# Patient Record
Sex: MALE | Race: WHITE | NOT HISPANIC OR LATINO | URBAN - METROPOLITAN AREA
[De-identification: names, ages, dates, MRNs, and addresses within clinical notes are randomized per-mention and may not be internally consistent; named-entity substitution may affect disease eponyms.]

---

## 2017-08-08 ENCOUNTER — EMERGENCY (EMERGENCY)
Facility: HOSPITAL | Age: 62
LOS: 1 days | Discharge: HOME | End: 2017-08-08
Attending: EMERGENCY MEDICINE
Payer: MEDICAID

## 2017-08-08 VITALS
RESPIRATION RATE: 14 BRPM | SYSTOLIC BLOOD PRESSURE: 147 MMHG | WEIGHT: 169.76 LBS | OXYGEN SATURATION: 98 % | DIASTOLIC BLOOD PRESSURE: 49 MMHG | TEMPERATURE: 99 F | HEART RATE: 62 BPM | HEIGHT: 66.54 IN

## 2017-08-08 VITALS
TEMPERATURE: 98 F | HEART RATE: 56 BPM | RESPIRATION RATE: 14 BRPM | OXYGEN SATURATION: 98 % | SYSTOLIC BLOOD PRESSURE: 132 MMHG | DIASTOLIC BLOOD PRESSURE: 68 MMHG

## 2017-08-08 DIAGNOSIS — M79.89 OTHER SPECIFIED SOFT TISSUE DISORDERS: ICD-10-CM

## 2017-08-08 DIAGNOSIS — F17.210 NICOTINE DEPENDENCE, CIGARETTES, UNCOMPLICATED: ICD-10-CM

## 2017-08-08 DIAGNOSIS — I10 ESSENTIAL (PRIMARY) HYPERTENSION: ICD-10-CM

## 2017-08-08 PROBLEM — Z00.00 ENCOUNTER FOR PREVENTIVE HEALTH EXAMINATION: Status: ACTIVE | Noted: 2017-08-08

## 2017-08-08 PROCEDURE — 93971 EXTREMITY STUDY: CPT | Mod: 26,RT

## 2017-08-08 PROCEDURE — 93971 EXTREMITY STUDY: CPT

## 2017-08-08 PROCEDURE — 99284 EMERGENCY DEPT VISIT MOD MDM: CPT

## 2017-08-08 NOTE — ED PROVIDER NOTE - CONSTITUTIONAL, MLM
normal... Well appearing, well nourished, awake, alert, oriented to person, place, time/situation and in no apparent distress. Pt ambulating without difficulty.

## 2017-08-08 NOTE — ED ADULT NURSE NOTE - OBJECTIVE STATEMENT
pt is here with right foot swelling for one week , right foot is swollen plus 2 , good pulses strong capp refills

## 2017-08-08 NOTE — ED PROVIDER NOTE - MUSCULOSKELETAL, MLM
Spine appears normal, range of motion is not limited, no muscle or joint tenderness. R ankle and foot have circumferential non pitting edema. No calf tenderness. Cap refill <2 seconds. DP pulses 2+.

## 2017-08-08 NOTE — ED PROVIDER NOTE - OBJECTIVE STATEMENT
64 y/o M pt with a significant PMHx of HTN, varicose veins presents to ED c/o R foot and ankle swelling x1 week that is improved with sleeping and worse throughout the day. Pt has never seen a vascular surgeon. Pt denies SOB, changes in urinary patterns, chest pain, fever, chills, numbness, tingling or any other complaints. NKDA.

## 2017-08-08 NOTE — ED PROVIDER NOTE - PROGRESS NOTE DETAILS
mckinney: DVT of right leg neg.  instruct to elevate legs, follow up with vascular and primary MD.    Dx right lower leg swelling possibly due to venous stasis.

## 2017-08-08 NOTE — ED PROVIDER NOTE - MEDICAL DECISION MAKING DETAILS
62 y/o M pt presents with 1 week of R foot swelling without any systemic symptoms. Likely venous stasis. Will r/o DVT. Unlikely thrombotic or embolic event. Obtain venous duplex and d/o with vascular f/u. Encourage leg elevation.

## 2017-08-08 NOTE — ED PROVIDER NOTE - CARDIAC, MLM
Normal rate, regular rhythm.  Heart sounds S1, S2.  No murmurs, rubs or gallops. No JVD at the neck. Visible varicose veins noted.

## 2018-07-12 ENCOUNTER — INPATIENT (INPATIENT)
Facility: HOSPITAL | Age: 63
LOS: 4 days | Discharge: HOME | DRG: 190 | End: 2018-07-17
Attending: INTERNAL MEDICINE | Admitting: INTERNAL MEDICINE
Payer: MEDICAID

## 2018-07-12 VITALS
SYSTOLIC BLOOD PRESSURE: 161 MMHG | HEIGHT: 64 IN | TEMPERATURE: 98 F | RESPIRATION RATE: 24 BRPM | DIASTOLIC BLOOD PRESSURE: 77 MMHG | HEART RATE: 72 BPM | WEIGHT: 160.06 LBS | OXYGEN SATURATION: 90 %

## 2018-07-12 LAB
ALBUMIN SERPL ELPH-MCNC: 3.1 G/DL — LOW (ref 3.5–5)
ALP SERPL-CCNC: 80 U/L — SIGNIFICANT CHANGE UP (ref 40–120)
ALT FLD-CCNC: 21 U/L DA — SIGNIFICANT CHANGE UP (ref 10–60)
ANION GAP SERPL CALC-SCNC: 9 MMOL/L — SIGNIFICANT CHANGE UP (ref 5–17)
AST SERPL-CCNC: 25 U/L — SIGNIFICANT CHANGE UP (ref 10–40)
BASOPHILS # BLD AUTO: 0.1 K/UL — SIGNIFICANT CHANGE UP (ref 0–0.2)
BASOPHILS NFR BLD AUTO: 1 % — SIGNIFICANT CHANGE UP (ref 0–2)
BILIRUB SERPL-MCNC: 0.5 MG/DL — SIGNIFICANT CHANGE UP (ref 0.2–1.2)
BUN SERPL-MCNC: 8 MG/DL — SIGNIFICANT CHANGE UP (ref 7–18)
CALCIUM SERPL-MCNC: 8.3 MG/DL — LOW (ref 8.4–10.5)
CHLORIDE SERPL-SCNC: 104 MMOL/L — SIGNIFICANT CHANGE UP (ref 96–108)
CO2 SERPL-SCNC: 25 MMOL/L — SIGNIFICANT CHANGE UP (ref 22–31)
CREAT SERPL-MCNC: 0.84 MG/DL — SIGNIFICANT CHANGE UP (ref 0.5–1.3)
EOSINOPHIL # BLD AUTO: 0.6 K/UL — HIGH (ref 0–0.5)
EOSINOPHIL NFR BLD AUTO: 4.8 % — SIGNIFICANT CHANGE UP (ref 0–6)
GLUCOSE SERPL-MCNC: 167 MG/DL — HIGH (ref 70–99)
HCT VFR BLD CALC: 34.3 % — LOW (ref 39–50)
HGB BLD-MCNC: 10.4 G/DL — LOW (ref 13–17)
LYMPHOCYTES # BLD AUTO: 1.5 K/UL — SIGNIFICANT CHANGE UP (ref 1–3.3)
LYMPHOCYTES # BLD AUTO: 13.1 % — SIGNIFICANT CHANGE UP (ref 13–44)
MCHC RBC-ENTMCNC: 22.5 PG — LOW (ref 27–34)
MCHC RBC-ENTMCNC: 30.2 GM/DL — LOW (ref 32–36)
MCV RBC AUTO: 74.5 FL — LOW (ref 80–100)
MONOCYTES # BLD AUTO: 1 K/UL — HIGH (ref 0–0.9)
MONOCYTES NFR BLD AUTO: 8.5 % — SIGNIFICANT CHANGE UP (ref 2–14)
NEUTROPHILS # BLD AUTO: 8.5 K/UL — HIGH (ref 1.8–7.4)
NEUTROPHILS NFR BLD AUTO: 72.5 % — SIGNIFICANT CHANGE UP (ref 43–77)
NT-PROBNP SERPL-SCNC: 275 PG/ML — HIGH (ref 0–125)
PLATELET # BLD AUTO: 151 K/UL — SIGNIFICANT CHANGE UP (ref 150–400)
POTASSIUM SERPL-MCNC: 4.1 MMOL/L — SIGNIFICANT CHANGE UP (ref 3.5–5.3)
POTASSIUM SERPL-SCNC: 4.1 MMOL/L — SIGNIFICANT CHANGE UP (ref 3.5–5.3)
PROT SERPL-MCNC: 7.9 G/DL — SIGNIFICANT CHANGE UP (ref 6–8.3)
RBC # BLD: 4.6 M/UL — SIGNIFICANT CHANGE UP (ref 4.2–5.8)
RBC # FLD: 16.8 % — HIGH (ref 10.3–14.5)
SODIUM SERPL-SCNC: 138 MMOL/L — SIGNIFICANT CHANGE UP (ref 135–145)
TROPONIN I SERPL-MCNC: <0.015 NG/ML — SIGNIFICANT CHANGE UP (ref 0–0.04)
WBC # BLD: 11.7 K/UL — HIGH (ref 3.8–10.5)
WBC # FLD AUTO: 11.7 K/UL — HIGH (ref 3.8–10.5)

## 2018-07-12 PROCEDURE — 71045 X-RAY EXAM CHEST 1 VIEW: CPT | Mod: 26

## 2018-07-12 RX ORDER — SODIUM CHLORIDE 9 MG/ML
3 INJECTION INTRAMUSCULAR; INTRAVENOUS; SUBCUTANEOUS ONCE
Qty: 0 | Refills: 0 | Status: COMPLETED | OUTPATIENT
Start: 2018-07-12 | End: 2018-07-12

## 2018-07-12 RX ORDER — NITROGLYCERIN 6.5 MG
0.4 CAPSULE, EXTENDED RELEASE ORAL
Qty: 0 | Refills: 0 | Status: DISCONTINUED | OUTPATIENT
Start: 2018-07-12 | End: 2018-07-17

## 2018-07-12 RX ORDER — ASPIRIN/CALCIUM CARB/MAGNESIUM 324 MG
162 TABLET ORAL ONCE
Qty: 0 | Refills: 0 | Status: COMPLETED | OUTPATIENT
Start: 2018-07-12 | End: 2018-07-12

## 2018-07-12 RX ADMIN — SODIUM CHLORIDE 3 MILLILITER(S): 9 INJECTION INTRAMUSCULAR; INTRAVENOUS; SUBCUTANEOUS at 22:48

## 2018-07-12 RX ADMIN — Medication 0.4 MILLIGRAM(S): at 22:50

## 2018-07-12 RX ADMIN — Medication 162 MILLIGRAM(S): at 22:50

## 2018-07-12 NOTE — ED PROVIDER NOTE - OBJECTIVE STATEMENT
62 year old M Pt w. PMHx of HTN presents to ED worsening shortness of breath x 2 days. Pt denies chest pain, nausea, vomiting, or any other complaints. Pt in respiratory distress. NKDA

## 2018-07-12 NOTE — ED ADULT NURSE NOTE - OBJECTIVE STATEMENT
pt is aox3 came to er accompanied by his wife c/o sob pt with difficulty breathing placed on o2 via n/c. also with distended abdomen firm.

## 2018-07-12 NOTE — ED PROVIDER NOTE - PROGRESS NOTE DETAILS
d/w dr estrada from icu, will see pt, requested abg pt very poor historian, unclear prior dx if pt may have had chf. given xr and pe findings this is much more likely than pe or pna. will admit for further eval and treatment. pt accepted to icu.

## 2018-07-12 NOTE — ED PROVIDER NOTE - MEDICAL DECISION MAKING DETAILS
61 y/o M pt w/ worsening shortness of breath, pt is hypoxic, respiratory failure, need for BiPAP, rule out ACS, stat EKG, possibly require EKG study. 61 y/o M pt w/ worsening shortness of breath, pt is hypoxic, respiratory failure, need for BiPAP, rule out ACS, stat EKG, possibly require echo

## 2018-07-13 DIAGNOSIS — I83.90 ASYMPTOMATIC VARICOSE VEINS OF UNSPECIFIED LOWER EXTREMITY: ICD-10-CM

## 2018-07-13 DIAGNOSIS — J96.01 ACUTE RESPIRATORY FAILURE WITH HYPOXIA: ICD-10-CM

## 2018-07-13 DIAGNOSIS — F10.10 ALCOHOL ABUSE, UNCOMPLICATED: ICD-10-CM

## 2018-07-13 DIAGNOSIS — D64.9 ANEMIA, UNSPECIFIED: ICD-10-CM

## 2018-07-13 DIAGNOSIS — Z29.9 ENCOUNTER FOR PROPHYLACTIC MEASURES, UNSPECIFIED: ICD-10-CM

## 2018-07-13 DIAGNOSIS — I10 ESSENTIAL (PRIMARY) HYPERTENSION: ICD-10-CM

## 2018-07-13 LAB
ALBUMIN SERPL ELPH-MCNC: 3.2 G/DL — LOW (ref 3.5–5)
ALP SERPL-CCNC: 75 U/L — SIGNIFICANT CHANGE UP (ref 40–120)
ALT FLD-CCNC: 21 U/L DA — SIGNIFICANT CHANGE UP (ref 10–60)
ANION GAP SERPL CALC-SCNC: 7 MMOL/L — SIGNIFICANT CHANGE UP (ref 5–17)
APTT BLD: 21.6 SEC — LOW (ref 27.5–37.4)
AST SERPL-CCNC: 41 U/L — HIGH (ref 10–40)
BASE EXCESS BLDA CALC-SCNC: 1.5 MMOL/L — SIGNIFICANT CHANGE UP (ref -2–2)
BASOPHILS # BLD AUTO: 0 K/UL — SIGNIFICANT CHANGE UP (ref 0–0.2)
BASOPHILS NFR BLD AUTO: 0.4 % — SIGNIFICANT CHANGE UP (ref 0–2)
BILIRUB SERPL-MCNC: 0.6 MG/DL — SIGNIFICANT CHANGE UP (ref 0.2–1.2)
BLOOD GAS COMMENTS ARTERIAL: SIGNIFICANT CHANGE UP
BUN SERPL-MCNC: 9 MG/DL — SIGNIFICANT CHANGE UP (ref 7–18)
CALCIUM SERPL-MCNC: 8.3 MG/DL — LOW (ref 8.4–10.5)
CHLORIDE SERPL-SCNC: 105 MMOL/L — SIGNIFICANT CHANGE UP (ref 96–108)
CHOLEST SERPL-MCNC: 152 MG/DL — SIGNIFICANT CHANGE UP (ref 10–199)
CO2 SERPL-SCNC: 26 MMOL/L — SIGNIFICANT CHANGE UP (ref 22–31)
CREAT SERPL-MCNC: 0.86 MG/DL — SIGNIFICANT CHANGE UP (ref 0.5–1.3)
EOSINOPHIL # BLD AUTO: 0.1 K/UL — SIGNIFICANT CHANGE UP (ref 0–0.5)
EOSINOPHIL NFR BLD AUTO: 0.7 % — SIGNIFICANT CHANGE UP (ref 0–6)
FERRITIN SERPL-MCNC: 24 NG/ML — LOW (ref 30–400)
FOLATE SERPL-MCNC: 7.7 NG/ML — SIGNIFICANT CHANGE UP
GLUCOSE SERPL-MCNC: 151 MG/DL — HIGH (ref 70–99)
HAPTOGLOB SERPL-MCNC: 124 MG/DL — SIGNIFICANT CHANGE UP (ref 34–200)
HAV IGM SER-ACNC: SIGNIFICANT CHANGE UP
HBA1C BLD-MCNC: 5.8 % — HIGH (ref 4–5.6)
HBV CORE IGM SER-ACNC: SIGNIFICANT CHANGE UP
HBV SURFACE AG SER-ACNC: SIGNIFICANT CHANGE UP
HCO3 BLDA-SCNC: 25 MMOL/L — SIGNIFICANT CHANGE UP (ref 23–27)
HCT VFR BLD CALC: 33.1 % — LOW (ref 39–50)
HCV AB S/CO SERPL IA: 0.48 S/CO — SIGNIFICANT CHANGE UP
HCV AB SERPL-IMP: SIGNIFICANT CHANGE UP
HDLC SERPL-MCNC: 41 MG/DL — SIGNIFICANT CHANGE UP (ref 40–125)
HGB BLD-MCNC: 10 G/DL — LOW (ref 13–17)
HIV 1+2 AB+HIV1 P24 AG SERPL QL IA: SIGNIFICANT CHANGE UP
HOROWITZ INDEX BLDA+IHG-RTO: 50 — SIGNIFICANT CHANGE UP
INR BLD: 1.13 RATIO — SIGNIFICANT CHANGE UP (ref 0.88–1.16)
IRON SATN MFR SERPL: 10 % — LOW (ref 20–55)
IRON SATN MFR SERPL: 56 UG/DL — LOW (ref 65–170)
LDH SERPL L TO P-CCNC: 485 U/L — HIGH (ref 120–225)
LIPID PNL WITH DIRECT LDL SERPL: 89 MG/DL — SIGNIFICANT CHANGE UP
LYMPHOCYTES # BLD AUTO: 0.7 K/UL — LOW (ref 1–3.3)
LYMPHOCYTES # BLD AUTO: 7.9 % — LOW (ref 13–44)
MAGNESIUM SERPL-MCNC: 2 MG/DL — SIGNIFICANT CHANGE UP (ref 1.6–2.6)
MCHC RBC-ENTMCNC: 22.3 PG — LOW (ref 27–34)
MCHC RBC-ENTMCNC: 30.1 GM/DL — LOW (ref 32–36)
MCV RBC AUTO: 73.9 FL — LOW (ref 80–100)
MONOCYTES # BLD AUTO: 0.1 K/UL — SIGNIFICANT CHANGE UP (ref 0–0.9)
MONOCYTES NFR BLD AUTO: 1 % — LOW (ref 2–14)
NEUTROPHILS # BLD AUTO: 8.3 K/UL — HIGH (ref 1.8–7.4)
NEUTROPHILS NFR BLD AUTO: 90 % — HIGH (ref 43–77)
PCO2 BLDA: 37 MMHG — SIGNIFICANT CHANGE UP (ref 32–46)
PH BLDA: 7.44 — SIGNIFICANT CHANGE UP (ref 7.35–7.45)
PHOSPHATE SERPL-MCNC: 3 MG/DL — SIGNIFICANT CHANGE UP (ref 2.5–4.5)
PLATELET # BLD AUTO: 166 K/UL — SIGNIFICANT CHANGE UP (ref 150–400)
PO2 BLDA: 201 MMHG — HIGH (ref 74–108)
POTASSIUM SERPL-MCNC: 4.6 MMOL/L — SIGNIFICANT CHANGE UP (ref 3.5–5.3)
POTASSIUM SERPL-SCNC: 4.6 MMOL/L — SIGNIFICANT CHANGE UP (ref 3.5–5.3)
PROT SERPL-MCNC: 8.4 G/DL — HIGH (ref 6–8.3)
PROTHROM AB SERPL-ACNC: 12.3 SEC — SIGNIFICANT CHANGE UP (ref 9.8–12.7)
RBC # BLD: 4.48 M/UL — SIGNIFICANT CHANGE UP (ref 4.2–5.8)
RBC # BLD: 4.57 M/UL — SIGNIFICANT CHANGE UP (ref 4.2–5.8)
RBC # FLD: 16.6 % — HIGH (ref 10.3–14.5)
RETICS #: 110.1 K/UL — SIGNIFICANT CHANGE UP (ref 25–125)
RETICS/RBC NFR: 2.4 % — SIGNIFICANT CHANGE UP (ref 0.5–2.5)
SAO2 % BLDA: SIGNIFICANT CHANGE UP % (ref 92–96)
SODIUM SERPL-SCNC: 138 MMOL/L — SIGNIFICANT CHANGE UP (ref 135–145)
TIBC SERPL-MCNC: 550 UG/DL — HIGH (ref 250–450)
TOTAL CHOLESTEROL/HDL RATIO MEASUREMENT: 3.7 RATIO — SIGNIFICANT CHANGE UP (ref 3.4–9.6)
TRANSFERRIN SERPL-MCNC: 401 MG/DL — HIGH (ref 200–360)
TRIGL SERPL-MCNC: 109 MG/DL — SIGNIFICANT CHANGE UP (ref 10–149)
TSH SERPL-MCNC: 2.5 UU/ML — SIGNIFICANT CHANGE UP (ref 0.34–4.82)
UIBC SERPL-MCNC: 494 UG/DL — HIGH (ref 110–370)
VIT B12 SERPL-MCNC: 463 PG/ML — SIGNIFICANT CHANGE UP (ref 232–1245)
WBC # BLD: 9.3 K/UL — SIGNIFICANT CHANGE UP (ref 3.8–10.5)
WBC # FLD AUTO: 9.3 K/UL — SIGNIFICANT CHANGE UP (ref 3.8–10.5)

## 2018-07-13 PROCEDURE — 99291 CRITICAL CARE FIRST HOUR: CPT

## 2018-07-13 PROCEDURE — 93970 EXTREMITY STUDY: CPT | Mod: 26

## 2018-07-13 RX ORDER — DOCUSATE SODIUM 100 MG
100 CAPSULE ORAL
Qty: 0 | Refills: 0 | Status: DISCONTINUED | OUTPATIENT
Start: 2018-07-13 | End: 2018-07-17

## 2018-07-13 RX ORDER — FOLIC ACID 0.8 MG
1 TABLET ORAL DAILY
Qty: 0 | Refills: 0 | Status: DISCONTINUED | OUTPATIENT
Start: 2018-07-13 | End: 2018-07-17

## 2018-07-13 RX ORDER — INSULIN LISPRO 100/ML
VIAL (ML) SUBCUTANEOUS
Qty: 0 | Refills: 0 | Status: DISCONTINUED | OUTPATIENT
Start: 2018-07-13 | End: 2018-07-15

## 2018-07-13 RX ORDER — GLUCAGON INJECTION, SOLUTION 0.5 MG/.1ML
1 INJECTION, SOLUTION SUBCUTANEOUS ONCE
Qty: 0 | Refills: 0 | Status: DISCONTINUED | OUTPATIENT
Start: 2018-07-13 | End: 2018-07-17

## 2018-07-13 RX ORDER — SODIUM CHLORIDE 9 MG/ML
1000 INJECTION, SOLUTION INTRAVENOUS
Qty: 0 | Refills: 0 | Status: DISCONTINUED | OUTPATIENT
Start: 2018-07-13 | End: 2018-07-17

## 2018-07-13 RX ORDER — POLYETHYLENE GLYCOL 3350 17 G/17G
17 POWDER, FOR SOLUTION ORAL DAILY
Qty: 0 | Refills: 0 | Status: DISCONTINUED | OUTPATIENT
Start: 2018-07-13 | End: 2018-07-17

## 2018-07-13 RX ORDER — IPRATROPIUM/ALBUTEROL SULFATE 18-103MCG
3 AEROSOL WITH ADAPTER (GRAM) INHALATION EVERY 6 HOURS
Qty: 0 | Refills: 0 | Status: DISCONTINUED | OUTPATIENT
Start: 2018-07-13 | End: 2018-07-17

## 2018-07-13 RX ORDER — DEXTROSE 50 % IN WATER 50 %
25 SYRINGE (ML) INTRAVENOUS ONCE
Qty: 0 | Refills: 0 | Status: DISCONTINUED | OUTPATIENT
Start: 2018-07-13 | End: 2018-07-17

## 2018-07-13 RX ORDER — THIAMINE MONONITRATE (VIT B1) 100 MG
100 TABLET ORAL DAILY
Qty: 0 | Refills: 0 | Status: DISCONTINUED | OUTPATIENT
Start: 2018-07-13 | End: 2018-07-17

## 2018-07-13 RX ORDER — ENOXAPARIN SODIUM 100 MG/ML
40 INJECTION SUBCUTANEOUS DAILY
Qty: 0 | Refills: 0 | Status: DISCONTINUED | OUTPATIENT
Start: 2018-07-13 | End: 2018-07-17

## 2018-07-13 RX ORDER — DEXTROSE 50 % IN WATER 50 %
12.5 SYRINGE (ML) INTRAVENOUS ONCE
Qty: 0 | Refills: 0 | Status: DISCONTINUED | OUTPATIENT
Start: 2018-07-13 | End: 2018-07-17

## 2018-07-13 RX ORDER — MULTIVIT-MIN/FERROUS GLUCONATE 9 MG/15 ML
1 LIQUID (ML) ORAL DAILY
Qty: 0 | Refills: 0 | Status: DISCONTINUED | OUTPATIENT
Start: 2018-07-13 | End: 2018-07-17

## 2018-07-13 RX ORDER — NICOTINE POLACRILEX 2 MG
1 GUM BUCCAL DAILY
Qty: 0 | Refills: 0 | Status: DISCONTINUED | OUTPATIENT
Start: 2018-07-13 | End: 2018-07-17

## 2018-07-13 RX ORDER — LOSARTAN POTASSIUM 100 MG/1
25 TABLET, FILM COATED ORAL DAILY
Qty: 0 | Refills: 0 | Status: DISCONTINUED | OUTPATIENT
Start: 2018-07-13 | End: 2018-07-17

## 2018-07-13 RX ORDER — IPRATROPIUM BROMIDE 0.2 MG/ML
500 SOLUTION, NON-ORAL INHALATION EVERY 6 HOURS
Qty: 0 | Refills: 0 | Status: DISCONTINUED | OUTPATIENT
Start: 2018-07-13 | End: 2018-07-13

## 2018-07-13 RX ORDER — SENNA PLUS 8.6 MG/1
2 TABLET ORAL AT BEDTIME
Qty: 0 | Refills: 0 | Status: DISCONTINUED | OUTPATIENT
Start: 2018-07-13 | End: 2018-07-17

## 2018-07-13 RX ORDER — DEXTROSE 50 % IN WATER 50 %
15 SYRINGE (ML) INTRAVENOUS ONCE
Qty: 0 | Refills: 0 | Status: DISCONTINUED | OUTPATIENT
Start: 2018-07-13 | End: 2018-07-17

## 2018-07-13 RX ORDER — CHLORHEXIDINE GLUCONATE 213 G/1000ML
1 SOLUTION TOPICAL EVERY 12 HOURS
Qty: 0 | Refills: 0 | Status: DISCONTINUED | OUTPATIENT
Start: 2018-07-13 | End: 2018-07-15

## 2018-07-13 RX ADMIN — Medication 40 MILLIGRAM(S): at 21:46

## 2018-07-13 RX ADMIN — Medication 40 MILLIGRAM(S): at 13:06

## 2018-07-13 RX ADMIN — Medication 1 TABLET(S): at 11:25

## 2018-07-13 RX ADMIN — SENNA PLUS 2 TABLET(S): 8.6 TABLET ORAL at 21:45

## 2018-07-13 RX ADMIN — Medication 1: at 11:25

## 2018-07-13 RX ADMIN — Medication 1: at 17:04

## 2018-07-13 RX ADMIN — Medication 100 MILLIGRAM(S): at 11:25

## 2018-07-13 RX ADMIN — Medication 3 MILLILITER(S): at 02:14

## 2018-07-13 RX ADMIN — Medication 1 MILLIGRAM(S): at 11:25

## 2018-07-13 RX ADMIN — CHLORHEXIDINE GLUCONATE 1 APPLICATION(S): 213 SOLUTION TOPICAL at 17:04

## 2018-07-13 RX ADMIN — Medication 40 MILLIGRAM(S): at 05:55

## 2018-07-13 RX ADMIN — ENOXAPARIN SODIUM 40 MILLIGRAM(S): 100 INJECTION SUBCUTANEOUS at 11:25

## 2018-07-13 RX ADMIN — Medication 1 PATCH: at 12:09

## 2018-07-13 RX ADMIN — Medication 3 MILLILITER(S): at 15:40

## 2018-07-13 RX ADMIN — LOSARTAN POTASSIUM 25 MILLIGRAM(S): 100 TABLET, FILM COATED ORAL at 13:03

## 2018-07-13 RX ADMIN — Medication 3 MILLILITER(S): at 09:03

## 2018-07-13 RX ADMIN — Medication 3 MILLILITER(S): at 20:39

## 2018-07-13 RX ADMIN — Medication 100 MILLIGRAM(S): at 17:04

## 2018-07-13 RX ADMIN — Medication 125 MILLIGRAM(S): at 01:27

## 2018-07-13 NOTE — PROGRESS NOTE ADULT - SUBJECTIVE AND OBJECTIVE BOX
INTERVAL HPI/OVERNIGHT EVENTS: was tolerating BIPAP well - switched to nasal canula     PRESSORS: [ ] YES [x ] NO  WHICH:    ANTIBIOTICS:   levoflox               DATE STARTED: 7/12    Antimicrobial:  levoFLOXacin IVPB        Cardiovascular:  nitroglycerin     SubLingual 0.4 milliGRAM(s) SubLingual every 5 minutes PRN    Pulmonary:  ALBUTerol/ipratropium for Nebulization 3 milliLiter(s) Nebulizer every 6 hours    Hematalogic:  enoxaparin Injectable 40 milliGRAM(s) SubCutaneous daily    Other:  chlorhexidine 4% Liquid 1 Application(s) Topical every 12 hours  dextrose 40% Gel 15 Gram(s) Oral once PRN  dextrose 5%. 1000 milliLiter(s) IV Continuous <Continuous>  dextrose 50% Injectable 12.5 Gram(s) IV Push once  dextrose 50% Injectable 25 Gram(s) IV Push once  dextrose 50% Injectable 25 Gram(s) IV Push once  docusate sodium 100 milliGRAM(s) Oral two times a day  folic acid 1 milliGRAM(s) Oral daily  glucagon  Injectable 1 milliGRAM(s) IntraMuscular once PRN  insulin lispro (HumaLOG) corrective regimen sliding scale   SubCutaneous three times a day before meals  LORazepam   Injectable 1 milliGRAM(s) IV Push every 6 hours PRN  methylPREDNISolone sodium succinate Injectable 40 milliGRAM(s) IV Push every 8 hours  multivitamin/minerals 1 Tablet(s) Oral daily  nicotine -  14 mG/24Hr(s) Patch 1 patch Transdermal daily  polyethylene glycol 3350 17 Gram(s) Oral daily PRN  senna 2 Tablet(s) Oral at bedtime  thiamine 100 milliGRAM(s) Oral daily    ALBUTerol/ipratropium for Nebulization 3 milliLiter(s) Nebulizer every 6 hours  chlorhexidine 4% Liquid 1 Application(s) Topical every 12 hours  dextrose 40% Gel 15 Gram(s) Oral once PRN  dextrose 5%. 1000 milliLiter(s) IV Continuous <Continuous>  dextrose 50% Injectable 12.5 Gram(s) IV Push once  dextrose 50% Injectable 25 Gram(s) IV Push once  dextrose 50% Injectable 25 Gram(s) IV Push once  docusate sodium 100 milliGRAM(s) Oral two times a day  enoxaparin Injectable 40 milliGRAM(s) SubCutaneous daily  folic acid 1 milliGRAM(s) Oral daily  glucagon  Injectable 1 milliGRAM(s) IntraMuscular once PRN  insulin lispro (HumaLOG) corrective regimen sliding scale   SubCutaneous three times a day before meals  levoFLOXacin IVPB      LORazepam   Injectable 1 milliGRAM(s) IV Push every 6 hours PRN  methylPREDNISolone sodium succinate Injectable 40 milliGRAM(s) IV Push every 8 hours  multivitamin/minerals 1 Tablet(s) Oral daily  nicotine -  14 mG/24Hr(s) Patch 1 patch Transdermal daily  nitroglycerin     SubLingual 0.4 milliGRAM(s) SubLingual every 5 minutes PRN  polyethylene glycol 3350 17 Gram(s) Oral daily PRN  senna 2 Tablet(s) Oral at bedtime  thiamine 100 milliGRAM(s) Oral daily    Drug Dosing Weight  Height (cm): 163 (13 Jul 2018 01:58)  Weight (kg): 78.2 (13 Jul 2018 01:58)  BMI (kg/m2): 29.4 (13 Jul 2018 01:58)  BSA (m2): 1.84 (13 Jul 2018 01:58)    CENTRAL LINE: [ ] YES [x ] NO  LOCATION:   DATE INSERTED:  REMOVE: [ ] YES [ ] NO  EXPLAIN:    MUÑOZ: [ ] YES [x ] NO    DATE INSERTED:  REMOVE:  [ ] YES [ ] NO  EXPLAIN:    A-LINE:  [ ] YES [x ] NO  LOCATION:   DATE INSERTED:  REMOVE:  [ ] YES [ ] NO  EXPLAIN:      ICU Vital Signs Last 24 Hrs  T(C): 36.6 (13 Jul 2018 08:00), Max: 36.7 (12 Jul 2018 22:11)  T(F): 97.9 (13 Jul 2018 08:00), Max: 98.1 (12 Jul 2018 22:11)  HR: 69 (13 Jul 2018 11:00) (60 - 91)  BP: 167/68 (13 Jul 2018 11:00) (152/66 - 172/65)  BP(mean): 91 (13 Jul 2018 11:00) (81 - 97)  RR: 20 (13 Jul 2018 11:00) (15 - 24)  SpO2: 100% (13 Jul 2018 11:00) (90% - 100%)      ABG - ( 13 Jul 2018 00:12 )  pH, Arterial: 7.44  pH, Blood: x     /  pCO2: 37    /  pO2: 201   / HCO3: 25    / Base Excess: 1.5   /  SaO2: NR                    07-12 @ 07:01  -  07-13 @ 07:00  --------------------------------------------------------  IN: 0 mL / OUT: 400 mL / NET: -400 mL            PHYSICAL EXAM:    · Constitutional	Well-developed, well nourished  · Eyes	EOMI; PERRL; no drainage or redness  · ENMT	No oral lesions; no gross abnormalities  · Neck	No bruits; no thyromegaly or nodules   · Back	No deformity or limitation of movement   · Respiratory	Breath Sounds equal & clear to percussion & auscultation, no accessory muscle use  · Cardiovascular	Regular rate & rhythm, normal S1, S2; no murmurs, gallops or rubs; no S3, S4  · Gastrointestinal	Soft, non-tender, no hepatosplenomegaly, normal bowel sounds  · Extremities	No cyanosis, clubbing or edema   · Neurological	Alert & oriented; no sensory, motor or coordination deficits, normal reflexes  · Musculoskeletal	No joint pain, swelling or deformity; no limitation of movement      LABS:  CBC Full  -  ( 13 Jul 2018 05:39 )  WBC Count : 9.3 K/uL  Hemoglobin : 10.0 g/dL  Hematocrit : 33.1 %  Platelet Count - Automated : 166 K/uL  Mean Cell Volume : 73.9 fl  Mean Cell Hemoglobin : 22.3 pg  Mean Cell Hemoglobin Concentration : 30.1 gm/dL  Auto Neutrophil # : 8.3 K/uL  Auto Lymphocyte # : 0.7 K/uL  Auto Monocyte # : 0.1 K/uL  Auto Eosinophil # : 0.1 K/uL  Auto Basophil # : 0.0 K/uL  Auto Neutrophil % : 90.0 %  Auto Lymphocyte % : 7.9 %  Auto Monocyte % : 1.0 %  Auto Eosinophil % : 0.7 %  Auto Basophil % : 0.4 %    07-13    138  |  105  |  9   ----------------------------<  151<H>  4.6   |  26  |  0.86    Ca    8.3<L>      13 Jul 2018 05:39  Phos  3.0     07-13  Mg     2.0     07-13    TPro  8.4<H>  /  Alb  3.2<L>  /  TBili  0.6  /  DBili  x   /  AST  41<H>  /  ALT  21  /  AlkPhos  75  07-13    PT/INR - ( 13 Jul 2018 05:39 )   PT: 12.3 sec;   INR: 1.13 ratio         PTT - ( 13 Jul 2018 05:39 )  PTT:21.6 sec    [x ]GOALS OF CARE DISCUSSION WITH PATIENT/FAMILY/PROXY: full code     CRITICAL CARE TIME SPENT: 35 minutes

## 2018-07-13 NOTE — H&P ADULT - ATTENDING COMMENTS
Patient is a 61 y/o male with PMH of HTN, active smoker, admitted with chief complaint of SOB and cough productive of green sputum. He has no infiltrate on CXR and was place on non invasive positive pressure ventilation with inspiratory/expiratory  pressure 15/5 cmh2o, FiO2 50%. He is comfortable with this level of respiratory support as per him. Will treat with antibiotics, systemic corticosteroids, beta agonist nebulizers and DVT prophylaxis with lovenox. ABG 7.44/37/200. Will reduce FiO2. Critical care time 35 minutes.

## 2018-07-13 NOTE — H&P ADULT - NSHPPHYSICALEXAM_GEN_ALL_CORE
GENERAL: patient on bipap, appears comfortable at this time, not using accessory muscles, difficulty sepaking throught the bipap  HEAD: atraumatic, normocephalic   EYES: PERRLA, white sclera. pale conjunctiva  ENMT: nasal mucosa- Oral cavity- dry  NECK: supple, no JVD, thyroid- not palpable  LYMPH: no palpable lymph nodes     SKIN:  warm and dry  CHEST/LUNG: decreased breath sounds b/l, scattered wheezing  HEART: RRR, no m/r/g   ABDOMEN: soft, nontender, distended+; bowel sounds+  EXTREMITIES: chronic RLE venous stasis, no cyanosis, no clubbing.  NEURO: AA0X3, mood/ affect-stable , no focal neuro deficits

## 2018-07-13 NOTE — ED ADULT NURSE REASSESSMENT NOTE - NS ED NURSE REASSESS COMMENT FT1
pt presents to the ER with sob unable to sit or lie down. first time on c-pap tolerating well appears to be more comfortable wife at bedside.

## 2018-07-13 NOTE — H&P ADULT - PROBLEM SELECTOR PLAN 4
chronic, elevate RLE drinks vodka everyday, CIWA protocol initiated, Oral MV, thiamine, Folate  PRN ativan only for CIWA>7

## 2018-07-13 NOTE — H&P ADULT - PROBLEM SELECTOR PLAN 2
does not know which med he is taking at home, will give IV pushes of anti HTN meds for now as needed (patient NPO while on BipaP)  please call pharmacy in AM to know home meds

## 2018-07-13 NOTE — H&P ADULT - ASSESSMENT
62/ M from home, lives with wife, with PMHx of HTN (takes meds at home, but does not know th name), HLD, right LE varicose veins, umbilical hernia, constipation, current active smoker (1Pack over 3 days >50 years), drinks every day, small glass of vodka comes in with difficulty breathing that has been ongoing for 2-3 months however worse today.    Patient is afebrile in the ED, hypertensive to 161/77, RR: 24 and O2 sat of 90% on RA, was placed on BiPaP by the ED. while on bipap, patient is not using accessory muscles and no apparent resp distress. leucocytosis not significant, 11.7, has microcytic, hypochromic anemia, H/H of 10.4/34.3, BS elevated to 167, first trop neg, proBNP WNL, no obvious consolidation seen on CXR    Admitted for SOB, hypoxic resp failure, needing NIV, possibly 2/2 to COPD exacerbation

## 2018-07-13 NOTE — H&P ADULT - HISTORY OF PRESENT ILLNESS
62/ M from home, lives with wife, with PMHx of HTN (takes meds at home, but does not know th name), HLD, right LE varicose veins, umbilical hernia, constipation, current active smoker (1Pack over 3 days >50 years), drinks every day, small glass of vodka comes in with difficulty breathing that has been ongoing for 2-3 months however worse today. Admits to cough with green sputum, subjective fever mostly at nights, no chest pain. Denies orthopnea but has GUILLEN, in the past, he used to walk 2-3 blocks without difficulty however now he becomes SOB with 4-5 steps. has chronic RLE edema 2/2 to varicose veins, no edema over the LLE. no chills, headache, dizziness, N/V/D, joint pains, rash, weight loss etc  NKDA, FHx of HTN/HLD in sister, Social Hx as above, surgical hx of left inguinal hernia repair.

## 2018-07-13 NOTE — ED ADULT NURSE REASSESSMENT NOTE - NS ED NURSE REASSESS COMMENT FT1
pt on c-pap Ipap 15, rate 12,02 sat 50% tolerating well. m. appears much calmer since on c-pap. tolerating well.

## 2018-07-13 NOTE — PROGRESS NOTE ADULT - PROBLEM SELECTOR PLAN 1
Patient coming in with difficulty breathing, hypoxic respiratory failure  no consolidation seen on CXR, not volume overloaded clinically, proBNP WNL  possible COPD exacerbation given long term smoking history ,  continue nebs, Levaquin 750mg QD IV, solu-medro 40Q8  switched to NC 2 L form BIPAP - tolerating well   smoking cessation advice provided, ordered nicotine patch  Check TTE to eval for Pul HTN  will get Doppler lower extremity- given lower extremity swelling and SOB

## 2018-07-14 DIAGNOSIS — R19.00 INTRA-ABDOMINAL AND PELVIC SWELLING, MASS AND LUMP, UNSPECIFIED SITE: ICD-10-CM

## 2018-07-14 LAB
ALBUMIN SERPL ELPH-MCNC: 2.5 G/DL — LOW (ref 3.5–5)
ALP SERPL-CCNC: 63 U/L — SIGNIFICANT CHANGE UP (ref 40–120)
ALT FLD-CCNC: 16 U/L DA — SIGNIFICANT CHANGE UP (ref 10–60)
ANION GAP SERPL CALC-SCNC: 7 MMOL/L — SIGNIFICANT CHANGE UP (ref 5–17)
AST SERPL-CCNC: 17 U/L — SIGNIFICANT CHANGE UP (ref 10–40)
BASOPHILS # BLD AUTO: 0 K/UL — SIGNIFICANT CHANGE UP (ref 0–0.2)
BASOPHILS NFR BLD AUTO: 0.2 % — SIGNIFICANT CHANGE UP (ref 0–2)
BILIRUB SERPL-MCNC: 0.2 MG/DL — SIGNIFICANT CHANGE UP (ref 0.2–1.2)
BUN SERPL-MCNC: 16 MG/DL — SIGNIFICANT CHANGE UP (ref 7–18)
CALCIUM SERPL-MCNC: 8.2 MG/DL — LOW (ref 8.4–10.5)
CHLORIDE SERPL-SCNC: 108 MMOL/L — SIGNIFICANT CHANGE UP (ref 96–108)
CO2 SERPL-SCNC: 25 MMOL/L — SIGNIFICANT CHANGE UP (ref 22–31)
CREAT SERPL-MCNC: 0.83 MG/DL — SIGNIFICANT CHANGE UP (ref 0.5–1.3)
EOSINOPHIL # BLD AUTO: 0 K/UL — SIGNIFICANT CHANGE UP (ref 0–0.5)
EOSINOPHIL NFR BLD AUTO: 0 % — SIGNIFICANT CHANGE UP (ref 0–6)
GLUCOSE BLDC GLUCOMTR-MCNC: 211 MG/DL — HIGH (ref 70–99)
GLUCOSE BLDC GLUCOMTR-MCNC: 223 MG/DL — HIGH (ref 70–99)
GLUCOSE BLDC GLUCOMTR-MCNC: 234 MG/DL — HIGH (ref 70–99)
GLUCOSE BLDC GLUCOMTR-MCNC: 243 MG/DL — HIGH (ref 70–99)
GLUCOSE SERPL-MCNC: 145 MG/DL — HIGH (ref 70–99)
HCT VFR BLD CALC: 31.2 % — LOW (ref 39–50)
HGB BLD-MCNC: 9.4 G/DL — LOW (ref 13–17)
LYMPHOCYTES # BLD AUTO: 0.9 K/UL — LOW (ref 1–3.3)
LYMPHOCYTES # BLD AUTO: 7.7 % — LOW (ref 13–44)
MAGNESIUM SERPL-MCNC: 2.2 MG/DL — SIGNIFICANT CHANGE UP (ref 1.6–2.6)
MCHC RBC-ENTMCNC: 22.4 PG — LOW (ref 27–34)
MCHC RBC-ENTMCNC: 30.3 GM/DL — LOW (ref 32–36)
MCV RBC AUTO: 74.1 FL — LOW (ref 80–100)
MONOCYTES # BLD AUTO: 0.8 K/UL — SIGNIFICANT CHANGE UP (ref 0–0.9)
MONOCYTES NFR BLD AUTO: 7.2 % — SIGNIFICANT CHANGE UP (ref 2–14)
NEUTROPHILS # BLD AUTO: 9.6 K/UL — HIGH (ref 1.8–7.4)
NEUTROPHILS NFR BLD AUTO: 84.9 % — HIGH (ref 43–77)
OB PNL STL: NEGATIVE — SIGNIFICANT CHANGE UP
PHOSPHATE SERPL-MCNC: 4.3 MG/DL — SIGNIFICANT CHANGE UP (ref 2.5–4.5)
PLATELET # BLD AUTO: 148 K/UL — LOW (ref 150–400)
POTASSIUM SERPL-MCNC: 3.8 MMOL/L — SIGNIFICANT CHANGE UP (ref 3.5–5.3)
POTASSIUM SERPL-SCNC: 3.8 MMOL/L — SIGNIFICANT CHANGE UP (ref 3.5–5.3)
PROT SERPL-MCNC: 7 G/DL — SIGNIFICANT CHANGE UP (ref 6–8.3)
RBC # BLD: 4.21 M/UL — SIGNIFICANT CHANGE UP (ref 4.2–5.8)
RBC # FLD: 16.3 % — HIGH (ref 10.3–14.5)
SODIUM SERPL-SCNC: 140 MMOL/L — SIGNIFICANT CHANGE UP (ref 135–145)
WBC # BLD: 11.4 K/UL — HIGH (ref 3.8–10.5)
WBC # FLD AUTO: 11.4 K/UL — HIGH (ref 3.8–10.5)

## 2018-07-14 RX ORDER — LANOLIN ALCOHOL/MO/W.PET/CERES
1 CREAM (GRAM) TOPICAL AT BEDTIME
Qty: 0 | Refills: 0 | Status: COMPLETED | OUTPATIENT
Start: 2018-07-14 | End: 2018-07-14

## 2018-07-14 RX ORDER — LANOLIN ALCOHOL/MO/W.PET/CERES
1 CREAM (GRAM) TOPICAL ONCE
Qty: 0 | Refills: 0 | Status: COMPLETED | OUTPATIENT
Start: 2018-07-14 | End: 2018-07-14

## 2018-07-14 RX ADMIN — Medication 1 MILLIGRAM(S): at 23:53

## 2018-07-14 RX ADMIN — Medication 3 MILLILITER(S): at 02:20

## 2018-07-14 RX ADMIN — Medication 40 MILLIGRAM(S): at 17:19

## 2018-07-14 RX ADMIN — Medication 4: at 11:35

## 2018-07-14 RX ADMIN — Medication 1 MILLIGRAM(S): at 02:54

## 2018-07-14 RX ADMIN — Medication 1 PATCH: at 12:44

## 2018-07-14 RX ADMIN — Medication 1 PATCH: at 11:36

## 2018-07-14 RX ADMIN — Medication 1 TABLET(S): at 11:36

## 2018-07-14 RX ADMIN — Medication 100 MILLIGRAM(S): at 17:19

## 2018-07-14 RX ADMIN — SENNA PLUS 2 TABLET(S): 8.6 TABLET ORAL at 21:51

## 2018-07-14 RX ADMIN — Medication 1 MILLIGRAM(S): at 11:36

## 2018-07-14 RX ADMIN — Medication 2: at 17:03

## 2018-07-14 RX ADMIN — ENOXAPARIN SODIUM 40 MILLIGRAM(S): 100 INJECTION SUBCUTANEOUS at 11:36

## 2018-07-14 RX ADMIN — Medication 3 MILLILITER(S): at 08:31

## 2018-07-14 RX ADMIN — Medication 3 MILLILITER(S): at 14:47

## 2018-07-14 RX ADMIN — LOSARTAN POTASSIUM 25 MILLIGRAM(S): 100 TABLET, FILM COATED ORAL at 07:08

## 2018-07-14 RX ADMIN — Medication 100 MILLIGRAM(S): at 11:36

## 2018-07-14 RX ADMIN — Medication 3 MILLILITER(S): at 21:07

## 2018-07-14 RX ADMIN — Medication 40 MILLIGRAM(S): at 07:08

## 2018-07-14 RX ADMIN — CHLORHEXIDINE GLUCONATE 1 APPLICATION(S): 213 SOLUTION TOPICAL at 07:09

## 2018-07-14 NOTE — PROGRESS NOTE ADULT - PROBLEM SELECTOR PLAN 1
- LFTs to be monitored daily  - CT scan Triphasic Abdomen to rule out HCC (if Ascites , proceed to Paracentesis)  - Endoscopy/colonoscopy to be scheduled on Tuesday after CT results.

## 2018-07-14 NOTE — CONSULT NOTE ADULT - SUBJECTIVE AND OBJECTIVE BOX
GI INITIAL CONSULT    HPI: 62M p/w GUILLEN and hypoxemic respiratory failure admitted to ICU for initiation of BiPAP and was downgraded to the floor in <24 hrs. GI consult was called for microcytic CELE. Pt reports having severe abd distension for the past 2.5 yrs, which can be contributing to pt's SOB. No reported melena, hematochezia, hematemesis. Reports having intermittent diarrhea and constipation. Reports EtOH abuse. Never had an EGD or colonoscopy.    PMH: HTN, COPD  PSH: R inguinal hernia repair    Meds: MEDICATIONS  (STANDING):  ALBUTerol/ipratropium for Nebulization 3 milliLiter(s) Nebulizer every 6 hours  chlorhexidine 4% Liquid 1 Application(s) Topical every 12 hours  dextrose 5%. 1000 milliLiter(s) (50 mL/Hr) IV Continuous <Continuous>  dextrose 50% Injectable 12.5 Gram(s) IV Push once  dextrose 50% Injectable 25 Gram(s) IV Push once  dextrose 50% Injectable 25 Gram(s) IV Push once  docusate sodium 100 milliGRAM(s) Oral two times a day  enoxaparin Injectable 40 milliGRAM(s) SubCutaneous daily  folic acid 1 milliGRAM(s) Oral daily  insulin lispro (HumaLOG) corrective regimen sliding scale   SubCutaneous three times a day before meals  levoFLOXacin IVPB      levoFLOXacin IVPB 750 milliGRAM(s) IV Intermittent every 24 hours  losartan 25 milliGRAM(s) Oral daily  methylPREDNISolone sodium succinate Injectable 40 milliGRAM(s) IV Push every 12 hours  multivitamin/minerals 1 Tablet(s) Oral daily  nicotine -  14 mG/24Hr(s) Patch 1 patch Transdermal daily  senna 2 Tablet(s) Oral at bedtime  thiamine 100 milliGRAM(s) Oral daily    SH: (+) tobacco abuse, (+) EtOH abuse  FH: no reported h/o GI malignancies  ROS:  CONSTITUTIONAL: No fever, weight loss, or fatigue  EYES: No eye pain, visual disturbances, or discharge  ENT:  No difficulty hearing, tinnitus, vertigo; No sinus or throat pain  NECK: No pain or stiffness  RESPIRATORY: No cough, wheezing, chills or hemoptysis, shortness of Breath  CARDIOVASCULAR: No chest pain, palpitations, passing out, dizziness, or leg swelling  GASTROINTESTINAL: see HPI  GENITOURINARY: No dysuria, frequency, hematuria, or incontinence  NEUROLOGICAL: No headaches, memory loss, loss of strength, numbness, or tremors  SKIN: No itching, burning, rashes, or lesions   MUSCULOSKELETAL: No arthralgia, myalgia, or back pain.     Vitals: T(C): 36.3 (07-14-18 @ 13:58)  T(F): 97.3 (07-14-18 @ 13:58), Max: 97.7 (07-14-18 @ 06:00)  HR: 81 (07-14-18 @ 13:58) (58 - 82)  BP: 158/62 (07-14-18 @ 13:58) (123/62 - 168/69)    Gen: NAD  Neuro: A&O x3; (-) asterixis  CVS: S1/S2  Chest: coarse BS B/L  Abd: S/severely distended/dull to percussion/mildly diffusely tender                        9.4    11.4  )-----------( 148      ( 14 Jul 2018 07:24 )             31.2   07-14    140  |  108  |  16  ----------------------------<  145<H>  3.8   |  25  |  0.83    Ca    8.2<L>      14 Jul 2018 07:24  Phos  4.3     07-14  Mg     2.2     07-14    TPro  7.0  /  Alb  2.5<L>  /  TBili  0.2  /  DBili  x   /  AST  17  /  ALT  16  /  AlkPhos  63  07-14    Viral hepatitis panel (-)    Imaging reviewed

## 2018-07-14 NOTE — CHART NOTE - NSCHARTNOTEFT_GEN_A_CORE
HPI:  62/ M from home, lives with wife, with PMHx of HTN (takes meds at home, but does not know th name), HLD, right LE varicose veins, umbilical hernia, constipation, current active smoker (1Pack over 3 days >50 years), drinks every day, small glass of vodka comes in with difficulty breathing that has been ongoing for 2-3 months however worse today. Admits to cough with green sputum, subjective fever mostly at nights, no chest pain. Denies orthopnea but has GUILLEN, in the past, he used to walk 2-3 blocks without difficulty however now he becomes SOB with 4-5 steps. has chronic RLE edema 2/2 to varicose veins, no edema over the LLE. no chills, headache, dizziness, N/V/D, joint pains, rash, weight loss etc  NKDA, FHx of HTN/HLD in sister, Social Hx as above, surgical hx of left inguinal hernia repair. (13 Jul 2018 00:12)    ICU Course:  · Assessment	  62/ M from home, lives with wife, with PMHx of HTN (takes meds at home, but does not know th name), HLD, right LE varicose veins, umbilical hernia, constipation, current active smoker (1Pack over 3 days >50 years), drinks every day, small glass of vodka comes in with difficulty breathing that has been ongoing for 2-3 months however worse today.    Patient is afebrile in the ED, hypertensive to 161/77, RR: 24 and O2 sat of 90% on RA, was placed on BiPaP by the ED. while on bipap, patient is not using accessory muscles and no apparent resp distress. leucocytosis not significant, 11.7, has microcytic, hypochromic anemia, H/H of 10.4/34.3, BS elevated to 167, first trop neg, proBNP WNL, no obvious consolidation seen on CXR    Admitted for SOB, hypoxic resp failure, needing NIV, possibly 2/2 to COPD exacerbation     Problem/Plan - 1:  ·  Problem: Acute respiratory failure with hypoxia.  Plan: Patient coming in with difficulty breathing, hypoxic respiratory failure  no consolidation seen on CXR, not volume overloaded clinically, proBNP WNL  possible COPD exacerbation given long term smoking history ,  continue nebs, Levaquin 750mg QD IV, solu-medro 40Q8  patient on room air, saturating well  smoking cessation advice provided, ordered nicotine patch  f/u TTE  LE Doppler negative    7/14:  -Weaning off Methylprednisolone 40 mg q8->q12  -Methylprednisolone 40 mg qD tomorrow.      Problem/Plan - 2:  ·  Problem: HTN (hypertension).  Plan: does not remember list of medications - started on losartan 25 mg PO - will titrate as needed   not able to get list medications from pharmacy.      Problem/Plan - 3:  ·  Problem: Anemia.  Plan: Microcytic hypochromic anemia, baseline not known  iron deficiency anemia   will follow FOBT.      Problem/Plan - 4:  ·  Problem: Alcohol abuse.  Plan: drinks vodka everyday, CIWA protocol initiated, Oral MV, thiamine, Folate  PRN ativan only for CIWA>7.      Problem/Plan - 5:  ·  Problem: Varicose veins.  Plan: chronic, elevate RLE.      Problem/Plan - 6:  Problem: Prophylactic measure. Plan: IMPROVE VTE score: 2 for age>60, ICU stay  Lovenox sc.    Attending Attestation:   62/ M from home, lives with wife, with PMHx of HTN (takes meds at home, but does not know th name), HLD, right LE varicose veins, umbilical hernia, constipation, current active smoker (1Pack over 3 days >50 years), drinks every day, small glass of vodka comes in with difficulty breathing that has been ongoing for 2-3 months however worse today.    Patient is afebrile in the ED, hypertensive to 161/77, RR: 24 and O2 sat of 90% on RA, was placed on BiPaP by the ED. while on bipap, patient is not using accessory muscles and no apparent resp distress. leucocytosis not significant, 11.7, has microcytic, hypochromic anemia, H/H of 10.4/34.3, BS elevated to 167, first trop neg, proBNP WNL, no obvious consolidation seen on CXR. Admitted for SOB, hypoxic resp failure, needing NIV, possibly 2/2 to COPD exacerbation.  Clinically improved  -continue antibx  -banana bag  -advise to stop using alcohol  -advise to stop smoking  -nicotine patch  -social svc  eval      Admitted for SOB, hypoxic resp failure, needing NIV, possibly 2/2 to COPD exacerbation . HPI:  62/ M from home, lives with wife, with PMHx of HTN (takes meds at home, but does not know th name), HLD, right LE varicose veins, umbilical hernia, constipation, current active smoker (1Pack over 3 days >50 years), drinks every day, small glass of vodka comes in with difficulty breathing that has been ongoing for 2-3 months however worse today. Admits to cough with green sputum, subjective fever mostly at nights, no chest pain. Denies orthopnea but has GUILLEN, in the past, he used to walk 2-3 blocks without difficulty however now he becomes SOB with 4-5 steps. has chronic RLE edema 2/2 to varicose veins, no edema over the LLE. no chills, headache, dizziness, N/V/D, joint pains, rash, weight loss etc  NKDA, FHx of HTN/HLD in sister, Social Hx as above, surgical hx of left inguinal hernia repair. (13 Jul 2018 00:12)    ICU Course:    Pt is Admitted for SOB, hypoxic respiratory failure, needing NIV, possibly 2/2 to COPD exacerbation.    Infectious etiology was considered but no clinical ccx c/w infection were observed and no consolidation was seen on CXR.    no consolidation seen on CXR, not volume overloaded clinically, proBNP WNL  possible COPD exacerbation given long term smoking history ,  continue nebs, Levaquin 750mg QD IV, solu-medro 40Q8  patient on room air, saturating well  smoking cessation advice provided, ordered nicotine patch  f/u TTE  LE Doppler negative    7/14:  -Weaning off Methylprednisolone 40 mg q8->q12  -Methylprednisolone 40 mg qD tomorrow.      Problem/Plan - 2:  ·  Problem: HTN (hypertension).  Plan: does not remember list of medications - started on losartan 25 mg PO - will titrate as needed   not able to get list medications from pharmacy.      Problem/Plan - 3:  ·  Problem: Anemia.  Plan: Microcytic hypochromic anemia, baseline not known  iron deficiency anemia   will follow FOBT.      Problem/Plan - 4:  ·  Problem: Alcohol abuse.  Plan: drinks vodka everyday, CIWA protocol initiated, Oral MV, thiamine, Folate  PRN ativan only for CIWA>7.      Problem/Plan - 5:  ·  Problem: Varicose veins.  Plan: chronic, elevate RLE.      Problem/Plan - 6:  Problem: Prophylactic measure. Plan: IMPROVE VTE score: 2 for age>60, ICU stay  Lovenox sc.    Attending Attestation:   62/ M from home, lives with wife, with PMHx of HTN (takes meds at home, but does not know th name), HLD, right LE varicose veins, umbilical hernia, constipation, current active smoker (1Pack over 3 days >50 years), drinks every day, small glass of vodka comes in with difficulty breathing that has been ongoing for 2-3 months however worse today.    Patient is afebrile in the ED, hypertensive to 161/77, RR: 24 and O2 sat of 90% on RA, was placed on BiPaP by the ED. while on bipap, patient is not using accessory muscles and no apparent resp distress. leucocytosis not significant, 11.7, has microcytic, hypochromic anemia, H/H of 10.4/34.3, BS elevated to 167, first trop neg, proBNP WNL, no obvious consolidation seen on CXR. Admitted for SOB, hypoxic resp failure, needing NIV, possibly 2/2 to COPD exacerbation.  Clinically improved  -continue antibx  -banana bag  -advise to stop using alcohol  -advise to stop smoking  -nicotine patch  -social svc  eval      Admitted for SOB, hypoxic resp failure, needing NIV, possibly 2/2 to COPD exacerbation . HPI:  62/ M from home, lives with wife, with PMHx of HTN (takes meds at home, but does not know th name), HLD, right LE varicose veins, umbilical hernia, constipation, current active smoker (1Pack over 3 days >50 years), drinks every day, small glass of vodka comes in with difficulty breathing that has been ongoing for 2-3 months however worse today. Admits to cough with green sputum, subjective fever mostly at nights, no chest pain. Denies orthopnea but has GUILLEN, in the past, he used to walk 2-3 blocks without difficulty however now he becomes SOB with 4-5 steps. has chronic RLE edema 2/2 to varicose veins, no edema over the LLE. no chills, headache, dizziness, N/V/D, joint pains, rash, weight loss etc  NKDA, FHx of HTN/HLD in sister, Social Hx as above, surgical hx of left inguinal hernia repair. (13 Jul 2018 00:12)    ICU Course:    Pt is Admitted for SOB, hypoxic respiratory failure, needing NIV, possibly 2/2 to COPD exacerbation. Infectious etiology was considered but no clinical ssx c/w infection were observed and no consolidation was seen on CXR. Given his extensive history of smoking and COPD, her respiratory failure has most likely caused by COPD exacerbation. In ICU, nebs, Levaquin 750mg QD IV, and solu-medro 40Q8 were given for respiratory support. She respirated room air and saturated well. For her COPD-related respiratory distress, steroid was given, and it was weaned off to Methylprednisolone 40 mg q8->q12 and then to qD. For her known hypertension, she was started on losartan 25 mg PO. Pt is a chronic alcohol abuser. CIWA protocol initiated, Oral MV, thiamine, Folate were supplemented. Appropriate GI and DVT prophylaxis applied. He is now stable to be down graded to medicine floor.

## 2018-07-14 NOTE — PROGRESS NOTE ADULT - SUBJECTIVE AND OBJECTIVE BOX
PGY1 Note discussed with Supervising Resident and Primary Attending.    Patient is a 62y old  Male who presents with a chief complaint of SOB, GUILLEN (13 Jul 2018 00:12)      INTERVAL HPI/OVERNIGHT EVENTS : Abdominal Distension     MEDICATIONS  (STANDING):  ALBUTerol/ipratropium for Nebulization 3 milliLiter(s) Nebulizer every 6 hours  chlorhexidine 4% Liquid 1 Application(s) Topical every 12 hours  dextrose 5%. 1000 milliLiter(s) (50 mL/Hr) IV Continuous <Continuous>  dextrose 50% Injectable 12.5 Gram(s) IV Push once  dextrose 50% Injectable 25 Gram(s) IV Push once  dextrose 50% Injectable 25 Gram(s) IV Push once  docusate sodium 100 milliGRAM(s) Oral two times a day  enoxaparin Injectable 40 milliGRAM(s) SubCutaneous daily  folic acid 1 milliGRAM(s) Oral daily  insulin lispro (HumaLOG) corrective regimen sliding scale   SubCutaneous three times a day before meals  levoFLOXacin IVPB      levoFLOXacin IVPB 750 milliGRAM(s) IV Intermittent every 24 hours  losartan 25 milliGRAM(s) Oral daily  methylPREDNISolone sodium succinate Injectable 40 milliGRAM(s) IV Push every 12 hours  multivitamin/minerals 1 Tablet(s) Oral daily  nicotine -  14 mG/24Hr(s) Patch 1 patch Transdermal daily  senna 2 Tablet(s) Oral at bedtime  thiamine 100 milliGRAM(s) Oral daily    MEDICATIONS  (PRN):  dextrose 40% Gel 15 Gram(s) Oral once PRN Blood Glucose LESS THAN 70 milliGRAM(s)/deciLiter  glucagon  Injectable 1 milliGRAM(s) IntraMuscular once PRN Glucose <70 milliGRAM(s)/deciLiter  LORazepam   Injectable 1 milliGRAM(s) IV Push every 6 hours PRN CIWA>7  nitroglycerin     SubLingual 0.4 milliGRAM(s) SubLingual every 5 minutes PRN short of breath  polyethylene glycol 3350 17 Gram(s) Oral daily PRN Constipation      Allergies    No Known Allergies    Intolerances        REVIEW OF SYSTEMS :  * CONSTITUTIONAL      : No Fever, Weight loss, or Fatigue  * EYES                             : No eye pain , Visual disturbances or Discharge  * RESPIRATORY             : No Cough, Wheezing, Chills or Hemoptysis; No shortness of breath  * CARDIOVASCULAR     : No Chest pain, Palpitations, Dizziness, or Leg swelling  * GASTROINTESTINAL  : Abdominal Distension. No Nausea, Vomiting or Hematemesis; No Diarrhea or Constipation. No Melena or Hematochezia.  * GENITOURINARY        : No Dysuria , Frequency , Haematuria   * NEUROLOGICAL          : No Headaches, Memory loss, Loss of trength, Numbness, or Tremors  * MUSCULOSKELETAL   : No Joint pain  * PSYCHIATRY                 : No Depression or Anxiety   * HEME/LYMPH              : No Easy Bruising or Bleeding gums  * SKIN                               : No Itching, Burning, Rashes, or Lesions     Vital Signs Last 24 Hrs  T(C): 36.8 (14 Jul 2018 22:39), Max: 36.8 (14 Jul 2018 22:39)  T(F): 98.3 (14 Jul 2018 22:39), Max: 98.3 (14 Jul 2018 22:39)  HR: 73 (14 Jul 2018 22:39) (58 - 81)  BP: 142/63 (14 Jul 2018 22:39) (123/62 - 168/69)  BP(mean): 74 (14 Jul 2018 12:00) (71 - 124)  RR: 17 (14 Jul 2018 22:39) (14 - 20)  SpO2: 98% (14 Jul 2018 22:39) (96% - 99%)    PHYSICAL EXAM :  * GENERAL                 : NAD, Well-groomed, Well-developed  * HEAD                       :  Atraumatic, Normocephalic  * EYES                         : EOMI, PERRLA, Conjunctiva and Sclera clear  * ENT                           : Moist Mucous Membranes  * NECK                         : Supple, No JVD, Normal Thyroid  * CHEST/LUNG           : Clear to Auscultation bilaterally; No Rales, Rhonchi, Wheezing or Rubs  * HEART                       : Regular Rate and Rhythm; No murmurs, Rubs or gallops  * ABDOMEN                : , Non-tender, distended dull to percussion; Bowel Sounds present  * NERVOUS SYSTEM  :  Alert & Oriented X3, Good Concentration; Motor Strength 5/5 B/L UL LL ; DTRs 2+ Intact and Symmetric  * EXTREMITIES            :  2+ Peripheral Pulses, No clubbing, cyanosis, or edema  * SKIN                           : No Rashes or Lesions    LABS:                          9.4    11.4  )-----------( 148      ( 14 Jul 2018 07:24 )             31.2     07-14    140  |  108  |  16  ----------------------------<  145<H>  3.8   |  25  |  0.83    Ca    8.2<L>      14 Jul 2018 07:24  Phos  4.3     07-14  Mg     2.2     07-14    TPro  7.0  /  Alb  2.5<L>  /  TBili  0.2  /  DBili  x   /  AST  17  /  ALT  16  /  AlkPhos  63  07-14    PT/INR - ( 13 Jul 2018 05:39 )   PT: 12.3 sec;   INR: 1.13 ratio         PTT - ( 13 Jul 2018 05:39 )  PTT:21.6 sec    CAPILLARY BLOOD GLUCOSE      POCT Blood Glucose.: 211 mg/dL (14 Jul 2018 22:04)  POCT Blood Glucose.: 223 mg/dL (14 Jul 2018 16:53)  POCT Blood Glucose.: 350 mg/dL (14 Jul 2018 11:30)  POCT Blood Glucose.: 145 mg/dL (14 Jul 2018 07:08)      RADIOLOGY & ADDITIONAL TESTS:   LE Duplex :     IMPRESSION:  No evidence of DVT.       Imaging Personally Reviewed   :  [ ] YES  [ ] NO    Consultant(s) Notes Reviewed :  [ ] YES  [ ] NO

## 2018-07-14 NOTE — PROGRESS NOTE ADULT - SUBJECTIVE AND OBJECTIVE BOX
HPI:  62/ M from home, lives with wife, with PMHx of HTN (takes meds at home, but does not know th name), HLD, right LE varicose veins, umbilical hernia, constipation, current active smoker (1Pack over 3 days >50 years), drinks every day, small glass of vodka comes in with difficulty breathing that has been ongoing for 2-3 months however worse today. Admits to cough with green sputum, subjective fever mostly at nights, no chest pain. Denies orthopnea but has GUILLEN, in the past, he used to walk 2-3 blocks without difficulty however now he becomes SOB with 4-5 steps. has chronic RLE edema 2/2 to varicose veins, no edema over the LLE. no chills, headache, dizziness, N/V/D, joint pains, rash, weight loss etc  NKDA, FHx of HTN/HLD in sister, Social Hx as above, surgical hx of left inguinal hernia repair. (13 Jul 2018 00:12)      Patient is a 62y old  Male who presents with a chief complaint of SOB, GUILLEN (13 Jul 2018 00:12)      INTERVAL HPI/OVERNIGHT EVENTS:  T(C): 36.3 (07-14-18 @ 13:58), Max: 36.5 (07-14-18 @ 06:00)  HR: 81 (07-14-18 @ 13:58) (58 - 82)  BP: 158/62 (07-14-18 @ 13:58) (123/62 - 168/69)  RR: 16 (07-14-18 @ 13:58) (14 - 24)  SpO2: 97% (07-14-18 @ 13:58) (96% - 99%)  Wt(kg): --  I&O's Summary    13 Jul 2018 07:01  -  14 Jul 2018 07:00  --------------------------------------------------------  IN: 200 mL / OUT: 1345 mL / NET: -1145 mL    14 Jul 2018 07:01  -  14 Jul 2018 16:56  --------------------------------------------------------  IN: 300 mL / OUT: 400 mL / NET: -100 mL        REVIEW OF SYSTEMS: denies fever, chills, SOB, palpitations, chest pain, abdominal pain, nausea, vomitting, diarrhea, constipation, dizziness    MEDICATIONS  (STANDING):  ALBUTerol/ipratropium for Nebulization 3 milliLiter(s) Nebulizer every 6 hours  chlorhexidine 4% Liquid 1 Application(s) Topical every 12 hours  dextrose 5%. 1000 milliLiter(s) (50 mL/Hr) IV Continuous <Continuous>  dextrose 50% Injectable 12.5 Gram(s) IV Push once  dextrose 50% Injectable 25 Gram(s) IV Push once  dextrose 50% Injectable 25 Gram(s) IV Push once  docusate sodium 100 milliGRAM(s) Oral two times a day  enoxaparin Injectable 40 milliGRAM(s) SubCutaneous daily  folic acid 1 milliGRAM(s) Oral daily  insulin lispro (HumaLOG) corrective regimen sliding scale   SubCutaneous three times a day before meals  levoFLOXacin IVPB      levoFLOXacin IVPB 750 milliGRAM(s) IV Intermittent every 24 hours  losartan 25 milliGRAM(s) Oral daily  methylPREDNISolone sodium succinate Injectable 40 milliGRAM(s) IV Push every 12 hours  multivitamin/minerals 1 Tablet(s) Oral daily  nicotine -  14 mG/24Hr(s) Patch 1 patch Transdermal daily  senna 2 Tablet(s) Oral at bedtime  thiamine 100 milliGRAM(s) Oral daily    MEDICATIONS  (PRN):  dextrose 40% Gel 15 Gram(s) Oral once PRN Blood Glucose LESS THAN 70 milliGRAM(s)/deciLiter  glucagon  Injectable 1 milliGRAM(s) IntraMuscular once PRN Glucose <70 milliGRAM(s)/deciLiter  LORazepam   Injectable 1 milliGRAM(s) IV Push every 6 hours PRN CIWA>7  nitroglycerin     SubLingual 0.4 milliGRAM(s) SubLingual every 5 minutes PRN short of breath  polyethylene glycol 3350 17 Gram(s) Oral daily PRN Constipation      PHYSICAL EXAM:  GENERAL: NAD, well-groomed, well-developed  HEAD:  Atraumatic, Normocephalic  EYES: EOMI, PERRLA, conjunctiva and sclera clear  ENMT: No tonsillar erythema, exudates, or enlargement; Moist mucous membranes, Good dentition, No lesions  NECK: Supple, No JVD, Normal thyroid  NERVOUS SYSTEM:  Alert & Oriented X3, Good concentration; Motor Strength 5/5 B/L upper and lower extremities; DTRs 2+ intact and symmetric  CHEST/LUNG: Clear to percussion bilaterally; No rales, rhonchi, wheezing, or rubs  HEART: Regular rate and rhythm; No murmurs, rubs, or gallops  ABDOMEN: Soft, Nontender, Nondistended; Bowel sounds present  EXTREMITIES:  2+ Peripheral Pulses, No clubbing, cyanosis, or edema  LYMPH: No lymphadenopathy noted  SKIN: No rashes or lesions  LABS:                        9.4    11.4  )-----------( 148      ( 14 Jul 2018 07:24 )             31.2     07-14    140  |  108  |  16  ----------------------------<  145<H>  3.8   |  25  |  0.83    Ca    8.2<L>      14 Jul 2018 07:24  Phos  4.3     07-14  Mg     2.2     07-14    TPro  7.0  /  Alb  2.5<L>  /  TBili  0.2  /  DBili  x   /  AST  17  /  ALT  16  /  AlkPhos  63  07-14    PT/INR - ( 13 Jul 2018 05:39 )   PT: 12.3 sec;   INR: 1.13 ratio         PTT - ( 13 Jul 2018 05:39 )  PTT:21.6 sec    CAPILLARY BLOOD GLUCOSE      POCT Blood Glucose.: 350 mg/dL (14 Jul 2018 11:30)  POCT Blood Glucose.: 145 mg/dL (14 Jul 2018 07:08)      ABG - ( 13 Jul 2018 00:12 )  pH, Arterial: 7.44  pH, Blood: x     /  pCO2: 37    /  pO2: 201   / HCO3: 25    / Base Excess: 1.5   /  SaO2: NR

## 2018-07-14 NOTE — PROGRESS NOTE ADULT - ATTENDING COMMENTS
62/ M from home, lives with wife, with PMHx of HTN (takes meds at home, but does not know th name), HLD, right LE varicose veins, umbilical hernia, constipation, current active smoker (1Pack over 3 days >50 years), drinks every day, small glass of vodka comes in with difficulty breathing that has been ongoing for 2-3 months however worse today.    Patient is afebrile in the ED, hypertensive to 161/77, RR: 24 and O2 sat of 90% on RA, was placed on BiPaP by the ED. while on bipap, patient is not using accessory muscles and no apparent resp distress. leucocytosis not significant, 11.7, has microcytic, hypochromic anemia, H/H of 10.4/34.3, BS elevated to 167, first trop neg, proBNP WNL, no obvious consolidation seen on CXR. Admitted for SOB, hypoxic resp failure, needing NIV, possibly 2/2 to COPD exacerbation.  Clinically improved  -continue antibx  -banana bag  -advise to stop using alcohol  -advise to stop smoking  -nicotine patch  -social sv  eval      Admitted for SOB, hypoxic resp failure, needing NIV, possibly 2/2 to COPD exacerbation

## 2018-07-14 NOTE — PROGRESS NOTE ADULT - PROBLEM SELECTOR PLAN 1
Patient coming in with difficulty breathing, hypoxic respiratory failure  no consolidation seen on CXR, not volume overloaded clinically, proBNP WNL  possible COPD exacerbation given long term smoking history ,  continue nebs, Levaquin 750mg QD IV, solu-medro 40Q8  switched to NC 2 L form BIPAP - tolerating well   smoking cessation advice provided, ordered nicotine patch  Check TTE to eval for Pul HTN  will get Doppler lower extremity- given lower extremity swelling and SOB Patient coming in with difficulty breathing, hypoxic respiratory failure  no consolidation seen on CXR, not volume overloaded clinically, proBNP WNL  possible COPD exacerbation given long term smoking history ,  continue nebs, Levaquin 750mg QD IV, solu-medro 40Q8  patient on room air, saturating well  smoking cessation advice provided, ordered nicotine patch  f/u TTE  LE Doppler negative Patient coming in with difficulty breathing, hypoxic respiratory failure  no consolidation seen on CXR, not volume overloaded clinically, proBNP WNL  possible COPD exacerbation given long term smoking history ,  continue nebs, Levaquin 750mg QD IV, solu-medro 40Q8  patient on room air, saturating well  smoking cessation advice provided, ordered nicotine patch  f/u TTE  LE Doppler negative    7/14:  -Weaning off Methylprednisolone 40 mg q8->q12  -Methylprednisolone 40 mg qD tomorrow

## 2018-07-14 NOTE — CONSULT NOTE ADULT - ASSESSMENT
62M w/CELE and severe abd distension that is dull to percussion.  -concern for cirrhosis of the liver given hx and presentation, although Plt are borderline and coags and LFTs are normal  -would check daily LFTs; check repeat PT/INR  -obtain triple phase CT HCC protocol to further evaluation of hepatic parenchyma and to r/o HCC  -if pt has ascites would obtain diagnostic and therapeutic paracentesis (send fluid for cell count/differential, protein, albumin, and cytology)  -pt will need inpt endoscopic evaluation of his CELE with EGD and colonoscopy  -will cont to follow pt with you

## 2018-07-14 NOTE — PROGRESS NOTE ADULT - SUBJECTIVE AND OBJECTIVE BOX
INTERVAL HPI/OVERNIGHT EVENTS: ***    PRESSORS: [] YES [] NO     WHICH:    Antimicrobial:  levoFLOXacin IVPB      levoFLOXacin IVPB 750 milliGRAM(s) IV Intermittent every 24 hours    Cardiovascular:  losartan 25 milliGRAM(s) Oral daily  nitroglycerin     SubLingual 0.4 milliGRAM(s) SubLingual every 5 minutes PRN    Pulmonary:  ALBUTerol/ipratropium for Nebulization 3 milliLiter(s) Nebulizer every 6 hours    Hematalogic:  enoxaparin Injectable 40 milliGRAM(s) SubCutaneous daily    Other:  chlorhexidine 4% Liquid 1 Application(s) Topical every 12 hours  dextrose 40% Gel 15 Gram(s) Oral once PRN  dextrose 5%. 1000 milliLiter(s) IV Continuous <Continuous>  dextrose 50% Injectable 12.5 Gram(s) IV Push once  dextrose 50% Injectable 25 Gram(s) IV Push once  dextrose 50% Injectable 25 Gram(s) IV Push once  docusate sodium 100 milliGRAM(s) Oral two times a day  folic acid 1 milliGRAM(s) Oral daily  glucagon  Injectable 1 milliGRAM(s) IntraMuscular once PRN  insulin lispro (HumaLOG) corrective regimen sliding scale   SubCutaneous three times a day before meals  LORazepam   Injectable 1 milliGRAM(s) IV Push every 6 hours PRN  methylPREDNISolone sodium succinate Injectable 40 milliGRAM(s) IV Push every 8 hours  multivitamin/minerals 1 Tablet(s) Oral daily  nicotine -  14 mG/24Hr(s) Patch 1 patch Transdermal daily  polyethylene glycol 3350 17 Gram(s) Oral daily PRN  senna 2 Tablet(s) Oral at bedtime  thiamine 100 milliGRAM(s) Oral daily    ALBUTerol/ipratropium for Nebulization 3 milliLiter(s) Nebulizer every 6 hours  chlorhexidine 4% Liquid 1 Application(s) Topical every 12 hours  dextrose 40% Gel 15 Gram(s) Oral once PRN  dextrose 5%. 1000 milliLiter(s) IV Continuous <Continuous>  dextrose 50% Injectable 12.5 Gram(s) IV Push once  dextrose 50% Injectable 25 Gram(s) IV Push once  dextrose 50% Injectable 25 Gram(s) IV Push once  docusate sodium 100 milliGRAM(s) Oral two times a day  enoxaparin Injectable 40 milliGRAM(s) SubCutaneous daily  folic acid 1 milliGRAM(s) Oral daily  glucagon  Injectable 1 milliGRAM(s) IntraMuscular once PRN  insulin lispro (HumaLOG) corrective regimen sliding scale   SubCutaneous three times a day before meals  levoFLOXacin IVPB      levoFLOXacin IVPB 750 milliGRAM(s) IV Intermittent every 24 hours  LORazepam   Injectable 1 milliGRAM(s) IV Push every 6 hours PRN  losartan 25 milliGRAM(s) Oral daily  methylPREDNISolone sodium succinate Injectable 40 milliGRAM(s) IV Push every 8 hours  multivitamin/minerals 1 Tablet(s) Oral daily  nicotine -  14 mG/24Hr(s) Patch 1 patch Transdermal daily  nitroglycerin     SubLingual 0.4 milliGRAM(s) SubLingual every 5 minutes PRN  polyethylene glycol 3350 17 Gram(s) Oral daily PRN  senna 2 Tablet(s) Oral at bedtime  thiamine 100 milliGRAM(s) Oral daily    Drug Dosing Weight  Height (cm): 163 (13 Jul 2018 01:58)  Weight (kg): 78.2 (13 Jul 2018 01:58)  BMI (kg/m2): 29.4 (13 Jul 2018 01:58)  BSA (m2): 1.84 (13 Jul 2018 01:58)    CENTRAL LINE: [] YES [] NO  LOCATION:   DATE INSERTED:  REMOVE: [] YES [] NO  EXPLAIN:    MUÑOZ: [x] YES [] NO    DATE INSERTED:  REMOVE:  [] YES [x] NO  EXPLAIN: Strict IOs    A-LINE:  [] YES [] NO  LOCATION:   DATE INSERTED:  REMOVE:  [] YES [] NO  EXPLAIN:    PMH -reviewed admission note, no change since admission  PAST MEDICAL & SURGICAL HISTORY:  Varicose veins  HTN (hypertension)  No significant past surgical history      ICU Vital Signs Last 24 Hrs  T(C): 36.1 (13 Jul 2018 16:28), Max: 36.6 (13 Jul 2018 01:00)  T(F): 97 (13 Jul 2018 16:28), Max: 97.9 (13 Jul 2018 08:00)  HR: 62 (14 Jul 2018 00:07) (60 - 91)  BP: 143/62 (13 Jul 2018 21:00) (124/98 - 172/65)  BP(mean): 80 (13 Jul 2018 21:00) (68 - 104)  ABP: --  ABP(mean): --  RR: 15 (13 Jul 2018 21:00) (15 - 24)  SpO2: 97% (14 Jul 2018 00:07) (96% - 100%)      ABG - ( 13 Jul 2018 00:12 )  pH, Arterial: 7.44  pH, Blood: x     /  pCO2: 37    /  pO2: 201   / HCO3: 25    / Base Excess: 1.5   /  SaO2: NR                    07-12 @ 07:01  -  07-13 @ 07:00  --------------------------------------------------------  IN: 0 mL / OUT: 400 mL / NET: -400 mL            PHYSICAL EXAM:    GENERAL: []NAD, []well-groomed, []well-developed  HEAD:  []Atraumatic, []Normocephalic  EYES: []EOMI, []PERRLA, []conjunctiva and sclera clear  ENMT: []No tonsillar erythema, exudates, or enlargement; []Moist mucous membranes, []Good dentition, []No lesions  NECK: []Supple, normal appearance, []No JVD; []Normal thyroid; []Trachea midline  NERVOUS SYSTEM:  []Alert & Oriented X3, []Good concentration; []Motor Strength 5/5 B/L upper and lower extremities; []DTRs 2+ intact and symmetric  CHEST/LUNG: []No chest deformity; []Normal percussion bilaterally; []No rales, rhonchi, wheezing   HEART: []Regular rate and rhythm; []No murmurs, rubs, or gallops  ABDOMEN: []Soft, Nontender, Nondistended; []Bowel sounds present  EXTREMITIES:  []2+ Peripheral Pulses, []No clubbing, cyanosis, or edema  LYMPH: []No lymphadenopathy noted  SKIN: []No rashes or lesions; []Good capillary refill      LABS:  CBC Full  -  ( 13 Jul 2018 05:39 )  WBC Count : 9.3 K/uL  Hemoglobin : 10.0 g/dL  Hematocrit : 33.1 %  Platelet Count - Automated : 166 K/uL  Mean Cell Volume : 73.9 fl  Mean Cell Hemoglobin : 22.3 pg  Mean Cell Hemoglobin Concentration : 30.1 gm/dL  Auto Neutrophil # : 8.3 K/uL  Auto Lymphocyte # : 0.7 K/uL  Auto Monocyte # : 0.1 K/uL  Auto Eosinophil # : 0.1 K/uL  Auto Basophil # : 0.0 K/uL  Auto Neutrophil % : 90.0 %  Auto Lymphocyte % : 7.9 %  Auto Monocyte % : 1.0 %  Auto Eosinophil % : 0.7 %  Auto Basophil % : 0.4 %    07-13    138  |  105  |  9   ----------------------------<  151<H>  4.6   |  26  |  0.86    Ca    8.3<L>      13 Jul 2018 05:39  Phos  3.0     07-13  Mg     2.0     07-13    TPro  8.4<H>  /  Alb  3.2<L>  /  TBili  0.6  /  DBili  x   /  AST  41<H>  /  ALT  21  /  AlkPhos  75  07-13    PT/INR - ( 13 Jul 2018 05:39 )   PT: 12.3 sec;   INR: 1.13 ratio         PTT - ( 13 Jul 2018 05:39 )  PTT:21.6 sec        RADIOLOGY & ADDITIONAL STUDIES REVIEWED:  ***    []GOALS OF CARE DISCUSSION WITH PATIENT/FAMILY/PROXY:    CRITICAL CARE TIME SPENT: 35 minutes INTERVAL HPI/OVERNIGHT EVENTS: Patient was seen and examined at bedside. Patient was OOB sitting in chair watching tv. Patient is stable for possible downgrade.    PRESSORS: no    Antimicrobial:  levoFLOXacin IVPB      levoFLOXacin IVPB 750 milliGRAM(s) IV Intermittent every 24 hours    Cardiovascular:  losartan 25 milliGRAM(s) Oral daily  nitroglycerin     SubLingual 0.4 milliGRAM(s) SubLingual every 5 minutes PRN    Pulmonary:  ALBUTerol/ipratropium for Nebulization 3 milliLiter(s) Nebulizer every 6 hours    Hematalogic:  enoxaparin Injectable 40 milliGRAM(s) SubCutaneous daily    Other:  chlorhexidine 4% Liquid 1 Application(s) Topical every 12 hours  dextrose 40% Gel 15 Gram(s) Oral once PRN  dextrose 5%. 1000 milliLiter(s) IV Continuous <Continuous>  dextrose 50% Injectable 12.5 Gram(s) IV Push once  dextrose 50% Injectable 25 Gram(s) IV Push once  dextrose 50% Injectable 25 Gram(s) IV Push once  docusate sodium 100 milliGRAM(s) Oral two times a day  folic acid 1 milliGRAM(s) Oral daily  glucagon  Injectable 1 milliGRAM(s) IntraMuscular once PRN  insulin lispro (HumaLOG) corrective regimen sliding scale   SubCutaneous three times a day before meals  LORazepam   Injectable 1 milliGRAM(s) IV Push every 6 hours PRN  methylPREDNISolone sodium succinate Injectable 40 milliGRAM(s) IV Push every 8 hours  multivitamin/minerals 1 Tablet(s) Oral daily  nicotine -  14 mG/24Hr(s) Patch 1 patch Transdermal daily  polyethylene glycol 3350 17 Gram(s) Oral daily PRN  senna 2 Tablet(s) Oral at bedtime  thiamine 100 milliGRAM(s) Oral daily    ALBUTerol/ipratropium for Nebulization 3 milliLiter(s) Nebulizer every 6 hours  chlorhexidine 4% Liquid 1 Application(s) Topical every 12 hours  dextrose 40% Gel 15 Gram(s) Oral once PRN  dextrose 5%. 1000 milliLiter(s) IV Continuous <Continuous>  dextrose 50% Injectable 12.5 Gram(s) IV Push once  dextrose 50% Injectable 25 Gram(s) IV Push once  dextrose 50% Injectable 25 Gram(s) IV Push once  docusate sodium 100 milliGRAM(s) Oral two times a day  enoxaparin Injectable 40 milliGRAM(s) SubCutaneous daily  folic acid 1 milliGRAM(s) Oral daily  glucagon  Injectable 1 milliGRAM(s) IntraMuscular once PRN  insulin lispro (HumaLOG) corrective regimen sliding scale   SubCutaneous three times a day before meals  levoFLOXacin IVPB      levoFLOXacin IVPB 750 milliGRAM(s) IV Intermittent every 24 hours  LORazepam   Injectable 1 milliGRAM(s) IV Push every 6 hours PRN  losartan 25 milliGRAM(s) Oral daily  methylPREDNISolone sodium succinate Injectable 40 milliGRAM(s) IV Push every 8 hours  multivitamin/minerals 1 Tablet(s) Oral daily  nicotine -  14 mG/24Hr(s) Patch 1 patch Transdermal daily  nitroglycerin     SubLingual 0.4 milliGRAM(s) SubLingual every 5 minutes PRN  polyethylene glycol 3350 17 Gram(s) Oral daily PRN  senna 2 Tablet(s) Oral at bedtime  thiamine 100 milliGRAM(s) Oral daily    Drug Dosing Weight  Height (cm): 163 (13 Jul 2018 01:58)  Weight (kg): 78.2 (13 Jul 2018 01:58)  BMI (kg/m2): 29.4 (13 Jul 2018 01:58)  BSA (m2): 1.84 (13 Jul 2018 01:58)    CENTRAL LINE: no  MUÑOZ: no  A-LINE: no    PMH -reviewed admission note, no change since admission  PAST MEDICAL & SURGICAL HISTORY:  Varicose veins  HTN (hypertension)  No significant past surgical history      ICU Vital Signs Last 24 Hrs  T(C): 36.1 (13 Jul 2018 16:28), Max: 36.6 (13 Jul 2018 01:00)  T(F): 97 (13 Jul 2018 16:28), Max: 97.9 (13 Jul 2018 08:00)  HR: 62 (14 Jul 2018 00:07) (60 - 91)  BP: 143/62 (13 Jul 2018 21:00) (124/98 - 172/65)  BP(mean): 80 (13 Jul 2018 21:00) (68 - 104)  ABP: --  ABP(mean): --  RR: 15 (13 Jul 2018 21:00) (15 - 24)  SpO2: 97% (14 Jul 2018 00:07) (96% - 100%)      ABG - ( 13 Jul 2018 00:12 )  pH, Arterial: 7.44  pH, Blood: x     /  pCO2: 37    /  pO2: 201   / HCO3: 25    / Base Excess: 1.5   /  SaO2: NR                    07-12 @ 07:01  -  07-13 @ 07:00  --------------------------------------------------------  IN: 0 mL / OUT: 400 mL / NET: -400 mL            PHYSICAL EXAM:    GENERAL: no acute distress  HEAD: atraumatic, normocephalic  EYES: EOMI, PERRL  ENMT: moist mucus membranes  NECK: supple, normal appearance  NERVOUS SYSTEM: AAOx3  CHEST/LUNG: cta b/l  HEART: []Regular rate and rhythm; []No murmurs, rubs, or gallops  ABDOMEN: []Soft, Nontender, Nondistended; []Bowel sounds present  EXTREMITIES:  []2+ Peripheral Pulses, []No clubbing, cyanosis, or edema  LYMPH: []No lymphadenopathy noted  SKIN: []No rashes or lesions; []Good capillary refill      LABS:  CBC Full  -  ( 13 Jul 2018 05:39 )  WBC Count : 9.3 K/uL  Hemoglobin : 10.0 g/dL  Hematocrit : 33.1 %  Platelet Count - Automated : 166 K/uL  Mean Cell Volume : 73.9 fl  Mean Cell Hemoglobin : 22.3 pg  Mean Cell Hemoglobin Concentration : 30.1 gm/dL  Auto Neutrophil # : 8.3 K/uL  Auto Lymphocyte # : 0.7 K/uL  Auto Monocyte # : 0.1 K/uL  Auto Eosinophil # : 0.1 K/uL  Auto Basophil # : 0.0 K/uL  Auto Neutrophil % : 90.0 %  Auto Lymphocyte % : 7.9 %  Auto Monocyte % : 1.0 %  Auto Eosinophil % : 0.7 %  Auto Basophil % : 0.4 %    07-13    138  |  105  |  9   ----------------------------<  151<H>  4.6   |  26  |  0.86    Ca    8.3<L>      13 Jul 2018 05:39  Phos  3.0     07-13  Mg     2.0     07-13    TPro  8.4<H>  /  Alb  3.2<L>  /  TBili  0.6  /  DBili  x   /  AST  41<H>  /  ALT  21  /  AlkPhos  75  07-13    PT/INR - ( 13 Jul 2018 05:39 )   PT: 12.3 sec;   INR: 1.13 ratio         PTT - ( 13 Jul 2018 05:39 )  PTT:21.6 sec        RADIOLOGY & ADDITIONAL STUDIES REVIEWED:  ***    []GOALS OF CARE DISCUSSION WITH PATIENT/FAMILY/PROXY:    CRITICAL CARE TIME SPENT: 35 minutes INTERVAL HPI/OVERNIGHT EVENTS: Patient was seen and examined at bedside. Patient was OOB sitting in chair watching tv. Patient is stable for possible downgrade.    PRESSORS: no    Antimicrobial:  levoFLOXacin IVPB      levoFLOXacin IVPB 750 milliGRAM(s) IV Intermittent every 24 hours    Cardiovascular:  losartan 25 milliGRAM(s) Oral daily  nitroglycerin     SubLingual 0.4 milliGRAM(s) SubLingual every 5 minutes PRN    Pulmonary:  ALBUTerol/ipratropium for Nebulization 3 milliLiter(s) Nebulizer every 6 hours    Hematalogic:  enoxaparin Injectable 40 milliGRAM(s) SubCutaneous daily    Other:  chlorhexidine 4% Liquid 1 Application(s) Topical every 12 hours  dextrose 40% Gel 15 Gram(s) Oral once PRN  dextrose 5%. 1000 milliLiter(s) IV Continuous <Continuous>  dextrose 50% Injectable 12.5 Gram(s) IV Push once  dextrose 50% Injectable 25 Gram(s) IV Push once  dextrose 50% Injectable 25 Gram(s) IV Push once  docusate sodium 100 milliGRAM(s) Oral two times a day  folic acid 1 milliGRAM(s) Oral daily  glucagon  Injectable 1 milliGRAM(s) IntraMuscular once PRN  insulin lispro (HumaLOG) corrective regimen sliding scale   SubCutaneous three times a day before meals  LORazepam   Injectable 1 milliGRAM(s) IV Push every 6 hours PRN  methylPREDNISolone sodium succinate Injectable 40 milliGRAM(s) IV Push every 8 hours  multivitamin/minerals 1 Tablet(s) Oral daily  nicotine -  14 mG/24Hr(s) Patch 1 patch Transdermal daily  polyethylene glycol 3350 17 Gram(s) Oral daily PRN  senna 2 Tablet(s) Oral at bedtime  thiamine 100 milliGRAM(s) Oral daily    ALBUTerol/ipratropium for Nebulization 3 milliLiter(s) Nebulizer every 6 hours  chlorhexidine 4% Liquid 1 Application(s) Topical every 12 hours  dextrose 40% Gel 15 Gram(s) Oral once PRN  dextrose 5%. 1000 milliLiter(s) IV Continuous <Continuous>  dextrose 50% Injectable 12.5 Gram(s) IV Push once  dextrose 50% Injectable 25 Gram(s) IV Push once  dextrose 50% Injectable 25 Gram(s) IV Push once  docusate sodium 100 milliGRAM(s) Oral two times a day  enoxaparin Injectable 40 milliGRAM(s) SubCutaneous daily  folic acid 1 milliGRAM(s) Oral daily  glucagon  Injectable 1 milliGRAM(s) IntraMuscular once PRN  insulin lispro (HumaLOG) corrective regimen sliding scale   SubCutaneous three times a day before meals  levoFLOXacin IVPB      levoFLOXacin IVPB 750 milliGRAM(s) IV Intermittent every 24 hours  LORazepam   Injectable 1 milliGRAM(s) IV Push every 6 hours PRN  losartan 25 milliGRAM(s) Oral daily  methylPREDNISolone sodium succinate Injectable 40 milliGRAM(s) IV Push every 8 hours  multivitamin/minerals 1 Tablet(s) Oral daily  nicotine -  14 mG/24Hr(s) Patch 1 patch Transdermal daily  nitroglycerin     SubLingual 0.4 milliGRAM(s) SubLingual every 5 minutes PRN  polyethylene glycol 3350 17 Gram(s) Oral daily PRN  senna 2 Tablet(s) Oral at bedtime  thiamine 100 milliGRAM(s) Oral daily    Drug Dosing Weight  Height (cm): 163 (13 Jul 2018 01:58)  Weight (kg): 78.2 (13 Jul 2018 01:58)  BMI (kg/m2): 29.4 (13 Jul 2018 01:58)  BSA (m2): 1.84 (13 Jul 2018 01:58)    CENTRAL LINE: no  MUÑOZ: no  A-LINE: no    PMH -reviewed admission note, no change since admission  PAST MEDICAL & SURGICAL HISTORY:  Varicose veins  HTN (hypertension)  No significant past surgical history      ICU Vital Signs Last 24 Hrs  T(C): 36.1 (13 Jul 2018 16:28), Max: 36.6 (13 Jul 2018 01:00)  T(F): 97 (13 Jul 2018 16:28), Max: 97.9 (13 Jul 2018 08:00)  HR: 62 (14 Jul 2018 00:07) (60 - 91)  BP: 143/62 (13 Jul 2018 21:00) (124/98 - 172/65)  BP(mean): 80 (13 Jul 2018 21:00) (68 - 104)  ABP: --  ABP(mean): --  RR: 15 (13 Jul 2018 21:00) (15 - 24)  SpO2: 97% (14 Jul 2018 00:07) (96% - 100%)      ABG - ( 13 Jul 2018 00:12 )  pH, Arterial: 7.44  pH, Blood: x     /  pCO2: 37    /  pO2: 201   / HCO3: 25    / Base Excess: 1.5   /  SaO2: NR                    07-12 @ 07:01  -  07-13 @ 07:00  --------------------------------------------------------  IN: 0 mL / OUT: 400 mL / NET: -400 mL            PHYSICAL EXAM:    GENERAL: no acute distress  HEAD: atraumatic, normocephalic  EYES: EOMI, PERRL  ENMT: moist mucus membranes  NECK: supple, normal appearance  NERVOUS SYSTEM: AAOx3  CHEST/LUNG: cta b/l  HEART: no appreciable murmurs, rubs, gallops  ABDOMEN: soft, nontender, nondistended  EXTREMITIES: no clubbing, cyanosis, edema  LYMPH: no lymphadenopathy noted  SKIN: no rashes or lesions      LABS:  CBC Full  -  ( 13 Jul 2018 05:39 )  WBC Count : 9.3 K/uL  Hemoglobin : 10.0 g/dL  Hematocrit : 33.1 %  Platelet Count - Automated : 166 K/uL  Mean Cell Volume : 73.9 fl  Mean Cell Hemoglobin : 22.3 pg  Mean Cell Hemoglobin Concentration : 30.1 gm/dL  Auto Neutrophil # : 8.3 K/uL  Auto Lymphocyte # : 0.7 K/uL  Auto Monocyte # : 0.1 K/uL  Auto Eosinophil # : 0.1 K/uL  Auto Basophil # : 0.0 K/uL  Auto Neutrophil % : 90.0 %  Auto Lymphocyte % : 7.9 %  Auto Monocyte % : 1.0 %  Auto Eosinophil % : 0.7 %  Auto Basophil % : 0.4 %    07-13    138  |  105  |  9   ----------------------------<  151<H>  4.6   |  26  |  0.86    Ca    8.3<L>      13 Jul 2018 05:39  Phos  3.0     07-13  Mg     2.0     07-13    TPro  8.4<H>  /  Alb  3.2<L>  /  TBili  0.6  /  DBili  x   /  AST  41<H>  /  ALT  21  /  AlkPhos  75  07-13    PT/INR - ( 13 Jul 2018 05:39 )   PT: 12.3 sec;   INR: 1.13 ratio         PTT - ( 13 Jul 2018 05:39 )  PTT:21.6 sec        RADIOLOGY & ADDITIONAL STUDIES REVIEWED:   cxr - small left pleural effusion    GOALS OF CARE DISCUSSION WITH PATIENT/FAMILY/PROXY:    CRITICAL CARE TIME SPENT: 35 minutes

## 2018-07-15 LAB
24R-OH-CALCIDIOL SERPL-MCNC: 21.8 NG/ML — LOW (ref 30–80)
ALBUMIN SERPL ELPH-MCNC: 2.6 G/DL — LOW (ref 3.5–5)
ALP SERPL-CCNC: 60 U/L — SIGNIFICANT CHANGE UP (ref 40–120)
ALT FLD-CCNC: 20 U/L DA — SIGNIFICANT CHANGE UP (ref 10–60)
ANION GAP SERPL CALC-SCNC: 10 MMOL/L — SIGNIFICANT CHANGE UP (ref 5–17)
AST SERPL-CCNC: 17 U/L — SIGNIFICANT CHANGE UP (ref 10–40)
BILIRUB DIRECT SERPL-MCNC: <0.1 MG/DL — SIGNIFICANT CHANGE UP (ref 0–0.2)
BILIRUB INDIRECT FLD-MCNC: >0.1 MG/DL — LOW (ref 0.2–1)
BILIRUB SERPL-MCNC: 0.2 MG/DL — SIGNIFICANT CHANGE UP (ref 0.2–1.2)
BUN SERPL-MCNC: 15 MG/DL — SIGNIFICANT CHANGE UP (ref 7–18)
CALCIUM SERPL-MCNC: 8.4 MG/DL — SIGNIFICANT CHANGE UP (ref 8.4–10.5)
CHLORIDE SERPL-SCNC: 107 MMOL/L — SIGNIFICANT CHANGE UP (ref 96–108)
CO2 SERPL-SCNC: 24 MMOL/L — SIGNIFICANT CHANGE UP (ref 22–31)
CREAT SERPL-MCNC: 0.83 MG/DL — SIGNIFICANT CHANGE UP (ref 0.5–1.3)
GLUCOSE BLDC GLUCOMTR-MCNC: 176 MG/DL — HIGH (ref 70–99)
GLUCOSE BLDC GLUCOMTR-MCNC: 236 MG/DL — HIGH (ref 70–99)
GLUCOSE BLDC GLUCOMTR-MCNC: 251 MG/DL — HIGH (ref 70–99)
GLUCOSE BLDC GLUCOMTR-MCNC: 318 MG/DL — HIGH (ref 70–99)
GLUCOSE BLDC GLUCOMTR-MCNC: 323 MG/DL — HIGH (ref 70–99)
GLUCOSE SERPL-MCNC: 174 MG/DL — HIGH (ref 70–99)
HCT VFR BLD CALC: 31.7 % — LOW (ref 39–50)
HGB BLD-MCNC: 9.4 G/DL — LOW (ref 13–17)
MCHC RBC-ENTMCNC: 22.2 PG — LOW (ref 27–34)
MCHC RBC-ENTMCNC: 29.7 GM/DL — LOW (ref 32–36)
MCV RBC AUTO: 74.7 FL — LOW (ref 80–100)
PLATELET # BLD AUTO: 134 K/UL — LOW (ref 150–400)
POTASSIUM SERPL-MCNC: 3.9 MMOL/L — SIGNIFICANT CHANGE UP (ref 3.5–5.3)
POTASSIUM SERPL-SCNC: 3.9 MMOL/L — SIGNIFICANT CHANGE UP (ref 3.5–5.3)
PROT SERPL-MCNC: 6.8 G/DL — SIGNIFICANT CHANGE UP (ref 6–8.3)
PSA FREE FLD-MCNC: 0.06 NG/ML — SIGNIFICANT CHANGE UP
PSA FREE FLD-MCNC: 35.3 — SIGNIFICANT CHANGE UP
PSA FREE MFR FLD: 0.17 NG/ML — SIGNIFICANT CHANGE UP (ref 0–4)
RBC # BLD: 4.25 M/UL — SIGNIFICANT CHANGE UP (ref 4.2–5.8)
RBC # FLD: 17.2 % — HIGH (ref 10.3–14.5)
SODIUM SERPL-SCNC: 141 MMOL/L — SIGNIFICANT CHANGE UP (ref 135–145)
WBC # BLD: 9.9 K/UL — SIGNIFICANT CHANGE UP (ref 3.8–10.5)
WBC # FLD AUTO: 9.9 K/UL — SIGNIFICANT CHANGE UP (ref 3.8–10.5)

## 2018-07-15 PROCEDURE — 76700 US EXAM ABDOM COMPLETE: CPT | Mod: 26

## 2018-07-15 RX ORDER — INSULIN LISPRO 100/ML
VIAL (ML) SUBCUTANEOUS
Qty: 0 | Refills: 0 | Status: DISCONTINUED | OUTPATIENT
Start: 2018-07-15 | End: 2018-07-17

## 2018-07-15 RX ORDER — INSULIN LISPRO 100/ML
VIAL (ML) SUBCUTANEOUS
Qty: 0 | Refills: 0 | Status: COMPLETED | OUTPATIENT
Start: 2018-07-15 | End: 2018-07-15

## 2018-07-15 RX ORDER — HYDRALAZINE HCL 50 MG
10 TABLET ORAL ONCE
Qty: 0 | Refills: 0 | Status: COMPLETED | OUTPATIENT
Start: 2018-07-15 | End: 2018-07-15

## 2018-07-15 RX ORDER — LANOLIN ALCOHOL/MO/W.PET/CERES
3 CREAM (GRAM) TOPICAL AT BEDTIME
Qty: 0 | Refills: 0 | Status: DISCONTINUED | OUTPATIENT
Start: 2018-07-15 | End: 2018-07-17

## 2018-07-15 RX ORDER — INSULIN LISPRO 100/ML
2 VIAL (ML) SUBCUTANEOUS ONCE
Qty: 0 | Refills: 0 | Status: COMPLETED | OUTPATIENT
Start: 2018-07-15 | End: 2018-07-15

## 2018-07-15 RX ADMIN — Medication 1 MILLIGRAM(S): at 12:16

## 2018-07-15 RX ADMIN — Medication 2 UNIT(S): at 00:20

## 2018-07-15 RX ADMIN — Medication 1: at 08:18

## 2018-07-15 RX ADMIN — Medication 100 MILLIGRAM(S): at 17:50

## 2018-07-15 RX ADMIN — LOSARTAN POTASSIUM 25 MILLIGRAM(S): 100 TABLET, FILM COATED ORAL at 05:54

## 2018-07-15 RX ADMIN — Medication 30 MILLILITER(S): at 21:33

## 2018-07-15 RX ADMIN — Medication 3: at 21:54

## 2018-07-15 RX ADMIN — Medication 40 MILLIGRAM(S): at 05:54

## 2018-07-15 RX ADMIN — Medication 4: at 12:06

## 2018-07-15 RX ADMIN — Medication 3 MILLIGRAM(S): at 22:15

## 2018-07-15 RX ADMIN — Medication 3 MILLILITER(S): at 20:16

## 2018-07-15 RX ADMIN — Medication 1 TABLET(S): at 12:16

## 2018-07-15 RX ADMIN — Medication 10 MILLIGRAM(S): at 23:40

## 2018-07-15 RX ADMIN — SENNA PLUS 2 TABLET(S): 8.6 TABLET ORAL at 21:33

## 2018-07-15 RX ADMIN — ENOXAPARIN SODIUM 40 MILLIGRAM(S): 100 INJECTION SUBCUTANEOUS at 12:16

## 2018-07-15 RX ADMIN — Medication 3 MILLILITER(S): at 02:52

## 2018-07-15 RX ADMIN — Medication 100 MILLIGRAM(S): at 12:16

## 2018-07-15 RX ADMIN — Medication 3 MILLILITER(S): at 09:50

## 2018-07-15 RX ADMIN — Medication 100 MILLIGRAM(S): at 05:54

## 2018-07-15 RX ADMIN — Medication 1 PATCH: at 12:07

## 2018-07-15 RX ADMIN — Medication 40 MILLIGRAM(S): at 17:50

## 2018-07-15 RX ADMIN — Medication 2: at 16:40

## 2018-07-15 RX ADMIN — Medication 1 PATCH: at 12:16

## 2018-07-15 NOTE — PHYSICAL THERAPY INITIAL EVALUATION ADULT - GENERAL OBSERVATIONS, REHAB EVAL
Consult received,EMR, radiology and labs reviewed. Patient received supine in bed, NAD, states feels better. Patient agreed to EVALUATION from Physical Therapist.

## 2018-07-15 NOTE — PROGRESS NOTE ADULT - SUBJECTIVE AND OBJECTIVE BOX
HPI:  62/ M from home, lives with wife, with PMHx of HTN (takes meds at home, but does not know th name), HLD, right LE varicose veins, umbilical hernia, constipation, current active smoker (1Pack over 3 days >50 years), drinks every day, small glass of vodka comes in with difficulty breathing that has been ongoing for 2-3 months however worse today. Admits to cough with green sputum, subjective fever mostly at nights, no chest pain. Denies orthopnea but has GUILLEN, in the past, he used to walk 2-3 blocks without difficulty however now he becomes SOB with 4-5 steps. has chronic RLE edema 2/2 to varicose veins, no edema over the LLE. no chills, headache, dizziness, N/V/D, joint pains, rash, weight loss etc  NKDA, FHx of HTN/HLD in sister, Social Hx as above, surgical hx of left inguinal hernia repair. (13 Jul 2018 00:12)      Patient is a 62y old  Male who presents with a chief complaint of SOB, GUILLEN (13 Jul 2018 00:12)      INTERVAL HPI/OVERNIGHT EVENTS:  T(C): 36.9 (07-15-18 @ 14:21), Max: 36.9 (07-15-18 @ 14:21)  HR: 66 (07-15-18 @ 14:21) (66 - 73)  BP: 154/68 (07-15-18 @ 14:21) (142/63 - 160/60)  RR: 17 (07-15-18 @ 14:21) (17 - 18)  SpO2: 99% (07-15-18 @ 14:21) (98% - 99%)  Wt(kg): --  I&O's Summary    14 Jul 2018 07:01  -  15 Jul 2018 07:00  --------------------------------------------------------  IN: 300 mL / OUT: 400 mL / NET: -100 mL        REVIEW OF SYSTEMS: denies fever, chills, SOB, palpitations, chest pain, abdominal pain, nausea, vomitting, diarrhea, constipation, dizziness    MEDICATIONS  (STANDING):  ALBUTerol/ipratropium for Nebulization 3 milliLiter(s) Nebulizer every 6 hours  dextrose 5%. 1000 milliLiter(s) (50 mL/Hr) IV Continuous <Continuous>  dextrose 50% Injectable 12.5 Gram(s) IV Push once  dextrose 50% Injectable 25 Gram(s) IV Push once  dextrose 50% Injectable 25 Gram(s) IV Push once  docusate sodium 100 milliGRAM(s) Oral two times a day  enoxaparin Injectable 40 milliGRAM(s) SubCutaneous daily  folic acid 1 milliGRAM(s) Oral daily  insulin lispro (HumaLOG) corrective regimen sliding scale   SubCutaneous three times a day before meals  levoFLOXacin IVPB      levoFLOXacin IVPB 750 milliGRAM(s) IV Intermittent every 24 hours  losartan 25 milliGRAM(s) Oral daily  methylPREDNISolone sodium succinate Injectable 40 milliGRAM(s) IV Push every 12 hours  multivitamin/minerals 1 Tablet(s) Oral daily  nicotine -  14 mG/24Hr(s) Patch 1 patch Transdermal daily  senna 2 Tablet(s) Oral at bedtime  thiamine 100 milliGRAM(s) Oral daily    MEDICATIONS  (PRN):  dextrose 40% Gel 15 Gram(s) Oral once PRN Blood Glucose LESS THAN 70 milliGRAM(s)/deciLiter  glucagon  Injectable 1 milliGRAM(s) IntraMuscular once PRN Glucose <70 milliGRAM(s)/deciLiter  LORazepam   Injectable 1 milliGRAM(s) IV Push every 6 hours PRN CIWA>7  nitroglycerin     SubLingual 0.4 milliGRAM(s) SubLingual every 5 minutes PRN short of breath  polyethylene glycol 3350 17 Gram(s) Oral daily PRN Constipation      PHYSICAL EXAM:  GENERAL: NAD, well-groomed, well-developed  HEAD:  Atraumatic, Normocephalic  EYES: EOMI, PERRLA, conjunctiva and sclera clear  ENMT: No tonsillar erythema, exudates, or enlargement; Moist mucous membranes, Good dentition, No lesions  NECK: Supple, No JVD, Normal thyroid  NERVOUS SYSTEM:  Alert & Oriented X3, Good concentration; Motor Strength 5/5 B/L upper and lower extremities; DTRs 2+ intact and symmetric  CHEST/LUNG: Clear to percussion bilaterally; No rales, rhonchi, wheezing, or rubs  HEART: Regular rate and rhythm; No murmurs, rubs, or gallops  ABDOMEN: Soft, Nontender, Nondistended; Bowel sounds present  EXTREMITIES:  2+ Peripheral Pulses, No clubbing, cyanosis, or edema  LYMPH: No lymphadenopathy noted  SKIN: No rashes or lesions  LABS:                        9.4    9.9   )-----------( 134      ( 15 Jul 2018 07:46 )             31.7     07-15    141  |  107  |  15  ----------------------------<  174<H>  3.9   |  24  |  0.83    Ca    8.4      15 Jul 2018 07:46  Phos  4.3     07-14  Mg     2.2     07-14    TPro  6.8  /  Alb  2.6<L>  /  TBili  0.2  /  DBili  <0.1  /  AST  17  /  ALT  20  /  AlkPhos  60  07-15        CAPILLARY BLOOD GLUCOSE      POCT Blood Glucose.: 323 mg/dL (15 Jul 2018 11:55)  POCT Blood Glucose.: 318 mg/dL (15 Jul 2018 11:53)  POCT Blood Glucose.: 176 mg/dL (15 Jul 2018 08:10)  POCT Blood Glucose.: 234 mg/dL (14 Jul 2018 23:44)  POCT Blood Glucose.: 243 mg/dL (14 Jul 2018 23:28)  POCT Blood Glucose.: 211 mg/dL (14 Jul 2018 22:04)  POCT Blood Glucose.: 223 mg/dL (14 Jul 2018 16:53)

## 2018-07-16 DIAGNOSIS — J44.1 CHRONIC OBSTRUCTIVE PULMONARY DISEASE WITH (ACUTE) EXACERBATION: ICD-10-CM

## 2018-07-16 LAB
GLUCOSE BLDC GLUCOMTR-MCNC: 132 MG/DL — HIGH (ref 70–99)
GLUCOSE BLDC GLUCOMTR-MCNC: 204 MG/DL — HIGH (ref 70–99)
GLUCOSE BLDC GLUCOMTR-MCNC: 220 MG/DL — HIGH (ref 70–99)
GLUCOSE BLDC GLUCOMTR-MCNC: 380 MG/DL — HIGH (ref 70–99)

## 2018-07-16 PROCEDURE — 74178 CT ABD&PLV WO CNTR FLWD CNTR: CPT | Mod: 26

## 2018-07-16 RX ORDER — PREGABALIN 225 MG/1
1000 CAPSULE ORAL DAILY
Qty: 0 | Refills: 0 | Status: DISCONTINUED | OUTPATIENT
Start: 2018-07-16 | End: 2018-07-17

## 2018-07-16 RX ORDER — HYDRALAZINE HCL 50 MG
10 TABLET ORAL ONCE
Qty: 0 | Refills: 0 | Status: COMPLETED | OUTPATIENT
Start: 2018-07-16 | End: 2018-07-17

## 2018-07-16 RX ORDER — SOD SULF/SODIUM/NAHCO3/KCL/PEG
4000 SOLUTION, RECONSTITUTED, ORAL ORAL ONCE
Qty: 0 | Refills: 0 | Status: COMPLETED | OUTPATIENT
Start: 2018-07-16 | End: 2018-07-16

## 2018-07-16 RX ORDER — FERROUS SULFATE 325(65) MG
325 TABLET ORAL DAILY
Qty: 0 | Refills: 0 | Status: DISCONTINUED | OUTPATIENT
Start: 2018-07-16 | End: 2018-07-17

## 2018-07-16 RX ADMIN — Medication 1 PATCH: at 11:38

## 2018-07-16 RX ADMIN — Medication 3 MILLILITER(S): at 20:21

## 2018-07-16 RX ADMIN — Medication 100 MILLIGRAM(S): at 11:38

## 2018-07-16 RX ADMIN — Medication 325 MILLIGRAM(S): at 14:01

## 2018-07-16 RX ADMIN — Medication 30 MILLILITER(S): at 14:06

## 2018-07-16 RX ADMIN — Medication 100 MILLIGRAM(S): at 05:55

## 2018-07-16 RX ADMIN — Medication 1 MILLIGRAM(S): at 11:38

## 2018-07-16 RX ADMIN — Medication 40 MILLIGRAM(S): at 14:01

## 2018-07-16 RX ADMIN — Medication 2: at 17:02

## 2018-07-16 RX ADMIN — Medication 2: at 12:04

## 2018-07-16 RX ADMIN — Medication 100 MILLIGRAM(S): at 17:02

## 2018-07-16 RX ADMIN — LOSARTAN POTASSIUM 25 MILLIGRAM(S): 100 TABLET, FILM COATED ORAL at 05:55

## 2018-07-16 RX ADMIN — SENNA PLUS 2 TABLET(S): 8.6 TABLET ORAL at 22:31

## 2018-07-16 RX ADMIN — Medication 3 MILLILITER(S): at 09:23

## 2018-07-16 RX ADMIN — Medication 5: at 22:31

## 2018-07-16 RX ADMIN — Medication 1 TABLET(S): at 11:38

## 2018-07-16 RX ADMIN — Medication 3 MILLILITER(S): at 15:26

## 2018-07-16 RX ADMIN — Medication 40 MILLIGRAM(S): at 05:57

## 2018-07-16 RX ADMIN — PREGABALIN 1000 MICROGRAM(S): 225 CAPSULE ORAL at 14:01

## 2018-07-16 RX ADMIN — ENOXAPARIN SODIUM 40 MILLIGRAM(S): 100 INJECTION SUBCUTANEOUS at 11:38

## 2018-07-16 RX ADMIN — Medication 3 MILLIGRAM(S): at 22:31

## 2018-07-16 NOTE — PROGRESS NOTE ADULT - SUBJECTIVE AND OBJECTIVE BOX
HPI:  62/ M from home, lives with wife, with PMHx of HTN (takes meds at home, but does not know th name), HLD, right LE varicose veins, umbilical hernia, constipation, current active smoker (1Pack over 3 days >50 years), drinks every day, small glass of vodka comes in with difficulty breathing that has been ongoing for 2-3 months however worse today. Admits to cough with green sputum, subjective fever mostly at nights, no chest pain. Denies orthopnea but has GUILLEN, in the past, he used to walk 2-3 blocks without difficulty however now he becomes SOB with 4-5 steps. has chronic RLE edema 2/2 to varicose veins, no edema over the LLE. no chills, headache, dizziness, N/V/D, joint pains, rash, weight loss etc  NKDA, FHx of HTN/HLD in sister, Social Hx as above, surgical hx of left inguinal hernia repair. (13 Jul 2018 00:12)      Patient is a 62y old  Male who presents with a chief complaint of SOB, GUILLEN (13 Jul 2018 00:12)      INTERVAL HPI/OVERNIGHT EVENTS:  T(C): 36.6 (07-16-18 @ 05:46), Max: 36.9 (07-15-18 @ 14:21)  HR: 61 (07-16-18 @ 05:46) (61 - 73)  BP: 150/49 (07-16-18 @ 05:46) (150/49 - 167/66)  RR: 18 (07-16-18 @ 05:46) (17 - 18)  SpO2: 96% (07-16-18 @ 05:46) (96% - 99%)  Wt(kg): --  I&O's Summary      REVIEW OF SYSTEMS: denies fever, chills, SOB, palpitations, chest pain, abdominal pain, nausea, vomitting, diarrhea, constipation, dizziness    MEDICATIONS  (STANDING):  ALBUTerol/ipratropium for Nebulization 3 milliLiter(s) Nebulizer every 6 hours  cyanocobalamin 1000 MICROGram(s) Oral daily  dextrose 5%. 1000 milliLiter(s) (50 mL/Hr) IV Continuous <Continuous>  dextrose 50% Injectable 12.5 Gram(s) IV Push once  dextrose 50% Injectable 25 Gram(s) IV Push once  dextrose 50% Injectable 25 Gram(s) IV Push once  docusate sodium 100 milliGRAM(s) Oral two times a day  enoxaparin Injectable 40 milliGRAM(s) SubCutaneous daily  ferrous    sulfate 325 milliGRAM(s) Oral daily  folic acid 1 milliGRAM(s) Oral daily  hydrALAZINE 10 milliGRAM(s) Oral once  insulin lispro (HumaLOG) corrective regimen sliding scale   SubCutaneous Before meals and at bedtime  levoFLOXacin IVPB      levoFLOXacin IVPB 750 milliGRAM(s) IV Intermittent every 24 hours  losartan 25 milliGRAM(s) Oral daily  melatonin 3 milliGRAM(s) Oral at bedtime  multivitamin/minerals 1 Tablet(s) Oral daily  nicotine -  14 mG/24Hr(s) Patch 1 patch Transdermal daily  predniSONE   Tablet 40 milliGRAM(s) Oral daily  senna 2 Tablet(s) Oral at bedtime  thiamine 100 milliGRAM(s) Oral daily    MEDICATIONS  (PRN):  aluminum hydroxide/magnesium hydroxide/simethicone Suspension 30 milliLiter(s) Oral every 6 hours PRN Dyspepsia  dextrose 40% Gel 15 Gram(s) Oral once PRN Blood Glucose LESS THAN 70 milliGRAM(s)/deciLiter  glucagon  Injectable 1 milliGRAM(s) IntraMuscular once PRN Glucose <70 milliGRAM(s)/deciLiter  LORazepam   Injectable 1 milliGRAM(s) IV Push every 6 hours PRN CIWA>7  nitroglycerin     SubLingual 0.4 milliGRAM(s) SubLingual every 5 minutes PRN short of breath  polyethylene glycol 3350 17 Gram(s) Oral daily PRN Constipation      PHYSICAL EXAM:  GENERAL: NAD, well-groomed, well-developed  HEAD:  Atraumatic, Normocephalic  EYES: EOMI, PERRLA, conjunctiva and sclera clear  ENMT: No tonsillar erythema, exudates, or enlargement; Moist mucous membranes, Good dentition, No lesions  NECK: Supple, No JVD, Normal thyroid  NERVOUS SYSTEM:  Alert & Oriented X3, Good concentration; Motor Strength 5/5 B/L upper and lower extremities; DTRs 2+ intact and symmetric  CHEST/LUNG: Clear to percussion bilaterally; No rales, rhonchi, wheezing, or rubs  HEART: Regular rate and rhythm; No murmurs, rubs, or gallops  ABDOMEN: Soft, Nontender, Nondistended; Bowel sounds present  EXTREMITIES:  2+ Peripheral Pulses, No clubbing, cyanosis, or edema  LYMPH: No lymphadenopathy noted  SKIN: No rashes or lesions  LABS:                        9.4    9.9   )-----------( 134      ( 15 Jul 2018 07:46 )             31.7     07-15    141  |  107  |  15  ----------------------------<  174<H>  3.9   |  24  |  0.83    Ca    8.4      15 Jul 2018 07:46    TPro  6.8  /  Alb  2.6<L>  /  TBili  0.2  /  DBili  <0.1  /  AST  17  /  ALT  20  /  AlkPhos  60  07-15        CAPILLARY BLOOD GLUCOSE      POCT Blood Glucose.: 204 mg/dL (16 Jul 2018 12:03)  POCT Blood Glucose.: 132 mg/dL (16 Jul 2018 08:06)  POCT Blood Glucose.: 251 mg/dL (15 Jul 2018 21:22)  POCT Blood Glucose.: 236 mg/dL (15 Jul 2018 16:36)

## 2018-07-16 NOTE — PROGRESS NOTE ADULT - SUBJECTIVE AND OBJECTIVE BOX
PGY 1 Note discussed with supervising resident and primary attending    Patient is a 62y old  Male who presents with a chief complaint of SOB, GUILLEN (13 Jul 2018 00:12)      INTERVAL HPI/OVERNIGHT EVENTS: Patient seen at the bedside, lying comfortably in bed. His SOb has improved.       MEDICATIONS  (STANDING):  ALBUTerol/ipratropium for Nebulization 3 milliLiter(s) Nebulizer every 6 hours  cyanocobalamin 1000 MICROGram(s) Oral daily  dextrose 5%. 1000 milliLiter(s) (50 mL/Hr) IV Continuous <Continuous>  dextrose 50% Injectable 12.5 Gram(s) IV Push once  dextrose 50% Injectable 25 Gram(s) IV Push once  dextrose 50% Injectable 25 Gram(s) IV Push once  docusate sodium 100 milliGRAM(s) Oral two times a day  enoxaparin Injectable 40 milliGRAM(s) SubCutaneous daily  ferrous    sulfate 325 milliGRAM(s) Oral daily  folic acid 1 milliGRAM(s) Oral daily  hydrALAZINE 10 milliGRAM(s) Oral once  insulin lispro (HumaLOG) corrective regimen sliding scale   SubCutaneous Before meals and at bedtime  levoFLOXacin IVPB      levoFLOXacin IVPB 750 milliGRAM(s) IV Intermittent every 24 hours  losartan 25 milliGRAM(s) Oral daily  melatonin 3 milliGRAM(s) Oral at bedtime  multivitamin/minerals 1 Tablet(s) Oral daily  nicotine -  14 mG/24Hr(s) Patch 1 patch Transdermal daily  predniSONE   Tablet 40 milliGRAM(s) Oral daily  senna 2 Tablet(s) Oral at bedtime  thiamine 100 milliGRAM(s) Oral daily    MEDICATIONS  (PRN):  aluminum hydroxide/magnesium hydroxide/simethicone Suspension 30 milliLiter(s) Oral every 6 hours PRN Dyspepsia  dextrose 40% Gel 15 Gram(s) Oral once PRN Blood Glucose LESS THAN 70 milliGRAM(s)/deciLiter  glucagon  Injectable 1 milliGRAM(s) IntraMuscular once PRN Glucose <70 milliGRAM(s)/deciLiter  LORazepam   Injectable 1 milliGRAM(s) IV Push every 12 hours PRN CIWA>7  nitroglycerin     SubLingual 0.4 milliGRAM(s) SubLingual every 5 minutes PRN short of breath  polyethylene glycol 3350 17 Gram(s) Oral daily PRN Constipation      __________________________________________________  REVIEW OF SYSTEMS:    CONSTITUTIONAL: No fever,   EYES: no acute visual disturbances  NECK: No pain or stiffness  RESPIRATORY: sob + but improved   CARDIOVASCULAR: No chest pain, no palpitations  GASTROINTESTINAL: Abdominal distension   NEUROLOGICAL: No headache or numbness, no tremors  MUSCULOSKELETAL: No joint pain, no muscle pain  GENITOURINARY: no dysuria, no frequency, no hesitancy  PSYCHIATRY: no depression , no anxiety  ALL OTHER  ROS negative        Vital Signs Last 24 Hrs  T(C): 36.7 (16 Jul 2018 14:41), Max: 36.7 (15 Jul 2018 21:29)  T(F): 98.1 (16 Jul 2018 14:41), Max: 98.1 (16 Jul 2018 14:41)  HR: 69 (16 Jul 2018 14:41) (61 - 73)  BP: 142/65 (16 Jul 2018 14:41) (142/65 - 167/66)  BP(mean): --  RR: 18 (16 Jul 2018 14:41) (17 - 18)  SpO2: 96% (16 Jul 2018 14:41) (96% - 99%)    ________________________________________________  PHYSICAL EXAM:  GENERAL: NAD  HEENT: Normocephalic;  conjunctivae and sclerae clear; moist mucous membranes;   NECK : supple  CHEST/LUNG: Clear to auscultation bilaterally with good air entry   HEART: S1 S2  regular; no murmurs, gallops or rubs  ABDOMEN: Soft, Nontender, distended; Bowel sounds present  EXTREMITIES: no cyanosis; no edema; no calf tenderness  SKIN: warm and dry; no rash  NERVOUS SYSTEM:  Awake and alert; Oriented  to place, person and time ; no new deficits    _________________________________________________  LABS:                        9.4    9.9   )-----------( 134      ( 15 Jul 2018 07:46 )             31.7     07-15    141  |  107  |  15  ----------------------------<  174<H>  3.9   |  24  |  0.83    Ca    8.4      15 Jul 2018 07:46    TPro  6.8  /  Alb  2.6<L>  /  TBili  0.2  /  DBili  <0.1  /  AST  17  /  ALT  20  /  AlkPhos  60  07-15        CAPILLARY BLOOD GLUCOSE      POCT Blood Glucose.: 220 mg/dL (16 Jul 2018 16:56)  POCT Blood Glucose.: 204 mg/dL (16 Jul 2018 12:03)  POCT Blood Glucose.: 132 mg/dL (16 Jul 2018 08:06)  POCT Blood Glucose.: 251 mg/dL (15 Jul 2018 21:22)        RADIOLOGY & ADDITIONAL TESTS:    Imaging Personally Reviewed:  YES/NO    Consultant(s) Notes Reviewed:   YES/ No    Care Discussed with Consultants :     Plan of care was discussed with patient and /or primary care giver; all questions and concerns were addressed and care was aligned with patient's wishes.

## 2018-07-16 NOTE — PROGRESS NOTE ADULT - PROBLEM SELECTOR PLAN 4
* does not remember list of medications   - started on losartan 25 mg PO ( will titrate as needed)
- Microcytic hypochromic anemia, iron deficiency anemia   - will follow FOBT
drinks vodka everyday, CIWA protocol initiated, Oral MV, thiamine, Folate  PRN ativan only for CIWA>7
drinks vodka everyday, CIWA protocol initiated, Oral MV, thiamine, Folate  PRN ativan only for CIWA>7

## 2018-07-16 NOTE — PROGRESS NOTE ADULT - SUBJECTIVE AND OBJECTIVE BOX
ADDENDUM TO EARLIER NOTE    I spoke with the patient at length over the phone regarding the importance of EGD for variceal surveillance and colonoscopy for evaluation of CELE. Pt adamantly refuses both procedures. Risks of refusal explained to patient, including missing dx of cancer as well as death. Pt verbalized understanding and still refusing the procedures. Please reconsult if/when pt is agreeable to undergo endoscopic evaluation.

## 2018-07-16 NOTE — PROGRESS NOTE ADULT - PROBLEM SELECTOR PLAN 5
- Microcytic hypochromic anemia, iron deficiency anemia   - will follow FOBT
- drinks vodka everyday, CIWA protocol initiated, Oral MV, thiamine, Folate  - PRN ativan only for CIWA>7
chronic, elevate RLE
chronic, elevate RLE

## 2018-07-16 NOTE — PROGRESS NOTE ADULT - PROBLEM SELECTOR PROBLEM 1
Abdominal swelling
Abdominal swelling
Acute respiratory failure with hypoxia
Acute respiratory failure with hypoxia

## 2018-07-16 NOTE — PROGRESS NOTE ADULT - PROBLEM SELECTOR PLAN 6
- drinks vodka everyday, monitored CIWA protocol , Oral MV, thiamine, Folate  - PRN ativan only for CIWA>7  CIWA score : 0 , no ativan received in last 24 hours
- chronic, elevate RLE
IMPROVE VTE score: 2 for age>60, ICU stay  Lovenox sc
IMPROVE VTE score: 2 for age>60, ICU stay  Lovenox sc

## 2018-07-16 NOTE — CHART NOTE - NSCHARTNOTEFT_GEN_A_CORE
patient refused Colonoscopy and EGD. Senior resident and GI Doctor spoke with the patient and discussed the risks and benefits of colonoscopy and EGD but patient still refused to have it. Patient was explained that he may die from GI bleed, Pt understands the risk of not proceeding with Colonoscopy and EGD but he still refused to have either.

## 2018-07-16 NOTE — PROGRESS NOTE ADULT - PROBLEM SELECTOR PLAN 2
Exacerbation ; resolved   Steroids stopped
- difficulty breathing and hypoxic respiratory failure  - no consolidation seen on CXR,   - proBNP WNL  - possible COPD exacerbation given long term smoking history ,  - continue nebs, Levaquin 750mg QD IV, solu-medro 40Q8  - switched to NC 2 L form BIPAP - tolerating well   - smoking cessation advice provided, ordered nicotine patch  - Check TTE to eval for Pul HTN  - get Doppler lower extremity- given lower extremity swelling and SOB
does not remember list of medications - started on losartan 25 mg PO - will titrate as needed   not able to get list medications from pharmacy
does not remember list of medications - started on losartan 25 mg PO - will titrate as needed   not able to get list medications from pharmacy

## 2018-07-16 NOTE — PROGRESS NOTE ADULT - SUBJECTIVE AND OBJECTIVE BOX
Pt doing well. No acute complaints.    MEDICATIONS  (STANDING):  ALBUTerol/ipratropium for Nebulization 3 milliLiter(s) Nebulizer every 6 hours  cyanocobalamin 1000 MICROGram(s) Oral daily  dextrose 5%. 1000 milliLiter(s) (50 mL/Hr) IV Continuous <Continuous>  dextrose 50% Injectable 12.5 Gram(s) IV Push once  dextrose 50% Injectable 25 Gram(s) IV Push once  dextrose 50% Injectable 25 Gram(s) IV Push once  docusate sodium 100 milliGRAM(s) Oral two times a day  enoxaparin Injectable 40 milliGRAM(s) SubCutaneous daily  ferrous    sulfate 325 milliGRAM(s) Oral daily  folic acid 1 milliGRAM(s) Oral daily  hydrALAZINE 10 milliGRAM(s) Oral once  insulin lispro (HumaLOG) corrective regimen sliding scale   SubCutaneous Before meals and at bedtime  levoFLOXacin IVPB      levoFLOXacin IVPB 750 milliGRAM(s) IV Intermittent every 24 hours  losartan 25 milliGRAM(s) Oral daily  melatonin 3 milliGRAM(s) Oral at bedtime  multivitamin/minerals 1 Tablet(s) Oral daily  nicotine -  14 mG/24Hr(s) Patch 1 patch Transdermal daily  polyethylene glycol/electrolyte Solution. 4000 milliLiter(s) Oral once  predniSONE   Tablet 40 milliGRAM(s) Oral daily  senna 2 Tablet(s) Oral at bedtime  thiamine 100 milliGRAM(s) Oral daily    T(C): 36.7 (07-16-18 @ 14:41)  T(F): 98.1 (07-16-18 @ 14:41), Max: 98.1 (07-16-18 @ 14:41)  HR: 69 (07-16-18 @ 14:41) (61 - 73)  BP: 142/65 (07-16-18 @ 14:41) (142/65 - 167/66)    Gen: NAD  CVS: S1/S2  Chest: CTABL  Abd: S/NT/distended/dull to percussion                        9.4    9.9   )-----------( 134      ( 15 Jul 2018 07:46 )             31.7   07-15    141  |  107  |  15  ----------------------------<  174<H>  3.9   |  24  |  0.83    Ca    8.4      15 Jul 2018 07:46    TPro  6.8  /  Alb  2.6<L>  /  TBili  0.2  /  DBili  <0.1  /  AST  17  /  ALT  20  /  AlkPhos  60  07-15    CT Abdomen and Pelvis w/wo IV Cont (07.16.18 @ 11:51)  IMPRESSION:  Cirrhosis. No CT evidence of an arterially enhancing hepatic lesion to suggest hepatoma.  Splenomegaly. Traceperihepatic ascites.  Cholelithiasis.  Colonic diverticulosis without CT evidence of diverticulitis.

## 2018-07-16 NOTE — CHART NOTE - NSCHARTNOTEFT_GEN_A_CORE
64 year old male patient admitted for hypoxic respiratory failure. He is on Levaquin 750mg IVPB but patient refused to take his medication given swelling of arms after IV medication. I examined the patient at bedside, mild swelling was noted with no infiltration. Patient was counselled but he continued to refuse. Levaquin 750mg oral is ordered.  Plan discussed with PGY-3 on call.

## 2018-07-16 NOTE — PROGRESS NOTE ADULT - PROBLEM SELECTOR PLAN 1
- LFTs to be monitored daily  - CT scan Triphasic Abdomen to rule out HCC ordered : f/u for results  - Endoscopy/colonoscopy: patient refused  CT abdomin : follow up for result ?HCC

## 2018-07-17 ENCOUNTER — TRANSCRIPTION ENCOUNTER (OUTPATIENT)
Age: 63
End: 2018-07-17

## 2018-07-17 VITALS
HEART RATE: 69 BPM | DIASTOLIC BLOOD PRESSURE: 62 MMHG | TEMPERATURE: 98 F | SYSTOLIC BLOOD PRESSURE: 153 MMHG | OXYGEN SATURATION: 95 % | RESPIRATION RATE: 18 BRPM

## 2018-07-17 LAB
GLUCOSE BLDC GLUCOMTR-MCNC: 239 MG/DL — HIGH (ref 70–99)
GLUCOSE BLDC GLUCOMTR-MCNC: 347 MG/DL — HIGH (ref 70–99)
GLUCOSE BLDC GLUCOMTR-MCNC: 354 MG/DL — HIGH (ref 70–99)

## 2018-07-17 PROCEDURE — 83880 ASSAY OF NATRIURETIC PEPTIDE: CPT

## 2018-07-17 PROCEDURE — 84153 ASSAY OF PSA TOTAL: CPT

## 2018-07-17 PROCEDURE — 82272 OCCULT BLD FECES 1-3 TESTS: CPT

## 2018-07-17 PROCEDURE — 82306 VITAMIN D 25 HYDROXY: CPT

## 2018-07-17 PROCEDURE — 71045 X-RAY EXAM CHEST 1 VIEW: CPT

## 2018-07-17 PROCEDURE — 85045 AUTOMATED RETICULOCYTE COUNT: CPT

## 2018-07-17 PROCEDURE — 99291 CRITICAL CARE FIRST HOUR: CPT | Mod: 25

## 2018-07-17 PROCEDURE — 82803 BLOOD GASES ANY COMBINATION: CPT

## 2018-07-17 PROCEDURE — 85610 PROTHROMBIN TIME: CPT

## 2018-07-17 PROCEDURE — 80048 BASIC METABOLIC PNL TOTAL CA: CPT

## 2018-07-17 PROCEDURE — 82607 VITAMIN B-12: CPT

## 2018-07-17 PROCEDURE — 85027 COMPLETE CBC AUTOMATED: CPT

## 2018-07-17 PROCEDURE — 84443 ASSAY THYROID STIM HORMONE: CPT

## 2018-07-17 PROCEDURE — 93306 TTE W/DOPPLER COMPLETE: CPT

## 2018-07-17 PROCEDURE — 84100 ASSAY OF PHOSPHORUS: CPT

## 2018-07-17 PROCEDURE — 82962 GLUCOSE BLOOD TEST: CPT

## 2018-07-17 PROCEDURE — 82746 ASSAY OF FOLIC ACID SERUM: CPT

## 2018-07-17 PROCEDURE — 87389 HIV-1 AG W/HIV-1&-2 AB AG IA: CPT

## 2018-07-17 PROCEDURE — 96375 TX/PRO/DX INJ NEW DRUG ADDON: CPT

## 2018-07-17 PROCEDURE — 80076 HEPATIC FUNCTION PANEL: CPT

## 2018-07-17 PROCEDURE — 94760 N-INVAS EAR/PLS OXIMETRY 1: CPT

## 2018-07-17 PROCEDURE — 94660 CPAP INITIATION&MGMT: CPT

## 2018-07-17 PROCEDURE — 80074 ACUTE HEPATITIS PANEL: CPT

## 2018-07-17 PROCEDURE — 85730 THROMBOPLASTIN TIME PARTIAL: CPT

## 2018-07-17 PROCEDURE — 83735 ASSAY OF MAGNESIUM: CPT

## 2018-07-17 PROCEDURE — 83550 IRON BINDING TEST: CPT

## 2018-07-17 PROCEDURE — 96374 THER/PROPH/DIAG INJ IV PUSH: CPT

## 2018-07-17 PROCEDURE — 97161 PT EVAL LOW COMPLEX 20 MIN: CPT

## 2018-07-17 PROCEDURE — 36415 COLL VENOUS BLD VENIPUNCTURE: CPT

## 2018-07-17 PROCEDURE — 80053 COMPREHEN METABOLIC PANEL: CPT

## 2018-07-17 PROCEDURE — 74178 CT ABD&PLV WO CNTR FLWD CNTR: CPT

## 2018-07-17 PROCEDURE — 83036 HEMOGLOBIN GLYCOSYLATED A1C: CPT

## 2018-07-17 PROCEDURE — 94640 AIRWAY INHALATION TREATMENT: CPT

## 2018-07-17 PROCEDURE — 96372 THER/PROPH/DIAG INJ SC/IM: CPT | Mod: XU

## 2018-07-17 PROCEDURE — 80061 LIPID PANEL: CPT

## 2018-07-17 PROCEDURE — 84466 ASSAY OF TRANSFERRIN: CPT

## 2018-07-17 PROCEDURE — 93970 EXTREMITY STUDY: CPT

## 2018-07-17 PROCEDURE — 83010 ASSAY OF HAPTOGLOBIN QUANT: CPT

## 2018-07-17 PROCEDURE — 96376 TX/PRO/DX INJ SAME DRUG ADON: CPT

## 2018-07-17 PROCEDURE — 83615 LACTATE (LD) (LDH) ENZYME: CPT

## 2018-07-17 PROCEDURE — 84154 ASSAY OF PSA FREE: CPT

## 2018-07-17 PROCEDURE — 76700 US EXAM ABDOM COMPLETE: CPT

## 2018-07-17 PROCEDURE — 82728 ASSAY OF FERRITIN: CPT

## 2018-07-17 PROCEDURE — 84484 ASSAY OF TROPONIN QUANT: CPT

## 2018-07-17 RX ORDER — MULTIVIT-MIN/FERROUS GLUCONATE 9 MG/15 ML
1 LIQUID (ML) ORAL
Qty: 30 | Refills: 0 | OUTPATIENT
Start: 2018-07-17 | End: 2018-08-15

## 2018-07-17 RX ORDER — PREGABALIN 225 MG/1
1 CAPSULE ORAL
Qty: 0 | Refills: 0 | COMMUNITY
Start: 2018-07-17

## 2018-07-17 RX ORDER — DOCUSATE SODIUM 100 MG
1 CAPSULE ORAL
Qty: 0 | Refills: 0 | COMMUNITY
Start: 2018-07-17

## 2018-07-17 RX ORDER — THIAMINE MONONITRATE (VIT B1) 100 MG
1 TABLET ORAL
Qty: 0 | Refills: 0 | COMMUNITY
Start: 2018-07-17

## 2018-07-17 RX ORDER — FERROUS SULFATE 325(65) MG
1 TABLET ORAL
Qty: 1 | Refills: 0 | OUTPATIENT
Start: 2018-07-17 | End: 2018-08-15

## 2018-07-17 RX ORDER — DOCUSATE SODIUM 100 MG
1 CAPSULE ORAL
Qty: 60 | Refills: 0
Start: 2018-07-17 | End: 2018-08-15

## 2018-07-17 RX ORDER — LOSARTAN POTASSIUM 100 MG/1
1 TABLET, FILM COATED ORAL
Qty: 30 | Refills: 0 | OUTPATIENT
Start: 2018-07-17 | End: 2018-08-15

## 2018-07-17 RX ORDER — FOLIC ACID 0.8 MG
1 TABLET ORAL
Qty: 0 | Refills: 0 | COMMUNITY
Start: 2018-07-17

## 2018-07-17 RX ORDER — MULTIVIT-MIN/FERROUS GLUCONATE 9 MG/15 ML
1 LIQUID (ML) ORAL
Qty: 0 | Refills: 0 | COMMUNITY
Start: 2018-07-17

## 2018-07-17 RX ORDER — LANOLIN ALCOHOL/MO/W.PET/CERES
1 CREAM (GRAM) TOPICAL
Qty: 30 | Refills: 0 | OUTPATIENT
Start: 2018-07-17 | End: 2018-08-15

## 2018-07-17 RX ORDER — FOLIC ACID 0.8 MG
1 TABLET ORAL
Qty: 30 | Refills: 0 | OUTPATIENT
Start: 2018-07-17 | End: 2018-08-15

## 2018-07-17 RX ORDER — SENNA PLUS 8.6 MG/1
2 TABLET ORAL
Qty: 0 | Refills: 0 | COMMUNITY
Start: 2018-07-17

## 2018-07-17 RX ORDER — THIAMINE MONONITRATE (VIT B1) 100 MG
1 TABLET ORAL
Qty: 30 | Refills: 0 | OUTPATIENT
Start: 2018-07-17 | End: 2018-08-15

## 2018-07-17 RX ORDER — SENNA PLUS 8.6 MG/1
2 TABLET ORAL
Qty: 60 | Refills: 0 | OUTPATIENT
Start: 2018-07-17 | End: 2018-08-15

## 2018-07-17 RX ORDER — PREGABALIN 225 MG/1
1 CAPSULE ORAL
Qty: 30 | Refills: 0 | OUTPATIENT
Start: 2018-07-17 | End: 2018-08-15

## 2018-07-17 RX ORDER — LANOLIN ALCOHOL/MO/W.PET/CERES
1 CREAM (GRAM) TOPICAL
Qty: 0 | Refills: 0 | COMMUNITY
Start: 2018-07-17

## 2018-07-17 RX ADMIN — Medication 4: at 11:55

## 2018-07-17 RX ADMIN — Medication 100 MILLIGRAM(S): at 11:54

## 2018-07-17 RX ADMIN — Medication 2: at 07:56

## 2018-07-17 RX ADMIN — LOSARTAN POTASSIUM 25 MILLIGRAM(S): 100 TABLET, FILM COATED ORAL at 05:51

## 2018-07-17 RX ADMIN — Medication 40 MILLIGRAM(S): at 05:48

## 2018-07-17 RX ADMIN — Medication 325 MILLIGRAM(S): at 11:54

## 2018-07-17 RX ADMIN — Medication 1 MILLIGRAM(S): at 11:56

## 2018-07-17 RX ADMIN — Medication 1 PATCH: at 11:55

## 2018-07-17 RX ADMIN — Medication 100 MILLIGRAM(S): at 17:09

## 2018-07-17 RX ADMIN — Medication 10 MILLIGRAM(S): at 11:53

## 2018-07-17 RX ADMIN — Medication 3 MILLILITER(S): at 14:23

## 2018-07-17 RX ADMIN — Medication 100 MILLIGRAM(S): at 05:48

## 2018-07-17 RX ADMIN — ENOXAPARIN SODIUM 40 MILLIGRAM(S): 100 INJECTION SUBCUTANEOUS at 11:53

## 2018-07-17 RX ADMIN — Medication 3 MILLILITER(S): at 08:34

## 2018-07-17 RX ADMIN — Medication 1 TABLET(S): at 11:54

## 2018-07-17 RX ADMIN — PREGABALIN 1000 MICROGRAM(S): 225 CAPSULE ORAL at 11:54

## 2018-07-17 RX ADMIN — Medication 5: at 16:58

## 2018-07-17 RX ADMIN — Medication 1 PATCH: at 11:53

## 2018-07-17 NOTE — DISCHARGE NOTE ADULT - CARE PROVIDER_API CALL
Julio Cesar Lux), Internal Medicine  [address] [place] [address]  Phone: [phone]  Fax: [phone] Julio Cesar Lux), Internal Medicine  [address] [place] [address]  Phone: [phone]  Fax: [phone]    Jesse Christensen), Internal Medicine  [address] [place] [address]  Phone: [phone]  Fax: [phone]

## 2018-07-17 NOTE — PROGRESS NOTE ADULT - PROVIDER SPECIALTY LIST ADULT
Critical Care
Critical Care
Gastroenterology
Gastroenterology
Internal Medicine

## 2018-07-17 NOTE — DISCHARGE NOTE ADULT - HOSPITAL COURSE
62/ M from home, lives with wife, with PMHx of HTN (takes meds at home, but does not know th name), HLD, right LE varicose veins, umbilical hernia, constipation, current active smoker (1Pack over 3 days >50 years), drinks every day, small glass of vodka comes in with difficulty breathing that has been ongoing for 2-3 months however worse today. Patient was afebrile in the ED, hypertensive to 161/77, RR: 24 and O2 sat of 90% on RA, was placed on BiPaP by the ED. while on bipap, patient is not using accessory muscles and no apparent resp distress. leucocytosis not significant, 11.7, has microcytic, hypochromic anemia, H/H of 10.4/34.3, BS elevated to 167, first trop neg, proBNP WNL, no obvious consolidation seen on CXR He was Admitted for SOB, hypoxic resp failure, needing NIV, possibly 2/2 to COPD exacerbation. For Acute respiratory failure with hypoxia with decreased breath sounds, poor air entry, possible COPD exacerbation, c/w BiPaP support, nebs, Levaquin 750mg QD IV, solu-medro, 125mg QD and 40Q8, ABG while on Bipap is WNL, smoking cessation advice provided, ordered nicotine patch, TTE : normal EF. Doppler ofLL was negative for PE, BNP was normal. Patient refused levoflox medication on 7/16. Seem by the resident and recommended to take medications but pt refused. risk and benefits explained. For alcohol abuse, patient was monitored on [facility] protocol.  For abdominal distension [place] and CT abdomen was done. CT abdomen showed: cirrhosis,, splenomegaly, with trace transhepatic ascites, was negative for hepatoma, cholelithiasis and diverticulosis. GI followed the patient and recommended EGD and Colonoscopy. Patient refused both the procedures. GI and senior resident spoke with the pt discussing the risk and benefits of proceeding with the procedure. Pt understands the risk and benefits but refused both the procedures.     Pt medically stable to discharge and recommended to have EGD and Colonoscopy on out patient basis

## 2018-07-17 NOTE — DISCHARGE NOTE ADULT - PLAN OF CARE
No shortness of breath you presented with acute respiratory failure most likely from COPD exacerbation, you were treated with steroids and bronchodilators, levofloxacin and BIPAP. You are being discharged on bronchodilators. you presented with acute respiratory failure most likely from COPD exacerbation, you were treated with steroids and bronchodilators, levofloxacin and BIPAP. You are being discharged on bronchodilators, levoflox and steroid taper. Follow up with PCP in 1 week. OP PFTs recommended, follow up with Pulmonologist is recommended. Take medications as advised CT scan showed cirrhosis of liver most likely because of alcohol. GI followed and recommended EGD and Colonoscopy but you refused. Risk and benefits of both the procedures were explained but you refused to proceed with either of the procedures. You are recommended to get EGD and colonoscopy on OP basis. Take medications as advised, OP follow up with GI and PCP in 1 week you were monitored on CIWA protocol and PRN Ativan in the hospital. Alcohol cessation is advised and participation in AA groups advised Ferrous sulphate and folic acid prescribed, Take medication as advised. Follow up with PCP in 1 week   EGD andCOlonoscopy was recommended to rule out any cause of bleeding especially in the setting of Cirrhosis of liver, but you refused for etiher of the procedure. you are recommended to have these on OP basis and follow up with GI doctor recommended take medications as advised, Follow up with PCP in 1 week leg elevation and follow up with PCP in 1 week smoking cessation, take medication as advised. Follow up with PCP and pulmonologist in 1 week and OP PFT recommended

## 2018-07-17 NOTE — DISCHARGE NOTE ADULT - PATIENT PORTAL LINK FT
You can access the [facility] Patient Portal, offered by [facility], by registering with the following website: [email or web address]

## 2018-07-17 NOTE — DISCHARGE NOTE ADULT - CARE PLAN
Principal Discharge DX:	Acute respiratory failure with hypoxia  Goal:	No shortness of breath  Assessment and plan of treatment:	you presented with acute respiratory failure most likely from COPD exacerbation, you were treated with steroids and bronchodilators, levofloxacin and BIPAP. You are being discharged on bronchodilators.  Secondary Diagnosis:	Abdominal swelling  Secondary Diagnosis:	Alcohol abuse  Secondary Diagnosis:	Anemia  Secondary Diagnosis:	HTN (hypertension)  Secondary Diagnosis:	Varicose veins  Secondary Diagnosis:	COPD exacerbation Principal Discharge DX:	Acute respiratory failure with hypoxia  Goal:	No shortness of breath  Assessment and plan of treatment:	you presented with acute respiratory failure most likely from COPD exacerbation, you were treated with steroids and bronchodilators, levofloxacin and BIPAP. You are being discharged on bronchodilators, levoflox and steroid taper. Follow up with PCP in 1 week. OP PFTs recommended, follow up with Pulmonologist is recommended. Take medications as advised  Secondary Diagnosis:	Abdominal swelling  Assessment and plan of treatment:	CT scan showed cirrhosis of liver most likely because of alcohol. GI followed and recommended EGD and Colonoscopy but you refused. Risk and benefits of both the procedures were explained but you refused to proceed with either of the procedures. You are recommended to get EGD and colonoscopy on OP basis. Take medications as advised, OP follow up with GI and PCP in 1 week  Secondary Diagnosis:	Alcohol abuse  Assessment and plan of treatment:	you were monitored on CIWA protocol and PRN Ativan in the hospital. Alcohol cessation is advised and participation in AA groups advised  Secondary Diagnosis:	Anemia  Assessment and plan of treatment:	Ferrous sulphate and folic acid prescribed, Take medication as advised. Follow up with PCP in 1 week   EGD andCOlonoscopy was recommended to rule out any cause of bleeding especially in the setting of Cirrhosis of liver, but you refused for etiher of the procedure. you are recommended to have these on OP basis and follow up with GI doctor recommended  Secondary Diagnosis:	HTN (hypertension)  Assessment and plan of treatment:	take medications as advised, Follow up with PCP in 1 week  Secondary Diagnosis:	Varicose veins  Assessment and plan of treatment:	leg elevation and follow up with PCP in 1 week  Secondary Diagnosis:	COPD exacerbation  Assessment and plan of treatment:	smoking cessation, take medication as advised. Follow up with PCP and pulmonologist in 1 week and OP PFT recommended

## 2018-07-17 NOTE — DISCHARGE NOTE ADULT - MEDICATION SUMMARY - MEDICATIONS TO TAKE
I will START or STAY ON the medications listed below when I get home from the hospital:    predniSONE 10 mg oral tablet  -- 40 mg x 3 days then   30 mg x 3 days then   20 mg x 3 days then   10 mg x 3 days then stop  -- It is very important that you take or use this exactly as directed.  Do not skip doses or discontinue unless directed by your doctor.  Obtain medical advice before taking any non-prescription drugs as some may affect the action of this medication.  Take with food or milk.    -- Indication: For COPD exacerbation    losartan 25 mg oral tablet  -- 1 tab(s) by mouth once a day  -- Indication: For HTN (hypertension)    FeroSul 325 mg (65 mg elemental iron) oral tablet  -- 1 tab(s) by mouth 3 times a day  -- Indication: For Anemia    docusate sodium 100 mg oral capsule  -- 1 cap(s) by mouth 2 times a day  -- Indication: For laxative    senna oral tablet  -- 2 tab(s) by mouth once a day (at bedtime)  -- Indication: For laxative    melatonin 3 mg oral tablet  -- 1 tab(s) by mouth once a day (at bedtime)  -- Indication: For sleep     Levaquin 750 mg oral tablet  -- 1 tab(s) by mouth every 24 hours  -- Indication: For COPD exacerbation    Multiple Vitamins with Minerals oral tablet  -- 1 tab(s) by mouth once a day  -- Indication: For supplement    cyanocobalamin 1000 mcg oral tablet  -- 1 tab(s) by mouth once a day  -- Indication: For supplement    folic acid 1 mg oral tablet  -- 1 tab(s) by mouth once a day  -- Indication: For supplement    thiamine 100 mg oral tablet  -- 1 tab(s) by mouth once a day  -- Indication: For supplement

## 2018-07-17 NOTE — PROGRESS NOTE ADULT - ASSESSMENT
63 yo M presented with Dyspnea on exertion and hypoxemic respiratory failure admitted to ICU for initiation of BiPAP and was downgraded to the floor in <24 hrs. Pt reports having severe abd distension for the past 2.5 yrs which can be contributing to pt's SOB.     PAtient  COPD exacerbation is resolved, Denied EGD and COlonoscopy for abdominal distension, F/u with CT abdomen for ?HCC and discharge tomorrow
61 yo M presented with Dyspnea on exertion and hypoxemic respiratory failure admitted to ICU for initiation of BiPAP and was downgraded to the floor in <24 hrs. Pt reports having severe abd distension for the past 2.5 yrs which can be contributing to pt's SOB.  Reports having intermittent diarrhea and constipation. Reports EtOH abuse.
62/ M from home, lives with wife, with PMHx of HTN (takes meds at home, but does not know th name), HLD, right LE varicose veins, umbilical hernia, constipation, current active smoker (1Pack over 3 days >50 years), drinks every day, small glass of vodka comes in with difficulty breathing that has been ongoing for 2-3 months however worse today.    Patient is afebrile in the ED, hypertensive to 161/77, RR: 24 and O2 sat of 90% on RA, was placed on BiPaP by the ED. while on bipap, patient is not using accessory muscles and no apparent resp distress. leucocytosis not significant, 11.7, has microcytic, hypochromic anemia, H/H of 10.4/34.3, BS elevated to 167, first trop neg, proBNP WNL, no obvious consolidation seen on CXR    Admitted for SOB, hypoxic resp failure, needing NIV, possibly 2/2 to COPD exacerbation
62/ M from home, lives with wife, with PMHx of HTN (takes meds at home, but does not know th name), HLD, right LE varicose veins, umbilical hernia, constipation, current active smoker (1Pack over 3 days >50 years), drinks every day, small glass of vodka comes in with difficulty breathing that has been ongoing for 2-3 months however worse today.    Patient is afebrile in the ED, hypertensive to 161/77, RR: 24 and O2 sat of 90% on RA, was placed on BiPaP by the ED. while on bipap, patient is not using accessory muscles and no apparent resp distress. leucocytosis not significant, 11.7, has microcytic, hypochromic anemia, H/H of 10.4/34.3, BS elevated to 167, first trop neg, proBNP WNL, no obvious consolidation seen on CXR    Admitted for SOB, hypoxic resp failure, needing NIV, possibly 2/2 to COPD exacerbation
62M w/CELE and newly dx'ed cirrhosis of the liver.  -pt has cirrhosis, likely due to long-standing EtOH abuse  -MELD = 9; not a candidate for transplant at this time  -plant for endoscopic evaluation of his CELE as well as variceal surveillance with EGD and colonoscopy tomorrow; prep with Golytely today and keep NPO after midnight
came to see pt, but pt went for sono abd.  d/w staff pt ias alert awke, normal vs.  will try to see him later on
down graded from icu  smoker admitted for sob,  anemia, distended abd .  started on B Dilaters, solu and levaquin  breathing is better  pt is on bed very comfdortable on RA  pulse ok  vs stable  lungs a/e fair no added sound    abd  ?? ascites  but no tenderness   used to drink in past  active smoker.  copd stable.  abd distension  hep b and c neg  will get sono to r/o ascites  out of bed in chaie     smoking quittal
seen and examined  comfortable on chair having lunch.  denies sob  or cp.  no abd pain  vs styable physical unchanged  lung  afew b/l basal rhoncis .   sono liver no no ascites   echo no systolic but diastolic dysf  no severe PHTN.  copd  better      is walking  with minimal sob.  change to steroids   Anemia  hepatic ?? cirrhosis  GI on board   smoking  on nicotin patch
seen and examined  vs stable afebrile on edge of bed comfortable  denies sob or cp.  pt is walking around with sob  no abd pain  physical unchanged  comfortable.    lungs a/e slightly decreased ( chronic) no rales  afew b/basilar rhonci  abd protruded,  non tender  no new labs  ct abd ? ascites , no mass  Copd / smoker has nicotin patch  tapering dose of prednisone  cont bronchodilaters     d/c home with PCP in next a few days f/u with , hgb, GI, pulmonary.outpt  no smoking, nicotin.  d/w pt  any sob , high fever, cp or anysymptoms , not feeling well come to er  no etoh  as cirrhosis

## 2018-07-17 NOTE — PROGRESS NOTE ADULT - SUBJECTIVE AND OBJECTIVE BOX
HPI:  62/ M from home, lives with wife, with PMHx of HTN (takes meds at home, but does not know th name), HLD, right LE varicose veins, umbilical hernia, constipation, current active smoker (1Pack over 3 days >50 years), drinks every day, small glass of vodka comes in with difficulty breathing that has been ongoing for 2-3 months however worse today. Admits to cough with green sputum, subjective fever mostly at nights, no chest pain. Denies orthopnea but has GUILLEN, in the past, he used to walk 2-3 blocks without difficulty however now he becomes SOB with 4-5 steps. has chronic RLE edema 2/2 to varicose veins, no edema over the LLE. no chills, headache, dizziness, N/V/D, joint pains, rash, weight loss etc  NKDA, FHx of HTN/HLD in sister, Social Hx as above, surgical hx of left inguinal hernia repair. (13 Jul 2018 00:12)      Patient is a 62y old  Male who presents with a chief complaint of SOB, GUILLEN (17 Jul 2018 11:30)      INTERVAL HPI/OVERNIGHT EVENTS:  T(C): 36.3 (07-17-18 @ 05:15), Max: 37.2 (07-16-18 @ 21:26)  HR: 66 (07-17-18 @ 05:15) (66 - 79)  BP: 159/64 (07-17-18 @ 05:15) (142/65 - 171/54)  RR: 17 (07-17-18 @ 05:15) (17 - 18)  SpO2: 100% (07-17-18 @ 05:15) (96% - 100%)  Wt(kg): --  I&O's Summary      REVIEW OF SYSTEMS: denies fever, chills, SOB, palpitations, chest pain, abdominal pain, nausea, vomitting, diarrhea, constipation, dizziness    MEDICATIONS  (STANDING):  ALBUTerol/ipratropium for Nebulization 3 milliLiter(s) Nebulizer every 6 hours  cyanocobalamin 1000 MICROGram(s) Oral daily  dextrose 5%. 1000 milliLiter(s) (50 mL/Hr) IV Continuous <Continuous>  dextrose 50% Injectable 12.5 Gram(s) IV Push once  dextrose 50% Injectable 25 Gram(s) IV Push once  dextrose 50% Injectable 25 Gram(s) IV Push once  docusate sodium 100 milliGRAM(s) Oral two times a day  enoxaparin Injectable 40 milliGRAM(s) SubCutaneous daily  ferrous    sulfate 325 milliGRAM(s) Oral daily  folic acid 1 milliGRAM(s) Oral daily  insulin lispro (HumaLOG) corrective regimen sliding scale   SubCutaneous Before meals and at bedtime  levoFLOXacin  Tablet 750 milliGRAM(s) Oral every 24 hours  losartan 25 milliGRAM(s) Oral daily  melatonin 3 milliGRAM(s) Oral at bedtime  multivitamin/minerals 1 Tablet(s) Oral daily  nicotine -  14 mG/24Hr(s) Patch 1 patch Transdermal daily  predniSONE   Tablet 40 milliGRAM(s) Oral daily  senna 2 Tablet(s) Oral at bedtime  thiamine 100 milliGRAM(s) Oral daily    MEDICATIONS  (PRN):  aluminum hydroxide/magnesium hydroxide/simethicone Suspension 30 milliLiter(s) Oral every 6 hours PRN Dyspepsia  dextrose 40% Gel 15 Gram(s) Oral once PRN Blood Glucose LESS THAN 70 milliGRAM(s)/deciLiter  glucagon  Injectable 1 milliGRAM(s) IntraMuscular once PRN Glucose <70 milliGRAM(s)/deciLiter  LORazepam   Injectable 1 milliGRAM(s) IV Push every 12 hours PRN CIWA>7  nitroglycerin     SubLingual 0.4 milliGRAM(s) SubLingual every 5 minutes PRN short of breath  polyethylene glycol 3350 17 Gram(s) Oral daily PRN Constipation      PHYSICAL EXAM:  GENERAL: NAD, well-groomed, well-developed  HEAD:  Atraumatic, Normocephalic  EYES: EOMI, PERRLA, conjunctiva and sclera clear  ENMT: No tonsillar erythema, exudates, or enlargement; Moist mucous membranes, Good dentition, No lesions  NECK: Supple, No JVD, Normal thyroid  NERVOUS SYSTEM:  Alert & Oriented X3, Good concentration; Motor Strength 5/5 B/L upper and lower extremities; DTRs 2+ intact and symmetric  CHEST/LUNG: Clear to percussion bilaterally; No rales, rhonchi, wheezing, or rubs  HEART: Regular rate and rhythm; No murmurs, rubs, or gallops  ABDOMEN: Soft, Nontender, Nondistended; Bowel sounds present  EXTREMITIES:  2+ Peripheral Pulses, No clubbing, cyanosis, or edema  LYMPH: No lymphadenopathy noted  SKIN: No rashes or lesions  LABS:              CAPILLARY BLOOD GLUCOSE      POCT Blood Glucose.: 347 mg/dL (17 Jul 2018 11:40)  POCT Blood Glucose.: 239 mg/dL (17 Jul 2018 07:38)  POCT Blood Glucose.: 380 mg/dL (16 Jul 2018 22:09)  POCT Blood Glucose.: 220 mg/dL (16 Jul 2018 16:56)

## 2019-01-14 ENCOUNTER — INPATIENT (INPATIENT)
Facility: HOSPITAL | Age: 64
LOS: 9 days | Discharge: HOME | DRG: 208 | End: 2019-01-24
Attending: INTERNAL MEDICINE | Admitting: INTERNAL MEDICINE
Payer: MEDICAID

## 2019-01-14 VITALS
SYSTOLIC BLOOD PRESSURE: 266 MMHG | WEIGHT: 220.02 LBS | HEART RATE: 86 BPM | OXYGEN SATURATION: 100 % | DIASTOLIC BLOOD PRESSURE: 111 MMHG | HEIGHT: 66 IN | RESPIRATION RATE: 20 BRPM

## 2019-01-14 DIAGNOSIS — G93.40 ENCEPHALOPATHY, UNSPECIFIED: ICD-10-CM

## 2019-01-14 DIAGNOSIS — I16.1 HYPERTENSIVE EMERGENCY: ICD-10-CM

## 2019-01-14 DIAGNOSIS — J96.92 RESPIRATORY FAILURE, UNSPECIFIED WITH HYPERCAPNIA: ICD-10-CM

## 2019-01-14 DIAGNOSIS — J44.9 CHRONIC OBSTRUCTIVE PULMONARY DISEASE, UNSPECIFIED: ICD-10-CM

## 2019-01-14 DIAGNOSIS — Z29.9 ENCOUNTER FOR PROPHYLACTIC MEASURES, UNSPECIFIED: ICD-10-CM

## 2019-01-14 DIAGNOSIS — R41.82 ALTERED MENTAL STATUS, UNSPECIFIED: ICD-10-CM

## 2019-01-14 DIAGNOSIS — I85.00 ESOPHAGEAL VARICES WITHOUT BLEEDING: ICD-10-CM

## 2019-01-14 PROBLEM — I83.90 ASYMPTOMATIC VARICOSE VEINS OF UNSPECIFIED LOWER EXTREMITY: Chronic | Status: ACTIVE | Noted: 2017-08-08

## 2019-01-14 PROBLEM — I10 ESSENTIAL (PRIMARY) HYPERTENSION: Chronic | Status: ACTIVE | Noted: 2017-08-08

## 2019-01-14 LAB
ALBUMIN SERPL ELPH-MCNC: 3.5 G/DL — SIGNIFICANT CHANGE UP (ref 3.5–5)
ALP SERPL-CCNC: 127 U/L — HIGH (ref 40–120)
ALT FLD-CCNC: 24 U/L DA — SIGNIFICANT CHANGE UP (ref 10–60)
ANION GAP SERPL CALC-SCNC: 10 MMOL/L — SIGNIFICANT CHANGE UP (ref 5–17)
APPEARANCE UR: CLEAR — SIGNIFICANT CHANGE UP
APTT BLD: 26.3 SEC — LOW (ref 27.5–36.3)
AST SERPL-CCNC: 32 U/L — SIGNIFICANT CHANGE UP (ref 10–40)
BASE EXCESS BLDA CALC-SCNC: -4.1 MMOL/L — LOW (ref -2–2)
BASE EXCESS BLDA CALC-SCNC: -8.7 MMOL/L — LOW (ref -2–2)
BASOPHILS # BLD AUTO: 0.3 K/UL — HIGH (ref 0–0.2)
BASOPHILS NFR BLD AUTO: 1.7 % — SIGNIFICANT CHANGE UP (ref 0–2)
BILIRUB SERPL-MCNC: 0.4 MG/DL — SIGNIFICANT CHANGE UP (ref 0.2–1.2)
BILIRUB UR-MCNC: NEGATIVE — SIGNIFICANT CHANGE UP
BLOOD GAS COMMENTS ARTERIAL: SIGNIFICANT CHANGE UP
BLOOD GAS COMMENTS ARTERIAL: SIGNIFICANT CHANGE UP
BUN SERPL-MCNC: 14 MG/DL — SIGNIFICANT CHANGE UP (ref 7–18)
CALCIUM SERPL-MCNC: 8.1 MG/DL — LOW (ref 8.4–10.5)
CHLORIDE SERPL-SCNC: 107 MMOL/L — SIGNIFICANT CHANGE UP (ref 96–108)
CK MB BLD-MCNC: 1.3 % — SIGNIFICANT CHANGE UP (ref 0–3.5)
CK MB CFR SERPL CALC: 2.8 NG/ML — SIGNIFICANT CHANGE UP (ref 0–3.6)
CK SERPL-CCNC: 211 U/L — SIGNIFICANT CHANGE UP (ref 35–232)
CO2 SERPL-SCNC: 22 MMOL/L — SIGNIFICANT CHANGE UP (ref 22–31)
COLOR SPEC: YELLOW — SIGNIFICANT CHANGE UP
CREAT SERPL-MCNC: 1.38 MG/DL — HIGH (ref 0.5–1.3)
DIFF PNL FLD: ABNORMAL
EOSINOPHIL # BLD AUTO: 1.1 K/UL — HIGH (ref 0–0.5)
EOSINOPHIL NFR BLD AUTO: 7.2 % — HIGH (ref 0–6)
GLUCOSE SERPL-MCNC: 204 MG/DL — HIGH (ref 70–99)
GLUCOSE UR QL: 50 MG/DL
HCO3 BLDA-SCNC: 23 MMOL/L — SIGNIFICANT CHANGE UP (ref 23–27)
HCO3 BLDA-SCNC: 23 MMOL/L — SIGNIFICANT CHANGE UP (ref 23–27)
HCT VFR BLD CALC: 43.8 % — SIGNIFICANT CHANGE UP (ref 39–50)
HGB BLD-MCNC: 13.3 G/DL — SIGNIFICANT CHANGE UP (ref 13–17)
HOROWITZ INDEX BLDA+IHG-RTO: 100 — SIGNIFICANT CHANGE UP
HOROWITZ INDEX BLDA+IHG-RTO: 50 — SIGNIFICANT CHANGE UP
INR BLD: 1.03 RATIO — SIGNIFICANT CHANGE UP (ref 0.88–1.16)
KETONES UR-MCNC: NEGATIVE — SIGNIFICANT CHANGE UP
LEUKOCYTE ESTERASE UR-ACNC: NEGATIVE — SIGNIFICANT CHANGE UP
LYMPHOCYTES # BLD AUTO: 29.2 % — SIGNIFICANT CHANGE UP (ref 13–44)
LYMPHOCYTES # BLD AUTO: 4.4 K/UL — HIGH (ref 1–3.3)
MCHC RBC-ENTMCNC: 22.9 PG — LOW (ref 27–34)
MCHC RBC-ENTMCNC: 30.4 GM/DL — LOW (ref 32–36)
MCV RBC AUTO: 75.2 FL — LOW (ref 80–100)
MONOCYTES # BLD AUTO: 0.9 K/UL — SIGNIFICANT CHANGE UP (ref 0–0.9)
MONOCYTES NFR BLD AUTO: 5.8 % — SIGNIFICANT CHANGE UP (ref 2–14)
NEUTROPHILS # BLD AUTO: 8.4 K/UL — HIGH (ref 1.8–7.4)
NEUTROPHILS NFR BLD AUTO: 56.1 % — SIGNIFICANT CHANGE UP (ref 43–77)
NITRITE UR-MCNC: NEGATIVE — SIGNIFICANT CHANGE UP
NT-PROBNP SERPL-SCNC: 458 PG/ML — HIGH (ref 0–125)
PCO2 BLDA: 54 MMHG — HIGH (ref 32–46)
PCO2 BLDA: 77 MMHG — CRITICAL HIGH (ref 32–46)
PCP SPEC-MCNC: SIGNIFICANT CHANGE UP
PH BLDA: 7.1 — CRITICAL LOW (ref 7.35–7.45)
PH BLDA: 7.26 — LOW (ref 7.35–7.45)
PH UR: 6 — SIGNIFICANT CHANGE UP (ref 5–8)
PLATELET # BLD AUTO: 214 K/UL — SIGNIFICANT CHANGE UP (ref 150–400)
PO2 BLDA: 331 MMHG — HIGH (ref 74–108)
PO2 BLDA: 93 MMHG — SIGNIFICANT CHANGE UP (ref 74–108)
POTASSIUM SERPL-MCNC: 4.6 MMOL/L — SIGNIFICANT CHANGE UP (ref 3.5–5.3)
POTASSIUM SERPL-SCNC: 4.6 MMOL/L — SIGNIFICANT CHANGE UP (ref 3.5–5.3)
PROT SERPL-MCNC: 8.7 G/DL — HIGH (ref 6–8.3)
PROT UR-MCNC: 500 MG/DL
PROTHROM AB SERPL-ACNC: 11.4 SEC — SIGNIFICANT CHANGE UP (ref 10–12.9)
RBC # BLD: 5.82 M/UL — HIGH (ref 4.2–5.8)
RBC # FLD: 15 % — HIGH (ref 10.3–14.5)
SAO2 % BLDA: 96 % — SIGNIFICANT CHANGE UP (ref 92–96)
SAO2 % BLDA: 99 % — HIGH (ref 92–96)
SODIUM SERPL-SCNC: 139 MMOL/L — SIGNIFICANT CHANGE UP (ref 135–145)
SP GR SPEC: 1.02 — SIGNIFICANT CHANGE UP (ref 1.01–1.02)
TROPONIN I SERPL-MCNC: 0.03 NG/ML — SIGNIFICANT CHANGE UP (ref 0–0.04)
UROBILINOGEN FLD QL: NEGATIVE — SIGNIFICANT CHANGE UP
WBC # BLD: 15.1 K/UL — HIGH (ref 3.8–10.5)
WBC # FLD AUTO: 15.1 K/UL — HIGH (ref 3.8–10.5)

## 2019-01-14 PROCEDURE — 99291 CRITICAL CARE FIRST HOUR: CPT

## 2019-01-14 PROCEDURE — 70450 CT HEAD/BRAIN W/O DYE: CPT | Mod: 26

## 2019-01-14 PROCEDURE — 71045 X-RAY EXAM CHEST 1 VIEW: CPT | Mod: 26

## 2019-01-14 RX ORDER — PROPOFOL 10 MG/ML
5 INJECTION, EMULSION INTRAVENOUS
Qty: 1000 | Refills: 0 | Status: DISCONTINUED | OUTPATIENT
Start: 2019-01-14 | End: 2019-01-14

## 2019-01-14 RX ORDER — MIDAZOLAM HYDROCHLORIDE 1 MG/ML
0.02 INJECTION, SOLUTION INTRAMUSCULAR; INTRAVENOUS
Qty: 100 | Refills: 0 | Status: DISCONTINUED | OUTPATIENT
Start: 2019-01-14 | End: 2019-01-14

## 2019-01-14 RX ORDER — ETOMIDATE 2 MG/ML
20 INJECTION INTRAVENOUS ONCE
Qty: 0 | Refills: 0 | Status: COMPLETED | OUTPATIENT
Start: 2019-01-14 | End: 2019-01-14

## 2019-01-14 RX ORDER — HEPARIN SODIUM 5000 [USP'U]/ML
5000 INJECTION INTRAVENOUS; SUBCUTANEOUS EVERY 8 HOURS
Qty: 0 | Refills: 0 | Status: DISCONTINUED | OUTPATIENT
Start: 2019-01-14 | End: 2019-01-15

## 2019-01-14 RX ORDER — PIPERACILLIN AND TAZOBACTAM 4; .5 G/20ML; G/20ML
3.38 INJECTION, POWDER, LYOPHILIZED, FOR SOLUTION INTRAVENOUS EVERY 8 HOURS
Qty: 0 | Refills: 0 | Status: DISCONTINUED | OUTPATIENT
Start: 2019-01-14 | End: 2019-01-22

## 2019-01-14 RX ORDER — MIDAZOLAM HYDROCHLORIDE 1 MG/ML
4 INJECTION, SOLUTION INTRAMUSCULAR; INTRAVENOUS ONCE
Qty: 0 | Refills: 0 | Status: DISCONTINUED | OUTPATIENT
Start: 2019-01-14 | End: 2019-01-14

## 2019-01-14 RX ORDER — PROPOFOL 10 MG/ML
5 INJECTION, EMULSION INTRAVENOUS
Qty: 500 | Refills: 0 | Status: DISCONTINUED | OUTPATIENT
Start: 2019-01-14 | End: 2019-01-14

## 2019-01-14 RX ORDER — MIDAZOLAM HYDROCHLORIDE 1 MG/ML
0.02 INJECTION, SOLUTION INTRAMUSCULAR; INTRAVENOUS
Qty: 100 | Refills: 0 | Status: DISCONTINUED | OUTPATIENT
Start: 2019-01-14 | End: 2019-01-17

## 2019-01-14 RX ORDER — LABETALOL HCL 100 MG
10 TABLET ORAL ONCE
Qty: 0 | Refills: 0 | Status: COMPLETED | OUTPATIENT
Start: 2019-01-14 | End: 2019-01-14

## 2019-01-14 RX ORDER — ROCURONIUM BROMIDE 10 MG/ML
100 VIAL (ML) INTRAVENOUS ONCE
Qty: 0 | Refills: 0 | Status: COMPLETED | OUTPATIENT
Start: 2019-01-14 | End: 2019-01-14

## 2019-01-14 RX ORDER — ALBUTEROL 90 UG/1
2 AEROSOL, METERED ORAL EVERY 6 HOURS
Qty: 0 | Refills: 0 | Status: DISCONTINUED | OUTPATIENT
Start: 2019-01-14 | End: 2019-01-21

## 2019-01-14 RX ORDER — INSULIN LISPRO 100/ML
VIAL (ML) SUBCUTANEOUS EVERY 6 HOURS
Qty: 0 | Refills: 0 | Status: DISCONTINUED | OUTPATIENT
Start: 2019-01-14 | End: 2019-01-23

## 2019-01-14 RX ORDER — PANTOPRAZOLE SODIUM 20 MG/1
40 TABLET, DELAYED RELEASE ORAL DAILY
Qty: 0 | Refills: 0 | Status: DISCONTINUED | OUTPATIENT
Start: 2019-01-14 | End: 2019-01-22

## 2019-01-14 RX ORDER — NITROGLYCERIN 6.5 MG
50 CAPSULE, EXTENDED RELEASE ORAL
Qty: 50 | Refills: 0 | Status: DISCONTINUED | OUTPATIENT
Start: 2019-01-14 | End: 2019-01-15

## 2019-01-14 RX ADMIN — PIPERACILLIN AND TAZOBACTAM 25 GRAM(S): 4; .5 INJECTION, POWDER, LYOPHILIZED, FOR SOLUTION INTRAVENOUS at 21:28

## 2019-01-14 RX ADMIN — HEPARIN SODIUM 5000 UNIT(S): 5000 INJECTION INTRAVENOUS; SUBCUTANEOUS at 21:28

## 2019-01-14 RX ADMIN — Medication 10 MILLIGRAM(S): at 19:06

## 2019-01-14 RX ADMIN — ALBUTEROL 2 PUFF(S): 90 AEROSOL, METERED ORAL at 16:44

## 2019-01-14 RX ADMIN — MIDAZOLAM HYDROCHLORIDE 2 MG/KG/HR: 1 INJECTION, SOLUTION INTRAMUSCULAR; INTRAVENOUS at 19:50

## 2019-01-14 RX ADMIN — Medication 125 MILLIGRAM(S): at 15:59

## 2019-01-14 RX ADMIN — PROPOFOL 2.99 MICROGRAM(S)/KG/MIN: 10 INJECTION, EMULSION INTRAVENOUS at 19:00

## 2019-01-14 RX ADMIN — PANTOPRAZOLE SODIUM 40 MILLIGRAM(S): 20 TABLET, DELAYED RELEASE ORAL at 21:29

## 2019-01-14 RX ADMIN — Medication 1: at 19:09

## 2019-01-14 RX ADMIN — ETOMIDATE 20 MILLIGRAM(S): 2 INJECTION INTRAVENOUS at 16:00

## 2019-01-14 RX ADMIN — ALBUTEROL 2 PUFF(S): 90 AEROSOL, METERED ORAL at 20:51

## 2019-01-14 RX ADMIN — MIDAZOLAM HYDROCHLORIDE 4 MILLIGRAM(S): 1 INJECTION, SOLUTION INTRAMUSCULAR; INTRAVENOUS at 16:45

## 2019-01-14 RX ADMIN — Medication 100 MILLIGRAM(S): at 16:00

## 2019-01-14 RX ADMIN — Medication 15 MICROGRAM(S)/MIN: at 17:16

## 2019-01-14 NOTE — ED ADULT NURSE NOTE - NSIMPLEMENTINTERV_GEN_ALL_ED
Implemented All Fall with Harm Risk Interventions:  [place] to call system. Call bell, personal items and telephone within reach. Instruct patient to call for assistance. Room bathroom lighting operational. Non-slip footwear when patient is off stretcher. Physically safe environment: no spills, clutter or unnecessary equipment. Stretcher in lowest position, wheels locked, appropriate side rails in place. Provide visual cue, wrist band, yellow gown, etc. Monitor gait and stability. Monitor for mental status changes and reorient to person, place, and time. Review medications for side effects contributing to fall risk. Reinforce activity limits and safety measures with patient and family. Provide visual clues: red socks.

## 2019-01-14 NOTE — ED PROVIDER NOTE - MEDICAL DECISION MAKING DETAILS
62 y/o M pt presents with AMS and labored breathing. Patient has decreased breath sound and HTN. Emergent incubation for airway protection placed, patient on nitro drip.

## 2019-01-14 NOTE — H&P ADULT - RESPIRATORY COMMENTS
Bilateral air entry +, no rales or rhonchi , decreased air entry on RT base compared to Lt base, ETT 7.5/ 23 at lip

## 2019-01-14 NOTE — H&P ADULT - PROBLEM SELECTOR PLAN 3
likely secondary to underlying hypercapnic respiratory failure and infectious etiology- concern for aspiration PNA ( given pt was vomiting at home) versus secondary to hypertensive emergency leading to encephalopathy  management of hypertensive emergency as per above plan.  Initial ABG s/o hypercapnic respiratory failure, was intubated in ED, ventilator settings are adjusted and repeat ABG shows improvement in Pco2 levels   CXR s/o bilateral pulmonary congestion more pronounced on RLL which seems to be consolidation  (awaiting official report).   f/u UA, urine and blood cultures   will start on Iv zosyn for aspiration PNA

## 2019-01-14 NOTE — H&P ADULT - PMH
COPD (chronic obstructive pulmonary disease)    HLD (hyperlipidemia)    HTN (hypertension)    Varicose veins

## 2019-01-14 NOTE — ED ADULT NURSE NOTE - OBJECTIVE STATEMENT
Pt. BIB EMS as a notification unresponsive in respiratory distress. Pt. skin molted and stomach distended. Pt. found in the streets unresponsive and hypertensive. Pt. BIB EMS as a notification unresponsive in respiratory distress. Pt. went to a bystander asking for help.  Pt. skin molted and stomach distended. Pt. found in the streets unresponsive and hypertensive.

## 2019-01-14 NOTE — ED PROVIDER NOTE - PROGRESS NOTE DETAILS
Upon reading patient hx, patient has hx of COPD and liver cirrhosis. Will give nebulizer and solumedrol. After incubation, patient's skin color returned to normal, patient is no longer mottled. BP decreased to 190 systolic. Pending CT scan of head to evaluate intercranial bleed. Upon reading patient hx, patient has hx of COPD and liver cirrhosis. Will give nebulizer and solumedrol. After intubation, patient's skin color returned to normal, patient is no longer mottled. BP decreased to 190 systolic. Pending CT scan of head to evaluate intercranial bleed. CT normal. admit to ICU. patient still on nitro drip

## 2019-01-14 NOTE — ED PROVIDER NOTE - OBJECTIVE STATEMENT
62 y/o M pt with a PMHx of HTN, varicose veins, BIB EMS to the ED for AMS. According to EMS, patient staggered on the street to some bystanders and said "I need help"; they called the ambulance patient was found to be minimally responsive (grunting) but not responding to verbal or painful stimuli. NKDA. 64 y/o M pt with a PMHx of HTN, varicose veins, BIB EMS to the ED for AMS. According to EMS, patient staggered on the street to some bystanders and said "I need help"; they called the ambulance. According to EMS patient was found to be minimally responsive (grunting) but not responding to verbal or painful stimuli. NKDA.

## 2019-01-14 NOTE — H&P ADULT - ASSESSMENT
63 year old M from home, lives with girl friend Ms. Remy , with PMHx of HTN, HLD, right LE varicose veins, umbilical hernia, ex smoker (1Pack over 3 days >50 years, quit 7 months back ), chronic ethanolic but family denies any alcohol abuse, Recently admitted for COPD exacerbation was on NIVBIPAP in July 2018, recently had EGD and colonoscopy on 1/111/19 which showed esophageal varices ( was started on nadolol) and colon polyps was BIB EMS for unresponsiveness.     In ED, patient was hypoxic and hypertensive to 266/ 111mmhg with mottled skin per ED attending. EKG- NSR with sinus arrythmia @67BPM , LBBB. cardiac enzymes x1- negative , CXR s/o bilateral pulmonary congestion more pronounced on RLL which seems to be consolidation  (awaiting official report). Labs pertinent for WBC- 15.1 , bun/ creat- 14/1.38, BNP- 458, ABG 0- 7.1/77/331/23/ 100 Fio2 on Ac 16/500/100%/5, CT head no acute pathology.     Pt is being admitted to ICU for hypoxic and hypercapenic respiratory failure secondary to Aspiration PNA and hypertensive encephalopathy.

## 2019-01-14 NOTE — H&P ADULT - ATTENDING COMMENTS
Patient is a 64 y/o male with PMH of HTN, COPD, Cirrhosis with esophageal varices, admitted after EMS found him unresponsive and mottled. He was intubated in the ED and placed on a NTG drip for accelerated HTN. CT scan of the head was negative for acute infarct or bleed. Just showed involutional changes. CXR showed a right sided interstitial infiltrate. The initial ABG significant for hypercapnea with decompensated ph ( 7.10 ) Kike 2 was 331. I increased the set respiratory rate and repeated the ABG which showed decreased CO2 ( 54 ) and p 7.26. I also gave 10 IV labetalol which caused some sinus bradycardia but decreased the BP substantially ( 150 / 80 ). Given the bradycardia have switched from propofol to versed for sedation. During this switch the patient became responsive with following of command ( squeeze hand ). There then is altered mental status without evidence of a structural issue ? metabolic encephalopathy vs intoxication, hypoxic and hypercapneic respiratory failure and accelerated HTN. Will examine 12 lead ECG. Serum brain natriuretic peptide 458 pg/ml. Troponin 0.03 ng/ml. r/o aspiration pneumonitis. Critical care time 50 minutes.

## 2019-01-14 NOTE — H&P ADULT - PROBLEM SELECTOR PLAN 6
[] Previous VTE                                                3  [] Thrombophilia                                             2  [] Lower limb paralysis                                   2    [] Current Cancer                                             2   [] Immobilization > 24 hrs                              1  [x] ICU/CCU stay > 24 hours                             1  [x] Age > 60                                                         1    IMPROVE VTE Score: 2, VTE ppx with heparin and GI ppx with protonix.

## 2019-01-14 NOTE — H&P ADULT - HISTORY OF PRESENT ILLNESS
63 year old M from home, lives with girl friend Ms. Remy , with PMHx of HTN, HLD, right LE varicose veins, umbilical hernia, ex smoker (1Pack over 3 days >50 years, quit 7 months back ), chronic ethanolic but family denies any alcohol abuse, Recently admitted for COPD exacerbation was on NIVBIPAP in July 2018, recently had EGD and colonoscopy on 1/111/19 which showed esophageal varices ( was started on nadolol) and colon polyps was BIB EMS for unresponsiveness. According to EMS, patient staggered on the street to some bystanders and said "I need help"; they called the ambulance. According to EMS patient was found to be minimally responsive (grunting) but not responding to verbal or painful stimuli. History obtained from ED note and Pts Girl friend Ms. Remy and primary GI Dr. Christensen. According to girl friend, pt had EGD and colonoscopy last friday and was not feeling well after taking medication, unable to elaborate more as she said she is busy and will call later. She also reported that pts blood sugars are running high above 400s and went to see endocrinologist, was started on oral meds, but pt is not taking any insulin and his BP has been running in 190s. Confirmed with Dr Christensen, who said that pts blood pressure was elevated and was started on nadolol for esophageal varices, doubt if pt is compliant with medications.     In ED, patient was hypoxic and hypertensive to 266/ 111mmhg with mottled skin per ED attending. EKG- NSR with sinus arrythmia @67BPM , LBBB. cardiac enzymes x1- negative , CXR s/o bilateral pulmonary congestion more pronounced on RLL which seems to be consolidation  (awaiting official report). Labs pertinent for WBC- 15.1 , bun/ creat- 14/1.38, BNP- 458, ABG 0- 7.1/77/331/23/ 100 Fio2 on Ac 16/500/100%/5, CT head no acute pathology.     Pt is being admitted to ICU for hypoxic and hypercapenic respiratory failure secondary to Aspiration PNA and hypertensive encephalopathy.

## 2019-01-14 NOTE — H&P ADULT - PROBLEM SELECTOR PLAN 2
Pts initial Blood pressure was in 260s systolic and was started on nitroglycerine gtt for hypertensive emergency  Urgent CT head shows no acute pathology.  CXR s/o bilateral pulmonary congestion more pronounced on RLL which seems to be consolidation  (awaiting official report).   EKG- NSR with sinus arrythmia @67BPM , LBBB. cardiac enzymes x1- negative  BNP- 458  *admit to ICU for close monitoring  Pt was started on propofol for sedation, still BP was in 210 s systolic.  s/p 10 mg of IV labetalol, Pt became intermittently sinus bradycardic with sinus arrythmia.  Nitroglycerin gtt was held and also propofol.  will start pt on versed for sedation.   titrate to maintain SBP goal 170-180   trend cardiac enzymes  f/u echo, last echo in July 2018 s/o GiV DD, moderate pulmonary HTN and mild AS.  monitor strict i/Os, goal is to keep net negative

## 2019-01-14 NOTE — ED ADULT NURSE REASSESSMENT NOTE - NS ED NURSE REASSESS COMMENT FT1
As per MD pt unable to move air through lungs and intubated 7.5 at 23 cm the right of the lip. CT scan done. Pt. on continuos cardiac monitor. Goss inserted. As per Dr. Jensen further blood work and medication to be given ICU.

## 2019-01-14 NOTE — H&P ADULT - PROBLEM SELECTOR PLAN 1
pt was intubated by ED as he was encephalopathic.    Pt has initial ABG s/o 7.1/77/331/23/ 100 Fio2 on Ac 16/500/100%/5.  Initial venetillator settings are adjusted with incrased RR to 20 and low Fio2 to 50%, repeat ABG s/o 7.26/54/ 93/23  will keep intubated and wean as tolerated   sedation with versed for now  f/u CXR in AM

## 2019-01-14 NOTE — H&P ADULT - PROBLEM SELECTOR PLAN 4
PT has hx of COPD.  O/E- bilateral air entry+ with decreased air entry on RLL.  no rhonchi or rales  will start on Duoneb   hold off steroids at this time.

## 2019-01-14 NOTE — ED PROVIDER NOTE - CARE PLAN
Principal Discharge DX:	Altered mental status  Secondary Diagnosis:	Respiratory failure  Secondary Diagnosis:	Hypertensive emergency

## 2019-01-15 DIAGNOSIS — D69.6 THROMBOCYTOPENIA, UNSPECIFIED: ICD-10-CM

## 2019-01-15 LAB
ALBUMIN SERPL ELPH-MCNC: 3 G/DL — LOW (ref 3.5–5)
ALP SERPL-CCNC: 64 U/L — SIGNIFICANT CHANGE UP (ref 40–120)
ALT FLD-CCNC: 17 U/L DA — SIGNIFICANT CHANGE UP (ref 10–60)
ANION GAP SERPL CALC-SCNC: 11 MMOL/L — SIGNIFICANT CHANGE UP (ref 5–17)
AST SERPL-CCNC: 26 U/L — SIGNIFICANT CHANGE UP (ref 10–40)
BASE EXCESS BLDA CALC-SCNC: -1.5 MMOL/L — SIGNIFICANT CHANGE UP (ref -2–2)
BASOPHILS # BLD AUTO: 0 K/UL — SIGNIFICANT CHANGE UP (ref 0–0.2)
BASOPHILS NFR BLD AUTO: 0.5 % — SIGNIFICANT CHANGE UP (ref 0–2)
BILIRUB DIRECT SERPL-MCNC: 0.1 MG/DL — SIGNIFICANT CHANGE UP (ref 0–0.2)
BILIRUB INDIRECT FLD-MCNC: 0.3 MG/DL — SIGNIFICANT CHANGE UP (ref 0.2–1)
BILIRUB SERPL-MCNC: 0.4 MG/DL — SIGNIFICANT CHANGE UP (ref 0.2–1.2)
BLOOD GAS COMMENTS ARTERIAL: SIGNIFICANT CHANGE UP
BUN SERPL-MCNC: 19 MG/DL — HIGH (ref 7–18)
CALCIUM SERPL-MCNC: 7.5 MG/DL — LOW (ref 8.4–10.5)
CHLORIDE SERPL-SCNC: 112 MMOL/L — HIGH (ref 96–108)
CHOLEST SERPL-MCNC: 192 MG/DL — SIGNIFICANT CHANGE UP (ref 10–199)
CK MB BLD-MCNC: 3.4 % — SIGNIFICANT CHANGE UP (ref 0–3.5)
CK MB CFR SERPL CALC: 3.3 NG/ML — SIGNIFICANT CHANGE UP (ref 0–3.6)
CK SERPL-CCNC: 96 U/L — SIGNIFICANT CHANGE UP (ref 35–232)
CO2 SERPL-SCNC: 21 MMOL/L — LOW (ref 22–31)
CREAT SERPL-MCNC: 1.16 MG/DL — SIGNIFICANT CHANGE UP (ref 0.5–1.3)
EOSINOPHIL # BLD AUTO: 0 K/UL — SIGNIFICANT CHANGE UP (ref 0–0.5)
EOSINOPHIL NFR BLD AUTO: 0.1 % — SIGNIFICANT CHANGE UP (ref 0–6)
GLUCOSE SERPL-MCNC: 147 MG/DL — HIGH (ref 70–99)
HBA1C BLD-MCNC: 5.4 % — SIGNIFICANT CHANGE UP (ref 4–5.6)
HCO3 BLDA-SCNC: 22 MMOL/L — LOW (ref 23–27)
HCT VFR BLD CALC: 36.5 % — LOW (ref 39–50)
HDLC SERPL-MCNC: 42 MG/DL — SIGNIFICANT CHANGE UP
HGB BLD-MCNC: 11.5 G/DL — LOW (ref 13–17)
HOROWITZ INDEX BLDA+IHG-RTO: 50 — SIGNIFICANT CHANGE UP
LIPID PNL WITH DIRECT LDL SERPL: 134 MG/DL — SIGNIFICANT CHANGE UP
LYMPHOCYTES # BLD AUTO: 1.1 K/UL — SIGNIFICANT CHANGE UP (ref 1–3.3)
LYMPHOCYTES # BLD AUTO: 15 % — SIGNIFICANT CHANGE UP (ref 13–44)
MAGNESIUM SERPL-MCNC: 1.9 MG/DL — SIGNIFICANT CHANGE UP (ref 1.6–2.6)
MCHC RBC-ENTMCNC: 23.1 PG — LOW (ref 27–34)
MCHC RBC-ENTMCNC: 31.6 GM/DL — LOW (ref 32–36)
MCV RBC AUTO: 73.3 FL — LOW (ref 80–100)
MONOCYTES # BLD AUTO: 0.4 K/UL — SIGNIFICANT CHANGE UP (ref 0–0.9)
MONOCYTES NFR BLD AUTO: 6.3 % — SIGNIFICANT CHANGE UP (ref 2–14)
NEUTROPHILS # BLD AUTO: 5.6 K/UL — SIGNIFICANT CHANGE UP (ref 1.8–7.4)
NEUTROPHILS NFR BLD AUTO: 78.1 % — HIGH (ref 43–77)
PCO2 BLDA: 37 MMHG — SIGNIFICANT CHANGE UP (ref 32–46)
PH BLDA: 7.4 — SIGNIFICANT CHANGE UP (ref 7.35–7.45)
PHOSPHATE SERPL-MCNC: 3.7 MG/DL — SIGNIFICANT CHANGE UP (ref 2.5–4.5)
PLATELET # BLD AUTO: 78 K/UL — LOW (ref 150–400)
PO2 BLDA: 117 MMHG — HIGH (ref 74–108)
POTASSIUM SERPL-MCNC: 3.9 MMOL/L — SIGNIFICANT CHANGE UP (ref 3.5–5.3)
POTASSIUM SERPL-SCNC: 3.9 MMOL/L — SIGNIFICANT CHANGE UP (ref 3.5–5.3)
PROT SERPL-MCNC: 6.9 G/DL — SIGNIFICANT CHANGE UP (ref 6–8.3)
RBC # BLD: 4.98 M/UL — SIGNIFICANT CHANGE UP (ref 4.2–5.8)
RBC # FLD: 14.8 % — HIGH (ref 10.3–14.5)
SAO2 % BLDA: 99 % — HIGH (ref 92–96)
SODIUM SERPL-SCNC: 144 MMOL/L — SIGNIFICANT CHANGE UP (ref 135–145)
TOTAL CHOLESTEROL/HDL RATIO MEASUREMENT: 4.6 RATIO — SIGNIFICANT CHANGE UP (ref 3.4–9.6)
TRIGL SERPL-MCNC: 82 MG/DL — SIGNIFICANT CHANGE UP (ref 10–149)
TROPONIN I SERPL-MCNC: 0.41 NG/ML — HIGH (ref 0–0.04)
TSH SERPL-MCNC: 0.61 UU/ML — SIGNIFICANT CHANGE UP (ref 0.34–4.82)
WBC # BLD: 7.1 K/UL — SIGNIFICANT CHANGE UP (ref 3.8–10.5)
WBC # FLD AUTO: 7.1 K/UL — SIGNIFICANT CHANGE UP (ref 3.8–10.5)

## 2019-01-15 PROCEDURE — 71045 X-RAY EXAM CHEST 1 VIEW: CPT | Mod: 26

## 2019-01-15 RX ORDER — PROPOFOL 10 MG/ML
5 INJECTION, EMULSION INTRAVENOUS
Qty: 1000 | Refills: 0 | Status: DISCONTINUED | OUTPATIENT
Start: 2019-01-15 | End: 2019-01-15

## 2019-01-15 RX ORDER — PROPOFOL 10 MG/ML
5 INJECTION, EMULSION INTRAVENOUS
Qty: 1000 | Refills: 0 | Status: DISCONTINUED | OUTPATIENT
Start: 2019-01-15 | End: 2019-01-17

## 2019-01-15 RX ORDER — SODIUM CHLORIDE 9 MG/ML
1000 INJECTION, SOLUTION INTRAVENOUS
Qty: 0 | Refills: 0 | Status: DISCONTINUED | OUTPATIENT
Start: 2019-01-15 | End: 2019-01-15

## 2019-01-15 RX ORDER — ALPRAZOLAM 0.25 MG
0.25 TABLET ORAL ONCE
Qty: 0 | Refills: 0 | Status: DISCONTINUED | OUTPATIENT
Start: 2019-01-15 | End: 2019-01-15

## 2019-01-15 RX ORDER — SODIUM CHLORIDE 9 MG/ML
1000 INJECTION, SOLUTION INTRAVENOUS
Qty: 0 | Refills: 0 | Status: DISCONTINUED | OUTPATIENT
Start: 2019-01-15 | End: 2019-01-16

## 2019-01-15 RX ORDER — ALPRAZOLAM 0.25 MG
0.25 TABLET ORAL DAILY
Qty: 0 | Refills: 0 | Status: DISCONTINUED | OUTPATIENT
Start: 2019-01-15 | End: 2019-01-15

## 2019-01-15 RX ADMIN — ALBUTEROL 2 PUFF(S): 90 AEROSOL, METERED ORAL at 03:56

## 2019-01-15 RX ADMIN — ALBUTEROL 2 PUFF(S): 90 AEROSOL, METERED ORAL at 16:12

## 2019-01-15 RX ADMIN — SODIUM CHLORIDE 40 MILLILITER(S): 9 INJECTION, SOLUTION INTRAVENOUS at 18:51

## 2019-01-15 RX ADMIN — PIPERACILLIN AND TAZOBACTAM 25 GRAM(S): 4; .5 INJECTION, POWDER, LYOPHILIZED, FOR SOLUTION INTRAVENOUS at 22:54

## 2019-01-15 RX ADMIN — Medication 2: at 00:10

## 2019-01-15 RX ADMIN — HEPARIN SODIUM 5000 UNIT(S): 5000 INJECTION INTRAVENOUS; SUBCUTANEOUS at 05:28

## 2019-01-15 RX ADMIN — ALBUTEROL 2 PUFF(S): 90 AEROSOL, METERED ORAL at 09:43

## 2019-01-15 RX ADMIN — MIDAZOLAM HYDROCHLORIDE 2 MG/KG/HR: 1 INJECTION, SOLUTION INTRAMUSCULAR; INTRAVENOUS at 10:58

## 2019-01-15 RX ADMIN — PROPOFOL 2.1 MICROGRAM(S)/KG/MIN: 10 INJECTION, EMULSION INTRAVENOUS at 22:54

## 2019-01-15 RX ADMIN — Medication 1: at 05:41

## 2019-01-15 RX ADMIN — PIPERACILLIN AND TAZOBACTAM 25 GRAM(S): 4; .5 INJECTION, POWDER, LYOPHILIZED, FOR SOLUTION INTRAVENOUS at 14:15

## 2019-01-15 RX ADMIN — PIPERACILLIN AND TAZOBACTAM 25 GRAM(S): 4; .5 INJECTION, POWDER, LYOPHILIZED, FOR SOLUTION INTRAVENOUS at 05:28

## 2019-01-15 RX ADMIN — PANTOPRAZOLE SODIUM 40 MILLIGRAM(S): 20 TABLET, DELAYED RELEASE ORAL at 12:23

## 2019-01-15 RX ADMIN — PROPOFOL 2.1 MICROGRAM(S)/KG/MIN: 10 INJECTION, EMULSION INTRAVENOUS at 20:50

## 2019-01-15 RX ADMIN — ALBUTEROL 2 PUFF(S): 90 AEROSOL, METERED ORAL at 22:53

## 2019-01-15 NOTE — PROGRESS NOTE ADULT - PROBLEM SELECTOR PLAN 3
likely secondary to underlying hypercapnic respiratory failure and infectious etiology- concern for aspiration PNA ( given pt was vomiting at home) versus secondary to hypertensive emergency leading to encephalopathy  management of hypertensive emergency as per above plan.  Initial ABG s/o hypercapnic respiratory failure, was intubated in ED, ventilator settings are adjusted and repeat ABG shows improvement in Pco2 levels   CXR s/o bilateral pulmonary congestion , R lower lobe consolidation.   -UA- looks contaminated. Follow urine and blood cultures   -C/w zosyn Day 2

## 2019-01-15 NOTE — CHART NOTE - NSCHARTNOTEFT_GEN_A_CORE
Patient's home medication reconciliation done with home meds brought in by patient's wife.  1. Patient's wife states that pt has been taking both losartan and olmesartan.  2. Pt also has been taking Pradaxa of unknown dosage, not added to med rec.  Primary team to please follow up with above. Thank you.

## 2019-01-15 NOTE — CONSULT NOTE ADULT - SUBJECTIVE AND OBJECTIVE BOX
GI INITIAL CONSULT    HPI: 63M w/EtOH cirrhosis and portal HTN presented with AMS and unresponsiveness requiring intubation. Was also found to be extremely hypertensive. CXR showing B/L PNA vs. pulmonary edema. Pt recently (last week) had an EGD and a colonoscopy; the former showed small-sized nonbleeding esophageal varices for which the pt was started on Nadolol 20 mg daily, the latter showed adenomatous colon polyps. Pt left the office after the procedures feeling well and without any complaints. Pt current condition occurred yesterday and the pt is currently in the ICU intubated and sedated. No bleeding episodes noted on or since admission.    PMH: EtOH cirrhosis, portal HTN, nonbleeding EV, COPD    Meds: MEDICATIONS  (STANDING):  ALBUTerol    90 MICROgram(s) HFA Inhaler 2 Puff(s) Inhalation every 6 hours  insulin lispro (HumaLOG) corrective regimen sliding scale   SubCutaneous every 6 hours  midazolam Infusion 0.02 mG/kG/Hr (1.996 mL/Hr) IV Continuous <Continuous>  pantoprazole  Injectable 40 milliGRAM(s) IV Push daily  piperacillin/tazobactam IVPB. 3.375 Gram(s) IV Intermittent every 8 hours    SH: former tobacco and former EtOH abuse  FH: noncontributory  ROS:  CONSTITUTIONAL: No fever, weight loss, or fatigue  EYES: No eye pain, visual disturbances, or discharge  ENT:  No difficulty hearing, tinnitus, vertigo; No sinus or throat pain  NECK: No pain or stiffness  RESPIRATORY: No cough, wheezing, chills or hemoptysis, shortness of Breath  CARDIOVASCULAR: No chest pain, palpitations, passing out, dizziness, or leg swelling  GASTROINTESTINAL: see HPI  GENITOURINARY: No dysuria, frequency, hematuria, or incontinence  NEUROLOGICAL: No headaches, memory loss, loss of strength, numbness, or tremors  SKIN: No itching, burning, rashes, or lesions   MUSCULOSKELETAL: No arthralgia, myalgia, or back pain.     Vitals: T(C): 36.7 (01-15-19 @ 15:30)  T(F): 98 (01-15-19 @ 15:30), Max: 98.4 (01-15-19 @ 11:00)  HR: 50 (01-15-19 @ 16:30) (50 - 87)  BP: 117/42 (01-15-19 @ 16:00) (92/48 - 175/82)    Gen: NAD  CVS: S1/S2  Chest: CTABL  Abd: S/NT/ND                        11.5   7.1   )-----------( 78       ( 15 Chin 2019 06:11 )             36.5   01-15    144  |  112<H>  |  19<H>  ----------------------------<  147<H>  3.9   |  21<L>  |  1.16    Ca    7.5<L>      15 Chin 2019 06:11  Phos  3.7     01-15  Mg     1.9     01-15    TPro  6.9  /  Alb  3.0<L>  /  TBili  0.4  /  DBili  0.1  /  AST  26  /  ALT  17  /  AlkPhos  64  01-15

## 2019-01-15 NOTE — CONSULT NOTE ADULT - SUBJECTIVE AND OBJECTIVE BOX
Patient is a 63y old  Male who presents with a chief complaint of Pt was brought in unresponsive (14 Jan 2019 17:58)      HPI:  63 year old M from home, lives with girl friend Ms. Remy , with PMHx of HTN, HLD, right LE varicose veins, umbilical hernia, ex smoker (1Pack over 3 days >50 years, quit 7 months back ), chronic ethanolic but family denies any alcohol abuse, Recently admitted for COPD exacerbation was on NIVBIPAP in July 2018, recently had EGD and colonoscopy on 1/111/19 which showed esophageal varices ( was started on nadolol) and colon polyps was BIB EMS for unresponsiveness. According to EMS, patient staggered on the street to some bystanders and said "I need help"; they called the ambulance. According to EMS patient was found to be minimally responsive (grunting) but not responding to verbal or painful stimuli. History obtained from ED note and Pts Girl friend Ms. Remy and primary GI Dr. Christensen. According to girl friend, pt had EGD and colonoscopy last friday and was not feeling well after taking medication, unable to elaborate more as she said she is busy and will call later. She also reported that pts blood sugars are running high above 400s and went to see endocrinologist, was started on oral meds, but pt is not taking any insulin and his BP has been running in 190s. Confirmed with Dr Christensen, who said that pts blood pressure was elevated and was started on nadolol for esophageal varices, doubt if pt is compliant with medications.     In ED, patient was hypoxic and hypertensive to 266/ 111mmhg with mottled skin per ED attending. EKG- NSR with sinus arrythmia @67BPM , LBBB. cardiac enzymes x1- negative , CXR s/o bilateral pulmonary congestion more pronounced on RLL which seems to be consolidation  (awaiting official report). Labs pertinent for WBC- 15.1 , bun/ creat- 14/1.38, BNP- 458, ABG 0- 7.1/77/331/23/ 100 Fio2 on Ac 16/500/100%/5, CT head no acute pathology.     Pt is being admitted to ICU for hypoxic and hypercapenic respiratory failure secondary to Aspiration PNA and hypertensive encephalopathy. (14 Jan 2019 17:58)  Hematology called due to sudden decrease of platelet count from baseline 140's to 78k    ROS:  Negative except for:    PAST MEDICAL & SURGICAL HISTORY:  COPD (chronic obstructive pulmonary disease)  HLD (hyperlipidemia)  Varicose veins  HTN (hypertension)  No significant past surgical history      SOCIAL HISTORY:    FAMILY HISTORY:      MEDICATIONS  (STANDING):  ALBUTerol    90 MICROgram(s) HFA Inhaler 2 Puff(s) Inhalation every 6 hours  insulin lispro (HumaLOG) corrective regimen sliding scale   SubCutaneous every 6 hours  midazolam Infusion 0.02 mG/kG/Hr (1.996 mL/Hr) IV Continuous <Continuous>  pantoprazole  Injectable 40 milliGRAM(s) IV Push daily  piperacillin/tazobactam IVPB. 3.375 Gram(s) IV Intermittent every 8 hours    MEDICATIONS  (PRN):      Allergies    No Known Allergies    Intolerances        Vital Signs Last 24 Hrs  T(C): 36.8 (15 Chin 2019 07:00), Max: 36.8 (14 Jan 2019 20:30)  T(F): 98.2 (15 Chin 2019 07:00), Max: 98.2 (14 Jan 2019 20:30)  HR: 55 (15 Chin 2019 10:00) (51 - 87)  BP: 130/44 (15 Chin 2019 10:00) (92/48 - 266/111)  BP(mean): 64 (15 Chin 2019 10:00) (59 - 103)  RR: 20 (15 Chin 2019 10:00) (16 - 25)  SpO2: 99% (15 Chin 2019 10:00) (97% - 100%)    PHYSICAL EXAM  General: adult in NAD  HEENT: clear oropharynx, anicteric sclera, pink conjunctiva  Neck: supple  CV: normal S1/S2 with no murmur rubs or gallops  Lungs: positive air movement b/l ant lungs,clear to auscultation, no wheezes, no rales  Abdomen: soft non-tender non-distended, no hepatosplenomegaly  Ext: no clubbing cyanosis or edema  Skin: no rashes and no petechiae  Neuro: alert and oriented X 4, no focal deficits      LABS:                          11.5   7.1   )-----------( 78       ( 15 Chin 2019 06:11 )             36.5         Mean Cell Volume : 73.3 fl  Mean Cell Hemoglobin : 23.1 pg  Mean Cell Hemoglobin Concentration : 31.6 gm/dL  Auto Neutrophil # : 5.6 K/uL  Auto Lymphocyte # : 1.1 K/uL  Auto Monocyte # : 0.4 K/uL  Auto Eosinophil # : 0.0 K/uL  Auto Basophil # : 0.0 K/uL  Auto Neutrophil % : 78.1 %  Auto Lymphocyte % : 15.0 %  Auto Monocyte % : 6.3 %  Auto Eosinophil % : 0.1 %  Auto Basophil % : 0.5 %      Serial CBC's  01-15 @ 06:11  Hct-36.5 / Hgb-11.5 / Plat-78 / RBC-4.98 / WBC-7.1  Serial CBC's  01-14 @ 16:40  Hct-43.8 / Hgb-13.3 / Plat-214 / RBC-5.82 / WBC-15.1      01-15    144  |  112<H>  |  19<H>  ----------------------------<  147<H>  3.9   |  21<L>  |  1.16    Ca    7.5<L>      15 Chin 2019 06:11  Phos  3.7     01-15  Mg     1.9     01-15    TPro  6.9  /  Alb  3.0<L>  /  TBili  0.4  /  DBili  0.1  /  AST  26  /  ALT  17  /  AlkPhos  64  01-15      PT/INR - ( 14 Jan 2019 16:40 )   PT: 11.4 sec;   INR: 1.03 ratio         PTT - ( 14 Jan 2019 16:40 )  PTT:26.3 sec                BLOOD SMEAR INTERPRETATION:       RADIOLOGY & ADDITIONAL STUDIES:

## 2019-01-15 NOTE — PROGRESS NOTE ADULT - PROBLEM SELECTOR PLAN 5
PT has hx of COPD.  O/E- bilateral air entry+ with decreased air entry on RLL.  no rhonchi or rales  will c/w Duoneb

## 2019-01-15 NOTE — CONSULT NOTE ADULT - PROBLEM SELECTOR RECOMMENDATION 9
Multifactorial  -Etoh use  -Esophageal varices signifying portal hypertension - likely cirrhosis  -infection  -antibiotic use  -heparin use    Agree with HIT panel but doubt since patient was just admitted and no history of recent exposure (recently hospitalized six months ago) Only if HIT positive would then check serotonin release assay  Hold all unnecessary medications causing thrombocytopenia  Tx platelets if bleeding or if Plt < 10k  If plt remain low will initiate evaluation otherwise expect return to baseline 140's     Thank you. Will follow.

## 2019-01-15 NOTE — PROGRESS NOTE ADULT - PROBLEM SELECTOR PLAN 1
pt was intubated by ED as he was encephalopathic.    Pt has initial ABG s/o 7.1/77/331/23/ 100 Fio2 on Ac 16/500/100%/5.  -Ventilator settings were adjusted yesterday, ABGS improved.  This mornin.40/37/117/99 FiO2   will keep intubated and wean as tolerated

## 2019-01-15 NOTE — PROGRESS NOTE ADULT - PROBLEM SELECTOR PLAN 4
Patient's Platelet dropped from 212 to 78 this morning which is sudden drop from last night.  -Could be secondary to infectious process, abx.   Will send HIT panel  SCD boots for Prophylaxis  -Dr. Pickett - Heme/onc

## 2019-01-15 NOTE — PROGRESS NOTE ADULT - SUBJECTIVE AND OBJECTIVE BOX
INTERVAL HPI/OVERNIGHT EVENTS: No overnight events.     PRESSORS: [ ] YES [ x] NO  WHICH:    ANTIBIOTICS:    Zosyn               DATE STARTED:  ANTIBIOTICS:                  DATE STARTED:  ANTIBIOTICS:                  DATE STARTED:    Antimicrobial:  piperacillin/tazobactam IVPB. 3.375 Gram(s) IV Intermittent every 8 hours    Cardiovascular:    Pulmonary:  ALBUTerol    90 MICROgram(s) HFA Inhaler 2 Puff(s) Inhalation every 6 hours    Hematalogic:    Other:  insulin lispro (HumaLOG) corrective regimen sliding scale   SubCutaneous every 6 hours  midazolam Infusion 0.02 mG/kG/Hr IV Continuous <Continuous>  pantoprazole  Injectable 40 milliGRAM(s) IV Push daily    ALBUTerol    90 MICROgram(s) HFA Inhaler 2 Puff(s) Inhalation every 6 hours  insulin lispro (HumaLOG) corrective regimen sliding scale   SubCutaneous every 6 hours  midazolam Infusion 0.02 mG/kG/Hr IV Continuous <Continuous>  pantoprazole  Injectable 40 milliGRAM(s) IV Push daily  piperacillin/tazobactam IVPB. 3.375 Gram(s) IV Intermittent every 8 hours    Drug Dosing Weight  Height (cm): 167.64 (2019 15:34)  Weight (kg): 99.8 (2019 15:34)  BMI (kg/m2): 35.5 (2019 15:34)  BSA (m2): 2.08 (2019 15:34)    CENTRAL LINE: [ ] YES [x ] NO  LOCATION:   DATE INSERTED:  REMOVE: [ ] YES [ ] NO  EXPLAIN:    WANDA: [ ] YES [ x] NO    DATE INSERTED:  REMOVE:  [ ] YES [ ] NO  EXPLAIN:    A-LINE:  [ ] YES [x ] NO  LOCATION:   DATE INSERTED:  REMOVE:  [ ] YES [ ] NO  EXPLAIN:    PMH -reviewed admission note, no change since admission      ABG - ( 15 Chin 2019 05:25 )  pH, Arterial: 7.40  pH, Blood: x     /  pCO2: 37    /  pO2: 117   / HCO3: 22    / Base Excess: -1.5  /  SaO2: 99                     @ 07:01  -  01-15 @ 07:00  --------------------------------------------------------  IN: 246 mL / OUT: 685 mL / NET: -439 mL        Mode: AC/ CMV (Assist Control/ Continuous Mandatory Ventilation)  RR (machine): 20  TV (machine): 500  FiO2: 50  PEEP: 5  ITime: 1  MAP: 12  PIP: 33      PHYSICAL EXAM:    GENERAL: NAD, well-groomed, well-developed  HEAD:  Atraumatic, Normocephalic  EYES: EOMI, PERRLA, conjunctiva and sclera clear  ENMT: No tonsillar erythema, exudates, or enlargement; Moist mucous membranes,   NECK: Supple, normal appearance, No JVD; Normal thyroid; Trachea midline  NERVOUS SYSTEM:  Sedated   CHEST/LUNG: No chest deformity; Decreased air entry on right side ; No rales, rhonchi, wheezing   HEART: Regular rate and rhythm; No murmurs, rubs, or gallops  ABDOMEN: Soft, Nontender, Distended Bowel sounds present hypoactive   EXTREMITIES:  2+ Peripheral Pulses, No clubbing, cyanosis, or edema  LYMPH: No lymphadenopathy noted  SKIN: No rashes or lesions; Good capillary refill      LABS:  CBC Full  -  ( 15 Chin 2019 06:11 )  WBC Count : 7.1 K/uL  Hemoglobin : 11.5 g/dL  Hematocrit : 36.5 %  Platelet Count - Automated : 78 K/uL  Mean Cell Volume : 73.3 fl  Mean Cell Hemoglobin : 23.1 pg  Mean Cell Hemoglobin Concentration : 31.6 gm/dL  Auto Neutrophil # : 5.6 K/uL  Auto Lymphocyte # : 1.1 K/uL  Auto Monocyte # : 0.4 K/uL  Auto Eosinophil # : 0.0 K/uL  Auto Basophil # : 0.0 K/uL  Auto Neutrophil % : 78.1 %  Auto Lymphocyte % : 15.0 %  Auto Monocyte % : 6.3 %  Auto Eosinophil % : 0.1 %  Auto Basophil % : 0.5 %    01-15    144  |  112<H>  |  19<H>  ----------------------------<  147<H>  3.9   |  21<L>  |  1.16    Ca    7.5<L>      15 Chin 2019 06:11  Phos  3.7     -15  Mg     1.9     -15    TPro  6.9  /  Alb  3.0<L>  /  TBili  0.4  /  DBili  0.1  /  AST  26  /  ALT  17  /  AlkPhos  64  01-15    PT/INR - ( 2019 16:40 )   PT: 11.4 sec;   INR: 1.03 ratio         PTT - ( 2019 16:40 )  PTT:26.3 sec  Urinalysis Basic - ( 2019 18:14 )    Color: Yellow / Appearance: Clear / S.020 / pH: x  Gluc: x / Ketone: Negative  / Bili: Negative / Urobili: Negative   Blood: x / Protein: 500 mg/dL / Nitrite: Negative   Leuk Esterase: Negative / RBC: 5-10 /HPF / WBC 3-5 /HPF   Sq Epi: x / Non Sq Epi: Moderate /HPF / Bacteria: Moderate /HPF          RADIOLOGY & ADDITIONAL STUDIES REVIEWED:  ***    [ ]GOALS OF CARE DISCUSSION WITH PATIENT/FAMILY/PROXY:    CRITICAL CARE TIME SPENT: 35 minutes

## 2019-01-15 NOTE — PROGRESS NOTE ADULT - PROBLEM SELECTOR PLAN 2
-Patient's initial BP in ED was in 260-230.  -He was started on propofol for sedation, along with labetalol IV 10mg push and nitroglycerin drip but his BP dropped to 130's and he was intermittently bradycardic.   -Currently he is off BP meds, versed for sedation  -BP has been stable.

## 2019-01-16 LAB
ALBUMIN SERPL ELPH-MCNC: 3 G/DL — LOW (ref 3.5–5)
ALP SERPL-CCNC: 52 U/L — SIGNIFICANT CHANGE UP (ref 40–120)
ALT FLD-CCNC: 18 U/L DA — SIGNIFICANT CHANGE UP (ref 10–60)
ANION GAP SERPL CALC-SCNC: 8 MMOL/L — SIGNIFICANT CHANGE UP (ref 5–17)
AST SERPL-CCNC: 16 U/L — SIGNIFICANT CHANGE UP (ref 10–40)
BASE EXCESS BLDA CALC-SCNC: 1.4 MMOL/L — SIGNIFICANT CHANGE UP (ref -2–2)
BILIRUB SERPL-MCNC: 0.3 MG/DL — SIGNIFICANT CHANGE UP (ref 0.2–1.2)
BLOOD GAS COMMENTS ARTERIAL: SIGNIFICANT CHANGE UP
BUN SERPL-MCNC: 18 MG/DL — SIGNIFICANT CHANGE UP (ref 7–18)
CALCIUM SERPL-MCNC: 8.1 MG/DL — LOW (ref 8.4–10.5)
CHLORIDE SERPL-SCNC: 114 MMOL/L — HIGH (ref 96–108)
CO2 SERPL-SCNC: 24 MMOL/L — SIGNIFICANT CHANGE UP (ref 22–31)
CREAT SERPL-MCNC: 1.1 MG/DL — SIGNIFICANT CHANGE UP (ref 0.5–1.3)
GLUCOSE SERPL-MCNC: 105 MG/DL — HIGH (ref 70–99)
HCO3 BLDA-SCNC: 24 MMOL/L — SIGNIFICANT CHANGE UP (ref 23–27)
HCT VFR BLD CALC: 34 % — LOW (ref 39–50)
HGB BLD-MCNC: 10.3 G/DL — LOW (ref 13–17)
HOROWITZ INDEX BLDA+IHG-RTO: 40 — SIGNIFICANT CHANGE UP
MAGNESIUM SERPL-MCNC: 2.1 MG/DL — SIGNIFICANT CHANGE UP (ref 1.6–2.6)
MCHC RBC-ENTMCNC: 23 PG — LOW (ref 27–34)
MCHC RBC-ENTMCNC: 30.3 GM/DL — LOW (ref 32–36)
MCV RBC AUTO: 76 FL — LOW (ref 80–100)
PCO2 BLDA: 31 MMHG — LOW (ref 32–46)
PH BLDA: 7.5 — HIGH (ref 7.35–7.45)
PHOSPHATE SERPL-MCNC: 2.7 MG/DL — SIGNIFICANT CHANGE UP (ref 2.5–4.5)
PLATELET # BLD AUTO: 119 K/UL — LOW (ref 150–400)
PO2 BLDA: 118 MMHG — HIGH (ref 74–108)
POTASSIUM SERPL-MCNC: 3.4 MMOL/L — LOW (ref 3.5–5.3)
POTASSIUM SERPL-SCNC: 3.4 MMOL/L — LOW (ref 3.5–5.3)
PROT SERPL-MCNC: 6.8 G/DL — SIGNIFICANT CHANGE UP (ref 6–8.3)
RBC # BLD: 4.47 M/UL — SIGNIFICANT CHANGE UP (ref 4.2–5.8)
RBC # FLD: 15.1 % — HIGH (ref 10.3–14.5)
SAO2 % BLDA: 99 % — HIGH (ref 92–96)
SODIUM SERPL-SCNC: 146 MMOL/L — HIGH (ref 135–145)
WBC # BLD: 10.4 K/UL — SIGNIFICANT CHANGE UP (ref 3.8–10.5)
WBC # FLD AUTO: 10.4 K/UL — SIGNIFICANT CHANGE UP (ref 3.8–10.5)

## 2019-01-16 PROCEDURE — 71045 X-RAY EXAM CHEST 1 VIEW: CPT | Mod: 26,77

## 2019-01-16 PROCEDURE — 71045 X-RAY EXAM CHEST 1 VIEW: CPT | Mod: 26

## 2019-01-16 PROCEDURE — 74018 RADEX ABDOMEN 1 VIEW: CPT | Mod: 26

## 2019-01-16 RX ORDER — MORPHINE SULFATE 50 MG/1
2 CAPSULE, EXTENDED RELEASE ORAL EVERY 4 HOURS
Qty: 0 | Refills: 0 | Status: DISCONTINUED | OUTPATIENT
Start: 2019-01-16 | End: 2019-01-21

## 2019-01-16 RX ORDER — SODIUM CHLORIDE 9 MG/ML
1000 INJECTION, SOLUTION INTRAVENOUS
Qty: 0 | Refills: 0 | Status: DISCONTINUED | OUTPATIENT
Start: 2019-01-16 | End: 2019-01-18

## 2019-01-16 RX ORDER — DEXMEDETOMIDINE HYDROCHLORIDE IN 0.9% SODIUM CHLORIDE 4 UG/ML
0.2 INJECTION INTRAVENOUS
Qty: 200 | Refills: 0 | Status: DISCONTINUED | OUTPATIENT
Start: 2019-01-16 | End: 2019-01-18

## 2019-01-16 RX ADMIN — DEXMEDETOMIDINE HYDROCHLORIDE IN 0.9% SODIUM CHLORIDE 3.5 MICROGRAM(S)/KG/HR: 4 INJECTION INTRAVENOUS at 18:30

## 2019-01-16 RX ADMIN — SODIUM CHLORIDE 75 MILLILITER(S): 9 INJECTION, SOLUTION INTRAVENOUS at 11:26

## 2019-01-16 RX ADMIN — MORPHINE SULFATE 2 MILLIGRAM(S): 50 CAPSULE, EXTENDED RELEASE ORAL at 22:18

## 2019-01-16 RX ADMIN — ALBUTEROL 2 PUFF(S): 90 AEROSOL, METERED ORAL at 08:54

## 2019-01-16 RX ADMIN — ALBUTEROL 2 PUFF(S): 90 AEROSOL, METERED ORAL at 16:23

## 2019-01-16 RX ADMIN — PIPERACILLIN AND TAZOBACTAM 25 GRAM(S): 4; .5 INJECTION, POWDER, LYOPHILIZED, FOR SOLUTION INTRAVENOUS at 06:14

## 2019-01-16 RX ADMIN — MIDAZOLAM HYDROCHLORIDE 2 MG/KG/HR: 1 INJECTION, SOLUTION INTRAMUSCULAR; INTRAVENOUS at 03:22

## 2019-01-16 RX ADMIN — ALBUTEROL 2 PUFF(S): 90 AEROSOL, METERED ORAL at 02:47

## 2019-01-16 RX ADMIN — MORPHINE SULFATE 2 MILLIGRAM(S): 50 CAPSULE, EXTENDED RELEASE ORAL at 14:39

## 2019-01-16 RX ADMIN — MORPHINE SULFATE 2 MILLIGRAM(S): 50 CAPSULE, EXTENDED RELEASE ORAL at 21:15

## 2019-01-16 RX ADMIN — DEXMEDETOMIDINE HYDROCHLORIDE IN 0.9% SODIUM CHLORIDE 3.5 MICROGRAM(S)/KG/HR: 4 INJECTION INTRAVENOUS at 10:32

## 2019-01-16 RX ADMIN — MORPHINE SULFATE 2 MILLIGRAM(S): 50 CAPSULE, EXTENDED RELEASE ORAL at 18:55

## 2019-01-16 RX ADMIN — MORPHINE SULFATE 2 MILLIGRAM(S): 50 CAPSULE, EXTENDED RELEASE ORAL at 10:31

## 2019-01-16 RX ADMIN — ALBUTEROL 2 PUFF(S): 90 AEROSOL, METERED ORAL at 21:14

## 2019-01-16 RX ADMIN — PIPERACILLIN AND TAZOBACTAM 25 GRAM(S): 4; .5 INJECTION, POWDER, LYOPHILIZED, FOR SOLUTION INTRAVENOUS at 14:38

## 2019-01-16 RX ADMIN — PANTOPRAZOLE SODIUM 40 MILLIGRAM(S): 20 TABLET, DELAYED RELEASE ORAL at 11:26

## 2019-01-16 RX ADMIN — PIPERACILLIN AND TAZOBACTAM 25 GRAM(S): 4; .5 INJECTION, POWDER, LYOPHILIZED, FOR SOLUTION INTRAVENOUS at 21:16

## 2019-01-16 NOTE — PROGRESS NOTE ADULT - PROBLEM SELECTOR PLAN 1
pt was intubated by ED as he was encephalopathic.    Pt has initial ABG s/o 7.1/77/331/23/ 100 Fio2 on Ac 16/500/100%/5.  -Ventilator settings were adjusted yesterday, ABGS improved.  -ABGs this morning resp alkalosis.   will keep intubated and wean as tolerated, started on morphine, Precedex will taper off propofol.

## 2019-01-16 NOTE — PROGRESS NOTE ADULT - SUBJECTIVE AND OBJECTIVE BOX
INTERVAL HPI/OVERNIGHT EVENTS: passed NGT today. Will re-position and start nadolol.     PRESSORS: [ ] YES [x ] NO  WHICH:    ANTIBIOTICS:      zosyn            DATE STARTED:   ANTIBIOTICS:                  DATE STARTED:  ANTIBIOTICS:                  DATE STARTED:    Antimicrobial:  piperacillin/tazobactam IVPB. 3.375 Gram(s) IV Intermittent every 8 hours    Cardiovascular:    Pulmonary:  ALBUTerol    90 MICROgram(s) HFA Inhaler 2 Puff(s) Inhalation every 6 hours    Hematalogic:    Other:  dexmedetomidine Infusion 0.2 MICROgram(s)/kG/Hr IV Continuous <Continuous>  dextrose 5% + sodium chloride 0.45%. 1000 milliLiter(s) IV Continuous <Continuous>  insulin lispro (HumaLOG) corrective regimen sliding scale   SubCutaneous every 6 hours  midazolam Infusion 0.02 mG/kG/Hr IV Continuous <Continuous>  morphine  - Injectable 2 milliGRAM(s) IV Push every 4 hours  multivitamin/thiamine/folic acid in sodium chloride 0.9% 1000 milliLiter(s) IV Continuous <Continuous>  pantoprazole  Injectable 40 milliGRAM(s) IV Push daily  propofol Infusion 5 MICROgram(s)/kG/Min IV Continuous <Continuous>    ALBUTerol    90 MICROgram(s) HFA Inhaler 2 Puff(s) Inhalation every 6 hours  dexmedetomidine Infusion 0.2 MICROgram(s)/kG/Hr IV Continuous <Continuous>  dextrose 5% + sodium chloride 0.45%. 1000 milliLiter(s) IV Continuous <Continuous>  insulin lispro (HumaLOG) corrective regimen sliding scale   SubCutaneous every 6 hours  midazolam Infusion 0.02 mG/kG/Hr IV Continuous <Continuous>  morphine  - Injectable 2 milliGRAM(s) IV Push every 4 hours  multivitamin/thiamine/folic acid in sodium chloride 0.9% 1000 milliLiter(s) IV Continuous <Continuous>  pantoprazole  Injectable 40 milliGRAM(s) IV Push daily  piperacillin/tazobactam IVPB. 3.375 Gram(s) IV Intermittent every 8 hours  propofol Infusion 5 MICROgram(s)/kG/Min IV Continuous <Continuous>    Drug Dosing Weight  Height (cm): 167.64 (2019 15:34)  Weight (kg): 70 (15 Chin 2019 19:54)  BMI (kg/m2): 24.9 (15 Chin 2019 19:54)  BSA (m2): 1.79 (15 Chin 2019 19:54)    CENTRAL LINE: [ ] YES [x] NO  LOCATION:   DATE INSERTED:  REMOVE: [ ] YES [ ] NO  EXPLAIN:    MUÑOZ: [ ] YES [x ] NO    DATE INSERTED:  REMOVE:  [ ] YES [ ] NO  EXPLAIN:    A-LINE:  [ ] YES [x ] NO  LOCATION:   DATE INSERTED:  REMOVE:  [ ] YES [ ] NO  EXPLAIN:    PMH -reviewed admission note, no change since admission      ABG - ( 2019 03:47 )  pH, Arterial: 7.50  pH, Blood: x     /  pCO2: 31    /  pO2: 118   / HCO3: 24    / Base Excess: 1.4   /  SaO2: 99                    01-15 @ 07:01  -  16 @ 07:00  --------------------------------------------------------  IN: 996.9 mL / OUT: 1050 mL / NET: -53.1 mL        Mode: AC/ CMV (Assist Control/ Continuous Mandatory Ventilation)  RR (machine): 20  TV (machine): 500  FiO2: 40  PEEP: 5  ITime: 1  MAP: 15  PIP: 42      PHYSICAL EXAM:    GENERAL: sedated, intubated   HEAD:  Atraumatic, Normocephalic  EYES: EOMI, PERRLA, conjunctiva and sclera clear  ENMT: No tonsillar erythema, exudates, or enlargement; Moist mucous membranes, Good dentition, [ ]No lesions  NECK: Supple, normal appearance, No JVD; Normal thyroid; Trachea midline  NERVOUS SYSTEM: sedated, intubated   CHEST/LUNG: No chest deformity; Normal percussion bilaterally; No rales, rhonchi, wheezing   HEART: Regular rate and rhythm; No murmurs, rubs, or gallops  ABDOMEN: Soft, Nontender, Nondistended;Bowel sounds present  EXTREMITIES:  2+ Peripheral Pulses, No clubbing, cyanosis, or edema  LYMPH: No lymphadenopathy noted  SKIN: No rashes or lesions; Good capillary refill      LABS:  CBC Full  -  ( 2019 05:49 )  WBC Count : 10.4 K/uL  Hemoglobin : 10.3 g/dL  Hematocrit : 34.0 %  Platelet Count - Automated : 119 K/uL  Mean Cell Volume : 76.0 fl  Mean Cell Hemoglobin : 23.0 pg  Mean Cell Hemoglobin Concentration : 30.3 gm/dL  Auto Neutrophil # : x  Auto Lymphocyte # : x  Auto Monocyte # : x  Auto Eosinophil # : x  Auto Basophil # : x  Auto Neutrophil % : x  Auto Lymphocyte % : x  Auto Monocyte % : x  Auto Eosinophil % : x  Auto Basophil % : x    -    146<H>  |  114<H>  |  18  ----------------------------<  105<H>  3.4<L>   |  24  |  1.10    Ca    8.1<L>      2019 05:49  Phos  2.7       Mg     2.1         TPro  6.8  /  Alb  3.0<L>  /  TBili  0.3  /  DBili  x   /  AST  16  /  ALT  18  /  AlkPhos  52      PT/INR - ( 2019 16:40 )   PT: 11.4 sec;   INR: 1.03 ratio         PTT - ( 2019 16:40 )  PTT:26.3 sec  Urinalysis Basic - ( 2019 18:14 )    Color: Yellow / Appearance: Clear / S.020 / pH: x  Gluc: x / Ketone: Negative  / Bili: Negative / Urobili: Negative   Blood: x / Protein: 500 mg/dL / Nitrite: Negative   Leuk Esterase: Negative / RBC: 5-10 /HPF / WBC 3-5 /HPF   Sq Epi: x / Non Sq Epi: Moderate /HPF / Bacteria: Moderate /HPF          RADIOLOGY & ADDITIONAL STUDIES REVIEWED:  ***    [ ]GOALS OF CARE DISCUSSION WITH PATIENT/FAMILY/PROXY:    CRITICAL CARE TIME SPENT: 35 minutes

## 2019-01-16 NOTE — PROGRESS NOTE ADULT - PROBLEM SELECTOR PLAN 1
Improved to 119k  Avoid Heparin until HIT panel is resulted  Will f/u.   No work up other than HIT panel at this time.

## 2019-01-16 NOTE — PROGRESS NOTE ADULT - SUBJECTIVE AND OBJECTIVE BOX
Progress Note:   · Provider Specialty	Critical Care	    Reason for Admission:    Reason for Admission:  · Reason for Admission	Pt was brought in unresponsive	      · Subjective and Objective: 	  INTERVAL HPI/OVERNIGHT EVENTS: passed NGT today. Will re-position and start nadolol.     PRESSORS: [ ] YES [x ] NO  WHICH:    ANTIBIOTICS:      zosyn            DATE STARTED:   ANTIBIOTICS:                  DATE STARTED:  ANTIBIOTICS:                  DATE STARTED:    Antimicrobial:  piperacillin/tazobactam IVPB. 3.375 Gram(s) IV Intermittent every 8 hours    Cardiovascular:    Pulmonary:  ALBUTerol    90 MICROgram(s) HFA Inhaler 2 Puff(s) Inhalation every 6 hours    Hematalogic:    Other:  dexmedetomidine Infusion 0.2 MICROgram(s)/kG/Hr IV Continuous <Continuous>  dextrose 5% + sodium chloride 0.45%. 1000 milliLiter(s) IV Continuous <Continuous>  insulin lispro (HumaLOG) corrective regimen sliding scale   SubCutaneous every 6 hours  midazolam Infusion 0.02 mG/kG/Hr IV Continuous <Continuous>  morphine  - Injectable 2 milliGRAM(s) IV Push every 4 hours  multivitamin/thiamine/folic acid in sodium chloride 0.9% 1000 milliLiter(s) IV Continuous <Continuous>  pantoprazole  Injectable 40 milliGRAM(s) IV Push daily  propofol Infusion 5 MICROgram(s)/kG/Min IV Continuous <Continuous>    ALBUTerol    90 MICROgram(s) HFA Inhaler 2 Puff(s) Inhalation every 6 hours  dexmedetomidine Infusion 0.2 MICROgram(s)/kG/Hr IV Continuous <Continuous>  dextrose 5% + sodium chloride 0.45%. 1000 milliLiter(s) IV Continuous <Continuous>  insulin lispro (HumaLOG) corrective regimen sliding scale   SubCutaneous every 6 hours  midazolam Infusion 0.02 mG/kG/Hr IV Continuous <Continuous>  morphine  - Injectable 2 milliGRAM(s) IV Push every 4 hours  multivitamin/thiamine/folic acid in sodium chloride 0.9% 1000 milliLiter(s) IV Continuous <Continuous>  pantoprazole  Injectable 40 milliGRAM(s) IV Push daily  piperacillin/tazobactam IVPB. 3.375 Gram(s) IV Intermittent every 8 hours  propofol Infusion 5 MICROgram(s)/kG/Min IV Continuous <Continuous>    Drug Dosing Weight  Height (cm): 167.64 (2019 15:34)  Weight (kg): 70 (15 Chin 2019 19:54)  BMI (kg/m2): 24.9 (15 Chin 2019 19:54)  BSA (m2): 1.79 (15 Chin 2019 19:54)    CENTRAL LINE: [ ] YES [x] NO  LOCATION:   DATE INSERTED:  REMOVE: [ ] YES [ ] NO  EXPLAIN:    MUÑOZ: [ ] YES [x ] NO    DATE INSERTED:  REMOVE:  [ ] YES [ ] NO  EXPLAIN:    A-LINE:  [ ] YES [x ] NO  LOCATION:   DATE INSERTED:  REMOVE:  [ ] YES [ ] NO  EXPLAIN:    PMH -reviewed admission note, no change since admission      ABG - ( 2019 03:47 )  pH, Arterial: 7.50  pH, Blood: x     /  pCO2: 31    /  pO2: 118   / HCO3: 24    / Base Excess: 1.4   /  SaO2: 99                    01-15 @ 07:01  -  16 @ 07:00  --------------------------------------------------------  IN: 996.9 mL / OUT: 1050 mL / NET: -53.1 mL        Mode: AC/ CMV (Assist Control/ Continuous Mandatory Ventilation)  RR (machine): 20  TV (machine): 500  FiO2: 40  PEEP: 5  ITime: 1  MAP: 15  PIP: 42      PHYSICAL EXAM:    GENERAL: sedated, intubated   HEAD:  Atraumatic, Normocephalic  EYES: EOMI, PERRLA, conjunctiva and sclera clear  ENMT: No tonsillar erythema, exudates, or enlargement; + ETT  NECK: Supple, normal appearance, No JVD; Normal thyroid; Trachea midline  NERVOUS SYSTEM: sedated, intubated   CHEST/LUNG: No chest deformity; Normal percussion bilaterally; No rales, rhonchi, wheezing   HEART: Regular rate and rhythm; No murmurs, rubs, or gallops  ABDOMEN: Soft, Nontender, Nondistended;Bowel sounds present  EXTREMITIES:  2+ Peripheral Pulses, No clubbing, cyanosis, or edema  LYMPH: No lymphadenopathy noted  SKIN: No rashes or lesions; Good capillary refill      LABS:  CBC Full  -  ( 2019 05:49 )  WBC Count : 10.4 K/uL  Hemoglobin : 10.3 g/dL  Hematocrit : 34.0 %  Platelet Count - Automated : 119 K/uL  Mean Cell Volume : 76.0 fl  Mean Cell Hemoglobin : 23.0 pg  Mean Cell Hemoglobin Concentration : 30.3 gm/dL  Auto Neutrophil # : x  Auto Lymphocyte # : x  Auto Monocyte # : x  Auto Eosinophil # : x  Auto Basophil # : x  Auto Neutrophil % : x  Auto Lymphocyte % : x  Auto Monocyte % : x  Auto Eosinophil % : x  Auto Basophil % : x        146<H>  |  114<H>  |  18  ----------------------------<  105<H>  3.4<L>   |  24  |  1.10    Ca    8.1<L>      2019 05:49  Phos  2.7       Mg     2.1         TPro  6.8  /  Alb  3.0<L>  /  TBili  0.3  /  DBili  x   /  AST  16  /  ALT  18  /  AlkPhos  52      PT/INR - ( 2019 16:40 )   PT: 11.4 sec;   INR: 1.03 ratio         PTT - ( 2019 16:40 )  PTT:26.3 sec  Urinalysis Basic - ( 2019 18:14 )    Color: Yellow / Appearance: Clear / S.020 / pH: x  Gluc: x / Ketone: Negative  / Bili: Negative / Urobili: Negative   Blood: x / Protein: 500 mg/dL / Nitrite: Negative   Leuk Esterase: Negative / RBC: 5-10 /HPF / WBC 3-5 /HPF   Sq Epi: x / Non Sq Epi: Moderate /HPF / Bacteria: Moderate /HPF          RADIOLOGY & ADDITIONAL STUDIES REVIEWED:  ***  CXR:< from: Xray Chest 1 View-PORTABLE IMMEDIATE (19 @ 13:44) >    EXAM:  XR CHEST PORTABLE IMMED 1V                            PROCEDURE DATE:  2019          INTERPRETATION:  INDICATION: Assessment following NG tube placement    PRIORS: 2019 at 6:54 AM    VIEWS: Portable AP radiography of the chest performed.    FINDINGS: Heart size appears within normal limits. Since prior evaluation   there is no significant interval change in position of an indwelling ETT.   Note is made of an advanced NGT, the distal aspect which not included on   the radiographic evaluation. The proximal aspect of the NGT overlies the   superior mediastinal region, presumed extrinsic to the patient.   Interstitial and airspace opacities bilaterally appear more prominent and   the opacity within the periphery of the left thorax likely related to   pleural thickening. Chronic appearing deformity of the left thoracic cage   identified. There is no evidence for pleural effusion or pneumothorax. No   mediastinal shift is noted.    IMPRESSION: Note is made of an advanced NGT, the distal aspect which not   included on the radiographic evaluation. The proximal aspect of the NGT   overlies the superior mediastinal region, presumed extrinsic to the   patient. Interstitial and airspace opacities bilaterally appear more  prominent and the opacity within the periphery of the left thorax likely   related to pleural thickening.                 AZEB BURGESS   This document has been electronically signed. 2019  1:56PM                < end of copied text >    [ ]GOALS OF CARE DISCUSSION WITH PATIENT/FAMILY/PROXY:    CRITICAL CARE TIME SPENT: 35 minutes    Assessment and Plan:   · Assessment		  63 year old M from home, lives with girl friend Ms. Remy , with PMHx of HTN, HLD, right LE varicose veins, umbilical hernia, ex smoker (1Pack over 3 days >50 years, quit 7 months back ), chronic ethanolic but family denies any alcohol abuse, Recently admitted for COPD exacerbation was on NIVBIPAP in 2018, recently had EGD and colonoscopy on  which showed esophageal varices ( was started on nadolol) and colon polyps was BIB EMS for unresponsiveness.     In ED, patient was hypoxic and hypertensive to 266/ 111mmhg with mottled skin per ED attending. EKG- NSR with sinus arrythmia @67BPM , LBBB. cardiac enzymes x1- negative , CXR s/o bilateral pulmonary congestion more pronounced on RLL which seems to be consolidation  (awaiting official report). Labs pertinent for WBC- 15.1 , bun/ creat- 14/1.38, BNP- 458, ABG 0- 7.1/77/331/23/ 100 Fio2 on Ac 16/500/100%/5, CT head no acute pathology.     Pt is being admitted to ICU for hypoxic and hypercapenic respiratory failure secondary to Aspiration PNA and hypertensive encephalopathy.          Problem/Plan - 1:  ·  Problem: Respiratory failure with hypercapnia.  Plan: pt was intubated by ED as he was encephalopathic.    Pt has initial ABG s/o 7.1/77/331/23/ 100 Fio2 on Ac 16/500/100%/5.  -Ventilator settings were adjusted yesterday, ABGS improved.  -ABGs this morning resp alkalosis.   will keep intubated and wean as tolerated, started on morphine, Precedex will taper off propofol.      Problem/Plan - 2:  ·  Problem: Hypertensive emergency.  Plan: -Patient's initial BP in ED was in 260-230.  -He was started on propofol for sedation, along with labetalol IV 10mg push and nitroglycerin drip but his BP dropped to 130's and he was intermittently bradycardic.   -Currently he is off BP meds, versed, Precedex, morphine.  -BP was getting up when off sedation, once sedation started lower down.   -BP has been stable.      Problem/Plan - 3:  ·  Problem: Encephalopathy.  Plan: likely secondary to underlying hypercapnic respiratory failure and infectious etiology- concern for aspiration PNA ( given pt was vomiting at home) versus secondary to hypertensive emergency leading to encephalopathy  management of hypertensive emergency as per above plan.  Initial ABG s/o hypercapnic respiratory failure, was intubated in ED, ventilator settings are adjusted and repeat ABG shows improvement in Pco2 levels   CXR s/o bilateral pulmonary congestion , R lower lobe consolidation.   -UA- looks contaminated. Follow urine and blood cultures   -C/w zosyn Day 3.      Problem/Plan - 4:  ·  Problem: Thrombocytopenia.  Plan: Platelets improved.  Follow HIT panel.  Heme/onc Dr. Pickett.      Problem/Plan - 5:  ·  Problem: COPD (chronic obstructive pulmonary disease).  Plan: PT has hx of COPD.  O/E- bilateral air entry+ with decreased air entry on RLL.  no rhonchi or rales  will c/w Duoneb.      Problem/Plan - 6:  Problem: Esophageal varices. Plan: pt was recently found to have esophageal varices on EGD.  will give nadolol from ngt as recommended by gi.   GI - Dr. Christensen.        critical care 37 min    discussed with iuc team on rounds

## 2019-01-16 NOTE — PROGRESS NOTE ADULT - PROBLEM SELECTOR PLAN 2
-Patient's initial BP in ED was in 260-230.  -He was started on propofol for sedation, along with labetalol IV 10mg push and nitroglycerin drip but his BP dropped to 130's and he was intermittently bradycardic.   -Currently he is off BP meds, versed, Precedex, morphine.  -BP was getting up when off sedation, once sedation started lower down.   -BP has been stable.

## 2019-01-16 NOTE — PROGRESS NOTE ADULT - SUBJECTIVE AND OBJECTIVE BOX
Patient is a 63y old  Male who presents with a chief complaint of Pt was brought in unresponsive (15 Chin 2019 16:47)       Pt is seen and examined. Intubated and sedated            ROS:  Negative except for:    MEDICATIONS  (STANDING):  ALBUTerol    90 MICROgram(s) HFA Inhaler 2 Puff(s) Inhalation every 6 hours  dextrose 5% + sodium chloride 0.45%. 1000 milliLiter(s) (40 mL/Hr) IV Continuous <Continuous>  insulin lispro (HumaLOG) corrective regimen sliding scale   SubCutaneous every 6 hours  midazolam Infusion 0.02 mG/kG/Hr (1.996 mL/Hr) IV Continuous <Continuous>  pantoprazole  Injectable 40 milliGRAM(s) IV Push daily  piperacillin/tazobactam IVPB. 3.375 Gram(s) IV Intermittent every 8 hours  propofol Infusion 5 MICROgram(s)/kG/Min (2.1 mL/Hr) IV Continuous <Continuous>    MEDICATIONS  (PRN):      Allergies    No Known Allergies    Intolerances        Vital Signs Last 24 Hrs  T(C): 36.2 (16 Jan 2019 05:00), Max: 36.9 (15 Chin 2019 11:00)  T(F): 97.2 (16 Jan 2019 05:00), Max: 98.4 (15 Chin 2019 11:00)  HR: 59 (16 Jan 2019 08:28) (50 - 64)  BP: 139/46 (16 Jan 2019 07:00) (106/47 - 177/50)  BP(mean): 70 (16 Jan 2019 07:00) (54 - 88)  RR: 20 (16 Jan 2019 07:00) (16 - 23)  SpO2: 100% (16 Jan 2019 08:28) (95% - 100%)    PHYSICAL EXAM  General: adult in NAD  HEENT: clear oropharynx, anicteric sclera, pink conjunctiva  Neck: supple  CV: normal S1/S2 with no murmur rubs or gallops  Lungs: positive air movement b/l ant lungs,clear to auscultation, no wheezes, no rales  Abdomen: soft non-tender non-distended, no hepatosplenomegaly  Ext: no clubbing cyanosis or edema  Skin: no rashes and no petechiae  Neuro: alert and oriented X 4, no focal deficits  LABS:                          10.3   10.4  )-----------( 119      ( 16 Jan 2019 05:49 )             34.0         Mean Cell Volume : 76.0 fl  Mean Cell Hemoglobin : 23.0 pg  Mean Cell Hemoglobin Concentration : 30.3 gm/dL  Auto Neutrophil # : x  Auto Lymphocyte # : x  Auto Monocyte # : x  Auto Eosinophil # : x  Auto Basophil # : x  Auto Neutrophil % : x  Auto Lymphocyte % : x  Auto Monocyte % : x  Auto Eosinophil % : x  Auto Basophil % : x    Serial CBC's  01-16 @ 05:49  Hct-34.0 / Hgb-10.3 / Plat-119 / RBC-4.47 / WBC-10.4          Serial CBC's  01-15 @ 06:11  Hct-36.5 / Hgb-11.5 / Plat-78 / RBC-4.98 / WBC-7.1          Serial CBC's  01-14 @ 16:40  Hct-43.8 / Hgb-13.3 / Plat-214 / RBC-5.82 / WBC-15.1            01-16    146<H>  |  114<H>  |  18  ----------------------------<  105<H>  3.4<L>   |  24  |  1.10    Ca    8.1<L>      16 Jan 2019 05:49  Phos  2.7     01-16  Mg     2.1     01-16    TPro  6.8  /  Alb  3.0<L>  /  TBili  0.3  /  DBili  x   /  AST  16  /  ALT  18  /  AlkPhos  52  01-16      PT/INR - ( 14 Jan 2019 16:40 )   PT: 11.4 sec;   INR: 1.03 ratio         PTT - ( 14 Jan 2019 16:40 )  PTT:26.3 sec    Hemoglobin: 10.3 g/dL (01-16-19 @ 05:49)  Hematocrit: 34.0 % (01-16-19 @ 05:49)  WBC Count: 10.4 K/uL (01-16-19 @ 05:49)  Platelet Count - Automated: 119 K/uL (01-16-19 @ 05:49)  WBC Count: 7.1 K/uL (01-15-19 @ 06:11)  Hemoglobin: 11.5 g/dL (01-15-19 @ 06:11)  Hematocrit: 36.5 % (01-15-19 @ 06:11)  Platelet Count - Automated: 78 K/uL (01-15-19 @ 06:11)  WBC Count: 15.1 K/uL (01-14-19 @ 16:40)  Hemoglobin: 13.3 g/dL (01-14-19 @ 16:40)  Hematocrit: 43.8 % (01-14-19 @ 16:40)  Platelet Count - Automated: 214 K/uL (01-14-19 @ 16:40)                BLOOD SMEAR INTERPRETATION:       RADIOLOGY & ADDITIONAL STUDIES:

## 2019-01-16 NOTE — CONSULT NOTE ADULT - SUBJECTIVE AND OBJECTIVE BOX
HPI:  63 year old M from home, lives with girl friend Ms. Remy , with PMHx of HTN, HLD, right LE varicose veins, umbilical hernia, ex smoker (1Pack over 3 days >50 years, quit 7 months back ), chronic ethanolic but family denies any alcohol abuse, Recently admitted for COPD exacerbation was on NIVBIPAP in 2018, recently had EGD and colonoscopy on  which showed esophageal varices ( was started on nadolol) and colon polyps was BIB EMS for unresponsiveness. According to EMS, patient staggered on the street to some bystanders and said "I need help"; they called the ambulance. According to EMS patient was found to be minimally responsive (grunting) but not responding to verbal or painful stimuli. History obtained from ED note and Pts Girl friend Ms. Remy and primary GI Dr. Christensen. According to girl friend, pt had EGD and colonoscopy last friday and was not feeling well after taking medication, unable to elaborate more as she said she is busy and will call later. She also reported that pts blood sugars are running high above 400s and went to see endocrinologist, was started on oral meds, but pt is not taking any insulin and his BP has been running in 190s. Confirmed with Dr Christensen, who said that pts blood pressure was elevated and was started on nadolol for esophageal varices, doubt if pt is compliant with medications.     In ED, patient was hypoxic and hypertensive to 266/ 111mmhg with mottled skin per ED attending. EKG- NSR with sinus arrythmia @67BPM , LBBB. cardiac enzymes x1- negative , CXR s/o bilateral pulmonary congestion more pronounced on RLL which seems to be consolidation  (awaiting official report). Labs pertinent for WBC- 15.1 , bun/ creat- 14/1.38, BNP- 458, ABG 0- 7.1/77/331/23/ 100 Fio2 on Ac 16/500/100%/5, CT head no acute pathology.     Pt is being admitted to ICU for hypoxic and hypercapenic respiratory failure secondary to Aspiration PNA and hypertensive encephalopathy. (2019 17:58)      PAST MEDICAL & SURGICAL HISTORY:  COPD (chronic obstructive pulmonary disease)  HLD (hyperlipidemia)  Varicose veins  HTN (hypertension)  No significant past surgical history      No Known Allergies      Meds:  ALBUTerol    90 MICROgram(s) HFA Inhaler 2 Puff(s) Inhalation every 6 hours  dexmedetomidine Infusion 0.2 MICROgram(s)/kG/Hr IV Continuous <Continuous>  dextrose 5% + sodium chloride 0.45%. 1000 milliLiter(s) IV Continuous <Continuous>  insulin lispro (HumaLOG) corrective regimen sliding scale   SubCutaneous every 6 hours  midazolam Infusion 0.02 mG/kG/Hr IV Continuous <Continuous>  morphine  - Injectable 2 milliGRAM(s) IV Push every 4 hours  multivitamin/thiamine/folic acid in sodium chloride 0.9% 1000 milliLiter(s) IV Continuous <Continuous>  pantoprazole  Injectable 40 milliGRAM(s) IV Push daily  piperacillin/tazobactam IVPB. 3.375 Gram(s) IV Intermittent every 8 hours  propofol Infusion 5 MICROgram(s)/kG/Min IV Continuous <Continuous>      SOCIAL HISTORY:  Smoker:  YES   ETOH use:  YES     FAMILY HISTORY:      VITALS:  Vital Signs Last 24 Hrs  T(C): 36.7 (2019 09:00), Max: 36.7 (15 Chin 2019 15:30)  T(F): 98 (2019 09:00), Max: 98 (15 Chin 2019 15:30)  HR: 46 (2019 14:00) (45 - 87)  BP: 145/52 (2019 14:00) (110/53 - 190/82)  BP(mean): 77 (2019 14:00) (57 - 109)  RR: 20 (2019 14:00) (16 - 28)  SpO2: 100% (2019 14:00) (95% - 100%)    LABS/DIAGNOSTIC TESTS:                          10.3   10.4  )-----------( 119      ( 2019 05:49 )             34.0     WBC Count: 10.4 K/uL ( @ 05:49)  WBC Count: 7.1 K/uL (01-15 @ 06:11)  WBC Count: 15.1 K/uL ( @ 16:40)          146<H>  |  114<H>  |  18  ----------------------------<  105<H>  3.4<L>   |  24  |  1.10    Ca    8.1<L>      2019 05:49  Phos  2.7       Mg     2.1         TPro  6.8  /  Alb  3.0<L>  /  TBili  0.3  /  DBili  x   /  AST  16  /  ALT  18  /  AlkPhos  52        Urinalysis Basic - ( 2019 18:14 )    Color: Yellow / Appearance: Clear / S.020 / pH: x  Gluc: x / Ketone: Negative  / Bili: Negative / Urobili: Negative   Blood: x / Protein: 500 mg/dL / Nitrite: Negative   Leuk Esterase: Negative / RBC: 5-10 /HPF / WBC 3-5 /HPF   Sq Epi: x / Non Sq Epi: Moderate /HPF / Bacteria: Moderate /HPF        LIVER FUNCTIONS - ( 2019 05:49 )  Alb: 3.0 g/dL / Pro: 6.8 g/dL / ALK PHOS: 52 U/L / ALT: 18 U/L DA / AST: 16 U/L / GGT: x             PT/INR - ( 2019 16:40 )   PT: 11.4 sec;   INR: 1.03 ratio         PTT - ( 2019 16:40 )  PTT:26.3 sec    LACTATE:    ABG - ABG - ( 2019 03:47 )  pH, Arterial: 7.50  pH, Blood: x     /  pCO2: 31    /  pO2: 118   / HCO3: 24    / Base Excess: 1.4   /  SaO2: 99                  CULTURES:       RADIOLOGY:< from: Xray Chest 1 View-PORTABLE IMMEDIATE (19 @ 13:44) >  EXAM:  XR CHEST PORTABLE IMMED 1V                            PROCEDURE DATE:  2019          INTERPRETATION:  INDICATION: Assessment following NG tube placement    PRIORS: 2019 at 6:54 AM    VIEWS: Portable AP radiography of the chest performed.    FINDINGS: Heart size appears within normal limits. Since prior evaluation   there is no significant interval change in position of an indwelling ETT.   Note is made of an advanced NGT, the distal aspect which not included on   the radiographic evaluation. The proximal aspect of the NGT overlies the   superior mediastinal region, presumed extrinsic to the patient.   Interstitial and airspace opacities bilaterally appear more prominent and   the opacity within the periphery of the left thorax likely related to   pleural thickening. Chronic appearing deformity of the left thoracic cage   identified. There is no evidence for pleural effusion or pneumothorax. No   mediastinal shift is noted.    IMPRESSION: Note is made of an advanced NGT, the distal aspect which not   included on the radiographic evaluation. The proximal aspect of the NGT   overlies the superior mediastinal region, presumed extrinsic to the   patient. Interstitial and airspace opacities bilaterally appear more  prominent and the opacity within the periphery of the left thorax likely   related to pleural thickening.       < end of copied text >        ROS  [  ] UNABLE TO ELICIT HPI:  63 year old M from home, lives with girl friend Ms. Remy , with PMHx of HTN, HLD, right LE varicose veins, umbilical hernia, ex smoker (1Pack over 3 days >50 years, quit 7 months back ), chronic alcoholic but family denies any alcohol abuse, Recently admitted for COPD exacerbation was on NIVBIPAP in 2018, recently had EGD and colonoscopy on  which showed esophageal varices ( was started on nadolol) and colon polyps was BIB EMS for unresponsiveness. According to EMS, patient staggered on the street to some bystanders and said "I need help"; they called the ambulance. According to EMS patient was found to be minimally responsive (grunting) but not responding to verbal or painful stimuli. History obtained from ED note and Pts Girl friend Ms. Remy and primary GI Dr. Christensen. According to girl friend, pt had EGD and colonoscopy last friday and was not feeling well after taking medication, unable to elaborate more as she said she is busy and will call later. She also reported that pts blood sugars are running high above 400s and went to see endocrinologist, was started on oral meds, but pt is not taking any insulin and his BP has been running in 190s. Confirmed with Dr Christensen, who said that pts blood pressure was elevated and was started on nadolol for esophageal varices, doubt if pt is compliant with medications.     Pt  admitted to ICU for hypoxic and hypercapnic respiratory failure secondary to Aspiration PNA and hypertensive encephalopathy. Pt is currently in the ICU, intubated and sedated and vent dependent.      PAST MEDICAL & SURGICAL HISTORY:  COPD (chronic obstructive pulmonary disease)  HLD (hyperlipidemia)  Varicose veins  HTN (hypertension)  No significant past surgical history      No Known Allergies      Meds:  ALBUTerol    90 MICROgram(s) HFA Inhaler 2 Puff(s) Inhalation every 6 hours  dexmedetomidine Infusion 0.2 MICROgram(s)/kG/Hr IV Continuous <Continuous>  dextrose 5% + sodium chloride 0.45%. 1000 milliLiter(s) IV Continuous <Continuous>  insulin lispro (HumaLOG) corrective regimen sliding scale   SubCutaneous every 6 hours  midazolam Infusion 0.02 mG/kG/Hr IV Continuous <Continuous>  morphine  - Injectable 2 milliGRAM(s) IV Push every 4 hours  multivitamin/thiamine/folic acid in sodium chloride 0.9% 1000 milliLiter(s) IV Continuous <Continuous>  pantoprazole  Injectable 40 milliGRAM(s) IV Push daily  piperacillin/tazobactam IVPB. 3.375 Gram(s) IV Intermittent every 8 hours  propofol Infusion 5 MICROgram(s)/kG/Min IV Continuous <Continuous>      SOCIAL HISTORY:  Smoker:  YES   ETOH use:  YES     FAMILY HISTORY: unknown      VITALS:  Vital Signs Last 24 Hrs  T(C): 36.7 (2019 09:00), Max: 36.7 (15 Chin 2019 15:30)  T(F): 98 (2019 09:00), Max: 98 (15 Chin 2019 15:30)  HR: 46 (2019 14:00) (45 - 87)  BP: 145/52 (2019 14:00) (110/53 - 190/82)  BP(mean): 77 (2019 14:00) (57 - 109)  RR: 20 (2019 14:00) (16 - 28)  SpO2: 100% (2019 14:00) (95% - 100%)    LABS/DIAGNOSTIC TESTS:                          10.3   10.4  )-----------( 119      ( 2019 05:49 )             34.0     WBC Count: 10.4 K/uL ( @ 05:49)  WBC Count: 7.1 K/uL (01-15 @ 06:11)  WBC Count: 15.1 K/uL ( @ 16:40)          146<H>  |  114<H>  |  18  ----------------------------<  105<H>  3.4<L>   |  24  |  1.10    Ca    8.1<L>      2019 05:49  Phos  2.7       Mg     2.1         TPro  6.8  /  Alb  3.0<L>  /  TBili  0.3  /  DBili  x   /  AST  16  /  ALT  18  /  AlkPhos  52        Urinalysis Basic - ( 2019 18:14 )    Color: Yellow / Appearance: Clear / S.020 / pH: x  Gluc: x / Ketone: Negative  / Bili: Negative / Urobili: Negative   Blood: x / Protein: 500 mg/dL / Nitrite: Negative   Leuk Esterase: Negative / RBC: 5-10 /HPF / WBC 3-5 /HPF   Sq Epi: x / Non Sq Epi: Moderate /HPF / Bacteria: Moderate /HPF        LIVER FUNCTIONS - ( 2019 05:49 )  Alb: 3.0 g/dL / Pro: 6.8 g/dL / ALK PHOS: 52 U/L / ALT: 18 U/L DA / AST: 16 U/L / GGT: x             PT/INR - ( 2019 16:40 )   PT: 11.4 sec;   INR: 1.03 ratio         PTT - ( 2019 16:40 )  PTT:26.3 sec    LACTATE:    ABG - ABG - ( 2019 03:47 )  pH, Arterial: 7.50  pH, Blood: x     /  pCO2: 31    /  pO2: 118   / HCO3: 24    / Base Excess: 1.4   /  SaO2: 99                  CULTURES:       RADIOLOGY:< from: Xray Chest 1 View-PORTABLE IMMEDIATE (19 @ 13:44) >  EXAM:  XR CHEST PORTABLE IMMED 1V                            PROCEDURE DATE:  2019          INTERPRETATION:  INDICATION: Assessment following NG tube placement    PRIORS: 2019 at 6:54 AM    VIEWS: Portable AP radiography of the chest performed.    FINDINGS: Heart size appears within normal limits. Since prior evaluation   there is no significant interval change in position of an indwelling ETT.   Note is made of an advanced NGT, the distal aspect which not included on   the radiographic evaluation. The proximal aspect of the NGT overlies the   superior mediastinal region, presumed extrinsic to the patient.   Interstitial and airspace opacities bilaterally appear more prominent and   the opacity within the periphery of the left thorax likely related to   pleural thickening. Chronic appearing deformity of the left thoracic cage   identified. There is no evidence for pleural effusion or pneumothorax. No   mediastinal shift is noted.    IMPRESSION: Note is made of an advanced NGT, the distal aspect which not   included on the radiographic evaluation. The proximal aspect of the NGT   overlies the superior mediastinal region, presumed extrinsic to the   patient. Interstitial and airspace opacities bilaterally appear more  prominent and the opacity within the periphery of the left thorax likely   related to pleural thickening.       < end of copied text >        ROS  [ x ] UNABLE TO ELICIT

## 2019-01-16 NOTE — CONSULT NOTE ADULT - ASSESSMENT
63M w/EtOH cirrhosis, portal HTN, and nonbleeding EVs p/w unresponsiveness requiring intubation.    #Cirrhosis  -compensated at this time    #EV  -nonbleeding, small  -cont Nadolol 20 mg daily; titrate to HR 55-60  -can place NGT gently for meds    #Ascites - not present  #HE - unable to assess as pt is intubated and sedated    #Respiratory failure  -care per ICU team    Please reconsult prn
Patient is a 63y old  Male who presents with a chief complaint of Pt was brought in unresponsive (14 Jan 2019 17:58)      HPI:  63 year old M from home, lives with girl friend Ms. Remy , with PMHx of HTN, HLD, right LE varicose veins, umbilical hernia, ex smoker (1Pack over 3 days >50 years, quit 7 months back ), chronic ethanolic but family denies any alcohol abuse, Recently admitted for COPD exacerbation was on NIVBIPAP in July 2018, recently had EGD and colonoscopy on 1/111/19 which showed esophageal varices ( was started on nadolol) and colon polyps was BIB EMS for unresponsiveness. According to EMS, patient staggered on the street to some bystanders and said "I need help"; they called the ambulance. According to EMS patient was found to be minimally responsive (grunting) but not responding to verbal or painful stimuli. History obtained from ED note and Pts Girl friend Ms. Remy and primary GI Dr. Christensen. According to girl friend, pt had EGD and colonoscopy last friday and was not feeling well after taking medication, unable to elaborate more as she said she is busy and will call later. She also reported that pts blood sugars are running high above 400s and went to see endocrinologist, was started on oral meds, but pt is not taking any insulin and his BP has been running in 190s. Confirmed with Dr Christensen, who said that pts blood pressure was elevated and was started on nadolol for esophageal varices, doubt if pt is compliant with medications.     In ED, patient was hypoxic and hypertensive to 266/ 111mmhg with mottled skin per ED attending. EKG- NSR with sinus arrythmia @67BPM , LBBB. cardiac enzymes x1- negative , CXR s/o bilateral pulmonary congestion more pronounced on RLL which seems to be consolidation  (awaiting official report). Labs pertinent for WBC- 15.1 , bun/ creat- 14/1.38, BNP- 458, ABG 0- 7.1/77/331/23/ 100 Fio2 on Ac 16/500/100%/5, CT head no acute pathology.     Pt is being admitted to ICU for hypoxic and hypercapenic respiratory failure secondary to Aspiration PNA and hypertensive encephalopathy. (14 Jan 2019 17:58)  Hematology called due to sudden decrease of platelet count from baseline 140's to 78k
Aspiration Pneumonia   Leukocytosis - normalized  resp failure    plan - cont zosyn 3.375 gms iv q8hrs

## 2019-01-16 NOTE — PROGRESS NOTE ADULT - PROBLEM SELECTOR PLAN 3
likely secondary to underlying hypercapnic respiratory failure and infectious etiology- concern for aspiration PNA ( given pt was vomiting at home) versus secondary to hypertensive emergency leading to encephalopathy  management of hypertensive emergency as per above plan.  Initial ABG s/o hypercapnic respiratory failure, was intubated in ED, ventilator settings are adjusted and repeat ABG shows improvement in Pco2 levels   CXR s/o bilateral pulmonary congestion , R lower lobe consolidation.   -UA- looks contaminated. Follow urine and blood cultures   -C/w zosyn Day 3

## 2019-01-17 LAB
ANION GAP SERPL CALC-SCNC: 10 MMOL/L — SIGNIFICANT CHANGE UP (ref 5–17)
BASE EXCESS BLDA CALC-SCNC: -0.2 MMOL/L — SIGNIFICANT CHANGE UP (ref -2–2)
BLOOD GAS COMMENTS ARTERIAL: SIGNIFICANT CHANGE UP
BUN SERPL-MCNC: 15 MG/DL — SIGNIFICANT CHANGE UP (ref 7–18)
CALCIUM SERPL-MCNC: 8 MG/DL — LOW (ref 8.4–10.5)
CHLORIDE SERPL-SCNC: 115 MMOL/L — HIGH (ref 96–108)
CO2 SERPL-SCNC: 20 MMOL/L — LOW (ref 22–31)
CREAT SERPL-MCNC: 1.03 MG/DL — SIGNIFICANT CHANGE UP (ref 0.5–1.3)
GLUCOSE SERPL-MCNC: 126 MG/DL — HIGH (ref 70–99)
HCO3 BLDA-SCNC: 22 MMOL/L — LOW (ref 23–27)
HCT VFR BLD CALC: 33.9 % — LOW (ref 39–50)
HGB BLD-MCNC: 10.2 G/DL — LOW (ref 13–17)
HOROWITZ INDEX BLDA+IHG-RTO: 40 — SIGNIFICANT CHANGE UP
MAGNESIUM SERPL-MCNC: 1.9 MG/DL — SIGNIFICANT CHANGE UP (ref 1.6–2.6)
MCHC RBC-ENTMCNC: 22.9 PG — LOW (ref 27–34)
MCHC RBC-ENTMCNC: 30.2 GM/DL — LOW (ref 32–36)
MCV RBC AUTO: 75.7 FL — LOW (ref 80–100)
PCO2 BLDA: 31 MMHG — LOW (ref 32–46)
PH BLDA: 7.47 — HIGH (ref 7.35–7.45)
PHOSPHATE SERPL-MCNC: 3.3 MG/DL — SIGNIFICANT CHANGE UP (ref 2.5–4.5)
PLATELET # BLD AUTO: 95 K/UL — LOW (ref 150–400)
PO2 BLDA: 121 MMHG — HIGH (ref 74–108)
POTASSIUM SERPL-MCNC: 3.6 MMOL/L — SIGNIFICANT CHANGE UP (ref 3.5–5.3)
POTASSIUM SERPL-SCNC: 3.6 MMOL/L — SIGNIFICANT CHANGE UP (ref 3.5–5.3)
RBC # BLD: 4.48 M/UL — SIGNIFICANT CHANGE UP (ref 4.2–5.8)
RBC # FLD: 15.2 % — HIGH (ref 10.3–14.5)
SAO2 % BLDA: 99 % — HIGH (ref 92–96)
SODIUM SERPL-SCNC: 145 MMOL/L — SIGNIFICANT CHANGE UP (ref 135–145)
WBC # BLD: 7.5 K/UL — SIGNIFICANT CHANGE UP (ref 3.8–10.5)
WBC # FLD AUTO: 7.5 K/UL — SIGNIFICANT CHANGE UP (ref 3.8–10.5)

## 2019-01-17 PROCEDURE — 71045 X-RAY EXAM CHEST 1 VIEW: CPT | Mod: 26

## 2019-01-17 RX ORDER — HALOPERIDOL DECANOATE 100 MG/ML
5 INJECTION INTRAMUSCULAR ONCE
Qty: 0 | Refills: 0 | Status: COMPLETED | OUTPATIENT
Start: 2019-01-17 | End: 2019-01-17

## 2019-01-17 RX ORDER — FUROSEMIDE 40 MG
40 TABLET ORAL ONCE
Qty: 0 | Refills: 0 | Status: COMPLETED | OUTPATIENT
Start: 2019-01-17 | End: 2019-01-17

## 2019-01-17 RX ORDER — HEPARIN SODIUM 5000 [USP'U]/ML
5000 INJECTION INTRAVENOUS; SUBCUTANEOUS EVERY 12 HOURS
Qty: 0 | Refills: 0 | Status: DISCONTINUED | OUTPATIENT
Start: 2019-01-17 | End: 2019-01-17

## 2019-01-17 RX ORDER — NADOLOL 80 MG/1
20 TABLET ORAL DAILY
Qty: 0 | Refills: 0 | Status: DISCONTINUED | OUTPATIENT
Start: 2019-01-17 | End: 2019-01-17

## 2019-01-17 RX ORDER — HYDRALAZINE HCL 50 MG
5 TABLET ORAL ONCE
Qty: 0 | Refills: 0 | Status: COMPLETED | OUTPATIENT
Start: 2019-01-17 | End: 2019-01-17

## 2019-01-17 RX ADMIN — MORPHINE SULFATE 2 MILLIGRAM(S): 50 CAPSULE, EXTENDED RELEASE ORAL at 01:32

## 2019-01-17 RX ADMIN — SODIUM CHLORIDE 75 MILLILITER(S): 9 INJECTION, SOLUTION INTRAVENOUS at 11:27

## 2019-01-17 RX ADMIN — PIPERACILLIN AND TAZOBACTAM 25 GRAM(S): 4; .5 INJECTION, POWDER, LYOPHILIZED, FOR SOLUTION INTRAVENOUS at 05:50

## 2019-01-17 RX ADMIN — PANTOPRAZOLE SODIUM 40 MILLIGRAM(S): 20 TABLET, DELAYED RELEASE ORAL at 11:27

## 2019-01-17 RX ADMIN — ALBUTEROL 2 PUFF(S): 90 AEROSOL, METERED ORAL at 16:36

## 2019-01-17 RX ADMIN — DEXMEDETOMIDINE HYDROCHLORIDE IN 0.9% SODIUM CHLORIDE 3.5 MICROGRAM(S)/KG/HR: 4 INJECTION INTRAVENOUS at 11:29

## 2019-01-17 RX ADMIN — HALOPERIDOL DECANOATE 5 MILLIGRAM(S): 100 INJECTION INTRAMUSCULAR at 10:46

## 2019-01-17 RX ADMIN — Medication 40 MILLIGRAM(S): at 10:46

## 2019-01-17 RX ADMIN — ALBUTEROL 2 PUFF(S): 90 AEROSOL, METERED ORAL at 08:47

## 2019-01-17 RX ADMIN — Medication 2 MILLIGRAM(S): at 11:03

## 2019-01-17 RX ADMIN — Medication 5 MILLIGRAM(S): at 23:15

## 2019-01-17 RX ADMIN — SODIUM CHLORIDE 40 MILLILITER(S): 9 INJECTION, SOLUTION INTRAVENOUS at 01:31

## 2019-01-17 RX ADMIN — DEXMEDETOMIDINE HYDROCHLORIDE IN 0.9% SODIUM CHLORIDE 3.5 MICROGRAM(S)/KG/HR: 4 INJECTION INTRAVENOUS at 00:05

## 2019-01-17 RX ADMIN — PIPERACILLIN AND TAZOBACTAM 25 GRAM(S): 4; .5 INJECTION, POWDER, LYOPHILIZED, FOR SOLUTION INTRAVENOUS at 21:04

## 2019-01-17 RX ADMIN — DEXMEDETOMIDINE HYDROCHLORIDE IN 0.9% SODIUM CHLORIDE 3.5 MICROGRAM(S)/KG/HR: 4 INJECTION INTRAVENOUS at 13:45

## 2019-01-17 RX ADMIN — MORPHINE SULFATE 2 MILLIGRAM(S): 50 CAPSULE, EXTENDED RELEASE ORAL at 02:00

## 2019-01-17 RX ADMIN — MORPHINE SULFATE 2 MILLIGRAM(S): 50 CAPSULE, EXTENDED RELEASE ORAL at 10:20

## 2019-01-17 RX ADMIN — MIDAZOLAM HYDROCHLORIDE 2 MG/KG/HR: 1 INJECTION, SOLUTION INTRAMUSCULAR; INTRAVENOUS at 07:14

## 2019-01-17 RX ADMIN — PIPERACILLIN AND TAZOBACTAM 25 GRAM(S): 4; .5 INJECTION, POWDER, LYOPHILIZED, FOR SOLUTION INTRAVENOUS at 13:42

## 2019-01-17 RX ADMIN — MORPHINE SULFATE 2 MILLIGRAM(S): 50 CAPSULE, EXTENDED RELEASE ORAL at 15:17

## 2019-01-17 RX ADMIN — DEXMEDETOMIDINE HYDROCHLORIDE IN 0.9% SODIUM CHLORIDE 3.5 MICROGRAM(S)/KG/HR: 4 INJECTION INTRAVENOUS at 18:42

## 2019-01-17 RX ADMIN — Medication 40 MILLIGRAM(S): at 17:48

## 2019-01-17 RX ADMIN — MORPHINE SULFATE 2 MILLIGRAM(S): 50 CAPSULE, EXTENDED RELEASE ORAL at 06:37

## 2019-01-17 RX ADMIN — MORPHINE SULFATE 2 MILLIGRAM(S): 50 CAPSULE, EXTENDED RELEASE ORAL at 05:50

## 2019-01-17 RX ADMIN — DEXMEDETOMIDINE HYDROCHLORIDE IN 0.9% SODIUM CHLORIDE 3.5 MICROGRAM(S)/KG/HR: 4 INJECTION INTRAVENOUS at 22:09

## 2019-01-17 RX ADMIN — ALBUTEROL 2 PUFF(S): 90 AEROSOL, METERED ORAL at 20:17

## 2019-01-17 RX ADMIN — ALBUTEROL 2 PUFF(S): 90 AEROSOL, METERED ORAL at 02:45

## 2019-01-17 RX ADMIN — MORPHINE SULFATE 2 MILLIGRAM(S): 50 CAPSULE, EXTENDED RELEASE ORAL at 22:08

## 2019-01-17 RX ADMIN — DEXMEDETOMIDINE HYDROCHLORIDE IN 0.9% SODIUM CHLORIDE 3.5 MICROGRAM(S)/KG/HR: 4 INJECTION INTRAVENOUS at 16:15

## 2019-01-17 RX ADMIN — MORPHINE SULFATE 2 MILLIGRAM(S): 50 CAPSULE, EXTENDED RELEASE ORAL at 21:05

## 2019-01-17 RX ADMIN — MORPHINE SULFATE 2 MILLIGRAM(S): 50 CAPSULE, EXTENDED RELEASE ORAL at 17:48

## 2019-01-17 NOTE — PROGRESS NOTE ADULT - PROBLEM SELECTOR PLAN 3
likely secondary to underlying hypercapnic respiratory failure and infectious etiology- concern for aspiration PNA ( given pt was vomiting at home) versus secondary to hypertensive emergency leading to encephalopathy  management of hypertensive emergency as per above plan.  Initial ABG s/o hypercapnic respiratory failure, was intubated in ED, ventilator settings are adjusted and repeat ABG shows improvement in Pco2 levels   CXR s/o bilateral pulmonary congestion , R lower lobe consolidation.   -C/w zosyn Day 3

## 2019-01-17 NOTE — DIETITIAN INITIAL EVALUATION ADULT. - PROBLEM SELECTOR PLAN 5
pt was recently found to have esophageal varices on EGD.  will hold off beta blocker due to bradycardia on telemetry.  GI - Dr. Christensen.

## 2019-01-17 NOTE — PROGRESS NOTE ADULT - SUBJECTIVE AND OBJECTIVE BOX
INTERVAL HPI/OVERNIGHT EVENTS: Patient was following commands off sedation and maintaining saturation. However, he started getting agitated and O2 sat dropped, placed back on AC mode of vent. He was given Lasix 40 IV.     PRESSORS: [ ] YES [x ] NO  WHICH:    ANTIBIOTICS:      zosyn            DATE STARTED:   ANTIBIOTICS:                  DATE STARTED:  ANTIBIOTICS:                  DATE STARTED:    Antimicrobial:  piperacillin/tazobactam IVPB. 3.375 Gram(s) IV Intermittent every 8 hours    Cardiovascular:    Pulmonary:  ALBUTerol    90 MICROgram(s) HFA Inhaler 2 Puff(s) Inhalation every 6 hours    Hematalogic:    Other:  dexmedetomidine Infusion 0.2 MICROgram(s)/kG/Hr IV Continuous <Continuous>  dextrose 5% + sodium chloride 0.45%. 1000 milliLiter(s) IV Continuous <Continuous>  insulin lispro (HumaLOG) corrective regimen sliding scale   SubCutaneous every 6 hours  midazolam Infusion 0.02 mG/kG/Hr IV Continuous <Continuous>  morphine  - Injectable 2 milliGRAM(s) IV Push every 4 hours  multivitamin/thiamine/folic acid in sodium chloride 0.9% 1000 milliLiter(s) IV Continuous <Continuous>  pantoprazole  Injectable 40 milliGRAM(s) IV Push daily  propofol Infusion 5 MICROgram(s)/kG/Min IV Continuous <Continuous>    ALBUTerol    90 MICROgram(s) HFA Inhaler 2 Puff(s) Inhalation every 6 hours  dexmedetomidine Infusion 0.2 MICROgram(s)/kG/Hr IV Continuous <Continuous>  dextrose 5% + sodium chloride 0.45%. 1000 milliLiter(s) IV Continuous <Continuous>  insulin lispro (HumaLOG) corrective regimen sliding scale   SubCutaneous every 6 hours  midazolam Infusion 0.02 mG/kG/Hr IV Continuous <Continuous>  morphine  - Injectable 2 milliGRAM(s) IV Push every 4 hours  multivitamin/thiamine/folic acid in sodium chloride 0.9% 1000 milliLiter(s) IV Continuous <Continuous>  pantoprazole  Injectable 40 milliGRAM(s) IV Push daily  piperacillin/tazobactam IVPB. 3.375 Gram(s) IV Intermittent every 8 hours  propofol Infusion 5 MICROgram(s)/kG/Min IV Continuous <Continuous>    Drug Dosing Weight  Height (cm): 167.64 (2019 15:34)  Weight (kg): 70 (15 Chin 2019 19:54)  BMI (kg/m2): 24.9 (15 Chin 2019 19:54)  BSA (m2): 1.79 (15 Chin 2019 19:54)    CENTRAL LINE: [ ] YES [x] NO  LOCATION:   DATE INSERTED:  REMOVE: [ ] YES [ ] NO  EXPLAIN:    MUÑOZ: [ ] YES [x ] NO    DATE INSERTED:  REMOVE:  [ ] YES [ ] NO  EXPLAIN:    A-LINE:  [ ] YES [x ] NO  LOCATION:   DATE INSERTED:  REMOVE:  [ ] YES [ ] NO  EXPLAIN:    PMH -reviewed admission note, no change since admission      ABG - ( 2019 03:47 )  pH, Arterial: 7.50  pH, Blood: x     /  pCO2: 31    /  pO2: 118   / HCO3: 24    / Base Excess: 1.4   /  SaO2: 99                    01-15 @ 07:01  -  -16 @ 07:00  --------------------------------------------------------  IN: 996.9 mL / OUT: 1050 mL / NET: -53.1 mL        Mode: AC/ CMV (Assist Control/ Continuous Mandatory Ventilation)  RR (machine): 20  TV (machine): 500  FiO2: 40  PEEP: 5  ITime: 1  MAP: 15  PIP: 42      PHYSICAL EXAM:    GENERAL: NAD,   HEAD:  Atraumatic, Normocephalic  EYES: EOMI, PERRLA, conjunctiva and sclera clear  ENMT: No tonsillar erythema, exudates, or enlargement; Moist mucous membranes, Good dentition, [ ]No lesions  NECK: Supple, normal appearance, No JVD; Normal thyroid; Trachea midline  NERVOUS SYSTEM: awake, responsive, following commands   CHEST/LUNG: No chest deformity;  No rales, rhonchi, wheezing   HEART: Regular rate and rhythm; No murmurs, rubs, or gallops  ABDOMEN: Soft, Nontender, distended ;Bowel sounds present  EXTREMITIES:  2+ Peripheral Pulses, No clubbing, cyanosis, or edema  LYMPH: No lymphadenopathy noted  SKIN: No rashes or lesions; Good capillary refill      LABS:  CBC Full  -  ( 2019 05:49 )  WBC Count : 10.4 K/uL  Hemoglobin : 10.3 g/dL  Hematocrit : 34.0 %  Platelet Count - Automated : 119 K/uL  Mean Cell Volume : 76.0 fl  Mean Cell Hemoglobin : 23.0 pg  Mean Cell Hemoglobin Concentration : 30.3 gm/dL  Auto Neutrophil # : x  Auto Lymphocyte # : x  Auto Monocyte # : x  Auto Eosinophil # : x  Auto Basophil # : x  Auto Neutrophil % : x  Auto Lymphocyte % : x  Auto Monocyte % : x  Auto Eosinophil % : x  Auto Basophil % : x        146<H>  |  114<H>  |  18  ----------------------------<  105<H>  3.4<L>   |  24  |  1.10    Ca    8.1<L>      2019 05:49  Phos  2.7       Mg     2.1         TPro  6.8  /  Alb  3.0<L>  /  TBili  0.3  /  DBili  x   /  AST  16  /  ALT  18  /  AlkPhos  52      PT/INR - ( 2019 16:40 )   PT: 11.4 sec;   INR: 1.03 ratio         PTT - ( 2019 16:40 )  PTT:26.3 sec  Urinalysis Basic - ( 2019 18:14 )    Color: Yellow / Appearance: Clear / S.020 / pH: x  Gluc: x / Ketone: Negative  / Bili: Negative / Urobili: Negative   Blood: x / Protein: 500 mg/dL / Nitrite: Negative   Leuk Esterase: Negative / RBC: 5-10 /HPF / WBC 3-5 /HPF   Sq Epi: x / Non Sq Epi: Moderate /HPF / Bacteria: Moderate /HPF          RADIOLOGY & ADDITIONAL STUDIES REVIEWED:  ***    [ ]GOALS OF CARE DISCUSSION WITH PATIENT/FAMILY/PROXY:    CRITICAL CARE TIME SPENT: 35 minutes

## 2019-01-17 NOTE — DIETITIAN INITIAL EVALUATION ADULT. - PERTINENT LABORATORY DATA
01-17 Na145 mmol/L Glu 126 mg/dL<H> K+ 3.6 mmol/L Cr  1.03 mg/dL BUN 15 mg/dL 01-17 Phos 3.3 mg/dL 01-16 Alb 3.0 g/dL<L> 01-15 [id number] 5.4 % 01-15 Chol 192 mg/dL  mg/dL HDL 42 mg/dL Trig 82 mg/dL

## 2019-01-17 NOTE — PROGRESS NOTE ADULT - PROBLEM SELECTOR PLAN 2
RESOLVED.  -Patient's initial BP in ED was in 260-230.  -He was started on propofol for sedation, along with labetalol IV 10mg push and nitroglycerin drip but his BP dropped to 130's and he was intermittently bradycardic.   -Currently he is off BP meds, BP well controlled.  May add home meds as needed.

## 2019-01-17 NOTE — PROGRESS NOTE ADULT - PROBLEM SELECTOR PLAN 1
pt was intubated by ED as he was encephalopathic.    Pt has initial ABG s/o 7.1/77/331/23/ 100 Fio2 on Ac 16/500/100%/5.  -Ventilator settings were adjusted yesterday, ABGS improved.  -ABGs this morning respiratory alkalosis. He failed SBT,  Will give SBT tomorrow

## 2019-01-18 LAB
ALBUMIN SERPL ELPH-MCNC: 2.6 G/DL — LOW (ref 3.5–5)
ALP SERPL-CCNC: 52 U/L — SIGNIFICANT CHANGE UP (ref 40–120)
ALT FLD-CCNC: 15 U/L DA — SIGNIFICANT CHANGE UP (ref 10–60)
AMMONIA BLD-MCNC: 34 UMOL/L — HIGH (ref 11–32)
ANION GAP SERPL CALC-SCNC: 12 MMOL/L — SIGNIFICANT CHANGE UP (ref 5–17)
AST SERPL-CCNC: 23 U/L — SIGNIFICANT CHANGE UP (ref 10–40)
BASE EXCESS BLDA CALC-SCNC: -0.8 MMOL/L — SIGNIFICANT CHANGE UP (ref -2–2)
BASE EXCESS BLDA CALC-SCNC: -1.3 MMOL/L — SIGNIFICANT CHANGE UP (ref -2–2)
BILIRUB SERPL-MCNC: 0.9 MG/DL — SIGNIFICANT CHANGE UP (ref 0.2–1.2)
BLOOD GAS COMMENTS ARTERIAL: SIGNIFICANT CHANGE UP
BLOOD GAS COMMENTS ARTERIAL: SIGNIFICANT CHANGE UP
BUN SERPL-MCNC: 18 MG/DL — SIGNIFICANT CHANGE UP (ref 7–18)
CALCIUM SERPL-MCNC: 8.1 MG/DL — LOW (ref 8.4–10.5)
CHLORIDE SERPL-SCNC: 114 MMOL/L — HIGH (ref 96–108)
CO2 SERPL-SCNC: 21 MMOL/L — LOW (ref 22–31)
CREAT SERPL-MCNC: 1.08 MG/DL — SIGNIFICANT CHANGE UP (ref 0.5–1.3)
GLUCOSE SERPL-MCNC: 155 MG/DL — HIGH (ref 70–99)
HCO3 BLDA-SCNC: 22 MMOL/L — LOW (ref 23–27)
HCO3 BLDA-SCNC: 22 MMOL/L — LOW (ref 23–27)
HCT VFR BLD CALC: 37.8 % — LOW (ref 39–50)
HGB BLD-MCNC: 11.6 G/DL — LOW (ref 13–17)
HOROWITZ INDEX BLDA+IHG-RTO: 40 — SIGNIFICANT CHANGE UP
HOROWITZ INDEX BLDA+IHG-RTO: 40 — SIGNIFICANT CHANGE UP
MAGNESIUM SERPL-MCNC: 1.9 MG/DL — SIGNIFICANT CHANGE UP (ref 1.6–2.6)
MCHC RBC-ENTMCNC: 22.9 PG — LOW (ref 27–34)
MCHC RBC-ENTMCNC: 30.6 GM/DL — LOW (ref 32–36)
MCV RBC AUTO: 74.7 FL — LOW (ref 80–100)
PCO2 BLDA: 32 MMHG — SIGNIFICANT CHANGE UP (ref 32–46)
PCO2 BLDA: 35 MMHG — SIGNIFICANT CHANGE UP (ref 32–46)
PF4 HEPARIN CMPLX AB SER-ACNC: NEGATIVE — SIGNIFICANT CHANGE UP
PF4 HEPARIN CMPLX AB SERPL QL IA: 0.13 ABS — SIGNIFICANT CHANGE UP
PH BLDA: 7.42 — SIGNIFICANT CHANGE UP (ref 7.35–7.45)
PH BLDA: 7.45 — SIGNIFICANT CHANGE UP (ref 7.35–7.45)
PHOSPHATE SERPL-MCNC: 4.1 MG/DL — SIGNIFICANT CHANGE UP (ref 2.5–4.5)
PLATELET # BLD AUTO: 123 K/UL — LOW (ref 150–400)
PO2 BLDA: 147 MMHG — HIGH (ref 74–108)
PO2 BLDA: 157 MMHG — HIGH (ref 74–108)
POTASSIUM SERPL-MCNC: 3.8 MMOL/L — SIGNIFICANT CHANGE UP (ref 3.5–5.3)
POTASSIUM SERPL-SCNC: 3.8 MMOL/L — SIGNIFICANT CHANGE UP (ref 3.5–5.3)
PROT SERPL-MCNC: 6.9 G/DL — SIGNIFICANT CHANGE UP (ref 6–8.3)
RBC # BLD: 5.07 M/UL — SIGNIFICANT CHANGE UP (ref 4.2–5.8)
RBC # FLD: 14.9 % — HIGH (ref 10.3–14.5)
SAO2 % BLDA: 99 % — HIGH (ref 92–96)
SAO2 % BLDA: 99 % — HIGH (ref 92–96)
SODIUM SERPL-SCNC: 147 MMOL/L — HIGH (ref 135–145)
WBC # BLD: 7.2 K/UL — SIGNIFICANT CHANGE UP (ref 3.8–10.5)
WBC # FLD AUTO: 7.2 K/UL — SIGNIFICANT CHANGE UP (ref 3.8–10.5)

## 2019-01-18 PROCEDURE — 71045 X-RAY EXAM CHEST 1 VIEW: CPT | Mod: 26

## 2019-01-18 PROCEDURE — 71045 X-RAY EXAM CHEST 1 VIEW: CPT | Mod: 26,77

## 2019-01-18 RX ORDER — SODIUM CHLORIDE 9 MG/ML
1000 INJECTION, SOLUTION INTRAVENOUS
Qty: 0 | Refills: 0 | Status: DISCONTINUED | OUTPATIENT
Start: 2019-01-18 | End: 2019-01-21

## 2019-01-18 RX ORDER — PROPOFOL 10 MG/ML
20 INJECTION, EMULSION INTRAVENOUS
Qty: 1000 | Refills: 0 | Status: DISCONTINUED | OUTPATIENT
Start: 2019-01-18 | End: 2019-01-21

## 2019-01-18 RX ORDER — PROPOFOL 10 MG/ML
20 INJECTION, EMULSION INTRAVENOUS
Qty: 1000 | Refills: 0 | Status: DISCONTINUED | OUTPATIENT
Start: 2019-01-18 | End: 2019-01-18

## 2019-01-18 RX ORDER — MIDAZOLAM HYDROCHLORIDE 1 MG/ML
2 INJECTION, SOLUTION INTRAMUSCULAR; INTRAVENOUS
Qty: 100 | Refills: 0 | Status: DISCONTINUED | OUTPATIENT
Start: 2019-01-18 | End: 2019-01-18

## 2019-01-18 RX ORDER — MIDAZOLAM HYDROCHLORIDE 1 MG/ML
0.03 INJECTION, SOLUTION INTRAMUSCULAR; INTRAVENOUS
Qty: 100 | Refills: 0 | Status: DISCONTINUED | OUTPATIENT
Start: 2019-01-18 | End: 2019-01-18

## 2019-01-18 RX ORDER — SUCCINYLCHOLINE CHLORIDE 100 MG/5ML
50 SYRINGE (ML) INTRAVENOUS ONCE
Qty: 0 | Refills: 0 | Status: COMPLETED | OUTPATIENT
Start: 2019-01-18 | End: 2019-01-18

## 2019-01-18 RX ORDER — MIDAZOLAM HYDROCHLORIDE 1 MG/ML
0.06 INJECTION, SOLUTION INTRAMUSCULAR; INTRAVENOUS
Qty: 100 | Refills: 0 | Status: DISCONTINUED | OUTPATIENT
Start: 2019-01-18 | End: 2019-01-19

## 2019-01-18 RX ORDER — LOSARTAN POTASSIUM 100 MG/1
50 TABLET, FILM COATED ORAL DAILY
Qty: 0 | Refills: 0 | Status: DISCONTINUED | OUTPATIENT
Start: 2019-01-18 | End: 2019-01-23

## 2019-01-18 RX ORDER — MIDAZOLAM HYDROCHLORIDE 1 MG/ML
0.02 INJECTION, SOLUTION INTRAMUSCULAR; INTRAVENOUS
Qty: 100 | Refills: 0 | Status: DISCONTINUED | OUTPATIENT
Start: 2019-01-18 | End: 2019-01-18

## 2019-01-18 RX ADMIN — ALBUTEROL 2 PUFF(S): 90 AEROSOL, METERED ORAL at 14:57

## 2019-01-18 RX ADMIN — DEXMEDETOMIDINE HYDROCHLORIDE IN 0.9% SODIUM CHLORIDE 3.5 MICROGRAM(S)/KG/HR: 4 INJECTION INTRAVENOUS at 04:12

## 2019-01-18 RX ADMIN — MIDAZOLAM HYDROCHLORIDE 4 MG/KG/HR: 1 INJECTION, SOLUTION INTRAMUSCULAR; INTRAVENOUS at 23:52

## 2019-01-18 RX ADMIN — Medication 40 MILLIGRAM(S): at 05:05

## 2019-01-18 RX ADMIN — MORPHINE SULFATE 2 MILLIGRAM(S): 50 CAPSULE, EXTENDED RELEASE ORAL at 05:16

## 2019-01-18 RX ADMIN — ALBUTEROL 2 PUFF(S): 90 AEROSOL, METERED ORAL at 02:18

## 2019-01-18 RX ADMIN — PROPOFOL 8.4 MICROGRAM(S)/KG/MIN: 10 INJECTION, EMULSION INTRAVENOUS at 13:33

## 2019-01-18 RX ADMIN — PANTOPRAZOLE SODIUM 40 MILLIGRAM(S): 20 TABLET, DELAYED RELEASE ORAL at 11:57

## 2019-01-18 RX ADMIN — PIPERACILLIN AND TAZOBACTAM 25 GRAM(S): 4; .5 INJECTION, POWDER, LYOPHILIZED, FOR SOLUTION INTRAVENOUS at 21:04

## 2019-01-18 RX ADMIN — Medication 1: at 05:16

## 2019-01-18 RX ADMIN — PROPOFOL 8.4 MICROGRAM(S)/KG/MIN: 10 INJECTION, EMULSION INTRAVENOUS at 11:15

## 2019-01-18 RX ADMIN — MORPHINE SULFATE 2 MILLIGRAM(S): 50 CAPSULE, EXTENDED RELEASE ORAL at 05:05

## 2019-01-18 RX ADMIN — Medication 50 MILLIGRAM(S): at 09:30

## 2019-01-18 RX ADMIN — MORPHINE SULFATE 2 MILLIGRAM(S): 50 CAPSULE, EXTENDED RELEASE ORAL at 21:04

## 2019-01-18 RX ADMIN — LOSARTAN POTASSIUM 50 MILLIGRAM(S): 100 TABLET, FILM COATED ORAL at 13:33

## 2019-01-18 RX ADMIN — SODIUM CHLORIDE 75 MILLILITER(S): 9 INJECTION, SOLUTION INTRAVENOUS at 11:02

## 2019-01-18 RX ADMIN — MORPHINE SULFATE 2 MILLIGRAM(S): 50 CAPSULE, EXTENDED RELEASE ORAL at 11:02

## 2019-01-18 RX ADMIN — PIPERACILLIN AND TAZOBACTAM 25 GRAM(S): 4; .5 INJECTION, POWDER, LYOPHILIZED, FOR SOLUTION INTRAVENOUS at 05:06

## 2019-01-18 RX ADMIN — MIDAZOLAM HYDROCHLORIDE 2 MG/KG/HR: 1 INJECTION, SOLUTION INTRAMUSCULAR; INTRAVENOUS at 22:38

## 2019-01-18 RX ADMIN — ALBUTEROL 2 PUFF(S): 90 AEROSOL, METERED ORAL at 08:35

## 2019-01-18 RX ADMIN — PROPOFOL 8.4 MICROGRAM(S)/KG/MIN: 10 INJECTION, EMULSION INTRAVENOUS at 22:38

## 2019-01-18 RX ADMIN — DEXMEDETOMIDINE HYDROCHLORIDE IN 0.9% SODIUM CHLORIDE 3.5 MICROGRAM(S)/KG/HR: 4 INJECTION INTRAVENOUS at 06:42

## 2019-01-18 RX ADMIN — MORPHINE SULFATE 2 MILLIGRAM(S): 50 CAPSULE, EXTENDED RELEASE ORAL at 13:34

## 2019-01-18 RX ADMIN — MORPHINE SULFATE 2 MILLIGRAM(S): 50 CAPSULE, EXTENDED RELEASE ORAL at 21:34

## 2019-01-18 RX ADMIN — MORPHINE SULFATE 2 MILLIGRAM(S): 50 CAPSULE, EXTENDED RELEASE ORAL at 02:13

## 2019-01-18 RX ADMIN — MORPHINE SULFATE 2 MILLIGRAM(S): 50 CAPSULE, EXTENDED RELEASE ORAL at 02:02

## 2019-01-18 RX ADMIN — DEXMEDETOMIDINE HYDROCHLORIDE IN 0.9% SODIUM CHLORIDE 3.5 MICROGRAM(S)/KG/HR: 4 INJECTION INTRAVENOUS at 01:30

## 2019-01-18 RX ADMIN — PIPERACILLIN AND TAZOBACTAM 25 GRAM(S): 4; .5 INJECTION, POWDER, LYOPHILIZED, FOR SOLUTION INTRAVENOUS at 13:33

## 2019-01-18 RX ADMIN — MORPHINE SULFATE 2 MILLIGRAM(S): 50 CAPSULE, EXTENDED RELEASE ORAL at 11:04

## 2019-01-18 RX ADMIN — Medication 1: at 11:57

## 2019-01-18 RX ADMIN — ALBUTEROL 2 PUFF(S): 90 AEROSOL, METERED ORAL at 21:03

## 2019-01-18 RX ADMIN — PROPOFOL 8.4 MICROGRAM(S)/KG/MIN: 10 INJECTION, EMULSION INTRAVENOUS at 21:04

## 2019-01-18 NOTE — PROGRESS NOTE ADULT - PROBLEM SELECTOR PLAN 1
pt was intubated by ED as he was encephalopathic.    Pt has initial ABG s/o 7.1/77/331/23/ 100 Fio2 on Ac 16/500/100%/5.  -Ventilator settings were adjusted yesterday, ABGS improved.  -ABGs this morning improved. He was given SBT which he passed. He got extubated, but got hypoxic and had to be reintubated.

## 2019-01-18 NOTE — AIRWAY PLACEMENT NOTE ADULT - AIRWAY COMMENTS:
Patient was originally desatted to mid 80s after extubation. Using 2 person bag mask ventilation, patient was oxygenated to 97%. Then propofol and succinylcholine were given to facilitate intubation. ETT was passed atraumatically through the cords, no swelling was noted on laryngoscopy. Patient was connected to the ventilator and saturations improved.

## 2019-01-18 NOTE — AIRWAY PLACEMENT NOTE ADULT - POST AIRWAY PLACEMENT ASSESSMENT:
positive end tidal CO2 noted/breath sounds bilateral/CXR pending/chest excursion noted/breath sounds equal/skin color improved

## 2019-01-18 NOTE — PROGRESS NOTE ADULT - PROBLEM SELECTOR PLAN 2
RESOLVED.  -Patient's initial BP in ED was in 260-230.  -He was started on propofol for sedation, along with labetalol IV 10mg push and nitroglycerin drip but his BP dropped to 130's and he was intermittently bradycardic.   -His BP was elevated, started losartan.  -Given poor mental status and hx of uncontrolled Hypertension- will get another CT head.

## 2019-01-18 NOTE — AIRWAY REMOVAL NOTE  ADULT & PEDS - RESPIRATORY EXPANSION/ACCESSORY MUSCLES/RETRACTIONS
subcostal retractions/substernal retractions/suprasternal retractions/abdominal muscle use/accessory muscle use/supraclavicular retractions

## 2019-01-18 NOTE — PROGRESS NOTE ADULT - PROBLEM SELECTOR PLAN 3
likely secondary to underlying hypercapnic respiratory failure and infectious etiology- concern for aspiration PNA ( given pt was vomiting at home) versus secondary to hypertensive emergency leading to encephalopathy  management of hypertensive emergency as per above plan.  Initial ABG s/o hypercapnic respiratory failure, was intubated in ED, ventilator settings are adjusted and repeat ABG shows improvement in Pco2 levels   CXR s/o bilateral pulmonary congestion , R lower lobe consolidation.   -C/w zosyn Day 4  Will repeat CT head today

## 2019-01-18 NOTE — PROGRESS NOTE ADULT - SUBJECTIVE AND OBJECTIVE BOX
INTERVAL HPI/OVERNIGHT EVENTS: Patient was give SBT trial, he did well was saturating up to 95% and was following commands. His ABGs were repeated, no hypercapnia. He was extubated however patient became hypoxic and he had to be reintubated  noted.     PRESSORS: [ ] YES [x ] NO  WHICH:      Antimicrobial:  piperacillin/tazobactam IVPB. 3.375 Gram(s) IV Intermittent every 8 hours    Cardiovascular:  losartan 50 milliGRAM(s) Oral daily    Pulmonary:  ALBUTerol    90 MICROgram(s) HFA Inhaler 2 Puff(s) Inhalation every 6 hours    Hematalogic:    Other:  insulin lispro (HumaLOG) corrective regimen sliding scale   SubCutaneous every 6 hours  morphine  - Injectable 2 milliGRAM(s) IV Push every 4 hours  multivitamin/thiamine/folic acid in sodium chloride 0.9% 1000 milliLiter(s) IV Continuous <Continuous>  pantoprazole  Injectable 40 milliGRAM(s) IV Push daily  predniSONE   Tablet 40 milliGRAM(s) Oral daily  propofol Infusion 20 MICROgram(s)/kG/Min IV Continuous <Continuous>    ALBUTerol    90 MICROgram(s) HFA Inhaler 2 Puff(s) Inhalation every 6 hours  insulin lispro (HumaLOG) corrective regimen sliding scale   SubCutaneous every 6 hours  losartan 50 milliGRAM(s) Oral daily  morphine  - Injectable 2 milliGRAM(s) IV Push every 4 hours  multivitamin/thiamine/folic acid in sodium chloride 0.9% 1000 milliLiter(s) IV Continuous <Continuous>  pantoprazole  Injectable 40 milliGRAM(s) IV Push daily  piperacillin/tazobactam IVPB. 3.375 Gram(s) IV Intermittent every 8 hours  predniSONE   Tablet 40 milliGRAM(s) Oral daily  propofol Infusion 20 MICROgram(s)/kG/Min IV Continuous <Continuous>    Drug Dosing Weight  Height (cm): 167.64 (14 Jan 2019 15:34)  Weight (kg): 70 (15 Chin 2019 19:54)  BMI (kg/m2): 24.9 (15 Chin 2019 19:54)  BSA (m2): 1.79 (15 Chin 2019 19:54)    CENTRAL LINE: [ ] YES [x ] NO  LOCATION:   DATE INSERTED:  REMOVE: [ ] YES [ ] NO  EXPLAIN:    MUÑOZ: [ x] YES [ ] NO    DATE INSERTED:  REMOVE:  [ ] YES [ ] NO  EXPLAIN:    A-LINE:  [ ] YES [x ] NO  LOCATION:   DATE INSERTED:  REMOVE:  [ ] YES [ ] NO  EXPLAIN:  ETT-1/14 EXT 1/18- ETT 1/14  NGT 1/18    PMH -reviewed admission note, no change since admission      ABG - ( 18 Jan 2019 09:44 )  pH, Arterial: 7.42  pH, Blood: x     /  pCO2: 35    /  pO2: 157   / HCO3: 22    / Base Excess: -1.3  /  SaO2: 99                    01-17 @ 07:01  -  01-18 @ 07:00  --------------------------------------------------------  IN: 2050.5 mL / OUT: 1530 mL / NET: 520.5 mL        Mode: AC/ CMV (Assist Control/ Continuous Mandatory Ventilation)  RR (machine): 20  TV (machine): 500  FiO2: 40  PEEP: 5  ITime: 1  MAP: 15  PIP: 34      PHYSICAL EXAM:    GENERAL: NAD, well-groomed, well-developed  HEAD:  Atraumatic, Normocephalic  EYES: EOMI, PERRLA, conjunctiva and sclera clear  ENMT: No tonsillar erythema, exudates, or enlargement; Moist mucous membranes, Good dentition, [ ]No lesions  NECK: Supple, normal appearance, No JVD; Normal thyroid; Trachea midline  NERVOUS SYSTEM:  sedated, responds to some commands when off sedation.   CHEST/LUNG: No chest deformity;No rales, rhonchi, wheezing   HEART: Regular rate and rhythm; No murmurs, rubs, or gallops  ABDOMEN: Soft, Nontender, Distended ;Bowel sounds present  EXTREMITIES:  2+ Peripheral Pulses, No clubbing, cyanosis, or edema  LYMPH: No lymphadenopathy noted  SKIN: No rashes or lesions; Good capillary refill      LABS:  CBC Full  -  ( 18 Jan 2019 03:43 )  WBC Count : 7.2 K/uL  Hemoglobin : 11.6 g/dL  Hematocrit : 37.8 %  Platelet Count - Automated : 123 K/uL  Mean Cell Volume : 74.7 fl  Mean Cell Hemoglobin : 22.9 pg  Mean Cell Hemoglobin Concentration : 30.6 gm/dL  Auto Neutrophil # : x  Auto Lymphocyte # : x  Auto Monocyte # : x  Auto Eosinophil # : x  Auto Basophil # : x  Auto Neutrophil % : x  Auto Lymphocyte % : x  Auto Monocyte % : x  Auto Eosinophil % : x  Auto Basophil % : x    01-18    147<H>  |  114<H>  |  18  ----------------------------<  155<H>  3.8   |  21<L>  |  1.08    Ca    8.1<L>      18 Jan 2019 03:43  Phos  4.1     01-18  Mg     1.9     01-18    TPro  6.9  /  Alb  2.6<L>  /  TBili  0.9  /  DBili  x   /  AST  23  /  ALT  15  /  AlkPhos  52  01-18            RADIOLOGY & ADDITIONAL STUDIES REVIEWED:  The distal tip of the indwelling ETT resides at the level of   the tracheal bifurcation; repositioning is recommended to avoid selective   mainstem bronchial intubation.  Interstitial and airspace opacities are   identified bilaterally, unchanged. Left pleural calcification/pleural   thickening appears unchanged. - 1/18      [ ]GOALS OF CARE DISCUSSION WITH PATIENT/FAMILY/PROXY:    CRITICAL CARE TIME SPENT: 35 minutes INTERVAL HPI/OVERNIGHT EVENTS: Patient was give SBT trial, he did well was saturating up to 95% and was following commands. His ABGs were repeated, no hypercapnia. He was extubated however patient became hypoxic and he had to be reintubated  noted.     PRESSORS: [ ] YES [x ] NO  WHICH:      Antimicrobial:  piperacillin/tazobactam IVPB. 3.375 Gram(s) IV Intermittent every 8 hours    Cardiovascular:  losartan 50 milliGRAM(s) Oral daily    Pulmonary:  ALBUTerol    90 MICROgram(s) HFA Inhaler 2 Puff(s) Inhalation every 6 hours    Hematalogic:    Other:  insulin lispro (HumaLOG) corrective regimen sliding scale   SubCutaneous every 6 hours  morphine  - Injectable 2 milliGRAM(s) IV Push every 4 hours  multivitamin/thiamine/folic acid in sodium chloride 0.9% 1000 milliLiter(s) IV Continuous <Continuous>  pantoprazole  Injectable 40 milliGRAM(s) IV Push daily  predniSONE   Tablet 40 milliGRAM(s) Oral daily  propofol Infusion 20 MICROgram(s)/kG/Min IV Continuous <Continuous>    ALBUTerol    90 MICROgram(s) HFA Inhaler 2 Puff(s) Inhalation every 6 hours  insulin lispro (HumaLOG) corrective regimen sliding scale   SubCutaneous every 6 hours  losartan 50 milliGRAM(s) Oral daily  morphine  - Injectable 2 milliGRAM(s) IV Push every 4 hours  multivitamin/thiamine/folic acid in sodium chloride 0.9% 1000 milliLiter(s) IV Continuous <Continuous>  pantoprazole  Injectable 40 milliGRAM(s) IV Push daily  piperacillin/tazobactam IVPB. 3.375 Gram(s) IV Intermittent every 8 hours  predniSONE   Tablet 40 milliGRAM(s) Oral daily  propofol Infusion 20 MICROgram(s)/kG/Min IV Continuous <Continuous>    Drug Dosing Weight  Height (cm): 167.64 (14 Jan 2019 15:34)  Weight (kg): 70 (15 Chin 2019 19:54)  BMI (kg/m2): 24.9 (15 Chin 2019 19:54)  BSA (m2): 1.79 (15 Chin 2019 19:54)    CENTRAL LINE: [ ] YES [x ] NO  LOCATION:   DATE INSERTED:  REMOVE: [ ] YES [ ] NO  EXPLAIN:    MUÑOZ: [ x] YES [ ] NO    DATE INSERTED:  REMOVE:  [ ] YES [ ] NO  EXPLAIN:    A-LINE:  [ ] YES [x ] NO  LOCATION:   DATE INSERTED:  REMOVE:  [ ] YES [ ] NO  EXPLAIN:  ETT-1/14 EXT 1/18- ETT 1/14  NGT 1/18    PMH -reviewed admission note, no change since admission      ABG - ( 18 Jan 2019 09:44 )  pH, Arterial: 7.42  pH, Blood: x     /  pCO2: 35    /  pO2: 157   / HCO3: 22    / Base Excess: -1.3  /  SaO2: 99                    01-17 @ 07:01  -  01-18 @ 07:00  --------------------------------------------------------  IN: 2050.5 mL / OUT: 1530 mL / NET: 520.5 mL        Mode: AC/ CMV (Assist Control/ Continuous Mandatory Ventilation)  RR (machine): 20  TV (machine): 500  FiO2: 40  PEEP: 5  ITime: 1  MAP: 15  PIP: 34      PHYSICAL EXAM:    GENERAL: NAD, well-groomed, well-developed  HEAD:  Atraumatic, Normocephalic  EYES: EOMI, PERRLA, conjunctiva and sclera clear  ENMT: Moist mucous membranes, ETT  NECK: Supple, normal appearance, No JVD; Normal thyroid; Trachea midline  NERVOUS SYSTEM:  sedated, responds to some commands when off sedation.   CHEST/LUNG: No chest deformity;No rales, rhonchi, wheezing   HEART: Regular rate and rhythm; No murmurs, rubs, or gallops  ABDOMEN: Soft, Nontender, Distended ;Bowel sounds present  EXTREMITIES:  2+ Peripheral Pulses, No clubbing, cyanosis, or edema  LYMPH: No lymphadenopathy noted  SKIN: No rashes or lesions; Good capillary refill      LABS:  CBC Full  -  ( 18 Jan 2019 03:43 )  WBC Count : 7.2 K/uL  Hemoglobin : 11.6 g/dL  Hematocrit : 37.8 %  Platelet Count - Automated : 123 K/uL  Mean Cell Volume : 74.7 fl  Mean Cell Hemoglobin : 22.9 pg  Mean Cell Hemoglobin Concentration : 30.6 gm/dL  Auto Neutrophil # : x  Auto Lymphocyte # : x  Auto Monocyte # : x  Auto Eosinophil # : x  Auto Basophil # : x  Auto Neutrophil % : x  Auto Lymphocyte % : x  Auto Monocyte % : x  Auto Eosinophil % : x  Auto Basophil % : x    01-18    147<H>  |  114<H>  |  18  ----------------------------<  155<H>  3.8   |  21<L>  |  1.08    Ca    8.1<L>      18 Jan 2019 03:43  Phos  4.1     01-18  Mg     1.9     01-18    TPro  6.9  /  Alb  2.6<L>  /  TBili  0.9  /  DBili  x   /  AST  23  /  ALT  15  /  AlkPhos  52  01-18            RADIOLOGY & ADDITIONAL STUDIES REVIEWED:  The distal tip of the indwelling ETT resides at the level of   the tracheal bifurcation; repositioning is recommended to avoid selective   mainstem bronchial intubation.  Interstitial and airspace opacities are   identified bilaterally, unchanged. Left pleural calcification/pleural   thickening appears unchanged. - 1/18      [ ]GOALS OF CARE DISCUSSION WITH PATIENT/FAMILY/PROXY:    CRITICAL CARE TIME SPENT: 35 minutes

## 2019-01-19 LAB
ANION GAP SERPL CALC-SCNC: 9 MMOL/L — SIGNIFICANT CHANGE UP (ref 5–17)
BASE EXCESS BLDA CALC-SCNC: -0.9 MMOL/L — SIGNIFICANT CHANGE UP (ref -2–2)
BLOOD GAS COMMENTS ARTERIAL: SIGNIFICANT CHANGE UP
BUN SERPL-MCNC: 21 MG/DL — HIGH (ref 7–18)
CALCIUM SERPL-MCNC: 7.7 MG/DL — LOW (ref 8.4–10.5)
CHLORIDE SERPL-SCNC: 116 MMOL/L — HIGH (ref 96–108)
CO2 SERPL-SCNC: 23 MMOL/L — SIGNIFICANT CHANGE UP (ref 22–31)
CREAT SERPL-MCNC: 1.09 MG/DL — SIGNIFICANT CHANGE UP (ref 0.5–1.3)
GLUCOSE SERPL-MCNC: 93 MG/DL — SIGNIFICANT CHANGE UP (ref 70–99)
HCO3 BLDA-SCNC: 22 MMOL/L — LOW (ref 23–27)
HCT VFR BLD CALC: 31.9 % — LOW (ref 39–50)
HGB BLD-MCNC: 9.7 G/DL — LOW (ref 13–17)
HOROWITZ INDEX BLDA+IHG-RTO: 40 — SIGNIFICANT CHANGE UP
MAGNESIUM SERPL-MCNC: 2.1 MG/DL — SIGNIFICANT CHANGE UP (ref 1.6–2.6)
MCHC RBC-ENTMCNC: 22.9 PG — LOW (ref 27–34)
MCHC RBC-ENTMCNC: 30.3 GM/DL — LOW (ref 32–36)
MCV RBC AUTO: 75.7 FL — LOW (ref 80–100)
PCO2 BLDA: 33 MMHG — SIGNIFICANT CHANGE UP (ref 32–46)
PH BLDA: 7.44 — SIGNIFICANT CHANGE UP (ref 7.35–7.45)
PHOSPHATE SERPL-MCNC: 2.7 MG/DL — SIGNIFICANT CHANGE UP (ref 2.5–4.5)
PLATELET # BLD AUTO: 120 K/UL — LOW (ref 150–400)
PO2 BLDA: 118 MMHG — HIGH (ref 74–108)
POTASSIUM SERPL-MCNC: 3.1 MMOL/L — LOW (ref 3.5–5.3)
POTASSIUM SERPL-SCNC: 3.1 MMOL/L — LOW (ref 3.5–5.3)
RBC # BLD: 4.22 M/UL — SIGNIFICANT CHANGE UP (ref 4.2–5.8)
RBC # FLD: 15.2 % — HIGH (ref 10.3–14.5)
SAO2 % BLDA: 98 % — HIGH (ref 92–96)
SODIUM SERPL-SCNC: 148 MMOL/L — HIGH (ref 135–145)
WBC # BLD: 9.2 K/UL — SIGNIFICANT CHANGE UP (ref 3.8–10.5)
WBC # FLD AUTO: 9.2 K/UL — SIGNIFICANT CHANGE UP (ref 3.8–10.5)

## 2019-01-19 PROCEDURE — 71045 X-RAY EXAM CHEST 1 VIEW: CPT | Mod: 26

## 2019-01-19 RX ORDER — POTASSIUM CHLORIDE 20 MEQ
40 PACKET (EA) ORAL
Qty: 0 | Refills: 0 | Status: COMPLETED | OUTPATIENT
Start: 2019-01-19 | End: 2019-01-19

## 2019-01-19 RX ORDER — MIDAZOLAM HYDROCHLORIDE 1 MG/ML
0.05 INJECTION, SOLUTION INTRAMUSCULAR; INTRAVENOUS
Qty: 100 | Refills: 0 | Status: DISCONTINUED | OUTPATIENT
Start: 2019-01-19 | End: 2019-01-19

## 2019-01-19 RX ADMIN — PROPOFOL 8.4 MICROGRAM(S)/KG/MIN: 10 INJECTION, EMULSION INTRAVENOUS at 18:44

## 2019-01-19 RX ADMIN — MORPHINE SULFATE 2 MILLIGRAM(S): 50 CAPSULE, EXTENDED RELEASE ORAL at 13:25

## 2019-01-19 RX ADMIN — SODIUM CHLORIDE 75 MILLILITER(S): 9 INJECTION, SOLUTION INTRAVENOUS at 14:10

## 2019-01-19 RX ADMIN — MIDAZOLAM HYDROCHLORIDE 3.5 MG/KG/HR: 1 INJECTION, SOLUTION INTRAMUSCULAR; INTRAVENOUS at 09:57

## 2019-01-19 RX ADMIN — Medication 40 MILLIEQUIVALENT(S): at 16:56

## 2019-01-19 RX ADMIN — MORPHINE SULFATE 2 MILLIGRAM(S): 50 CAPSULE, EXTENDED RELEASE ORAL at 21:06

## 2019-01-19 RX ADMIN — PIPERACILLIN AND TAZOBACTAM 25 GRAM(S): 4; .5 INJECTION, POWDER, LYOPHILIZED, FOR SOLUTION INTRAVENOUS at 21:05

## 2019-01-19 RX ADMIN — Medication 40 MILLIEQUIVALENT(S): at 13:25

## 2019-01-19 RX ADMIN — MORPHINE SULFATE 2 MILLIGRAM(S): 50 CAPSULE, EXTENDED RELEASE ORAL at 10:57

## 2019-01-19 RX ADMIN — MORPHINE SULFATE 2 MILLIGRAM(S): 50 CAPSULE, EXTENDED RELEASE ORAL at 13:26

## 2019-01-19 RX ADMIN — Medication 1: at 13:17

## 2019-01-19 RX ADMIN — PIPERACILLIN AND TAZOBACTAM 25 GRAM(S): 4; .5 INJECTION, POWDER, LYOPHILIZED, FOR SOLUTION INTRAVENOUS at 05:21

## 2019-01-19 RX ADMIN — ALBUTEROL 2 PUFF(S): 90 AEROSOL, METERED ORAL at 02:16

## 2019-01-19 RX ADMIN — ALBUTEROL 2 PUFF(S): 90 AEROSOL, METERED ORAL at 14:30

## 2019-01-19 RX ADMIN — PIPERACILLIN AND TAZOBACTAM 25 GRAM(S): 4; .5 INJECTION, POWDER, LYOPHILIZED, FOR SOLUTION INTRAVENOUS at 13:18

## 2019-01-19 RX ADMIN — PANTOPRAZOLE SODIUM 40 MILLIGRAM(S): 20 TABLET, DELAYED RELEASE ORAL at 13:18

## 2019-01-19 RX ADMIN — Medication 1: at 18:36

## 2019-01-19 RX ADMIN — MORPHINE SULFATE 2 MILLIGRAM(S): 50 CAPSULE, EXTENDED RELEASE ORAL at 10:58

## 2019-01-19 RX ADMIN — ALBUTEROL 2 PUFF(S): 90 AEROSOL, METERED ORAL at 08:49

## 2019-01-19 RX ADMIN — ALBUTEROL 2 PUFF(S): 90 AEROSOL, METERED ORAL at 20:48

## 2019-01-19 RX ADMIN — PROPOFOL 8.4 MICROGRAM(S)/KG/MIN: 10 INJECTION, EMULSION INTRAVENOUS at 14:09

## 2019-01-19 RX ADMIN — MORPHINE SULFATE 2 MILLIGRAM(S): 50 CAPSULE, EXTENDED RELEASE ORAL at 21:30

## 2019-01-19 RX ADMIN — Medication 40 MILLIGRAM(S): at 05:21

## 2019-01-19 NOTE — PROGRESS NOTE ADULT - PROBLEM SELECTOR PLAN 2
RESOLVED.  -Patient's initial BP in ED was in 260-230.  -He was started on propofol for sedation, along with labetalol IV 10mg push and nitroglycerin drip but his BP dropped to 130's and he was intermittently bradycardic.   -His BP was elevated, started losartan.  -Given poor mental status and hx of uncontrolled Hypertension- will get another CT head. RESOLVED.  -Patient's initial BP in ED was in 260-230.  -He was started on propofol for sedation, along with labetalol IV 10mg push and nitroglycerin drip but his BP dropped to 130's and he was intermittently bradycardic.   -His BP was elevated, started losartan.  -Given poor mental status and hx of uncontrolled Hypertension- will get another CT head RESOLVED.

## 2019-01-19 NOTE — PROGRESS NOTE ADULT - PROBLEM SELECTOR PROBLEM 5

## 2019-01-19 NOTE — PROGRESS NOTE ADULT - PROBLEM SELECTOR PLAN 5
PT has hx of COPD.  O/E- bilateral air entry+ with decreased air entry on RLL.  no rhonchi or rales  will c/w Duoneb PT has hx of COPD.  O/E- bilateral air entry+ with decreased air entry on RLL.  -new right side pleural effusion. continue to monitor respiratory status   no rhonchi or rales  will c/w Duoneb

## 2019-01-19 NOTE — PROGRESS NOTE ADULT - PROBLEM SELECTOR PLAN 3
likely secondary to underlying hypercapnic respiratory failure and infectious etiology- concern for aspiration PNA ( given pt was vomiting at home) versus secondary to hypertensive emergency leading to encephalopathy  management of hypertensive emergency as per above plan.  Initial ABG s/o hypercapnic respiratory failure, was intubated in ED, ventilator settings are adjusted and repeat ABG shows improvement in Pco2 levels   CXR s/o bilateral pulmonary congestion , R lower lobe consolidation.   -C/w zosyn Day 4  Will repeat CT head today likely secondary to underlying hypercapnic respiratory failure and infectious etiology- concern for aspiration PNA ( given pt was vomiting at home) versus secondary to hypertensive emergency leading to encephalopathy  management of hypertensive emergency as per above plan.  Initial ABG s/o hypercapnic respiratory failure, was intubated in ED, ventilator settings are adjusted and repeat ABG shows improvement in Pco2 levels   CXR s/o bilateral pulmonary congestion , R lower lobe consolidation.   -C/w zosyn Day 4  -Repeat CT head as above likely 2/2 to infection due to aspiration versus hypertensive emergency  -C/w zosyn Day 5  -Repeat CT head noted.   -c.w Mechanical ventilation

## 2019-01-19 NOTE — PROGRESS NOTE ADULT - PROBLEM SELECTOR PLAN 4
-Platelets improved.    -HIT panel re-ordered.   Heme/onc Dr. Pickett -Platelets improved.    -f/u HIT panel   Heme/onc Dr. Pickett

## 2019-01-19 NOTE — PROGRESS NOTE ADULT - PROBLEM SELECTOR PLAN 1
pt was intubated by ED as he was encephalopathic.    Pt has initial ABG s/o 7.1/77/331/23/ 100 Fio2 on Ac 16/500/100%/5.  ABGs 1/18 morning improved. He was given SBT which he passed. He got extubated, but got hypoxic and had to be reintubated.  -ABG continues to improve 1/19 AM Patient was intubated in ED for protection of airway.  ABGs 1/18 morning improved. He was given SBT which he passed. He got extubated, but got hypoxic and had to be reintubated.  -ABG show PH 7.44 pco2 33,pao2 118, vent setting dec R/R to 16.

## 2019-01-19 NOTE — PROGRESS NOTE ADULT - SUBJECTIVE AND OBJECTIVE BOX
INTERVAL HPI/OVERNIGHT EVENTS:   Latest Na 147 -> free water 250 q 6  Satting well s/p re-intubation 1/18  Still jessa to 40-50s  Not effectively sedated on Propofol  Increasing versed, decreasing Propofol      PRESSORS: [ ] YES [x ] NO  WHICH:    ANTIBIOTICS:                  DATE STARTED:  ANTIBIOTICS:                  DATE STARTED:  ANTIBIOTICS:                  DATE STARTED:    Antimicrobial:  piperacillin/tazobactam IVPB. 3.375 Gram(s) IV Intermittent every 8 hours    Cardiovascular:  losartan 50 milliGRAM(s) Oral daily    Pulmonary:  ALBUTerol    90 MICROgram(s) HFA Inhaler 2 Puff(s) Inhalation every 6 hours    Hematalogic:    Other:  insulin lispro (HumaLOG) corrective regimen sliding scale   SubCutaneous every 6 hours  midazolam Infusion 0.057 mG/kG/Hr IV Continuous <Continuous>  morphine  - Injectable 2 milliGRAM(s) IV Push every 4 hours  multivitamin/thiamine/folic acid in sodium chloride 0.9% 1000 milliLiter(s) IV Continuous <Continuous>  pantoprazole  Injectable 40 milliGRAM(s) IV Push daily  predniSONE   Tablet 40 milliGRAM(s) Oral daily  propofol Infusion 20 MICROgram(s)/kG/Min IV Continuous <Continuous>    ALBUTerol    90 MICROgram(s) HFA Inhaler 2 Puff(s) Inhalation every 6 hours  insulin lispro (HumaLOG) corrective regimen sliding scale   SubCutaneous every 6 hours  losartan 50 milliGRAM(s) Oral daily  midazolam Infusion 0.057 mG/kG/Hr IV Continuous <Continuous>  morphine  - Injectable 2 milliGRAM(s) IV Push every 4 hours  multivitamin/thiamine/folic acid in sodium chloride 0.9% 1000 milliLiter(s) IV Continuous <Continuous>  pantoprazole  Injectable 40 milliGRAM(s) IV Push daily  piperacillin/tazobactam IVPB. 3.375 Gram(s) IV Intermittent every 8 hours  predniSONE   Tablet 40 milliGRAM(s) Oral daily  propofol Infusion 20 MICROgram(s)/kG/Min IV Continuous <Continuous>    Drug Dosing Weight  Height (cm): 167.64 (14 Jan 2019 15:34)  Weight (kg): 70 (15 Chin 2019 19:54)  BMI (kg/m2): 24.9 (15 Chin 2019 19:54)  BSA (m2): 1.79 (15 Chin 2019 19:54)    CENTRAL LINE: [ ] YES [x ] NO  LOCATION:   DATE INSERTED:  REMOVE: [ ] YES [ ] NO  EXPLAIN:    MUÑOZ: [ x] YES [ ] NO    DATE INSERTED:  REMOVE:  [ ] YES [ ] NO  EXPLAIN:    A-LINE:  [ ] YES [x ] NO  LOCATION:   DATE INSERTED:  REMOVE:  [ ] YES [ ] NO  EXPLAIN:  ETT-1/14 EXT 1/18- ETT 1/14  NGT 1/18    ICU Vital Signs Last 24 Hrs  T(C): 36.4 (18 Jan 2019 23:42), Max: 36.9 (18 Jan 2019 08:00)  T(F): 97.5 (18 Jan 2019 23:42), Max: 98.5 (18 Jan 2019 21:00)  HR: 50 (19 Jan 2019 04:32) (43 - 99)  BP: 92/34 (19 Jan 2019 01:00) (91/51 - 208/69)  BP(mean): 48 (19 Jan 2019 01:00) (42 - 105)  ABP: --  ABP(mean): --  RR: 20 (19 Jan 2019 01:00) (14 - 30)  SpO2: 100% (19 Jan 2019 04:32) (84% - 100%)      ABG - ( 19 Jan 2019 05:07 )  pH, Arterial: 7.44  pH, Blood: x     /  pCO2: 33    /  pO2: 118   / HCO3: 22    / Base Excess: -0.9  /  SaO2: 98                    01-17 @ 07:01  -  01-18 @ 07:00  --------------------------------------------------------  IN: 2050.5 mL / OUT: 1530 mL / NET: 520.5 mL        Mode: AC/ CMV (Assist Control/ Continuous Mandatory Ventilation)  RR (machine): 20  TV (machine): 500  FiO2: 40  PEEP: 5  ITime: 1  MAP: 10  PIP: 34      PHYSICAL EXAM:    GENERAL: NAD, well-groomed, well-developed  HEAD:  Atraumatic, Normocephalic  EYES: EOMI, PERRLA, conjunctiva and sclera clear  ENMT: Moist mucous membranes, ETT  NECK: Supple, normal appearance, No JVD; Normal thyroid; Trachea midline  NERVOUS SYSTEM:  sedated, responds to some commands when off sedation.   CHEST/LUNG: No chest deformity;No rales, rhonchi, wheezing   HEART: Regular rate and rhythm; No murmurs, rubs, or gallops  ABDOMEN: Soft, Nontender, Distended ;Bowel sounds present  EXTREMITIES:  2+ Peripheral Pulses, No clubbing, cyanosis, or edema  LYMPH: No lymphadenopathy noted  SKIN: No rashes or lesions; Good capillary refill    LABS:  CBC Full  -  ( 19 Jan 2019 03:53 )  WBC Count : 9.2 K/uL  Hemoglobin : 9.7 g/dL  Hematocrit : 31.9 %  Platelet Count - Automated : 120 K/uL  Mean Cell Volume : 75.7 fl  Mean Cell Hemoglobin : 22.9 pg  Mean Cell Hemoglobin Concentration : 30.3 gm/dL  Auto Neutrophil # : x  Auto Lymphocyte # : x  Auto Monocyte # : x  Auto Eosinophil # : x  Auto Basophil # : x  Auto Neutrophil % : x  Auto Lymphocyte % : x  Auto Monocyte % : x  Auto Eosinophil % : x  Auto Basophil % : x    01-19    148<H>  |  116<H>  |  21<H>  ----------------------------<  93  3.1<L>   |  23  |  1.09    Ca    7.7<L>      19 Jan 2019 03:53  Phos  2.7     01-19  Mg     2.1     01-19    TPro  6.9  /  Alb  2.6<L>  /  TBili  0.9  /  DBili  x   /  AST  23  /  ALT  15  /  AlkPhos  52  01-18            RADIOLOGY & ADDITIONAL STUDIES REVIEWED:  ***    [ ]GOALS OF CARE DISCUSSION WITH PATIENT/FAMILY/PROXY:    CRITICAL CARE TIME SPENT: 35 minutes INTERVAL HPI/OVERNIGHT EVENTS:  Patient was seen and examined at bed side. added ativan to sedation regime. titrate fentanyl down.       PRESSORS: [ ] YES [x ] NO  WHICH:      Antimicrobial:  piperacillin/tazobactam IVPB. 3.375 Gram(s) IV Intermittent every 8 hours day 6    Cardiovascular:  losartan 50 milliGRAM(s) Oral daily    Pulmonary:  ALBUTerol    90 MICROgram(s) HFA Inhaler 2 Puff(s) Inhalation every 6 hours    Hematalogic:    Other:  insulin lispro (HumaLOG) corrective regimen sliding scale   SubCutaneous every 6 hours  midazolam Infusion 0.057 mG/kG/Hr IV Continuous <Continuous>  morphine  - Injectable 2 milliGRAM(s) IV Push every 4 hours  multivitamin/thiamine/folic acid in sodium chloride 0.9% 1000 milliLiter(s) IV Continuous <Continuous>  pantoprazole  Injectable 40 milliGRAM(s) IV Push daily  predniSONE   Tablet 40 milliGRAM(s) Oral daily  propofol Infusion 20 MICROgram(s)/kG/Min IV Continuous <Continuous>    ALBUTerol    90 MICROgram(s) HFA Inhaler 2 Puff(s) Inhalation every 6 hours  insulin lispro (HumaLOG) corrective regimen sliding scale   SubCutaneous every 6 hours  losartan 50 milliGRAM(s) Oral daily  midazolam Infusion 0.057 mG/kG/Hr IV Continuous <Continuous>  morphine  - Injectable 2 milliGRAM(s) IV Push every 4 hours  multivitamin/thiamine/folic acid in sodium chloride 0.9% 1000 milliLiter(s) IV Continuous <Continuous>  pantoprazole  Injectable 40 milliGRAM(s) IV Push daily  piperacillin/tazobactam IVPB. 3.375 Gram(s) IV Intermittent every 8 hours  predniSONE   Tablet 40 milliGRAM(s) Oral daily  propofol Infusion 20 MICROgram(s)/kG/Min IV Continuous <Continuous>    Drug Dosing Weight  Height (cm): 167.64 (14 Jan 2019 15:34)  Weight (kg): 70 (15 Chin 2019 19:54)  BMI (kg/m2): 24.9 (15 Chin 2019 19:54)  BSA (m2): 1.79 (15 Chin 2019 19:54)    CENTRAL LINE: [ ] YES [x ] NO  LOCATION:   DATE INSERTED:  REMOVE: [ ] YES [ ] NO  EXPLAIN:    MUÑOZ: [ x] YES [ ] NO    DATE INSERTED:  REMOVE:  [ ] YES [ ] NO  EXPLAIN:    A-LINE:  [ ] YES [x ] NO  LOCATION:   DATE INSERTED:  REMOVE:  [ ] YES [ ] NO  EXPLAIN:  ETT-1/14 EXT 1/18- ETT 1/14  NGT 1/18    ICU Vital Signs Last 24 Hrs  T(C): 36.4 (18 Jan 2019 23:42), Max: 36.9 (18 Jan 2019 08:00)  T(F): 97.5 (18 Jan 2019 23:42), Max: 98.5 (18 Jan 2019 21:00)  HR: 50 (19 Jan 2019 04:32) (43 - 99)  BP: 92/34 (19 Jan 2019 01:00) (91/51 - 208/69)  BP(mean): 48 (19 Jan 2019 01:00) (42 - 105)  ABP: --  ABP(mean): --  RR: 20 (19 Jan 2019 01:00) (14 - 30)  SpO2: 100% (19 Jan 2019 04:32) (84% - 100%)      ABG - ( 19 Jan 2019 05:07 )  pH, Arterial: 7.44  pH, Blood: x     /  pCO2: 33    /  pO2: 118   / HCO3: 22    / Base Excess: -0.9  /  SaO2: 98                    01-17 @ 07:01  -  01-18 @ 07:00  --------------------------------------------------------  IN: 2050.5 mL / OUT: 1530 mL / NET: 520.5 mL        Mode: AC/ CMV (Assist Control/ Continuous Mandatory Ventilation)  RR (machine): 20  TV (machine): 500  FiO2: 40  PEEP: 5  ITime: 1  MAP: 10  PIP: 34      PHYSICAL EXAM:    GENERAL: NAD, well-groomed, well-developed  HEAD:  Atraumatic, Normocephalic  EYES: EOMI, PERRLA, conjunctiva and sclera clear  ENMT: Moist mucous membranes, ETT  NECK: Supple, normal appearance, No JVD; Normal thyroid; Trachea midline  NERVOUS SYSTEM:  sedated, responds to some commands when off sedation.   CHEST/LUNG: No chest deformity;No rales, rhonchi, wheezing   HEART: Regular rate and rhythm; No murmurs, rubs, or gallops  ABDOMEN: Soft, Nontender, Distended ;Bowel sounds present  EXTREMITIES:  2+ Peripheral Pulses, No clubbing, cyanosis, or edema  LYMPH: No lymphadenopathy noted  SKIN: No rashes or lesions; Good capillary refill    LABS:  CBC Full  -  ( 19 Jan 2019 03:53 )  WBC Count : 9.2 K/uL  Hemoglobin : 9.7 g/dL  Hematocrit : 31.9 %  Platelet Count - Automated : 120 K/uL  Mean Cell Volume : 75.7 fl  Mean Cell Hemoglobin : 22.9 pg  Mean Cell Hemoglobin Concentration : 30.3 gm/dL  Auto Neutrophil # : x  Auto Lymphocyte # : x  Auto Monocyte # : x  Auto Eosinophil # : x  Auto Basophil # : x  Auto Neutrophil % : x  Auto Lymphocyte % : x  Auto Monocyte % : x  Auto Eosinophil % : x  Auto Basophil % : x    01-19    148<H>  |  116<H>  |  21<H>  ----------------------------<  93  3.1<L>   |  23  |  1.09    Ca    7.7<L>      19 Jan 2019 03:53  Phos  2.7     01-19  Mg     2.1     01-19    TPro  6.9  /  Alb  2.6<L>  /  TBili  0.9  /  DBili  x   /  AST  23  /  ALT  15  /  AlkPhos  52  01-18            RADIOLOGY & ADDITIONAL STUDIES REVIEWED:  ***    < from: Xray Chest 1 View- PORTABLE-Routine (01.19.19 @ 12:37) >    IMPRESSION: Advanced infiltrates left much greater than right again   noted. Developing right base effusion.    [x ]GOALS OF CARE DISCUSSION WITH PATIENT/FAMILY/PROXY: full code    CRITICAL CARE TIME SPENT: 35 minutes INTERVAL HPI/OVERNIGHT EVENTS:  Patient was seen and examined at bed side. added ativan to sedation regime. titrate fentanyl down.       PRESSORS: [ ] YES [x ] NO  WHICH:      Antimicrobial:  piperacillin/tazobactam IVPB. 3.375 Gram(s) IV Intermittent every 8 hours day 6    Cardiovascular:  losartan 50 milliGRAM(s) Oral daily    Pulmonary:  ALBUTerol    90 MICROgram(s) HFA Inhaler 2 Puff(s) Inhalation every 6 hours    Hematalogic:    Other:  insulin lispro (HumaLOG) corrective regimen sliding scale   SubCutaneous every 6 hours  midazolam Infusion 0.057 mG/kG/Hr IV Continuous <Continuous>  morphine  - Injectable 2 milliGRAM(s) IV Push every 4 hours  multivitamin/thiamine/folic acid in sodium chloride 0.9% 1000 milliLiter(s) IV Continuous <Continuous>  pantoprazole  Injectable 40 milliGRAM(s) IV Push daily  predniSONE   Tablet 40 milliGRAM(s) Oral daily  propofol Infusion 20 MICROgram(s)/kG/Min IV Continuous <Continuous>    ALBUTerol    90 MICROgram(s) HFA Inhaler 2 Puff(s) Inhalation every 6 hours  insulin lispro (HumaLOG) corrective regimen sliding scale   SubCutaneous every 6 hours  losartan 50 milliGRAM(s) Oral daily  midazolam Infusion 0.057 mG/kG/Hr IV Continuous <Continuous>  morphine  - Injectable 2 milliGRAM(s) IV Push every 4 hours  multivitamin/thiamine/folic acid in sodium chloride 0.9% 1000 milliLiter(s) IV Continuous <Continuous>  pantoprazole  Injectable 40 milliGRAM(s) IV Push daily  piperacillin/tazobactam IVPB. 3.375 Gram(s) IV Intermittent every 8 hours  predniSONE   Tablet 40 milliGRAM(s) Oral daily  propofol Infusion 20 MICROgram(s)/kG/Min IV Continuous <Continuous>    Drug Dosing Weight  Height (cm): 167.64 (14 Jan 2019 15:34)  Weight (kg): 70 (15 Chin 2019 19:54)  BMI (kg/m2): 24.9 (15 Chin 2019 19:54)  BSA (m2): 1.79 (15 Chin 2019 19:54)    CENTRAL LINE: [ ] YES [x ] NO  LOCATION:   DATE INSERTED:  REMOVE: [ ] YES [ ] NO  EXPLAIN:    MUÑOZ: [ x] YES [ ] NO    DATE INSERTED:  REMOVE:  [ ] YES [ ] NO  EXPLAIN:    A-LINE:  [ ] YES [x ] NO  LOCATION:   DATE INSERTED:  REMOVE:  [ ] YES [ ] NO  EXPLAIN:  ETT-1/14 EXT 1/18- ETT 1/14  NGT 1/18    ICU Vital Signs Last 24 Hrs  T(C): 36.4 (18 Jan 2019 23:42), Max: 36.9 (18 Jan 2019 08:00)  T(F): 97.5 (18 Jan 2019 23:42), Max: 98.5 (18 Jan 2019 21:00)  HR: 50 (19 Jan 2019 04:32) (43 - 99)  BP: 92/34 (19 Jan 2019 01:00) (91/51 - 208/69)  BP(mean): 48 (19 Jan 2019 01:00) (42 - 105)  ABP: --  ABP(mean): --  RR: 20 (19 Jan 2019 01:00) (14 - 30)  SpO2: 100% (19 Jan 2019 04:32) (84% - 100%)      ABG - ( 19 Jan 2019 05:07 )  pH, Arterial: 7.44  pH, Blood: x     /  pCO2: 33    /  pO2: 118   / HCO3: 22    / Base Excess: -0.9  /  SaO2: 98                    01-17 @ 07:01  -  01-18 @ 07:00  --------------------------------------------------------  IN: 2050.5 mL / OUT: 1530 mL / NET: 520.5 mL        Mode: AC/ CMV (Assist Control/ Continuous Mandatory Ventilation)  RR (machine): 20  TV (machine): 500  FiO2: 40  PEEP: 5  ITime: 1  MAP: 10  PIP: 34      PHYSICAL EXAM:    GENERAL: NAD, well-groomed, well-developed  HEAD:  Atraumatic, Normocephalic  EYES: PERRL, conjunctiva and sclera clear  ENMT: Moist mucous membranes, ETT  NECK: Supple, normal appearance, No JVD; Normal thyroid; Trachea midline  NERVOUS SYSTEM:  sedated, responds to some commands when off sedation.   CHEST/LUNG: No chest deformity;No rales, rhonchi, wheezing   HEART: Regular rate and rhythm; No murmurs, rubs, or gallops  ABDOMEN: Soft, Nontender, Distended ;Bowel sounds present  EXTREMITIES:  2+ Peripheral Pulses, No clubbing, cyanosis, or edema  LYMPH: No lymphadenopathy noted  SKIN: No rashes or lesions; Good capillary refill    LABS:  CBC Full  -  ( 19 Jan 2019 03:53 )  WBC Count : 9.2 K/uL  Hemoglobin : 9.7 g/dL  Hematocrit : 31.9 %  Platelet Count - Automated : 120 K/uL  Mean Cell Volume : 75.7 fl  Mean Cell Hemoglobin : 22.9 pg  Mean Cell Hemoglobin Concentration : 30.3 gm/dL  Auto Neutrophil # : x  Auto Lymphocyte # : x  Auto Monocyte # : x  Auto Eosinophil # : x  Auto Basophil # : x  Auto Neutrophil % : x  Auto Lymphocyte % : x  Auto Monocyte % : x  Auto Eosinophil % : x  Auto Basophil % : x    01-19    148<H>  |  116<H>  |  21<H>  ----------------------------<  93  3.1<L>   |  23  |  1.09    Ca    7.7<L>      19 Jan 2019 03:53  Phos  2.7     01-19  Mg     2.1     01-19    TPro  6.9  /  Alb  2.6<L>  /  TBili  0.9  /  DBili  x   /  AST  23  /  ALT  15  /  AlkPhos  52  01-18            RADIOLOGY & ADDITIONAL STUDIES REVIEWED:  ***    < from: Xray Chest 1 View- PORTABLE-Routine (01.19.19 @ 12:37) >    IMPRESSION: Advanced infiltrates left much greater than right again   noted. Developing right base effusion.    [x ]GOALS OF CARE DISCUSSION WITH PATIENT/FAMILY/PROXY: full code    CRITICAL CARE TIME SPENT: 35 minutes

## 2019-01-20 LAB
ANION GAP SERPL CALC-SCNC: 10 MMOL/L — SIGNIFICANT CHANGE UP (ref 5–17)
BASE EXCESS BLDA CALC-SCNC: -0.2 MMOL/L — SIGNIFICANT CHANGE UP (ref -2–2)
BASOPHILS # BLD AUTO: 0.1 K/UL — SIGNIFICANT CHANGE UP (ref 0–0.2)
BASOPHILS NFR BLD AUTO: 0.9 % — SIGNIFICANT CHANGE UP (ref 0–2)
BLOOD GAS COMMENTS ARTERIAL: SIGNIFICANT CHANGE UP
BUN SERPL-MCNC: 16 MG/DL — SIGNIFICANT CHANGE UP (ref 7–18)
CALCIUM SERPL-MCNC: 7.9 MG/DL — LOW (ref 8.4–10.5)
CHLORIDE SERPL-SCNC: 116 MMOL/L — HIGH (ref 96–108)
CO2 SERPL-SCNC: 23 MMOL/L — SIGNIFICANT CHANGE UP (ref 22–31)
CREAT SERPL-MCNC: 0.87 MG/DL — SIGNIFICANT CHANGE UP (ref 0.5–1.3)
EOSINOPHIL # BLD AUTO: 0.5 K/UL — SIGNIFICANT CHANGE UP (ref 0–0.5)
EOSINOPHIL NFR BLD AUTO: 5.4 % — SIGNIFICANT CHANGE UP (ref 0–6)
GLUCOSE SERPL-MCNC: 105 MG/DL — HIGH (ref 70–99)
HCO3 BLDA-SCNC: 24 MMOL/L — SIGNIFICANT CHANGE UP (ref 23–27)
HCT VFR BLD CALC: 33.5 % — LOW (ref 39–50)
HGB BLD-MCNC: 10.2 G/DL — LOW (ref 13–17)
HOROWITZ INDEX BLDA+IHG-RTO: 40 — SIGNIFICANT CHANGE UP
LYMPHOCYTES # BLD AUTO: 19.8 % — SIGNIFICANT CHANGE UP (ref 13–44)
LYMPHOCYTES # BLD AUTO: 2 K/UL — SIGNIFICANT CHANGE UP (ref 1–3.3)
MAGNESIUM SERPL-MCNC: 2.1 MG/DL — SIGNIFICANT CHANGE UP (ref 1.6–2.6)
MCHC RBC-ENTMCNC: 23.2 PG — LOW (ref 27–34)
MCHC RBC-ENTMCNC: 30.6 GM/DL — LOW (ref 32–36)
MCV RBC AUTO: 76 FL — LOW (ref 80–100)
MONOCYTES # BLD AUTO: 1.1 K/UL — HIGH (ref 0–0.9)
MONOCYTES NFR BLD AUTO: 11.4 % — SIGNIFICANT CHANGE UP (ref 2–14)
NEUTROPHILS # BLD AUTO: 6.2 K/UL — SIGNIFICANT CHANGE UP (ref 1.8–7.4)
NEUTROPHILS NFR BLD AUTO: 62.4 % — SIGNIFICANT CHANGE UP (ref 43–77)
PCO2 BLDA: 40 MMHG — SIGNIFICANT CHANGE UP (ref 32–46)
PH BLDA: 7.4 — SIGNIFICANT CHANGE UP (ref 7.35–7.45)
PHOSPHATE SERPL-MCNC: 2.7 MG/DL — SIGNIFICANT CHANGE UP (ref 2.5–4.5)
PLATELET # BLD AUTO: 124 K/UL — LOW (ref 150–400)
PO2 BLDA: 126 MMHG — HIGH (ref 74–108)
POTASSIUM SERPL-MCNC: 3.7 MMOL/L — SIGNIFICANT CHANGE UP (ref 3.5–5.3)
POTASSIUM SERPL-SCNC: 3.7 MMOL/L — SIGNIFICANT CHANGE UP (ref 3.5–5.3)
RBC # BLD: 4.41 M/UL — SIGNIFICANT CHANGE UP (ref 4.2–5.8)
RBC # FLD: 15.6 % — HIGH (ref 10.3–14.5)
SAO2 % BLDA: 99 % — HIGH (ref 92–96)
SODIUM SERPL-SCNC: 149 MMOL/L — HIGH (ref 135–145)
WBC # BLD: 10 K/UL — SIGNIFICANT CHANGE UP (ref 3.8–10.5)
WBC # FLD AUTO: 10 K/UL — SIGNIFICANT CHANGE UP (ref 3.8–10.5)

## 2019-01-20 PROCEDURE — 71045 X-RAY EXAM CHEST 1 VIEW: CPT | Mod: 26,77

## 2019-01-20 PROCEDURE — 71045 X-RAY EXAM CHEST 1 VIEW: CPT | Mod: 26,76

## 2019-01-20 RX ORDER — FUROSEMIDE 40 MG
40 TABLET ORAL ONCE
Qty: 0 | Refills: 0 | Status: COMPLETED | OUTPATIENT
Start: 2019-01-20 | End: 2019-01-20

## 2019-01-20 RX ADMIN — PIPERACILLIN AND TAZOBACTAM 25 GRAM(S): 4; .5 INJECTION, POWDER, LYOPHILIZED, FOR SOLUTION INTRAVENOUS at 13:47

## 2019-01-20 RX ADMIN — MORPHINE SULFATE 2 MILLIGRAM(S): 50 CAPSULE, EXTENDED RELEASE ORAL at 02:30

## 2019-01-20 RX ADMIN — ALBUTEROL 2 PUFF(S): 90 AEROSOL, METERED ORAL at 20:52

## 2019-01-20 RX ADMIN — PROPOFOL 8.4 MICROGRAM(S)/KG/MIN: 10 INJECTION, EMULSION INTRAVENOUS at 03:54

## 2019-01-20 RX ADMIN — PIPERACILLIN AND TAZOBACTAM 25 GRAM(S): 4; .5 INJECTION, POWDER, LYOPHILIZED, FOR SOLUTION INTRAVENOUS at 05:02

## 2019-01-20 RX ADMIN — Medication 40 MILLIGRAM(S): at 05:01

## 2019-01-20 RX ADMIN — PANTOPRAZOLE SODIUM 40 MILLIGRAM(S): 20 TABLET, DELAYED RELEASE ORAL at 13:41

## 2019-01-20 RX ADMIN — MORPHINE SULFATE 2 MILLIGRAM(S): 50 CAPSULE, EXTENDED RELEASE ORAL at 02:10

## 2019-01-20 RX ADMIN — PIPERACILLIN AND TAZOBACTAM 25 GRAM(S): 4; .5 INJECTION, POWDER, LYOPHILIZED, FOR SOLUTION INTRAVENOUS at 22:47

## 2019-01-20 RX ADMIN — MORPHINE SULFATE 2 MILLIGRAM(S): 50 CAPSULE, EXTENDED RELEASE ORAL at 18:49

## 2019-01-20 RX ADMIN — ALBUTEROL 2 PUFF(S): 90 AEROSOL, METERED ORAL at 08:34

## 2019-01-20 RX ADMIN — MORPHINE SULFATE 2 MILLIGRAM(S): 50 CAPSULE, EXTENDED RELEASE ORAL at 05:01

## 2019-01-20 RX ADMIN — MORPHINE SULFATE 2 MILLIGRAM(S): 50 CAPSULE, EXTENDED RELEASE ORAL at 22:47

## 2019-01-20 RX ADMIN — MORPHINE SULFATE 2 MILLIGRAM(S): 50 CAPSULE, EXTENDED RELEASE ORAL at 18:53

## 2019-01-20 RX ADMIN — Medication 40 MILLIGRAM(S): at 13:39

## 2019-01-20 RX ADMIN — MORPHINE SULFATE 2 MILLIGRAM(S): 50 CAPSULE, EXTENDED RELEASE ORAL at 23:46

## 2019-01-20 RX ADMIN — ALBUTEROL 2 PUFF(S): 90 AEROSOL, METERED ORAL at 02:23

## 2019-01-20 RX ADMIN — ALBUTEROL 2 PUFF(S): 90 AEROSOL, METERED ORAL at 15:49

## 2019-01-20 RX ADMIN — MORPHINE SULFATE 2 MILLIGRAM(S): 50 CAPSULE, EXTENDED RELEASE ORAL at 07:00

## 2019-01-20 RX ADMIN — LOSARTAN POTASSIUM 50 MILLIGRAM(S): 100 TABLET, FILM COATED ORAL at 05:01

## 2019-01-20 NOTE — PROGRESS NOTE ADULT - SUBJECTIVE AND OBJECTIVE BOX
INTERVAL HPI/OVERNIGHT EVENTS:   No overnight events    PRESSORS: [ ] YES [x ] NO        Antimicrobial:  piperacillin/tazobactam IVPB. 3.375 Gram(s) IV Intermittent every 8 hours    Cardiovascular:  losartan 50 milliGRAM(s) Oral daily    Pulmonary:  ALBUTerol    90 MICROgram(s) HFA Inhaler 2 Puff(s) Inhalation every 6 hours    Hematalogic:    Other:  insulin lispro (HumaLOG) corrective regimen sliding scale   SubCutaneous every 6 hours  morphine  - Injectable 2 milliGRAM(s) IV Push every 4 hours  multivitamin/thiamine/folic acid in sodium chloride 0.9% 1000 milliLiter(s) IV Continuous <Continuous>  pantoprazole  Injectable 40 milliGRAM(s) IV Push daily  predniSONE   Tablet 40 milliGRAM(s) Oral daily  propofol Infusion 20 MICROgram(s)/kG/Min IV Continuous <Continuous>      Drug Dosing Weight  Height (cm): 167.64 (14 Jan 2019 15:34)  Weight (kg): 70 (15 Chin 2019 19:54)  BMI (kg/m2): 24.9 (15 Chin 2019 19:54)  BSA (m2): 1.79 (15 Chin 2019 19:54)    CENTRAL LINE: [ ] YES [x ] NO  LOCATION:   DATE INSERTED:  REMOVE: [ ] YES [ ] NO  EXPLAIN:    MUÑOZ: [ x] YES [ ] NO    DATE INSERTED:  REMOVE:  [ ] YES [ ] NO  EXPLAIN:    A-LINE:  [ ] YES [x ] NO  LOCATION:   DATE INSERTED:  REMOVE:  [ ] YES [ ] NO  EXPLAIN:  ETT-1/14 EXT 1/18- ETT 1/14  NGT 1/18      PMH/Social Hx/Fam Hx -reviewed admission note, no change since admission  PAST MEDICAL & SURGICAL HISTORY:  COPD (chronic obstructive pulmonary disease)  HLD (hyperlipidemia)  Varicose veins  HTN (hypertension)  No significant past surgical history        T(C): 36.9 (01-19-19 @ 23:42), Max: 36.9 (01-19-19 @ 23:42)  HR: 70 (01-20-19 @ 01:00)  BP: 153/67 (01-20-19 @ 01:00)  BP(mean): 87 (01-20-19 @ 01:00)  ABP: --  ABP(mean): --  RR: 18 (01-20-19 @ 01:00)  SpO2: 99% (01-20-19 @ 01:00)  Wt(kg): --    ABG - ( 19 Jan 2019 05:07 )  pH, Arterial: 7.44  pH, Blood: x     /  pCO2: 33    /  pO2: 118   / HCO3: 22    / Base Excess: -0.9  /  SaO2: 98                    01-18 @ 07:01  -  01-19 @ 07:00  --------------------------------------------------------  IN: 3244.6 mL / OUT: 940 mL / NET: 2304.6 mL        Mode: AC/ CMV (Assist Control/ Continuous Mandatory Ventilation)  RR (machine): 16  TV (machine): 500  FiO2: 40  PEEP: 5  ITime: 1  MAP: 9  PIP: 28      PHYSICAL EXAM:    GENERAL: NAD, well-groomed, well-developed  HEAD:  Atraumatic, Normocephalic  EYES: PERRL, conjunctiva and sclera clear  ENMT: Moist mucous membranes, ETT  NECK: Supple, normal appearance, No JVD; Normal thyroid; Trachea midline  NERVOUS SYSTEM:  sedated, responds to some commands when off sedation.   CHEST/LUNG: ronchi L  HEART: Regular rate and rhythm; No murmurs, rubs, or gallops  ABDOMEN: Soft, Nontender, Distended ;Bowel sounds present  EXTREMITIES:  2+ Peripheral Pulses, No clubbing, cyanosis, or edema  LYMPH: No lymphadenopathy noted  SKIN: No rashes or lesions; Good capillary refill      LABS:  CBC Full  -  ( 19 Jan 2019 03:53 )  WBC Count : 9.2 K/uL  Hemoglobin : 9.7 g/dL  Hematocrit : 31.9 %  Platelet Count - Automated : 120 K/uL  Mean Cell Volume : 75.7 fl  Mean Cell Hemoglobin : 22.9 pg  Mean Cell Hemoglobin Concentration : 30.3 gm/dL  Auto Neutrophil # : x  Auto Lymphocyte # : x  Auto Monocyte # : x  Auto Eosinophil # : x  Auto Basophil # : x  Auto Neutrophil % : x  Auto Lymphocyte % : x  Auto Monocyte % : x  Auto Eosinophil % : x  Auto Basophil % : x    01-19    148<H>  |  116<H>  |  21<H>  ----------------------------<  93  3.1<L>   |  23  |  1.09    Ca    7.7<L>      19 Jan 2019 03:53  Phos  2.7     01-19  Mg     2.1     01-19    TPro  6.9  /  Alb  2.6<L>  /  TBili  0.9  /  DBili  x   /  AST  23  /  ALT  15  /  AlkPhos  52  01-18            RADIOLOGY & ADDITIONAL STUDIES REVIEWED:      GOALS OF CARE DISCUSSION WITH PATIENT/FAMILY/PROXY/ QUESTIONS WERE ANSWERED TO THE BEST OF MY ABILITIES    CRITICAL CARE TIME SPENT: 35 minutes

## 2019-01-20 NOTE — PROGRESS NOTE ADULT - PROBLEM SELECTOR PLAN 4
PT has hx of COPD.  O/E- bilateral air entry+ with decreased air entry on RLL.  -new right side pleural effusion. continue to monitor respiratory status   no rhonchi or rales  will c/w Duoneb

## 2019-01-20 NOTE — PROGRESS NOTE ADULT - PROBLEM SELECTOR PLAN 2
likely 2/2 to infection due to aspiration versus hypertensive emergency  -C/w zosyn Day 6  -Repeat CT head noted.   -c.w Mechanical ventilation

## 2019-01-20 NOTE — PROGRESS NOTE ADULT - PROBLEM SELECTOR PLAN 1
Patient was intubated in ED for protection of airway.  ABGs 1/18 morning improved. He was given SBT which he passed. He got extubated, but got hypoxic and had to be reintubated.  -ABG show PH 7.44 pco2 33,pao2 118, vent setting dec R/R to 16.

## 2019-01-20 NOTE — PROGRESS NOTE ADULT - PROBLEM SELECTOR PLAN 5
pt was recently found to have esophageal varices on EGD.  -Nadolol was not given as patient had bradycardia, will give once bradycardia resolved.   GI - Dr. Christensen.

## 2019-01-21 LAB
ALBUMIN SERPL ELPH-MCNC: 2.9 G/DL — LOW (ref 3.5–5)
ALP SERPL-CCNC: 58 U/L — SIGNIFICANT CHANGE UP (ref 40–120)
ALT FLD-CCNC: 21 U/L DA — SIGNIFICANT CHANGE UP (ref 10–60)
ANION GAP SERPL CALC-SCNC: 7 MMOL/L — SIGNIFICANT CHANGE UP (ref 5–17)
AST SERPL-CCNC: 25 U/L — SIGNIFICANT CHANGE UP (ref 10–40)
BASE EXCESS BLDA CALC-SCNC: 3.9 MMOL/L — HIGH (ref -2–2)
BASE EXCESS BLDA CALC-SCNC: 5.5 MMOL/L — HIGH (ref -2–2)
BASOPHILS # BLD AUTO: 0.1 K/UL — SIGNIFICANT CHANGE UP (ref 0–0.2)
BASOPHILS NFR BLD AUTO: 1 % — SIGNIFICANT CHANGE UP (ref 0–2)
BILIRUB SERPL-MCNC: 0.5 MG/DL — SIGNIFICANT CHANGE UP (ref 0.2–1.2)
BLOOD GAS COMMENTS ARTERIAL: SIGNIFICANT CHANGE UP
BLOOD GAS COMMENTS ARTERIAL: SIGNIFICANT CHANGE UP
BUN SERPL-MCNC: 16 MG/DL — SIGNIFICANT CHANGE UP (ref 7–18)
CALCIUM SERPL-MCNC: 8.3 MG/DL — LOW (ref 8.4–10.5)
CHLORIDE SERPL-SCNC: 112 MMOL/L — HIGH (ref 96–108)
CO2 SERPL-SCNC: 29 MMOL/L — SIGNIFICANT CHANGE UP (ref 22–31)
CREAT SERPL-MCNC: 1.08 MG/DL — SIGNIFICANT CHANGE UP (ref 0.5–1.3)
EOSINOPHIL # BLD AUTO: 0.6 K/UL — HIGH (ref 0–0.5)
EOSINOPHIL NFR BLD AUTO: 4.7 % — SIGNIFICANT CHANGE UP (ref 0–6)
GLUCOSE SERPL-MCNC: 126 MG/DL — HIGH (ref 70–99)
HCO3 BLDA-SCNC: 28 MMOL/L — HIGH (ref 23–27)
HCO3 BLDA-SCNC: 30 MMOL/L — HIGH (ref 23–27)
HCT VFR BLD CALC: 38.7 % — LOW (ref 39–50)
HGB BLD-MCNC: 11.6 G/DL — LOW (ref 13–17)
HOROWITZ INDEX BLDA+IHG-RTO: 40 — SIGNIFICANT CHANGE UP
HOROWITZ INDEX BLDA+IHG-RTO: 40 — SIGNIFICANT CHANGE UP
LYMPHOCYTES # BLD AUTO: 3.7 K/UL — HIGH (ref 1–3.3)
LYMPHOCYTES # BLD AUTO: 30.3 % — SIGNIFICANT CHANGE UP (ref 13–44)
MAGNESIUM SERPL-MCNC: 2.2 MG/DL — SIGNIFICANT CHANGE UP (ref 1.6–2.6)
MCHC RBC-ENTMCNC: 23 PG — LOW (ref 27–34)
MCHC RBC-ENTMCNC: 30 GM/DL — LOW (ref 32–36)
MCV RBC AUTO: 76.8 FL — LOW (ref 80–100)
MONOCYTES # BLD AUTO: 1.3 K/UL — HIGH (ref 0–0.9)
MONOCYTES NFR BLD AUTO: 10.4 % — SIGNIFICANT CHANGE UP (ref 2–14)
NEUTROPHILS # BLD AUTO: 6.6 K/UL — SIGNIFICANT CHANGE UP (ref 1.8–7.4)
NEUTROPHILS NFR BLD AUTO: 53.6 % — SIGNIFICANT CHANGE UP (ref 43–77)
PCO2 BLDA: 42 MMHG — SIGNIFICANT CHANGE UP (ref 32–46)
PCO2 BLDA: 44 MMHG — SIGNIFICANT CHANGE UP (ref 32–46)
PH BLDA: 7.44 — SIGNIFICANT CHANGE UP (ref 7.35–7.45)
PH BLDA: 7.45 — SIGNIFICANT CHANGE UP (ref 7.35–7.45)
PHOSPHATE SERPL-MCNC: 4.4 MG/DL — SIGNIFICANT CHANGE UP (ref 2.5–4.5)
PLATELET # BLD AUTO: 148 K/UL — LOW (ref 150–400)
PO2 BLDA: 121 MMHG — HIGH (ref 74–108)
PO2 BLDA: 121 MMHG — HIGH (ref 74–108)
POTASSIUM SERPL-MCNC: 3.4 MMOL/L — LOW (ref 3.5–5.3)
POTASSIUM SERPL-SCNC: 3.4 MMOL/L — LOW (ref 3.5–5.3)
PROT SERPL-MCNC: 7 G/DL — SIGNIFICANT CHANGE UP (ref 6–8.3)
RBC # BLD: 5.04 M/UL — SIGNIFICANT CHANGE UP (ref 4.2–5.8)
RBC # FLD: 15.9 % — HIGH (ref 10.3–14.5)
SAO2 % BLDA: 99 % — HIGH (ref 92–96)
SAO2 % BLDA: 99 % — HIGH (ref 92–96)
SODIUM SERPL-SCNC: 148 MMOL/L — HIGH (ref 135–145)
SRA INTERP SER-IMP: SIGNIFICANT CHANGE UP
WBC # BLD: 12.3 K/UL — HIGH (ref 3.8–10.5)
WBC # FLD AUTO: 12.3 K/UL — HIGH (ref 3.8–10.5)

## 2019-01-21 PROCEDURE — 71045 X-RAY EXAM CHEST 1 VIEW: CPT | Mod: 26

## 2019-01-21 RX ORDER — AMLODIPINE BESYLATE 2.5 MG/1
10 TABLET ORAL ONCE
Qty: 0 | Refills: 0 | Status: COMPLETED | OUTPATIENT
Start: 2019-01-21 | End: 2019-01-21

## 2019-01-21 RX ORDER — SODIUM CHLORIDE 9 MG/ML
1000 INJECTION INTRAMUSCULAR; INTRAVENOUS; SUBCUTANEOUS ONCE
Qty: 0 | Refills: 0 | Status: DISCONTINUED | OUTPATIENT
Start: 2019-01-21 | End: 2019-01-21

## 2019-01-21 RX ORDER — HEPARIN SODIUM 5000 [USP'U]/ML
5000 INJECTION INTRAVENOUS; SUBCUTANEOUS EVERY 8 HOURS
Qty: 0 | Refills: 0 | Status: DISCONTINUED | OUTPATIENT
Start: 2019-01-21 | End: 2019-01-24

## 2019-01-21 RX ORDER — FUROSEMIDE 40 MG
40 TABLET ORAL ONCE
Qty: 0 | Refills: 0 | Status: COMPLETED | OUTPATIENT
Start: 2019-01-21 | End: 2019-01-21

## 2019-01-21 RX ORDER — DEXMEDETOMIDINE HYDROCHLORIDE IN 0.9% SODIUM CHLORIDE 4 UG/ML
0.2 INJECTION INTRAVENOUS
Qty: 200 | Refills: 0 | Status: DISCONTINUED | OUTPATIENT
Start: 2019-01-21 | End: 2019-01-21

## 2019-01-21 RX ORDER — HALOPERIDOL DECANOATE 100 MG/ML
5 INJECTION INTRAMUSCULAR ONCE
Qty: 0 | Refills: 0 | Status: COMPLETED | OUTPATIENT
Start: 2019-01-21 | End: 2019-01-21

## 2019-01-21 RX ORDER — POTASSIUM CHLORIDE 20 MEQ
10 PACKET (EA) ORAL
Qty: 0 | Refills: 0 | Status: COMPLETED | OUTPATIENT
Start: 2019-01-21 | End: 2019-01-21

## 2019-01-21 RX ORDER — LISINOPRIL 2.5 MG/1
10 TABLET ORAL DAILY
Qty: 0 | Refills: 0 | Status: DISCONTINUED | OUTPATIENT
Start: 2019-01-21 | End: 2019-01-21

## 2019-01-21 RX ORDER — DEXTROSE 50 % IN WATER 50 %
50 SYRINGE (ML) INTRAVENOUS ONCE
Qty: 0 | Refills: 0 | Status: COMPLETED | OUTPATIENT
Start: 2019-01-21 | End: 2019-01-21

## 2019-01-21 RX ORDER — SODIUM CHLORIDE 9 MG/ML
500 INJECTION INTRAMUSCULAR; INTRAVENOUS; SUBCUTANEOUS ONCE
Qty: 0 | Refills: 0 | Status: COMPLETED | OUTPATIENT
Start: 2019-01-21 | End: 2019-01-21

## 2019-01-21 RX ADMIN — PIPERACILLIN AND TAZOBACTAM 25 GRAM(S): 4; .5 INJECTION, POWDER, LYOPHILIZED, FOR SOLUTION INTRAVENOUS at 22:58

## 2019-01-21 RX ADMIN — ALBUTEROL 2 PUFF(S): 90 AEROSOL, METERED ORAL at 02:40

## 2019-01-21 RX ADMIN — LISINOPRIL 10 MILLIGRAM(S): 2.5 TABLET ORAL at 08:34

## 2019-01-21 RX ADMIN — ALBUTEROL 2 PUFF(S): 90 AEROSOL, METERED ORAL at 09:39

## 2019-01-21 RX ADMIN — Medication 40 MILLIGRAM(S): at 06:34

## 2019-01-21 RX ADMIN — SODIUM CHLORIDE 1000 MILLILITER(S): 9 INJECTION INTRAMUSCULAR; INTRAVENOUS; SUBCUTANEOUS at 18:50

## 2019-01-21 RX ADMIN — DEXMEDETOMIDINE HYDROCHLORIDE IN 0.9% SODIUM CHLORIDE 3.5 MICROGRAM(S)/KG/HR: 4 INJECTION INTRAVENOUS at 09:33

## 2019-01-21 RX ADMIN — HEPARIN SODIUM 5000 UNIT(S): 5000 INJECTION INTRAVENOUS; SUBCUTANEOUS at 22:58

## 2019-01-21 RX ADMIN — MORPHINE SULFATE 2 MILLIGRAM(S): 50 CAPSULE, EXTENDED RELEASE ORAL at 07:00

## 2019-01-21 RX ADMIN — PANTOPRAZOLE SODIUM 40 MILLIGRAM(S): 20 TABLET, DELAYED RELEASE ORAL at 12:22

## 2019-01-21 RX ADMIN — Medication 50 MILLILITER(S): at 23:56

## 2019-01-21 RX ADMIN — SODIUM CHLORIDE 75 MILLILITER(S): 9 INJECTION, SOLUTION INTRAVENOUS at 06:43

## 2019-01-21 RX ADMIN — LOSARTAN POTASSIUM 50 MILLIGRAM(S): 100 TABLET, FILM COATED ORAL at 06:34

## 2019-01-21 RX ADMIN — Medication 100 MILLIEQUIVALENT(S): at 09:47

## 2019-01-21 RX ADMIN — HEPARIN SODIUM 5000 UNIT(S): 5000 INJECTION INTRAVENOUS; SUBCUTANEOUS at 13:49

## 2019-01-21 RX ADMIN — MORPHINE SULFATE 2 MILLIGRAM(S): 50 CAPSULE, EXTENDED RELEASE ORAL at 06:35

## 2019-01-21 RX ADMIN — HALOPERIDOL DECANOATE 5 MILLIGRAM(S): 100 INJECTION INTRAMUSCULAR at 09:33

## 2019-01-21 RX ADMIN — PIPERACILLIN AND TAZOBACTAM 25 GRAM(S): 4; .5 INJECTION, POWDER, LYOPHILIZED, FOR SOLUTION INTRAVENOUS at 13:49

## 2019-01-21 RX ADMIN — Medication 40 MILLIGRAM(S): at 09:47

## 2019-01-21 RX ADMIN — DEXMEDETOMIDINE HYDROCHLORIDE IN 0.9% SODIUM CHLORIDE 3.5 MICROGRAM(S)/KG/HR: 4 INJECTION INTRAVENOUS at 12:00

## 2019-01-21 RX ADMIN — PIPERACILLIN AND TAZOBACTAM 25 GRAM(S): 4; .5 INJECTION, POWDER, LYOPHILIZED, FOR SOLUTION INTRAVENOUS at 06:35

## 2019-01-21 RX ADMIN — Medication 100 MILLIEQUIVALENT(S): at 08:34

## 2019-01-21 NOTE — PROGRESS NOTE ADULT - PROBLEM SELECTOR PLAN 1
Patient was intubated in ED, he had AMS and was hypercapnic   ABGs 1/18 morning improved. He was given SBT which he passed. He got extubated, but got hypoxic and had to be reintubated.  -ABGs are improved, gave him Lasix 40 IV.   -He felt sob while on CPAP trial, possible agitation Patient was intubated in ED, he had AMS and was hypercapnic   ABGs 1/18 morning improved. He was given SBT which he passed. He got extubated, but got hypoxic and had to be reintubated.  -ABGs are improved, gave him Lasix 40 IV.   -He felt sob while on CPAP trial, possible agitation.  -C/w vent support  -SBT in am.  -There was also concern for underlying PNA, zosyn day 7.  -Will f/u ID Patient was intubated in ED, he had AMS and was hypercapnic   ABGs 1/18 morning improved. He was given SBT which he passed. He got extubated, but got hypoxic and had to be reintubated.  -ABGs are improved, gave him Lasix 40 IV.   -He felt sob while on CPAP trial, possible agitation.  -passed SBT, gt extubated 01/21  -There was also concern for underlying PNA, zosyn day 7.  -Will f/u ID

## 2019-01-21 NOTE — PROGRESS NOTE ADULT - PROBLEM SELECTOR PLAN 4
PT has hx of COPD.  O/E- bilateral air entry+ with decreased air entry on RLL.  -new right side pleural effusion. continue to monitor respiratory status   no rhonchi or rales  will c/w Duoneb pt was recently found to have esophageal varices on EGD.  -Nadolol was not given as patient had bradycardia, will give once bradycardia resolved.   GI - Dr. Christensen.

## 2019-01-21 NOTE — PROGRESS NOTE ADULT - PROBLEM SELECTOR PLAN 2
likely 2/2 to infection due to aspiration versus hypertensive emergency  -C/w zosyn Day 6  -Repeat CT head noted.   -c.w Mechanical ventilation PT has hx of COPD.  O/E- bilateral air entry+ with decreased air entry on RLL.  will c/w Duoneb  C/w prednisone taper dose.

## 2019-01-21 NOTE — AIRWAY REMOVAL NOTE  ADULT & PEDS - ARTIFICAL AIRWAY REMOVAL COMMENTS
Patient was given supplemental oxygen but had poor mental status and labored breathing with hypoxia so patient was reintubated
as per dr rome pt extubated placed on am40% 84mdq92% hr 51 rr15 no sob noted at this time
pt was weaned and extubated to %, , O2sat. 87.  labored breathing, will reintubate

## 2019-01-21 NOTE — PROGRESS NOTE ADULT - PROBLEM SELECTOR PLAN 3
-Platelets improved.    -f/u HIT panel   Heme/onc Dr. Pickett -Platelets improved.    -HIT panel negative.  -Will restart heparin today- 1/21  Heme/onc Dr. Pickett

## 2019-01-21 NOTE — PROGRESS NOTE ADULT - PROBLEM SELECTOR PLAN 5
pt was recently found to have esophageal varices on EGD.  -Nadolol was not given as patient had bradycardia, will give once bradycardia resolved.   GI - Dr. Christensen. [] Previous VTE                                                3  [] Thrombophilia                                             2  [] Lower limb paralysis                                   2    [] Current Cancer                                             2   [] Immobilization > 24 hrs                              1  [x] ICU/CCU stay > 24 hours                             1  [x] Age > 60                                                         1    IMPROVE VTE Score: 2, VTE ppx with heparin and GI ppx with protonix.

## 2019-01-21 NOTE — PROGRESS NOTE ADULT - SUBJECTIVE AND OBJECTIVE BOX
INTERVAL HPI/OVERNIGHT EVENTS: Patient's BP was noted to be in 200 overnight, sedation was increased which brought it down. This morning BP remained high, added amlodipine to his regimen. He was much more awake and alert, however he had some agitation for which haldol 5mg IV was given stat.     PRESSORS: [ ] YES [x ] NO  WHICH:    ANTIBIOTICS: zosyn                 DATE STARTED: 1/14  ANTIBIOTICS:                  DATE STARTED:  ANTIBIOTICS:                  DATE STARTED:    Antimicrobial:  piperacillin/tazobactam IVPB. 3.375 Gram(s) IV Intermittent every 8 hours    Cardiovascular:  losartan 50 milliGRAM(s) Oral daily    Pulmonary:  ALBUTerol    90 MICROgram(s) HFA Inhaler 2 Puff(s) Inhalation every 6 hours    Hematalogic:  heparin  Injectable 5000 Unit(s) SubCutaneous every 8 hours    Other:  dexmedetomidine Infusion 0.2 MICROgram(s)/kG/Hr IV Continuous <Continuous>  insulin lispro (HumaLOG) corrective regimen sliding scale   SubCutaneous every 6 hours  pantoprazole  Injectable 40 milliGRAM(s) IV Push daily  predniSONE   Tablet 40 milliGRAM(s) Oral daily  propofol Infusion 20 MICROgram(s)/kG/Min IV Continuous <Continuous>    ALBUTerol    90 MICROgram(s) HFA Inhaler 2 Puff(s) Inhalation every 6 hours  dexmedetomidine Infusion 0.2 MICROgram(s)/kG/Hr IV Continuous <Continuous>  heparin  Injectable 5000 Unit(s) SubCutaneous every 8 hours  insulin lispro (HumaLOG) corrective regimen sliding scale   SubCutaneous every 6 hours  losartan 50 milliGRAM(s) Oral daily  pantoprazole  Injectable 40 milliGRAM(s) IV Push daily  piperacillin/tazobactam IVPB. 3.375 Gram(s) IV Intermittent every 8 hours  predniSONE   Tablet 40 milliGRAM(s) Oral daily  propofol Infusion 20 MICROgram(s)/kG/Min IV Continuous <Continuous>    Drug Dosing Weight  Height (cm): 167.64 (14 Jan 2019 15:34)  Weight (kg): 70 (15 Chin 2019 19:54)  BMI (kg/m2): 24.9 (15 Chin 2019 19:54)  BSA (m2): 1.79 (15 Chin 2019 19:54)    CENTRAL LINE: [ ] YES x[ ] NO  LOCATION:   DATE INSERTED:  REMOVE: [ ] YES [ ] NO  EXPLAIN:    MUÑOZ: [x ] YES [ ] NO    DATE INSERTED:  REMOVE:  [ ] YES [ ] NO  EXPLAIN:    A-LINE:  [ ] YES [x ] NO  LOCATION:   DATE INSERTED:  REMOVE:  [ ] YES [ ] NO  EXPLAIN:    PMH -reviewed admission note, no change since admission      ABG - ( 21 Jan 2019 04:20 )  pH, Arterial: 7.44  pH, Blood: x     /  pCO2: 42    /  pO2: 121   / HCO3: 28    / Base Excess: 3.9   /  SaO2: 99                    01-20 @ 07:01  -  01-21 @ 07:00  --------------------------------------------------------  IN: 2629.8 mL / OUT: 4280 mL / NET: -1650.2 mL        Mode: PS (Pressure Support)/ Spontaneous  FiO2: 40  PEEP: 5  PS: 5  MAP: 7  PIP: 11      PHYSICAL EXAM:    GENERAL: NAD, well-groomed, well-developed  HEAD:  Atraumatic, Normocephalic  EYES: EOMI, PERRLA, conjunctiva and sclera clear  ENMT: No tonsillar erythema, exudates, or enlargement; Moist mucous membranes, Good dentition, , ETT  NECK: Supple, normal appearance, No JVD; Normal thyroid; Trachea midline  NERVOUS SYSTEM:   CHEST/LUNG: No chest deformity; Normal percussion bilaterally; No rales, rhonchi, wheezing   HEART: Regular rate and rhythm; No murmurs, rubs, or gallops  ABDOMEN: Soft, Nontender, Nondistended;Bowel sounds present  EXTREMITIES:  2+ Peripheral Pulses, No clubbing, cyanosis, or edema  LYMPH: No lymphadenopathy noted  SKIN: No rashes or lesions; Good capillary refill      LABS:  CBC Full  -  ( 21 Jan 2019 06:29 )  WBC Count : 12.3 K/uL  Hemoglobin : 11.6 g/dL  Hematocrit : 38.7 %  Platelet Count - Automated : 148 K/uL  Mean Cell Volume : 76.8 fl  Mean Cell Hemoglobin : 23.0 pg  Mean Cell Hemoglobin Concentration : 30.0 gm/dL  Auto Neutrophil # : 6.6 K/uL  Auto Lymphocyte # : 3.7 K/uL  Auto Monocyte # : 1.3 K/uL  Auto Eosinophil # : 0.6 K/uL  Auto Basophil # : 0.1 K/uL  Auto Neutrophil % : 53.6 %  Auto Lymphocyte % : 30.3 %  Auto Monocyte % : 10.4 %  Auto Eosinophil % : 4.7 %  Auto Basophil % : 1.0 %    01-21    148<H>  |  112<H>  |  16  ----------------------------<  126<H>  3.4<L>   |  29  |  1.08    Ca    8.3<L>      21 Jan 2019 06:29  Phos  4.4     01-21  Mg     2.2     01-21    TPro  7.0  /  Alb  2.9<L>  /  TBili  0.5  /  DBili  x   /  AST  25  /  ALT  21  /  AlkPhos  58  01-21            RADIOLOGY & ADDITIONAL STUDIES REVIEWED:  ***    [ ]GOALS OF CARE DISCUSSION WITH PATIENT/FAMILY/PROXY:    CRITICAL CARE TIME SPENT: 35 minutes INTERVAL HPI/OVERNIGHT EVENTS: Patient's BP was noted to be in 200 overnight, sedation was increased which brought it down. This morning BP remained high, added amlodipine to his regimen. He was much more awake and alert, however he had some agitation for which haldol 5mg IV was given stat.     PRESSORS: [ ] YES [x ] NO  WHICH:    ANTIBIOTICS: zosyn                 DATE STARTED: 1/14  ANTIBIOTICS:                  DATE STARTED:  ANTIBIOTICS:                  DATE STARTED:    Antimicrobial:  piperacillin/tazobactam IVPB. 3.375 Gram(s) IV Intermittent every 8 hours    Cardiovascular:  losartan 50 milliGRAM(s) Oral daily    Pulmonary:  ALBUTerol    90 MICROgram(s) HFA Inhaler 2 Puff(s) Inhalation every 6 hours    Hematalogic:  heparin  Injectable 5000 Unit(s) SubCutaneous every 8 hours    Other:  dexmedetomidine Infusion 0.2 MICROgram(s)/kG/Hr IV Continuous <Continuous>  insulin lispro (HumaLOG) corrective regimen sliding scale   SubCutaneous every 6 hours  pantoprazole  Injectable 40 milliGRAM(s) IV Push daily  predniSONE   Tablet 40 milliGRAM(s) Oral daily  propofol Infusion 20 MICROgram(s)/kG/Min IV Continuous <Continuous>    ALBUTerol    90 MICROgram(s) HFA Inhaler 2 Puff(s) Inhalation every 6 hours  dexmedetomidine Infusion 0.2 MICROgram(s)/kG/Hr IV Continuous <Continuous>  heparin  Injectable 5000 Unit(s) SubCutaneous every 8 hours  insulin lispro (HumaLOG) corrective regimen sliding scale   SubCutaneous every 6 hours  losartan 50 milliGRAM(s) Oral daily  pantoprazole  Injectable 40 milliGRAM(s) IV Push daily  piperacillin/tazobactam IVPB. 3.375 Gram(s) IV Intermittent every 8 hours  predniSONE   Tablet 40 milliGRAM(s) Oral daily  propofol Infusion 20 MICROgram(s)/kG/Min IV Continuous <Continuous>    Drug Dosing Weight  Height (cm): 167.64 (14 Jan 2019 15:34)  Weight (kg): 70 (15 Chin 2019 19:54)  BMI (kg/m2): 24.9 (15 Chin 2019 19:54)  BSA (m2): 1.79 (15 Chin 2019 19:54)    CENTRAL LINE: [ ] YES [ x] NO  LOCATION:   DATE INSERTED:  REMOVE: [ ] YES [ ] NO  EXPLAIN:    MUÑOZ: [x ] YES [ ] NO    DATE INSERTED:  REMOVE:  [ ] YES [ ] NO  EXPLAIN:    A-LINE:  [ ] YES [x ] NO  LOCATION:   DATE INSERTED:  REMOVE:  [ ] YES [ ] NO  EXPLAIN:    PMH -reviewed admission note, no change since admission      ABG - ( 21 Jan 2019 04:20 )  pH, Arterial: 7.44  pH, Blood: x     /  pCO2: 42    /  pO2: 121   / HCO3: 28    / Base Excess: 3.9   /  SaO2: 99                    01-20 @ 07:01  -  01-21 @ 07:00  --------------------------------------------------------  IN: 2629.8 mL / OUT: 4280 mL / NET: -1650.2 mL        Mode: PS (Pressure Support)/ Spontaneous  FiO2: 40  PEEP: 5  PS: 5  MAP: 7  PIP: 11      PHYSICAL EXAM:    GENERAL: NAD, well-groomed, well-developed  HEAD:  Atraumatic, Normocephalic  EYES: EOMI, PERRLA, conjunctiva and sclera clear  ENMT: No tonsillar erythema, exudates, or enlargement; Moist mucous membranes, Good dentition, , ETT  NECK: Supple, normal appearance, No JVD; Normal thyroid; Trachea midline  NERVOUS SYSTEM: alert, following commands, purposeful.   CHEST/LUNG: No chest deformity; Normal percussion bilaterally; No rales, rhonchi, wheezing   HEART: Regular rate and rhythm; No murmurs, rubs, or gallops  ABDOMEN: Soft, Nontender, Nondistended;Bowel sounds present  EXTREMITIES:  2+ Peripheral Pulses, No clubbing, cyanosis, or edema  LYMPH: No lymphadenopathy noted  SKIN: No rashes or lesions; Good capillary refill      LABS:  CBC Full  -  ( 21 Jan 2019 06:29 )  WBC Count : 12.3 K/uL  Hemoglobin : 11.6 g/dL  Hematocrit : 38.7 %  Platelet Count - Automated : 148 K/uL  Mean Cell Volume : 76.8 fl  Mean Cell Hemoglobin : 23.0 pg  Mean Cell Hemoglobin Concentration : 30.0 gm/dL  Auto Neutrophil # : 6.6 K/uL  Auto Lymphocyte # : 3.7 K/uL  Auto Monocyte # : 1.3 K/uL  Auto Eosinophil # : 0.6 K/uL  Auto Basophil # : 0.1 K/uL  Auto Neutrophil % : 53.6 %  Auto Lymphocyte % : 30.3 %  Auto Monocyte % : 10.4 %  Auto Eosinophil % : 4.7 %  Auto Basophil % : 1.0 %    01-21    148<H>  |  112<H>  |  16  ----------------------------<  126<H>  3.4<L>   |  29  |  1.08    Ca    8.3<L>      21 Jan 2019 06:29  Phos  4.4     01-21  Mg     2.2     01-21    TPro  7.0  /  Alb  2.9<L>  /  TBili  0.5  /  DBili  x   /  AST  25  /  ALT  21  /  AlkPhos  58  01-21            RADIOLOGY & ADDITIONAL STUDIES REVIEWED: reviewed     [ ]GOALS OF CARE DISCUSSION WITH PATIENT/FAMILY/PROXY:     CRITICAL CARE TIME SPENT: 35 minutes INTERVAL HPI/OVERNIGHT EVENTS: Patient's BP was noted to be in 200 overnight, sedation was increased which brought it down. This morning BP remained high, added amlodipine to his regimen. He was much more awake and alert, however he had some agitation for which haldol 5mg IV was given stat. He improved, SBT given which he passed and was extubated. He is currently on NC.     PRESSORS: [ ] YES [x ] NO  WHICH:    ANTIBIOTICS: zosyn                 DATE STARTED: 1/14  ANTIBIOTICS:                  DATE STARTED:  ANTIBIOTICS:                  DATE STARTED:    Antimicrobial:  piperacillin/tazobactam IVPB. 3.375 Gram(s) IV Intermittent every 8 hours    Cardiovascular:  losartan 50 milliGRAM(s) Oral daily    Pulmonary:  ALBUTerol    90 MICROgram(s) HFA Inhaler 2 Puff(s) Inhalation every 6 hours    Hematalogic:  heparin  Injectable 5000 Unit(s) SubCutaneous every 8 hours    Other:  dexmedetomidine Infusion 0.2 MICROgram(s)/kG/Hr IV Continuous <Continuous>  insulin lispro (HumaLOG) corrective regimen sliding scale   SubCutaneous every 6 hours  pantoprazole  Injectable 40 milliGRAM(s) IV Push daily  predniSONE   Tablet 40 milliGRAM(s) Oral daily  propofol Infusion 20 MICROgram(s)/kG/Min IV Continuous <Continuous>    ALBUTerol    90 MICROgram(s) HFA Inhaler 2 Puff(s) Inhalation every 6 hours  dexmedetomidine Infusion 0.2 MICROgram(s)/kG/Hr IV Continuous <Continuous>  heparin  Injectable 5000 Unit(s) SubCutaneous every 8 hours  insulin lispro (HumaLOG) corrective regimen sliding scale   SubCutaneous every 6 hours  losartan 50 milliGRAM(s) Oral daily  pantoprazole  Injectable 40 milliGRAM(s) IV Push daily  piperacillin/tazobactam IVPB. 3.375 Gram(s) IV Intermittent every 8 hours  predniSONE   Tablet 40 milliGRAM(s) Oral daily  propofol Infusion 20 MICROgram(s)/kG/Min IV Continuous <Continuous>    Drug Dosing Weight  Height (cm): 167.64 (14 Jan 2019 15:34)  Weight (kg): 70 (15 Chin 2019 19:54)  BMI (kg/m2): 24.9 (15 Chin 2019 19:54)  BSA (m2): 1.79 (15 Chin 2019 19:54)    CENTRAL LINE: [ ] YES [ x] NO  LOCATION:   DATE INSERTED:  REMOVE: [ ] YES [ ] NO  EXPLAIN:    MUÑOZ: [x ] YES [ ] NO    DATE INSERTED:  REMOVE:  [ ] YES [ ] NO  EXPLAIN:    A-LINE:  [ ] YES [x ] NO  LOCATION:   DATE INSERTED:  REMOVE:  [ ] YES [ ] NO  EXPLAIN:   EXT- 01/21  PMH -reviewed admission note, no change since admission      ABG - ( 21 Jan 2019 04:20 )  pH, Arterial: 7.44  pH, Blood: x     /  pCO2: 42    /  pO2: 121   / HCO3: 28    / Base Excess: 3.9   /  SaO2: 99                    01-20 @ 07:01  -  01-21 @ 07:00  --------------------------------------------------------  IN: 2629.8 mL / OUT: 4280 mL / NET: -1650.2 mL        Mode: PS (Pressure Support)/ Spontaneous  FiO2: 40  PEEP: 5  PS: 5  MAP: 7  PIP: 11      PHYSICAL EXAM:    GENERAL: NAD, well-groomed, well-developed  HEAD:  Atraumatic, Normocephalic  EYES: EOMI, PERRLA, conjunctiva and sclera clear  ENMT: No tonsillar erythema, exudates, or enlargement; Moist mucous membranes, Good dentition, , ETT  NECK: Supple, normal appearance, No JVD; Normal thyroid; Trachea midline  NERVOUS SYSTEM: alert, following commands, purposeful.   CHEST/LUNG: No chest deformity; Normal percussion bilaterally; No rales, rhonchi, wheezing   HEART: Regular rate and rhythm; No murmurs, rubs, or gallops  ABDOMEN: Soft, Nontender, Nondistended;Bowel sounds present  EXTREMITIES:  2+ Peripheral Pulses, No clubbing, cyanosis, or edema  LYMPH: No lymphadenopathy noted  SKIN: No rashes or lesions; Good capillary refill      LABS:  CBC Full  -  ( 21 Jan 2019 06:29 )  WBC Count : 12.3 K/uL  Hemoglobin : 11.6 g/dL  Hematocrit : 38.7 %  Platelet Count - Automated : 148 K/uL  Mean Cell Volume : 76.8 fl  Mean Cell Hemoglobin : 23.0 pg  Mean Cell Hemoglobin Concentration : 30.0 gm/dL  Auto Neutrophil # : 6.6 K/uL  Auto Lymphocyte # : 3.7 K/uL  Auto Monocyte # : 1.3 K/uL  Auto Eosinophil # : 0.6 K/uL  Auto Basophil # : 0.1 K/uL  Auto Neutrophil % : 53.6 %  Auto Lymphocyte % : 30.3 %  Auto Monocyte % : 10.4 %  Auto Eosinophil % : 4.7 %  Auto Basophil % : 1.0 %    01-21    148<H>  |  112<H>  |  16  ----------------------------<  126<H>  3.4<L>   |  29  |  1.08    Ca    8.3<L>      21 Jan 2019 06:29  Phos  4.4     01-21  Mg     2.2     01-21    TPro  7.0  /  Alb  2.9<L>  /  TBili  0.5  /  DBili  x   /  AST  25  /  ALT  21  /  AlkPhos  58  01-21            RADIOLOGY & ADDITIONAL STUDIES REVIEWED: reviewed     [ ]GOALS OF CARE DISCUSSION WITH PATIENT/FAMILY/PROXY:     CRITICAL CARE TIME SPENT: 35 minutes

## 2019-01-22 LAB
ANION GAP SERPL CALC-SCNC: 7 MMOL/L — SIGNIFICANT CHANGE UP (ref 5–17)
BUN SERPL-MCNC: 20 MG/DL — HIGH (ref 7–18)
CALCIUM SERPL-MCNC: 8.3 MG/DL — LOW (ref 8.4–10.5)
CHLORIDE SERPL-SCNC: 107 MMOL/L — SIGNIFICANT CHANGE UP (ref 96–108)
CO2 SERPL-SCNC: 31 MMOL/L — SIGNIFICANT CHANGE UP (ref 22–31)
CREAT SERPL-MCNC: 1.01 MG/DL — SIGNIFICANT CHANGE UP (ref 0.5–1.3)
GLUCOSE BLDC GLUCOMTR-MCNC: 94 MG/DL — SIGNIFICANT CHANGE UP (ref 70–99)
GLUCOSE BLDC GLUCOMTR-MCNC: 95 MG/DL — SIGNIFICANT CHANGE UP (ref 70–99)
GLUCOSE BLDC GLUCOMTR-MCNC: 98 MG/DL — SIGNIFICANT CHANGE UP (ref 70–99)
GLUCOSE SERPL-MCNC: 101 MG/DL — HIGH (ref 70–99)
HCT VFR BLD CALC: 36.5 % — LOW (ref 39–50)
HGB BLD-MCNC: 11.1 G/DL — LOW (ref 13–17)
MAGNESIUM SERPL-MCNC: 2.2 MG/DL — SIGNIFICANT CHANGE UP (ref 1.6–2.6)
MCHC RBC-ENTMCNC: 23.4 PG — LOW (ref 27–34)
MCHC RBC-ENTMCNC: 30.5 GM/DL — LOW (ref 32–36)
MCV RBC AUTO: 76.6 FL — LOW (ref 80–100)
PHOSPHATE SERPL-MCNC: 3.9 MG/DL — SIGNIFICANT CHANGE UP (ref 2.5–4.5)
PLATELET # BLD AUTO: 155 K/UL — SIGNIFICANT CHANGE UP (ref 150–400)
POTASSIUM SERPL-MCNC: 3.3 MMOL/L — LOW (ref 3.5–5.3)
POTASSIUM SERPL-SCNC: 3.3 MMOL/L — LOW (ref 3.5–5.3)
RBC # BLD: 4.76 M/UL — SIGNIFICANT CHANGE UP (ref 4.2–5.8)
RBC # FLD: 15.6 % — HIGH (ref 10.3–14.5)
SODIUM SERPL-SCNC: 145 MMOL/L — SIGNIFICANT CHANGE UP (ref 135–145)
WBC # BLD: 11 K/UL — HIGH (ref 3.8–10.5)
WBC # FLD AUTO: 11 K/UL — HIGH (ref 3.8–10.5)

## 2019-01-22 RX ORDER — PANTOPRAZOLE SODIUM 20 MG/1
40 TABLET, DELAYED RELEASE ORAL
Qty: 0 | Refills: 0 | Status: DISCONTINUED | OUTPATIENT
Start: 2019-01-22 | End: 2019-01-24

## 2019-01-22 RX ORDER — POTASSIUM CHLORIDE 20 MEQ
40 PACKET (EA) ORAL ONCE
Qty: 0 | Refills: 0 | Status: COMPLETED | OUTPATIENT
Start: 2019-01-22 | End: 2019-01-22

## 2019-01-22 RX ORDER — IPRATROPIUM/ALBUTEROL SULFATE 18-103MCG
3 AEROSOL WITH ADAPTER (GRAM) INHALATION EVERY 6 HOURS
Qty: 0 | Refills: 0 | Status: DISCONTINUED | OUTPATIENT
Start: 2019-01-22 | End: 2019-01-24

## 2019-01-22 RX ORDER — HYDRALAZINE HCL 50 MG
5 TABLET ORAL ONCE
Qty: 0 | Refills: 0 | Status: COMPLETED | OUTPATIENT
Start: 2019-01-22 | End: 2019-01-22

## 2019-01-22 RX ORDER — IPRATROPIUM/ALBUTEROL SULFATE 18-103MCG
3 AEROSOL WITH ADAPTER (GRAM) INHALATION EVERY 6 HOURS
Qty: 0 | Refills: 0 | Status: DISCONTINUED | OUTPATIENT
Start: 2019-01-22 | End: 2019-01-22

## 2019-01-22 RX ORDER — NADOLOL 80 MG/1
20 TABLET ORAL DAILY
Qty: 0 | Refills: 0 | Status: DISCONTINUED | OUTPATIENT
Start: 2019-01-22 | End: 2019-01-24

## 2019-01-22 RX ADMIN — HEPARIN SODIUM 5000 UNIT(S): 5000 INJECTION INTRAVENOUS; SUBCUTANEOUS at 05:55

## 2019-01-22 RX ADMIN — PANTOPRAZOLE SODIUM 40 MILLIGRAM(S): 20 TABLET, DELAYED RELEASE ORAL at 08:41

## 2019-01-22 RX ADMIN — PIPERACILLIN AND TAZOBACTAM 25 GRAM(S): 4; .5 INJECTION, POWDER, LYOPHILIZED, FOR SOLUTION INTRAVENOUS at 05:55

## 2019-01-22 RX ADMIN — Medication 3 MILLILITER(S): at 23:22

## 2019-01-22 RX ADMIN — Medication 5 MILLIGRAM(S): at 23:06

## 2019-01-22 RX ADMIN — LOSARTAN POTASSIUM 50 MILLIGRAM(S): 100 TABLET, FILM COATED ORAL at 05:55

## 2019-01-22 RX ADMIN — Medication 40 MILLIEQUIVALENT(S): at 08:41

## 2019-01-22 RX ADMIN — HEPARIN SODIUM 5000 UNIT(S): 5000 INJECTION INTRAVENOUS; SUBCUTANEOUS at 22:15

## 2019-01-22 RX ADMIN — Medication 40 MILLIGRAM(S): at 05:55

## 2019-01-22 RX ADMIN — HEPARIN SODIUM 5000 UNIT(S): 5000 INJECTION INTRAVENOUS; SUBCUTANEOUS at 15:06

## 2019-01-22 RX ADMIN — NADOLOL 20 MILLIGRAM(S): 80 TABLET ORAL at 18:13

## 2019-01-22 RX ADMIN — Medication 3 MILLILITER(S): at 08:53

## 2019-01-22 NOTE — SWALLOW BEDSIDE ASSESSMENT ADULT - SWALLOW EVAL: DIAGNOSIS
Pt p/w no s&s of dysphagia on puree or thin liquids trials: adequate labial seal, good bolus formation & manipulation for purees, timely oral transit; intact oropharyngeal swallow with no overt s/s of laryngeal penetration or aspiration at this exam.

## 2019-01-22 NOTE — SWALLOW BEDSIDE ASSESSMENT ADULT - ASR SWALLOW ASPIRATION MONITOR
change of breathing pattern/position upright (90Y)/cough/gurgly voice/fever/pneumonia/throat clearing/upper respiratory infection/oral hygiene

## 2019-01-22 NOTE — PROGRESS NOTE ADULT - PROBLEM SELECTOR PLAN 3
-Platelets improved.    -HIT panel negative.  -Will restart heparin today- 1/21  Heme/onc Dr. Pickett

## 2019-01-22 NOTE — PROGRESS NOTE ADULT - SUBJECTIVE AND OBJECTIVE BOX
INTERVAL HPI/OVERNIGHT EVENTS: Patient got extubated yesterday, doing better. He is OOBC and is being fed.     PRESSORS: [ ] YES [ ] NO  WHICH:    ANTIBIOTICS:                  DATE STARTED:  ANTIBIOTICS:                  DATE STARTED:  ANTIBIOTICS:                  DATE STARTED:    Antimicrobial:    Cardiovascular:  losartan 50 milliGRAM(s) Oral daily    Pulmonary:  ALBUTerol/ipratropium for Nebulization 3 milliLiter(s) Nebulizer every 6 hours    Hematalogic:  heparin  Injectable 5000 Unit(s) SubCutaneous every 8 hours    Other:  insulin lispro (HumaLOG) corrective regimen sliding scale   SubCutaneous every 6 hours  pantoprazole    Tablet 40 milliGRAM(s) Oral before breakfast    ALBUTerol/ipratropium for Nebulization 3 milliLiter(s) Nebulizer every 6 hours  heparin  Injectable 5000 Unit(s) SubCutaneous every 8 hours  insulin lispro (HumaLOG) corrective regimen sliding scale   SubCutaneous every 6 hours  losartan 50 milliGRAM(s) Oral daily  pantoprazole    Tablet 40 milliGRAM(s) Oral before breakfast    Drug Dosing Weight  Height (cm): 167.64 (14 Jan 2019 15:34)  Weight (kg): 70 (15 Chin 2019 19:54)  BMI (kg/m2): 24.9 (15 Chin 2019 19:54)  BSA (m2): 1.79 (15 Chin 2019 19:54)    CENTRAL LINE: [ ] YES [ ] NO  LOCATION:   DATE INSERTED:  REMOVE: [ ] YES [ ] NO  EXPLAIN:    WANDA: [ ] YES [ ] NO    DATE INSERTED:  REMOVE:  [ ] YES [ ] NO  EXPLAIN:    A-LINE:  [ ] YES [ ] NO  LOCATION:   DATE INSERTED:  REMOVE:  [ ] YES [ ] NO  EXPLAIN:    PMH -reviewed admission note, no change since admission      ABG - ( 21 Jan 2019 13:04 )  pH, Arterial: 7.45  pH, Blood: x     /  pCO2: 44    /  pO2: 121   / HCO3: 30    / Base Excess: 5.5   /  SaO2: 99                    01-21 @ 07:01  -  01-22 @ 07:00  --------------------------------------------------------  IN: 2330.5 mL / OUT: 3240 mL / NET: -909.5 mL        Mode: PS (Pressure Support)/ Spontaneous  FiO2: 40  PEEP: 5  PS: 5  MAP: 7  PIP: 11      PHYSICAL EXAM:    GENERAL: NAD, well-groomed, well-developed  HEAD:  Atraumatic, Normocephalic  EYES: EOMI, PERRLA, conjunctiva and sclera clear  ENMT: No tonsillar erythema, exudates, or enlargement; Moist mucous membranes, Good dentition, [ ]No lesions  NECK: Supple, normal appearance, No JVD; Normal thyroid; Trachea midline  NERVOUS SYSTEM:  Alert & Oriented X3, Good concentration; Motor Strength 5/5 B/L upper and lower extremities; DTRs 2+ intact and symmetric  CHEST/LUNG: No chest deformity; Normal percussion bilaterally; No rales, rhonchi, wheezing   HEART: Regular rate and rhythm; No murmurs, rubs, or gallops  ABDOMEN: Soft, Nontender, Nondistended;Bowel sounds present  EXTREMITIES:  2+ Peripheral Pulses, No clubbing, cyanosis, or edema  LYMPH: No lymphadenopathy noted  SKIN: No rashes or lesions; Good capillary refill      LABS:  CBC Full  -  ( 22 Jan 2019 06:24 )  WBC Count : 11.0 K/uL  Hemoglobin : 11.1 g/dL  Hematocrit : 36.5 %  Platelet Count - Automated : 155 K/uL  Mean Cell Volume : 76.6 fl  Mean Cell Hemoglobin : 23.4 pg  Mean Cell Hemoglobin Concentration : 30.5 gm/dL  Auto Neutrophil # : x  Auto Lymphocyte # : x  Auto Monocyte # : x  Auto Eosinophil # : x  Auto Basophil # : x  Auto Neutrophil % : x  Auto Lymphocyte % : x  Auto Monocyte % : x  Auto Eosinophil % : x  Auto Basophil % : x    01-22    145  |  107  |  20<H>  ----------------------------<  101<H>  3.3<L>   |  31  |  1.01    Ca    8.3<L>      22 Jan 2019 06:24  Phos  3.9     01-22  Mg     2.2     01-22    TPro  7.0  /  Alb  2.9<L>  /  TBili  0.5  /  DBili  x   /  AST  25  /  ALT  21  /  AlkPhos  58  01-21            RADIOLOGY & ADDITIONAL STUDIES REVIEWED:  ***    [ ]GOALS OF CARE DISCUSSION WITH PATIENT/FAMILY/PROXY:    CRITICAL CARE TIME SPENT: 35 minutes INTERVAL HPI/OVERNIGHT EVENTS: Patient got extubated yesterday, doing better. He is OOBC and is being fed.     PRESSORS: [ ] YES [x ] NO  WHICH:      Antimicrobial:    Cardiovascular:  losartan 50 milliGRAM(s) Oral daily    Pulmonary:  ALBUTerol/ipratropium for Nebulization 3 milliLiter(s) Nebulizer every 6 hours    Hematalogic:  heparin  Injectable 5000 Unit(s) SubCutaneous every 8 hours    Other:  insulin lispro (HumaLOG) corrective regimen sliding scale   SubCutaneous every 6 hours  pantoprazole    Tablet 40 milliGRAM(s) Oral before breakfast    ALBUTerol/ipratropium for Nebulization 3 milliLiter(s) Nebulizer every 6 hours  heparin  Injectable 5000 Unit(s) SubCutaneous every 8 hours  insulin lispro (HumaLOG) corrective regimen sliding scale   SubCutaneous every 6 hours  losartan 50 milliGRAM(s) Oral daily  pantoprazole    Tablet 40 milliGRAM(s) Oral before breakfast    Drug Dosing Weight  Height (cm): 167.64 (14 Jan 2019 15:34)  Weight (kg): 70 (15 Chin 2019 19:54)  BMI (kg/m2): 24.9 (15 Chin 2019 19:54)  BSA (m2): 1.79 (15 Chin 2019 19:54)    CENTRAL LINE: [ ] YES [ ] NO  LOCATION:   DATE INSERTED:  REMOVE: [ ] YES [ ] NO  EXPLAIN:    WANDA: [ ] YES [ ] NO    DATE INSERTED:  REMOVE:  [ ] YES [ ] NO  EXPLAIN:    A-LINE:  [ ] YES [ ] NO  LOCATION:   DATE INSERTED:  REMOVE:  [ ] YES [ ] NO  EXPLAIN:    PMH -reviewed admission note, no change since admission      ABG - ( 21 Jan 2019 13:04 )  pH, Arterial: 7.45  pH, Blood: x     /  pCO2: 44    /  pO2: 121   / HCO3: 30    / Base Excess: 5.5   /  SaO2: 99                    01-21 @ 07:01  -  01-22 @ 07:00  --------------------------------------------------------  IN: 2330.5 mL / OUT: 3240 mL / NET: -909.5 mL        Mode: PS (Pressure Support)/ Spontaneous  FiO2: 40  PEEP: 5  PS: 5  MAP: 7  PIP: 11      PHYSICAL EXAM:    GENERAL: NAD, well-groomed, well-developed  HEAD:  Atraumatic, Normocephalic  EYES: EOMI, PERRLA, conjunctiva and sclera clear  ENMT: No tonsillar erythema, exudates, or enlargement; Moist mucous membranes, Good dentition, [ ]No lesions  NECK: Supple, normal appearance, No JVD; Normal thyroid; Trachea midline  NERVOUS SYSTEM:  Alert & Oriented X3, Good concentration; Motor Strength 5/5 B/L upper and lower extremities; DTRs 2+ intact and symmetric  CHEST/LUNG: No chest deformity No rales, rhonchi, wheezing   HEART: Regular rate and rhythm; No murmurs, rubs, or gallops  ABDOMEN: Soft, Nontender, Nondistended;Bowel sounds present  EXTREMITIES:  2+ Peripheral Pulses, No clubbing, cyanosis, or edema  LYMPH: No lymphadenopathy noted  SKIN: No rashes or lesions; Good capillary refill      LABS:  CBC Full  -  ( 22 Jan 2019 06:24 )  WBC Count : 11.0 K/uL  Hemoglobin : 11.1 g/dL  Hematocrit : 36.5 %  Platelet Count - Automated : 155 K/uL  Mean Cell Volume : 76.6 fl  Mean Cell Hemoglobin : 23.4 pg  Mean Cell Hemoglobin Concentration : 30.5 gm/dL  Auto Neutrophil # : x  Auto Lymphocyte # : x  Auto Monocyte # : x  Auto Eosinophil # : x  Auto Basophil # : x  Auto Neutrophil % : x  Auto Lymphocyte % : x  Auto Monocyte % : x  Auto Eosinophil % : x  Auto Basophil % : x    01-22    145  |  107  |  20<H>  ----------------------------<  101<H>  3.3<L>   |  31  |  1.01    Ca    8.3<L>      22 Jan 2019 06:24  Phos  3.9     01-22  Mg     2.2     01-22    TPro  7.0  /  Alb  2.9<L>  /  TBili  0.5  /  DBili  x   /  AST  25  /  ALT  21  /  AlkPhos  58  01-21            RADIOLOGY & ADDITIONAL STUDIES REVIEWED:      [ ]GOALS OF CARE DISCUSSION WITH PATIENT/FAMILY/PROXY:    CRITICAL CARE TIME SPENT: 35 minutes INTERVAL HPI/OVERNIGHT EVENTS: Patient got extubated yesterday, doing better. He is OOBC and is being fed.     PRESSORS: [ ] YES [x ] NO  WHICH:      Antimicrobial:    Cardiovascular:  losartan 50 milliGRAM(s) Oral daily    Pulmonary:  ALBUTerol/ipratropium for Nebulization 3 milliLiter(s) Nebulizer every 6 hours    Hematalogic:  heparin  Injectable 5000 Unit(s) SubCutaneous every 8 hours    Other:  insulin lispro (HumaLOG) corrective regimen sliding scale   SubCutaneous every 6 hours  pantoprazole    Tablet 40 milliGRAM(s) Oral before breakfast    ALBUTerol/ipratropium for Nebulization 3 milliLiter(s) Nebulizer every 6 hours  heparin  Injectable 5000 Unit(s) SubCutaneous every 8 hours  insulin lispro (HumaLOG) corrective regimen sliding scale   SubCutaneous every 6 hours  losartan 50 milliGRAM(s) Oral daily  pantoprazole    Tablet 40 milliGRAM(s) Oral before breakfast    Drug Dosing Weight  Height (cm): 167.64 (14 Jan 2019 15:34)  Weight (kg): 70 (15 Chin 2019 19:54)  BMI (kg/m2): 24.9 (15 Chin 2019 19:54)  BSA (m2): 1.79 (15 Chin 2019 19:54)    CENTRAL LINE: [ ] YES [ ] NO  LOCATION:   DATE INSERTED:  REMOVE: [ ] YES [ ] NO  EXPLAIN:    WANDA: [ ] YES [ ] NO    DATE INSERTED:  REMOVE:  [ ] YES [ ] NO  EXPLAIN:    A-LINE:  [ ] YES [ ] NO  LOCATION:   DATE INSERTED:  REMOVE:  [ ] YES [ ] NO  EXPLAIN:    PMH -reviewed admission note, no change since admission      ABG - ( 21 Jan 2019 13:04 )  pH, Arterial: 7.45  pH, Blood: x     /  pCO2: 44    /  pO2: 121   / HCO3: 30    / Base Excess: 5.5   /  SaO2: 99        01-21 @ 07:01  -  01-22 @ 07:00  --------------------------------------------------------  IN: 2330.5 mL / OUT: 3240 mL / NET: -909.5 mL    Mode: PS (Pressure Support)/ Spontaneous  FiO2: 40  PEEP: 5  PS: 5  MAP: 7  PIP: 11      PHYSICAL EXAM:  GENERAL: NAD, well-groomed, well-developed  HEAD:  Atraumatic, Normocephalic  EYES: EOMI, PERRLA, conjunctiva and sclera clear  ENMT: No tonsillar erythema, exudates, or enlargement; Moist mucous membranes, Good dentition, [ ]No lesions  NECK: Supple, normal appearance, No JVD; Normal thyroid; Trachea midline  NERVOUS SYSTEM:  Alert & Oriented X3, Good concentration; Motor Strength 5/5 B/L upper and lower extremities; DTRs 2+ intact and symmetric  CHEST/LUNG: No chest deformity No rales, rhonchi, wheezing   HEART: Regular rate and rhythm; No murmurs, rubs, or gallops  ABDOMEN: Soft, Nontender, Nondistended;Bowel sounds present  EXTREMITIES:  2+ Peripheral Pulses, No clubbing, cyanosis, or edema  LYMPH: No lymphadenopathy noted  SKIN: No rashes or lesions; Good capillary refill      LABS:  CBC Full  -  ( 22 Jan 2019 06:24 )  WBC Count : 11.0 K/uL  Hemoglobin : 11.1 g/dL  Hematocrit : 36.5 %  Platelet Count - Automated : 155 K/uL  Mean Cell Volume : 76.6 fl  Mean Cell Hemoglobin : 23.4 pg  Mean Cell Hemoglobin Concentration : 30.5 gm/dL  Auto Neutrophil # : x  Auto Lymphocyte # : x  Auto Monocyte # : x  Auto Eosinophil # : x  Auto Basophil # : x  Auto Neutrophil % : x  Auto Lymphocyte % : x  Auto Monocyte % : x  Auto Eosinophil % : x  Auto Basophil % : x    01-22    145  |  107  |  20<H>  ----------------------------<  101<H>  3.3<L>   |  31  |  1.01    Ca    8.3<L>      22 Jan 2019 06:24  Phos  3.9     01-22  Mg     2.2     01-22    TPro  7.0  /  Alb  2.9<L>  /  TBili  0.5  /  DBili  x   /  AST  25  /  ALT  21  /  AlkPhos  58  01-21            RADIOLOGY & ADDITIONAL STUDIES REVIEWED:      [ ]GOALS OF CARE DISCUSSION WITH PATIENT/FAMILY/PROXY:    CRITICAL CARE TIME SPENT: 35 minutes

## 2019-01-22 NOTE — PROGRESS NOTE ADULT - PROBLEM SELECTOR PLAN 1
Patient was intubated in ED, he had AMS and was hypercapnic   ABGs 1/18 morning improved. He was given SBT which he passed. He got extubated, but got hypoxic and had to be reintubated.  -ABGs are improved, gave him Lasix 40 IV.   -He felt sob while on CPAP trial, possible agitation.  -passed SBT, gt extubated 01/21  -Completed abx therapy.   -He is on room air and saturating 100%   -Feeding re-started.  -Patient is stable to be down graded to medicine.

## 2019-01-22 NOTE — CHART NOTE - NSCHARTNOTEFT_GEN_A_CORE
63 year old M from home, lives with girl friend Ms. Remy , with PMHx of HTN, HLD, right LE varicose veins, umbilical hernia, ex smoker (1Pack over 3 days >50 years, quit 7 months back ), chronic ethanolic but family denies any alcohol abuse, Recently admitted for COPD exacerbation was on NIVBIPAP in July 2018, recently had EGD and colonoscopy on 1/111/19 which showed esophageal varices ( was started on nadolol) and colon polyps was BIB EMS for unresponsiveness. According to EMS, patient staggered on the street to some bystanders and said "I need help"; they called the ambulance. According to EMS patient was found to be minimally responsive (grunting) but not responding to verbal or painful stimuli. History obtained from ED note and Pts Girl friend Ms. Remy and primary GI Dr. Christensen. According to girl friend, pt had EGD and colonoscopy last friday and was not feeling well after taking medication, unable to elaborate more as she said she is busy and will call later. She also reported that pts blood sugars are running high above 400s and went to see endocrinologist, was started on oral meds, but pt is not taking any insulin and his BP has been running in 190s. Confirmed with Dr Christensen, who said that pts blood pressure was elevated and was started on nadolol for esophageal varices, doubt if pt is compliant with medications.     In ED, patient was hypoxic and hypertensive to 266/ 111mmhg with mottled skin per ED attending. EKG- NSR with sinus arrythmia @67BPM , LBBB. cardiac enzymes x1- negative , CXR s/o bilateral pulmonary congestion more pronounced on RLL which seems to be consolidation  (awaiting official report). Labs pertinent for WBC- 15.1 , bun/ creat- 14/1.38, BNP- 458, ABG 0- 7.1/77/331/23/ 100 Fio2 on Ac 16/500/100%/5, CT head no acute pathology. HPI 1/14      Pt was admitted to ICU for hypoxic and hypercapnic respiratory failure likely sec to COPD exacerbation, questionable PNA.       Problem/Plan - 1:  ·  Problem: Respiratory failure with hypercapnia.  Plan: Patient was intubated in ED, he had AMS and was hypercapnic   ABGs 1/18 morning improved. He was given SBT which he passed. He got extubated, but got hypoxic and had to be reintubated.  - He later passed SBT, mental status was improved and got  extubated 01/21  -Completed abx therapy- 1/21  -He is on room air and saturating 100%   -Feeding re-started (puree) , he is out of bed to chair.      Problem/Plan - 2:  ·  Problem: COPD (chronic obstructive pulmonary disease).  Plan: PT has hx of COPD.  O/E- bilateral air entry+ with decreased air entry on RLL.  will c/w Duoneb PRN   -Completed steroid tapered dose.      Problem/Plan - 3:  ·  Problem: Thrombocytopenia.  Plan: -Platelets improved.    -HIT panel negative.  -Will restart heparin today- 1/21  Heme/onc Dr. Pickett.      Problem/Plan - 4:  ·  Problem: Esophageal varices.  Plan: pt was recently found to have esophageal varices on EGD.  -Nadolol was not given as patient had bradycardia, will restart  today.   -Monitor HR.   GI - Dr. Christensen.      Problem/Plan - 5:  ·  Problem: Prophylactic measure.  Plan: [] Previous VTE                                                3  [] Thrombophilia                                             2  [] Lower limb paralysis                                   2    [] Current Cancer                                             2   [] Immobilization > 24 hrs                              1  [x] ICU/CCU stay > 24 hours                             1  [x] Age > 60                                                         1    IMPROVE VTE Score: 2, VTE ppx with heparin and GI ppx with protonix.     Patient is stable to be down graded to medicine. Report given to floor attending and resident.     Things to follow:  -Goss catheter removed today 1/22/2019 at around 11pm. TOV in next 8-9 hours   -Follow PT evaluation. 63 year old M from home, lives with girl friend Ms. Remy , with PMHx of HTN, HLD, right LE varicose veins, umbilical hernia, ex smoker (1Pack over 3 days >50 years, quit 7 months back ), chronic ethanolic but family denies any alcohol abuse, Recently admitted for COPD exacerbation was on NIVBIPAP in July 2018, recently had EGD and colonoscopy on 1/111/19 which showed esophageal varices ( was started on nadolol) and colon polyps was BIB EMS for unresponsiveness. According to EMS, patient staggered on the street to some bystanders and said "I need help"; they called the ambulance. According to EMS patient was found to be minimally responsive (grunting) but not responding to verbal or painful stimuli. History obtained from ED note and Pts Girl friend Ms. Remy and primary GI Dr. Christensen. According to girl friend, pt had EGD and colonoscopy last friday and was not feeling well after taking medication, unable to elaborate more as she said she is busy and will call later. She also reported that pts blood sugars are running high above 400s and went to see endocrinologist, was started on oral meds, but pt is not taking any insulin and his BP has been running in 190s. Confirmed with Dr Christensen, who said that pts blood pressure was elevated and was started on nadolol for esophageal varices, doubt if pt is compliant with medications.     In ED, patient was hypoxic and hypertensive to 266/ 111mmhg with mottled skin per ED attending. EKG- NSR with sinus arrythmia @67BPM , LBBB. cardiac enzymes x1- negative , CXR s/o bilateral pulmonary congestion more pronounced on RLL which seems to be consolidation  (awaiting official report). Labs pertinent for WBC- 15.1 , bun/ creat- 14/1.38, BNP- 458, ABG 0- 7.1/77/331/23/ 100 Fio2 on Ac 16/500/100%/5, CT head no acute pathology. HPI 1/14      Pt was admitted to ICU for hypoxic and hypercapnic respiratory failure likely sec to COPD exacerbation, questionable PNA.       Problem/Plan - 1:  ·  Problem: Respiratory failure with hypercapnia.  Plan: Patient was intubated in ED, he had AMS and was hypercapnic   ABGs 1/18 morning improved. He was given SBT which he passed. He got extubated, but got hypoxic and had to be reintubated.  - He later passed SBT, mental status was improved and got  extubated 01/21  -Completed abx therapy- 1/21  -He is on room air and saturating 100%   -Feeding re-started (puree) , he is out of bed to chair.      Problem/Plan - 2:  ·  Problem: COPD (chronic obstructive pulmonary disease).  Plan: PT has hx of COPD.  O/E- bilateral air entry+ with decreased air entry on RLL.  will c/w Duoneb PRN   -Completed steroid tapered dose.      Problem/Plan - 3:  ·  Problem: Thrombocytopenia.  Plan: -Platelets improved.    -HIT panel negative.  -Will restart heparin today- 1/21  Heme/onc Dr. Pickett.      Problem/Plan - 4:  ·  Problem: Esophageal varices.  Plan: pt was recently found to have esophageal varices on EGD.  -Nadolol was not given as patient had bradycardia, will restart  today.   -Monitor HR.   GI - Dr. Christensen.      Problem/Plan - 5:  ·  Problem: Prophylactic measure.  Plan: [] Previous VTE                                                3  [] Thrombophilia                                             2  [] Lower limb paralysis                                   2    [] Current Cancer                                             2   [] Immobilization > 24 hrs                              1  [x] ICU/CCU stay > 24 hours                             1  [x] Age > 60                                                         1    IMPROVE VTE Score: 2, VTE ppx with heparin and GI ppx with protonix.     Patient is stable to be down graded to medicine. Report given to floor attending and resident.     Things to follow:  -Goss catheter removed today 1/22/2019 at around 11am. TOV in next 8-9 hours   -Follow PT evaluation.

## 2019-01-22 NOTE — SWALLOW BEDSIDE ASSESSMENT ADULT - ADDITIONAL RECOMMENDATIONS
Pt p/w swallow WFL; advance diet to mechanical soft solids at Dr's discretion when Pt has dentures from home

## 2019-01-22 NOTE — SWALLOW BEDSIDE ASSESSMENT ADULT - SLP PERTINENT HISTORY OF CURRENT PROBLEM
64 y/o M from home with PMHx of HTN, HLD, right LE varicose veins, umbilical hernia, ex smoker (1Pack over 3 days >50 years, quit 7 months back ), chronic ethanolic but family denies any alcohol abuse, Recently admitted for COPD exacerbation was on NIVBIPAP in July 2018. Recently had EGD and colonoscopy on 1/11/19 which showed esophageal varices (was started on nadolol) and colon polyps was BIB EMS for unresponsiveness. In ED, patient was hypoxic and hypertensive, CXR +bilateral pulmonary congestion more pronounced on RLL which seems to be consolidation. CT head no acute pathology.

## 2019-01-22 NOTE — PROGRESS NOTE ADULT - ATTENDING COMMENTS
Patient seen and examined with resident, Addendum to above.    62 y/o male with history of COPD, HTN, HLD admitted to the ICU with hypercapnic respiratory failure. Likely due to COPD exacerbation. Extubated yesterday. Appears comfortable. Alert and oriented.     Assessment:  1. Acute hypercapnic respiratory failure  2. COPD  3. HTN  4. Esophageal varices   5. Thrombocytopenia     - Nebs PRN  - Antibiotics per ID recs  - BP control, restart nadolol  - oral diet  - d/c randolph catheter   - SCD for DVT prophylaxis  - OOB to chair, PT evaluation  - Transfer out of iCU today
Patient seen and examined with resident, Addendum to above.    64 y/o male with history of COPD, HTN, HLD admitted to the ICU with hypercapnic respiratory failure. Likely due to COPD exacerbation. Remains sedated and intubated. No evidence of distress at this time. Failed SBT this AM due to agitation.     Assessment:  1. Acute hypercapnic respiratory failure  2. COPD  3. HTN  4. Esophageal varices   5. Thrombocytopenia     - Cont. Ventilatory support for now  - Cont. propofol  - Start precedex today  - Tube feeds  - Cont. Steroids   - Cont. nebs RTC  - Antibiotics per ID recs  - lasix and aim for negative balance   - BP control  - SCD for DVT prophylaxis  - 34 min of critical care time spent on this patient's care
Patient seen and examined with resident, Addendum to above.    64 y/o male with history of COPD, HTN, HLD admitted to the ICU with hypercapnic respiratory failure. Likely due to COPD exacerbation. This morning, patient with CPAP but became distressed while being weaned. ?Aspiration event on admission, though no gross evidence of a dense consolidation on chest xray    Assessment:  1. Acute hypercapnic respiratory failure  2. COPD  3. HTN  4. Esophageal varices   5. Thrombocytopenia     - Cont. Ventilatory management  - Will attempt weaning on dexmedetomidine   - Short course of steroids  - Cont. nebs RTC  - Antibiotics per ID recs, though consider de-escalating   - give a trial of lasix today  - F/u hematology recs  - SCD for DVT prophylaxis  - Monitor for signs of bleeding  - 35 min of critical care time spent on this patient's are
Please refer to my note from today.
Please refer to my note from today.
Patient seen and examined with resident, Addendum to above.    64 y/o male with history of COPD, HTN, HLD admitted to the ICU with hypercapnic respiratory failure. Likely due to COPD exacerbation. This morning, patient with CPAP but became distressed while being weaned. ?Aspiration event on admission, though no gross evidence of a dense consolidation on chest xray. Did well on SBT. ABG on PS was acceptable with out hypercapnea or hypoxia. Patient extubated, but had to be reintubated for respiratory distress post extubation.     Assessment:  1. Acute hypercapnic respiratory failure  2. COPD  3. HTN  4. Esophageal varices   5. Thrombocytopenia     - Cont. Ventilatory support for now  - Cont. sedatives for now   - Free water through feeding tube  - Cont. Steroids   - Cont. nebs RTC  - Antibiotics per ID recs, though consider de-escalating   - Start losartan  - SCD for DVT prophylaxis  - Monitor for signs of bleeding  - 36 min of critical care time spent on this patient's are
Patient seen and examined with resident, Addendum to above.    62 y/o male with history of COPD, HTN, HLD admitted to the ICU with hypercapnic respiratory failure. Likely due to COPD exacerbation. Remains sedated and intubated. No evidence of distress at this time.     Assessment:  1. Acute hypercapnic respiratory failure  2. COPD  3. HTN  4. Esophageal varices   5. Thrombocytopenia     - Cont. Ventilatory support for now  - D/c versed drip  - Cont. propofol  - Tube feeds  - Cont. Steroids   - Cont. nebs RTC  - Antibiotics per ID recs  - BP control  - SCD for DVT prophylaxis  - 33 min of critical care time spent on this patient's are
Patient seen and examined with resident, Addendum to above.    64 y/o male with history of COPD, HTN, HLD admitted to the ICU with hypercapnic respiratory failure. Likely due to COPD exacerbation. SBT on Precedex today and patient extubated.     Assessment:  1. Acute hypercapnic respiratory failure  2. COPD  3. HTN  4. Esophageal varices   5. Thrombocytopenia     - extubated to face mask, titrate Fio2 to maintain saturation >92%  - Tube feeds  - Cont. Steroids   - Cont. nebs RTC  - Antibiotics per ID recs  - lasix and aim for negative balance   - BP control  - SCD for DVT prophylaxis  - OOB to chair, PT evaluation  - 39 min of critical care time spent on this patient's care

## 2019-01-22 NOTE — PROGRESS NOTE ADULT - PROBLEM SELECTOR PLAN 2
PT has hx of COPD.  O/E- bilateral air entry+ with decreased air entry on RLL.  will c/w Duoneb PRN   -Completed steroid tapered dose.

## 2019-01-22 NOTE — SWALLOW BEDSIDE ASSESSMENT ADULT - ORAL PREPARATORY PHASE
Within functional limits Reduced oral grading/Decreased mastication ability/Pt spit out all solid pieces

## 2019-01-22 NOTE — SWALLOW BEDSIDE ASSESSMENT ADULT - SWALLOW EVAL: RECOMMENDED FEEDING/EATING TECHNIQUES
oral hygiene/allow for swallow between intakes/position upright (90 degrees)/small sips/bites/alternate food with liquid

## 2019-01-22 NOTE — SWALLOW BEDSIDE ASSESSMENT ADULT - COMMENTS
Pt AA+Ox3, Pt received OOB, seated upright in chair for exam. Lunch tray present. +Edentulous; Pt spit out all solid trials.

## 2019-01-22 NOTE — PROGRESS NOTE ADULT - PROBLEM SELECTOR PLAN 4
pt was recently found to have esophageal varices on EGD.  -Nadolol was not given as patient had bradycardia, will restart  today.   GI - Dr. Christensen.

## 2019-01-23 DIAGNOSIS — I10 ESSENTIAL (PRIMARY) HYPERTENSION: ICD-10-CM

## 2019-01-23 LAB
GLUCOSE BLDC GLUCOMTR-MCNC: 103 MG/DL — HIGH (ref 70–99)
GLUCOSE BLDC GLUCOMTR-MCNC: 104 MG/DL — HIGH (ref 70–99)
GLUCOSE BLDC GLUCOMTR-MCNC: 106 MG/DL — HIGH (ref 70–99)
GLUCOSE BLDC GLUCOMTR-MCNC: 132 MG/DL — HIGH (ref 70–99)

## 2019-01-23 RX ORDER — LOSARTAN POTASSIUM 100 MG/1
100 TABLET, FILM COATED ORAL DAILY
Qty: 0 | Refills: 0 | Status: DISCONTINUED | OUTPATIENT
Start: 2019-01-23 | End: 2019-01-24

## 2019-01-23 RX ORDER — INSULIN LISPRO 100/ML
VIAL (ML) SUBCUTANEOUS
Qty: 0 | Refills: 0 | Status: DISCONTINUED | OUTPATIENT
Start: 2019-01-23 | End: 2019-01-24

## 2019-01-23 RX ORDER — HYDRALAZINE HCL 50 MG
5 TABLET ORAL ONCE
Qty: 0 | Refills: 0 | Status: COMPLETED | OUTPATIENT
Start: 2019-01-23 | End: 2019-01-23

## 2019-01-23 RX ADMIN — HEPARIN SODIUM 5000 UNIT(S): 5000 INJECTION INTRAVENOUS; SUBCUTANEOUS at 13:27

## 2019-01-23 RX ADMIN — LOSARTAN POTASSIUM 100 MILLIGRAM(S): 100 TABLET, FILM COATED ORAL at 17:21

## 2019-01-23 RX ADMIN — PANTOPRAZOLE SODIUM 40 MILLIGRAM(S): 20 TABLET, DELAYED RELEASE ORAL at 07:48

## 2019-01-23 RX ADMIN — HEPARIN SODIUM 5000 UNIT(S): 5000 INJECTION INTRAVENOUS; SUBCUTANEOUS at 05:54

## 2019-01-23 RX ADMIN — Medication 100 MILLIGRAM(S): at 11:19

## 2019-01-23 RX ADMIN — Medication 5 MILLIGRAM(S): at 01:25

## 2019-01-23 RX ADMIN — HEPARIN SODIUM 5000 UNIT(S): 5000 INJECTION INTRAVENOUS; SUBCUTANEOUS at 21:53

## 2019-01-23 RX ADMIN — NADOLOL 20 MILLIGRAM(S): 80 TABLET ORAL at 05:54

## 2019-01-23 RX ADMIN — LOSARTAN POTASSIUM 50 MILLIGRAM(S): 100 TABLET, FILM COATED ORAL at 05:54

## 2019-01-23 NOTE — PHYSICAL THERAPY INITIAL EVALUATION ADULT - PERTINENT HX OF CURRENT PROBLEM, REHAB EVAL
Pt. admitted from home due to bilat leg pain and numbness. Pt. w/ h/o Varicose veins on bilat LE, HTN, HLD; chronic ethalonic.

## 2019-01-23 NOTE — PROGRESS NOTE ADULT - PROBLEM SELECTOR PROBLEM 1
Respiratory failure with hypercapnia
Respiratory failure with hypercapnia
Thrombocytopenia
COPD (chronic obstructive pulmonary disease)
Respiratory failure with hypercapnia

## 2019-01-23 NOTE — PROGRESS NOTE ADULT - PROBLEM SELECTOR PROBLEM 3
Encephalopathy
Thrombocytopenia
Encephalopathy
Respiratory failure with hypercapnia
Thrombocytopenia
Thrombocytopenia

## 2019-01-23 NOTE — PROGRESS NOTE ADULT - SUBJECTIVE AND OBJECTIVE BOX
PGY1 Note discussed with supervising resident and primary attending.    Patient is a 63y old  Male who presents with a chief complaint of Pt was brought in unresponsive (23 Jan 2019 14:24)      INTERVAL HPI/OVERNIGHT EVENTS: pt assessed on floor after icu downgrade, offers no new complaints.     MEDICATIONS  (STANDING):  heparin  Injectable 5000 Unit(s) SubCutaneous every 8 hours  insulin lispro (HumaLOG) corrective regimen sliding scale   SubCutaneous Before meals and at bedtime  losartan 100 milliGRAM(s) Oral daily  nadolol 20 milliGRAM(s) Oral daily  pantoprazole    Tablet 40 milliGRAM(s) Oral before breakfast    MEDICATIONS  (PRN):  ALBUTerol/ipratropium for Nebulization 3 milliLiter(s) Nebulizer every 6 hours PRN Shortness of Breath and/or Wheezing  guaiFENesin   Syrup  (Sugar-Free) 100 milliGRAM(s) Oral every 6 hours PRN Cough      Allergies    No Known Allergies    Intolerances        REVIEW OF SYSTEMS:  CONSTITUTIONAL: No fever, weight loss, or fatigue  RESPIRATORY: occasional dry cough, no wheezing, or shortness of breath  CARDIOVASCULAR: No chest pain, palpitations, dizziness, or leg swelling  GASTROINTESTINAL: No abdominal pain. No nausea, vomiting, diarrhea or constipation.   NEUROLOGICAL: No headaches, memory loss, loss of strength, numbness, or tremors  SKIN: No itching, burning, rashes, or lesions     Vital Signs Last 24 Hrs  T(C): 36.2 (23 Jan 2019 14:29), Max: 36.8 (23 Jan 2019 02:21)  T(F): 97.1 (23 Jan 2019 14:29), Max: 98.3 (23 Jan 2019 02:21)  HR: 52 (23 Jan 2019 14:29) (52 - 74)  BP: 144/48 (23 Jan 2019 14:29) (144/48 - 184/65)  BP(mean): --  RR: 18 (23 Jan 2019 14:29) (18 - 18)  SpO2: 98% (23 Jan 2019 14:29) (96% - 99%)    PHYSICAL EXAM:  GENERAL: NAD, well-groomed, well-developed  HEAD:  Atraumatic, Normocephalic  EYES: EOMI, PERRLA, conjunctiva and sclera clear  NECK: Supple, No JVD, Normal thyroid  CHEST/LUNG: Clear to auscultation  HEART: Regular rate and rhythm; No murmurs, rubs, or gallops  ABDOMEN: Soft, Nontender, Nondistended; Bowel sounds present  NERVOUS SYSTEM:  Alert & Oriented X3, Good concentration; Motor Strength 5/5 B/L   EXTREMITIES:  2+ Peripheral Pulses, No clubbing, cyanosis, or edema      LABS:                        11.1   11.0  )-----------( 155      ( 22 Jan 2019 06:24 )             36.5     01-22    145  |  107  |  20<H>  ----------------------------<  101<H>  3.3<L>   |  31  |  1.01    Ca    8.3<L>      22 Jan 2019 06:24  Phos  3.9     01-22  Mg     2.2     01-22          CAPILLARY BLOOD GLUCOSE      POCT Blood Glucose.: 132 mg/dL (23 Jan 2019 12:03)  POCT Blood Glucose.: 103 mg/dL (23 Jan 2019 06:20)  POCT Blood Glucose.: 94 mg/dL (22 Jan 2019 23:44)  POCT Blood Glucose.: 95 mg/dL (22 Jan 2019 21:16)  POCT Blood Glucose.: 98 mg/dL (22 Jan 2019 17:04)

## 2019-01-23 NOTE — PROGRESS NOTE ADULT - PROBLEM SELECTOR PLAN 5
pt was recently found to have esophageal varices on EGD.  - continue with Nadolol   GI - Dr. Christensen.

## 2019-01-23 NOTE — PROGRESS NOTE ADULT - PROBLEM SELECTOR PLAN 2
patient's blood pressure maintaining high in 170's to 150's  Increase losartan to 100mg  monitor bp patient's blood pressure maintaining high in 170's to 150's  Increase losartan to 100mg  monitor bp  Echo with normal LVSF, grade 2 DD, mild AS

## 2019-01-23 NOTE — PROGRESS NOTE ADULT - SUBJECTIVE AND OBJECTIVE BOX
HPI:  63 year old M from home, lives with girl friend Ms. Remy , with PMHx of HTN, HLD, right LE varicose veins, umbilical hernia, ex smoker (1Pack over 3 days >50 years, quit 7 months back ), chronic ethanolic but family denies any alcohol abuse, Recently admitted for COPD exacerbation was on NIVBIPAP in July 2018, recently had EGD and colonoscopy on 1/111/19 which showed esophageal varices ( was started on nadolol) and colon polyps was BIB EMS for unresponsiveness. According to EMS, patient staggered on the street to some bystanders and said "I need help"; they called the ambulance. According to EMS patient was found to be minimally responsive (grunting) but not responding to verbal or painful stimuli. History obtained from ED note and Pts Girl friend Ms. Remy and primary GI Dr. Christensen. According to girl friend, pt had EGD and colonoscopy last friday and was not feeling well after taking medication, unable to elaborate more as she said she is busy and will call later. She also reported that pts blood sugars are running high above 400s and went to see endocrinologist, was started on oral meds, but pt is not taking any insulin and his BP has been running in 190s. Confirmed with Dr Christensen, who said that pts blood pressure was elevated and was started on nadolol for esophageal varices, doubt if pt is compliant with medications.     In ED, patient was hypoxic and hypertensive to 266/ 111mmhg with mottled skin per ED attending. EKG- NSR with sinus arrythmia @67BPM , LBBB. cardiac enzymes x1- negative , CXR s/o bilateral pulmonary congestion more pronounced on RLL which seems to be consolidation  (awaiting official report). Labs pertinent for WBC- 15.1 , bun/ creat- 14/1.38, BNP- 458, ABG 0- 7.1/77/331/23/ 100 Fio2 on Ac 16/500/100%/5, CT head no acute pathology.     Pt is being admitted to ICU for hypoxic and hypercapenic respiratory failure secondary to Aspiration PNA and hypertensive encephalopathy. (14 Jan 2019 17:58)      Patient is a 63y old  Male who presents with a chief complaint of Pt was brought in unresponsive (22 Jan 2019 12:04)      INTERVAL HPI/OVERNIGHT EVENTS:  T(C): 36.7 (01-23-19 @ 05:00), Max: 36.8 (01-23-19 @ 02:21)  HR: 68 (01-23-19 @ 11:02) (59 - 74)  BP: 157/68 (01-23-19 @ 11:02) (149/55 - 184/65)  RR: 18 (01-23-19 @ 05:00) (18 - 19)  SpO2: 98% (01-23-19 @ 11:02) (96% - 99%)  Wt(kg): --  I&O's Summary      REVIEW OF SYSTEMS: denies fever, chills, SOB, palpitations, chest pain, abdominal pain, nausea, vomitting, diarrhea, constipation, dizziness    MEDICATIONS  (STANDING):  heparin  Injectable 5000 Unit(s) SubCutaneous every 8 hours  insulin lispro (HumaLOG) corrective regimen sliding scale   SubCutaneous Before meals and at bedtime  losartan 100 milliGRAM(s) Oral daily  nadolol 20 milliGRAM(s) Oral daily  pantoprazole    Tablet 40 milliGRAM(s) Oral before breakfast    MEDICATIONS  (PRN):  ALBUTerol/ipratropium for Nebulization 3 milliLiter(s) Nebulizer every 6 hours PRN Shortness of Breath and/or Wheezing  guaiFENesin   Syrup  (Sugar-Free) 100 milliGRAM(s) Oral every 6 hours PRN Cough      PHYSICAL EXAM:  GENERAL: NAD, well-groomed, well-developed  HEAD:  Atraumatic, Normocephalic  EYES: EOMI, PERRLA, conjunctiva and sclera clear  ENMT: No tonsillar erythema, exudates, or enlargement; Moist mucous membranes, Good dentition, No lesions  NECK: Supple, No JVD, Normal thyroid  NERVOUS SYSTEM:  Alert & Oriented X3, Good concentration; Motor Strength 5/5 B/L upper and lower extremities; DTRs 2+ intact and symmetric  CHEST/LUNG: Clear to percussion bilaterally; No rales, rhonchi, wheezing, or rubs  HEART: Regular rate and rhythm; No murmurs, rubs, or gallops  ABDOMEN: Soft, Nontender, Nondistended; Bowel sounds present  EXTREMITIES:  2+ Peripheral Pulses, No clubbing, cyanosis, or edema  LYMPH: No lymphadenopathy noted  SKIN: No rashes or lesions  LABS:                        11.1   11.0  )-----------( 155      ( 22 Jan 2019 06:24 )             36.5     01-22    145  |  107  |  20<H>  ----------------------------<  101<H>  3.3<L>   |  31  |  1.01    Ca    8.3<L>      22 Jan 2019 06:24  Phos  3.9     01-22  Mg     2.2     01-22          CAPILLARY BLOOD GLUCOSE      POCT Blood Glucose.: 132 mg/dL (23 Jan 2019 12:03)  POCT Blood Glucose.: 103 mg/dL (23 Jan 2019 06:20)  POCT Blood Glucose.: 94 mg/dL (22 Jan 2019 23:44)  POCT Blood Glucose.: 95 mg/dL (22 Jan 2019 21:16)  POCT Blood Glucose.: 98 mg/dL (22 Jan 2019 17:04)

## 2019-01-23 NOTE — PHYSICAL THERAPY INITIAL EVALUATION ADULT - RANGE OF MOTION EXAMINATION, REHAB EVAL
Left UE ROM was WFL (within functional limits)/Right UE ROM was WFL (within functional limits)/Left LE ROM was WFL (within functional limits)/Right LE ROM was WFL (within functional limits)

## 2019-01-23 NOTE — PROGRESS NOTE ADULT - PROBLEM SELECTOR PROBLEM 6
Esophageal varices
Prophylactic measure

## 2019-01-24 ENCOUNTER — TRANSCRIPTION ENCOUNTER (OUTPATIENT)
Age: 64
End: 2019-01-24

## 2019-01-24 VITALS
DIASTOLIC BLOOD PRESSURE: 49 MMHG | RESPIRATION RATE: 16 BRPM | TEMPERATURE: 99 F | HEART RATE: 61 BPM | OXYGEN SATURATION: 98 % | SYSTOLIC BLOOD PRESSURE: 147 MMHG

## 2019-01-24 LAB
ALBUMIN SERPL ELPH-MCNC: 2.9 G/DL — LOW (ref 3.5–5)
ALP SERPL-CCNC: 52 U/L — SIGNIFICANT CHANGE UP (ref 40–120)
ALT FLD-CCNC: 20 U/L DA — SIGNIFICANT CHANGE UP (ref 10–60)
AMMONIA BLD-MCNC: 32 UMOL/L — SIGNIFICANT CHANGE UP (ref 11–32)
ANION GAP SERPL CALC-SCNC: 10 MMOL/L — SIGNIFICANT CHANGE UP (ref 5–17)
AST SERPL-CCNC: 18 U/L — SIGNIFICANT CHANGE UP (ref 10–40)
BILIRUB DIRECT SERPL-MCNC: 0.2 MG/DL — SIGNIFICANT CHANGE UP (ref 0–0.2)
BILIRUB INDIRECT FLD-MCNC: 0.3 MG/DL — SIGNIFICANT CHANGE UP (ref 0.2–1)
BILIRUB SERPL-MCNC: 0.5 MG/DL — SIGNIFICANT CHANGE UP (ref 0.2–1.2)
BUN SERPL-MCNC: 13 MG/DL — SIGNIFICANT CHANGE UP (ref 7–18)
CALCIUM SERPL-MCNC: 8.6 MG/DL — SIGNIFICANT CHANGE UP (ref 8.4–10.5)
CHLORIDE SERPL-SCNC: 111 MMOL/L — HIGH (ref 96–108)
CO2 SERPL-SCNC: 22 MMOL/L — SIGNIFICANT CHANGE UP (ref 22–31)
CREAT SERPL-MCNC: 0.94 MG/DL — SIGNIFICANT CHANGE UP (ref 0.5–1.3)
GLUCOSE BLDC GLUCOMTR-MCNC: 103 MG/DL — HIGH (ref 70–99)
GLUCOSE BLDC GLUCOMTR-MCNC: 126 MG/DL — HIGH (ref 70–99)
GLUCOSE BLDC GLUCOMTR-MCNC: 140 MG/DL — HIGH (ref 70–99)
GLUCOSE SERPL-MCNC: 100 MG/DL — HIGH (ref 70–99)
HBA1C BLD-MCNC: 5.4 % — SIGNIFICANT CHANGE UP (ref 4–5.6)
HCT VFR BLD CALC: 35 % — LOW (ref 39–50)
HGB BLD-MCNC: 10.7 G/DL — LOW (ref 13–17)
MCHC RBC-ENTMCNC: 22.7 PG — LOW (ref 27–34)
MCHC RBC-ENTMCNC: 30.6 GM/DL — LOW (ref 32–36)
MCV RBC AUTO: 74.2 FL — LOW (ref 80–100)
PLATELET # BLD AUTO: 176 K/UL — SIGNIFICANT CHANGE UP (ref 150–400)
POTASSIUM SERPL-MCNC: 3.5 MMOL/L — SIGNIFICANT CHANGE UP (ref 3.5–5.3)
POTASSIUM SERPL-SCNC: 3.5 MMOL/L — SIGNIFICANT CHANGE UP (ref 3.5–5.3)
PROT SERPL-MCNC: 7.4 G/DL — SIGNIFICANT CHANGE UP (ref 6–8.3)
RBC # BLD: 4.72 M/UL — SIGNIFICANT CHANGE UP (ref 4.2–5.8)
RBC # FLD: 15.9 % — HIGH (ref 10.3–14.5)
SODIUM SERPL-SCNC: 143 MMOL/L — SIGNIFICANT CHANGE UP (ref 135–145)
T PALLIDUM AB TITR SER: NEGATIVE — SIGNIFICANT CHANGE UP
TSH SERPL-MCNC: 2.32 UU/ML — SIGNIFICANT CHANGE UP (ref 0.34–4.82)
VIT B12 SERPL-MCNC: 622 PG/ML — SIGNIFICANT CHANGE UP (ref 232–1245)
WBC # BLD: 10 K/UL — SIGNIFICANT CHANGE UP (ref 3.8–10.5)
WBC # FLD AUTO: 10 K/UL — SIGNIFICANT CHANGE UP (ref 3.8–10.5)

## 2019-01-24 PROCEDURE — 86780 TREPONEMA PALLIDUM: CPT

## 2019-01-24 PROCEDURE — 71046 X-RAY EXAM CHEST 2 VIEWS: CPT

## 2019-01-24 PROCEDURE — 71045 X-RAY EXAM CHEST 1 VIEW: CPT

## 2019-01-24 PROCEDURE — 80061 LIPID PANEL: CPT

## 2019-01-24 PROCEDURE — 93005 ELECTROCARDIOGRAM TRACING: CPT

## 2019-01-24 PROCEDURE — 84443 ASSAY THYROID STIM HORMONE: CPT

## 2019-01-24 PROCEDURE — 74018 RADEX ABDOMEN 1 VIEW: CPT

## 2019-01-24 PROCEDURE — 94640 AIRWAY INHALATION TREATMENT: CPT

## 2019-01-24 PROCEDURE — 82607 VITAMIN B-12: CPT

## 2019-01-24 PROCEDURE — 85610 PROTHROMBIN TIME: CPT

## 2019-01-24 PROCEDURE — 80053 COMPREHEN METABOLIC PANEL: CPT

## 2019-01-24 PROCEDURE — 82550 ASSAY OF CK (CPK): CPT

## 2019-01-24 PROCEDURE — 97161 PT EVAL LOW COMPLEX 20 MIN: CPT

## 2019-01-24 PROCEDURE — 84100 ASSAY OF PHOSPHORUS: CPT

## 2019-01-24 PROCEDURE — 36415 COLL VENOUS BLD VENIPUNCTURE: CPT

## 2019-01-24 PROCEDURE — 70450 CT HEAD/BRAIN W/O DYE: CPT

## 2019-01-24 PROCEDURE — 83880 ASSAY OF NATRIURETIC PEPTIDE: CPT

## 2019-01-24 PROCEDURE — 85730 THROMBOPLASTIN TIME PARTIAL: CPT

## 2019-01-24 PROCEDURE — 94002 VENT MGMT INPAT INIT DAY: CPT

## 2019-01-24 PROCEDURE — 93306 TTE W/DOPPLER COMPLETE: CPT

## 2019-01-24 PROCEDURE — 86022 PLATELET ANTIBODIES: CPT

## 2019-01-24 PROCEDURE — 83036 HEMOGLOBIN GLYCOSYLATED A1C: CPT

## 2019-01-24 PROCEDURE — 71046 X-RAY EXAM CHEST 2 VIEWS: CPT | Mod: 26

## 2019-01-24 PROCEDURE — 80307 DRUG TEST PRSMV CHEM ANLYZR: CPT

## 2019-01-24 PROCEDURE — 84484 ASSAY OF TROPONIN QUANT: CPT

## 2019-01-24 PROCEDURE — 80048 BASIC METABOLIC PNL TOTAL CA: CPT

## 2019-01-24 PROCEDURE — 83735 ASSAY OF MAGNESIUM: CPT

## 2019-01-24 PROCEDURE — 92610 EVALUATE SWALLOWING FUNCTION: CPT

## 2019-01-24 PROCEDURE — 85027 COMPLETE CBC AUTOMATED: CPT

## 2019-01-24 PROCEDURE — 82553 CREATINE MB FRACTION: CPT

## 2019-01-24 PROCEDURE — 81001 URINALYSIS AUTO W/SCOPE: CPT

## 2019-01-24 PROCEDURE — 82803 BLOOD GASES ANY COMBINATION: CPT

## 2019-01-24 PROCEDURE — 82140 ASSAY OF AMMONIA: CPT

## 2019-01-24 PROCEDURE — 94003 VENT MGMT INPAT SUBQ DAY: CPT

## 2019-01-24 PROCEDURE — 99291 CRITICAL CARE FIRST HOUR: CPT | Mod: 25

## 2019-01-24 PROCEDURE — 80076 HEPATIC FUNCTION PANEL: CPT

## 2019-01-24 PROCEDURE — 82962 GLUCOSE BLOOD TEST: CPT

## 2019-01-24 RX ORDER — OLMESARTAN MEDOXOMIL 5 MG/1
1 TABLET, FILM COATED ORAL
Qty: 0 | Refills: 0 | COMMUNITY

## 2019-01-24 RX ORDER — LOSARTAN POTASSIUM 100 MG/1
1 TABLET, FILM COATED ORAL
Qty: 30 | Refills: 0
Start: 2019-01-24 | End: 2019-02-22

## 2019-01-24 RX ORDER — LOSARTAN POTASSIUM 100 MG/1
1 TABLET, FILM COATED ORAL
Qty: 0 | Refills: 0 | COMMUNITY
Start: 2019-01-24

## 2019-01-24 RX ORDER — HALOPERIDOL DECANOATE 100 MG/ML
2 INJECTION INTRAMUSCULAR ONCE
Qty: 0 | Refills: 0 | Status: COMPLETED | OUTPATIENT
Start: 2019-01-24 | End: 2019-01-24

## 2019-01-24 RX ORDER — PIOGLITAZONE HYDROCHLORIDE 15 MG/1
1 TABLET ORAL
Qty: 0 | Refills: 0 | COMMUNITY

## 2019-01-24 RX ORDER — AMLODIPINE BESYLATE 2.5 MG/1
1 TABLET ORAL
Qty: 30 | Refills: 0
Start: 2019-01-24 | End: 2019-02-22

## 2019-01-24 RX ORDER — HALOPERIDOL DECANOATE 100 MG/ML
5 INJECTION INTRAMUSCULAR ONCE
Qty: 0 | Refills: 0 | Status: DISCONTINUED | OUTPATIENT
Start: 2019-01-24 | End: 2019-01-24

## 2019-01-24 RX ORDER — GLYBURIDE 5 MG
1 TABLET ORAL
Qty: 0 | Refills: 0 | COMMUNITY

## 2019-01-24 RX ORDER — NADOLOL 80 MG/1
1 TABLET ORAL
Qty: 0 | Refills: 0 | COMMUNITY

## 2019-01-24 RX ORDER — ALBUTEROL 90 UG/1
2 AEROSOL, METERED ORAL
Qty: 1 | Refills: 0
Start: 2019-01-24 | End: 2019-01-30

## 2019-01-24 RX ORDER — LOSARTAN POTASSIUM 100 MG/1
1 TABLET, FILM COATED ORAL
Qty: 0 | Refills: 0 | COMMUNITY

## 2019-01-24 RX ORDER — AMLODIPINE BESYLATE 2.5 MG/1
5 TABLET ORAL DAILY
Qty: 0 | Refills: 0 | Status: DISCONTINUED | OUTPATIENT
Start: 2019-01-24 | End: 2019-01-24

## 2019-01-24 RX ORDER — NADOLOL 80 MG/1
1 TABLET ORAL
Qty: 0 | Refills: 0 | DISCHARGE
Start: 2019-01-24

## 2019-01-24 RX ORDER — DAPAGLIFLOZIN 10 MG/1
1 TABLET, FILM COATED ORAL
Qty: 0 | Refills: 0 | COMMUNITY

## 2019-01-24 RX ADMIN — Medication 3 MILLILITER(S): at 10:45

## 2019-01-24 RX ADMIN — LOSARTAN POTASSIUM 100 MILLIGRAM(S): 100 TABLET, FILM COATED ORAL at 05:10

## 2019-01-24 RX ADMIN — NADOLOL 20 MILLIGRAM(S): 80 TABLET ORAL at 05:10

## 2019-01-24 RX ADMIN — HEPARIN SODIUM 5000 UNIT(S): 5000 INJECTION INTRAVENOUS; SUBCUTANEOUS at 05:10

## 2019-01-24 RX ADMIN — HALOPERIDOL DECANOATE 2 MILLIGRAM(S): 100 INJECTION INTRAMUSCULAR at 02:41

## 2019-01-24 RX ADMIN — PANTOPRAZOLE SODIUM 40 MILLIGRAM(S): 20 TABLET, DELAYED RELEASE ORAL at 05:11

## 2019-01-24 RX ADMIN — HEPARIN SODIUM 5000 UNIT(S): 5000 INJECTION INTRAVENOUS; SUBCUTANEOUS at 13:47

## 2019-01-24 NOTE — DISCHARGE NOTE ADULT - MEDICATION SUMMARY - MEDICATIONS TO STOP TAKING
I will STOP taking the medications listed below when I get home from the hospital:    Levaquin 750 mg oral tablet  -- 1 tab(s) by mouth every 24 hours    predniSONE 10 mg oral tablet  -- 40 mg x 3 days then   30 mg x 3 days then   20 mg x 3 days then   10 mg x 3 days then stop  -- It is very important that you take or use this exactly as directed.  Do not skip doses or discontinue unless directed by your doctor.  Obtain medical advice before taking any non-prescription drugs as some may affect the action of this medication.  Take with food or milk.    melatonin 3 mg oral tablet  -- 1 tab(s) by mouth once a day (at bedtime)    FeroSul 325 mg (65 mg elemental iron) oral tablet  -- 1 tab(s) by mouth 3 times a day    senna oral tablet  -- 2 tab(s) by mouth once a day (at bedtime)    dapagliflozin 5 mg oral tablet  -- 1 tab(s) by mouth once a day    olmesartan 20 mg oral tablet  -- 1 tab(s) by mouth once a day    glyBURIDE 5 mg oral tablet  -- 1 tab(s) by mouth 2 times a day

## 2019-01-24 NOTE — DISCHARGE NOTE ADULT - HOSPITAL COURSE
63 year old M from home, lives with girl friend Ms. Remy , with PMHx of HTN, HLD, right LE varicose veins, umbilical hernia, ex smoker (1Pack over 3 days >50 years, quit 7 months back ), chronic ethanolic but family denies any alcohol abuse, Recently admitted for COPD exacerbation was on NIVBIPAP in July 2018, recently had EGD and colonoscopy on 1/111/19 which showed esophageal varices ( was started on nadolol) and colon polyps was BIB EMS for unresponsiveness. According to EMS, patient staggered on the street to some bystanders and said "I need help"; they called the ambulance. According to EMS patient was found to be minimally responsive (grunting) but not responding to verbal or painful stimuli. History obtained from ED note and Pts Girl friend Ms. Remy and primary GI Dr. Christensen. According to girl friend, pt had EGD and colonoscopy last friday and was not feeling well after taking medication, unable to elaborate more as she said she is busy and will call later. She also reported that pts blood sugars are running high above 400s and went to see endocrinologist, was started on oral meds, but pt is not taking any insulin and his BP has been running in 190s. Confirmed with Dr Christensen, who said that pts blood pressure was elevated and was started on nadolol for esophageal varices, doubt if pt is compliant with medications.   In ED, patient was hypoxic and hypertensive to 266/ 111mmhg with mottled skin per ED attending. EKG- NSR with sinus arrythmia @67BPM , LBBB. cardiac enzymes x1- negative , CXR s/o bilateral pulmonary congestion more pronounced on RLL which seems to be consolidation  (awaiting official report). Labs pertinent for WBC- 15.1 , bun/ creat- 14/1.38, BNP- 458, ABG 0- 7.1/77/331/23/ 100 Fio2 on Ac 16/500/100%/5, CT head no acute pathology. HPI 1/14    Pt was admitted to ICU for hypoxic and hypercapnic respiratory failure likely sec to COPD exacerbation, questionable PNA. Patient was intubated in ED, he had AMS and was hypercapnic, ABGs 1/18 morning improved. He was given SBT which he passed. He got extubated, but got hypoxic and had to be reintubated. He later passed SBT, mental status was improved and got  extubated 01/21; Completed abx therapy- 1/21; He is on room air and saturating 100%, Repeat CXR this AM with significant improvement. Completed steroid tapered dose for copd exacerbation.  Thrombocytopenia:  Platelets improved; HIT panel negative.  Esophageal varices: pt was recently found to have esophageal varices on EGD. Nadolol resumed. GI - Dr. Christensen.   Pt downgraded to floor on 1/22: pt eval recc outpatient PT. Repeat cxr with significant improvement. Losartan increased to 100mg in addition to norvasc with better control of blood pressure.   Please note this is only a brief summary of events, refer to medical charts for complete information.

## 2019-01-24 NOTE — DISCHARGE NOTE ADULT - PLAN OF CARE
blood pressure <130/80 mm Hg Your medications were adjusted inpatient. Continue with your medicaitons as prescribed. Follow up with Dr Christensen, GI doctor Continue with nadolol You gave a history for diabetes for which you were on medications. Your hemoglobin a1c on admission was 5.4, which is not diagnostic of diabetes. Your blood sugars were monitors in the hospital which did not require any medications for control. Your home meds are being stopped at this point. Be sure to consume a diet restricted to 1800Kcal. You are advised to check your blood sugar levels daily and maintain a log. Follow up with your primary doctor in 1 week with log of your sugar levels to monitor fir need to resume any medications. Resolved You were admitted to the ICU for respiratory failure from worsening of your COPD. You had to be intubated and required mechanical ventilation. Your respiratory status improved with steroids. You completed dose of steroids and antibiotics and are doing well on room air. Continue with medications as prescribed and follow up with your primary doctor in 5 days. You are also recommended to follow up with a pulmonologist as outpatient. Your medications were adjusted inpatient. Continue with your medications as prescribed. Follow up with PCP

## 2019-01-24 NOTE — DISCHARGE NOTE ADULT - MEDICATION SUMMARY - MEDICATIONS TO CHANGE
I will SWITCH the dose or number of times a day I take the medications listed below when I get home from the hospital:    losartan 25 mg oral tablet  -- 1 tab(s) by mouth once a day    losartan 50 mg oral tablet  -- 1 tab(s) by mouth once a day

## 2019-01-24 NOTE — DISCHARGE NOTE ADULT - CARE PROVIDER_API CALL
no hemoptysis/no dyspnea/no wheezing/no cough
Jesse Christensen), Internal Medicine  [address] [place] [address]  Phone: [phone]  Fax: [phone]    Sally Barrios), Internal Medicine  [address] [place] [address]  Phone: [phone]  Fax: [phone]

## 2019-01-24 NOTE — PROGRESS NOTE ADULT - ASSESSMENT
63 year old M from home, lives with girl friend Ms. Remy , with PMHx of HTN, HLD, right LE varicose veins, umbilical hernia, ex smoker (1Pack over 3 days >50 years, quit 7 months back ), chronic ethanolic but family denies any alcohol abuse, Recently admitted for COPD exacerbation was on NIVBIPAP in July 2018, recently had EGD and colonoscopy on 1/111/19 which showed esophageal varices ( was started on nadolol) and colon polyps was BIB EMS for unresponsiveness.     In ED, patient was hypoxic and hypertensive to 266/ 111mmhg with mottled skin per ED attending. EKG- NSR with sinus arrythmia @67BPM , LBBB. cardiac enzymes x1- negative , CXR s/o bilateral pulmonary congestion more pronounced on RLL which seems to be consolidation  (awaiting official report). Labs pertinent for WBC- 15.1 , bun/ creat- 14/1.38, BNP- 458, ABG 0- 7.1/77/331/23/ 100 Fio2 on Ac 16/500/100%/5, CT head no acute pathology.     Pt is being admitted to ICU for hypoxic and hypercapenic respiratory failure secondary to Aspiration PNA and hypertensive encephalopathy.
63 year old M from home, lives with girl friend Ms. Remy , with PMHx of HTN, HLD, right LE varicose veins, umbilical hernia, ex smoker (1Pack over 3 days >50 years, quit 7 months back ), chronic ethanolic but family denies any alcohol abuse, Recently admitted for COPD exacerbation was on NIVBIPAP in July 2018, recently had EGD and colonoscopy on 1/111/19 which showed esophageal varices ( was started on nadolol) and colon polyps was BIB EMS for unresponsiveness.     In ED, patient was hypoxic and hypertensive to 266/ 111mmhg with mottled skin per ED attending. EKG- NSR with sinus arrythmia @67BPM , LBBB. cardiac enzymes x1- negative , CXR s/o bilateral pulmonary congestion more pronounced on RLL which seems to be consolidation  (awaiting official report). Labs pertinent for WBC- 15.1 , bun/ creat- 14/1.38, BNP- 458, ABG 0- 7.1/77/331/23/ 100 Fio2 on Ac 16/500/100%/5, CT head no acute pathology.     Pt is being admitted to ICU for hypoxic and hypercapenic respiratory failure secondary to Aspiration PNA and hypertensive encephalopathy.
63 year old M from home, lives with girl friend Ms. Remy , with PMHx of HTN, HLD, right LE varicose veins, umbilical hernia, ex smoker (1Pack over 3 days >50 years, quit 7 months back ), chronic ethanolic but family denies any alcohol abuse,  BIB EMS for unresponsiveness. Pt was admitted to ICU for hypoxic and hypercapnic respiratory failure likely sec to COPD exacerbation. Patient downgraded to floor on 1/22/2019
63 year old M from home, lives with girl friend Ms. Remy , with PMHx of HTN, HLD, right LE varicose veins, umbilical hernia, ex smoker (1Pack over 3 days >50 years, quit 7 months back ), chronic ethanolic but family denies any alcohol abuse, Recently admitted for COPD exacerbation was on NIVBIPAP in July 2018, recently had EGD and colonoscopy on 1/111/19 which showed esophageal varices ( was started on nadolol) and colon polyps was BIB EMS for unresponsiveness.     In ED, patient was hypoxic and hypertensive to 266/ 111mmhg with mottled skin per ED attending. EKG- NSR with sinus arrythmia @67BPM , LBBB. cardiac enzymes x1- negative , CXR s/o bilateral pulmonary congestion more pronounced on RLL which seems to be consolidation  (awaiting official report). Labs pertinent for WBC- 15.1 , bun/ creat- 14/1.38, BNP- 458, ABG 0- 7.1/77/331/23/ 100 Fio2 on Ac 16/500/100%/5, CT head no acute pathology.     Pt is being admitted to ICU for hypoxic and hypercapenic respiratory failure secondary to Aspiration PNA and hypertensive encephalopathy.
aseena nd examined  pt  was admitted in icu for resp failure  h/o long standing smoking quit 6 months back  pt was hypertensive in er  later on was extubated   copd excerbration  copd, dm, htn, colonic polyp removed recently , had egd also   eso varices   bp still high on nadolol and losartan increased to 100 daily  resp status is ok  on edge of bed comfortable  mild N P cough afebrile   seen by pulm,  will also f/u  will get cxr and repeat labs and will watch bp and BS  out of bed in chair
seen and examined vsstable afebrile  physical unchanged    walking around little bit inside room wit back support ( h/o lbp over 20 years  pain pattern unchanged)  no cp or sob  physical unchanged alert awake  non focal  a/o unresponsive, copd resp failure , htn, hld cont same   dm pt was on po meds can be discharged on same  htn  add amlodipine  d/w pt for dirt  f/u with PCP, watch bp,  and BS.  f/u pulm for copd  ex smoker
63 year old M from home, lives with girl friend Ms. Remy , with PMHx of HTN, HLD, right LE varicose veins, umbilical hernia, ex smoker (1Pack over 3 days >50 years, quit 7 months back ), chronic ethanolic but family denies any alcohol abuse, Recently admitted for COPD exacerbation was on NIVBIPAP in July 2018, recently had EGD and colonoscopy on 1/111/19 which showed esophageal varices ( was started on nadolol) and colon polyps was BIB EMS for unresponsiveness.     In ED, patient was hypoxic and hypertensive to 266/ 111mmhg with mottled skin per ED attending. EKG- NSR with sinus arrythmia @67BPM , LBBB. cardiac enzymes x1- negative , CXR s/o bilateral pulmonary congestion more pronounced on RLL which seems to be consolidation  (awaiting official report). Labs pertinent for WBC- 15.1 , bun/ creat- 14/1.38, BNP- 458, ABG 0- 7.1/77/331/23/ 100 Fio2 on Ac 16/500/100%/5, CT head no acute pathology.     Pt is being admitted to ICU for hypoxic and hypercapnic respiratory failure likely sec to COPD exacerbation.
63 year old M from home, lives with girl friend Ms. Remy , with PMHx of HTN, HLD, right LE varicose veins, umbilical hernia, ex smoker (1Pack over 3 days >50 years, quit 7 months back ), chronic ethanolic but family denies any alcohol abuse, Recently admitted for COPD exacerbation was on NIVBIPAP in July 2018, recently had EGD and colonoscopy on 1/111/19 which showed esophageal varices ( was started on nadolol) and colon polyps was BIB EMS for unresponsiveness.     In ED, patient was hypoxic and hypertensive to 266/ 111mmhg with mottled skin per ED attending. EKG- NSR with sinus arrythmia @67BPM , LBBB. cardiac enzymes x1- negative , CXR s/o bilateral pulmonary congestion more pronounced on RLL which seems to be consolidation  (awaiting official report). Labs pertinent for WBC- 15.1 , bun/ creat- 14/1.38, BNP- 458, ABG 0- 7.1/77/331/23/ 100 Fio2 on Ac 16/500/100%/5, CT head no acute pathology.     Pt is being admitted to ICU for hypoxic and hypercapnic respiratory failure likely sec to COPD exacerbation. Patient got extubated yesterday and he has been doing fine.

## 2019-01-24 NOTE — PROGRESS NOTE ADULT - SUBJECTIVE AND OBJECTIVE BOX
HPI:  63 year old M from home, lives with girl friend Ms. Remy , with PMHx of HTN, HLD, right LE varicose veins, umbilical hernia, ex smoker (1Pack over 3 days >50 years, quit 7 months back ), chronic ethanolic but family denies any alcohol abuse, Recently admitted for COPD exacerbation was on NIVBIPAP in July 2018, recently had EGD and colonoscopy on 1/111/19 which showed esophageal varices ( was started on nadolol) and colon polyps was BIB EMS for unresponsiveness. According to EMS, patient staggered on the street to some bystanders and said "I need help"; they called the ambulance. According to EMS patient was found to be minimally responsive (grunting) but not responding to verbal or painful stimuli. History obtained from ED note and Pts Girl friend Ms. Remy and primary GI Dr. Christensen. According to girl friend, pt had EGD and colonoscopy last friday and was not feeling well after taking medication, unable to elaborate more as she said she is busy and will call later. She also reported that pts blood sugars are running high above 400s and went to see endocrinologist, was started on oral meds, but pt is not taking any insulin and his BP has been running in 190s. Confirmed with Dr Christensen, who said that pts blood pressure was elevated and was started on nadolol for esophageal varices, doubt if pt is compliant with medications.     In ED, patient was hypoxic and hypertensive to 266/ 111mmhg with mottled skin per ED attending. EKG- NSR with sinus arrythmia @67BPM , LBBB. cardiac enzymes x1- negative , CXR s/o bilateral pulmonary congestion more pronounced on RLL which seems to be consolidation  (awaiting official report). Labs pertinent for WBC- 15.1 , bun/ creat- 14/1.38, BNP- 458, ABG 0- 7.1/77/331/23/ 100 Fio2 on Ac 16/500/100%/5, CT head no acute pathology.     Pt is being admitted to ICU for hypoxic and hypercapenic respiratory failure secondary to Aspiration PNA and hypertensive encephalopathy. (14 Jan 2019 17:58)      Patient is a 63y old  Male who presents with a chief complaint of Pt was brought in unresponsive (23 Jan 2019 15:59)      INTERVAL HPI/OVERNIGHT EVENTS:  T(C): 36.7 (01-24-19 @ 04:50), Max: 37 (01-23-19 @ 21:22)  HR: 66 (01-24-19 @ 04:50) (52 - 66)  BP: 170/59 (01-24-19 @ 04:50) (144/48 - 170/59)  RR: 17 (01-24-19 @ 04:50) (17 - 18)  SpO2: 98% (01-24-19 @ 04:50) (98% - 99%)  Wt(kg): --  I&O's Summary    23 Jan 2019 07:01  -  24 Jan 2019 07:00  --------------------------------------------------------  IN: 240 mL / OUT: 0 mL / NET: 240 mL        REVIEW OF SYSTEMS: denies fever, chills, SOB, palpitations, chest pain, abdominal pain, nausea, vomitting, diarrhea, constipation, dizziness    MEDICATIONS  (STANDING):  amLODIPine   Tablet 5 milliGRAM(s) Oral daily  heparin  Injectable 5000 Unit(s) SubCutaneous every 8 hours  insulin lispro (HumaLOG) corrective regimen sliding scale   SubCutaneous Before meals and at bedtime  losartan 100 milliGRAM(s) Oral daily  nadolol 20 milliGRAM(s) Oral daily  pantoprazole    Tablet 40 milliGRAM(s) Oral before breakfast    MEDICATIONS  (PRN):  ALBUTerol/ipratropium for Nebulization 3 milliLiter(s) Nebulizer every 6 hours PRN Shortness of Breath and/or Wheezing  guaiFENesin   Syrup  (Sugar-Free) 100 milliGRAM(s) Oral every 6 hours PRN Cough      PHYSICAL EXAM:  GENERAL: NAD, well-groomed, well-developed  HEAD:  Atraumatic, Normocephalic  EYES: EOMI, PERRLA, conjunctiva and sclera clear  ENMT: No tonsillar erythema, exudates, or enlargement; Moist mucous membranes, Good dentition, No lesions  NECK: Supple, No JVD, Normal thyroid  NERVOUS SYSTEM:  Alert & Oriented X3, Good concentration; Motor Strength 5/5 B/L upper and lower extremities; DTRs 2+ intact and symmetric  CHEST/LUNG: Clear to percussion bilaterally; No rales, rhonchi, wheezing, or rubs  HEART: Regular rate and rhythm; No murmurs, rubs, or gallops  ABDOMEN: Soft, Nontender, Nondistended; Bowel sounds present  EXTREMITIES:  2+ Peripheral Pulses, No clubbing, cyanosis, or edema  LYMPH: No lymphadenopathy noted  SKIN: No rashes or lesions  LABS:                        10.7   10.0  )-----------( 176      ( 24 Jan 2019 07:10 )             35.0     01-24    143  |  111<H>  |  13  ----------------------------<  100<H>  3.5   |  22  |  0.94    Ca    8.6      24 Jan 2019 07:10    TPro  7.4  /  Alb  2.9<L>  /  TBili  0.5  /  DBili  0.2  /  AST  18  /  ALT  20  /  AlkPhos  52  01-24        CAPILLARY BLOOD GLUCOSE      POCT Blood Glucose.: 140 mg/dL (24 Jan 2019 08:32)  POCT Blood Glucose.: 104 mg/dL (23 Jan 2019 21:38)  POCT Blood Glucose.: 106 mg/dL (23 Jan 2019 17:29)  POCT Blood Glucose.: 132 mg/dL (23 Jan 2019 12:03)

## 2019-01-24 NOTE — DISCHARGE NOTE ADULT - CARE PLAN
Principal Discharge DX:	COPD exacerbation  Secondary Diagnosis:	Essential hypertension  Goal:	blood pressure <130/80 mm Hg  Assessment and plan of treatment:	Your medications were adjusted inpatient. Continue with your medicaitons as prescribed.  Secondary Diagnosis:	Esophageal varices  Goal:	Follow up with Dr Christensen, GI doctor  Assessment and plan of treatment:	Continue with nadolol  Secondary Diagnosis:	History of diabetes mellitus  Assessment and plan of treatment:	You gave a history for diabetes for which you were on medications. Your hemoglobin a1c on admission was 5.4, which is not diagnostic of diabetes. Your blood sugars were monitors in the hospital which did not require any medications for control. Your home meds are being stopped at this point. Be sure to consume a diet restricted to 1800Kcal. You are advised to check your blood sugar levels daily and maintain a log. Follow up with your primary doctor in 1 week with log of your sugar levels to monitor fir need to resume any medications. Principal Discharge DX:	COPD exacerbation  Goal:	Resolved  Assessment and plan of treatment:	You were admitted to the ICU for respiratory failure from worsening of your COPD. You had to be intubated and required mechanical ventilation. Your respiratory status improved with steroids. You completed dose of steroids and antibiotics and are doing well on room air. Continue with medications as prescribed and follow up with your primary doctor in 5 days. You are also recommended to follow up with a pulmonologist as outpatient.  Secondary Diagnosis:	Essential hypertension  Goal:	blood pressure <130/80 mm Hg  Assessment and plan of treatment:	Your medications were adjusted inpatient. Continue with your medications as prescribed.  Secondary Diagnosis:	Esophageal varices  Goal:	Follow up with Dr Christensen, GI doctor  Assessment and plan of treatment:	Continue with nadolol  Secondary Diagnosis:	History of diabetes mellitus  Goal:	Follow up with PCP  Assessment and plan of treatment:	You gave a history for diabetes for which you were on medications. Your hemoglobin a1c on admission was 5.4, which is not diagnostic of diabetes. Your blood sugars were monitors in the hospital which did not require any medications for control. Your home meds are being stopped at this point. Be sure to consume a diet restricted to 1800Kcal. You are advised to check your blood sugar levels daily and maintain a log. Follow up with your primary doctor in 1 week with log of your sugar levels to monitor fir need to resume any medications.

## 2019-01-24 NOTE — DISCHARGE NOTE ADULT - PATIENT PORTAL LINK FT
You can access the [facility] Patient Portal, offered by [facility], by registering with the following website: [email or web address]

## 2019-01-24 NOTE — DISCHARGE NOTE ADULT - MEDICATION SUMMARY - MEDICATIONS TO TAKE
I will START or STAY ON the medications listed below when I get home from the hospital:    losartan 100 mg oral tablet  -- 1 tab(s) by mouth once a day  -- Indication: For Essential hypertension    nadolol 20 mg oral tablet  -- 1 tab(s) by mouth once a day  -- Indication: For Esophageal varices    amLODIPine 5 mg oral tablet  -- 1 tab(s) by mouth once a day  -- Indication: For Essential hypertension    docusate sodium 100 mg oral capsule  -- 1 cap(s) by mouth 2 times a day  -- Indication: For Constipation    omeprazole 20 mg oral delayed release capsule  -- 1 cap(s) by mouth once a day  -- Indication: For Prophylactic measure I will START or STAY ON the medications listed below when I get home from the hospital:    losartan 100 mg oral tablet  -- 1 tab(s) by mouth once a day  -- Indication: For Essential hypertension    nadolol 20 mg oral tablet  -- 1 tab(s) by mouth once a day  -- Indication: For Esophageal varices    Proventil HFA 90 mcg/inh inhalation aerosol  -- 2 puff(s) inhaled every 6 hours, As Needed -for shortness of breath and/or wheezing   -- For inhalation only.  It is very important that you take or use this exactly as directed.  Do not skip doses or discontinue unless directed by your doctor.  Obtain medical advice before taking any non-prescription drugs as some may affect the action of this medication.  Shake well before use.    -- Indication: For COPD (chronic obstructive pulmonary disease)    amLODIPine 5 mg oral tablet  -- 1 tab(s) by mouth once a day  -- Indication: For Essential hypertension    docusate sodium 100 mg oral capsule  -- 1 cap(s) by mouth 2 times a day  -- Indication: For Constipation    omeprazole 20 mg oral delayed release capsule  -- 1 cap(s) by mouth once a day  -- Indication: For Prophylactic measure I will START or STAY ON the medications listed below when I get home from the hospital:    Outpatient Physical Therapy  -- OUTPATIENT PT  -- Indication: For PT    losartan 100 mg oral tablet  -- 1 tab(s) by mouth once a day  -- Indication: For Essential hypertension    nadolol 20 mg oral tablet  -- 1 tab(s) by mouth once a day  -- Indication: For Esophageal varices    Proventil HFA 90 mcg/inh inhalation aerosol  -- 2 puff(s) inhaled every 6 hours, As Needed -for shortness of breath and/or wheezing   -- For inhalation only.  It is very important that you take or use this exactly as directed.  Do not skip doses or discontinue unless directed by your doctor.  Obtain medical advice before taking any non-prescription drugs as some may affect the action of this medication.  Shake well before use.    -- Indication: For COPD (chronic obstructive pulmonary disease)    amLODIPine 5 mg oral tablet  -- 1 tab(s) by mouth once a day  -- Indication: For Essential hypertension    docusate sodium 100 mg oral capsule  -- 1 cap(s) by mouth 2 times a day  -- Indication: For Constipation    omeprazole 20 mg oral delayed release capsule  -- 1 cap(s) by mouth once a day  -- Indication: For Prophylactic measure

## 2019-01-24 NOTE — PROGRESS NOTE ADULT - REASON FOR ADMISSION
Pt was brought in unresponsive
alcohol withdrawal
Pt was brought in unresponsive

## 2019-03-23 NOTE — ED ADULT TRIAGE NOTE - INTERPRETATION SERVICES DECLINED
62 yr old female patient with stage IV papillary serous endometrial cancer, papillary thyroid cancer is sent to the ED for right sided weakness , found to have metastatic brain lesions with mass effect  Patient was started on decadron and Keppra. She was evaluated by neurosurgery, and seen by PT. With steroids, weakness improved. Patient was accepted to [facility], and will continue with PT there. Radiation oncology was consulted, and patient will start radiation as OP on Monday.
Patient/Caregiver requests family/friend to interpret.

## 2019-04-15 PROBLEM — E78.5 HYPERLIPIDEMIA, UNSPECIFIED: Chronic | Status: ACTIVE | Noted: 2019-01-14

## 2019-12-16 NOTE — DISCHARGE NOTE ADULT - CLICK TO LAUNCH ORM
Hospital Day:  5d    Subjective:    Patient is a 88y old  Female who presents with a chief complaint of Fall admitted for head trauma. Overnight, no acute events. This morning was initially pleasant answering questions appropriately but became increasing confused and agitated. Denied CP, SOB, palpitations, pain anywhere, urinary or bowel symptoms.        Past Medical Hx:   Atrial fibrillation  High blood cholesterol  Hypertension  Diabetes mellitus, type 2    Past Sx:  History of hip surgery    Allergies:  No Known Drug Allergies  Originally Entered as [RASH] reaction to [cashew nut] (Unknown)    Current Meds:   Standng Meds:  aMIOdarone    Tablet   Oral   amLODIPine   Tablet 5 milliGRAM(s) Oral daily  atorvastatin 20 milliGRAM(s) Oral at bedtime  chlorhexidine 4% Liquid 1 Application(s) Topical <User Schedule>  dextrose 5%. 1000 milliLiter(s) (50 mL/Hr) IV Continuous <Continuous>  dextrose 50% Injectable 12.5 Gram(s) IV Push once  dextrose 50% Injectable 25 Gram(s) IV Push once  dextrose 50% Injectable 25 Gram(s) IV Push once  docusate sodium Oral Tab/Cap - Peds 100 milliGRAM(s) Oral three times a day  insulin glargine Injectable (LANTUS) 10 Unit(s) SubCutaneous at bedtime  insulin lispro (HumaLOG) corrective regimen sliding scale   SubCutaneous three times a day before meals  insulin lispro Injectable (HumaLOG) 4 Unit(s) SubCutaneous three times a day before meals  pantoprazole    Tablet 40 milliGRAM(s) Oral before breakfast  polyethylene glycol 3350 17 Gram(s) Oral two times a day  rivaroxaban 20 milliGRAM(s) Oral with dinner    PRN Meds:  acetaminophen   Tablet .. 650 milliGRAM(s) Oral every 6 hours PRN Mild Pain (1 - 3)  dextrose 40% Gel 15 Gram(s) Oral once PRN Blood Glucose LESS THAN 70 milliGRAM(s)/deciliter  glucagon  Injectable 1 milliGRAM(s) IntraMuscular once PRN Glucose LESS THAN 70 milligrams/deciliter    HOME MEDICATIONS:  dilTIAZem 300 mg/24 hours oral capsule, extended release: 1 cap(s) orally once a day  docusate sodium 100 mg oral capsule: 1 cap(s) orally 3 times a day  glimepiride 2 mg oral tablet: 1 tab(s) orally once a day  metFORMIN 500 mg oral tablet: 1 tab(s) orally once a day   Metoprolol Tartrate 50 mg oral tablet: 1 tab(s) orally 2 times a day  Protonix 40 mg oral delayed release tablet: 1 tab(s) orally once a day  rivaroxaban 20 mg oral tablet: 1 tab(s) orally every 24 hours  simvastatin 40 mg oral tablet: 1 tab(s) orally once a day (at bedtime)      Vital Signs:   T(F): 97.3 (19 @ 05:51), Max: 97.3 (12-15-19 @ 20:29)  HR: 76 (19 @ 05:51) (74 - 77)  BP: 146/67 (19 @ 05:51) (108/55 - 148/67)  RR: 18 (19 @ 05:51) (18 - 18)  SpO2: --      12-15-19 @ 07:01  -  19 @ 07:00  --------------------------------------------------------  IN: 740 mL / OUT: 300 mL / NET: 440 mL        Physical Exam:   GENERAL: NAD  HEENT: NCAT  CHEST/LUNG: CTA b/l  HEART: Regular rate and rhythm; s1 s2 appreciated  ABDOMEN: Soft, Nontender, Nondistended; Bowel sounds present  EXTREMITIES: No LE edema b/l  NERVOUS SYSTEM:  Alert & Oriented X2 (person and place) initially now A&O x 0        Labs:                         8.9    7.84  )-----------( 268      ( 15 Dec 2019 06:18 )             29.4       15 Dec 2019 06:18    141    |  103    |  18     ----------------------------<  105    5.0     |  25     |  0.8      Ca    8.8        15 Dec 2019 06:18  Phos  3.7       15 Dec 2019 06:18  Mg     2.3       15 Dec 2019 06:18    Hemoglobin A1C, Whole Blood: 6.0 % (19 @ 05:37)    Troponin <0.01, CKMB --, CK -- 19 @ 06:04    Urinalysis Basic - ( 13 Dec 2019 13:00 )    Color: Yellow / Appearance: Slightly Turbid / S.020 / pH: x  Gluc: x / Ketone: Negative  / Bili: Negative / Urobili: <2 mg/dL   Blood: x / Protein: Negative / Nitrite: Negative   Leuk Esterase: Moderate / RBC: 116 /HPF / WBC 21 /HPF   Sq Epi: x / Non Sq Epi: 1 /HPF / Bacteria: Many        Radiology:    < from: Xray Kidney Ureter Bladder (19 @ 16:49) >  Impression:  Nonobstructive bowel gas pattern.    < end of copied text > .

## 2020-06-17 NOTE — H&P ADULT - PROBLEM SELECTOR PLAN 5
Patient: Cassandra Marquez    Procedure Summary     Date:  06/16/20 Room / Location:      Anesthesia Start:  1857 Anesthesia Stop:  1945    Procedure:  Labor Epidural Diagnosis:      Scheduled Providers:   Responsible Provider:  Robert Stern M.D.    Anesthesia Type:  epidural ASA Status:  2          Final Anesthesia Type: epidural  Last vitals  BP   Blood Pressure: 124/86    Temp   36.7 °C (98 °F)    Pulse   Pulse: 92   Resp   18    SpO2   92 %      Anesthesia Post Evaluation    Patient location during evaluation: PACU  Patient participation: complete - patient participated  Level of consciousness: awake and alert    Airway patency: patent  Anesthetic complications: no  Cardiovascular status: hemodynamically stable  Respiratory status: acceptable  Hydration status: euvolemic    PONV: none           Nurse Pain Score: 4 (NPRS)        
pt was recently found to have esophageal varices on EGD.  will hold off beta blocker due to bradycardia on telemetry.  GI - Dr. Christensen.

## 2020-07-10 NOTE — PROGRESS NOTE ADULT - PROBLEM SELECTOR PLAN 3
Resolved,  ELIZABETH: normal EF, no Pul HTN
* does not remember list of medications   - started on losartan 25 mg PO ( will titrate as needed)
Microcytic hypochromic anemia, baseline not known  iron deficiency anemia   will follow FOBT
Microcytic hypochromic anemia, baseline not known  iron deficiency anemia   will follow FOBT
Normal

## 2021-03-01 ENCOUNTER — EMERGENCY (EMERGENCY)
Facility: HOSPITAL | Age: 66
LOS: 1 days | Discharge: HOME | End: 2021-03-01
Attending: EMERGENCY MEDICINE
Payer: MEDICAID

## 2021-03-01 VITALS
TEMPERATURE: 99 F | OXYGEN SATURATION: 98 % | HEART RATE: 88 BPM | SYSTOLIC BLOOD PRESSURE: 170 MMHG | HEIGHT: 66 IN | DIASTOLIC BLOOD PRESSURE: 74 MMHG | RESPIRATION RATE: 18 BRPM | WEIGHT: 179.9 LBS

## 2021-03-01 PROCEDURE — 73110 X-RAY EXAM OF WRIST: CPT | Mod: 26,LT

## 2021-03-01 PROCEDURE — 73090 X-RAY EXAM OF FOREARM: CPT | Mod: 26,LT

## 2021-03-01 PROCEDURE — 99284 EMERGENCY DEPT VISIT MOD MDM: CPT

## 2021-03-01 PROCEDURE — 99284 EMERGENCY DEPT VISIT MOD MDM: CPT | Mod: 25

## 2021-03-01 PROCEDURE — 29125 APPL SHORT ARM SPLINT STATIC: CPT | Mod: LT

## 2021-03-01 PROCEDURE — 73090 X-RAY EXAM OF FOREARM: CPT

## 2021-03-01 PROCEDURE — 73110 X-RAY EXAM OF WRIST: CPT

## 2021-03-01 PROCEDURE — 73130 X-RAY EXAM OF HAND: CPT

## 2021-03-01 PROCEDURE — 73130 X-RAY EXAM OF HAND: CPT | Mod: 26,LT

## 2021-03-01 RX ORDER — ACETAMINOPHEN 500 MG
975 TABLET ORAL ONCE
Refills: 0 | Status: COMPLETED | OUTPATIENT
Start: 2021-03-01 | End: 2021-03-01

## 2021-03-01 RX ADMIN — Medication 975 MILLIGRAM(S): at 22:16

## 2021-03-01 NOTE — ED PROVIDER NOTE - CARE PLAN
Principal Discharge DX:	Closed fracture of distal end of left radius, unspecified fracture morphology, initial encounter  Secondary Diagnosis:	Closed fracture of distal end of left ulna, unspecified fracture morphology, initial encounter

## 2021-03-01 NOTE — ED PROVIDER NOTE - PATIENT PORTAL LINK FT
You can access the FollowMyHealth Patient Portal offered by [facility] by registering at the following website: [email or web address]. By joining [facility]’s FollowMyHealth portal, you will also be able to view your health information using other applications (apps) compatible with our system.

## 2021-03-01 NOTE — ED ADULT TRIAGE NOTE - PATIENT ON (OXYGEN DELIVERY METHOD)
Bedside verbal report received from [facility] 34[facility], reviewed SBAR, VS, MAR, labs and summary of care. Patient sitting up, talking on phone. Patient denies distress. Patient up to BR prn, gait steady, call light in reach. room air

## 2021-03-01 NOTE — ED PROVIDER NOTE - OBJECTIVE STATEMENT
65 year old male with PMHx of HTN, DM, right hand dominant presenting to the ED s/p injury to the left wrist today. He reports that while he was opening his curtain, the curtain heriberto fell down and hit his left wrist. Patient now with left wrist pain and swelling since 16:00. No paresthesias noted, no other acute complaints or injuries.

## 2021-03-01 NOTE — ED PROVIDER NOTE - CLINICAL SUMMARY MEDICAL DECISION MAKING FREE TEXT BOX
65 year old male presenting to the ED with left wrist injury. Will x-ray, splint, and discharge with follow up for orthopedics .

## 2021-03-01 NOTE — ED PROVIDER NOTE - NSFOLLOWUPCLINICS_GEN_ALL_ED_FT
[facility]  Orthopedics  [address] [place] [address]  Phone: [phone]  Fax: [phone]  Follow Up Time: 1-3 Days

## 2021-07-29 NOTE — H&P ADULT - PROBLEM SELECTOR PLAN 1
Price (Do Not Change): 0.00 Instructions: This plan will send the code FBSE to the PM system.  DO NOT or CHANGE the price. Detail Level: Generalized Patient oming in with difficulty breathing, hypoxic resp failure, decreased breath sounds, poor air entry, with h/o long term smoking  no consolidation seen on CXR, not volume overloaded clinically, proBNP WNL  possible COPD exacerbation, c/w BiPaP support, nebs, Levaquin 750mg QD IV, solu-medro, 125mg QD and 40Q8  ABG while on Bipap is WNL, will try to taper to NC as tolerated  Will need PFTs as an out patient and CT chest non urgent as patient is a current active smoker  smoking cessation advice provided, ordered nicotine patch Patient oming in with difficulty breathing, hypoxic resp failure, decreased breath sounds, poor air entry, with h/o long term smoking  no consolidation seen on CXR, not volume overloaded clinically, proBNP WNL  possible COPD exacerbation, c/w BiPaP support, nebs, Levaquin 750mg QD IV, solu-medro, 125mg QD and 40Q8  ABG while on Bipap is WNL, will try to taper to NC as tolerated  Will need PFTs as an out patient and CT chest non urgent as patient is a current active smoker  smoking cessation advice provided, ordered nicotine patch  Check TTE to eval for Pul HTN

## 2021-12-08 NOTE — PATIENT PROFILE ADULT - NSPROEDAABILITYLEARNOTHER_GEN_A_NUR
smokeless tobacco. She reports previous alcohol use. She reports that she does not use drugs. Family History:  Reviewed in detail and  Positive as follows:        Problem Relation Age of Onset    Cancer Mother 39        uterine ca    Migraines Mother     High Blood Pressure Father     High Cholesterol Father     Migraines Sister     Diabetes Maternal Grandmother     Celiac Disease Maternal Grandmother     Cancer Maternal Grandfather         bladder ca with mets       REVIEW OF SYSTEMS:   Positive review  noted in the HPI. All other systems reviewed and negative.     PHYSICAL EXAM:    BP (!) 108/56   Pulse 60   Temp 97.4 °F (36.3 °C) (Oral)   Resp 13   LMP 12/01/2021   SpO2 97%   General Appearance: alert and oriented to person, place and time, well developed and well- nourished, in no acute distress  Skin: warm and dry, no rash or erythema  Head: normocephalic and atraumatic  Eyes: pupils equal, round, and reactive to light, extraocular eye movements intact, conjunctivae normal  ENT: tympanic membrane, external ear and ear canal normal bilaterally, nose without deformity, nasal mucosa and turbinates normal without polyps  Neck: supple and non-tender without mass, no thyromegaly or thyroid nodules, no cervical lymphadenopathy  Pulmonary/Chest: clear to auscultation bilaterally- no wheezes, rales or rhonchi, normal air movement, no respiratory distress  Cardiovascular: normal rate, regular rhythm, normal S1 and S2, no murmurs, rubs, clicks, or gallops, Peripheral pulses good, Cap refill <3 sec, no carotid bruits  Abdomen: soft, non-tender, non-distended, normal bowel sounds, no masses or organomegaly  Extremities: no cyanosis, clubbing or edema  Musculoskeletal: normal range of motion, no joint swelling, deformity or tenderness  Neurologic: reflexes normal and symmetric, no cranial nerve deficit, gait, coordination and speech normal      LABS:        CBC   Recent Labs     12/07/21  0800   WBC 8.6   HGB 14.1   HCT 42.8         RENAL  Recent Labs     12/07/21  0800      K 4.5      CO2 24   BUN 11   CREATININE 0.7     LFT'S  Recent Labs     12/07/21  0800   AST 23   ALT 39   BILITOT <0.2   ALKPHOS 91     COAG  No results for input(s): INR in the last 72 hours. CARDIAC ENZYMES  Recent Labs     12/07/21  0800   TROPONINI <0.01       U/A:    Lab Results   Component Value Date    NITRITE negative 04/19/2012    COLORU YELLOW 03/04/2021    WBCUA 1 03/04/2021    RBCUA 3-4 03/04/2021    MUCUS 1+ 12/26/2020    BACTERIA Rare 12/26/2020    CLARITYU Clear 03/04/2021    SPECGRAV 1.026 03/04/2021    LEUKOCYTESUR Negative 03/04/2021    BLOODU Negative 03/04/2021    GLUCOSEU Negative 03/04/2021    GLUCOSEU NEGATIVE 07/30/2011       ABG  No results found for: RHU0RHV, BEART, Y9HFKQPM, PHART, THGBART, NPE8JWT, PO2ART, YHN1LDB    UA:No results for input(s): NITRITE, COLORU, PHUR, LABCAST, WBCUA, RBCUA, MUCUS, TRICHOMONAS, YEAST, BACTERIA, CLARITYU, SPECGRAV, LEUKOCYTESUR, UROBILINOGEN, BILIRUBINUR, BLOODU, GLUCOSEU, KETUA, AMORPHOUS in the last 72 hours. Microbiology:  No results for input(s): LABGRAM, LABANAE, ORG, CXSURG in the last 72 hours. Nasal Culture: No results for input(s): ORG, MRSAPCR in the last 72 hours. Blood Culture: No results for input(s): BC, BLOODCULT2, ORG in the last 72 hours. Fungal Culture:   No results for input(s): FUNGSM in the last 72 hours. No results for input(s): FUNCXBLD in the last 72 hours. CSF Culture:  No results for input(s): COLORCSF, APPEARCSF, CFTUBE, CLOTCSF, WBCCSF, RBCCSF, NEUTCSF, NUMCELLSCSF, LYMPHSCSF, MONOCSF, GLUCCSF, VOLCSF in the last 72 hours. Respiratory Culture:  No results for input(s): Carlos Corral in the last 72 hours. AFB:No results for input(s): AFBSMEAR in the last 72 hours. Urine Culture  No results for input(s): LABURIN in the last 72 hours.     RADIOLOGY:    MRI brain without contrast    (Results Pending)   MRV HEAD W WO CONTRAST (Results Pending)       Previous medical records personally reviewed and analyzed         PHYSICIAN CERTIFICATION    I certify that Pierre Diaz is expected to be hospitalized for >2 midnights based on the following assessment and plan:    ASSESSMENT/PLAN:  Active Hospital Problems    Diagnosis Date Noted    Vertigo [R42] 12/07/2021     Vertigo, likely BPPV  -Neurology following  -MRI/MRV brain has been ordered  -Echo  -PT/OT  -continue antivert PRN    DVT Prophylaxis:lovenox  Diet: ADULT DIET; Regular  Code Status: Full Code  PT/OT Eval Status: pending    Dispo - pending clinical course       Gerardo Pérez MD  The note was completed using EMR. Every effort was made to ensure accuracy; however, inadvertent computerized transcription errors may be present. Thank you No primary care provider on file. for the opportunity to be involved in this patient's care. If you have any questions or concerns please feel free to contact me at [phone]. none

## 2022-05-02 NOTE — PROGRESS NOTE ADULT - SUBJECTIVE AND OBJECTIVE BOX
[facility]    ICU TEAM Progress Note    Name: Crystal Roy   : 1955   MRN: [id number]   Date: 2022      HPI  Alina Garduno is a 65-year-old female with PMHx of schizophrenia who presents to the ED as a transfer from an OSH ED for right-sided mixed density right parietal parenchymal hemorrhage. Per chart review, her symptoms began yesterday morning with left-sided hemiparesis. Family brought her to the OSH today after she became more lethargic with AMS. Neurosurgery evaluated patient and no need for neurosurgical intervention at this time. She was started on Cardene in the ED for hypertension and Precedex for agitation. OSH was unable to perform CTA due to agitation. Will attempt CTA while on Precedex. She will be admitted to the ICU for further medical management. Reason for ICU Admission: [address]    Subjective:   Overnight Events:   2022  N/A    Hospital Course  : Admitted to ICU on Cardene and Precedex infusions. POD:  * No surgery found *    S/P:            ICU Assessment and Plan:     ICH  HTN  Acute encephalopathy  Schizophrenia         ICU Comprehensive Plan of Care:     1. Neurological System    Spontaneous Awakening Trial: N/A  Sedation: Precedex  Analgesia: Acetaminophen    2. Cardiovascular System    SBP Goal of: > 90 mmHg and < 160 mmHg  MAP Goal of: > 65 mmHg  Nicardipine (Cardene) - For above SBP/MAP goals  Transfusion Trigger (Hgb): <7 g/dL  Keep K > 4; Mg > 2     3. Respiratory System  Incentive spirometry  Spontaneous Breathing Trial: N/A  Pulmonary toilet: Incentive Spirometry   SpO2 Goal: > 92%  DVT Prophylaxis: SCD's or Sequential Compression Device     4. Renal/GI/Endocrine System  IVFs:   Ulcer Prophylaxis: Not at this time   Bowel Regimen: MiraLax  Feeding:  No NPO  Blood Sugar Goal 120-180 - Glycemic Control: Insulin    5.  Infectious Disease    Indwelling Catheter:  Tubes: None  Lines: Peripheral IV  Drains: None  D - De-escalation of Antibiotics: None    6. PT/OT: PT consulted and on board, OT consulted and on board and Speech therapy consulted and on board     7. Goals of Care Discussion with family Pending     8. Plan of Care/Code Status: DNI    9. Discussed Care Plan with Bedside RN    10. Documentation of Current Medications      Active Problem List:     Problem List  Date Reviewed: 2015          Codes Class    Hemorrhagic stroke [facility]) ICD-10-CM: I61.9  ICD-9-CM: 328         Schizoaffective disorder ([facility] [address] ICD-10-CM: F25.9  ICD-9-CM: 295.70               Past Medical History:      has a past medical history of Psychotic disorder. Past Surgical History:      has a past surgical history that includes hx tubal ligation. Home Medications:     Prior to Admission medications    Not on File       Allergies/Social/Family History:     No Known Allergies   Social History     Tobacco Use    Smoking status: Never Smoker    Smokeless tobacco: Not on file   Substance Use Topics    Alcohol use: No      Family History   Problem Relation Age of Onset    Diabetes Neg Hx     Heart Disease Neg Hx     Psychiatric Disorder Neg Hx        Review of Systems:     Review of Systems   Unable to perform ROS: Acuity of condition       Objective:   Vital Signs:  Visit Vitals  BP (!) 158/100   Pulse 85   Temp 99.1 °F (37.3 °C)   Resp 27   Wt 54.1 kg (119 lb 4.3 oz)   SpO2 95%   BMI 27.41 kg/m²      O2 Device: None (Room air) Temp (24hrs), Av.6 °F (37 °C), Min:98.1 °F (36.7 °C), Max:99.1 °F (37.3 °C)           Intake/Output:   No intake or output data in the 24 hours ending 22 6302    Physical Exam:    Physical Exam  Vitals and nursing note reviewed. Constitutional:       Appearance: She is ill-appearing. HENT:      Mouth/Throat:      Mouth: Mucous membranes are dry. Eyes:      Pupils: Pupils are equal, round, and reactive to light. Cardiovascular:      Rate and Rhythm: Regular rhythm. Tachycardia present.    Pulmonary:      Effort: Pulmonary effort is · Reason for Admission	Pt was brought in unresponsive	      · Subjective and Objective: 	  INTERVAL HPI/OVERNIGHT EVENTS: No overnight events.     PRESSORS: [ ] YES [ x] NO  WHICH:    ANTIBIOTICS:    Zosyn               DATE STARTED:  ANTIBIOTICS:                  DATE STARTED:  ANTIBIOTICS:                  DATE STARTED:    Antimicrobial:  piperacillin/tazobactam IVPB. 3.375 Gram(s) IV Intermittent every 8 hours    Cardiovascular:    Pulmonary:  ALBUTerol    90 MICROgram(s) HFA Inhaler 2 Puff(s) Inhalation every 6 hours    Hematalogic:    Other:  insulin lispro (HumaLOG) corrective regimen sliding scale   SubCutaneous every 6 hours  midazolam Infusion 0.02 mG/kG/Hr IV Continuous <Continuous>  pantoprazole  Injectable 40 milliGRAM(s) IV Push daily    ALBUTerol    90 MICROgram(s) HFA Inhaler 2 Puff(s) Inhalation every 6 hours  insulin lispro (HumaLOG) corrective regimen sliding scale   SubCutaneous every 6 hours  midazolam Infusion 0.02 mG/kG/Hr IV Continuous <Continuous>  pantoprazole  Injectable 40 milliGRAM(s) IV Push daily  piperacillin/tazobactam IVPB. 3.375 Gram(s) IV Intermittent every 8 hours    Drug Dosing Weight  Height (cm): 167.64 (2019 15:34)  Weight (kg): 99.8 (2019 15:34)  BMI (kg/m2): 35.5 (2019 15:34)  BSA (m2): 2.08 (2019 15:34)    CENTRAL LINE: [ ] YES [x ] NO  LOCATION:   DATE INSERTED:  REMOVE: [ ] YES [ ] NO  EXPLAIN:    MUÑOZ: [ ] YES [ x] NO    DATE INSERTED:  REMOVE:  [ ] YES [ ] NO  EXPLAIN:    A-LINE:  [ ] YES [x ] NO  LOCATION:   DATE INSERTED:  REMOVE:  [ ] YES [ ] NO  EXPLAIN:    PMH -reviewed admission note, no change since admission      ABG - ( 15 Chin 2019 05:25 )  pH, Arterial: 7.40  pH, Blood: x     /  pCO2: 37    /  pO2: 117   / HCO3: 22    / Base Excess: -1.5  /  SaO2: 99                     @ 07:01  -  -15 @ 07:00  --------------------------------------------------------  IN: 246 mL / OUT: 685 mL / NET: -439 mL        Mode: AC/ CMV (Assist Control/ Continuous Mandatory Ventilation)  RR (machine): 20  TV (machine): 500  FiO2: 50  PEEP: 5  ITime: 1  MAP: 12  PIP: 33      PHYSICAL EXAM:    GENERAL: NAD, well-groomed, well-developed  HEAD:  Atraumatic, Normocephalic  EYES: EOMI, PERRLA, conjunctiva and sclera clear  ENMT: No tonsillar erythema, exudates, or enlargement; Moist mucous membranes,   NECK: Supple, normal appearance, No JVD; Normal thyroid; Trachea midline  NERVOUS SYSTEM:  Sedated   CHEST/LUNG: No chest deformity; Decreased air entry on right side ; No rales, rhonchi, wheezing   HEART: Regular rate and rhythm; No murmurs, rubs, or gallops  ABDOMEN: Soft, Nontender, Distended Bowel sounds present hypoactive   EXTREMITIES:  2+ Peripheral Pulses, No clubbing, cyanosis, or edema  LYMPH: No lymphadenopathy noted  SKIN: No rashes or lesions; Good capillary refill      LABS:  CBC Full  -  ( 15 Chin 2019 06:11 )  WBC Count : 7.1 K/uL  Hemoglobin : 11.5 g/dL  Hematocrit : 36.5 %  Platelet Count - Automated : 78 K/uL  Mean Cell Volume : 73.3 fl  Mean Cell Hemoglobin : 23.1 pg  Mean Cell Hemoglobin Concentration : 31.6 gm/dL  Auto Neutrophil # : 5.6 K/uL  Auto Lymphocyte # : 1.1 K/uL  Auto Monocyte # : 0.4 K/uL  Auto Eosinophil # : 0.0 K/uL  Auto Basophil # : 0.0 K/uL  Auto Neutrophil % : 78.1 %  Auto Lymphocyte % : 15.0 %  Auto Monocyte % : 6.3 %  Auto Eosinophil % : 0.1 %  Auto Basophil % : 0.5 %    01-15    144  |  112<H>  |  19<H>  ----------------------------<  147<H>  3.9   |  21<L>  |  1.16    Ca    7.5<L>      15 Chin 2019 06:11  Phos  3.7     01-15  Mg     1.9     01-15    TPro  6.9  /  Alb  3.0<L>  /  TBili  0.4  /  DBili  0.1  /  AST  26  /  ALT  17  /  AlkPhos  64  01-15    PT/INR - ( 2019 16:40 )   PT: 11.4 sec;   INR: 1.03 ratio         PTT - ( 2019 16:40 )  PTT:26.3 sec  Urinalysis Basic - ( 2019 18:14 )    Color: Yellow / Appearance: Clear / S.020 / pH: x  Gluc: x / Ketone: Negative  / Bili: Negative / Urobili: Negative   Blood: x / Protein: 500 mg/dL / Nitrite: Negative   Leuk Esterase: Negative / RBC: 5-10 /HPF / WBC 3-5 /HPF   Sq Epi: x / Non Sq Epi: Moderate /HPF / Bacteria: Moderate /HPF          RADIOLOGY & ADDITIONAL STUDIES REVIEWED:  ***    [ ]GOALS OF CARE DISCUSSION WITH PATIENT/FAMILY/PROXY:    CRITICAL CARE TIME SPENT: 35 minutes    Assessment and Plan:   · Assessment		  63 year old M from home, lives with girl friend Ms. Remy , with PMHx of HTN, HLD, right LE varicose veins, umbilical hernia, ex smoker (1Pack over 3 days >50 years, quit 7 months back ), chronic ethanolic but family denies any alcohol abuse, Recently admitted for COPD exacerbation was on NIVBIPAP in 2018, recently had EGD and colonoscopy on  which showed esophageal varices ( was started on nadolol) and colon polyps was BIB EMS for unresponsiveness.     In ED, patient was hypoxic and hypertensive to 266/ 111mmhg with mottled skin per ED attending. EKG- NSR with sinus arrythmia @67BPM , LBBB. cardiac enzymes x1- negative , CXR s/o bilateral pulmonary congestion more pronounced on RLL which seems to be consolidation  (awaiting official report). Labs pertinent for WBC- 15.1 , bun/ creat- 14/1.38, BNP- 458, ABG 0- 7.1/77/331/23/ 100 Fio2 on Ac 16/500/100%/5, CT head no acute pathology.     Pt is being admitted to ICU for hypoxic and hypercapenic respiratory failure secondary to Aspiration PNA and hypertensive encephalopathy.          Problem/Plan - 1:  ·  Problem: Respiratory failure with hypercapnia.  Plan: pt was intubated by ED as he was encephalopathic.    Pt has initial ABG s/o 7.1/77/331/23/ 100 Fio2 on Ac 16/500/100%/5.  -Ventilator settings were adjusted yesterday, ABGS improved.  This mornin.40/37/117/99 FiO2   will keep intubated and wean as tolerated.      Problem/Plan - 2:  ·  Problem: Hypertensive emergency.  Plan: -Patient's initial BP in ED was in 260-230.  -He was started on propofol for sedation, along with labetalol IV 10mg push and nitroglycerin drip but his BP dropped to 130's and he was intermittently bradycardic.   -Currently he is off BP meds, versed for sedation  -BP has been stable.      Problem/Plan - 3:  ·  Problem: Encephalopathy.  Plan: likely secondary to underlying hypercapnic respiratory failure and infectious etiology- concern for aspiration PNA ( given pt was vomiting at home) versus secondary to hypertensive emergency leading to encephalopathy  management of hypertensive emergency as per above plan.  Initial ABG s/o hypercapnic respiratory failure, was intubated in ED, ventilator settings are adjusted and repeat ABG shows improvement in Pco2 levels   CXR s/o bilateral pulmonary congestion , R lower lobe consolidation.   -UA- looks contaminated. Follow urine and blood cultures   -C/w zosyn Day 2.      Problem/Plan - 4:  ·  Problem: Thrombocytopenia.  Plan: Patient's Platelet dropped from 212 to 78 this morning which is sudden drop from last night.  -Could be secondary to infectious process, abx.   Will send HIT panel  SCD boots for Prophylaxis  -Dr. Pickett - Heme/onc.      Problem/Plan - 5:  ·  Problem: COPD (chronic obstructive pulmonary disease).  Plan: PT has hx of COPD.  O/E- bilateral air entry+ with decreased air entry on RLL.  no rhonchi or rales  will c/w Duoneb.      Problem/Plan - 6:  Problem: Esophageal varices. Plan: pt was recently found to have esophageal varices on EGD.  will hold off beta blocker due to bradycardia on telemetry.  GI - Dr. Christensen.     Problem/Plan - 7:  ·  Problem: Prophylactic measure.  Plan: [] Previous VTE                                                3  [] Thrombophilia                                             2  [] Lower limb paralysis                                   2    [] Current Cancer                                             2   [] Immobilization > 24 hrs                              1  [x] ICU/CCU stay > 24 hours                             1  [x] Age > 60                                                         1    IMPROVE VTE Score: 2, VTE ppx with heparin and GI ppx with protonix.           critical care 36 min  case discussed with icu team on rounds normal.      Breath sounds: Normal breath sounds. Comments: Diminished bases  Abdominal:      General: Bowel sounds are normal.      Palpations: Abdomen is soft. Skin:     General: Skin is warm and dry. Capillary Refill: Capillary refill takes less than 2 seconds. Neurological:      Mental Status: She is easily aroused. She is lethargic, disoriented and confused. Comments: Left-sided hemiparesis, agitated and moving spontaneously   Psychiatric:         Behavior: Behavior is agitated. Judgment: Judgment is impulsive. LABS AND  DATA: Personally reviewed  Recent Labs     22  2219   WBC 13.1*   HGB 12.1   HCT 37.4        No results for input(s): NA, K, CL, CO2, BUN, CREA, GLU, CA, MG, PHOS in the last 72 hours. No results for input(s): AP, TBIL, TP, ALB, GLOB, AML, LPSE in the last 72 hours. No lab exists for component: SGOT, GPT, AMYP  No results for input(s): INR, PTP, APTT, INREXT in the last 72 hours. No results for input(s): PHI, PCO2I, PO2I, FIO2I in the last 72 hours. No results for input(s): CPK, CKMB, TROIQ, BNPP in the last 72 hours. Imagin2022       Chest X-Ray:  CXR Results  (Last 48 hours)               22 1759  XR CHEST PORT Final result    Impression:  No acute process. Narrative: Indication: Left-sided weakness, vomiting       Comparison: None       Portable exam of the chest obtained at 1758 demonstrates normal heart size. There is no acute process in the lung fields. The osseous structures are   unremarkable.                    ECHO:  pending    Ventilator Settings:  Mode Rate Tidal Volume Pressure FiO2 PEEP                    Peak airway pressure:      Minute ventilation:          MEDS: Reviewed    Multidisciplinary Rounds Completed:  N/A    ABCDEF Bundle/Checklist Completed:  Yes    SPECIAL EQUIPMENT  None    DISPOSITION  Stay in ICU    CRITICAL CARE CONSULTANT NOTE  I had a face to face encounter with the patient, reviewed and interpreted patient data including clinical events, labs, images, vital signs, I/O's, and examined patient. I have discussed the case and the plan and management of the patient's care with the consulting services, the bedside nurses and the respiratory therapist.      NOTE OF PERSONAL INVOLVEMENT IN CARE   This patient has a high probability of imminent, clinically significant deterioration, which requires the highest level of preparedness to intervene urgently. I participated in the decision-making and personally managed or directed the management of the following life and organ supporting interventions that required my frequent assessment to treat or prevent imminent deterioration. I personally spent 70 minutes of critical care time. This is time spent at this critically ill patient's bedside actively involved in patient care as well as the coordination of care. This does not include any procedural time which has been billed separately.     Flip Becker [facility]  Nurse Practitioner  [facility]  5/1/2022

## 2022-05-06 NOTE — PATIENT PROFILE ADULT. - COMFORT LEVEL, ACCEPTABLE
Rx Refill Note  Requested Prescriptions     Pending Prescriptions Disp Refills   • losartan (COZAAR) 50 MG tablet [Pharmacy Med Name: Losartan Potassium 50 MG Oral Tablet] 30 tablet 0     Sig: Take 1 tablet by mouth once daily      Last office visit with prescribing clinician: 4/4/2022      Next office visit with prescribing clinician: 5/17/2022            Elijah Magaña Rep  05/06/22, 12:54 EDT  
0

## 2022-06-27 NOTE — ED PROVIDER NOTE - GENITOURINARY [-], MLM
Patient discharged to home at approximately 5:30 pm.  IV discontinued before patient left.  All discharge instructions explained to patient and all questions answered.  Cd of CT scans and ultrasounds sent home with patient per request.    
no changes in urinary patterns

## 2022-06-30 NOTE — DIETITIAN INITIAL EVALUATION ADULT. - PERTINENT MEDS FT
[place] : 1945 Sex: male  Age: 65 y.o. Chief Complaint   Patient presents with    Dizziness     has had this for along time but owrse today he became concerend    Medication Refill    Medication Check     wants it isobride backa to a whole tablet rather then 1/2 due to having high bp        Assessment and Plan:    Eldon Diop was seen today for dizziness, medication refill and medication check. Diagnoses and all orders for this visit:    Acute left-sided muscle weakness  Comments: The way he lists to the left on this episode is concerning especially with his comorbidities referral to neurology and CT with contrast ordered  Orders:  -     CT HEAD W CONTRAST; Future  -     [address], APN, Neurology, [facility]    H/O right coronary artery stent placement  Comments:  His CTA of the neck showed no stenosis at that time in     Familial hypercholesterolemia    Essential hypertension    Type 2 diabetes mellitus without complication, with long-term current use of insulin ([address]    Type 2 diabetes mellitus with stage 2 chronic kidney disease, with long-term current use of insulin ([address]    Falling episodes  Comments:  lists to the left briefly not dizziness like vertigo as before  Orders:  -     CT HEAD W CONTRAST; Future  -     [address], APN, Neurology, [place]    Balance problem  Comments:  He definitely knows the difference between vertigo and balance issues which are nonvertiginous in nature  Orders:  -     [address]; Future  -     [address], APN, Neurology, [facility]    Other orders  -     carvedilol (COREG) 25 MG tablet; TAKE 1 TABLET BY MOUTH  TWICE DAILY WITH MEALS  -     isosorbide mononitrate (IMDUR) 60 MG extended release tablet;  Take 1/2 tablet orally twice daily        USPTF:      Lab Results   Component Value Date    LABA1C 6.2 (H) 2022     No results found for: EAG   Abnormal Blood Glucose and Type 2 Diabetes Mellitus: Screening -- Adults aged 36 to 79 years who are overweight or obese Grade: B (Recommended)    BP Readings from Last 3 Encounters:   06/30/22 100/60   05/03/22 132/70   09/01/21 124/68     High Blood Pressure: Screening and Home Monitoring -- Adults  Grade: A (Recommended) recommends screening for high blood pressure in ages 25 years or older. obtain measurements outside of the clinical setting for diagnostic confirmation before starting treatment. Annual screening for adults aged 36 years or older or those who are at increased risk for blood pressure    (  ) Colorectal Cancer: Screening --Adults aged 48 to 76 years  Grade: A (Recommended) recommends screening for colorectal cancer starting at age 48 years and continuing until age 76 years. Lab Results   Component Value Date    CHOL 166 06/18/2021    CHOL 177 06/14/2021    CHOL 179 02/08/2021     Lab Results   Component Value Date    TRIG 254 (H) 06/18/2021    TRIG 222 (H) 06/14/2021    TRIG 295 (H) 02/08/2021     Lab Results   Component Value Date    HDL 27 06/18/2021    HDL 32 06/14/2021    HDL 38 02/08/2021     Lab Results   Component Value Date    LDLCALC 88 06/18/2021    LDLCALC 101 (H) 06/14/2021    LDLCALC 82 02/08/2021     Lab Results   Component Value Date    LABVLDL 51 06/18/2021    LABVLDL 44 06/14/2021    LABVLDL 59 02/08/2021     No results found for: CHOLHDLRATIO   (  )  Lipid Disorders in Adults: Screening -- Men 28 and Older  Grade: A (Recommended) recommends screening men aged 28 and older for lipid disorders.         Alcohol Use:     Frequency of Alcohol Consumption: Not on file    Average Number of Drinks: Not on file    Frequency of Binge Drinking: Not on file      (Non Drinker) Alcohol Misuse: Screening and Behavioral Counseling Interventions in Primary Care -- Adults  Grade: B (Recommended) recommends that clinicians screen adults aged 25 years or older for alcohol misuse and provide persons engaged in risky or hazardous drinking with brief behavioral counseling interventions to reduce alcohol misuse. Estimated body mass index is 36.3 kg/m² as calculated from the following:    Height as of this encounter: 5' 10\" (1.778 m). Weight as of this encounter: 253 lb (114.8 kg). (  )  Obesity: Screening for and Management of-- All Adults  Grade: B(Recommended) recommends screening all adults for obesity. Clinicians should offer or refer patients with a body mass index (BMI) of 30 kg/m2 or higher to intensive, multicomponent behavioral interventions. (Not a fall risk)  Fall Prevention -- Exercise/Physical Therapy: Community-dwelling Adults 72 Years or Older, Increased Risk for Falls   Grade: B (Recommended) recommends exercise or physical therapy to prevent falls in community-dwelling adults aged 72 years or older who are at increased risk for falls. (No new symptoms noted or reported today)  Depression: Screening -- General adult population, including pregnant and postpartum women  Grade: B(Recommended) recommends screening for depression in the general adult population,  Screening should be implemented with adequate systems in place to ensure accurate diagnosis, effective treatment, and appropriate follow-up. (  ) Glaucoma: Screening - Adults and Diabetic Eye Exam      (TSH 3.66) Thyroid Dysfunction: Screening --      Lab Results   Component Value Date    PSADIA 4.79 (H) 05/20/2022    PSADIA 3.85 11/12/2021      (  ) Prostate Cancer: Prostate-Specific Antigen (PSA)-Based Screening -- All Men  PSA 2.5 Mar 2020  PSA 3.1 Sep 2019  PSA 2.7 Feb 2018      Educational materials printed for patient's review and were included in patient instructions on his After Visit Summary and given to patient at the end of visit. Counseled regarding above diagnosis, including possible risks and complications,  especially if left uncontrolled.      Counseled regarding the possible side effects, risks, benefits and alternatives to treatment; patient and/or guardian verbalizes understanding, agrees, feels comfortable with and wishes to proceed with above treatment plan. Advised patient to call with any new medication issues, and read all Rx info from pharmacy to assure aware of all possible risks and side effects of medication before taking. Reviewed age and gender appropriate health screening exams and vaccinations. Advised patient regarding importance of keeping up with recommended health maintenance and to schedule as soon as possible if overdue, as this is important in assessing for undiagnosed pathology, especially cancer, as well as protecting against potentially harmful/life threatening disease. Patient verbalizes understanding and agrees with above counseling, assessment and plan. All questions answered. On 06/30/22 I have spent 30 reviewing previous notes, test results and face to face with the patient discussing the diagnosis and importance of compliance with the treatment plan as well as documenting on the day of the visit. Educational materials exercises printed for patient's review and were included in patient instructions on their After Visit Summary and given to patient at the end of visit.      Return in about 4 months (around 11/8/2022). Collins Almonte is a very pleasant 65-year-old white male who describes being out on the mower for about 3 hours and when he got off he started to list to the left side almost fallen several times. He has had multiple episodes of dizziness and is concerned that there may be something else wrong he knows what vertigo is and states that this is not vertiginous in nature. His blood pressure is low at his visit today but he says that it it is typically runs higher and that may be he may be dehydrated but the primary concern is the fact that he is having that left-sided weakness. He denies any head injury or trauma no sugar issues to think hypoglycemic event.   He denies any nausea, vomiting or chest pain no reported shortness of breath. LAST  VISIT  Ivonne Sen  presents today for evaluation and management of chronic medical problems. Current medication list reviewed. The patient is tolerating all medications well without adverse events or known side effects. The patient does understand the risk and benefits of the prescribed medications. The patient is not up-to-date on all age-appropriate wellness issues. Patient denies any reccent hospitalizations or ER visit. CPAP    Patient is doing well on CPAP, utilizing it on a regular basis for a 7 year history. He reports resting well with the machine and is getting much better sleep on it, but lately he is having lack of sleep complaints at this time. With sleepiness during the day and has had some return of snoring  . Recommend he continue with pursuit of getting a CPAP study      Acute Complaints reported:  Ivonne Sen is reporting some shortness of breath which seems to be more congestion in description as opposed to exertional and is kind of positional if anything. He denies any chest pain or clamminess associated with it    He is also having an issue with any region in his right groin that does not seem to be healing and seems to be a nonunion after a surgical incision which she cannot remember what he had done looks like an inguinal hernia but he says he has not had 1    He is also having some more episodes where he is waking up at night he thinks that his CPAP is not working correctly and would like another sleep study    Dizziness  Associated symptoms include headaches and weakness. Pertinent negatives include no abdominal pain, arthralgias, chest pain, chills, coughing, diaphoresis, fatigue, fever, joint swelling, myalgias, nausea, numbness, rash or vomiting. CHRONIC CONDITION:    DM:  Mild intensity but controlled on    blood glucose test strips (ONETOUCH ULTRA) strip, 1 each by Does not apply route 2 times daily, Disp: 100 strip, Rfl: 5  tablet, Place 1 tablet under the tongue every 5 minutes as needed for Chest pain up to max of 3 total doses. If no relief after 1 dose, call 911., Disp: 25 tablet, Rfl: 0   remains at a mild intensity but overall good control, without symptoms, no ringing in the ears, no headaches and no nose bleeds. Better on medications. CAD: Patient currently on a Betablocker and an ACE for LVSD. Tobacco Use: N    Flu Shot Up To Date: Y     Meds Listed in Medical Record: Y     Hyperlipidemia: Mild in intensity but controlled on   rosuvastatin (CRESTOR) 40 MG tablet, Take 1 tablet by mouth every evening, Disp: 90 tablet, Rfl: 1   omega-3 acid ethyl esters (LOVAZA) 1 g capsule, Take 2 g by mouth 2 times daily, Disp: , Rfl:   , without symptoms, no complications with dietary treatment regimen reporting no side effects or intolereances. Compliant with treatment and diet. No muscle aches, new joint pains or abd pain. Hypothyroid: Remains controlled, without symptoms, on   levothyroxine (SYNTHROID, LEVOTHROID) 50 MCG tablet Take 1 tablet by mouth 1 (one) time each day   with no evidence of weight gain, palpitations, fatigue, difficulty swallowing, neck prominence or hair loss. Is better on medications and worsens with forgetting to take medications. Has been on medications religiously since last visit. Review of Systems   Constitutional: Negative for activity change, chills, diaphoresis, fatigue, fever and unexpected weight change. HENT: Negative for trouble swallowing and voice change. Eyes: Negative for visual disturbance. Respiratory: Negative for cough, chest tightness, shortness of breath and wheezing. Cardiovascular: Negative for chest pain, palpitations and leg swelling. Gastrointestinal: Negative for abdominal pain, blood in stool, constipation, diarrhea, nausea and vomiting. Endocrine: Negative for polydipsia, polyphagia and polyuria.    Genitourinary: Negative for dysuria, enuresis, frequency and hematuria. Musculoskeletal: Negative for arthralgias, back pain, gait problem, joint swelling, myalgias and neck stiffness. Skin: Negative for rash. Neurological: Positive for dizziness, weakness, light-headedness and headaches. Negative for seizures, syncope, facial asymmetry and numbness. Hematological: Does not bruise/bleed easily. Psychiatric/Behavioral: Negative for behavioral problems, confusion, hallucinations and suicidal ideas. The patient is not nervous/anxious.           Current Outpatient Medications:     carvedilol (COREG) 25 MG tablet, TAKE 1 TABLET BY MOUTH  TWICE DAILY WITH MEALS, Disp: 180 tablet, Rfl: 1    isosorbide mononitrate (IMDUR) 60 MG extended release tablet, Take 1/2 tablet orally twice daily, Disp: 90 tablet, Rfl: 1    ranolazine (RANEXA) 1000 MG extended release tablet, TAKE 1  BY MOUTH TWICE DAILY, Disp: 180 tablet, Rfl: 1    levothyroxine (SYNTHROID) 50 MCG tablet, Take 1 tablet by mouth Daily, Disp: 90 tablet, Rfl: 1    rosuvastatin (CRESTOR) 40 MG tablet, Take 1 tablet by mouth every evening, Disp: 90 tablet, Rfl: 1    sodium bicarbonate 650 MG tablet, TAKE 1 TABLET BY MOUTH ONCE DAILY, Disp: , Rfl:     blood glucose test strips (ONETOUCH ULTRA) strip, 1 each by Does not apply route 2 times daily, Disp: 100 strip, Rfl: 5    omeprazole (PRILOSEC) 20 MG delayed release capsule, TAKE 1 CAPSULE BY MOUTH  ONCE DAILY, Disp: 90 capsule, Rfl: 1    insulin lispro (HUMALOG) 100 UNIT/ML injection vial, Inject 1 Units into the skin 3 times daily as needed for High Blood Sugar Sliding Scale as needed, Disp: 10 mL, Rfl: 5    cloNIDine (CATAPRES) 0.1 MG tablet, Take 0.1 mg by mouth as needed Pt is only taking PRN, Disp: , Rfl:     omega-3 acid ethyl esters (LOVAZA) 1 g capsule, Take 2 g by mouth 2 times daily, Disp: , Rfl:     Magnesium 500 MG TABS, Take 1,000 mg by mouth 2 times daily Pt is taking 2 tablets in the am and 1 tablet in the evening for a total os 1500 mg, Disp: , Rfl:     Psyllium (METAMUCIL FIBER PO), Take 1 capsule by mouth daily, Disp: , Rfl:     loratadine (CLARITIN) 10 MG tablet, Take 10 mg by mouth daily, Disp: , Rfl:     sacubitril-valsartan (ENTRESTO) 24-26 MG per tablet, Take 1 tablet by mouth 2 times daily, Disp: 60 tablet, Rfl: 1    ticagrelor (BRILINTA) 90 MG TABS tablet, Take 1 tablet by mouth 2 times daily, Disp: 60 tablet, Rfl: 1    Insulin Degludec (TRESIBA FLEXTOUCH) 200 UNIT/ML SOPN, Inject 55 Units into the skin daily (with breakfast), Disp: , Rfl:     tamsulosin (FLOMAX) 0.4 MG capsule, TAKE 1 CAPSULE BY MOUTH ONCE DAILY AT BEDTIME, Disp: , Rfl:     nitroGLYCERIN (NITROSTAT) 0.4 MG SL tablet, Place 1 tablet under the tongue every 5 minutes as needed for Chest pain up to max of 3 total doses.  If no relief after 1 dose, call 911., Disp: 25 tablet, Rfl: 0    Garlic 817 MG CAPS, Take 500 mg by mouth 2 times daily , Disp: , Rfl:     Cholecalciferol (VITAMIN D3) 50 MCG (2000 UT) CAPS, Take 2,000 Units by mouth daily , Disp: , Rfl:     Ascorbic Acid (VITAMIN C) 1000 MG tablet, Take 1,000 mg by mouth daily, Disp: , Rfl:     zinc sulfate (ZINCATE) 220 (50 Zn) MG capsule, Take 50 mg by mouth daily , Disp: , Rfl:     TURMERIC PO, Take 500 capsules by mouth daily , Disp: , Rfl:     B Complex Vitamins (B COMPLEX 1 PO), Take 1 tablet by mouth daily , Disp: , Rfl:     aspirin 81 MG tablet, Take 81 mg by mouth daily , Disp: , Rfl:   No Known Allergies    Past Medical History:   Diagnosis Date    CAD (coronary artery disease)     [facility]     skin    Diabetes mellitus ([facility]     Diverticular disease of colon     DJD (degenerative joint disease), multiple sites     GERD (gastroesophageal reflux disease)     Goiter     H/O thyroid nodule     Hx of malignant neoplasm of prostate     Hyperlipidemia     Hypertension     Hypothyroidism     Status post insertion of drug eluting coronary artery stent 06/18/2021    SVG to OM 1     Past Surgical History:   Procedure Laterality Date    CAROTID ENDARTERECTOMY  2017    CHOLECYSTECTOMY      COLONOSCOPY      CORONARY ANGIOPLASTY WITH STENT PLACEMENT  2021    DR. FINE [facility], STENT TO RIGHT PDA  LISBETH    CORONARY ARTERY BYPASS GRAFT  10/11/2001    HERNIA REPAIR      SKIN CANCER EXCISION      MELONOMA    THYROID SURGERY  2017    BX    TOTAL KNEE ARTHROPLASTY Left 2018     Family History   Problem Relation Age of Onset    Diabetes Mother     Cancer Mother     Heart Attack Father     Coronary Art Dis Father      Social History     Socioeconomic History    Marital status:      Spouse name: Not on file    Number of children: Not on file    Years of education: Not on file    Highest education level: Not on file   Occupational History    Not on file   Tobacco Use    Smoking status: Former Smoker     Packs/day: 1.00     Years: 5.00     Pack years: 5.00     Quit date: 1984     Years since quittin.4    Smokeless tobacco: Never Used   Vaping Use    Vaping Use: Never used   Substance and Sexual Activity    Alcohol use: Never    Drug use: Never    Sexual activity: Not Currently   Other Topics Concern    Not on file   Social History Narrative    Not on file     Social Determinants of Health     Financial Resource Strain: Low Risk     Difficulty of Paying Living Expenses: Not hard at all   Food Insecurity: No Food Insecurity    Worried About Running Out of Food in the Last Year: Never true    [address] in the Last Year: Never true   Transportation Needs:     Lack of Transportation (Medical): Not on file    Lack of Transportation (Non-Medical):  Not on file   Physical Activity:     Days of Exercise per Week: Not on file    Minutes of Exercise per Session: Not on file   Stress:     Feeling of Stress : Not on file   Social Connections:     Frequency of Communication with Friends and Family: Not on file    Frequency of Social Gatherings with Friends and Family: Not on file    Attends Hoahaoism Services: Not on file    Active Member of Clubs or Organizations: Not on file    Attends Club or Organization Meetings: Not on file    Marital Status: Not on file   Intimate Partner Violence:     Fear of Current or Ex-Partner: Not on file    Emotionally Abused: Not on file    Physically Abused: Not on file    Sexually Abused: Not on file   Housing Stability:     Unable to Pay for Housing in the Last Year: Not on file    Number of [place] in the Last Year: Not on file    Unstable Housing in the Last Year: Not on file       Vitals:    06/30/22 1512 06/30/22 1516   BP: 90/60 100/60   Pulse: 83    Resp: 18    Temp: 97.4 °F (36.3 °C)    TempSrc: Temporal    SpO2: 97%    Weight: 253 lb (114.8 kg)    Height: 5' 10\" (1.778 m)        Physical Exam  Vitals and nursing note reviewed. Constitutional:       Appearance: Normal appearance. HENT:      Head: Normocephalic. Right Ear: Tympanic membrane and ear canal normal. There is no impacted cerumen. Left Ear: Tympanic membrane and ear canal normal. There is no impacted cerumen. Nose: Nose normal.      Mouth/Throat:      Mouth: Mucous membranes are dry. Eyes:      Extraocular Movements: Extraocular movements intact. Pupils: Pupils are equal, round, and reactive to light. Neck:      Vascular: No carotid bruit. Cardiovascular:      Rate and Rhythm: Normal rate and regular rhythm. Pulses: Normal pulses. Heart sounds: Normal heart sounds. No murmur heard. No friction rub. No gallop. Pulmonary:      Effort: Pulmonary effort is normal. No respiratory distress. Breath sounds: Normal breath sounds. No stridor. No wheezing, rhonchi or rales. Chest:      Chest wall: No tenderness. Abdominal:      General: Bowel sounds are normal. There is no distension. Palpations: Abdomen is soft. Musculoskeletal:         General: No swelling, tenderness, deformity or signs of injury. Cervical back: No rigidity. No muscular tenderness. Right lower leg: No edema. Left lower leg: No edema. Lymphadenopathy:      Cervical: No cervical adenopathy. Skin:     General: Skin is warm and dry. Capillary Refill: Capillary refill takes 2 to 3 seconds. Findings: No bruising, lesion or rash. Comments: Wound   Neurological:      General: No focal deficit present. Mental Status: He is alert and oriented to person, place, and time. Motor: No weakness. Gait: Gait normal.   Psychiatric:         Attention and Perception: Attention normal.         Mood and Affect: Mood normal.         Behavior: Behavior normal.         Thought Content: Thought content does not include homicidal or suicidal ideation. Thought content does not include homicidal or suicidal plan.                     Seen By:  VERENICE Mccray - CNP reviewed

## 2023-04-21 ENCOUNTER — INPATIENT (INPATIENT)
Facility: HOSPITAL | Age: 68
LOS: 5 days | Discharge: HOME | DRG: 552 | End: 2023-04-27
Attending: INTERNAL MEDICINE | Admitting: INTERNAL MEDICINE
Payer: MEDICAID

## 2023-04-21 VITALS
RESPIRATION RATE: 18 BRPM | OXYGEN SATURATION: 97 % | TEMPERATURE: 98 F | HEIGHT: 63 IN | WEIGHT: 165.35 LBS | SYSTOLIC BLOOD PRESSURE: 203 MMHG | DIASTOLIC BLOOD PRESSURE: 78 MMHG | HEART RATE: 80 BPM

## 2023-04-21 DIAGNOSIS — I10 ESSENTIAL (PRIMARY) HYPERTENSION: ICD-10-CM

## 2023-04-21 DIAGNOSIS — Z29.9 ENCOUNTER FOR PROPHYLACTIC MEASURES, UNSPECIFIED: ICD-10-CM

## 2023-04-21 DIAGNOSIS — J44.9 CHRONIC OBSTRUCTIVE PULMONARY DISEASE, UNSPECIFIED: ICD-10-CM

## 2023-04-21 DIAGNOSIS — F10.20 ALCOHOL DEPENDENCE, UNCOMPLICATED: ICD-10-CM

## 2023-04-21 DIAGNOSIS — F17.200 NICOTINE DEPENDENCE, UNSPECIFIED, UNCOMPLICATED: ICD-10-CM

## 2023-04-21 DIAGNOSIS — E78.5 HYPERLIPIDEMIA, UNSPECIFIED: ICD-10-CM

## 2023-04-21 DIAGNOSIS — I16.0 HYPERTENSIVE URGENCY: ICD-10-CM

## 2023-04-21 DIAGNOSIS — K74.60 UNSPECIFIED CIRRHOSIS OF LIVER: ICD-10-CM

## 2023-04-21 DIAGNOSIS — M54.9 DORSALGIA, UNSPECIFIED: ICD-10-CM

## 2023-04-21 DIAGNOSIS — S32.000A WEDGE COMPRESSION FRACTURE OF UNSPECIFIED LUMBAR VERTEBRA, INITIAL ENCOUNTER FOR CLOSED FRACTURE: ICD-10-CM

## 2023-04-21 DIAGNOSIS — J45.909 UNSPECIFIED ASTHMA, UNCOMPLICATED: ICD-10-CM

## 2023-04-21 DIAGNOSIS — R55 SYNCOPE AND COLLAPSE: ICD-10-CM

## 2023-04-21 DIAGNOSIS — Z87.898 PERSONAL HISTORY OF OTHER SPECIFIED CONDITIONS: ICD-10-CM

## 2023-04-21 DIAGNOSIS — R42 DIZZINESS AND GIDDINESS: ICD-10-CM

## 2023-04-21 DIAGNOSIS — I50.30 UNSPECIFIED DIASTOLIC (CONGESTIVE) HEART FAILURE: ICD-10-CM

## 2023-04-21 DIAGNOSIS — M51.36 OTHER INTERVERTEBRAL DISC DEGENERATION, LUMBAR REGION: ICD-10-CM

## 2023-04-21 LAB
ALBUMIN SERPL ELPH-MCNC: 2.8 G/DL — LOW (ref 3.5–5)
ALP SERPL-CCNC: 72 U/L — SIGNIFICANT CHANGE UP (ref 40–120)
ALT FLD-CCNC: 14 U/L DA — SIGNIFICANT CHANGE UP (ref 10–60)
ANION GAP SERPL CALC-SCNC: 8 MMOL/L — SIGNIFICANT CHANGE UP (ref 5–17)
AST SERPL-CCNC: 29 U/L — SIGNIFICANT CHANGE UP (ref 10–40)
BASOPHILS # BLD AUTO: 0.02 K/UL — SIGNIFICANT CHANGE UP (ref 0–0.2)
BASOPHILS NFR BLD AUTO: 0.3 % — SIGNIFICANT CHANGE UP (ref 0–2)
BILIRUB SERPL-MCNC: 0.6 MG/DL — SIGNIFICANT CHANGE UP (ref 0.2–1.2)
BUN SERPL-MCNC: 7 MG/DL — SIGNIFICANT CHANGE UP (ref 7–18)
CALCIUM SERPL-MCNC: 8.8 MG/DL — SIGNIFICANT CHANGE UP (ref 8.4–10.5)
CHLORIDE SERPL-SCNC: 110 MMOL/L — HIGH (ref 96–108)
CO2 SERPL-SCNC: 23 MMOL/L — SIGNIFICANT CHANGE UP (ref 22–31)
CREAT SERPL-MCNC: 0.85 MG/DL — SIGNIFICANT CHANGE UP (ref 0.5–1.3)
EGFR: 95 ML/MIN/1.73M2 — SIGNIFICANT CHANGE UP
EOSINOPHIL # BLD AUTO: 0.16 K/UL — SIGNIFICANT CHANGE UP (ref 0–0.5)
EOSINOPHIL NFR BLD AUTO: 2.2 % — SIGNIFICANT CHANGE UP (ref 0–6)
GLUCOSE SERPL-MCNC: 165 MG/DL — HIGH (ref 70–99)
HCT VFR BLD CALC: 33.4 % — LOW (ref 39–50)
HGB BLD-MCNC: 10.8 G/DL — LOW (ref 13–17)
IMM GRANULOCYTES NFR BLD AUTO: 0.7 % — SIGNIFICANT CHANGE UP (ref 0–0.9)
LYMPHOCYTES # BLD AUTO: 0.69 K/UL — LOW (ref 1–3.3)
LYMPHOCYTES # BLD AUTO: 9.5 % — LOW (ref 13–44)
MCHC RBC-ENTMCNC: 30.3 PG — SIGNIFICANT CHANGE UP (ref 27–34)
MCHC RBC-ENTMCNC: 32.3 GM/DL — SIGNIFICANT CHANGE UP (ref 32–36)
MCV RBC AUTO: 93.8 FL — SIGNIFICANT CHANGE UP (ref 80–100)
MONOCYTES # BLD AUTO: 0.61 K/UL — SIGNIFICANT CHANGE UP (ref 0–0.9)
MONOCYTES NFR BLD AUTO: 8.4 % — SIGNIFICANT CHANGE UP (ref 2–14)
NEUTROPHILS # BLD AUTO: 5.71 K/UL — SIGNIFICANT CHANGE UP (ref 1.8–7.4)
NEUTROPHILS NFR BLD AUTO: 78.9 % — HIGH (ref 43–77)
NRBC # BLD: 0 /100 WBCS — SIGNIFICANT CHANGE UP (ref 0–0)
PLATELET # BLD AUTO: 135 K/UL — LOW (ref 150–400)
POTASSIUM SERPL-MCNC: 3.3 MMOL/L — LOW (ref 3.5–5.3)
POTASSIUM SERPL-SCNC: 3.3 MMOL/L — LOW (ref 3.5–5.3)
PROT SERPL-MCNC: 6.9 G/DL — SIGNIFICANT CHANGE UP (ref 6–8.3)
RBC # BLD: 3.56 M/UL — LOW (ref 4.2–5.8)
RBC # FLD: 17.1 % — HIGH (ref 10.3–14.5)
SODIUM SERPL-SCNC: 141 MMOL/L — SIGNIFICANT CHANGE UP (ref 135–145)
TROPONIN I, HIGH SENSITIVITY RESULT: 20.1 NG/L — SIGNIFICANT CHANGE UP
WBC # BLD: 7.24 K/UL — SIGNIFICANT CHANGE UP (ref 3.8–10.5)
WBC # FLD AUTO: 7.24 K/UL — SIGNIFICANT CHANGE UP (ref 3.8–10.5)

## 2023-04-21 PROCEDURE — 72131 CT LUMBAR SPINE W/O DYE: CPT | Mod: 26,MA

## 2023-04-21 PROCEDURE — 93010 ELECTROCARDIOGRAM REPORT: CPT

## 2023-04-21 PROCEDURE — 70450 CT HEAD/BRAIN W/O DYE: CPT | Mod: 26

## 2023-04-21 PROCEDURE — 99222 1ST HOSP IP/OBS MODERATE 55: CPT

## 2023-04-21 PROCEDURE — 71045 X-RAY EXAM CHEST 1 VIEW: CPT | Mod: 26

## 2023-04-21 PROCEDURE — 72128 CT CHEST SPINE W/O DYE: CPT | Mod: 26,MA

## 2023-04-21 PROCEDURE — 99222 1ST HOSP IP/OBS MODERATE 55: CPT | Mod: GC

## 2023-04-21 PROCEDURE — 99285 EMERGENCY DEPT VISIT HI MDM: CPT

## 2023-04-21 PROCEDURE — 99406 BEHAV CHNG SMOKING 3-10 MIN: CPT

## 2023-04-21 PROCEDURE — 99254 IP/OBS CNSLTJ NEW/EST MOD 60: CPT

## 2023-04-21 RX ORDER — HYDRALAZINE HCL 50 MG
10 TABLET ORAL ONCE
Refills: 0 | Status: COMPLETED | OUTPATIENT
Start: 2023-04-21 | End: 2023-04-21

## 2023-04-21 RX ORDER — LIDOCAINE 4 G/100G
1 CREAM TOPICAL EVERY 24 HOURS
Refills: 0 | Status: DISCONTINUED | OUTPATIENT
Start: 2023-04-21 | End: 2023-04-27

## 2023-04-21 RX ORDER — MONTELUKAST 4 MG/1
10 TABLET, CHEWABLE ORAL AT BEDTIME
Refills: 0 | Status: DISCONTINUED | OUTPATIENT
Start: 2023-04-21 | End: 2023-04-27

## 2023-04-21 RX ORDER — PANTOPRAZOLE SODIUM 20 MG/1
40 TABLET, DELAYED RELEASE ORAL
Refills: 0 | Status: DISCONTINUED | OUTPATIENT
Start: 2023-04-21 | End: 2023-04-27

## 2023-04-21 RX ORDER — OXYCODONE HYDROCHLORIDE 5 MG/1
5 TABLET ORAL EVERY 4 HOURS
Refills: 0 | Status: DISCONTINUED | OUTPATIENT
Start: 2023-04-21 | End: 2023-04-27

## 2023-04-21 RX ORDER — LABETALOL HCL 100 MG
5 TABLET ORAL ONCE
Refills: 0 | Status: COMPLETED | OUTPATIENT
Start: 2023-04-21 | End: 2023-04-21

## 2023-04-21 RX ORDER — POTASSIUM CHLORIDE 20 MEQ
40 PACKET (EA) ORAL ONCE
Refills: 0 | Status: COMPLETED | OUTPATIENT
Start: 2023-04-21 | End: 2023-04-21

## 2023-04-21 RX ORDER — ALBUTEROL 90 UG/1
2 AEROSOL, METERED ORAL EVERY 6 HOURS
Refills: 0 | Status: DISCONTINUED | OUTPATIENT
Start: 2023-04-21 | End: 2023-04-27

## 2023-04-21 RX ORDER — AMLODIPINE BESYLATE 2.5 MG/1
5 TABLET ORAL DAILY
Refills: 0 | Status: DISCONTINUED | OUTPATIENT
Start: 2023-04-21 | End: 2023-04-23

## 2023-04-21 RX ORDER — ONDANSETRON 8 MG/1
4 TABLET, FILM COATED ORAL EVERY 8 HOURS
Refills: 0 | Status: DISCONTINUED | OUTPATIENT
Start: 2023-04-21 | End: 2023-04-27

## 2023-04-21 RX ORDER — ACETAMINOPHEN 500 MG
650 TABLET ORAL EVERY 6 HOURS
Refills: 0 | Status: DISCONTINUED | OUTPATIENT
Start: 2023-04-21 | End: 2023-04-21

## 2023-04-21 RX ORDER — LANOLIN ALCOHOL/MO/W.PET/CERES
3 CREAM (GRAM) TOPICAL ONCE
Refills: 0 | Status: COMPLETED | OUTPATIENT
Start: 2023-04-21 | End: 2023-04-21

## 2023-04-21 RX ORDER — MORPHINE SULFATE 50 MG/1
4 CAPSULE, EXTENDED RELEASE ORAL ONCE
Refills: 0 | Status: DISCONTINUED | OUTPATIENT
Start: 2023-04-21 | End: 2023-04-21

## 2023-04-21 RX ORDER — SENNA PLUS 8.6 MG/1
2 TABLET ORAL AT BEDTIME
Refills: 0 | Status: DISCONTINUED | OUTPATIENT
Start: 2023-04-21 | End: 2023-04-27

## 2023-04-21 RX ORDER — THIAMINE MONONITRATE (VIT B1) 100 MG
100 TABLET ORAL DAILY
Refills: 0 | Status: DISCONTINUED | OUTPATIENT
Start: 2023-04-21 | End: 2023-04-27

## 2023-04-21 RX ORDER — ACETAMINOPHEN 500 MG
1000 TABLET ORAL EVERY 6 HOURS
Refills: 0 | Status: COMPLETED | OUTPATIENT
Start: 2023-04-21 | End: 2023-04-24

## 2023-04-21 RX ORDER — MORPHINE SULFATE 50 MG/1
4 CAPSULE, EXTENDED RELEASE ORAL EVERY 6 HOURS
Refills: 0 | Status: DISCONTINUED | OUTPATIENT
Start: 2023-04-21 | End: 2023-04-21

## 2023-04-21 RX ORDER — ENOXAPARIN SODIUM 100 MG/ML
40 INJECTION SUBCUTANEOUS EVERY 24 HOURS
Refills: 0 | Status: DISCONTINUED | OUTPATIENT
Start: 2023-04-21 | End: 2023-04-23

## 2023-04-21 RX ORDER — LOSARTAN POTASSIUM 100 MG/1
100 TABLET, FILM COATED ORAL ONCE
Refills: 0 | Status: COMPLETED | OUTPATIENT
Start: 2023-04-21 | End: 2023-04-21

## 2023-04-21 RX ORDER — KETOROLAC TROMETHAMINE 30 MG/ML
15 SYRINGE (ML) INJECTION EVERY 6 HOURS
Refills: 0 | Status: DISCONTINUED | OUTPATIENT
Start: 2023-04-21 | End: 2023-04-21

## 2023-04-21 RX ORDER — LABETALOL HCL 100 MG
5 TABLET ORAL ONCE
Refills: 0 | Status: DISCONTINUED | OUTPATIENT
Start: 2023-04-21 | End: 2023-04-21

## 2023-04-21 RX ORDER — KETOROLAC TROMETHAMINE 30 MG/ML
15 SYRINGE (ML) INJECTION EVERY 6 HOURS
Refills: 0 | Status: DISCONTINUED | OUTPATIENT
Start: 2023-04-21 | End: 2023-04-22

## 2023-04-21 RX ORDER — LOSARTAN POTASSIUM 100 MG/1
100 TABLET, FILM COATED ORAL DAILY
Refills: 0 | Status: DISCONTINUED | OUTPATIENT
Start: 2023-04-21 | End: 2023-04-22

## 2023-04-21 RX ORDER — IPRATROPIUM/ALBUTEROL SULFATE 18-103MCG
3 AEROSOL WITH ADAPTER (GRAM) INHALATION ONCE
Refills: 0 | Status: COMPLETED | OUTPATIENT
Start: 2023-04-21 | End: 2023-04-21

## 2023-04-21 RX ORDER — GABAPENTIN 400 MG/1
100 CAPSULE ORAL THREE TIMES A DAY
Refills: 0 | Status: DISCONTINUED | OUTPATIENT
Start: 2023-04-21 | End: 2023-04-27

## 2023-04-21 RX ORDER — FOLIC ACID 0.8 MG
1 TABLET ORAL DAILY
Refills: 0 | Status: DISCONTINUED | OUTPATIENT
Start: 2023-04-21 | End: 2023-04-27

## 2023-04-21 RX ORDER — FLUTICASONE PROPIONATE 50 MCG
1 SPRAY, SUSPENSION NASAL
Refills: 0 | Status: DISCONTINUED | OUTPATIENT
Start: 2023-04-21 | End: 2023-04-27

## 2023-04-21 RX ORDER — AMLODIPINE BESYLATE 2.5 MG/1
5 TABLET ORAL ONCE
Refills: 0 | Status: COMPLETED | OUTPATIENT
Start: 2023-04-21 | End: 2023-04-21

## 2023-04-21 RX ORDER — POLYETHYLENE GLYCOL 3350 17 G/17G
17 POWDER, FOR SOLUTION ORAL DAILY
Refills: 0 | Status: DISCONTINUED | OUTPATIENT
Start: 2023-04-21 | End: 2023-04-27

## 2023-04-21 RX ORDER — LABETALOL HCL 100 MG
100 TABLET ORAL ONCE
Refills: 0 | Status: COMPLETED | OUTPATIENT
Start: 2023-04-21 | End: 2023-04-21

## 2023-04-21 RX ADMIN — Medication 3 MILLIGRAM(S): at 23:03

## 2023-04-21 RX ADMIN — MORPHINE SULFATE 4 MILLIGRAM(S): 50 CAPSULE, EXTENDED RELEASE ORAL at 12:50

## 2023-04-21 RX ADMIN — SENNA PLUS 2 TABLET(S): 8.6 TABLET ORAL at 23:00

## 2023-04-21 RX ADMIN — ONDANSETRON 4 MILLIGRAM(S): 8 TABLET, FILM COATED ORAL at 14:05

## 2023-04-21 RX ADMIN — LOSARTAN POTASSIUM 100 MILLIGRAM(S): 100 TABLET, FILM COATED ORAL at 15:17

## 2023-04-21 RX ADMIN — Medication 5 MILLIGRAM(S): at 12:32

## 2023-04-21 RX ADMIN — Medication 40 MILLIEQUIVALENT(S): at 16:06

## 2023-04-21 RX ADMIN — LIDOCAINE 1 PATCH: 4 CREAM TOPICAL at 12:20

## 2023-04-21 RX ADMIN — Medication 1000 MILLIGRAM(S): at 19:32

## 2023-04-21 RX ADMIN — Medication 10 MILLIGRAM(S): at 15:06

## 2023-04-21 RX ADMIN — ENOXAPARIN SODIUM 40 MILLIGRAM(S): 100 INJECTION SUBCUTANEOUS at 14:39

## 2023-04-21 RX ADMIN — Medication 1000 MILLIGRAM(S): at 18:54

## 2023-04-21 RX ADMIN — LIDOCAINE 1 PATCH: 4 CREAM TOPICAL at 20:12

## 2023-04-21 RX ADMIN — GABAPENTIN 100 MILLIGRAM(S): 400 CAPSULE ORAL at 23:03

## 2023-04-21 RX ADMIN — AMLODIPINE BESYLATE 5 MILLIGRAM(S): 2.5 TABLET ORAL at 14:39

## 2023-04-21 RX ADMIN — Medication 3 MILLILITER(S): at 12:20

## 2023-04-21 RX ADMIN — Medication 1000 MILLIGRAM(S): at 14:39

## 2023-04-21 RX ADMIN — MORPHINE SULFATE 4 MILLIGRAM(S): 50 CAPSULE, EXTENDED RELEASE ORAL at 12:20

## 2023-04-21 RX ADMIN — GABAPENTIN 100 MILLIGRAM(S): 400 CAPSULE ORAL at 14:39

## 2023-04-21 RX ADMIN — MORPHINE SULFATE 4 MILLIGRAM(S): 50 CAPSULE, EXTENDED RELEASE ORAL at 05:39

## 2023-04-21 RX ADMIN — Medication 100 MILLIGRAM(S): at 18:53

## 2023-04-21 RX ADMIN — MORPHINE SULFATE 4 MILLIGRAM(S): 50 CAPSULE, EXTENDED RELEASE ORAL at 06:02

## 2023-04-21 RX ADMIN — Medication 1000 MILLIGRAM(S): at 15:09

## 2023-04-21 RX ADMIN — Medication 1 SPRAY(S): at 22:59

## 2023-04-21 RX ADMIN — MONTELUKAST 10 MILLIGRAM(S): 4 TABLET, CHEWABLE ORAL at 23:03

## 2023-04-21 RX ADMIN — LOSARTAN POTASSIUM 100 MILLIGRAM(S): 100 TABLET, FILM COATED ORAL at 18:54

## 2023-04-21 NOTE — H&P ADULT - NSVTERISKASSESS_GEN_ALL_CORE FT
,  Chief Complaint   Patient presents with    Gout     Follow up-  stated pain is better but still there      1. Have you been to the ER, urgent care clinic since your last visit? Hospitalized since your last visit? No    2. Have you seen or consulted any other health care providers outside of the [address] [place] [address] since your last visit? Include any pap smears or colon screening. No    Call placed to pt. Verified patient with two type of identifiers. Call placed to pt. Verified patient with two type of identifiers. Medical Assessment Completed on: 21-Apr-2023 13:07

## 2023-04-21 NOTE — H&P ADULT - PROBLEM SELECTOR PLAN 7
h/o liver cirrhosis and portal HTN confirmed on CT 2019 with EGD 2019 showing esophageal varices, non-bleeding   likely in the setting of chronic ETOH use   currently compensated and not encephalopathic   previously on Nadolol 20mg daily in 2019 but has not been taking since then confirmed by pharmacy   will hold off resuming for now   patient would benefit o/p hepatology follow up

## 2023-04-21 NOTE — ED ADULT NURSE NOTE - OBJECTIVE STATEMENT
pt awake and oriented x 4. c/o witnessed fall. pt reported pain on his spine. pt on supine position, side rails up for safety. bed locked to lowest position.

## 2023-04-21 NOTE — H&P ADULT - PROBLEM SELECTOR PLAN 2
c/o preceding dizziness prior to mechanical fall, denies LOC and head trauma  h/o falls, syncopal episodes, and unsteady gait, unclear if in the setting of ETOH, as per daughter   denies lightheadedness or any associated vision changes, HA, chest pain, palpitations   CTH: chronic microangiopathy, no acute changes   EKG showing NSR with LVH and QRS widening and QTc 539  admit to tele for further monitoring given risk factors cannot r/o cardiac etiology   f/u orthostatics   encourage PO hydration

## 2023-04-21 NOTE — ED PROVIDER NOTE - CLINICAL SUMMARY MEDICAL DECISION MAKING FREE TEXT BOX
Patient presenting with back pain after fall from dizziness.  The dizziness sounds most likely orthostatic however given his age and medical history plan for labs troponin and EKG to screen for ACS or evidence of an acute cardiac event.  We will treat pain and obtains CAT scans of the thoracic and lumbar spine to evaluate for fracture from injury.  Disposition to be determined

## 2023-04-21 NOTE — H&P ADULT - NSHPROSALLOTHERNEGRD_GEN_ALL_CORE
All other review of systems negative, except as noted in HPI Humira Counseling:  I discussed with the patient the risks of adalimumab including but not limited to myelosuppression, immunosuppression, autoimmune hepatitis, demyelinating diseases, lymphoma, and serious infections.  The patient understands that monitoring is required including a PPD at baseline and must alert us or the primary physician if symptoms of infection or other concerning signs are noted.

## 2023-04-21 NOTE — H&P ADULT - HISTORY OF PRESENT ILLNESS
67M from home, ambulates with caution uses cane sparingly, PMHx HLD, DM, Syncope and falls, COPD (not on home O2) and active smoker and not compliant with home inhalers with a prior ICU admission requiring intubation for AHRF with hypercapnia and hypoxia, HTN noncompliant on medication c/b hypertensive encephalopathy, and chronic ETOH dependence and daily use c/b liver cirrhosis and portal HTN with EGD (01/2019) showing esophageal varices, non-bleeding, presenting after traumatic fall with preceding dizziness, now c/o intractable mid-lower back pain. Patient is AOx2-3, unreliable historian unable to provide history without inaccuracies, compared to chart review and collateral obtained from daughter, Nikole Rome [phone] who is out-of state but communicates daily with patient and patient's domestic common law partner, Reza Hercules. Patient was reported to get up from bed at night to turn off a light, when he became dizzy without HA, chest pain, SOB, or palpitations and lost his balance falling backwards and striking his back on a dresser. Denies any LOC or head trauma, as well as no nausea or vomiting. Reports severe pain throughout his lower back after fall, but denies any loss of sensation or weakness of his legs. He denies any loss of sensation in his groin or loss of continence.  Reports back pain had no relief with OTC ibuprofen. He is not on blood thinners. Although he reports no longer drinking or smoking, and then later reporting "sometimes on occasion", his daughter confirmed that he smokes 1/2-pack/day and drinks 3-glasses of vodka daily. Noted to have slight slurring in speech, which he states is his normal speech. He denies any preceding HA, changes in vision, ringing in the ears, chest pain, SOB, and no palpitations. Patient denies fevers, chills, and recent illnesses.

## 2023-04-21 NOTE — ED PROVIDER NOTE - PHYSICAL EXAMINATION
Exam:  General: Patient well appearing, vital signs within normal limits  HEENT: airway patent with moist mucous membranes  Cardiac: RRR S1/S2 with strong peripheral pulses  Respiratory: lungs clear without respiratory distress  GI: abdomen soft, non tender, non distended  Neuro: no gross neurologic deficits, strength and sensation to light touch intact  Skin: warm, well perfused  Psych: normal mood and affect

## 2023-04-21 NOTE — ED ADULT NURSE NOTE - ED STAT RN HANDOFF DETAILS 2
pt is admitted on tele /pt on tele box /safety maintained /vitals stable /report given to rn/ni /covering rn/

## 2023-04-21 NOTE — H&P ADULT - PROBLEM SELECTOR PLAN 6
although denies daily smoking, daughter confirms drinks 1-3 glasses of vodka daily   h/o chronic ETOH use with liver cirrhosis and portal HTN, compensated   currently not in withdrawal, unclear when last drink was  CIWA 2, vomited x1, no nausea in EDHL and AOx2-3 however unclear if at baseline as daughter reports he is forgetful.  will hold off on CIWA protocol for now   will give thiamine, multivitamin, and folic acid for now   SBIRT consulted

## 2023-04-21 NOTE — H&P ADULT - NSHPPHYSICALEXAM_GEN_ALL_CORE
Vital Signs Last 24 Hrs  T(C): 36.6 (21 Apr 2023 04:26), Max: 36.6 (21 Apr 2023 04:26)  T(F): 97.8 (21 Apr 2023 04:26), Max: 97.8 (21 Apr 2023 04:26)  HR: 80 (21 Apr 2023 04:26) (80 - 80)  BP: 203/78 (21 Apr 2023 04:26) (203/78 - 203/78)  BP(mean): --  RR: 18 (21 Apr 2023 04:26) (18 - 18)  SpO2: 97% (21 Apr 2023 04:26) (97% - 97%)    Parameters below as of 21 Apr 2023 04:26  Patient On (Oxygen Delivery Method): room air    GENERAL: NAD, lying in bed comfortably  HEAD:  Atraumatic, Normocephalic  EYES: EOMI, PERRLA, conjunctiva and sclera clear  ENT: Moist mucous membranes  NECK: Supple, No JVD  CHEST/LUNG: Clear to auscultation bilaterally; No rales, rhonchi, wheezing, or rubs. Unlabored respirations  HEART: Regular rate and rhythm; No murmurs, rubs, or gallops  ABDOMEN: Bowel sounds present; Soft, Nontender, Nondistended. No hepatomegally  EXTREMITIES:  2+ Peripheral Pulses, brisk capillary refill. No clubbing, cyanosis, or edema  NERVOUS SYSTEM:  Alert & Oriented X3, speech clear. No deficits   MSK: FROM all 4 extremities, full and equal strength  SKIN: No rashes or lesions Vital Signs Last 24 Hrs  T(C): 36.6 (21 Apr 2023 04:26), Max: 36.6 (21 Apr 2023 04:26)  T(F): 97.8 (21 Apr 2023 04:26), Max: 97.8 (21 Apr 2023 04:26)  HR: 80 (21 Apr 2023 04:26) (80 - 80)  BP: 203/78 (21 Apr 2023 04:26) (203/78 - 203/78)  BP(mean): --  RR: 18 (21 Apr 2023 04:26) (18 - 18)  SpO2: 97% (21 Apr 2023 04:26) (97% - 97%)    Parameters below as of 21 Apr 2023 04:26  Patient On (Oxygen Delivery Method): room air    GENERAL: NAD, lying in bed comfortably  HEAD:  Atraumatic, Normocephalic  EYES: EOMI, PERRLA, conjunctiva and sclera clear  ENT: Moist mucous membranes  NECK: Supple, No JVD  CHEST/LUNG: (+) mouth breathing but not in respiratory distress (+) b/l expiratory wheezing on deep inspiration   HEART: Regular rate and rhythm; No murmurs, rubs, or gallops  ABDOMEN: Bowel sounds present; Soft, Nontender, Nondistended. No hepatomegally  EXTREMITIES:  2+ Peripheral Pulses, brisk capillary refill. No clubbing, cyanosis, or edema  NERVOUS SYSTEM:  Alert & Oriented X3, speech clear. No focal deficits (+) slurring of speech, patient claims his speech is normal, unclear chronicity   MSK: FROM all 4 extremities, full and equal strength  SKIN: No rashes or lesions

## 2023-04-21 NOTE — CONSULT NOTE ADULT - PROBLEM SELECTOR RECOMMENDATION 9
Pt with acute lumbar back pain which is somatic and neuropathic in nature due to acute L1 compression fracture and degenerative disc disease.   Opioid pain recommendations   - Oxycodone 5 mg PO q 4 hours PRN severe pain. Monitor for sedation/ respiratory depression.   Non-opioid pain recommendations   - Toradol 15mg IVP q 6 hours x 3 days.   - Acetaminophen 1 gram PO q 8 hours x 3 days. Monitor LFTs  - Gabapentin 100mg po q 8 hours. Monitor renal function.   - Continue Lidoderm 4% patch daily.   Bowel Regimen  - Miralax 17G PO daily  - Senna 2 tablets at bedtime for constipation  Mild pain   - Non-pharmacological pain treatment recommendations  - Warm/ Cool packs PRN   - Repositioning, imagery, relaxation, distraction.  - Physical therapy OOB if no contraindications   Recommendations discussed with primary team and RN

## 2023-04-21 NOTE — ED PROVIDER NOTE - PROGRESS NOTE DETAILS
Patient reporting some improvement in pain with treatment, CT noting acute L1 compression fracture, able to weight bear for approximately 3 seconds with assist, will admit for pain management, PT, rehab.

## 2023-04-21 NOTE — H&P ADULT - PROBLEM SELECTOR PROBLEM 4
HLD (hyperlipidemia) Asthma COPD (chronic obstructive pulmonary disease) (HFpEF) heart failure with preserved ejection fraction

## 2023-04-21 NOTE — H&P ADULT - ASSESSMENT
67M from home, ambulates with caution uses cane sparingly, PMHx HLD, DM, Syncope and falls, COPD (not on home O2) and active smoker and not compliant with home inhalers with a prior ICU admission requiring intubation for AHRF with hypercapnia and hypoxia, HTN noncompliant on medication c/b hypertensive encephalopathy, and chronic ETOH dependence and daily use c/b liver cirrhosis and portal HTN with EGD (01/2019) showing esophageal varices, non-bleeding, presenting after traumatic fall with preceding dizziness, now c/o intractable mid-lower back pain. Found to have elevated BP, asymptomatic. CTH with no acute pathology, BP with mild improvement s/p IV labetalol and Hydralazine with resuming home meds. Admitted to tele for further monitoring. Ortho, Pain Management, and Pulmonary consulted.

## 2023-04-21 NOTE — H&P ADULT - PROBLEM SELECTOR PLAN 8
although denies daily smoking, daughter confirms smokes 1/2 pack daily   admits to smoking occasionally with long- standing history   educated on smoking cessation   declined nicotine patch

## 2023-04-21 NOTE — H&P ADULT - PROBLEM SELECTOR PLAN 4
TTE (01/2019): 50-55% EF with mild MR, mild AS, G2DD, and mild pulmonary HTN   unclear is etiology of dizziness, cannot r/o cardiac causes  EKG showing NSR with LVH and QRS widening and QTc 539  admits to medication non-compliance   admit to tele   resume meds confirmed by pharmacy   f/u TTE

## 2023-04-21 NOTE — ED PROVIDER NOTE - OBJECTIVE STATEMENT
67-year-old man presenting complaining of mid to lower back pain.  He was getting up from bed in the night to turn off a light when he became dizzy without chest pain or palpitations and lost his balance falling backwards and striking his back on a dresser.  He reports severe pain throughout his back very struck.  He denies any loss of sensation or weakness of his legs.  He denies any loss of sensation in his groin or loss of continence.  He is not on blood thinners.  He already took ibuprofen for pain at home but is reporting ongoing pain.

## 2023-04-21 NOTE — CONSULT NOTE ADULT - SUBJECTIVE AND OBJECTIVE BOX
PULMONARY SERVICE INITIAL CONSULT NOTE    HPI: 67yM, Indian speaking, with PMH of COPD, asthma, DM, HFpEF, seasonal allergies, and  HTN who presented  with acute onset back pain after fall at home. Admitted with L1 compression fracture, seen on CT thoracic and lumbar spine. Patient states he was dizzy when he stood up at night, and fell backward hitting his back on the bed. Denies any head trauma or LOC. Reports shortness of breath. Back pain is 8/10 in severity, patient requesting pain meds.States his speech is usually "normal" at baseline. Walks short distances unassisted, will use cane if longer distance. Pt is an active smoker, does not reliably use his inhalers (prescribed breo ellipta, albuterol, and is on singulair). Reports chronic dyspnea and cough.      REVIEW OF SYSTEMS:  Constitutional: No fever, weight loss or fatigue  Eyes: No eye pain, visual disturbances, or discharge  ENMT:  No difficulty hearing, tinnitus, vertigo; No sinus or throat pain  Neck: No pain, stiffness or neck swelling  Respiratory: see HPI  Cardiovascular: No chest pain, palpitations, dizziness or leg swelling  Gastrointestinal: No abdominal or epigastric pain. No nausea, vomiting or hematemesis; No diarrhea or constipation. No melena or hematochezia.  Genitourinary: No dysuria, frequency, hematuria or incontinence  Neurological: No headaches, memory loss, loss of strength, numbness or tremors  Skin: No itching, burning, rashes or lesions   Lymph Nodes: No enlarged glands  Endocrine: No heat or cold intolerance; No hair loss  Musculoskeletal: No joint pain or swelling; No muscle, back or extremity pain  Psychiatric: No depression, anxiety, mood swings or difficulty sleeping  Heme/Lymph: No easy bruising or bleeding gums  Allergy and Immunologic: No hives or eczema    PAST MEDICAL & SURGICAL HISTORY:  HTN (hypertension)      Varicose veins      HLD (hyperlipidemia)      Asthma      No significant past surgical history          FAMILY HISTORY:      SOCIAL HISTORY:  Smoking Status: [ ] Current, [ ] Former, [ ] Never  Pack Years:    MEDICATIONS:  Pulmonary:  albuterol    90 MICROgram(s) HFA Inhaler 2 Puff(s) Inhalation every 6 hours PRN  montelukast 10 milliGRAM(s) Oral at bedtime    Antimicrobials:    Anticoagulants:  enoxaparin Injectable 40 milliGRAM(s) SubCutaneous every 24 hours    Onc:    GI/:  pantoprazole    Tablet 40 milliGRAM(s) Oral before breakfast  polyethylene glycol 3350 17 Gram(s) Oral daily  senna 2 Tablet(s) Oral at bedtime    Endocrine:    Cardiac:  amLODIPine   Tablet 5 milliGRAM(s) Oral daily  losartan 100 milliGRAM(s) Oral daily  losartan 100 milliGRAM(s) Oral once    Other Medications:  acetaminophen     Tablet .. 1000 milliGRAM(s) Oral every 6 hours  fluticasone propionate 50 MICROgram(s)/spray Nasal Spray 1 Spray(s) Both Nostrils two times a day  gabapentin 100 milliGRAM(s) Oral three times a day  ketorolac   Injectable 15 milliGRAM(s) IV Push every 6 hours  lidocaine   4% Patch 1 Patch Transdermal every 24 hours  ondansetron Injectable 4 milliGRAM(s) IV Push every 8 hours PRN  oxyCODONE    IR 5 milliGRAM(s) Oral every 4 hours PRN      Allergies    No Known Allergies    Intolerances        Vital Signs Last 24 Hrs  T(C): 36.9 (21 Apr 2023 14:17), Max: 36.9 (21 Apr 2023 14:17)  T(F): 98.4 (21 Apr 2023 14:17), Max: 98.4 (21 Apr 2023 14:17)  HR: 75 (21 Apr 2023 14:17) (70 - 85)  BP: 231/86 (21 Apr 2023 14:17) (140/73 - 233/89)  BP(mean): --  RR: 19 (21 Apr 2023 14:17) (18 - 19)  SpO2: 92% (21 Apr 2023 14:17) (92% - 97%)    Parameters below as of 21 Apr 2023 14:17  Patient On (Oxygen Delivery Method): room air            PHYSICAL EXAM:  GEN: NAD, well-appearing, comfortable upright/ semirecumbent in bed  HEENT: anicteric sclera; MMM  RESP: no respiratory distress, CTA B/L; no W/R/R  CV: +S1/S2, RRR, no m/g/r  GI: soft, NT/ND, +BS  EXTREM: WWP; no edema, clubbing or cyanosis  Vascular: 2+ radial pulses B/L  NEURO: alert and awake, moving limbs spontaneously    LABS:      CBC Full  -  ( 21 Apr 2023 05:18 )  WBC Count : 7.24 K/uL  RBC Count : 3.56 M/uL  Hemoglobin : 10.8 g/dL  Hematocrit : 33.4 %  Platelet Count - Automated : 135 K/uL  Mean Cell Volume : 93.8 fl  Mean Cell Hemoglobin : 30.3 pg  Mean Cell Hemoglobin Concentration : 32.3 gm/dL  Auto Neutrophil # : 5.71 K/uL  Auto Lymphocyte # : 0.69 K/uL  Auto Monocyte # : 0.61 K/uL  Auto Eosinophil # : 0.16 K/uL  Auto Basophil # : 0.02 K/uL  Auto Neutrophil % : 78.9 %  Auto Lymphocyte % : 9.5 %  Auto Monocyte % : 8.4 %  Auto Eosinophil % : 2.2 %  Auto Basophil % : 0.3 %    04-21    141  |  110<H>  |  7   ----------------------------<  165<H>  3.3<L>   |  23  |  0.85    Ca    8.8      21 Apr 2023 05:18    TPro  6.9  /  Alb  2.8<L>  /  TBili  0.6  /  DBili  x   /  AST  29  /  ALT  14  /  AlkPhos  72  04-21                      RADIOLOGY & ADDITIONAL STUDIES:  < from: Xray Chest 1 View-PORTABLE IMMEDIATE (Xray Chest 1 View-PORTABLE IMMEDIATE .) (04.21.23 @ 12:49) >  Heart magnified by technique.    There is an increasing calcified left lateral pleural reaction compared   to January 24, 2019.    No lung consolidations.    IMPRESSION: Increasing chronic left lateral pleural findings.   PULMONARY SERVICE INITIAL CONSULT NOTE    HPI: 67yM, Zambian speaking, with PMH of COPD, asthma, DM, HFpEF, seasonal allergies, and  HTN who presented  with acute onset back pain after fall at home. Admitted with L1 compression fracture, seen on CT thoracic and lumbar spine. Patient states he was dizzy when he stood up at night, and fell backward hitting his back on the bed. Denies any head trauma or LOC. Reports shortness of breath. Back pain is 8/10 in severity, patient requesting pain meds.States his speech is usually "normal" at baseline. Walks short distances unassisted, will use cane if longer distance. Pt is an active smoker, several cigarettes every few days; smoked for 55 years 1ppd. Reports chronic dyspnea and cough. 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 No pets, mold/mildew exposure. While his daughter reports that he does not reliably use his inhalers (per primary team discussion), pt says he takes breo ellipta, Spiriva and albuterol, as prescribed, and is on singulair). Follows with pulmonologist Dr. Ciera Jacome, [facility]. He has never been told he has any lung/pleural lesions particularly on the left side, denies thoracic procedures in the past. Respiratory sx are stable.      REVIEW OF SYSTEMS:  Constitutional: No fever, weight loss +fatigue  Eyes: No eye pain, visual disturbances, or discharge  ENMT:  No difficulty hearing, tinnitus, vertigo; No sinus or throat pain  Neck: No pain, stiffness or neck swelling  Respiratory: see HPI  Cardiovascular: No chest pain, palpitations, dizziness or leg swelling  Gastrointestinal: No abdominal or epigastric pain. No nausea, vomiting or hematemesis; No diarrhea or constipation. No melena or hematochezia.  Genitourinary: No dysuria, frequency, hematuria or incontinence  Neurological: No headaches, memory loss, loss of strength, numbness or tremors  Skin: No itching, burning, rashes or lesions   Lymph Nodes: No enlarged glands  Endocrine: No heat or cold intolerance; No hair loss  Musculoskeletal: Back pain  Psychiatric: No depression, anxiety, mood swings or difficulty sleeping  Heme/Lymph: No easy bruising or bleeding gums  Allergy and Immunologic: No hives or eczema    PAST MEDICAL & SURGICAL HISTORY:  HTN (hypertension)      Varicose veins      HLD (hyperlipidemia)      Asthma      No significant past surgical history          FAMILY HISTORY:      SOCIAL HISTORY:  Smoking Status: [x] Current, [ ] Former, [ ] Never  Pack Years: >55    MEDICATIONS:  Pulmonary:  albuterol    90 MICROgram(s) HFA Inhaler 2 Puff(s) Inhalation every 6 hours PRN  montelukast 10 milliGRAM(s) Oral at bedtime    Antimicrobials:    Anticoagulants:  enoxaparin Injectable 40 milliGRAM(s) SubCutaneous every 24 hours    Onc:    GI/:  pantoprazole    Tablet 40 milliGRAM(s) Oral before breakfast  polyethylene glycol 3350 17 Gram(s) Oral daily  senna 2 Tablet(s) Oral at bedtime    Endocrine:    Cardiac:  amLODIPine   Tablet 5 milliGRAM(s) Oral daily  losartan 100 milliGRAM(s) Oral daily  losartan 100 milliGRAM(s) Oral once    Other Medications:  acetaminophen     Tablet .. 1000 milliGRAM(s) Oral every 6 hours  fluticasone propionate 50 MICROgram(s)/spray Nasal Spray 1 Spray(s) Both Nostrils two times a day  gabapentin 100 milliGRAM(s) Oral three times a day  ketorolac   Injectable 15 milliGRAM(s) IV Push every 6 hours  lidocaine   4% Patch 1 Patch Transdermal every 24 hours  ondansetron Injectable 4 milliGRAM(s) IV Push every 8 hours PRN  oxyCODONE    IR 5 milliGRAM(s) Oral every 4 hours PRN      Allergies    No Known Allergies    Intolerances        Vital Signs Last 24 Hrs  T(C): 36.9 (21 Apr 2023 14:17), Max: 36.9 (21 Apr 2023 14:17)  T(F): 98.4 (21 Apr 2023 14:17), Max: 98.4 (21 Apr 2023 14:17)  HR: 75 (21 Apr 2023 14:17) (70 - 85)  BP: 231/86 (21 Apr 2023 14:17) (140/73 - 233/89)  BP(mean): --  RR: 19 (21 Apr 2023 14:17) (18 - 19)  SpO2: 92% (21 Apr 2023 14:17) (92% - 97%)    Parameters below as of 21 Apr 2023 14:17  Patient On (Oxygen Delivery Method): room air            PHYSICAL EXAM:  GEN: NAD, well-appearing, appears mildly uncomfortable semirecumbent in bed from back pain  HEENT: anicteric sclera; MMM  RESP: no respiratory distress, distant breath sounds throughout but CTABL, no appreciable w/r/r. Decreased breath sounds L lung fields  CV: +S1/S2, RRR, no m/g/r  GI: soft, NT/ND, +BS  EXTREM: WWP; no edema, clubbing or cyanosis  Vascular: 2+ radial pulses B/L  NEURO: alert and awake, moving limbs spontaneously    LABS:      CBC Full  -  ( 21 Apr 2023 05:18 )  WBC Count : 7.24 K/uL  RBC Count : 3.56 M/uL  Hemoglobin : 10.8 g/dL  Hematocrit : 33.4 %  Platelet Count - Automated : 135 K/uL  Mean Cell Volume : 93.8 fl  Mean Cell Hemoglobin : 30.3 pg  Mean Cell Hemoglobin Concentration : 32.3 gm/dL  Auto Neutrophil # : 5.71 K/uL  Auto Lymphocyte # : 0.69 K/uL  Auto Monocyte # : 0.61 K/uL  Auto Eosinophil # : 0.16 K/uL  Auto Basophil # : 0.02 K/uL  Auto Neutrophil % : 78.9 %  Auto Lymphocyte % : 9.5 %  Auto Monocyte % : 8.4 %  Auto Eosinophil % : 2.2 %  Auto Basophil % : 0.3 %    04-21    141  |  110<H>  |  7   ----------------------------<  165<H>  3.3<L>   |  23  |  0.85    Ca    8.8      21 Apr 2023 05:18    TPro  6.9  /  Alb  2.8<L>  /  TBili  0.6  /  DBili  x   /  AST  29  /  ALT  14  /  AlkPhos  72  04-21                      RADIOLOGY & ADDITIONAL STUDIES:  < from: Xray Chest 1 View-PORTABLE IMMEDIATE (Xray Chest 1 View-PORTABLE IMMEDIATE .) (04.21.23 @ 12:49) >  Heart magnified by technique.    There is an increasing calcified left lateral pleural reaction compared   to January 24, 2019.    No lung consolidations.    IMPRESSION: Increasing chronic left lateral pleural findings.

## 2023-04-21 NOTE — H&P ADULT - ATTENDING COMMENTS
#Acute L1 compression fracture 2/2 mechanical fall   #r/o Syncope   #Hypokalemia   #HTN     67yM with PMH of HTN who presented  with acute onset back pain after fall at home. Admitted with L1 compression fracture.   Patient seen at bedside.     Social history:   FH:     PE:  Constitutional/General: Well developed, vitals reviewed  EYE: Symmetrical pupils, conjunctiva clear   ENT: Good dentition, oropharynx clear  NECK: No visual masses, no JVD  CHEST: No respiratory distress, bilateral symmetrical chest rise  ABDOMEN: Nondistended, no visual masses  SKIN: No rash, warm, dry  NEURO: A+Ox3, Cranial nerves grossly intact, moves all extremities, follows commands  PSYCH: Normal mood, normal affect    Labs: reviewed by me - CBC, CMP, troponin  Imaging: reviewed by me - CT T-spine, CT L-spine with acute compression fracture of L1 and chronic DDD    A/P:   -Pain control, received morphine in the ED  -Will check orthostatics   -PT consult  -Repeat echo   -Pain management and ortho consult #Acute L1 compression fracture 2/2 fall in setting of dizziness  #Hypertensive urgency  #Hypokalemia   #HFpEF, not in acute exacerbation  #HTN   #DM    67yM, Gren[place] speaking, with PMH of COPD, asthma, DM, HFpEF, seasonal allergies, and  HTN who presented  with acute onset back pain after fall at home. Admitted with L1 compression fracture, seen on CT thoracic and lumbar spine.    Patient seen at bedside. He is a poor historian, required frequent re-direction.  used to communicate with patient (ID [id number], [facility] ). Patient states he was dizzy when he stood up at night, and fell backward hitting his back on the bed. Denies any head trauma or LOC. Reports shortness of breath. Back pain is 8/10 in severity, patient requesting pain meds.States his speech is usually "normal" at baseline. Walks short distances unassisted, will use cane if longer distance.    PCP: Dr. Sally Barrios ([phone])  Patient's daughter: Nikole ([phone])  Lives with partner/girlfriend, Omayra (emergency contact)    Social history: current smoker, states he smokes when he drinks - only on [place] holidays; appears to have more extensive smoking history in past    PE:  Constitutional/General: Obesity, vitals reviewed  EYE: Symmetrical pupils, conjunctiva clear   ENT: adentulous, oropharynx clear  NECK: No visual masses, no JVD  CHEST: No respiratory distress, bilateral symmetrical chest rise, mild bilateral wheezes   ABDOMEN: Nondistended, no visual masses  SKIN: No rash, warm, dry  NEURO: Alert, Cranial nerves grossly intact, moves all extremities, follows commands  PSYCH: Normal mood, normal affect    Labs: reviewed by me - CBC, CMP, troponin  Imaging: reviewed by me - CT T-spine, CT L-spine with acute compression fracture of L1 and chronic DDD    A/P:   -Pain control with Tylenol/Toradol/Morphine, Lidocaine patch, s/p morphine in the ED  -s/p DuoNebs x1; saturating 92-94% on room air, patient is a mouth breather; CXR ordered  -SBP elevated >200, possible in setting of pain; Labetalol 5mg x1  -Tele monitoring - dizziness associated with fall  -Check orthostatics   -New onset slurred speech, CT head to rule out acute infarct  -Replace potassium, monitor BMP daily  -ACHS FS, ISS; on metformin BID at home  -Med list obtained from pharmacy, patient on several medications for COPD/asthma   -PT consult  -Pain management and ortho consult; pulmonary consult   -F/U CXR, echo, CT head

## 2023-04-21 NOTE — H&P ADULT - PROBLEM SELECTOR PLAN 3
p/w /78, asymptomatic on admission   suspected to be 2/2 uncontrolled pain but discovered not compliant with HTN meds as well  h/o HTN takes amlodipine 5mg and losartan 100mg confirmed by pharmacy   denies pounding/ pulsatile HA, blurry vision, floaters, confusion, facial numbness, focal weakness  EKG showing NSR with LVH and QRS widening and QTc 539  No signs of end organ ischemia; Trop negative x1 and no CORTES  s/p labetalol 5mg IVP in the ED with minimal improvement   given STAT home meds and hydralazine 10mg IVP with continued improvement   c/w home medications with holding parameters   Will continue to monitor blood pressure  Goal: MAP reduction should not be more than 25-30% within the first several hours, goal SBP >180 in the AM   f/u TTE to asses for pulm HTN

## 2023-04-21 NOTE — ED ADULT TRIAGE NOTE - CHIEF COMPLAINT QUOTE
Pt BIBA as per EMS pt stated he was getting up  out of bed to turn off a lamp got dizzy and fell hitting his back on a dresser, denies loc, hitting his head or being on blood thinners c/o midback pain. Pt BIBA as per EMS pt stated he was getting up  out of bed to turn off a lamp got dizzy and fell hitting his back on a dresser, denies loc, hitting his head or being on blood thinners c/o midback pain. pt has history of hypertension

## 2023-04-21 NOTE — H&P ADULT - PROBLEM SELECTOR PROBLEM 5
Chronic obstructive pulmonary disease (COPD) HLD (hyperlipidemia) Current smoker COPD (chronic obstructive pulmonary disease)

## 2023-04-21 NOTE — ED PROVIDER NOTE - NSICDXPASTMEDICALHX_GEN_ALL_CORE_FT
PAST MEDICAL HISTORY:  COPD (chronic obstructive pulmonary disease)     HLD (hyperlipidemia)     HTN (hypertension)     Varicose veins

## 2023-04-21 NOTE — H&P ADULT - PROBLEM SELECTOR PLAN 5
h/o COPD not on home o2 and current smoker takes Breo Ellipta and Ventolin PRN confirmed by pharmacy   unclear if complaint with inhalers, reports always being SOB with chronic cough   not currently in exacerbation  O2 support as needed, maintain O2 sat 88-92%, avoid over oxygenation  CXR shows increasing left lateral pleural thickening, no evidence of consolidation   s/p duoneb x1 with good response   duoneb, symbicort, spiriva, albuterol prn  Pulmonary consulted: Dr. Navarrete  - CT chest non-contrast

## 2023-04-21 NOTE — CONSULT NOTE ADULT - ASSESSMENT
Confidential Drug Utilization Report  Search Terms: Jose Rome, 1955Search Date: 04/21/2023 13:37:23 PM  The Drug Utilization Report below displays all of the controlled substance prescriptions, if any, that your patient has filled in the last twelve months. The information displayed on this report is compiled from pharmacy submissions to the Department, and accurately reflects the information as submitted by the pharmacies.    This report was requested by: Kina Baldwin | Reference #: [id number]

## 2023-04-21 NOTE — ED ADULT NURSE REASSESSMENT NOTE - NS ED NURSE REASSESS COMMENT FT1
pt moved to holding /pt c/o sob and bp is elevated and dr isidro made aware /dr isidro at bed side /pt has no chest pain or no tachycardic /pt on cardiac monitor /

## 2023-04-21 NOTE — ED ADULT NURSE NOTE - CHIEF COMPLAINT QUOTE
Pt BIBA as per EMS pt stated he was getting up  out of bed to turn off a lamp got dizzy and fell hitting his back on a dresser, denies loc, hitting his head or being on blood thinners c/o midback pain. pt has history of hypertension

## 2023-04-21 NOTE — CONSULT NOTE ADULT - TIME BILLING
- Review of chart (documentation, labs/studies, VS trends)  - Personal review of chest imaging  - Medication reconciliation  - Bedside interview and physical examination  - Bedside SpO2 monitoring  - Coordination of care with primary team - Review of chart (documentation, labs/studies, VS trends)  - Personal review of chest imaging  - Medication reconciliation  - Bedside interview and physical examination  - Bedside SpO2 monitoring  - Vibha estevez cessation counseling  - Coordination of care with primary team

## 2023-04-21 NOTE — H&P ADULT - PROBLEM SELECTOR PLAN 1
presenting after traumatic fall which was preceded by dizziness, no LOC or head trauma   h/o falls and syncopal episodes with injury in the setting of unsteady gait, as per daughter   CT thoracic/ Lumbar: acute lumbar compression fracture of L1 vertebrae with 25% loss in vertebral height   s/p Morphine 4mg x1 in the ED with good response   limit mobility for now with bedrest  Ortho consulted, awaiting further recs  Pain management consulted:   - Oxycodone 5 mg PO q 4 hours PRN severe pain. Monitor for sedation/ respiratory depression.   - c/w Toradol 15mg IVP q 6 hours x 3 days.   - Acetaminophen 1 gram PO q 8 hours x 3 days. Monitor LFTs  - Gabapentin 100mg po q 8 hours. Monitor renal function.   - c/w Lidoderm 4% patch daily  - start bowel regiment   PT consulted

## 2023-04-21 NOTE — H&P ADULT - NSICDXPASTMEDICALHX_GEN_ALL_CORE_FT
PAST MEDICAL HISTORY:  COPD (chronic obstructive pulmonary disease)     HLD (hyperlipidemia)     HTN (hypertension)     Varicose veins      PAST MEDICAL HISTORY:  Asthma     HLD (hyperlipidemia)     HTN (hypertension)     Varicose veins      PAST MEDICAL HISTORY:  Alcohol dependence, daily use     COPD (chronic obstructive pulmonary disease)     DM (diabetes mellitus)     HLD (hyperlipidemia)     HTN (hypertension)     Liver cirrhosis     Portal hypertension with esophageal varices     Smokes tobacco daily     Syncope     Varicose veins

## 2023-04-21 NOTE — PATIENT PROFILE ADULT - PACKS PER DAY
1 Albendazole Pregnancy And Lactation Text: This medication is Pregnancy Category C and it isn't known if it is safe during pregnancy. It is also excreted in breast milk.

## 2023-04-21 NOTE — PATIENT PROFILE ADULT - FALL HARM RISK - HARM RISK INTERVENTIONS

## 2023-04-21 NOTE — CONSULT NOTE ADULT - ASSESSMENT
67M active smoker with PMH COPD here s/p fall with L1 compression frx. Pt is on ICS/LAMA/LABA and follows with an outpatient pulmonologist, stable respiratory sx. CXR with possible L calcified pleural process of unclear etiology or chronicity (not present on 1/2019 CXR).    Recommendations:  - C/w ICS/LAMA/LABA, resume Breo and Spiriva on d/c  - Albuterol as needed q4-6h for acute SOB/wheezing  - Pain mgmt as per primary team, minimize splinting  - Incentive spirometry 10x/h or as tolerated; OOB/TC and ambulation daily as tolerated if able to bear weight  - Would discuss with outpatient pulmonologist regarding chronicity CXR findings; if L pleural process new, would consider non-urgent or outpatient CT chest to further evaluate as it is likely not contributing to acute presentation  - Please contact Pulmonology for further questions or concerns       Veronica Navarrete MD  Pulmonary & Critical Care  Available on Teams  67M active smoker with PMH COPD here s/p fall with L1 compression frx. Pt is on ICS/LAMA/LABA and follows with an outpatient pulmonologist, stable respiratory sx. CXR with possible L calcified pleural process of unclear etiology or chronicity (not present on 1/2019 CXR).    Recommendations:  - C/w ICS/LAMA/LABA, resume Breo and Spiriva on d/c  - Albuterol as needed q4-6h for acute SOB/wheezing  - Pain mgmt as per primary team, minimize splinting  - Incentive spirometry 10x/h or as tolerated; OOB/TC and ambulation daily as tolerated if able to bear weight  - Would discuss with outpatient pulmonologist regarding chronicity CXR findings; if L pleural process new, would consider non-urgent or outpatient CT chest to further evaluate as it is likely not contributing to acute presentation  - SMoking cessation counseling provided; pt says he is actively trying to decrease # cigarettes daily  - Please contact Pulmonology for further questions or concerns       Veronica Navarrete MD  Pulmonary & Critical Care  Available on Teams

## 2023-04-22 DIAGNOSIS — N17.9 ACUTE KIDNEY FAILURE, UNSPECIFIED: ICD-10-CM

## 2023-04-22 LAB
ALBUMIN SERPL ELPH-MCNC: 2.6 G/DL — LOW (ref 3.5–5)
ALP SERPL-CCNC: 58 U/L — SIGNIFICANT CHANGE UP (ref 40–120)
ALT FLD-CCNC: 12 U/L DA — SIGNIFICANT CHANGE UP (ref 10–60)
ANION GAP SERPL CALC-SCNC: 5 MMOL/L — SIGNIFICANT CHANGE UP (ref 5–17)
AST SERPL-CCNC: 20 U/L — SIGNIFICANT CHANGE UP (ref 10–40)
BASOPHILS # BLD AUTO: 0.05 K/UL — SIGNIFICANT CHANGE UP (ref 0–0.2)
BASOPHILS NFR BLD AUTO: 0.8 % — SIGNIFICANT CHANGE UP (ref 0–2)
BILIRUB SERPL-MCNC: 0.7 MG/DL — SIGNIFICANT CHANGE UP (ref 0.2–1.2)
BUN SERPL-MCNC: 13 MG/DL — SIGNIFICANT CHANGE UP (ref 7–18)
CALCIUM SERPL-MCNC: 8.8 MG/DL — SIGNIFICANT CHANGE UP (ref 8.4–10.5)
CHLORIDE SERPL-SCNC: 112 MMOL/L — HIGH (ref 96–108)
CO2 SERPL-SCNC: 26 MMOL/L — SIGNIFICANT CHANGE UP (ref 22–31)
CREAT SERPL-MCNC: 1.36 MG/DL — HIGH (ref 0.5–1.3)
EGFR: 57 ML/MIN/1.73M2 — LOW
EOSINOPHIL # BLD AUTO: 0.28 K/UL — SIGNIFICANT CHANGE UP (ref 0–0.5)
EOSINOPHIL NFR BLD AUTO: 4.2 % — SIGNIFICANT CHANGE UP (ref 0–6)
GLUCOSE SERPL-MCNC: 125 MG/DL — HIGH (ref 70–99)
HCT VFR BLD CALC: 35.5 % — LOW (ref 39–50)
HGB BLD-MCNC: 11.1 G/DL — LOW (ref 13–17)
IMM GRANULOCYTES NFR BLD AUTO: 0.5 % — SIGNIFICANT CHANGE UP (ref 0–0.9)
LYMPHOCYTES # BLD AUTO: 1.01 K/UL — SIGNIFICANT CHANGE UP (ref 1–3.3)
LYMPHOCYTES # BLD AUTO: 15.2 % — SIGNIFICANT CHANGE UP (ref 13–44)
MAGNESIUM SERPL-MCNC: 2.1 MG/DL — SIGNIFICANT CHANGE UP (ref 1.6–2.6)
MCHC RBC-ENTMCNC: 30.5 PG — SIGNIFICANT CHANGE UP (ref 27–34)
MCHC RBC-ENTMCNC: 31.3 GM/DL — LOW (ref 32–36)
MCV RBC AUTO: 97.5 FL — SIGNIFICANT CHANGE UP (ref 80–100)
MONOCYTES # BLD AUTO: 0.61 K/UL — SIGNIFICANT CHANGE UP (ref 0–0.9)
MONOCYTES NFR BLD AUTO: 9.2 % — SIGNIFICANT CHANGE UP (ref 2–14)
NEUTROPHILS # BLD AUTO: 4.68 K/UL — SIGNIFICANT CHANGE UP (ref 1.8–7.4)
NEUTROPHILS NFR BLD AUTO: 70.1 % — SIGNIFICANT CHANGE UP (ref 43–77)
NRBC # BLD: 0 /100 WBCS — SIGNIFICANT CHANGE UP (ref 0–0)
PHOSPHATE SERPL-MCNC: 4.5 MG/DL — SIGNIFICANT CHANGE UP (ref 2.5–4.5)
PLATELET # BLD AUTO: 143 K/UL — LOW (ref 150–400)
POTASSIUM SERPL-MCNC: 3.8 MMOL/L — SIGNIFICANT CHANGE UP (ref 3.5–5.3)
POTASSIUM SERPL-SCNC: 3.8 MMOL/L — SIGNIFICANT CHANGE UP (ref 3.5–5.3)
PROT SERPL-MCNC: 6.6 G/DL — SIGNIFICANT CHANGE UP (ref 6–8.3)
RBC # BLD: 3.64 M/UL — LOW (ref 4.2–5.8)
RBC # FLD: 17.5 % — HIGH (ref 10.3–14.5)
SODIUM SERPL-SCNC: 143 MMOL/L — SIGNIFICANT CHANGE UP (ref 135–145)
WBC # BLD: 6.66 K/UL — SIGNIFICANT CHANGE UP (ref 3.8–10.5)
WBC # FLD AUTO: 6.66 K/UL — SIGNIFICANT CHANGE UP (ref 3.8–10.5)

## 2023-04-22 PROCEDURE — 99232 SBSQ HOSP IP/OBS MODERATE 35: CPT | Mod: GC

## 2023-04-22 RX ORDER — SODIUM CHLORIDE 9 MG/ML
1000 INJECTION INTRAMUSCULAR; INTRAVENOUS; SUBCUTANEOUS
Refills: 0 | Status: DISCONTINUED | OUTPATIENT
Start: 2023-04-22 | End: 2023-04-23

## 2023-04-22 RX ORDER — BUDESONIDE AND FORMOTEROL FUMARATE DIHYDRATE 160; 4.5 UG/1; UG/1
2 AEROSOL RESPIRATORY (INHALATION)
Refills: 0 | Status: DISCONTINUED | OUTPATIENT
Start: 2023-04-22 | End: 2023-04-27

## 2023-04-22 RX ORDER — SODIUM CHLORIDE 9 MG/ML
1000 INJECTION INTRAMUSCULAR; INTRAVENOUS; SUBCUTANEOUS
Refills: 0 | Status: DISCONTINUED | OUTPATIENT
Start: 2023-04-22 | End: 2023-04-22

## 2023-04-22 RX ORDER — TIOTROPIUM BROMIDE 18 UG/1
2 CAPSULE ORAL; RESPIRATORY (INHALATION) DAILY
Refills: 0 | Status: DISCONTINUED | OUTPATIENT
Start: 2023-04-22 | End: 2023-04-27

## 2023-04-22 RX ORDER — LANOLIN ALCOHOL/MO/W.PET/CERES
5 CREAM (GRAM) TOPICAL ONCE
Refills: 0 | Status: COMPLETED | OUTPATIENT
Start: 2023-04-22 | End: 2023-04-22

## 2023-04-22 RX ADMIN — LIDOCAINE 1 PATCH: 4 CREAM TOPICAL at 12:57

## 2023-04-22 RX ADMIN — POLYETHYLENE GLYCOL 3350 17 GRAM(S): 17 POWDER, FOR SOLUTION ORAL at 09:44

## 2023-04-22 RX ADMIN — Medication 15 MILLIGRAM(S): at 00:45

## 2023-04-22 RX ADMIN — BUDESONIDE AND FORMOTEROL FUMARATE DIHYDRATE 2 PUFF(S): 160; 4.5 AEROSOL RESPIRATORY (INHALATION) at 21:26

## 2023-04-22 RX ADMIN — Medication 1000 MILLIGRAM(S): at 05:33

## 2023-04-22 RX ADMIN — GABAPENTIN 100 MILLIGRAM(S): 400 CAPSULE ORAL at 21:27

## 2023-04-22 RX ADMIN — Medication 1 MILLIGRAM(S): at 12:57

## 2023-04-22 RX ADMIN — ENOXAPARIN SODIUM 40 MILLIGRAM(S): 100 INJECTION SUBCUTANEOUS at 18:38

## 2023-04-22 RX ADMIN — Medication 1000 MILLIGRAM(S): at 06:08

## 2023-04-22 RX ADMIN — MONTELUKAST 10 MILLIGRAM(S): 4 TABLET, CHEWABLE ORAL at 21:27

## 2023-04-22 RX ADMIN — LIDOCAINE 1 PATCH: 4 CREAM TOPICAL at 01:23

## 2023-04-22 RX ADMIN — Medication 15 MILLIGRAM(S): at 05:34

## 2023-04-22 RX ADMIN — SODIUM CHLORIDE 75 MILLILITER(S): 9 INJECTION INTRAMUSCULAR; INTRAVENOUS; SUBCUTANEOUS at 03:29

## 2023-04-22 RX ADMIN — SODIUM CHLORIDE 75 MILLILITER(S): 9 INJECTION INTRAMUSCULAR; INTRAVENOUS; SUBCUTANEOUS at 18:36

## 2023-04-22 RX ADMIN — GABAPENTIN 100 MILLIGRAM(S): 400 CAPSULE ORAL at 18:38

## 2023-04-22 RX ADMIN — GABAPENTIN 100 MILLIGRAM(S): 400 CAPSULE ORAL at 05:31

## 2023-04-22 RX ADMIN — SENNA PLUS 2 TABLET(S): 8.6 TABLET ORAL at 21:27

## 2023-04-22 RX ADMIN — Medication 1000 MILLIGRAM(S): at 13:10

## 2023-04-22 RX ADMIN — Medication 5 MILLIGRAM(S): at 22:28

## 2023-04-22 RX ADMIN — Medication 100 MILLIGRAM(S): at 12:57

## 2023-04-22 RX ADMIN — Medication 1000 MILLIGRAM(S): at 18:37

## 2023-04-22 RX ADMIN — Medication 15 MILLIGRAM(S): at 01:23

## 2023-04-22 RX ADMIN — Medication 1 TABLET(S): at 12:57

## 2023-04-22 RX ADMIN — Medication 1000 MILLIGRAM(S): at 12:57

## 2023-04-22 RX ADMIN — Medication 15 MILLIGRAM(S): at 06:09

## 2023-04-22 RX ADMIN — Medication 1000 MILLIGRAM(S): at 19:00

## 2023-04-22 RX ADMIN — Medication 1 SPRAY(S): at 18:38

## 2023-04-22 RX ADMIN — Medication 1 SPRAY(S): at 05:33

## 2023-04-22 RX ADMIN — BUDESONIDE AND FORMOTEROL FUMARATE DIHYDRATE 2 PUFF(S): 160; 4.5 AEROSOL RESPIRATORY (INHALATION) at 09:44

## 2023-04-22 RX ADMIN — LIDOCAINE 1 PATCH: 4 CREAM TOPICAL at 21:34

## 2023-04-22 RX ADMIN — PANTOPRAZOLE SODIUM 40 MILLIGRAM(S): 20 TABLET, DELAYED RELEASE ORAL at 05:35

## 2023-04-22 NOTE — PROGRESS NOTE ADULT - PROBLEM SELECTOR PLAN 4
TTE (01/2019): 50-55% EF with mild MR, mild AS, G2DD, and mild pulmonary HTN   unclear is etiology of dizziness, cannot r/o cardiac causes  EKG showing NSR with LVH and QRS widening and QTc 539  admits to medication non-compliance   admit to tele   resume meds confirmed by pharmacy   f/u TTE TTE (01/2019): 50-55% EF with mild MR, mild AS, G2DD, and mild pulmonary HTN   unclear is etiology of dizziness, cannot r/o cardiac causes  EKG showing NSR with LVH and QRS widening and QTc 539  admits to medication non-compliance   admit to tele   resume meds confirmed by pharmacy   Echo showed EF 50-55%, grade 2 DD, RVP mod increased at 51mm Hg TTE (01/2019): 50-55% EF with mild MR, mild AS, G2DD, and mild pulmonary HTN   EKG showing NSR with LVH and QRS widening and QTc 539  admits to medication non-compliance   admit to tele   Echo showed EF 50-55%, grade 2 DD, RVP mod increased at 51mm Hg

## 2023-04-22 NOTE — PROGRESS NOTE ADULT - PROBLEM SELECTOR PLAN 3
p/w /78, asymptomatic on admission   suspected to be 2/2 uncontrolled pain but discovered not compliant with HTN meds as well  h/o HTN takes amlodipine 5mg and losartan 100mg confirmed by pharmacy   denies pounding/ pulsatile HA, blurry vision, floaters, confusion, facial numbness, focal weakness  EKG showing NSR with LVH and QRS widening and QTc 539  No signs of end organ ischemia; Trop negative x1 and no CORTES  s/p labetalol 5mg IVP in the ED with minimal improvement   given STAT home meds and hydralazine 10mg IVP with continued improvement   c/w home medications with holding parameters   Will continue to monitor blood pressure  Goal: MAP reduction should not be more than 25-30% within the first several hours, goal SBP >180 in the AM   f/u TTE to asses for pulm HTN p/w /78, asymptomatic on admission   suspected to be 2/2 uncontrolled pain but discovered not compliant with HTN meds as well  h/o HTN takes amlodipine 5mg and losartan 100mg confirmed by pharmacy   EKG showing NSR with LVH and QRS widening and QTc 539  No signs of end organ ischemia; Trop negative x1 and no CORTES  s/p labetalol 5mg IVP in the ED with minimal improvement   given STAT home meds and hydralazine 10mg IVP with continued improvement   Goal: MAP reduction should not be more than 25-30% within the first several hours, goal SBP >180 in the AM   **BP fell under goal, held AM BP meds and started gentle IVF hydration  Monitor BP closely Pt w/ SCr 1.36, Cr 0.85 on admission  dced toradol and losartan for now  started gentle IVF hydration  Follow BMP daily

## 2023-04-22 NOTE — PROGRESS NOTE ADULT - PROBLEM SELECTOR PLAN 8
although denies daily smoking, daughter confirms smokes 1/2 pack daily   admits to smoking occasionally with long- standing history   educated on smoking cessation   declined nicotine patch h/o liver cirrhosis and portal HTN confirmed on CT 2019 with EGD 2019 showing esophageal varices, non-bleeding   likely in the setting of chronic ETOH use   currently compensated and not encephalopathic   previously on Nadolol 20mg daily in 2019 but has not been taking since then confirmed by pharmacy   will hold off resuming for now   patient would benefit o/p hepatology follow up

## 2023-04-22 NOTE — PROGRESS NOTE ADULT - PROBLEM SELECTOR PLAN 1
presenting after traumatic fall which was preceded by dizziness, no LOC or head trauma   h/o falls and syncopal episodes with injury in the setting of unsteady gait, as per daughter   CT thoracic/ Lumbar: acute lumbar compression fracture of L1 vertebrae with 25% loss in vertebral height   s/p Morphine 4mg x1 in the ED with good response   limit mobility for now with bedrest  Ortho consulted, awaiting further recs  Pain management consulted:   - Oxycodone 5 mg PO q 4 hours PRN severe pain. Monitor for sedation/ respiratory depression.   - c/w Toradol 15mg IVP q 6 hours x 3 days.   - Acetaminophen 1 gram PO q 8 hours x 3 days. Monitor LFTs  - Gabapentin 100mg po q 8 hours. Monitor renal function.   - c/w Lidoderm 4% patch daily  - start bowel regiment   PT consulted presenting after traumatic fall which was preceded by dizziness, no LOC or head trauma   h/o falls and syncopal episodes with injury in the setting of unsteady gait, as per daughter   CT thoracic/ Lumbar: acute lumbar compression fracture of L1 vertebrae with 25% loss in vertebral height   s/p Morphine 4mg x1 in the ED with good response   limit mobility for now with bedrest  Ortho consulted, awaiting further recs  Pain management consulted:   - Oxycodone 5 mg PO q 4 hours PRN severe pain. Monitor for sedation/ respiratory depression.   - Acetaminophen 1 gram PO q 8 hours x 3 days. Monitor LFTs  - Gabapentin 100mg po q 8 hours. Monitor renal function.   - c/w Lidoderm 4% patch daily  - start bowel regiment   - DCed Toradol due to CORTES  PT consulted p/w /78, asymptomatic on admission, not compliant with meds  h/o HTN on amlodipine 5mg and losartan 100mg  EKG showing NSR with LVH and QRS widening and QTc 539  No signs of end organ ischemia; Trop negative x1 and no CORTES  s/p labetalol 5mg IVP in the ED with minimal improvement   given STAT home meds and hydralazine 10mg IVP with continued improvement   Goal: MAP reduction should not be more than 25-30% within the first several hours, goal SBP >180 in the AM   **BP fell under goal, held AM BP meds and started gentle IVF hydration  DCed losartan (due to CORTES), c/w amlodipine  Monitor BP closely

## 2023-04-22 NOTE — PROGRESS NOTE ADULT - PROBLEM SELECTOR PLAN 7
h/o liver cirrhosis and portal HTN confirmed on CT 2019 with EGD 2019 showing esophageal varices, non-bleeding   likely in the setting of chronic ETOH use   currently compensated and not encephalopathic   previously on Nadolol 20mg daily in 2019 but has not been taking since then confirmed by pharmacy   will hold off resuming for now   patient would benefit o/p hepatology follow up although denies daily smoking, daughter confirms drinks 1-3 glasses of vodka daily   h/o chronic ETOH use with liver cirrhosis and portal HTN, compensated   currently not in withdrawal, unclear when last drink was  CIWA 2, vomited x1, no nausea in EDHL and AOx2-3 however unclear if at baseline as daughter reports he is forgetful.  will hold off on CIWA protocol for now   will give thiamine, multivitamin, and folic acid for now   SBIRT consulted

## 2023-04-22 NOTE — PROGRESS NOTE ADULT - PROBLEM SELECTOR PLAN 9
h/o HLD not on statin therapy   DASH DIET although denies daily smoking, daughter confirms smokes 1/2 pack daily   admits to smoking occasionally with long- standing history   educated on smoking cessation   declined nicotine patch

## 2023-04-22 NOTE — PROGRESS NOTE ADULT - ATTENDING COMMENTS
#Acute L1 compression fracture 2/2 fall in setting of dizziness  #Hypertensive urgency  #CORTES  #Hypokalemia, resolved  #HFpEF, not in acute exacerbation  #HTN   #DM    67yM, Martiniqu[place] speaking, with PMH of COPD, asthma, DM, HFpEF, seasonal allergies, and  HTN who presented  with acute onset back pain after fall at home. Admitted with L1 compression fracture, seen on CT thoracic and lumbar spine.    Patient seen at bedside. Given medication to lower his BP, however decreased to SBP in the 140s, started on IVF and AM meds held. This morning, patient sleeping comfortably, easily arousable.  Reports back pain.     -Monitor BP for now, agree with holding AM meds and will continue gentle hydration; can resume amlodipine if needed  -s/p DuoNebs x1; saturating 92-94% on room air, patient is a mouth breather; CXR ordered  -Orthostatics negative; tele monitoring - dizziness associated with fall  -CORTES likely prerenal, on IVF, monitor daily  -ACHS FS, ISS; on metformin BID at home  -Labs reviewed: CBC, BMP, Mg, LFTs, will repeat in AM  -Imaging reviewed by me: CTH without any acute findings - concern for acute CVA because of slurred speech; CXR with chronic changes  -Pain management following; dc'd Toradol in setting of CORTES  -PT consult pending, patient is able to walk to the bathroom  -Seen by pulmonary, recommendations appreciated  -Ortho consult pending #Acute L1 compression fracture 2/2 fall in setting of dizziness  #Hypertensive urgency  #CORTES  #Hypokalemia, resolved  #HFpEF, not in acute exacerbation  #HTN   #DM    67yM, [place] speaking, with PMH of COPD, asthma, DM, HFpEF, seasonal allergies, and  HTN who presented  with acute onset back pain after fall at home. Admitted with L1 compression fracture, seen on CT thoracic and lumbar spine.    Patient seen at bedside. Given medication yesterday to lower his BP, however SBP decreased too fast overnight, went down to 140s, started on IVF and AM meds held. This morning, patient sleeping comfortably, easily arousable.  Reports back pain.     -Monitor BP for now, agree with holding AM meds and will continue gentle hydration; can resume amlodipine if BP elevated  -s/p DuoNebs x1; saturating 92-94% on room air, patient is a mouth breather  -Orthostatics negative; tele monitoring - dizziness associated with fall  -CORTES likely prerenal, on IVF, monitor BMP daily  -ACHS FS, ISS; on metformin BID at home  -Labs reviewed: CBC, BMP, Mg, LFTs, will repeat in AM  -Imaging reviewed by me: CTH without any acute findings - concern for acute CVA because of slurred speech; CXR with chronic changes  -Pain management following; dc'd Toradol in setting of CORTES  -Seen by pulmonary, recommendations appreciated  -Ortho consult pending  -PT consult pending, patient is able to walk to the bathroom  -DVT ppx: Lovenox

## 2023-04-22 NOTE — PROGRESS NOTE ADULT - PROBLEM SELECTOR PLAN 2
c/o preceding dizziness prior to mechanical fall, denies LOC and head trauma  h/o falls, syncopal episodes, and unsteady gait, unclear if in the setting of ETOH, as per daughter   denies lightheadedness or any associated vision changes, HA, chest pain, palpitations   CTH: chronic microangiopathy, no acute changes   EKG showing NSR with LVH and QRS widening and QTc 539  admit to tele for further monitoring given risk factors cannot r/o cardiac etiology   f/u orthostatics   encourage PO hydration c/o preceding dizziness prior to mechanical fall, denies LOC and head trauma  h/o falls, syncopal episodes, and unsteady gait, unclear if in the setting of ETOH, as per daughter   denies lightheadedness or any associated vision changes, HA, chest pain, palpitations   CTH: chronic microangiopathy, no acute changes   EKG showing NSR with LVH and QRS widening and QTc 539  admit to tele for further monitoring given risk factors cannot r/o cardiac etiology   Orthostatic negative  encourage PO hydration  States dizziness resolved presenting after traumatic fall which was preceded by dizziness, no LOC or head trauma   h/o falls and syncopal episodes with injury in the setting of unsteady gait, as per daughter   CT thoracic/ Lumbar: acute lumbar compression fracture of L1 vertebrae with 25% loss in vertebral height   s/p Morphine 4mg x1 in the ED with good response   limit mobility for now with bedrest  Ortho consulted, awaiting further recs  Pain management consulted:   - Oxycodone 5 mg PO q 4 hours PRN severe pain. Monitor for sedation/ respiratory depression.   - Acetaminophen 1 gram PO q 8 hours x 3 days. Monitor LFTs  - Gabapentin 100mg po q 8 hours. Monitor renal function.   - c/w Lidoderm 4% patch daily  - start bowel regiment   - DCed Toradol due to CORTES  PT consulted

## 2023-04-22 NOTE — PROGRESS NOTE ADULT - PROBLEM SELECTOR PLAN 6
although denies daily smoking, daughter confirms drinks 1-3 glasses of vodka daily   h/o chronic ETOH use with liver cirrhosis and portal HTN, compensated   currently not in withdrawal, unclear when last drink was  CIWA 2, vomited x1, no nausea in EDHL and AOx2-3 however unclear if at baseline as daughter reports he is forgetful.  will hold off on CIWA protocol for now   will give thiamine, multivitamin, and folic acid for now   SBIRT consulted c/o preceding dizziness prior to mechanical fall, denies LOC and head trauma  h/o falls, syncopal episodes, and unsteady gait, unclear if in the setting of ETOH, as per daughter   denies lightheadedness or any associated vision changes, HA, chest pain, palpitations   CTH: chronic microangiopathy, no acute changes   Orthostatic negative  encourage PO hydration  RESOLVED

## 2023-04-22 NOTE — PROGRESS NOTE ADULT - SUBJECTIVE AND OBJECTIVE BOX
PGY-1 Progress Note discussed with attending    PAGER #: [[phone]] TILL 5:00 PM  PLEASE CONTACT ON CALL TEAM:   - On Call Team (Please refer to Angel) FROM 5:00 PM - 8:30PM  - Nightfloat Team FROM 8:30 -7:30 AM    INTERVAL HPI/OVERNIGHT EVENTS:       REVIEW OF SYSTEMS:  CONSTITUTIONAL: No fever, weight loss, or fatigue  RESPIRATORY: No cough, wheezing, chills or hemoptysis; No shortness of breath  CARDIOVASCULAR: No chest pain, palpitations, dizziness, or leg swelling  GASTROINTESTINAL: No abdominal pain. No nausea, vomiting, or hematemesis; No diarrhea or constipation. No melena or hematochezia.  GENITOURINARY: No dysuria or hematuria, urinary frequency  NEUROLOGICAL: No headaches, memory loss, loss of strength, numbness, or tremors  SKIN: No itching, burning, rashes, or lesions     MEDICATIONS  (STANDING):  acetaminophen     Tablet .. 1000 milliGRAM(s) Oral every 6 hours  amLODIPine   Tablet 5 milliGRAM(s) Oral daily  budesonide  80 MICROgram(s)/formoterol 4.5 MICROgram(s) Inhaler 2 Puff(s) Inhalation two times a day  enoxaparin Injectable 40 milliGRAM(s) SubCutaneous every 24 hours  fluticasone propionate 50 MICROgram(s)/spray Nasal Spray 1 Spray(s) Both Nostrils two times a day  folic acid 1 milliGRAM(s) Oral daily  gabapentin 100 milliGRAM(s) Oral three times a day  ketorolac   Injectable 15 milliGRAM(s) IV Push every 6 hours  lidocaine   4% Patch 1 Patch Transdermal every 24 hours  losartan 100 milliGRAM(s) Oral daily  montelukast 10 milliGRAM(s) Oral at bedtime  multivitamin 1 Tablet(s) Oral daily  pantoprazole    Tablet 40 milliGRAM(s) Oral before breakfast  polyethylene glycol 3350 17 Gram(s) Oral daily  senna 2 Tablet(s) Oral at bedtime  sodium chloride 0.9%. 1000 milliLiter(s) (75 mL/Hr) IV Continuous <Continuous>  thiamine 100 milliGRAM(s) Oral daily  tiotropium 2.5 MICROgram(s) Inhaler 2 Puff(s) Inhalation daily    MEDICATIONS  (PRN):  albuterol    90 MICROgram(s) HFA Inhaler 2 Puff(s) Inhalation every 6 hours PRN Shortness of Breath and/or Wheezing  ondansetron Injectable 4 milliGRAM(s) IV Push every 8 hours PRN Nausea and/or Vomiting  oxyCODONE    IR 5 milliGRAM(s) Oral every 4 hours PRN Severe Pain (7 - 10)      Vital Signs Last 24 Hrs  T(C): 36.4 (22 Apr 2023 07:15), Max: 36.9 (21 Apr 2023 14:17)  T(F): 97.5 (22 Apr 2023 07:15), Max: 98.4 (21 Apr 2023 14:17)  HR: 57 (22 Apr 2023 07:15) (48 - 85)  BP: 147/54 (22 Apr 2023 07:15) (143/59 - 233/89)  BP(mean): --  RR: 19 (22 Apr 2023 07:15) (18 - 19)  SpO2: 94% (22 Apr 2023 07:15) (91% - 99%)    Parameters below as of 22 Apr 2023 07:15  Patient On (Oxygen Delivery Method): room air        PHYSICAL EXAMINATION:  GENERAL: NAD, well built  HEAD:  Atraumatic, Normocephalic  EYES:  conjunctiva and sclera clear  NECK: Supple, No JVD, Normal thyroid  CHEST/LUNG: Clear to auscultation. Clear to percussion bilaterally; No rales, rhonchi, wheezing, or rubs  HEART: Regular rate and rhythm; No murmurs, rubs, or gallops  ABDOMEN: Soft, Nontender, Nondistended; Bowel sounds present  NERVOUS SYSTEM:  Alert & Oriented X3,    EXTREMITIES:  2+ Peripheral Pulses, No clubbing, cyanosis, or edema  SKIN: warm dry                          11.1   6.66  )-----------( 143      ( 22 Apr 2023 07:10 )             35.5     04-22    143  |  112<H>  |  13  ----------------------------<  125<H>  3.8   |  26  |  1.36<H>    Ca    8.8      22 Apr 2023 07:10  Phos  4.5     04-22  Mg     2.1     04-22    TPro  6.6  /  Alb  2.6<L>  /  TBili  0.7  /  DBili  x   /  AST  20  /  ALT  12  /  AlkPhos  58  04-22    LIVER FUNCTIONS - ( 22 Apr 2023 07:10 )  Alb: 2.6 g/dL / Pro: 6.6 g/dL / ALK PHOS: 58 U/L / ALT: 12 U/L DA / AST: 20 U/L / GGT: x                       I&O's Summary      CAPILLARY BLOOD GLUCOSE        CAPILLARY BLOOD GLUCOSE          RADIOLOGY & ADDITIONAL TESTS:                   PGY-1 Progress Note discussed with attending    PAGER #: [[phone]] TILL 5:00 PM  PLEASE CONTACT ON CALL TEAM:   - On Call Team (Please refer to Angel) FROM 5:00 PM - 8:30PM  - Nightfloat Team FROM 8:30 -7:30 AM    INTERVAL HPI/OVERNIGHT EVENTS:   Overnight team reported that pt's BP fell below goal (BP was 143/59) thus started gentle IVF hydration and held AM BP meds. BP at 11AM is 135/90. Pt denies dizziness, headache, chest pain or visual changes. States he had a large BM this morning. Complains of mild back pain but appears comfortable.     REVIEW OF SYSTEMS:  CONSTITUTIONAL: No fever, weight loss, or fatigue  RESPIRATORY: No cough, wheezing, chills or hemoptysis; No shortness of breath  CARDIOVASCULAR: No chest pain, palpitations, dizziness, or leg swelling  GASTROINTESTINAL: No abdominal pain. No nausea, vomiting, or hematemesis; No diarrhea or constipation. No melena or hematochezia.  GENITOURINARY: No dysuria or hematuria, urinary frequency  NEUROLOGICAL: No headaches, memory loss, loss of strength, numbness, or tremors  SKIN: No itching, burning, rashes, or lesions     MEDICATIONS  (STANDING):  acetaminophen     Tablet .. 1000 milliGRAM(s) Oral every 6 hours  amLODIPine   Tablet 5 milliGRAM(s) Oral daily  budesonide  80 MICROgram(s)/formoterol 4.5 MICROgram(s) Inhaler 2 Puff(s) Inhalation two times a day  enoxaparin Injectable 40 milliGRAM(s) SubCutaneous every 24 hours  fluticasone propionate 50 MICROgram(s)/spray Nasal Spray 1 Spray(s) Both Nostrils two times a day  folic acid 1 milliGRAM(s) Oral daily  gabapentin 100 milliGRAM(s) Oral three times a day  ketorolac   Injectable 15 milliGRAM(s) IV Push every 6 hours  lidocaine   4% Patch 1 Patch Transdermal every 24 hours  losartan 100 milliGRAM(s) Oral daily  montelukast 10 milliGRAM(s) Oral at bedtime  multivitamin 1 Tablet(s) Oral daily  pantoprazole    Tablet 40 milliGRAM(s) Oral before breakfast  polyethylene glycol 3350 17 Gram(s) Oral daily  senna 2 Tablet(s) Oral at bedtime  sodium chloride 0.9%. 1000 milliLiter(s) (75 mL/Hr) IV Continuous <Continuous>  thiamine 100 milliGRAM(s) Oral daily  tiotropium 2.5 MICROgram(s) Inhaler 2 Puff(s) Inhalation daily    MEDICATIONS  (PRN):  albuterol    90 MICROgram(s) HFA Inhaler 2 Puff(s) Inhalation every 6 hours PRN Shortness of Breath and/or Wheezing  ondansetron Injectable 4 milliGRAM(s) IV Push every 8 hours PRN Nausea and/or Vomiting  oxyCODONE    IR 5 milliGRAM(s) Oral every 4 hours PRN Severe Pain (7 - 10)      Vital Signs Last 24 Hrs  T(C): 36.4 (22 Apr 2023 07:15), Max: 36.9 (21 Apr 2023 14:17)  T(F): 97.5 (22 Apr 2023 07:15), Max: 98.4 (21 Apr 2023 14:17)  HR: 57 (22 Apr 2023 07:15) (48 - 85)  BP: 147/54 (22 Apr 2023 07:15) (143/59 - 233/89)  BP(mean): --  RR: 19 (22 Apr 2023 07:15) (18 - 19)  SpO2: 94% (22 Apr 2023 07:15) (91% - 99%)    Parameters below as of 22 Apr 2023 07:15  Patient On (Oxygen Delivery Method): room air        PHYSICAL EXAMINATION:  GENERAL: NAD, well built  HEAD:  Atraumatic, Normocephalic  EYES:  conjunctiva and sclera clear  NECK: Supple, No JVD, Normal thyroid  CHEST/LUNG: Clear to auscultation. Clear to percussion bilaterally; No rales, rhonchi, wheezing, or rubs  HEART: Regular rate and rhythm; No murmurs, rubs, or gallops  ABDOMEN: Soft, Nontender, Nondistended; Bowel sounds present  NERVOUS SYSTEM:  Alert & Oriented X3,    EXTREMITIES:  2+ Peripheral Pulses, No clubbing, cyanosis, or edema  SKIN: warm dry                          11.1   6.66  )-----------( 143      ( 22 Apr 2023 07:10 )             35.5     04-22    143  |  112<H>  |  13  ----------------------------<  125<H>  3.8   |  26  |  1.36<H>    Ca    8.8      22 Apr 2023 07:10  Phos  4.5     04-22  Mg     2.1     04-22    TPro  6.6  /  Alb  2.6<L>  /  TBili  0.7  /  DBili  x   /  AST  20  /  ALT  12  /  AlkPhos  58  04-22    LIVER FUNCTIONS - ( 22 Apr 2023 07:10 )  Alb: 2.6 g/dL / Pro: 6.6 g/dL / ALK PHOS: 58 U/L / ALT: 12 U/L DA / AST: 20 U/L / GGT: x                       I&O's Summary      CAPILLARY BLOOD GLUCOSE        CAPILLARY BLOOD GLUCOSE          RADIOLOGY & ADDITIONAL TESTS:                   PGY-1 Progress Note discussed with attending    PAGER #: [[phone]] TILL 5:00 PM  PLEASE CONTACT ON CALL TEAM:   - On Call Team (Please refer to Angel) FROM 5:00 PM - 8:30PM  - Nightfloat Team FROM 8:30 -7:30 AM    INTERVAL HPI/OVERNIGHT EVENTS:   Overnight team reported that pt's BP fell below goal (BP was 143/59) thus started gentle IVF hydration and held AM BP meds. BP at 11AM is 135/90. Pt denies dizziness, headache, chest pain or visual changes. States he had a large BM this morning. Complains of mild back pain but appears comfortable.     REVIEW OF SYSTEMS:  CONSTITUTIONAL: No fever, weight loss, or fatigue  RESPIRATORY: No cough, wheezing, chills or hemoptysis; No shortness of breath  CARDIOVASCULAR: No chest pain, palpitations, dizziness, or leg swelling  GASTROINTESTINAL: No abdominal pain. No nausea, vomiting, or hematemesis; No diarrhea or constipation. No melena or hematochezia.  GENITOURINARY: No dysuria or hematuria, urinary frequency  NEUROLOGICAL: No headaches, memory loss, loss of strength, numbness, or tremors  SKIN: No itching, burning, rashes, or lesions     MEDICATIONS  (STANDING):  acetaminophen     Tablet .. 1000 milliGRAM(s) Oral every 6 hours  amLODIPine   Tablet 5 milliGRAM(s) Oral daily  budesonide  80 MICROgram(s)/formoterol 4.5 MICROgram(s) Inhaler 2 Puff(s) Inhalation two times a day  enoxaparin Injectable 40 milliGRAM(s) SubCutaneous every 24 hours  fluticasone propionate 50 MICROgram(s)/spray Nasal Spray 1 Spray(s) Both Nostrils two times a day  folic acid 1 milliGRAM(s) Oral daily  gabapentin 100 milliGRAM(s) Oral three times a day  ketorolac   Injectable 15 milliGRAM(s) IV Push every 6 hours  lidocaine   4% Patch 1 Patch Transdermal every 24 hours  losartan 100 milliGRAM(s) Oral daily  montelukast 10 milliGRAM(s) Oral at bedtime  multivitamin 1 Tablet(s) Oral daily  pantoprazole    Tablet 40 milliGRAM(s) Oral before breakfast  polyethylene glycol 3350 17 Gram(s) Oral daily  senna 2 Tablet(s) Oral at bedtime  sodium chloride 0.9%. 1000 milliLiter(s) (75 mL/Hr) IV Continuous <Continuous>  thiamine 100 milliGRAM(s) Oral daily  tiotropium 2.5 MICROgram(s) Inhaler 2 Puff(s) Inhalation daily    MEDICATIONS  (PRN):  albuterol    90 MICROgram(s) HFA Inhaler 2 Puff(s) Inhalation every 6 hours PRN Shortness of Breath and/or Wheezing  ondansetron Injectable 4 milliGRAM(s) IV Push every 8 hours PRN Nausea and/or Vomiting  oxyCODONE    IR 5 milliGRAM(s) Oral every 4 hours PRN Severe Pain (7 - 10)      Vital Signs Last 24 Hrs  T(C): 36.4 (22 Apr 2023 07:15), Max: 36.9 (21 Apr 2023 14:17)  T(F): 97.5 (22 Apr 2023 07:15), Max: 98.4 (21 Apr 2023 14:17)  HR: 57 (22 Apr 2023 07:15) (48 - 85)  BP: 147/54 (22 Apr 2023 07:15) (143/59 - 233/89)  BP(mean): --  RR: 19 (22 Apr 2023 07:15) (18 - 19)  SpO2: 94% (22 Apr 2023 07:15) (91% - 99%)    Parameters below as of 22 Apr 2023 07:15  Patient On (Oxygen Delivery Method): room air        PHYSICAL EXAMINATION:  GENERAL: NAD, overweight male  HEAD:  Atraumatic, Normocephalic  EYES:  conjunctiva and sclera clear  NECK: Supple, No JVD, Normal thyroid  CHEST/LUNG: Clear to auscultation. Clear to percussion bilaterally; No rales, rhonchi, or rubs. (+) b/l expiratory wheezing on deep inspiration   HEART: Regular rate and rhythm; No murmurs, rubs, or gallops  ABDOMEN: Soft, Nontender, Nondistended; Bowel sounds present  NERVOUS SYSTEM:  Alert & Oriented X3, mild slurred speech  MSK: + point tenderness of L-spine  EXTREMITIES:  2+ Peripheral Pulses, No clubbing, cyanosis, or edema  SKIN: warm dry                          11.1   6.66  )-----------( 143      ( 22 Apr 2023 07:10 )             35.5     04-22    143  |  112<H>  |  13  ----------------------------<  125<H>  3.8   |  26  |  1.36<H>    Ca    8.8      22 Apr 2023 07:10  Phos  4.5     04-22  Mg     2.1     04-22    TPro  6.6  /  Alb  2.6<L>  /  TBili  0.7  /  DBili  x   /  AST  20  /  ALT  12  /  AlkPhos  58  04-22    LIVER FUNCTIONS - ( 22 Apr 2023 07:10 )  Alb: 2.6 g/dL / Pro: 6.6 g/dL / ALK PHOS: 58 U/L / ALT: 12 U/L DA / AST: 20 U/L / GGT: x

## 2023-04-22 NOTE — PROGRESS NOTE ADULT - PROBLEM SELECTOR PLAN 5
h/o COPD not on home o2 and current smoker takes Breo Ellipta and Ventolin PRN confirmed by pharmacy   unclear if complaint with inhalers, reports always being SOB with chronic cough   not currently in exacerbation  O2 support as needed, maintain O2 sat 88-92%, avoid over oxygenation  CXR shows increasing left lateral pleural thickening, no evidence of consolidation   s/p duoneb x1 with good response   duoneb, symbicort, spiriva, albuterol prn  Pulmonary consulted: Dr. Navarrete  - CT chest non-contrast h/o COPD not on home o2 and current smoker takes Breo Ellipta and Ventolin PRN confirmed by pharmacy   unclear if complaint with inhalers, reports always being SOB with chronic cough   not currently in exacerbation  O2 support as needed, maintain O2 sat 88-92%, avoid over oxygenation  CXR shows increasing left lateral pleural thickening, no evidence of consolidation   s/p duoneb x1 with good response   duoneb, symbicort, spiriva, albuterol prn  Pulmonary consulted: Dr. Navarrete  - CT chest non-contrast as outpatient

## 2023-04-23 LAB
ALBUMIN SERPL ELPH-MCNC: 2.5 G/DL — LOW (ref 3.5–5)
ALP SERPL-CCNC: 60 U/L — SIGNIFICANT CHANGE UP (ref 40–120)
ALT FLD-CCNC: 12 U/L DA — SIGNIFICANT CHANGE UP (ref 10–60)
ANION GAP SERPL CALC-SCNC: 6 MMOL/L — SIGNIFICANT CHANGE UP (ref 5–17)
AST SERPL-CCNC: 17 U/L — SIGNIFICANT CHANGE UP (ref 10–40)
BASOPHILS # BLD AUTO: 0.04 K/UL — SIGNIFICANT CHANGE UP (ref 0–0.2)
BASOPHILS NFR BLD AUTO: 0.7 % — SIGNIFICANT CHANGE UP (ref 0–2)
BILIRUB SERPL-MCNC: 0.6 MG/DL — SIGNIFICANT CHANGE UP (ref 0.2–1.2)
BUN SERPL-MCNC: 19 MG/DL — HIGH (ref 7–18)
CALCIUM SERPL-MCNC: 8.6 MG/DL — SIGNIFICANT CHANGE UP (ref 8.4–10.5)
CHLORIDE SERPL-SCNC: 115 MMOL/L — HIGH (ref 96–108)
CO2 SERPL-SCNC: 23 MMOL/L — SIGNIFICANT CHANGE UP (ref 22–31)
CREAT SERPL-MCNC: 1.54 MG/DL — HIGH (ref 0.5–1.3)
EGFR: 49 ML/MIN/1.73M2 — LOW
EOSINOPHIL # BLD AUTO: 0.2 K/UL — SIGNIFICANT CHANGE UP (ref 0–0.5)
EOSINOPHIL NFR BLD AUTO: 3.3 % — SIGNIFICANT CHANGE UP (ref 0–6)
GLUCOSE SERPL-MCNC: 112 MG/DL — HIGH (ref 70–99)
HCT VFR BLD CALC: 33.6 % — LOW (ref 39–50)
HCV AB S/CO SERPL IA: 0.22 S/CO — SIGNIFICANT CHANGE UP (ref 0–0.99)
HCV AB SERPL-IMP: SIGNIFICANT CHANGE UP
HGB BLD-MCNC: 10.6 G/DL — LOW (ref 13–17)
IMM GRANULOCYTES NFR BLD AUTO: 0.5 % — SIGNIFICANT CHANGE UP (ref 0–0.9)
INR BLD: 1.05 RATIO — SIGNIFICANT CHANGE UP (ref 0.88–1.16)
LYMPHOCYTES # BLD AUTO: 0.92 K/UL — LOW (ref 1–3.3)
LYMPHOCYTES # BLD AUTO: 15.3 % — SIGNIFICANT CHANGE UP (ref 13–44)
MAGNESIUM SERPL-MCNC: 2 MG/DL — SIGNIFICANT CHANGE UP (ref 1.6–2.6)
MCHC RBC-ENTMCNC: 30.4 PG — SIGNIFICANT CHANGE UP (ref 27–34)
MCHC RBC-ENTMCNC: 31.5 GM/DL — LOW (ref 32–36)
MCV RBC AUTO: 96.3 FL — SIGNIFICANT CHANGE UP (ref 80–100)
MELD SCORE WITH DIALYSIS: 20 POINTS — SIGNIFICANT CHANGE UP
MELD SCORE WITHOUT DIALYSIS: 11 POINTS — SIGNIFICANT CHANGE UP
MONOCYTES # BLD AUTO: 0.47 K/UL — SIGNIFICANT CHANGE UP (ref 0–0.9)
MONOCYTES NFR BLD AUTO: 7.8 % — SIGNIFICANT CHANGE UP (ref 2–14)
NEUTROPHILS # BLD AUTO: 4.37 K/UL — SIGNIFICANT CHANGE UP (ref 1.8–7.4)
NEUTROPHILS NFR BLD AUTO: 72.4 % — SIGNIFICANT CHANGE UP (ref 43–77)
NRBC # BLD: 0 /100 WBCS — SIGNIFICANT CHANGE UP (ref 0–0)
PHOSPHATE SERPL-MCNC: 4.3 MG/DL — SIGNIFICANT CHANGE UP (ref 2.5–4.5)
PLATELET # BLD AUTO: 140 K/UL — LOW (ref 150–400)
POTASSIUM SERPL-MCNC: 3.7 MMOL/L — SIGNIFICANT CHANGE UP (ref 3.5–5.3)
POTASSIUM SERPL-SCNC: 3.7 MMOL/L — SIGNIFICANT CHANGE UP (ref 3.5–5.3)
PROT SERPL-MCNC: 6.5 G/DL — SIGNIFICANT CHANGE UP (ref 6–8.3)
PROTHROM AB SERPL-ACNC: 12.5 SEC — SIGNIFICANT CHANGE UP (ref 10.5–13.4)
RBC # BLD: 3.49 M/UL — LOW (ref 4.2–5.8)
RBC # FLD: 17.6 % — HIGH (ref 10.3–14.5)
SODIUM SERPL-SCNC: 144 MMOL/L — SIGNIFICANT CHANGE UP (ref 135–145)
WBC # BLD: 6.03 K/UL — SIGNIFICANT CHANGE UP (ref 3.8–10.5)
WBC # FLD AUTO: 6.03 K/UL — SIGNIFICANT CHANGE UP (ref 3.8–10.5)

## 2023-04-23 PROCEDURE — 99233 SBSQ HOSP IP/OBS HIGH 50: CPT | Mod: GC

## 2023-04-23 RX ORDER — AMLODIPINE BESYLATE 2.5 MG/1
5 TABLET ORAL ONCE
Refills: 0 | Status: COMPLETED | OUTPATIENT
Start: 2023-04-23 | End: 2023-04-23

## 2023-04-23 RX ORDER — HYDRALAZINE HCL 50 MG
5 TABLET ORAL ONCE
Refills: 0 | Status: COMPLETED | OUTPATIENT
Start: 2023-04-23 | End: 2023-04-23

## 2023-04-23 RX ORDER — HEPARIN SODIUM 5000 [USP'U]/ML
5000 INJECTION INTRAVENOUS; SUBCUTANEOUS EVERY 12 HOURS
Refills: 0 | Status: DISCONTINUED | OUTPATIENT
Start: 2023-04-23 | End: 2023-04-27

## 2023-04-23 RX ORDER — AMLODIPINE BESYLATE 2.5 MG/1
10 TABLET ORAL DAILY
Refills: 0 | Status: DISCONTINUED | OUTPATIENT
Start: 2023-04-24 | End: 2023-04-24

## 2023-04-23 RX ADMIN — Medication 1000 MILLIGRAM(S): at 01:17

## 2023-04-23 RX ADMIN — AMLODIPINE BESYLATE 5 MILLIGRAM(S): 2.5 TABLET ORAL at 05:45

## 2023-04-23 RX ADMIN — POLYETHYLENE GLYCOL 3350 17 GRAM(S): 17 POWDER, FOR SOLUTION ORAL at 12:38

## 2023-04-23 RX ADMIN — PANTOPRAZOLE SODIUM 40 MILLIGRAM(S): 20 TABLET, DELAYED RELEASE ORAL at 07:03

## 2023-04-23 RX ADMIN — BUDESONIDE AND FORMOTEROL FUMARATE DIHYDRATE 2 PUFF(S): 160; 4.5 AEROSOL RESPIRATORY (INHALATION) at 21:08

## 2023-04-23 RX ADMIN — LIDOCAINE 1 PATCH: 4 CREAM TOPICAL at 01:00

## 2023-04-23 RX ADMIN — Medication 1 TABLET(S): at 12:37

## 2023-04-23 RX ADMIN — Medication 1 MILLIGRAM(S): at 12:37

## 2023-04-23 RX ADMIN — Medication 1 SPRAY(S): at 18:38

## 2023-04-23 RX ADMIN — Medication 1000 MILLIGRAM(S): at 05:44

## 2023-04-23 RX ADMIN — OXYCODONE HYDROCHLORIDE 5 MILLIGRAM(S): 5 TABLET ORAL at 08:35

## 2023-04-23 RX ADMIN — Medication 1000 MILLIGRAM(S): at 18:38

## 2023-04-23 RX ADMIN — GABAPENTIN 100 MILLIGRAM(S): 400 CAPSULE ORAL at 21:08

## 2023-04-23 RX ADMIN — HEPARIN SODIUM 5000 UNIT(S): 5000 INJECTION INTRAVENOUS; SUBCUTANEOUS at 18:39

## 2023-04-23 RX ADMIN — LIDOCAINE 1 PATCH: 4 CREAM TOPICAL at 23:56

## 2023-04-23 RX ADMIN — MONTELUKAST 10 MILLIGRAM(S): 4 TABLET, CHEWABLE ORAL at 21:07

## 2023-04-23 RX ADMIN — Medication 1000 MILLIGRAM(S): at 19:08

## 2023-04-23 RX ADMIN — GABAPENTIN 100 MILLIGRAM(S): 400 CAPSULE ORAL at 05:44

## 2023-04-23 RX ADMIN — BUDESONIDE AND FORMOTEROL FUMARATE DIHYDRATE 2 PUFF(S): 160; 4.5 AEROSOL RESPIRATORY (INHALATION) at 12:37

## 2023-04-23 RX ADMIN — Medication 1000 MILLIGRAM(S): at 23:59

## 2023-04-23 RX ADMIN — AMLODIPINE BESYLATE 5 MILLIGRAM(S): 2.5 TABLET ORAL at 23:59

## 2023-04-23 RX ADMIN — GABAPENTIN 100 MILLIGRAM(S): 400 CAPSULE ORAL at 17:16

## 2023-04-23 RX ADMIN — Medication 1 SPRAY(S): at 05:45

## 2023-04-23 RX ADMIN — Medication 100 MILLIGRAM(S): at 12:36

## 2023-04-23 RX ADMIN — LIDOCAINE 1 PATCH: 4 CREAM TOPICAL at 12:37

## 2023-04-23 RX ADMIN — Medication 1000 MILLIGRAM(S): at 13:20

## 2023-04-23 RX ADMIN — OXYCODONE HYDROCHLORIDE 5 MILLIGRAM(S): 5 TABLET ORAL at 07:58

## 2023-04-23 RX ADMIN — Medication 1000 MILLIGRAM(S): at 12:36

## 2023-04-23 RX ADMIN — Medication 1000 MILLIGRAM(S): at 06:44

## 2023-04-23 RX ADMIN — SENNA PLUS 2 TABLET(S): 8.6 TABLET ORAL at 21:08

## 2023-04-23 RX ADMIN — TIOTROPIUM BROMIDE 2 PUFF(S): 18 CAPSULE ORAL; RESPIRATORY (INHALATION) at 12:37

## 2023-04-23 RX ADMIN — Medication 1000 MILLIGRAM(S): at 00:17

## 2023-04-23 RX ADMIN — Medication 5 MILLIGRAM(S): at 18:38

## 2023-04-23 NOTE — PHYSICAL THERAPY INITIAL EVALUATION ADULT - NSPTDISCHREC_GEN_A_CORE
DaTSCAN is normal.    This would be strong evidence that she does not have parkinson disease.  Her tremor is likely secondary to abilify.      I would advise she wean off carbidopa/levodopa and start benztropine instead as that will likely be more effective for her.    She may consider risk/benefits of change to abilify therapy with her psychiatrist.        Carbidopa/levodopa - reduce to 1 tab twice daily for 1 week then stop.      Benztropine  What it is:   An antispasmodic, anticholinergic medication that directly inhibits the parasympathetic nervous system relaxing smooth muscles.   Used for patients with dystonia and/or tremor.  How to take it:   Take benztropine with or without food.  Getting started on benztropine:   Starting dose for benztropine: 0.5 mg tab  Breakfast  Bedtime     1/2 x 7 days    1/2 1/2 x 7 days*   1/2 1  x 7 days    1 1 x 7 days    * if tremor is well enough controlled on this dose do not need to increase further.    Side Effects:   Dry mouth   Blurred vision   Impaired memory   Urinary retention   Nausea   Constipation   Dizziness   Tips:   If benztropine dries your mouth excessively, use gum or hard candy to increase saliva production.   See the dentist regularly to prevent gum damage from dry mouth.         Home PT

## 2023-04-23 NOTE — PROGRESS NOTE ADULT - ATTENDING COMMENTS
#Acute L1 compression fracture 2/2 fall in setting of dizziness  #Hypertensive urgency  #CORTES  #Hypokalemia, resolved  #HFpEF, not in acute exacerbation  #HTN   #DM    67yM, [place] citizen speaking, with PMH of COPD, asthma, DM, HFpEF, seasonal allergies, and  HTN who presented  with acute onset back pain after fall at home. Admitted with L1 compression fracture, seen on CT thoracic and lumbar spine.    Patient seen at bedside. Given medication yesterday to lower his BP, however SBP decreased too fast overnight, went down to 140s, started on IVF and AM meds held. This morning, patient sleeping comfortably, easily arousable.  Reports back pain.     -Increased amlodipine to 10mg, patient likely hypertensive in setting of pain from fracture and IVF- however patient has CORTES likely from dehydration 2/2 poor PO intake so will continue gentle hydration for 24h and pain control  -Orthostatics negative; tele monitoring - dizziness associated with fall  -ACHS FS, ISS; on metformin BID at home  -Labs reviewed: CBC, BMP, Mg, LFTs, will repeat in AM  -Pain management following; dc'd Toradol in setting of CORTES  -Seen by pulmonary, recommendations appreciated; resume breo and spiriva on dc  -Ortho consult in AM; unable to reach today  -PT rec: home therapy  -DVT ppx: heparin #Acute L1 compression fracture 2/2 fall in setting of dizziness  #Hypertensive urgency  #CORTES  #Hypokalemia, resolved  #HFpEF, not in acute exacerbation  #HTN   #DM    67yM, [place] speaking, with PMH of COPD, asthma, DM, HFpEF, seasonal allergies, and  HTN who presented  with acute onset back pain after fall at home. Admitted with L1 compression fracture, seen on CT thoracic and lumbar spine.      -Increased amlodipine to 10mg, patient likely hypertensive in setting of pain from fracture and IVF- however patient has CORTES likely from dehydration 2/2 poor PO intake so will continue gentle hydration for 24h and pain control  -Orthostatics negative; tele monitoring - dizziness associated with fall  -ACHS FS, ISS; on metformin BID at home  -Labs reviewed: CBC, BMP, Mg, LFTs, will repeat in AM  -Pain management following; dc'd Toradol in setting of CORTES  -Seen by pulmonary, recommendations appreciated; resume breo and spiriva on dc  -Ortho consult in AM; unable to reach today  -PT rec: home therapy  -DVT ppx: heparin

## 2023-04-23 NOTE — PHYSICAL THERAPY INITIAL EVALUATION ADULT - RANGE OF MOTION EXAMINATION, REHAB EVAL
except for left sh flex to 70 deg; right sh to 100 deg/bilateral upper extremity ROM was WFL (within functional limits)/bilateral lower extremity ROM was WFL (within functional limits)

## 2023-04-23 NOTE — PROGRESS NOTE ADULT - PROBLEM SELECTOR PLAN 5
h/o COPD not on home o2 and current smoker takes Breo Ellipta and Ventolin PRN confirmed by pharmacy   unclear if complaint with inhalers, reports always being SOB with chronic cough   not currently in exacerbation  O2 support as needed, maintain O2 sat 88-92%, avoid over oxygenation  CXR shows increasing left lateral pleural thickening, no evidence of consolidation   s/p duoneb x1 with good response   duoneb, symbicort, spiriva, albuterol prn  Pulmonary consulted: Dr. Navarrete  - CT chest non-contrast as outpatient

## 2023-04-23 NOTE — CHART NOTE - NSCHARTNOTEFT_GEN_A_CORE
Attempted to contact ortho for follow up on recs multiple times today, most recently at 12:45 pm, unable to reach. Will attempt again.
Signed out to maintaine goal SBP >180 in the setting of Hypertensive urgency.  Patients BP found to be 143/59.  Will start IV fluids @ 75cc/hr.  Will hold antihypertensive medications at this time.

## 2023-04-23 NOTE — PROGRESS NOTE ADULT - PROBLEM SELECTOR PLAN 1
p/w /78, asymptomatic on admission, not compliant with meds  h/o HTN on amlodipine 5mg and losartan 100mg  EKG showing NSR with LVH and QRS widening and QTc 539  No signs of end organ ischemia; Trop negative x1 and no CORTES  s/p labetalol 5mg IVP in the ED with minimal improvement   given STAT home meds and hydralazine 10mg IVP with continued improvement   **BP fell under goal of 25% reduction within first 24 hours, held AM BP meds on 4/22 and started gentle IVF hydration  DCed losartan (due to CORTES), c/w amlodipine  Monitor BP closely  4/23 - SBP up to 170s. Given that it has been >48 hours [facility] ept rpesented with HTN urgency, target BP can go down. Aim for SBP <150

## 2023-04-23 NOTE — PHYSICAL THERAPY INITIAL EVALUATION ADULT - GENERAL OBSERVATIONS, REHAB EVAL
Pt seen supine in bed w/telemetry, c/o fatigue and lack of sleep-back pain with movement-(+) abdominal binder

## 2023-04-23 NOTE — PHYSICAL THERAPY INITIAL EVALUATION ADULT - GAIT DEVIATIONS NOTED, PT EVAL
decreased cornel/increased time in double stance/decreased velocity of limb motion/decreased step length

## 2023-04-23 NOTE — PROGRESS NOTE ADULT - PROBLEM SELECTOR PLAN 4
TTE (01/2019): 50-55% EF with mild MR, mild AS, G2DD, and mild pulmonary HTN   EKG showing NSR with LVH and QRS widening and QTc 539  admits to medication non-compliance   admit to tele   Echo showed EF 50-55%, grade 2 DD, RVP mod increased at 51mm Hg

## 2023-04-23 NOTE — PHYSICAL THERAPY INITIAL EVALUATION ADULT - PERTINENT HX OF CURRENT PROBLEM, REHAB EVAL
67M from home, ambulates with caution uses cane sparingly, PMHx HLD, DM, Syncope and falls, COPD (not on home O2) and active smoker and not compliant with home inhalers with a prior ICU admission requiring intubation for AHRF with hypercapnia and hypoxia, HTN noncompliant on medication c/b hypertensive encephalopathy, and chronic ETOH dependence and daily use c/b liver cirrhosis and portal HTN with EGD (01/2019) showing esophageal varices, non-bleeding, presenting after traumatic fall with preceding dizziness, now c/o intractable mid-lower back pain.

## 2023-04-23 NOTE — PROGRESS NOTE ADULT - SUBJECTIVE AND OBJECTIVE BOX
PGY-1 Progress Note discussed with attending    PAGER #: [[phone]]  PLEASE CONTACT ON CALL TEAM:  - On Call Team (Please refer to Angel) FROM 5:00 PM - 8:30PM  - Nightfloat Team FROM 8:30 -7:30 AM    CHIEF COMPLAINT & BRIEF HOSPITAL COURSE:      INTERVAL HPI/OVERNIGHT EVENTS:   No acute events overnight. BP was elevated to SBP 170s this morning. Currently pt only complaining of continued back pain.  [place]  [id number]    REVIEW OF SYSTEMS:  CONSTITUTIONAL: Denies fever, weight loss, or fatigue  RESPIRATORY: Denies cough, wheezing, chills or hemoptysis; Denies shortness of breath  CARDIOVASCULAR: Denies chest pain, palpitations, dizziness, or leg swelling  GASTROINTESTINAL: Denies abdominal pain. Denies nausea, vomiting, or hematemesis; Denies diarrhea or constipation. Denies melena or hematochezia.  GENITOURINARY: Denies dysuria or hematuria, urinary frequency  NEUROLOGICAL: Denies headaches, memory loss, loss of strength, numbness, or tremors  SKIN: Denies itching, burning, rashes, or lesions     MEDICATIONS  (STANDING):  acetaminophen     Tablet .. 1000 milliGRAM(s) Oral every 6 hours  amLODIPine   Tablet 5 milliGRAM(s) Oral daily  budesonide  80 MICROgram(s)/formoterol 4.5 MICROgram(s) Inhaler 2 Puff(s) Inhalation two times a day  enoxaparin Injectable 40 milliGRAM(s) SubCutaneous every 24 hours  fluticasone propionate 50 MICROgram(s)/spray Nasal Spray 1 Spray(s) Both Nostrils two times a day  folic acid 1 milliGRAM(s) Oral daily  gabapentin 100 milliGRAM(s) Oral three times a day  lidocaine   4% Patch 1 Patch Transdermal every 24 hours  montelukast 10 milliGRAM(s) Oral at bedtime  multivitamin 1 Tablet(s) Oral daily  pantoprazole    Tablet 40 milliGRAM(s) Oral before breakfast  polyethylene glycol 3350 17 Gram(s) Oral daily  senna 2 Tablet(s) Oral at bedtime  thiamine 100 milliGRAM(s) Oral daily  tiotropium 2.5 MICROgram(s) Inhaler 2 Puff(s) Inhalation daily    MEDICATIONS  (PRN):  albuterol    90 MICROgram(s) HFA Inhaler 2 Puff(s) Inhalation every 6 hours PRN Shortness of Breath and/or Wheezing  ondansetron Injectable 4 milliGRAM(s) IV Push every 8 hours PRN Nausea and/or Vomiting  oxyCODONE    IR 5 milliGRAM(s) Oral every 4 hours PRN Severe Pain (7 - 10)      Vital Signs Last 24 Hrs  T(C): 36.7 (23 Apr 2023 08:40), Max: 37.1 (22 Apr 2023 11:08)  T(F): 98.1 (23 Apr 2023 08:40), Max: 98.8 (22 Apr 2023 11:08)  HR: 74 (23 Apr 2023 08:40) (55 - 74)  BP: 197/74 (23 Apr 2023 08:40) (135/90 - 197/74)  BP(mean): --  RR: 18 (23 Apr 2023 08:40) (18 - 19)  SpO2: 98% (23 Apr 2023 08:40) (95% - 98%)    Parameters below as of 23 Apr 2023 08:40  Patient On (Oxygen Delivery Method): room air        PHYSICAL EXAMINATION:  GENERAL: NAD, well built, elderly, appears older than stated age  HEAD:  Atraumatic, Normocephalic  EYES:  conjunctiva and sclera clear  NECK: Supple, No JVD  CHEST/LUNG: Clear to auscultation. No rales, rhonchi, wheezing, or rubs  HEART: Regular rate and rhythm  ABDOMEN: Soft, Nontender, Nondistended; Bowel sounds present, no pain or masses on palpation  NERVOUS SYSTEM:  Alert & Oriented X3, following commands  PSYCH: appropriate affect  : voiding well  EXTREMITIES:  No clubbing, cyanosis, or edema  MSK: tenderness to palp of lumbar spine  SKIN: warm dry                          10.6   6.03  )-----------( 140      ( 23 Apr 2023 06:18 )             33.6     04-23    144  |  115<H>  |  19<H>  ----------------------------<  112<H>  3.7   |  23  |  1.54<H>    Ca    8.6      23 Apr 2023 06:18  Phos  4.3     04-23  Mg     2.0     04-23    TPro  6.5  /  Alb  2.5<L>  /  TBili  0.6  /  DBili  x   /  AST  17  /  ALT  12  /  AlkPhos  60  04-23    LIVER FUNCTIONS - ( 23 Apr 2023 06:18 )  Alb: 2.5 g/dL / Pro: 6.5 g/dL / ALK PHOS: 60 U/L / ALT: 12 U/L DA / AST: 17 U/L / GGT: x               PT/INR - ( 23 Apr 2023 06:18 )   PT: 12.5 sec;   INR: 1.05 ratio             I&O's Summary    22 Apr 2023 07:01  -  23 Apr 2023 07:00  --------------------------------------------------------  IN: 1275 mL / OUT: 400 mL / NET: 875 mL            CAPILLARY BLOOD GLUCOSE      RADIOLOGY & ADDITIONAL TESTS:

## 2023-04-23 NOTE — PROGRESS NOTE ADULT - PROBLEM SELECTOR PLAN 2
presenting after traumatic fall which was preceded by dizziness, no LOC or head trauma   h/o falls and syncopal episodes with injury in the setting of unsteady gait, as per daughter   CT thoracic/ Lumbar: acute lumbar compression fracture of L1 vertebrae with 25% loss in vertebral height   s/p Morphine 4mg x1 in the ED with good response   limit mobility for now with bedrest  Ortho consulted, awaiting further recs  Pain management consulted:   - Oxycodone 5 mg PO q 4 hours PRN severe pain. Monitor for sedation/ respiratory depression.   - Acetaminophen 1 gram PO q 8 hours x 3 days. Monitor LFTs  - Gabapentin 100mg po q 8 hours. Monitor renal function.   - c/w Lidoderm 4% patch daily  - start bowel regiment   - DCed Toradol due to CORTES  PT consulted [Home] : at home, [unfilled] , at the time of the visit. [Medical Office: ([facility])___] : at the medical office located in  [Spouse] : spouse [Verbal consent obtained from patient] : the patient, [unfilled] [Follow-up Visit ___] : a follow-up visit  for [unfilled]

## 2023-04-23 NOTE — PROGRESS NOTE ADULT - PROBLEM SELECTOR PLAN 3
Pt w/ SCr 1.36, Cr 0.85 on admission  ---> trending up 1.54 SCr  dced toradol and losartan for now  s/p gentle IVF hydration  Follow BMP daily  Avoid nephrotoxic agents  Encourage appropriate PO fluid intake

## 2023-04-24 LAB
ALBUMIN SERPL ELPH-MCNC: 2.7 G/DL — LOW (ref 3.5–5)
ALP SERPL-CCNC: 68 U/L — SIGNIFICANT CHANGE UP (ref 40–120)
ALT FLD-CCNC: 11 U/L DA — SIGNIFICANT CHANGE UP (ref 10–60)
ANION GAP SERPL CALC-SCNC: 7 MMOL/L — SIGNIFICANT CHANGE UP (ref 5–17)
AST SERPL-CCNC: 16 U/L — SIGNIFICANT CHANGE UP (ref 10–40)
BASOPHILS # BLD AUTO: 0.04 K/UL — SIGNIFICANT CHANGE UP (ref 0–0.2)
BASOPHILS NFR BLD AUTO: 0.5 % — SIGNIFICANT CHANGE UP (ref 0–2)
BILIRUB SERPL-MCNC: 0.8 MG/DL — SIGNIFICANT CHANGE UP (ref 0.2–1.2)
BUN SERPL-MCNC: 16 MG/DL — SIGNIFICANT CHANGE UP (ref 7–18)
CALCIUM SERPL-MCNC: 9.3 MG/DL — SIGNIFICANT CHANGE UP (ref 8.4–10.5)
CHLORIDE SERPL-SCNC: 113 MMOL/L — HIGH (ref 96–108)
CO2 SERPL-SCNC: 23 MMOL/L — SIGNIFICANT CHANGE UP (ref 22–31)
CREAT SERPL-MCNC: 1.01 MG/DL — SIGNIFICANT CHANGE UP (ref 0.5–1.3)
EGFR: 81 ML/MIN/1.73M2 — SIGNIFICANT CHANGE UP
EOSINOPHIL # BLD AUTO: 0.23 K/UL — SIGNIFICANT CHANGE UP (ref 0–0.5)
EOSINOPHIL NFR BLD AUTO: 3 % — SIGNIFICANT CHANGE UP (ref 0–6)
GLUCOSE SERPL-MCNC: 126 MG/DL — HIGH (ref 70–99)
HCT VFR BLD CALC: 38 % — LOW (ref 39–50)
HGB BLD-MCNC: 12.1 G/DL — LOW (ref 13–17)
IMM GRANULOCYTES NFR BLD AUTO: 0.4 % — SIGNIFICANT CHANGE UP (ref 0–0.9)
LYMPHOCYTES # BLD AUTO: 0.93 K/UL — LOW (ref 1–3.3)
LYMPHOCYTES # BLD AUTO: 12.1 % — LOW (ref 13–44)
MAGNESIUM SERPL-MCNC: 1.9 MG/DL — SIGNIFICANT CHANGE UP (ref 1.6–2.6)
MCHC RBC-ENTMCNC: 30.8 PG — SIGNIFICANT CHANGE UP (ref 27–34)
MCHC RBC-ENTMCNC: 31.8 GM/DL — LOW (ref 32–36)
MCV RBC AUTO: 96.7 FL — SIGNIFICANT CHANGE UP (ref 80–100)
MONOCYTES # BLD AUTO: 0.7 K/UL — SIGNIFICANT CHANGE UP (ref 0–0.9)
MONOCYTES NFR BLD AUTO: 9.1 % — SIGNIFICANT CHANGE UP (ref 2–14)
NEUTROPHILS # BLD AUTO: 5.78 K/UL — SIGNIFICANT CHANGE UP (ref 1.8–7.4)
NEUTROPHILS NFR BLD AUTO: 74.9 % — SIGNIFICANT CHANGE UP (ref 43–77)
NRBC # BLD: 0 /100 WBCS — SIGNIFICANT CHANGE UP (ref 0–0)
PHOSPHATE SERPL-MCNC: 3.3 MG/DL — SIGNIFICANT CHANGE UP (ref 2.5–4.5)
PLATELET # BLD AUTO: 165 K/UL — SIGNIFICANT CHANGE UP (ref 150–400)
POTASSIUM SERPL-MCNC: 3.7 MMOL/L — SIGNIFICANT CHANGE UP (ref 3.5–5.3)
POTASSIUM SERPL-SCNC: 3.7 MMOL/L — SIGNIFICANT CHANGE UP (ref 3.5–5.3)
PROT SERPL-MCNC: 7.1 G/DL — SIGNIFICANT CHANGE UP (ref 6–8.3)
RBC # BLD: 3.93 M/UL — LOW (ref 4.2–5.8)
RBC # FLD: 17.6 % — HIGH (ref 10.3–14.5)
SODIUM SERPL-SCNC: 143 MMOL/L — SIGNIFICANT CHANGE UP (ref 135–145)
WBC # BLD: 7.71 K/UL — SIGNIFICANT CHANGE UP (ref 3.8–10.5)
WBC # FLD AUTO: 7.71 K/UL — SIGNIFICANT CHANGE UP (ref 3.8–10.5)

## 2023-04-24 PROCEDURE — 99232 SBSQ HOSP IP/OBS MODERATE 35: CPT | Mod: GC

## 2023-04-24 PROCEDURE — 93010 ELECTROCARDIOGRAM REPORT: CPT

## 2023-04-24 PROCEDURE — 99232 SBSQ HOSP IP/OBS MODERATE 35: CPT

## 2023-04-24 RX ORDER — AMLODIPINE BESYLATE 2.5 MG/1
5 TABLET ORAL DAILY
Refills: 0 | Status: DISCONTINUED | OUTPATIENT
Start: 2023-04-24 | End: 2023-04-24

## 2023-04-24 RX ORDER — LOSARTAN POTASSIUM 100 MG/1
50 TABLET, FILM COATED ORAL DAILY
Refills: 0 | Status: DISCONTINUED | OUTPATIENT
Start: 2023-04-24 | End: 2023-04-26

## 2023-04-24 RX ORDER — ACETAMINOPHEN 500 MG
1000 TABLET ORAL EVERY 8 HOURS
Refills: 0 | Status: DISCONTINUED | OUTPATIENT
Start: 2023-04-24 | End: 2023-04-27

## 2023-04-24 RX ORDER — AMLODIPINE BESYLATE 2.5 MG/1
10 TABLET ORAL DAILY
Refills: 0 | Status: DISCONTINUED | OUTPATIENT
Start: 2023-04-25 | End: 2023-04-27

## 2023-04-24 RX ADMIN — LIDOCAINE 1 PATCH: 4 CREAM TOPICAL at 11:40

## 2023-04-24 RX ADMIN — Medication 1000 MILLIGRAM(S): at 21:13

## 2023-04-24 RX ADMIN — GABAPENTIN 100 MILLIGRAM(S): 400 CAPSULE ORAL at 21:15

## 2023-04-24 RX ADMIN — PANTOPRAZOLE SODIUM 40 MILLIGRAM(S): 20 TABLET, DELAYED RELEASE ORAL at 05:36

## 2023-04-24 RX ADMIN — AMLODIPINE BESYLATE 10 MILLIGRAM(S): 2.5 TABLET ORAL at 05:35

## 2023-04-24 RX ADMIN — Medication 1 TABLET(S): at 11:41

## 2023-04-24 RX ADMIN — Medication 1000 MILLIGRAM(S): at 06:05

## 2023-04-24 RX ADMIN — Medication 1000 MILLIGRAM(S): at 14:50

## 2023-04-24 RX ADMIN — LIDOCAINE 1 PATCH: 4 CREAM TOPICAL at 23:12

## 2023-04-24 RX ADMIN — LOSARTAN POTASSIUM 50 MILLIGRAM(S): 100 TABLET, FILM COATED ORAL at 11:41

## 2023-04-24 RX ADMIN — HEPARIN SODIUM 5000 UNIT(S): 5000 INJECTION INTRAVENOUS; SUBCUTANEOUS at 17:24

## 2023-04-24 RX ADMIN — Medication 1000 MILLIGRAM(S): at 22:13

## 2023-04-24 RX ADMIN — Medication 100 MILLIGRAM(S): at 11:41

## 2023-04-24 RX ADMIN — HEPARIN SODIUM 5000 UNIT(S): 5000 INJECTION INTRAVENOUS; SUBCUTANEOUS at 05:35

## 2023-04-24 RX ADMIN — Medication 1000 MILLIGRAM(S): at 13:58

## 2023-04-24 RX ADMIN — OXYCODONE HYDROCHLORIDE 5 MILLIGRAM(S): 5 TABLET ORAL at 12:30

## 2023-04-24 RX ADMIN — MONTELUKAST 10 MILLIGRAM(S): 4 TABLET, CHEWABLE ORAL at 21:12

## 2023-04-24 RX ADMIN — OXYCODONE HYDROCHLORIDE 5 MILLIGRAM(S): 5 TABLET ORAL at 18:46

## 2023-04-24 RX ADMIN — POLYETHYLENE GLYCOL 3350 17 GRAM(S): 17 POWDER, FOR SOLUTION ORAL at 11:40

## 2023-04-24 RX ADMIN — OXYCODONE HYDROCHLORIDE 5 MILLIGRAM(S): 5 TABLET ORAL at 17:39

## 2023-04-24 RX ADMIN — OXYCODONE HYDROCHLORIDE 5 MILLIGRAM(S): 5 TABLET ORAL at 11:41

## 2023-04-24 RX ADMIN — TIOTROPIUM BROMIDE 2 PUFF(S): 18 CAPSULE ORAL; RESPIRATORY (INHALATION) at 11:42

## 2023-04-24 RX ADMIN — BUDESONIDE AND FORMOTEROL FUMARATE DIHYDRATE 2 PUFF(S): 160; 4.5 AEROSOL RESPIRATORY (INHALATION) at 21:16

## 2023-04-24 RX ADMIN — Medication 1 SPRAY(S): at 17:24

## 2023-04-24 RX ADMIN — Medication 1000 MILLIGRAM(S): at 05:35

## 2023-04-24 RX ADMIN — LIDOCAINE 1 PATCH: 4 CREAM TOPICAL at 19:01

## 2023-04-24 RX ADMIN — BUDESONIDE AND FORMOTEROL FUMARATE DIHYDRATE 2 PUFF(S): 160; 4.5 AEROSOL RESPIRATORY (INHALATION) at 10:19

## 2023-04-24 RX ADMIN — Medication 1 SPRAY(S): at 05:35

## 2023-04-24 RX ADMIN — Medication 1 MILLIGRAM(S): at 11:42

## 2023-04-24 RX ADMIN — GABAPENTIN 100 MILLIGRAM(S): 400 CAPSULE ORAL at 13:58

## 2023-04-24 RX ADMIN — GABAPENTIN 100 MILLIGRAM(S): 400 CAPSULE ORAL at 05:35

## 2023-04-24 RX ADMIN — Medication 1000 MILLIGRAM(S): at 00:29

## 2023-04-24 NOTE — PROGRESS NOTE ADULT - PROBLEM SELECTOR PLAN 3
Pt w/ SCr 1.36, Cr 0.85 on admission  ---> trending up 1.54 SCr  dced toradol and losartan for now  s/p gentle IVF hydration  Follow BMP daily  Avoid nephrotoxic agents  Encourage appropriate PO fluid intake Pt w/ SCr 1.36, Cr 0.85 on admission  dced toradol and losartan for now  s/p gentle IVF hydration  Follow BMP daily  Avoid nephrotoxic agents  Encourage appropriate PO fluid intake  Resolved*

## 2023-04-24 NOTE — PROGRESS NOTE ADULT - SUBJECTIVE AND OBJECTIVE BOX
Source of information: GREGORY ELIZABETH, Chart review  Patient language: English  : n/a    HPI:  67M from home, ambulates with caution uses cane sparingly, PMHx HLD, DM, Syncope and falls, COPD (not on home O2) and active smoker and not compliant with home inhalers with a prior ICU admission requiring intubation for AHRF with hypercapnia and hypoxia, HTN noncompliant on medication c/b hypertensive encephalopathy, and chronic ETOH dependence and daily use c/b liver cirrhosis and portal HTN with EGD (01/2019) showing esophageal varices, non-bleeding, presenting after traumatic fall with preceding dizziness, now c/o intractable mid-lower back pain. Patient is AOx2-3, unreliable historian unable to provide history without inaccuracies, compared to chart review and collateral obtained from daughter, Nikole Elizabeth [phone] who is [place]of [place] but communicates daily with patient and patient's domestic common law partner, Reza Hercules. Patient was reported to get up from bed at night to turn off a light, when he became dizzy without HA, chest pain, SOB, or palpitations and lost his balance falling backwards and striking his back on a dresser. Denies any LOC or head trauma, as well as no nausea or vomiting. Reports severe pain throughout his lower back after fall, but denies any loss of sensation or weakness of his legs. He denies any loss of sensation in his groin or loss of continence.  Reports back pain had no relief with OTC ibuprofen. He is not on blood thinners. Although he reports no longer drinking or smoking, and then later reporting "sometimes on occasion", his daughter confirmed that he smokes 1/2-pack/day and drinks 3-glasses of vodka daily. Noted to have slight slurring in speech, which he states is his normal speech. He denies any preceding HA, changes in vision, ringing in the ears, chest pain, SOB, and no palpitations. Patient denies fevers, chills, and recent illnesses.  (21 Apr 2023 10:32)      Patient is a 67y old  Male with PMH HLD HTN asthma, found to have acute L1 compression fracture. Pain consulted for back pain. Pt seen and examined at bedside thia morning. Pt found lying in bed, awake, alert and oriented x3, speech clear. No acute distress noted. Patient t reports lumbar back pain score 7/10 and not tolerable, also c/o left sided rib pain and abdominal pain 5/10. RN informed to medicate patient with PRN pain meds. Noted to have an abdominal binder in place. SCALE USED: (1-10 VNRS). Pt describes pain as aching, radiating to left hip, alleviated by pain medication, exacerbated by movement, deep breaths and palpation. Tolerating diet -Pureed. Reports. Denies lethargy, chest pain, vomiting, constipation.  Also reports b/l leg cramps. Last BM reported 4/23/2023. Denies numbness/ tingling. Patient stated goal for pain control: to be able to take deep breaths, get out of bed to chair and ambulate with tolerable pain control. Pt reports taking ibuprofen for pain at home.     PAST MEDICAL & SURGICAL HISTORY:  HTN (hypertension)    Varicose veins    HLD (hyperlipidemia)    Liver cirrhosis    Portal hypertension with esophageal varices    COPD (chronic obstructive pulmonary disease)    Syncope    Alcohol dependence, daily use    Smokes tobacco daily    DM (diabetes mellitus)    No significant past surgical history    FAMILY HISTORY:    Social History:  Lives at home with domestic partner (21 Apr 2023 10:32)   [x]Denies ETOH use, illicit drug use, and smoking     Allergies    No Known Allergies    Intolerances    MEDICATIONS  (STANDING):  acetaminophen     Tablet .. 1000 milliGRAM(s) Oral every 8 hours  budesonide  80 MICROgram(s)/formoterol 4.5 MICROgram(s) Inhaler 2 Puff(s) Inhalation two times a day  fluticasone propionate 50 MICROgram(s)/spray Nasal Spray 1 Spray(s) Both Nostrils two times a day  folic acid 1 milliGRAM(s) Oral daily  gabapentin 100 milliGRAM(s) Oral three times a day  heparin   Injectable 5000 Unit(s) SubCutaneous every 12 hours  lidocaine   4% Patch 1 Patch Transdermal every 24 hours  losartan 50 milliGRAM(s) Oral daily  montelukast 10 milliGRAM(s) Oral at bedtime  multivitamin 1 Tablet(s) Oral daily  pantoprazole    Tablet 40 milliGRAM(s) Oral before breakfast  polyethylene glycol 3350 17 Gram(s) Oral daily  senna 2 Tablet(s) Oral at bedtime  thiamine 100 milliGRAM(s) Oral daily  tiotropium 2.5 MICROgram(s) Inhaler 2 Puff(s) Inhalation daily    MEDICATIONS  (PRN):  albuterol    90 MICROgram(s) HFA Inhaler 2 Puff(s) Inhalation every 6 hours PRN Shortness of Breath and/or Wheezing  ondansetron Injectable 4 milliGRAM(s) IV Push every 8 hours PRN Nausea and/or Vomiting  oxyCODONE    IR 5 milliGRAM(s) Oral every 4 hours PRN Severe Pain (7 - 10)    Vital Signs Last 24 Hrs  T(C): 36.4 (24 Apr 2023 12:26), Max: 37 (23 Apr 2023 23:29)  T(F): 97.5 (24 Apr 2023 12:26), Max: 98.6 (23 Apr 2023 23:29)  HR: 74 (24 Apr 2023 12:26) (64 - 86)  BP: 107/94 (24 Apr 2023 12:26) (107/94 - 179/69)  BP(mean): --  RR: 18 (24 Apr 2023 12:26) (18 - 22)  SpO2: 98% (24 Apr 2023 12:26) (95% - 98%)    Parameters below as of 24 Apr 2023 12:26  Patient On (Oxygen Delivery Method): room air    LABS: Reviewed                        12.1   7.71  )-----------( 165      ( 24 Apr 2023 07:16 )             38.0     04-24    143  |  113<H>  |  16  ----------------------------<  126<H>  3.7   |  23  |  1.01    Ca    9.3      24 Apr 2023 07:16  Phos  3.3     04-24  Mg     1.9     04-24    TPro  7.1  /  Alb  2.7<L>  /  TBili  0.8  /  DBili  x   /  AST  16  /  ALT  11  /  AlkPhos  68  04-24    PT/INR - ( 23 Apr 2023 06:18 )   PT: 12.5 sec;   INR: 1.05 ratio      LIVER FUNCTIONS - ( 24 Apr 2023 07:16 )  Alb: 2.7 g/dL / Pro: 7.1 g/dL / ALK PHOS: 68 U/L / ALT: 11 U/L DA / AST: 16 U/L / GGT: x           CAPILLARY BLOOD GLUCOSE    Radiology: Reviewed  ACC: [id number] EXAM:  CT BRAIN   ORDERED BY: THERESA JUAN     PROCEDURE DATE:  04/21/2023      INTERPRETATION:  CLINICAL INFORMATION:  slurred speech    TECHNIQUE:  Axial CT images were acquired through the head.  Intravenous contrast: None  Two-dimensional reformats were generated.    COMPARISON STUDY: CT head 1/14/2019    FINDINGS:    There is no CT evidence of acute intracranial hemorrhage, mass effect,   midline shift, or acute, large territorial infarct.  The ventricles and   sulci are prominent compatible with moderate to advanced generalized   brain parenchymal volume loss. Mild patchy periventricular and   subcortical white matter hypodensities are nonspecific, although likely   due to chronic microangiopathy. The basal cisterns are patent. There is   an empty sella.    There are atherosclerotic calcifications at the skull base.    The mastoid air cells and middle ear cavities are grossly clear. There is   no significant paranasal sinus mucosal thickening.    The calvariumand skull base are grossly intact.    IMPRESSION:  No acute intracranial hemorrhage or mass effect.  Chronic changes, as described above.    --- End of Report ---    JACQUELYN SILVA MD; Attending Radiologist  This document has been electronically signed. Apr 21 2023  1:38PM  ACC: [id number] EXAM:  CT LUMBAR SPINE   ORDERED BY: SHARONA SHAY     ACC: [id number] EXAM:  CT THORACIC SPINE   ORDERED BY: SHARONA SHAY     PROCEDURE DATE:  04/21/2023          INTERPRETATION:  CT THORACIC SPINE, CT LUMBAR SPINE:    CLINICAL INDICATION: Fall    TECHNIQUE: CT of the thoracic and lumbar spine was performed without the   administration of intravenous contrast, according to standard protocol.    COMPARISON: CT abdomen 7/16/2018    FINDINGS:    ALIGNMENT: Thoracic kyphosis is preserved. Lumbar lordosis is preserved.    VERTEBRAE: The thoracic vertebral bodies are normal in height. Within the   lumbar spine, there is anterior compression deformity at L1 producing   approximately 25% loss of vertebral body height.    DISCS: The disc spaces are maintained.    EVALUATION OF INDIVIDUAL LEVELS DEMONSTRATES: No spinal canal or   neuroforaminal stenosis throughout the thoracic spine.    Within the lumbar spine:    T12-L1: Unremarkable    L1-L2: Unremarkable.    L2-L3: Mild disc bulge producing mild central canal stenosis without   significant neuroforaminal narrowing.    L3-L4: Broad disc bulge producing moderate to severe central canal   stenosis and associated mild to moderate bilateral neuroforaminal   narrowing.    L4-L5: Broad disc bulge producing moderate to severe central canal   stenosis and associated mild to moderate bilateral neuroforaminal   narrowing.    L5-S1: Mild disc bulge producing mild central canal stenosis without   significant neuroforaminal narrowing.    PARAVERTEBRAL SOFT TISSUES: The visualized paravertebral soft tissues   appear within normal limits.    Scarring at the left lung apex.    IMPRESSION:    Acute compression fracture at L1 with approximately 25% loss of vertebral   body height. No retropulsion into the ventral thecal sac. No traumatic   malalignment.    Chronic degenerative disc disease within the lumbar spine, see above for   details.    --- End of Report ---    ASHOK KUMAR MD; Attending Radiologist  This document has been electronically signed. Apr 21 2023  5:46AM  ACC: [id number] EXAM:  XR CHEST PORTABLE IMMED 1V   ORDERED BY: THERESA MARTINEZ     PROCEDURE DATE:  04/21/2023      INTERPRETATION:  AP chest on April 21, 2023 11:47 AM. Patient is short of   breath.    Heart magnified by technique.    There is an increasing calcified left lateral pleural reaction compared   to January 24, 2019.    No lung consolidations.    IMPRESSION: Increasing chronic left lateral pleural findings.    --- End of Report ---    MARIA ANTONIA BEASLEY MD; Attending Radiologist  This document has been electronically signed. Apr 21 2023 12:56PM    ORT Score -   Family Hx of substance abuse	Female	      Male  Alcohol 	                                           1                     3  Illegal drugs	                                   2                     3  Rx drugs                                           4 	                  4  Personal Hx of substance abuse		  Alcohol 	                                          3	                  3  Illegal drugs                                     4	                  4  Rx drugs                                            5 	                  5  Age between 16- 45 years	           1                     1  hx preadolescent sexual abuse	   3 	                  0  Psychological disease		  ADD, OCD, bipolar, schizophrenia   2	          2  Depression                                           1 	          1  Total: 0    a score of 3 or lower indicates low risk for opioid abuse		  a score of 4-7 indicates moderate risk for opioid abuse		  a score of 8 or higher indicates high risk for opioid abuse    REVIEW OF SYSTEMS:  CONSTITUTIONAL: No fever, no fatigue  HEENT:  No difficulty hearing, no change in vision  NECK: No pain or stiffness  RESPIRATORY: No cough, wheezing, chills or hemoptysis; + shortness of breath on exertion   CARDIOVASCULAR: No chest pain, palpitations, dizziness, or leg swelling   GASTROINTESTINAL: No loss of appetite, decreased PO intake. No abdominal or epigastric pain. No nausea or vomiting; No diarrhea or constipation.   GENITOURINARY: No dysuria, frequency, hematuria, retention or incontinence  MUSCULOSKELETAL: + lumbar back pain no swelling radiating to left hip, left chest and abdominal pain,+ b/l leg cramps, no upper or lower motor strength weakness, no saddle anesthesia, bowel/bladder incontinence, + falls   NEURO: No headaches, No numbness/tingling b/l LE, No weakness    PHYSICAL EXAM:  GENERAL: awake, alert & Oriented X3, cooperative, NAD, Good concentration. Speech is clear.  RESPIRATORY: Respirations even and unlabored. Diminished to auscultation bilaterally; No rales, rhonchi, wheezing, or rubs  CARDIOVASCULAR: Normal S1/S2, regular rate and rhythm; No murmurs, rubs, or gallops. No JVD. + cardiac monitor in place  GASTROINTESTINAL:  Soft, Nontender, Nondistended; Bowel sounds present  PERIPHERAL VASCULAR:  Extremities warm without edema. 2+ Peripheral Pulses, No cyanosis, No calf tenderness  MUSCULOSKELETAL: Motor Strength 5/5 B/L upper and 4/5 lower extremities; moves all extremities equally against gravity; + decreased ROM left LE; + left SLR at 45 degrees; + lumbar backpain tenderness on palpation.  SKIN: Warm, dry, intact. No rashes, lesions, scars or wounds. abdominal binder on chest area    Risk factors associated with adverse outcomes related to opioid treatment  [ ]  Concurrent benzodiazepine use  [ ]  History/ Active substance use or alcohol use disorder  [ ] Psychiatric co-morbidity  [ ] Sleep apnea  [x] COPD  [ ] BMI> 35  [ ] Liver dysfunction  [ ] Renal dysfunction  [ ] CHF  [x] Smoker  [x]  Age > 60 years    [x]  [place]  Reviewed and Copied to Chart. See below.    Plan of care and goal oriented pain management treatment options were discussed with patient and /or primary care giver; all questions and concerns were addressed and care was aligned with patient's wishes.    Educated patient on goal oriented pain management treatment options     04-24-23 @ 12:47

## 2023-04-24 NOTE — PROGRESS NOTE ADULT - SUBJECTIVE AND OBJECTIVE BOX
PGY-1 Progress Note discussed with attending    PAGER #: [[phone]] TILL 5:00 PM  PLEASE CONTACT ON CALL TEAM:   - On Call Team (Please refer to Angel) FROM 5:00 PM - 8:30PM  - Nightfloat Team FROM 8:30 -7:30 AM    INTERVAL HPI/OVERNIGHT EVENTS:       REVIEW OF SYSTEMS:  CONSTITUTIONAL: No fever, weight loss, or fatigue  RESPIRATORY: No cough, wheezing, chills or hemoptysis; No shortness of breath  CARDIOVASCULAR: No chest pain, palpitations, dizziness, or leg swelling  GASTROINTESTINAL: No abdominal pain. No nausea, vomiting, or hematemesis; No diarrhea or constipation. No melena or hematochezia.  GENITOURINARY: No dysuria or hematuria, urinary frequency  NEUROLOGICAL: No headaches, memory loss, loss of strength, numbness, or tremors  SKIN: No itching, burning, rashes, or lesions     MEDICATIONS  (STANDING):  acetaminophen     Tablet .. 1000 milliGRAM(s) Oral every 6 hours  amLODIPine   Tablet 5 milliGRAM(s) Oral daily  budesonide  80 MICROgram(s)/formoterol 4.5 MICROgram(s) Inhaler 2 Puff(s) Inhalation two times a day  fluticasone propionate 50 MICROgram(s)/spray Nasal Spray 1 Spray(s) Both Nostrils two times a day  folic acid 1 milliGRAM(s) Oral daily  gabapentin 100 milliGRAM(s) Oral three times a day  heparin   Injectable 5000 Unit(s) SubCutaneous every 12 hours  lidocaine   4% Patch 1 Patch Transdermal every 24 hours  losartan 50 milliGRAM(s) Oral daily  montelukast 10 milliGRAM(s) Oral at bedtime  multivitamin 1 Tablet(s) Oral daily  pantoprazole    Tablet 40 milliGRAM(s) Oral before breakfast  polyethylene glycol 3350 17 Gram(s) Oral daily  senna 2 Tablet(s) Oral at bedtime  thiamine 100 milliGRAM(s) Oral daily  tiotropium 2.5 MICROgram(s) Inhaler 2 Puff(s) Inhalation daily    MEDICATIONS  (PRN):  albuterol    90 MICROgram(s) HFA Inhaler 2 Puff(s) Inhalation every 6 hours PRN Shortness of Breath and/or Wheezing  ondansetron Injectable 4 milliGRAM(s) IV Push every 8 hours PRN Nausea and/or Vomiting  oxyCODONE    IR 5 milliGRAM(s) Oral every 4 hours PRN Severe Pain (7 - 10)      Vital Signs Last 24 Hrs  T(C): 36.9 (24 Apr 2023 08:00), Max: 37 (23 Apr 2023 11:16)  T(F): 98.4 (24 Apr 2023 08:00), Max: 98.6 (23 Apr 2023 11:16)  HR: 81 (24 Apr 2023 08:00) (64 - 86)  BP: 162/63 (24 Apr 2023 08:00) (143/62 - 179/69)  BP(mean): --  RR: 22 (24 Apr 2023 08:00) (18 - 22)  SpO2: 98% (24 Apr 2023 08:00) (95% - 98%)    Parameters below as of 24 Apr 2023 08:00  Patient On (Oxygen Delivery Method): room air        PHYSICAL EXAMINATION:  GENERAL: NAD, well built  HEAD:  Atraumatic, Normocephalic  EYES:  conjunctiva and sclera clear  NECK: Supple, No JVD, Normal thyroid  CHEST/LUNG: Clear to auscultation. Clear to percussion bilaterally; No rales, rhonchi, wheezing, or rubs  HEART: Regular rate and rhythm; No murmurs, rubs, or gallops  ABDOMEN: Soft, Nontender, Nondistended; Bowel sounds present  NERVOUS SYSTEM:  Alert & Oriented X3,    EXTREMITIES:  2+ Peripheral Pulses, No clubbing, cyanosis, or edema  SKIN: warm dry                          12.1   7.71  )-----------( 165      ( 24 Apr 2023 07:16 )             38.0     04-24    143  |  113<H>  |  16  ----------------------------<  126<H>  3.7   |  23  |  1.01    Ca    9.3      24 Apr 2023 07:16  Phos  3.3     04-24  Mg     1.9     04-24    TPro  7.1  /  Alb  2.7<L>  /  TBili  0.8  /  DBili  x   /  AST  16  /  ALT  11  /  AlkPhos  68  04-24    LIVER FUNCTIONS - ( 24 Apr 2023 07:16 )  Alb: 2.7 g/dL / Pro: 7.1 g/dL / ALK PHOS: 68 U/L / ALT: 11 U/L DA / AST: 16 U/L / GGT: x               PT/INR - ( 23 Apr 2023 06:18 )   PT: 12.5 sec;   INR: 1.05 ratio                 I&O's Summary    23 Apr 2023 07:01  -  24 Apr 2023 07:00  --------------------------------------------------------  IN: 0 mL / OUT: 700 mL / NET: -700 mL        CAPILLARY BLOOD GLUCOSE        CAPILLARY BLOOD GLUCOSE          RADIOLOGY & ADDITIONAL TESTS:                   PGY-1 Progress Note discussed with attending    PAGER #: [[phone]] TILL 5:00 PM  PLEASE CONTACT ON CALL TEAM:   - On Call Team (Please refer to Angel) FROM 5:00 PM - 8:30PM  - Nightfloat Team FROM 8:30 -7:30 AM    INTERVAL HPI/OVERNIGHT EVENTS:   No acute overnight events. Pt states that he has back, shoulder and generalized pain this morning. He denies any other acute complaints.     REVIEW OF SYSTEMS:  CONSTITUTIONAL: No fever, weight loss, or fatigue  RESPIRATORY: No cough, wheezing, chills or hemoptysis; No shortness of breath  CARDIOVASCULAR: No chest pain, palpitations, dizziness, or leg swelling  GASTROINTESTINAL: No abdominal pain. No nausea, vomiting, or hematemesis; No diarrhea or constipation. No melena or hematochezia.  GENITOURINARY: No dysuria or hematuria, urinary frequency  NEUROLOGICAL: No headaches, memory loss, loss of strength, numbness, or tremors  MSK: + back pain  SKIN: No itching, burning, rashes, or lesions     MEDICATIONS  (STANDING):  acetaminophen     Tablet .. 1000 milliGRAM(s) Oral every 6 hours  amLODIPine   Tablet 5 milliGRAM(s) Oral daily  budesonide  80 MICROgram(s)/formoterol 4.5 MICROgram(s) Inhaler 2 Puff(s) Inhalation two times a day  fluticasone propionate 50 MICROgram(s)/spray Nasal Spray 1 Spray(s) Both Nostrils two times a day  folic acid 1 milliGRAM(s) Oral daily  gabapentin 100 milliGRAM(s) Oral three times a day  heparin   Injectable 5000 Unit(s) SubCutaneous every 12 hours  lidocaine   4% Patch 1 Patch Transdermal every 24 hours  losartan 50 milliGRAM(s) Oral daily  montelukast 10 milliGRAM(s) Oral at bedtime  multivitamin 1 Tablet(s) Oral daily  pantoprazole    Tablet 40 milliGRAM(s) Oral before breakfast  polyethylene glycol 3350 17 Gram(s) Oral daily  senna 2 Tablet(s) Oral at bedtime  thiamine 100 milliGRAM(s) Oral daily  tiotropium 2.5 MICROgram(s) Inhaler 2 Puff(s) Inhalation daily    MEDICATIONS  (PRN):  albuterol    90 MICROgram(s) HFA Inhaler 2 Puff(s) Inhalation every 6 hours PRN Shortness of Breath and/or Wheezing  ondansetron Injectable 4 milliGRAM(s) IV Push every 8 hours PRN Nausea and/or Vomiting  oxyCODONE    IR 5 milliGRAM(s) Oral every 4 hours PRN Severe Pain (7 - 10)      Vital Signs Last 24 Hrs  T(C): 36.9 (24 Apr 2023 08:00), Max: 37 (23 Apr 2023 11:16)  T(F): 98.4 (24 Apr 2023 08:00), Max: 98.6 (23 Apr 2023 11:16)  HR: 81 (24 Apr 2023 08:00) (64 - 86)  BP: 162/63 (24 Apr 2023 08:00) (143/62 - 179/69)  BP(mean): --  RR: 22 (24 Apr 2023 08:00) (18 - 22)  SpO2: 98% (24 Apr 2023 08:00) (95% - 98%)    Parameters below as of 24 Apr 2023 08:00  Patient On (Oxygen Delivery Method): room air        PHYSICAL EXAMINATION:  GENERAL: NAD, well built, elderly, appears older than stated age  HEAD:  Atraumatic, Normocephalic  EYES:  conjunctiva and sclera clear  NECK: Supple, No JVD  CHEST/LUNG: Clear to auscultation. No rales, rhonchi, wheezing, or rubs  HEART: Regular rate and rhythm  ABDOMEN: Soft, Nontender, Nondistended; Bowel sounds present, no pain or masses on palpation  NERVOUS SYSTEM:  Alert & Oriented X3, following commands  PSYCH: appropriate affect  : voiding well  EXTREMITIES:  No clubbing, cyanosis, or edema  MSK: tenderness to palp of lumbar spine  SKIN: warm dry                        12.1   7.71  )-----------( 165      ( 24 Apr 2023 07:16 )             38.0     04-24    143  |  113<H>  |  16  ----------------------------<  126<H>  3.7   |  23  |  1.01    Ca    9.3      24 Apr 2023 07:16  Phos  3.3     04-24  Mg     1.9     04-24    TPro  7.1  /  Alb  2.7<L>  /  TBili  0.8  /  DBili  x   /  AST  16  /  ALT  11  /  AlkPhos  68  04-24    LIVER FUNCTIONS - ( 24 Apr 2023 07:16 )  Alb: 2.7 g/dL / Pro: 7.1 g/dL / ALK PHOS: 68 U/L / ALT: 11 U/L DA / AST: 16 U/L / GGT: x               PT/INR - ( 23 Apr 2023 06:18 )   PT: 12.5 sec;   INR: 1.05 ratio               I&O's Summary    23 Apr 2023 07:01  -  24 Apr 2023 07:00  --------------------------------------------------------  IN: 0 mL / OUT: 700 mL / NET: -700 mL

## 2023-04-24 NOTE — PROGRESS NOTE ADULT - PROBLEM SELECTOR PLAN 1
p/w /78, asymptomatic on admission, not compliant with meds  h/o HTN on amlodipine 5mg and losartan 100mg  EKG showing NSR with LVH and QRS widening and QTc 539  No signs of end organ ischemia; Trop negative x1 and no CORTES  s/p labetalol 5mg IVP in the ED with minimal improvement   given STAT home meds and hydralazine 10mg IVP with continued improvement   **BP fell under goal of 25% reduction within first 24 hours, held AM BP meds on 4/22 and started gentle IVF hydration  DCed losartan (due to CORTES), c/w amlodipine  Monitor BP closely  4/23 - SBP up to 170s. Given that it has been >48 hours [facility] ept rpesented with HTN urgency, target BP can go down. Aim for SBP <150 p/w /78, asymptomatic on admission, not compliant with meds  h/o HTN on amlodipine 5mg and losartan 100mg  EKG showing NSR with LVH and QRS widening and QTc 539  No signs of end organ ischemia; Trop negative x1 and no CORTES  s/p labetalol 5mg IVP in the ED with minimal improvement   given STAT home meds and hydralazine 10mg IVP with continued improvement   **BP fell under goal of 25% reduction within first 24 hours, held AM BP meds on 4/22 and started gentle IVF hydration  c/w amlodipine 10mg  resume losartan 50mg qd  Monitor BP closely

## 2023-04-24 NOTE — PROGRESS NOTE ADULT - PROBLEM SELECTOR PLAN 6
c/o preceding dizziness prior to mechanical fall, denies LOC and head trauma  h/o falls, syncopal episodes, and unsteady gait, unclear if in the setting of ETOH, as per daughter   denies lightheadedness or any associated vision changes, HA, chest pain, palpitations   CTH: chronic microangiopathy, no acute changes   Orthostatic negative  encourage PO hydration  RESOLVED

## 2023-04-24 NOTE — PROGRESS NOTE ADULT - PROBLEM SELECTOR PLAN 1
Pt with acute lumbar back pain which is somatic and neuropathic in nature due to acute L1 compression fracture and degenerative disc disease. Pending Ortho recommendations.  Opioid pain recommendations   - Continue Oxycodone 5 mg PO q 4 hours PRN severe pain. Monitor for sedation/ respiratory depression.   Non-opioid pain recommendations   - Continue Acetaminophen 1 gram PO q 8 hours x 4 days. Monitor LFTs  - Continue Gabapentin 100mg po q 8 hours. Monitor renal function.   - Continue Lidoderm 4% patch daily.   Bowel Regimen  - Miralax 17G PO daily  - Senna 2 tablets at bedtime for constipation  Mild pain   - Non-pharmacological pain treatment recommendations  - Warm/ Cool packs PRN   - Repositioning, imagery, relaxation, distraction.  - Physical therapy OOB if no contraindications   Recommendations discussed with primary team and RN.

## 2023-04-24 NOTE — PROGRESS NOTE ADULT - ATTENDING COMMENTS
#Acute L1 compression fracture 2/2 fall in setting of dizziness  #Hypertensive urgency  #CORTES, resolved  #Hypokalemia, resolved  #HFpEF, not in acute exacerbation  #DM    67yM, Beninese speaking, with PMH of COPD, asthma, DM, HFpEF, seasonal allergies, and  HTN who presented  with acute onset back pain after fall at home. Admitted with L1 compression fracture, seen on CT thoracic and lumbar spine.    Patient seen at bedside. Reports pain in his abdomen and back.     -Continue amlodipine 10mg, resumed losartan 50mg as CORTES resolved  -Orthostatics negative; tele monitoring - dizziness associated with fall which has resolved, patient ambulating  -ACHS FS, ISS; on metformin BID at home  -Labs reviewed: CBC, BMP, Mg, LFTs, will repeat in AM  -Pain management following; on bowel regimen  -Seen by pulmonary, recommendations appreciated; resume breo and spiriva on dc  -Ortho consult pending  -PT rec: home therapy  -DVT ppx: heparin  -SW consult for home assistance  -Dispo: can likely be dc'd in 24-48h, once BP stabilizes and pending ortho recs

## 2023-04-25 LAB
ANION GAP SERPL CALC-SCNC: 8 MMOL/L — SIGNIFICANT CHANGE UP (ref 5–17)
BUN SERPL-MCNC: 16 MG/DL — SIGNIFICANT CHANGE UP (ref 7–18)
CALCIUM SERPL-MCNC: 9.3 MG/DL — SIGNIFICANT CHANGE UP (ref 8.4–10.5)
CHLORIDE SERPL-SCNC: 112 MMOL/L — HIGH (ref 96–108)
CO2 SERPL-SCNC: 21 MMOL/L — LOW (ref 22–31)
CREAT SERPL-MCNC: 0.98 MG/DL — SIGNIFICANT CHANGE UP (ref 0.5–1.3)
EGFR: 84 ML/MIN/1.73M2 — SIGNIFICANT CHANGE UP
GLUCOSE SERPL-MCNC: 118 MG/DL — HIGH (ref 70–99)
HCT VFR BLD CALC: 37.3 % — LOW (ref 39–50)
HGB BLD-MCNC: 11.6 G/DL — LOW (ref 13–17)
MAGNESIUM SERPL-MCNC: 2.1 MG/DL — SIGNIFICANT CHANGE UP (ref 1.6–2.6)
MCHC RBC-ENTMCNC: 30.1 PG — SIGNIFICANT CHANGE UP (ref 27–34)
MCHC RBC-ENTMCNC: 31.1 GM/DL — LOW (ref 32–36)
MCV RBC AUTO: 96.6 FL — SIGNIFICANT CHANGE UP (ref 80–100)
NRBC # BLD: 0 /100 WBCS — SIGNIFICANT CHANGE UP (ref 0–0)
PHOSPHATE SERPL-MCNC: 3.4 MG/DL — SIGNIFICANT CHANGE UP (ref 2.5–4.5)
PLATELET # BLD AUTO: 184 K/UL — SIGNIFICANT CHANGE UP (ref 150–400)
POTASSIUM SERPL-MCNC: 3.9 MMOL/L — SIGNIFICANT CHANGE UP (ref 3.5–5.3)
POTASSIUM SERPL-SCNC: 3.9 MMOL/L — SIGNIFICANT CHANGE UP (ref 3.5–5.3)
RAPID RVP RESULT: SIGNIFICANT CHANGE UP
RBC # BLD: 3.86 M/UL — LOW (ref 4.2–5.8)
RBC # FLD: 17.4 % — HIGH (ref 10.3–14.5)
SARS-COV-2 RNA SPEC QL NAA+PROBE: SIGNIFICANT CHANGE UP
SODIUM SERPL-SCNC: 141 MMOL/L — SIGNIFICANT CHANGE UP (ref 135–145)
WBC # BLD: 8.62 K/UL — SIGNIFICANT CHANGE UP (ref 3.8–10.5)
WBC # FLD AUTO: 8.62 K/UL — SIGNIFICANT CHANGE UP (ref 3.8–10.5)

## 2023-04-25 PROCEDURE — 99232 SBSQ HOSP IP/OBS MODERATE 35: CPT

## 2023-04-25 PROCEDURE — 99233 SBSQ HOSP IP/OBS HIGH 50: CPT | Mod: GC

## 2023-04-25 PROCEDURE — 72110 X-RAY EXAM L-2 SPINE 4/>VWS: CPT | Mod: 26

## 2023-04-25 PROCEDURE — 72148 MRI LUMBAR SPINE W/O DYE: CPT | Mod: 26

## 2023-04-25 RX ORDER — LOSARTAN POTASSIUM 100 MG/1
50 TABLET, FILM COATED ORAL ONCE
Refills: 0 | Status: COMPLETED | OUTPATIENT
Start: 2023-04-25 | End: 2023-04-25

## 2023-04-25 RX ADMIN — Medication 1000 MILLIGRAM(S): at 13:07

## 2023-04-25 RX ADMIN — BUDESONIDE AND FORMOTEROL FUMARATE DIHYDRATE 2 PUFF(S): 160; 4.5 AEROSOL RESPIRATORY (INHALATION) at 21:22

## 2023-04-25 RX ADMIN — Medication 1000 MILLIGRAM(S): at 06:02

## 2023-04-25 RX ADMIN — LIDOCAINE 1 PATCH: 4 CREAM TOPICAL at 20:00

## 2023-04-25 RX ADMIN — Medication 1000 MILLIGRAM(S): at 05:02

## 2023-04-25 RX ADMIN — GABAPENTIN 100 MILLIGRAM(S): 400 CAPSULE ORAL at 05:03

## 2023-04-25 RX ADMIN — POLYETHYLENE GLYCOL 3350 17 GRAM(S): 17 POWDER, FOR SOLUTION ORAL at 13:04

## 2023-04-25 RX ADMIN — LOSARTAN POTASSIUM 50 MILLIGRAM(S): 100 TABLET, FILM COATED ORAL at 16:09

## 2023-04-25 RX ADMIN — LIDOCAINE 1 PATCH: 4 CREAM TOPICAL at 13:10

## 2023-04-25 RX ADMIN — LOSARTAN POTASSIUM 50 MILLIGRAM(S): 100 TABLET, FILM COATED ORAL at 05:02

## 2023-04-25 RX ADMIN — ALBUTEROL 2 PUFF(S): 90 AEROSOL, METERED ORAL at 05:03

## 2023-04-25 RX ADMIN — Medication 1000 MILLIGRAM(S): at 21:21

## 2023-04-25 RX ADMIN — TIOTROPIUM BROMIDE 2 PUFF(S): 18 CAPSULE ORAL; RESPIRATORY (INHALATION) at 13:05

## 2023-04-25 RX ADMIN — Medication 1 MILLIGRAM(S): at 13:06

## 2023-04-25 RX ADMIN — PANTOPRAZOLE SODIUM 40 MILLIGRAM(S): 20 TABLET, DELAYED RELEASE ORAL at 05:02

## 2023-04-25 RX ADMIN — HEPARIN SODIUM 5000 UNIT(S): 5000 INJECTION INTRAVENOUS; SUBCUTANEOUS at 18:04

## 2023-04-25 RX ADMIN — Medication 1000 MILLIGRAM(S): at 13:37

## 2023-04-25 RX ADMIN — Medication 100 MILLIGRAM(S): at 13:07

## 2023-04-25 RX ADMIN — GABAPENTIN 100 MILLIGRAM(S): 400 CAPSULE ORAL at 21:21

## 2023-04-25 RX ADMIN — Medication 1 SPRAY(S): at 05:03

## 2023-04-25 RX ADMIN — BUDESONIDE AND FORMOTEROL FUMARATE DIHYDRATE 2 PUFF(S): 160; 4.5 AEROSOL RESPIRATORY (INHALATION) at 13:04

## 2023-04-25 RX ADMIN — Medication 1 SPRAY(S): at 18:04

## 2023-04-25 RX ADMIN — MONTELUKAST 10 MILLIGRAM(S): 4 TABLET, CHEWABLE ORAL at 21:21

## 2023-04-25 RX ADMIN — Medication 1 TABLET(S): at 13:07

## 2023-04-25 RX ADMIN — GABAPENTIN 100 MILLIGRAM(S): 400 CAPSULE ORAL at 13:10

## 2023-04-25 RX ADMIN — Medication 1000 MILLIGRAM(S): at 22:21

## 2023-04-25 RX ADMIN — AMLODIPINE BESYLATE 10 MILLIGRAM(S): 2.5 TABLET ORAL at 05:03

## 2023-04-25 RX ADMIN — HEPARIN SODIUM 5000 UNIT(S): 5000 INJECTION INTRAVENOUS; SUBCUTANEOUS at 05:03

## 2023-04-25 NOTE — PROGRESS NOTE ADULT - PROBLEM SELECTOR PLAN 8
h/o liver cirrhosis and portal HTN confirmed on CT 2019 with EGD 2019 showing esophageal varices, non-bleeding   likely in the setting of chronic ETOH use   currently compensated and not encephalopathic   previously on Nadolol 20mg daily in 2019 but has not been taking since then confirmed by pharmacy   will hold off resuming for now   patient would benefit o/p hepatology follow up h/o HLD not on statin therapy   DASH DIET

## 2023-04-25 NOTE — PROGRESS NOTE ADULT - SUBJECTIVE AND OBJECTIVE BOX
Source of information: GREGORY ELIZABETH, Chart review  Patient language: Montenegrin  : # [id number]    HPI:    Patient is a 67y old  Male with PMH HLD HTN asthma, found to have acute L1 compression fracture. MRI lumbar spine demonstrates-Moderate acute compression deformity inferior greater than superior endplates of L1. No significant bony retropulsion/spinal canal narrowing. Small right paracentral disc protrusion L3-4 with mass effect and displacement of the right L4 nerve root.     Pain consulted for back pain. Pt seen and examined at bedside. Pt sitting at edge of bed, reports lumbar back pain score 8/10 and tolerable SCALE USED: (1-10 VNRS). Pt describes pain as aching, non- radiating, alleviated by pain medication, exacerbated by movement and palpation. Pt tolerating PO diet. Reports SOB on exertion. Denies lethargy, chest pain, nausea, vomiting. Reports constipation, last BM 4/22. Also reports b/l leg cramps. Denies numbness/ tingling. Patient stated goal for pain control: to be able to take deep breaths, get out of bed to chair and ambulate with tolerable pain control. Pt reports taking ibuprofen for pain at home.     PAST MEDICAL & SURGICAL HISTORY:  HTN (hypertension)      Varicose veins      HLD (hyperlipidemia)      Asthma      No significant past surgical history          FAMILY HISTORY:      Social History:  Lives at home with domestic partner (21 Apr 2023 10:32)   [X ] Denies ETOH use, illicit drug use and smoking    Allergies    No Known Allergies    MEDICATIONS  (STANDING):  acetaminophen     Tablet .. 1000 milliGRAM(s) Oral every 8 hours  amLODIPine   Tablet 10 milliGRAM(s) Oral daily  budesonide  80 MICROgram(s)/formoterol 4.5 MICROgram(s) Inhaler 2 Puff(s) Inhalation two times a day  fluticasone propionate 50 MICROgram(s)/spray Nasal Spray 1 Spray(s) Both Nostrils two times a day  folic acid 1 milliGRAM(s) Oral daily  gabapentin 100 milliGRAM(s) Oral three times a day  heparin   Injectable 5000 Unit(s) SubCutaneous every 12 hours  lidocaine   4% Patch 1 Patch Transdermal every 24 hours  losartan 50 milliGRAM(s) Oral daily  montelukast 10 milliGRAM(s) Oral at bedtime  multivitamin 1 Tablet(s) Oral daily  pantoprazole    Tablet 40 milliGRAM(s) Oral before breakfast  polyethylene glycol 3350 17 Gram(s) Oral daily  senna 2 Tablet(s) Oral at bedtime  thiamine 100 milliGRAM(s) Oral daily  tiotropium 2.5 MICROgram(s) Inhaler 2 Puff(s) Inhalation daily    MEDICATIONS  (PRN):  albuterol    90 MICROgram(s) HFA Inhaler 2 Puff(s) Inhalation every 6 hours PRN Shortness of Breath and/or Wheezing  ondansetron Injectable 4 milliGRAM(s) IV Push every 8 hours PRN Nausea and/or Vomiting  oxyCODONE    IR 5 milliGRAM(s) Oral every 4 hours PRN Severe Pain (7 - 10)      Vital Signs Last 24 Hrs  T(C): 36.8 (25 Apr 2023 12:20), Max: 37.2 (25 Apr 2023 08:07)  T(F): 98.2 (25 Apr 2023 12:20), Max: 99 (25 Apr 2023 08:07)  HR: 76 (25 Apr 2023 12:20) (66 - 90)  BP: 150/72 (25 Apr 2023 12:20) (150/64 - 180/73)  BP(mean): --  RR: 19 (25 Apr 2023 12:20) (18 - 20)  SpO2: 100% (25 Apr 2023 12:20) (95% - 100%)    Parameters below as of 25 Apr 2023 12:20  Patient On (Oxygen Delivery Method): room air        LABS: Reviewed                          11.6   8.62  )-----------( 184      ( 25 Apr 2023 06:17 )             37.3     04-25    141  |  112<H>  |  16  ----------------------------<  118<H>  3.9   |  21<L>  |  0.98    Ca    9.3      25 Apr 2023 06:17  Phos  3.4     04-25  Mg     2.1     04-25    TPro  7.1  /  Alb  2.7<L>  /  TBili  0.8  /  DBili  x   /  AST  16  /  ALT  11  /  AlkPhos  68  04-24      LIVER FUNCTIONS - ( 24 Apr 2023 07:16 )  Alb: 2.7 g/dL / Pro: 7.1 g/dL / ALK PHOS: 68 U/L / ALT: 11 U/L DA / AST: 16 U/L / GGT: x       Radiology: Reviewed.   < from: CT Lumbar Spine No Cont (04.21.23 @ 05:24) >  ACC: [id number] EXAM:  CT LUMBAR SPINE   ORDERED BY: SHARONA SHAY     ACC: [id number] EXAM:  CT THORACIC SPINE   ORDERED BY: SHARONA SHAY     PROCEDURE DATE:  04/21/2023          INTERPRETATION:  CT THORACIC SPINE, CT LUMBAR SPINE:    CLINICAL INDICATION: Fall    TECHNIQUE: CT of the thoracic and lumbar spine was performed without the   administration of intravenous contrast, according to standard protocol.    COMPARISON: CT abdomen 7/16/2018    FINDINGS:    ALIGNMENT: Thoracic kyphosis is preserved. Lumbar lordosis is preserved.    VERTEBRAE: The thoracic vertebral bodies are normal in height. Within the   lumbar spine, there is anterior compression deformity at L1 producing   approximately 25% loss of vertebral body height.    DISCS: The disc spaces are maintained.    EVALUATION OF INDIVIDUAL LEVELS DEMONSTRATES: No spinal canal or   neuroforaminal stenosis throughout the thoracic spine.    Within the lumbar spine:    T12-L1: Unremarkable    L1-L2: Unremarkable.    L2-L3: Mild disc bulge producing mild central canal stenosis without   significant neuroforaminal narrowing.    L3-L4: Broad disc bulge producing moderate to severe central canal   stenosis and associated mild to moderate bilateral neuroforaminal   narrowing.    L4-L5: Broad disc bulge producing moderate to severe central canal   stenosis and associated mild to moderate bilateral neuroforaminal   narrowing.    L5-S1: Mild disc bulge producing mild central canal stenosis without   significant neuroforaminal narrowing.    PARAVERTEBRAL SOFT TISSUES: The visualized paravertebral soft tissues   appear within normal limits.    Scarring at the left lung apex.    IMPRESSION:    Acute compression fracture at L1 with approximately 25% loss of vertebral   body height. No retropulsion into the ventral thecal sac. No traumatic   malalignment.    Chronic degenerative disc disease within the lumbar spine, see above for   details.    --- End of Report ---        < end of copied text >      ORT Score -   Family Hx of substance abuse	Female	      Male  Alcohol 	                                           1                     3  Illegal drugs	                                   2                     3  Rx drugs                                           4 	                  4  Personal Hx of substance abuse		  Alcohol 	                                          3	                  3  Illegal drugs                                     4	                  4  Rx drugs                                            5 	                  5  Age between 16- 45 years	           1                     1  hx preadolescent sexual abuse	   3 	                  0  Psychological disease		  ADD, OCD, bipolar, schizophrenia   2	          2  Depression                                           1 	          1  Total: 0    a score of 3 or lower indicates low risk for opioid abuse		  a score of 4-7 indicates moderate risk for opioid abuse		  a score of 8 or higher indicates high risk for opioid abuse    REVIEW OF SYSTEMS:  CONSTITUTIONAL: No fever + fatigue  HEENT:  No difficulty hearing, no change in vision  NECK: No pain or stiffness  RESPIRATORY: No cough, wheezing, chills or hemoptysis; + shortness of breath on exertion   CARDIOVASCULAR: No chest pain, palpitations, dizziness, or leg swelling   GASTROINTESTINAL: No loss of appetite, decreased PO intake. No abdominal or epigastric pain. No nausea, No vomiting; No diarrhea or constipation.   GENITOURINARY: No dysuria, frequency, hematuria, retention or incontinence  MUSCULOSKELETAL: + lumbar back pain no swelling; + b/l leg cramps, no upper motor strength weakness, + lower motor strength weakness, no saddle anesthesia, bowel/bladder incontinence, no falls   NEURO: No headaches, No numbness/tingling b/l LE    PHYSICAL EXAM:  GENERAL:  + fatigued & Oriented X3, cooperative, NAD, Good concentration. Speech is muffled.   RESPIRATORY: Respirations even and unlabored. Diminished to auscultation bilaterally; No rales, rhonchi, wheezing, or rubs  CARDIOVASCULAR: Normal S1/S2, regular rate and rhythm; No murmurs, rubs, or gallops. No JVD. + cardiac monitor in place  GASTROINTESTINAL:  Soft, Nontender, + distended; Bowel sounds present; + abdominal binder in place.   PERIPHERAL VASCULAR:  Extremities warm without edema. 2+ Peripheral Pulses, No cyanosis, No calf tenderness  MUSCULOSKELETAL: Motor Strength 5/5 B/L upper and 4/5 lower extremities; + decreased ROM left LE; + left SLR at 45 degrees; + lumbar back tenderness on palpation   SKIN: Warm, dry, intact. No rashes, lesions, scars or wounds.     Risk factors associated with adverse outcomes related to opioid treatment  [ ]  Concurrent benzodiazepine use  [ ]  History/ Active substance use or alcohol use disorder  [ ] Psychiatric co-morbidity  [ ] Sleep apnea  [X ] COPD  [ ] BMI> 35  [ ] Liver dysfunction  [ ] Renal dysfunction  [ ] CHF  [X ] Smoker  [X ]  Age > 60 years    [ X]  [place]  Reviewed and Copied to Chart. See below.    Plan of care and goal oriented pain management treatment options were discussed with patient and /or primary care giver; all questions and concerns were addressed and care was aligned with patient's wishes.    Educated patient on goal oriented pain management treatment options     04-25-23 @ 13:00         Source of information: GREGORY ELIZABETH, Chart review  Patient language: Spanish  : # [id number]    HPI:    Patient is a 67y old  Male with PMH HLD HTN asthma, found to have acute L1 compression fracture. MRI lumbar spine demonstrates-Moderate acute compression deformity inferior greater than superior endplates of L1. No significant bony retropulsion/spinal canal narrowing. Small right paracentral disc protrusion L3-4 with mass effect and displacement of the right L4 nerve root.     Pain consulted for back pain. Pt seen and examined at bedside. Pt sitting at edge of bed, reports lumbar back pain score 8/10 and tolerable SCALE USED: (1-10 VNRS). Pt describes pain as aching, non- radiating, alleviated by pain medication, exacerbated by movement and palpation. Pt tolerating PO diet. Reports SOB on exertion. Denies lethargy, chest pain, nausea, vomiting. Reports constipation, last BM 4/22. Also reports b/l leg cramps. Denies numbness/ tingling. Patient stated goal for pain control: to be able to take deep breaths, get out of bed to chair and ambulate with tolerable pain control. Pt ambulated with PT. Pt reports taking ibuprofen for pain at home.     PAST MEDICAL & SURGICAL HISTORY:  HTN (hypertension)      Varicose veins      HLD (hyperlipidemia)      Asthma      No significant past surgical history          FAMILY HISTORY:      Social History:  Lives at home with domestic partner (21 Apr 2023 10:32)   [X ] Denies ETOH use, illicit drug use and smoking    Allergies    No Known Allergies    MEDICATIONS  (STANDING):  acetaminophen     Tablet .. 1000 milliGRAM(s) Oral every 8 hours  amLODIPine   Tablet 10 milliGRAM(s) Oral daily  budesonide  80 MICROgram(s)/formoterol 4.5 MICROgram(s) Inhaler 2 Puff(s) Inhalation two times a day  fluticasone propionate 50 MICROgram(s)/spray Nasal Spray 1 Spray(s) Both Nostrils two times a day  folic acid 1 milliGRAM(s) Oral daily  gabapentin 100 milliGRAM(s) Oral three times a day  heparin   Injectable 5000 Unit(s) SubCutaneous every 12 hours  lidocaine   4% Patch 1 Patch Transdermal every 24 hours  losartan 50 milliGRAM(s) Oral daily  montelukast 10 milliGRAM(s) Oral at bedtime  multivitamin 1 Tablet(s) Oral daily  pantoprazole    Tablet 40 milliGRAM(s) Oral before breakfast  polyethylene glycol 3350 17 Gram(s) Oral daily  senna 2 Tablet(s) Oral at bedtime  thiamine 100 milliGRAM(s) Oral daily  tiotropium 2.5 MICROgram(s) Inhaler 2 Puff(s) Inhalation daily    MEDICATIONS  (PRN):  albuterol    90 MICROgram(s) HFA Inhaler 2 Puff(s) Inhalation every 6 hours PRN Shortness of Breath and/or Wheezing  ondansetron Injectable 4 milliGRAM(s) IV Push every 8 hours PRN Nausea and/or Vomiting  oxyCODONE    IR 5 milliGRAM(s) Oral every 4 hours PRN Severe Pain (7 - 10)      Vital Signs Last 24 Hrs  T(C): 36.8 (25 Apr 2023 12:20), Max: 37.2 (25 Apr 2023 08:07)  T(F): 98.2 (25 Apr 2023 12:20), Max: 99 (25 Apr 2023 08:07)  HR: 76 (25 Apr 2023 12:20) (66 - 90)  BP: 150/72 (25 Apr 2023 12:20) (150/64 - 180/73)  BP(mean): --  RR: 19 (25 Apr 2023 12:20) (18 - 20)  SpO2: 100% (25 Apr 2023 12:20) (95% - 100%)    Parameters below as of 25 Apr 2023 12:20  Patient On (Oxygen Delivery Method): room air        LABS: Reviewed                          11.6   8.62  )-----------( 184      ( 25 Apr 2023 06:17 )             37.3     04-25    141  |  112<H>  |  16  ----------------------------<  118<H>  3.9   |  21<L>  |  0.98    Ca    9.3      25 Apr 2023 06:17  Phos  3.4     04-25  Mg     2.1     04-25    TPro  7.1  /  Alb  2.7<L>  /  TBili  0.8  /  DBili  x   /  AST  16  /  ALT  11  /  AlkPhos  68  04-24      LIVER FUNCTIONS - ( 24 Apr 2023 07:16 )  Alb: 2.7 g/dL / Pro: 7.1 g/dL / ALK PHOS: 68 U/L / ALT: 11 U/L DA / AST: 16 U/L / GGT: x       Radiology: Reviewed.   < from: CT Lumbar Spine No Cont (04.21.23 @ 05:24) >  ACC: [id number] EXAM:  CT LUMBAR SPINE   ORDERED BY: SHARONA SHAY     ACC: [id number] EXAM:  CT THORACIC SPINE   ORDERED BY: SHARONA SHAY     PROCEDURE DATE:  04/21/2023          INTERPRETATION:  CT THORACIC SPINE, CT LUMBAR SPINE:    CLINICAL INDICATION: Fall    TECHNIQUE: CT of the thoracic and lumbar spine was performed without the   administration of intravenous contrast, according to standard protocol.    COMPARISON: CT abdomen 7/16/2018    FINDINGS:    ALIGNMENT: Thoracic kyphosis is preserved. Lumbar lordosis is preserved.    VERTEBRAE: The thoracic vertebral bodies are normal in height. Within the   lumbar spine, there is anterior compression deformity at L1 producing   approximately 25% loss of vertebral body height.    DISCS: The disc spaces are maintained.    EVALUATION OF INDIVIDUAL LEVELS DEMONSTRATES: No spinal canal or   neuroforaminal stenosis throughout the thoracic spine.    Within the lumbar spine:    T12-L1: Unremarkable    L1-L2: Unremarkable.    L2-L3: Mild disc bulge producing mild central canal stenosis without   significant neuroforaminal narrowing.    L3-L4: Broad disc bulge producing moderate to severe central canal   stenosis and associated mild to moderate bilateral neuroforaminal   narrowing.    L4-L5: Broad disc bulge producing moderate to severe central canal   stenosis and associated mild to moderate bilateral neuroforaminal   narrowing.    L5-S1: Mild disc bulge producing mild central canal stenosis without   significant neuroforaminal narrowing.    PARAVERTEBRAL SOFT TISSUES: The visualized paravertebral soft tissues   appear within normal limits.    Scarring at the left lung apex.    IMPRESSION:    Acute compression fracture at L1 with approximately 25% loss of vertebral   body height. No retropulsion into the ventral thecal sac. No traumatic   malalignment.    Chronic degenerative disc disease within the lumbar spine, see above for   details.    --- End of Report ---        < end of copied text >      ORT Score -   Family Hx of substance abuse	Female	      Male  Alcohol 	                                           1                     3  Illegal drugs	                                   2                     3  Rx drugs                                           4 	                  4  Personal Hx of substance abuse		  Alcohol 	                                          3	                  3  Illegal drugs                                     4	                  4  Rx drugs                                            5 	                  5  Age between 16- 45 years	           1                     1  hx preadolescent sexual abuse	   3 	                  0  Psychological disease		  ADD, OCD, bipolar, schizophrenia   2	          2  Depression                                           1 	          1  Total: 0    a score of 3 or lower indicates low risk for opioid abuse		  a score of 4-7 indicates moderate risk for opioid abuse		  a score of 8 or higher indicates high risk for opioid abuse    REVIEW OF SYSTEMS:  CONSTITUTIONAL: No fever + fatigue  HEENT:  No difficulty hearing, no change in vision  NECK: No pain or stiffness  RESPIRATORY: No cough, wheezing, chills or hemoptysis; + shortness of breath on exertion   CARDIOVASCULAR: No chest pain, palpitations, dizziness, or leg swelling   GASTROINTESTINAL: No loss of appetite, decreased PO intake. No abdominal or epigastric pain. No nausea, No vomiting; No diarrhea or constipation.   GENITOURINARY: No dysuria, frequency, hematuria, retention or incontinence  MUSCULOSKELETAL: + lumbar back pain no swelling; + b/l leg cramps, no upper motor strength weakness, + lower motor strength weakness, no saddle anesthesia, bowel/bladder incontinence, no falls   NEURO: No headaches, No numbness/tingling b/l LE    PHYSICAL EXAM:  GENERAL:  + fatigued & Oriented X3, cooperative, NAD, Good concentration. Speech is muffled.   RESPIRATORY: Respirations even and unlabored. Diminished to auscultation bilaterally; No rales, rhonchi, wheezing, or rubs  CARDIOVASCULAR: Normal S1/S2, regular rate and rhythm; No murmurs, rubs, or gallops. No JVD. + cardiac monitor in place  GASTROINTESTINAL:  Soft, Nontender, + distended; Bowel sounds present; + abdominal binder in place.   PERIPHERAL VASCULAR:  Extremities warm without edema. 2+ Peripheral Pulses, No cyanosis, No calf tenderness  MUSCULOSKELETAL: Motor Strength 5/5 B/L upper and 4/5 lower extremities; + decreased ROM left LE; + left SLR at 45 degrees; + lumbar back tenderness on palpation   SKIN: Warm, dry, intact. No rashes, lesions, scars or wounds.     Risk factors associated with adverse outcomes related to opioid treatment  [ ]  Concurrent benzodiazepine use  [ ]  History/ Active substance use or alcohol use disorder  [ ] Psychiatric co-morbidity  [ ] Sleep apnea  [X ] COPD  [ ] BMI> 35  [ ] Liver dysfunction  [ ] Renal dysfunction  [ ] CHF  [X ] Smoker  [X ]  Age > 60 years    [ X]  [place]  Reviewed and Copied to Chart. See below.    Plan of care and goal oriented pain management treatment options were discussed with patient and /or primary care giver; all questions and concerns were addressed and care was aligned with patient's wishes.    Educated patient on goal oriented pain management treatment options     04-25-23 @ 13:00

## 2023-04-25 NOTE — PROGRESS NOTE ADULT - PROBLEM SELECTOR PROBLEM 4
shortness of breath (HFpEF) heart failure with preserved ejection fraction COPD (chronic obstructive pulmonary disease)

## 2023-04-25 NOTE — PROGRESS NOTE ADULT - PROBLEM SELECTOR PLAN 5
h/o COPD not on home o2 and current smoker takes Breo Ellipta and Ventolin PRN confirmed by pharmacy   unclear if complaint with inhalers, reports always being SOB with chronic cough   not currently in exacerbation  O2 support as needed, maintain O2 sat 88-92%, avoid over oxygenation  CXR shows increasing left lateral pleural thickening, no evidence of consolidation   s/p duoneb x1 with good response   duoneb, symbicort, spiriva, albuterol prn  Pulmonary consulted: Dr. Navarrete  - CT chest non-contrast as outpatient although denies daily smoking, daughter confirms drinks 1-3 glasses of vodka daily   h/o chronic ETOH use with liver cirrhosis and portal HTN, compensated   will hold off on CIWA protocol for now, no signs of withdrawal  c/w thiamine, multivitamin, and folic acid  SBIRT consulted

## 2023-04-25 NOTE — PROGRESS NOTE ADULT - PROBLEM SELECTOR PLAN 7
although denies daily smoking, daughter confirms drinks 1-3 glasses of vodka daily   h/o chronic ETOH use with liver cirrhosis and portal HTN, compensated   currently not in withdrawal, unclear when last drink was  CIWA 2, vomited x1, no nausea in EDHL and AOx2-3 however unclear if at baseline as daughter reports he is forgetful.  will hold off on CIWA protocol for now   will give thiamine, multivitamin, and folic acid for now   SBIRT consulted although denies daily smoking, daughter confirms smokes 1/2 pack daily   admits to smoking occasionally with long- standing history   educated on smoking cessation   declined nicotine patch

## 2023-04-25 NOTE — PROGRESS NOTE ADULT - PROBLEM SELECTOR PLAN 1
p/w /78, asymptomatic on admission, not compliant with meds  h/o HTN on amlodipine 5mg and losartan 100mg  EKG showing NSR with LVH and QRS widening and QTc 539  No signs of end organ ischemia; Trop negative x1 and no CORTES  s/p labetalol 5mg IVP in the ED with minimal improvement   given STAT home meds and hydralazine 10mg IVP with continued improvement   **BP fell under goal of 25% reduction within first 24 hours, held AM BP meds on 4/22 and started gentle IVF hydration  c/w amlodipine 10mg  resume losartan 50mg qd  Monitor BP closely p/w /78, asymptomatic on admission, not compliant with meds  h/o HTN on amlodipine 5mg and losartan 100mg  EKG showing NSR with LVH and QRS widening and QTc 539  No signs of end organ ischemia; Trop negative x1 and no CORTES  c/w amlodipine 10mg  increase losartan to 100mg daily  BP still elevated  Monitor BP closely

## 2023-04-25 NOTE — PROGRESS NOTE ADULT - PROBLEM SELECTOR PLAN 6
c/o preceding dizziness prior to mechanical fall, denies LOC and head trauma  h/o falls, syncopal episodes, and unsteady gait, unclear if in the setting of ETOH, as per daughter   denies lightheadedness or any associated vision changes, HA, chest pain, palpitations   CTH: chronic microangiopathy, no acute changes   Orthostatic negative  encourage PO hydration  RESOLVED h/o liver cirrhosis and portal HTN confirmed on CT 2019 with EGD 2019 showing esophageal varices, non-bleeding   likely in the setting of chronic ETOH use   currently compensated and not encephalopathic   previously on Nadolol 20mg daily in 2019 but has not been taking since then confirmed by pharmacy   will hold off resuming for now   patient would benefit o/p hepatology follow up

## 2023-04-25 NOTE — PROGRESS NOTE ADULT - PROBLEM SELECTOR PLAN 3
Pt w/ SCr 1.36, Cr 0.85 on admission  dced toradol and losartan for now  s/p gentle IVF hydration  Follow BMP daily  Avoid nephrotoxic agents  Encourage appropriate PO fluid intake  Resolved* TTE (01/2019): 50-55% EF with mild MR, mild AS, G2DD, and mild pulmonary HTN   EKG showing NSR with LVH and QRS widening and QTc 539  admits to medication non-compliance  Echo showed EF 50-55%, grade 2 DD, RVP mod increased at 51mm Hg

## 2023-04-25 NOTE — PROGRESS NOTE ADULT - PROBLEM SELECTOR PLAN 4
TTE (01/2019): 50-55% EF with mild MR, mild AS, G2DD, and mild pulmonary HTN   EKG showing NSR with LVH and QRS widening and QTc 539  admits to medication non-compliance   admit to tele   Echo showed EF 50-55%, grade 2 DD, RVP mod increased at 51mm Hg h/o COPD not on home o2 and current smoker takes Breo Ellipta and Ventolin PRN confirmed by pharmacy   unclear if complaint with inhalers, reports always being SOB with chronic cough   not currently in exacerbation  O2 support as needed, maintain O2 sat 88-92%, avoid over oxygenation  CXR shows increasing left lateral pleural thickening, no evidence of consolidation   s/p duoneb x1 with good response   duoneb, symbicort, spiriva, albuterol prn  Pulmonary consulted: Dr. Navarrete  - CT chest non-contrast as outpatient

## 2023-04-25 NOTE — PROGRESS NOTE ADULT - PROBLEM SELECTOR PLAN 2
presenting after traumatic fall which was preceded by dizziness, no LOC or head trauma   h/o falls and syncopal episodes with injury in the setting of unsteady gait, as per daughter   CT thoracic/ Lumbar: acute lumbar compression fracture of L1 vertebrae with 25% loss in vertebral height   s/p Morphine 4mg x1 in the ED with good response   limit mobility for now with bedrest  Ortho consulted, pending MRI Lumbar  Pain management consulted:   - Oxycodone 5 mg PO q 4 hours PRN severe pain. Monitor for sedation/ respiratory depression.   - Acetaminophen 1 gram PO q 8 hours x 3 days. Monitor LFTs  - Gabapentin 100mg po q 8 hours. Monitor renal function.   - c/w Lidoderm 4% patch daily  - start bowel regiment   - DCed Toradol due to CORTES  PT consulted: recommends home PT (family would prefer [facility]) presenting after traumatic fall which was preceded by dizziness, no LOC or head trauma   h/o falls and syncopal episodes with injury in the setting of unsteady gait, as per daughter   CT thoracic/ Lumbar: acute lumbar compression fracture of L1 vertebrae with 25% loss in vertebral height   Pain management consulted:   - Oxycodone 5 mg PO q 4 hours PRN severe pain. Monitor for sedation/ respiratory depression.   - Acetaminophen 1 gram PO q 8 hours x 3 days. Monitor LFTs  - Gabapentin 100mg po q 8 hours. Monitor renal function.   - c/w Lidoderm 4% patch daily  - c/w bowel regiment   MRI shows Moderate acute compression deformity inferior greater than superior endplates of L1. No significant bony retropulsion/spinal canal narrowing. Small right paracentral disc protrusion L3-4 with mass effect and   displacement of the right L4 nerve root  f/u X-Ray L-Spine Flexion/Extension  PT consulted: recommends home PT (family would prefer [facility]), pending re-eval  Ortho following, f/u reccs

## 2023-04-25 NOTE — PROGRESS NOTE ADULT - ATTENDING COMMENTS
Patient seen/evaluated at bedside on 4/25/23. I agree with the resident progress note/outlined plan of care. My independent findings and conclusions are documented.    Chart received to my service today. Documents reviewed    [place] : Eloy ID# [id number]  S: reports back pain. + cough productive of green sputum. Current diet is pureed food and he is wondering if this can be switched.  + difficulty ambulating    AAOx3, edentulous  3/5 LLE, 4+ RLE    MRI spine: Moderate acute compression deformity inferior greater than superior   endplates of L1. No significant bony retropulsion/spinal canal narrowing.  Small right paracentral disc protrusion L3-4 with mass effect and   displacement of the right L4 nerve root    1.Acute L1 compression fracture 2/2 fall in setting of dizziness  #Hypertensive urgency- resolved  #CORTES, resolved  #Hypokalemia, resolved  #HFpEF, not in acute exacerbation  #DM    67yM, [place] speaking, with PMH of COPD, asthma, DM, HFpEF, seasonal allergies, and  HTN who presented  with acute onset back pain after fall at home. Admitted with L1 compression fracture, seen on CT thoracic and lumbar spine.    Patient seen at bedside. Reports pain in his abdomen and back.     -Continue amlodipine 10mg, resumed losartan 50mg as CORTES resolved  -Orthostatics negative; tele monitoring - dizziness associated with fall which has resolved, patient ambulating  -ACHS FS, ISS; on metformin BID at home  -Labs reviewed: CBC, BMP, Mg, LFTs, will repeat in AM  -Pain management following; on bowel regimen  -Seen by pulmonary, recommendations appreciated; resume breo and spiriva on dc  -Ortho consult pending  -PT rec: home therapy  -DVT ppx: heparin  -SW consult for home assistance  -Dispo: can likely be dc'd in 24-48h, once BP stabilizes and pending ortho recs. Patient seen/evaluated at bedside on 4/25/23. I agree with the resident progress note/outlined plan of care. My independent findings and conclusions are documented.    Chart received to my service today. Documents reviewed    [place] : Eloy ID# [id number]  S: reports back pain. increasing + cough now productive of green sputum. Current diet is pureed food and he is wondering if this can be switched.  + difficulty ambulating    AAOx3, edentulous  3/5 LLE, 4+ RLE    MRI spine: Moderate acute compression deformity inferior greater than superior   endplates of L1. No significant bony retropulsion/spinal canal narrowing.  Small right paracentral disc protrusion L3-4 with mass effect and   displacement of the right L4 nerve root    1.Acute L1 compression fracture   2. back pain  3. ambulatory dysfunction  4. bronchitis  5. dizziness  6.Hypertensive urgency- resolved  7AKI, resolved  8Hypokalemia, resolved  9HFpEF, not in acute exacerbation  10DM    67yM, [place] speaking, with PMH of COPD, asthma, DM, HFpEF, seasonal allergies, and  HTN who presented  with acute onset back pain after fall at home. Admitted with L1 compression fracture, seen on CT thoracic and lumbar spine.      MRI results reviewed- known L1 compression fracture and L3-L4 disc protrusion  -pending flexion/extension xrays of the lumbar spine  -follow up formal orthopedics spine consult, fall precautions  -repeat cxr, check full RVP, if cough progresses and RVP is negative, could add azithromycin for bronchitis  -improved control with amlodipine 10mg, resumed losartan 50mg as CORTES resolved  -Orthostatics negative; tele monitoring  -ACHS FS, ISS; on metformin BID at home  -Pain management following; on bowel regimen  -Seen by pulmonary, recommendations appreciated; resume breo and spiriva on dc  -PT rec: home therapy- PT reassessment, appears to be requiring additional assistance to ambulate  -DVT ppx: heparin  -Dispo: can likely be dc'd in 24-48h, once BP stabilizes and pending ortho recs. Patient seen/evaluated at bedside on 4/25/23. I agree with the resident progress note/outlined plan of care. My independent findings and conclusions are documented.    Chart received to my service today. Documents reviewed    [place] : Eloy ID# [id number]  S: reports back pain. increasing + cough now productive of green sputum. Current diet is pureed food and he is wondering if this can be switched.  + difficulty ambulating    AAOx3, edentulous  3/5 LLE, 4+ RLE    MRI spine: Moderate acute compression deformity inferior greater than superior   endplates of L1. No significant bony retropulsion/spinal canal narrowing.  Small right paracentral disc protrusion L3-4 with mass effect and   displacement of the right L4 nerve root    1.Acute L1 compression fracture   2. back pain  3. ambulatory dysfunction  4. bronchitis  5. constipation  6. dizziness  7.Hypertensive urgency- resolved  8.CORTES, resolved  9 Hypokalemia, resolved  10HFpEF, not in acute exacerbation  11DM    67yM, Ethiopian speaking, with PMH of COPD, asthma, DM, HFpEF, seasonal allergies, and  HTN who presented  with acute onset back pain after fall at home. Admitted with L1 compression fracture, seen on CT thoracic and lumbar spine.      MRI results reviewed- known L1 compression fracture and L3-L4 disc protrusion  -pending flexion/extension xrays of the lumbar spine  -follow up formal orthopedics spine consult, fall precautions  -repeat cxr, check full RVP, if cough progresses and RVP is negative, could add azithromycin for bronchitis  -improved control with amlodipine 10mg, resumed losartan 50mg as CORTES resolved  -Orthostatics negative; tele monitoring  -ACHS FS, ISS; on metformin BID at home  -Pain management following; on bowel regimen- may have to escalate bowel regimen  -Seen by pulmonary, recommendations appreciated; resume breo and spiriva on dc  -PT rec: home therapy- PT reassessment, appears to be requiring additional assistance to ambulate  -DVT ppx: heparin  -Dispo: can likely be dc'd in 24-48h, once BP stabilizes and pending ortho recs.

## 2023-04-25 NOTE — PROGRESS NOTE ADULT - PROBLEM SELECTOR PLAN 1
Pt with acute lumbar back pain which is somatic and neuropathic in nature due to acute L1 compression fracture and degenerative disc disease. MRI lumbar spine demonstrates-Moderate acute compression deformity inferior greater than superior endplates of L1. No significant bony retropulsion/spinal canal narrowing. Small right paracentral disc protrusion L3-4 with mass effect and displacement of the right L4 nerve root.   Pending Ortho recommendations.  Opioid pain recommendations   - Continue Oxycodone 5 mg PO q 4 hours PRN severe pain. Monitor for sedation/ respiratory depression.   Non-opioid pain recommendations   - Continue Acetaminophen 1 gram PO q 8 hours x 4 days. Monitor LFTs  - Continue Gabapentin 100mg po q 8 hours. Monitor renal function.   - Continue Lidoderm 4% patch daily.   - Avoid NSAIDs- recent CORTES, now resolved.   Bowel Regimen  - Miralax 17G PO daily  - Senna 2 tablets at bedtime for constipation  - Dulcolax 5mg PO BID PRN constipation   Mild pain   - Non-pharmacological pain treatment recommendations  - Warm/ Cool packs PRN   - Repositioning, imagery, relaxation, distraction.  - Physical therapy OOB if no contraindications   Recommendations discussed with primary team and RN.

## 2023-04-25 NOTE — PROGRESS NOTE ADULT - SUBJECTIVE AND OBJECTIVE BOX
PGY-1 Progress Note discussed with attending    PAGER #: [[phone]] TILL 5:00 PM  PLEASE CONTACT ON CALL TEAM:   - On Call Team (Please refer to Angel) FROM 5:00 PM - 8:30PM  - Nightfloat Team FROM 8:30 -7:30 AM    INTERVAL HPI/OVERNIGHT EVENTS:       REVIEW OF SYSTEMS:  CONSTITUTIONAL: No fever, weight loss, or fatigue  RESPIRATORY: No cough, wheezing, chills or hemoptysis; No shortness of breath  CARDIOVASCULAR: No chest pain, palpitations, dizziness, or leg swelling  GASTROINTESTINAL: No abdominal pain. No nausea, vomiting, or hematemesis; No diarrhea or constipation. No melena or hematochezia.  GENITOURINARY: No dysuria or hematuria, urinary frequency  NEUROLOGICAL: No headaches, memory loss, loss of strength, numbness, or tremors  SKIN: No itching, burning, rashes, or lesions     MEDICATIONS  (STANDING):  acetaminophen     Tablet .. 1000 milliGRAM(s) Oral every 8 hours  amLODIPine   Tablet 10 milliGRAM(s) Oral daily  budesonide  80 MICROgram(s)/formoterol 4.5 MICROgram(s) Inhaler 2 Puff(s) Inhalation two times a day  fluticasone propionate 50 MICROgram(s)/spray Nasal Spray 1 Spray(s) Both Nostrils two times a day  folic acid 1 milliGRAM(s) Oral daily  gabapentin 100 milliGRAM(s) Oral three times a day  heparin   Injectable 5000 Unit(s) SubCutaneous every 12 hours  lidocaine   4% Patch 1 Patch Transdermal every 24 hours  losartan 50 milliGRAM(s) Oral daily  montelukast 10 milliGRAM(s) Oral at bedtime  multivitamin 1 Tablet(s) Oral daily  pantoprazole    Tablet 40 milliGRAM(s) Oral before breakfast  polyethylene glycol 3350 17 Gram(s) Oral daily  senna 2 Tablet(s) Oral at bedtime  thiamine 100 milliGRAM(s) Oral daily  tiotropium 2.5 MICROgram(s) Inhaler 2 Puff(s) Inhalation daily    MEDICATIONS  (PRN):  albuterol    90 MICROgram(s) HFA Inhaler 2 Puff(s) Inhalation every 6 hours PRN Shortness of Breath and/or Wheezing  ondansetron Injectable 4 milliGRAM(s) IV Push every 8 hours PRN Nausea and/or Vomiting  oxyCODONE    IR 5 milliGRAM(s) Oral every 4 hours PRN Severe Pain (7 - 10)      Vital Signs Last 24 Hrs  T(C): 37.2 (25 Apr 2023 08:07), Max: 37.2 (25 Apr 2023 08:07)  T(F): 99 (25 Apr 2023 08:07), Max: 99 (25 Apr 2023 08:07)  HR: 76 (25 Apr 2023 08:07) (66 - 90)  BP: 164/65 (25 Apr 2023 08:07) (107/94 - 180/73)  BP(mean): --  RR: 18 (25 Apr 2023 08:07) (18 - 20)  SpO2: 95% (25 Apr 2023 08:07) (95% - 99%)    Parameters below as of 25 Apr 2023 08:07  Patient On (Oxygen Delivery Method): room air        PHYSICAL EXAMINATION:  GENERAL: NAD, well built  HEAD:  Atraumatic, Normocephalic  EYES:  conjunctiva and sclera clear  NECK: Supple, No JVD, Normal thyroid  CHEST/LUNG: Clear to auscultation. Clear to percussion bilaterally; No rales, rhonchi, wheezing, or rubs  HEART: Regular rate and rhythm; No murmurs, rubs, or gallops  ABDOMEN: Soft, Nontender, Nondistended; Bowel sounds present  NERVOUS SYSTEM:  Alert & Oriented X3,    EXTREMITIES:  2+ Peripheral Pulses, No clubbing, cyanosis, or edema  SKIN: warm dry                          11.6   8.62  )-----------( 184      ( 25 Apr 2023 06:17 )             37.3     04-25    141  |  112<H>  |  16  ----------------------------<  118<H>  3.9   |  21<L>  |  0.98    Ca    9.3      25 Apr 2023 06:17  Phos  3.4     04-25  Mg     2.1     04-25    TPro  7.1  /  Alb  2.7<L>  /  TBili  0.8  /  DBili  x   /  AST  16  /  ALT  11  /  AlkPhos  68  04-24    LIVER FUNCTIONS - ( 24 Apr 2023 07:16 )  Alb: 2.7 g/dL / Pro: 7.1 g/dL / ALK PHOS: 68 U/L / ALT: 11 U/L DA / AST: 16 U/L / GGT: x                       I&O's Summary      CAPILLARY BLOOD GLUCOSE        CAPILLARY BLOOD GLUCOSE          RADIOLOGY & ADDITIONAL TESTS:                   PGY-1 Progress Note discussed with attending    PAGER #: [[phone]] TILL 5:00 PM  PLEASE CONTACT ON CALL TEAM:   - On Call Team (Please refer to Angel) FROM 5:00 PM - 8:30PM  - Nightfloat Team FROM 8:30 -7:30 AM    INTERVAL HPI/OVERNIGHT EVENTS:   No acute overnight events. Pt states he has mid-low back pain. Denies any other acute symptoms.     REVIEW OF SYSTEMS:  CONSTITUTIONAL: No fever, weight loss, or fatigue  RESPIRATORY: No cough, wheezing, chills or hemoptysis; No shortness of breath  CARDIOVASCULAR: No chest pain, palpitations, dizziness, or leg swelling  GASTROINTESTINAL: No abdominal pain. No nausea, vomiting, or hematemesis; No diarrhea or constipation. No melena or hematochezia.  GENITOURINARY: No dysuria or hematuria, urinary frequency  NEUROLOGICAL: No headaches, memory loss, loss of strength, numbness, or tremors. + mid-low back pain  SKIN: No itching, burning, rashes, or lesions     MEDICATIONS  (STANDING):  acetaminophen     Tablet .. 1000 milliGRAM(s) Oral every 8 hours  amLODIPine   Tablet 10 milliGRAM(s) Oral daily  budesonide  80 MICROgram(s)/formoterol 4.5 MICROgram(s) Inhaler 2 Puff(s) Inhalation two times a day  fluticasone propionate 50 MICROgram(s)/spray Nasal Spray 1 Spray(s) Both Nostrils two times a day  folic acid 1 milliGRAM(s) Oral daily  gabapentin 100 milliGRAM(s) Oral three times a day  heparin   Injectable 5000 Unit(s) SubCutaneous every 12 hours  lidocaine   4% Patch 1 Patch Transdermal every 24 hours  losartan 50 milliGRAM(s) Oral daily  montelukast 10 milliGRAM(s) Oral at bedtime  multivitamin 1 Tablet(s) Oral daily  pantoprazole    Tablet 40 milliGRAM(s) Oral before breakfast  polyethylene glycol 3350 17 Gram(s) Oral daily  senna 2 Tablet(s) Oral at bedtime  thiamine 100 milliGRAM(s) Oral daily  tiotropium 2.5 MICROgram(s) Inhaler 2 Puff(s) Inhalation daily    MEDICATIONS  (PRN):  albuterol    90 MICROgram(s) HFA Inhaler 2 Puff(s) Inhalation every 6 hours PRN Shortness of Breath and/or Wheezing  ondansetron Injectable 4 milliGRAM(s) IV Push every 8 hours PRN Nausea and/or Vomiting  oxyCODONE    IR 5 milliGRAM(s) Oral every 4 hours PRN Severe Pain (7 - 10)      Vital Signs Last 24 Hrs  T(C): 37.2 (25 Apr 2023 08:07), Max: 37.2 (25 Apr 2023 08:07)  T(F): 99 (25 Apr 2023 08:07), Max: 99 (25 Apr 2023 08:07)  HR: 76 (25 Apr 2023 08:07) (66 - 90)  BP: 164/65 (25 Apr 2023 08:07) (107/94 - 180/73)  BP(mean): --  RR: 18 (25 Apr 2023 08:07) (18 - 20)  SpO2: 95% (25 Apr 2023 08:07) (95% - 99%)    Parameters below as of 25 Apr 2023 08:07  Patient On (Oxygen Delivery Method): room air    PHYSICAL EXAMINATION:  GENERAL: NAD, well built, elderly, appears older than stated age  HEAD:  Atraumatic, Normocephalic  EYES:  conjunctiva and sclera clear  NECK: Supple, No JVD  CHEST/LUNG: Clear to auscultation. No rales, rhonchi, wheezing, or rubs  HEART: Regular rate and rhythm  ABDOMEN: Soft, Nontender, Nondistended; Bowel sounds present, no pain or masses on palpation  NERVOUS SYSTEM:  Alert & Oriented X3, following commands  PSYCH: appropriate affect  : voiding well  EXTREMITIES:  No clubbing, cyanosis, or edema  MSK: tenderness to palp of lumbar spine  SKIN: warm dry                        11.6   8.62  )-----------( 184      ( 25 Apr 2023 06:17 )             37.3     04-25    141  |  112<H>  |  16  ----------------------------<  118<H>  3.9   |  21<L>  |  0.98    Ca    9.3      25 Apr 2023 06:17  Phos  3.4     04-25  Mg     2.1     04-25    TPro  7.1  /  Alb  2.7<L>  /  TBili  0.8  /  DBili  x   /  AST  16  /  ALT  11  /  AlkPhos  68  04-24    LIVER FUNCTIONS - ( 24 Apr 2023 07:16 )  Alb: 2.7 g/dL / Pro: 7.1 g/dL / ALK PHOS: 68 U/L / ALT: 11 U/L DA / AST: 16 U/L / GGT: x               RADIOLOGY & ADDITIONAL TESTS:  < from: MR Lumbar Spine No Cont (04.25.23 @ 12:24) >  IMPRESSION:  Moderate acute compression deformity inferior greater than superior   endplates of L1. No significant bony retropulsion/spinal canal narrowing.  Small right paracentral disc protrusion L3-4 with mass effect and   displacement of the right L4 nerve root    --- End of Report ---

## 2023-04-25 NOTE — PROGRESS NOTE ADULT - PROBLEM SELECTOR PLAN 9
although denies daily smoking, daughter confirms smokes 1/2 pack daily   admits to smoking occasionally with long- standing history   educated on smoking cessation   declined nicotine patch Heparin SQ q12

## 2023-04-26 ENCOUNTER — TRANSCRIPTION ENCOUNTER (OUTPATIENT)
Age: 68
End: 2023-04-26

## 2023-04-26 LAB
ANION GAP SERPL CALC-SCNC: 10 MMOL/L — SIGNIFICANT CHANGE UP (ref 5–17)
BUN SERPL-MCNC: 18 MG/DL — SIGNIFICANT CHANGE UP (ref 7–18)
CALCIUM SERPL-MCNC: 9.4 MG/DL — SIGNIFICANT CHANGE UP (ref 8.4–10.5)
CHLORIDE SERPL-SCNC: 109 MMOL/L — HIGH (ref 96–108)
CO2 SERPL-SCNC: 21 MMOL/L — LOW (ref 22–31)
CREAT SERPL-MCNC: 0.84 MG/DL — SIGNIFICANT CHANGE UP (ref 0.5–1.3)
EGFR: 95 ML/MIN/1.73M2 — SIGNIFICANT CHANGE UP
GLUCOSE SERPL-MCNC: 102 MG/DL — HIGH (ref 70–99)
HCT VFR BLD CALC: 35.6 % — LOW (ref 39–50)
HGB BLD-MCNC: 11.4 G/DL — LOW (ref 13–17)
MAGNESIUM SERPL-MCNC: 2 MG/DL — SIGNIFICANT CHANGE UP (ref 1.6–2.6)
MCHC RBC-ENTMCNC: 30.3 PG — SIGNIFICANT CHANGE UP (ref 27–34)
MCHC RBC-ENTMCNC: 32 GM/DL — SIGNIFICANT CHANGE UP (ref 32–36)
MCV RBC AUTO: 94.7 FL — SIGNIFICANT CHANGE UP (ref 80–100)
NRBC # BLD: 0 /100 WBCS — SIGNIFICANT CHANGE UP (ref 0–0)
PHOSPHATE SERPL-MCNC: 3.6 MG/DL — SIGNIFICANT CHANGE UP (ref 2.5–4.5)
PLATELET # BLD AUTO: 172 K/UL — SIGNIFICANT CHANGE UP (ref 150–400)
POTASSIUM SERPL-MCNC: 3.6 MMOL/L — SIGNIFICANT CHANGE UP (ref 3.5–5.3)
POTASSIUM SERPL-SCNC: 3.6 MMOL/L — SIGNIFICANT CHANGE UP (ref 3.5–5.3)
RBC # BLD: 3.76 M/UL — LOW (ref 4.2–5.8)
RBC # FLD: 17 % — HIGH (ref 10.3–14.5)
SODIUM SERPL-SCNC: 140 MMOL/L — SIGNIFICANT CHANGE UP (ref 135–145)
WBC # BLD: 8.21 K/UL — SIGNIFICANT CHANGE UP (ref 3.8–10.5)
WBC # FLD AUTO: 8.21 K/UL — SIGNIFICANT CHANGE UP (ref 3.8–10.5)

## 2023-04-26 PROCEDURE — 71045 X-RAY EXAM CHEST 1 VIEW: CPT | Mod: 26

## 2023-04-26 PROCEDURE — 99254 IP/OBS CNSLTJ NEW/EST MOD 60: CPT

## 2023-04-26 PROCEDURE — 99232 SBSQ HOSP IP/OBS MODERATE 35: CPT | Mod: GC

## 2023-04-26 RX ORDER — LANOLIN ALCOHOL/MO/W.PET/CERES
5 CREAM (GRAM) TOPICAL AT BEDTIME
Refills: 0 | Status: DISCONTINUED | OUTPATIENT
Start: 2023-04-26 | End: 2023-04-27

## 2023-04-26 RX ORDER — LOSARTAN POTASSIUM 100 MG/1
50 TABLET, FILM COATED ORAL ONCE
Refills: 0 | Status: COMPLETED | OUTPATIENT
Start: 2023-04-26 | End: 2023-04-26

## 2023-04-26 RX ORDER — LOSARTAN POTASSIUM 100 MG/1
100 TABLET, FILM COATED ORAL DAILY
Refills: 0 | Status: DISCONTINUED | OUTPATIENT
Start: 2023-04-27 | End: 2023-04-27

## 2023-04-26 RX ADMIN — LIDOCAINE 1 PATCH: 4 CREAM TOPICAL at 23:16

## 2023-04-26 RX ADMIN — Medication 1000 MILLIGRAM(S): at 13:48

## 2023-04-26 RX ADMIN — LIDOCAINE 1 PATCH: 4 CREAM TOPICAL at 01:14

## 2023-04-26 RX ADMIN — GABAPENTIN 100 MILLIGRAM(S): 400 CAPSULE ORAL at 13:48

## 2023-04-26 RX ADMIN — LIDOCAINE 1 PATCH: 4 CREAM TOPICAL at 19:29

## 2023-04-26 RX ADMIN — Medication 1000 MILLIGRAM(S): at 15:02

## 2023-04-26 RX ADMIN — Medication 1000 MILLIGRAM(S): at 23:33

## 2023-04-26 RX ADMIN — TIOTROPIUM BROMIDE 2 PUFF(S): 18 CAPSULE ORAL; RESPIRATORY (INHALATION) at 12:40

## 2023-04-26 RX ADMIN — Medication 5 MILLIGRAM(S): at 23:30

## 2023-04-26 RX ADMIN — BUDESONIDE AND FORMOTEROL FUMARATE DIHYDRATE 2 PUFF(S): 160; 4.5 AEROSOL RESPIRATORY (INHALATION) at 22:58

## 2023-04-26 RX ADMIN — LOSARTAN POTASSIUM 50 MILLIGRAM(S): 100 TABLET, FILM COATED ORAL at 09:59

## 2023-04-26 RX ADMIN — POLYETHYLENE GLYCOL 3350 17 GRAM(S): 17 POWDER, FOR SOLUTION ORAL at 11:08

## 2023-04-26 RX ADMIN — HEPARIN SODIUM 5000 UNIT(S): 5000 INJECTION INTRAVENOUS; SUBCUTANEOUS at 17:16

## 2023-04-26 RX ADMIN — Medication 1 MILLIGRAM(S): at 11:08

## 2023-04-26 RX ADMIN — Medication 100 MILLIGRAM(S): at 11:07

## 2023-04-26 RX ADMIN — GABAPENTIN 100 MILLIGRAM(S): 400 CAPSULE ORAL at 22:57

## 2023-04-26 RX ADMIN — Medication 1 SPRAY(S): at 17:16

## 2023-04-26 RX ADMIN — HEPARIN SODIUM 5000 UNIT(S): 5000 INJECTION INTRAVENOUS; SUBCUTANEOUS at 05:41

## 2023-04-26 RX ADMIN — Medication 5 MILLIGRAM(S): at 11:07

## 2023-04-26 RX ADMIN — AMLODIPINE BESYLATE 10 MILLIGRAM(S): 2.5 TABLET ORAL at 06:42

## 2023-04-26 RX ADMIN — MONTELUKAST 10 MILLIGRAM(S): 4 TABLET, CHEWABLE ORAL at 22:57

## 2023-04-26 RX ADMIN — BUDESONIDE AND FORMOTEROL FUMARATE DIHYDRATE 2 PUFF(S): 160; 4.5 AEROSOL RESPIRATORY (INHALATION) at 10:01

## 2023-04-26 RX ADMIN — Medication 1 SPRAY(S): at 05:42

## 2023-04-26 RX ADMIN — Medication 1000 MILLIGRAM(S): at 22:56

## 2023-04-26 RX ADMIN — LIDOCAINE 1 PATCH: 4 CREAM TOPICAL at 11:06

## 2023-04-26 RX ADMIN — Medication 1000 MILLIGRAM(S): at 06:41

## 2023-04-26 RX ADMIN — LOSARTAN POTASSIUM 50 MILLIGRAM(S): 100 TABLET, FILM COATED ORAL at 05:41

## 2023-04-26 RX ADMIN — SENNA PLUS 2 TABLET(S): 8.6 TABLET ORAL at 22:57

## 2023-04-26 RX ADMIN — PANTOPRAZOLE SODIUM 40 MILLIGRAM(S): 20 TABLET, DELAYED RELEASE ORAL at 06:43

## 2023-04-26 RX ADMIN — Medication 1 TABLET(S): at 11:07

## 2023-04-26 RX ADMIN — Medication 1000 MILLIGRAM(S): at 05:41

## 2023-04-26 RX ADMIN — GABAPENTIN 100 MILLIGRAM(S): 400 CAPSULE ORAL at 05:41

## 2023-04-26 NOTE — DISCHARGE NOTE PROVIDER - NSDCCPCAREPLAN_GEN_ALL_CORE_FT
PRINCIPAL DISCHARGE DIAGNOSIS  Diagnosis: Hypertensive urgency  Assessment and Plan of Treatment: You came in with an elevated blood pressure and had hypertensive urgency. This is when your blood pressure is over 180/120 but you aren't having any symptoms that mean your organs are being affected. We treated you with IV blood pressure medications and you improved. We started you on Amlodipine 10mg ONCE A DAY and Losartan 100mg ONCE A DAY. It is very important that you are compliant with your medications. Follow a DASH (low sodium) diet. Follow up with your PCP within 1-2 weeks of discharge.      SECONDARY DISCHARGE DIAGNOSES  Diagnosis: Closed traumatic compression fracture of lumbar vertebra  Assessment and Plan of Treatment: You had a fall at home. CT Lumbar Spine showed an acute lumbar compression fracture of L1 vertebrae with 25% loss in vertebral height. MRI shows Moderate acute compression deformity inferior greater than superior endplates of L1. No significant bony retropulsion/spinal canal narrowing. Small right paracentral disc protrusion L3-4 with mass effect and displacement of the right L4 nerve root. X-Ray L-Spine Flexion/Extension shows similar L1 fracture. We consulted Neurosurgery. No acute intervention was indicated. Pain management was consulted and recommended ..................... Physical therapy was consulted and recommends home PT. Follow up with neurosurgery, Dr. Granadso within 2-4 weeks of discharge for further management.    Diagnosis: CORTES (acute kidney injury)  Assessment and Plan of Treatment: You were found to have a worsening of your kidney function from your baseline. You were treated with IV hydration, and your renal function improved back to your baseline. We held nephrotoxic medications such as NSAID pain killers, and were cautious about the use of IV contrast if such scans were needed. You are advised to continue to stay well hydrated, and to seek medical attention if you have painful/burning urination, blood in urine, or increasing inability to control the flow of urine.    Diagnosis: (HFpEF) heart failure with preserved ejection fraction  Assessment and Plan of Treatment: You came in with a history of heart failure with preserved ejection fraction which means that your heart is not relaxing well enough to allow blood to flow normally. Your last echocardiogram showed a good forward flow (ejection fraction>55%. You have grade II diastolic dysfunction. The chest X-ray on admission, as well as other labs were not significant for an active worsening of HF, and you were monitored, while continuing on your medications. A cardiologist assessed you on telemetry and you did not have any abnormal rhythms.    Diagnosis: COPD (chronic obstructive pulmonary disease)  Assessment and Plan of Treatment: You have a history of COPD. Continue to take your inhalers as prescribed. Follow up with your PCP or pulmonologist for further care.    Diagnosis: Alcohol dependence, daily use  Assessment and Plan of Treatment: You have a history of daily alcohol use. You can improve your life and health by stopping your use of alcohol. Alcohol abuse and dependence can have a negative effect on your life. Drinking too much can lead to addiction or health issues. Talk to your doctor about programs that can help you stop drinking alcohol. Visit Substance Abuse and Mental Health Services Administration -- www.samhsa.gov/atod/alcohol for more information.    Diagnosis: Liver cirrhosis  Assessment and Plan of Treatment: You have a history of liver cirrhosis. Follow up with a hepatologist to monitor your liver function.    Diagnosis: Current smoker  Assessment and Plan of Treatment: You stated that you are currently smoking cigarettes daily. You declined the nicotine patch. There are known risks if you continue to smoke especially with your history of COPD. The sooner you quit, the better but it is but it’s never too late to quit! Get support. If you make a decision to quit, follow up with your PCP for resources to help you quit. Nicotine replacement products help you slowly reduce the amount of nicotine you need. They can help you deal with cravings and withdrawal symptoms. These products include nicotine patches, gum, lozenges, mouth spray, and inhalers. Most are available without a prescription.    Diagnosis: HLD (hyperlipidemia)  Assessment and Plan of Treatment: You have hyperlipidemia. Please continue to take your cholesterol medications. Maintain a healthy diet that consist of low sugar, low fat, low sodium. Decrease the amount of fried foods that you consume. Exercise frequently if possible. Please follow up with  your primary care provider within 1 week of your discharge.  You are recommended a DASH Diet that emphasizes mostly vegetables, fruits, and fat-free or low-fat dairy products. Please limit intake of sodium, sweets, beverages w/ high sugar/carbohydrate content.       PRINCIPAL DISCHARGE DIAGNOSIS  Diagnosis: Hypertensive urgency  Assessment and Plan of Treatment: You came in with an elevated blood pressure and had hypertensive urgency. This is when your blood pressure is over 180/120 but you aren't having any symptoms that mean your organs are being affected. We treated you with IV blood pressure medications and you improved. We started you on Amlodipine 10mg ONCE A DAY and Losartan 100mg ONCE A DAY. It is very important that you are compliant with your medications. Follow a DASH (low sodium) diet. Follow up with your PCP within 1-2 weeks of discharge.      SECONDARY DISCHARGE DIAGNOSES  Diagnosis: Closed traumatic compression fracture of lumbar vertebra  Assessment and Plan of Treatment: You had a fall at home. CT Lumbar Spine showed an acute lumbar compression fracture of L1 vertebrae with 25% loss in vertebral height. MRI shows Moderate acute compression deformity inferior greater than superior endplates of L1. No significant bony retropulsion/spinal canal narrowing. Small right paracentral disc protrusion L3-4 with mass effect and displacement of the right L4 nerve root. X-Ray L-Spine Flexion/Extension shows similar L1 fracture. We consulted Neurosurgery. No acute intervention was indicated. Pain management was consulted and recommended tylenol for pain along with flexiril for musclespasms. You were also recommended to start gabapentin 300mg three times a day to assist with the pain you have. Physical therapy was consulted and recommend JIGNESH. Your loved one decided to take you home instead. Follow up with neurosurgery, Dr. Granados within 2-4 weeks of discharge for further management.    Diagnosis: CORTES (acute kidney injury)  Assessment and Plan of Treatment: You were found to have a worsening of your kidney function from your baseline. You were treated with IV hydration, and your renal function improved back to your baseline. We held nephrotoxic medications such as NSAID pain killers, and were cautious about the use of IV contrast if such scans were needed. You are advised to continue to stay well hydrated, and to seek medical attention if you have painful/burning urination, blood in urine, or increasing inability to control the flow of urine.    Diagnosis: (HFpEF) heart failure with preserved ejection fraction  Assessment and Plan of Treatment: You came in with a history of heart failure with preserved ejection fraction which means that your heart is not relaxing well enough to allow blood to flow normally. Your last echocardiogram showed a good forward flow (ejection fraction>55%. You have grade II diastolic dysfunction. The chest X-ray on admission, as well as other labs were not significant for an active worsening of HF, and you were monitored, while continuing on your medications. A cardiologist assessed you on telemetry and you did not have any abnormal rhythms.    Diagnosis: COPD (chronic obstructive pulmonary disease)  Assessment and Plan of Treatment: You have a history of COPD. Continue to take your inhalers as prescribed. Follow up with your PCP or pulmonologist for further care. Start taking spiriva.    Diagnosis: Alcohol dependence, daily use  Assessment and Plan of Treatment: You have a history of daily alcohol use. You can improve your life and health by stopping your use of alcohol. Alcohol abuse and dependence can have a negative effect on your life. Drinking too much can lead to addiction or health issues. Talk to your doctor about programs that can help you stop drinking alcohol. Visit Substance Abuse and Mental Health Services Administration -- www.samhsa.gov/atod/alcohol for more information.    Diagnosis: Liver cirrhosis  Assessment and Plan of Treatment: You have a history of liver cirrhosis. Follow up with a hepatologist to monitor your liver function.    Diagnosis: Current smoker  Assessment and Plan of Treatment: You stated that you are currently smoking cigarettes daily. You declined the nicotine patch. There are known risks if you continue to smoke especially with your history of COPD. The sooner you quit, the better but it is but it’s never too late to quit! Get support. If you make a decision to quit, follow up with your PCP for resources to help you quit. Nicotine replacement products help you slowly reduce the amount of nicotine you need. They can help you deal with cravings and withdrawal symptoms. These products include nicotine patches, gum, lozenges, mouth spray, and inhalers. Most are available without a prescription.    Diagnosis: HLD (hyperlipidemia)  Assessment and Plan of Treatment: You have hyperlipidemia. Please continue to take your cholesterol medications. Maintain a healthy diet that consist of low sugar, low fat, low sodium. Decrease the amount of fried foods that you consume. Exercise frequently if possible. Please follow up with  your primary care provider within 1 week of your discharge.  You are recommended a DASH Diet that emphasizes mostly vegetables, fruits, and fat-free or low-fat dairy products. Please limit intake of sodium, sweets, beverages w/ high sugar/carbohydrate content.       PRINCIPAL DISCHARGE DIAGNOSIS  Diagnosis: Hypertensive urgency  Assessment and Plan of Treatment: You came in with an elevated blood pressure and had hypertensive urgency. This is when your blood pressure is over 180/120 but you aren't having any symptoms that mean your organs are being affected. We treated you with IV blood pressure medications and you improved. We started you on Amlodipine 10mg ONCE A DAY and Losartan 100mg ONCE A DAY. It is very important that you are compliant with your medications. Follow a DASH (low sodium) diet. Follow up with your PCP within 1-2 weeks of discharge.      SECONDARY DISCHARGE DIAGNOSES  Diagnosis: Closed traumatic compression fracture of lumbar vertebra  Assessment and Plan of Treatment: You had a fall at home. CT Lumbar Spine showed an acute lumbar compression fracture of L1 vertebrae with 25% loss in vertebral height. MRI shows Moderate acute compression deformity inferior greater than superior endplates of L1. No significant bony retropulsion/spinal canal narrowing. Small right paracentral disc protrusion L3-4 with mass effect and displacement of the right L4 nerve root. X-Ray L-Spine Flexion/Extension shows similar L1 fracture. We consulted Neurosurgery. No acute intervention was indicated. Pain management was consulted and recommended tylenol for pain along with flexiril for musclespasms. You were also recommended to start gabapentin 300mg three times a day to assist with the pain you have. Physical therapy was consulted and recommend JIGNESH. Your loved one decided to take you home instead. Follow up with neurosurgery, Dr. Granados within 2-4 weeks of discharge for further management.    Diagnosis: CORTES (acute kidney injury)  Assessment and Plan of Treatment: You were found to have a worsening of your kidney function from your baseline. You were treated with IV hydration, and your renal function improved back to your baseline. We held nephrotoxic medications such as NSAID pain killers, and were cautious about the use of IV contrast if such scans were needed. You are advised to continue to stay well hydrated, and to seek medical attention if you have painful/burning urination, blood in urine, or increasing inability to control the flow of urine.    Diagnosis: (HFpEF) heart failure with preserved ejection fraction  Assessment and Plan of Treatment: You came in with a history of heart failure with preserved ejection fraction which means that your heart is not relaxing well enough to allow blood to flow normally. Your last echocardiogram showed a good forward flow (ejection fraction>55%. You have grade II diastolic dysfunction. The chest X-ray on admission, as well as other labs were not significant for an active worsening of HF, and you were monitored, while continuing on your medications. A cardiologist assessed you on telemetry and you did not have any abnormal rhythms.    Diagnosis: COPD (chronic obstructive pulmonary disease)  Assessment and Plan of Treatment: You have a history of COPD. Continue to take your inhalers as prescribed. Follow up with your PCP or pulmonologist for further care. Start taking spiriva. You should have a CT chest done outpatient to further evaluate the lung fidnings on Xray that were seen while you were in the hospital. Dr. Sparks's (pulmonologist) information is listed in the chart.    Diagnosis: Alcohol dependence, daily use  Assessment and Plan of Treatment: You have a history of daily alcohol use. You can improve your life and health by stopping your use of alcohol. Alcohol abuse and dependence can have a negative effect on your life. Drinking too much can lead to addiction or health issues. Talk to your doctor about programs that can help you stop drinking alcohol. Visit Substance Abuse and Mental Health Services Administration -- www.samhsa.gov/atod/alcohol for more information.    Diagnosis: Liver cirrhosis  Assessment and Plan of Treatment: You have a history of liver cirrhosis. Follow up with a hepatologist to monitor your liver function.    Diagnosis: Current smoker  Assessment and Plan of Treatment: You stated that you are currently smoking cigarettes daily. You declined the nicotine patch. There are known risks if you continue to smoke especially with your history of COPD. The sooner you quit, the better but it is but it’s never too late to quit! Get support. If you make a decision to quit, follow up with your PCP for resources to help you quit. Nicotine replacement products help you slowly reduce the amount of nicotine you need. They can help you deal with cravings and withdrawal symptoms. These products include nicotine patches, gum, lozenges, mouth spray, and inhalers. Most are available without a prescription.    Diagnosis: HLD (hyperlipidemia)  Assessment and Plan of Treatment: You have hyperlipidemia. Please continue to take your cholesterol medications. Maintain a healthy diet that consist of low sugar, low fat, low sodium. Decrease the amount of fried foods that you consume. Exercise frequently if possible. Please follow up with  your primary care provider within 1 week of your discharge.  You are recommended a DASH Diet that emphasizes mostly vegetables, fruits, and fat-free or low-fat dairy products. Please limit intake of sodium, sweets, beverages w/ high sugar/carbohydrate content.

## 2023-04-26 NOTE — PROGRESS NOTE ADULT - PROBLEM SELECTOR PLAN 1
p/w /78, asymptomatic on admission, not compliant with meds  h/o HTN on amlodipine 5mg and losartan 100mg  EKG showing NSR with LVH and QRS widening and QTc 539  No signs of end organ ischemia; Trop negative x1 and no CORTES  c/w amlodipine 10mg  c/w losartan to 100mg daily  BP still elevated  Monitor BP closely

## 2023-04-26 NOTE — DISCHARGE NOTE PROVIDER - HOSPITAL COURSE
67M from home, ambulates with caution uses cane sparingly, PMHx HLD, DM, Syncope and falls, COPD (not on home O2) and active smoker and not compliant with home inhalers with a prior ICU admission requiring intubation for AHRF with hypercapnia and hypoxia, HTN noncompliant on medication c/b hypertensive encephalopathy, and chronic ETOH dependence and daily use c/b liver cirrhosis and portal HTN with EGD (01/2019) showing esophageal varices, non-bleeding, presenting after traumatic fall with preceding dizziness, now c/o intractable mid-lower back pain. Patient is AOx2-3, unreliable historian unable to provide history without inaccuracies, compared to chart review and collateral obtained from daughter, Nikole Rome [phone] who is out-of state but communicates daily with patient and patient's domestic common law partner, Reza Hercules. Patient was reported to get up from bed at night to turn off a light, when he became dizzy without HA, chest pain, SOB, or palpitations and lost his balance falling backwards and striking his back on a dresser. Denies any LOC or head trauma, as well as no nausea or vomiting. Reports severe pain throughout his lower back after fall, but denies any loss of sensation or weakness of his legs. He denies any loss of sensation in his groin or loss of continence.  Reports back pain had no relief with OTC ibuprofen. He is not on blood thinners. Although he reports no longer drinking or smoking, and then later reporting "sometimes on occasion", his daughter confirmed that he smokes 1/2-pack/day and drinks 3-glasses of vodka daily. Noted to have slight slurring in speech, which he states is his normal speech. He denies any preceding HA, changes in vision, ringing in the ears, chest pain, SOB, and no palpitations. Patient denies fevers, chills, and recent illnesses.    Pt admitted to telemetry for HTNsive urgency. p/w /78, asymptomatic on admission, not compliant with meds. h/o HTN on amlodipine 5mg and losartan 100mg. EKG showing NSR with LVH and QRS widening and QTc 539. No signs of end organ ischemia; Trop negative x1 and no CORTES. c/w amlodipine 10mg. c/w losartan to 100mg daily. Monitor BP closely.  Pt with a closed traumatic compression fracture of lumbar vertebra, presenting after traumatic fall which was preceded by dizziness, no LOC or head trauma. h/o falls and syncopal episodes with injury in the setting of unsteady gait, as per daughter. CT thoracic/ Lumbar: acute lumbar compression fracture of L1 vertebrae with 25% loss in vertebral height   Pain management consulted: recommended Oxycodone 5 mg PO q 4 hours PRN severe pain. Acetaminophen 1 gram PO q 8 hours x 3 days. Gabapentin 100mg po q 8 hours. Monitor renal function. Lidoderm 4% patch daily. started on bowel regimen. MRI shows Moderate acute compression deformity inferior greater than superior endplates of L1. No significant bony retropulsion/spinal canal narrowing. Small right paracentral disc protrusion L3-4 with mass effect and displacement of the right L4 nerve root. X-Ray L-Spine Flexion/Extension shows similar L1 fracture. PT consulted: recommends home PT. Neurosurgery following, conservative mgmt, will need outpatient follow up. Hx of (HFpEF) heart failure with preserved ejection fraction. Last TTE (01/2019): 50-55% EF with mild MR, mild AS, G2DD, and mild pulmonary HTN. EKG showing NSR with LVH and QRS widening and QTc 539. admits to medication non-compliance. Echo showed EF 50-55%, grade 2 DD, RVP mod increased at 51mm Hg. h/o COPD not on home o2 and current smoker takes Breo Ellipta and Ventolin PRN confirmed by pharmacy. unclear if complaint with inhalers, reports always being SOB with chronic cough. not currently in exacerbation. O2 support as needed, maintain O2 sat 88-92%, avoid over oxygenation. CXR shows increasing left lateral pleural thickening, no evidence of consolidation. s/p duoneb x1 with good response. c/w duoneb, symbicort, spiriva, albuterol prn. Pulmonary consulted: Dr. Navarrete. CT chest non-contrast as outpatient. Hx of Alcohol dependence, daily use. although denies daily smoking, daughter confirms drinks 1-3 glasses of vodka daily. h/o chronic ETOH use with liver cirrhosis and portal HTN, compensated. no signs of withdrawal. c/w thiamine, multivitamin, and folic acid. SBIRT consulted. h/o liver cirrhosis and portal HTN confirmed on CT 2019 with EGD 2019 showing esophageal varices, non-bleeding. likely in the setting of chronic ETOH use. currently compensated and not encephalopathic. previously on Nadolol 20mg daily in 2019 but has not been taking since then confirmed by pharmacy. will hold off resuming for now. patient would benefit o/p hepatology follow up. h/o HLD not on statin therapy, DASH DIET. DVT prophylaxi with Heparin SQ q12.    Patient is medically stable and ready for discharge with home PT.  This is only a brief summary. Please refer to the chart for the full hospital course.     67M from home, ambulates with caution uses cane sparingly, PMHx HLD, DM, Syncope and falls, COPD (not on home O2) and active smoker and not compliant with home inhalers with a prior ICU admission requiring intubation for AHRF with hypercapnia and hypoxia, HTN noncompliant on medication c/b hypertensive encephalopathy, and chronic ETOH dependence and daily use c/b liver cirrhosis and portal HTN with EGD (01/2019) showing esophageal varices, non-bleeding, presenting after traumatic fall with preceding dizziness, now c/o intractable mid-lower back pain. Patient is AOx2-3, unreliable historian unable to provide history without inaccuracies, compared to chart review and collateral obtained from daughter, Nikoel Rome [phone] who is out-of state but communicates daily with patient and patient's domestic common law partner, Reza Hercules. Patient was reported to get up from bed at night to turn off a light, when he became dizzy without HA, chest pain, SOB, or palpitations and lost his balance falling backwards and striking his back on a dresser. Denies any LOC or head trauma, as well as no nausea or vomiting. Reports severe pain throughout his lower back after fall, but denies any loss of sensation or weakness of his legs. He denies any loss of sensation in his groin or loss of continence.  Reports back pain had no relief with OTC ibuprofen. He is not on blood thinners. Although he reports no longer drinking or smoking, and then later reporting "sometimes on occasion", his daughter confirmed that he smokes 1/2-pack/day and drinks 3-glasses of vodka daily. Noted to have slight slurring in speech, which he states is his normal speech.     Pt admitted to telemetry for hypertensive urgency. P/w /78, asymptomatic on admission, not compliant with meds. h/o HTN on amlodipine 5mg and losartan 100mg. EKG showing NSR with LVH and QRS widening and QTc 539. No signs of end organ ischemia; Trop negative x1 and no CORTES. c/w amlodipine 10mg. c/w losartan to 100mg daily. Monitor BP closely.  Pt with a closed traumatic compression fracture of lumbar vertebra, presenting after traumatic fall which was preceded by dizziness, no LOC or head trauma. h/o falls and syncopal episodes with injury in the setting of unsteady gait, as per daughter. CT thoracic/ Lumbar: acute lumbar compression fracture of L1 vertebrae with 25% loss in vertebral height   Pain management consulted: recommended Oxycodone 5 mg PO q 4 hours PRN severe pain. Acetaminophen 1 gram PO q 8 hours x 3 days. Gabapentin 100mg po q 8 hours. Monitor renal function. Lidoderm 4% patch daily. started on bowel regimen. MRI shows Moderate acute compression deformity inferior greater than superior endplates of L1. No significant bony retropulsion/spinal canal narrowing. Small right paracentral disc protrusion L3-4 with mass effect and displacement of the right L4 nerve root. X-Ray L-Spine Flexion/Extension shows similar L1 fracture. PT consulted: recommends home PT. Neurosurgery following, conservative mgmt, will need outpatient follow up. Hx of (HFpEF) heart failure with preserved ejection fraction. Last TTE (01/2019): 50-55% EF with mild MR, mild AS, G2DD, and mild pulmonary HTN. EKG showing NSR with LVH and QRS widening and QTc 539. admits to medication non-compliance. Echo showed EF 50-55%, grade 2 DD, RVP mod increased at 51mm Hg. h/o COPD not on home o2 and current smoker takes Breo Ellipta and Ventolin PRN confirmed by pharmacy. unclear if complaint with inhalers, reports always being SOB with chronic cough. not currently in exacerbation. O2 support as needed, maintain O2 sat 88-92%, avoid over oxygenation. CXR shows increasing left lateral pleural thickening, no evidence of consolidation. s/p duoneb x1 with good response. c/w duoneb, symbicort, spiriva, albuterol prn. Pulmonary consulted: Dr. Navarrete. CT chest non-contrast as outpatient. Continue spiriva and breo on DC. Hx of Alcohol dependence, daily use. although denies daily smoking, daughter confirms drinks 1-3 glasses of vodka daily. h/o chronic ETOH use with liver cirrhosis and portal HTN, compensated. no signs of withdrawal. c/w thiamine, multivitamin, and folic acid. SBIRT consulted. h/o liver cirrhosis and portal HTN confirmed on CT 2019 with EGD 2019 showing esophageal varices, non-bleeding. likely in the setting of chronic ETOH use. currently compensated and not encephalopathic. previously on Nadolol 20mg daily in 2019 but has not been taking since then confirmed by pharmacy. will hold off resuming for now. patient would benefit o/p hepatology follow up. h/o HLD not on statin therapy, DASH DIET. Pt constipated, started on bowel regiment. PT recommended [facility], however family opted to take patient home instead.     Patient is medically stable and ready for discharge with home PT.  This is only a brief summary. Please refer to the chart for the full hospital course.     67M from home, ambulates with caution uses cane sparingly, PMHx HLD, DM, Syncope and falls, COPD (not on home O2) and active smoker and not compliant with home inhalers with a prior ICU admission requiring intubation for AHRF with hypercapnia and hypoxia, HTN noncompliant on medication c/b hypertensive encephalopathy, and chronic ETOH dependence and daily use c/b liver cirrhosis and portal HTN with EGD (01/2019) showing esophageal varices, non-bleeding, presenting after traumatic fall with preceding dizziness, now c/o intractable mid-lower back pain. Patient is AOx2-3, unreliable historian unable to provide history without inaccuracies, compared to chart review and collateral obtained from daughter, Nikole Rome [phone] who is out-of state but communicates daily with patient and patient's domestic common law partner, Reza Hercules. Patient was reported to get up from bed at night to turn off a light, when he became dizzy without HA, chest pain, SOB, or palpitations and lost his balance falling backwards and striking his back on a dresser. Denies any LOC or head trauma, as well as no nausea or vomiting. Reports severe pain throughout his lower back after fall, but denies any loss of sensation or weakness of his legs. He denies any loss of sensation in his groin or loss of continence.  Reports back pain had no relief with OTC ibuprofen. He is not on blood thinners. Although he reports no longer drinking or smoking, and then later reporting "sometimes on occasion", his daughter confirmed that he smokes 1/2-pack/day and drinks 3-glasses of vodka daily. Noted to have slight slurring in speech, which he states is his normal speech.     Pt admitted to telemetry for hypertensive urgency. P/w /78, asymptomatic on admission, not compliant with meds. h/o HTN on amlodipine 5mg and losartan 100mg. EKG showing NSR with LVH and QRS widening and QTc 539. No signs of end organ ischemia; Trop negative x1 and no CORTES. c/w amlodipine 10mg. c/w losartan to 100mg daily. Monitor BP closely.  Pt with a closed traumatic compression fracture of lumbar vertebra, presenting after traumatic fall which was preceded by dizziness, no LOC or head trauma. h/o falls and syncopal episodes with injury in the setting of unsteady gait, as per daughter. CT thoracic/ Lumbar: acute lumbar compression fracture of L1 vertebrae with 25% loss in vertebral height   Pain management consulted: recommended Oxycodone 5 mg PO q 4 hours PRN severe pain. Acetaminophen 1 gram PO q 8 hours x 3 days. Gabapentin 100mg po q 8 hours. Monitor renal function. Lidoderm 4% patch daily. started on bowel regimen. MRI shows Moderate acute compression deformity inferior greater than superior endplates of L1. No significant bony retropulsion/spinal canal narrowing. Small right paracentral disc protrusion L3-4 with mass effect and displacement of the right L4 nerve root. X-Ray L-Spine Flexion/Extension shows similar L1 fracture. PT consulted: recommends home PT. Neurosurgery following, conservative mgmt, will need outpatient follow up. Hx of (HFpEF) heart failure with preserved ejection fraction. Last TTE (01/2019): 50-55% EF with mild MR, mild AS, G2DD, and mild pulmonary HTN. EKG showing NSR with LVH and QRS widening and QTc 539. admits to medication non-compliance. Echo showed EF 50-55%, grade 2 DD, RVP mod increased at 51mm Hg. h/o COPD not on home o2 and current smoker takes Breo Ellipta and Ventolin PRN confirmed by pharmacy. unclear if complaint with inhalers, reports always being SOB with chronic cough. not currently in exacerbation. O2 support as needed, maintain O2 sat 88-92%, avoid over oxygenation. CXR shows increasing left lateral pleural thickening, no evidence of consolidation. s/p duoneb x1 with good response. c/w duoneb, symbicort, spiriva, albuterol prn. Pulmonary consulted: Dr. Navarrete. CT chest non-contrast as outpatient. Continue spiriva and breo on DC. Hx of Alcohol dependence, daily use. although denies daily smoking, daughter confirms drinks 1-3 glasses of vodka daily. h/o chronic ETOH use with liver cirrhosis and portal HTN, compensated. no signs of withdrawal. c/w thiamine, multivitamin, and folic acid. SBIRT consulted. h/o liver cirrhosis and portal HTN confirmed on CT 2019 with EGD 2019 showing esophageal varices, non-bleeding. likely in the setting of chronic ETOH use. currently compensated and not encephalopathic. previously on Nadolol 20mg daily in 2019 but has not been taking since then confirmed by pharmacy. will hold off resuming for now. patient would benefit o/p hepatology follow up. h/o HLD not on statin therapy, DASH DIET. Pt constipated, started on bowel regiment. PT recommended [facility], however family opted to take patient home instead.     Patient is medically stable and ready for discharge with home PT.  This is only a brief summary. Please refer to the chart for the full hospital course.    Med Attending Addendum for Day of Discharge  4/27/23 [place]  ID:   Patient seen and evaluated at bedside on the day of discharge. On 4/27/23 in the late afternoon, the medical team was informed by case management that in light of difficulties with the patient being accepted at an [facility], the daughter would be coming to the hospital to take him home on arrival from her flight to [place] today. The patient was discharged home to the care of his daughter with home services. He should follow up with pulmonary and neurosurgery for his acute lumbar fracture. Alcohol use cessation counseled.     MRI spine: Moderate acute compression deformity inferior greater than superior   endplates of L1. No significant bony retropulsion/spinal canal narrowing.  Small right paracentral disc protrusion L3-4 with mass effect and   displacement of the right L4 nerve root    1.Acute L1 compression fracture   2. back pain  3. ambulatory dysfunction  4. bronchitis  5. constipation  6. dizziness  7.Hypertensive urgency- resolved  8.CORTES, resolved  9 Hypokalemia, resolved  10HFpEF, not in acute exacerbation  11DM    67yM, Moldovan speaking, with PMH of COPD, asthma, DM, HFpEF, seasonal allergies, and  HTN who presented  with acute onset back pain after fall at home. Admitted with L1 compression fracture, seen on CT thoracic and lumbar spine and ambulatory dysfunction. Assessed by neurosurgery, pain management and pulmonary    MRI results reviewed- known L1 compression fracture and L3-L4 disc protrusion  -pending flexion/extension xrays of the lumbar spine  -appreciate neurosurgery consult evaluation- c/w neurontin 300mg, oxycodone for pain control  -fall precautions  -RVP negative  -improved control with amlodipine 10mg, resumed losartan 50mg as CORTES resolved  -Orthostatics negative; tele monitoring  -ACHS FS, ISS; on metformin BID at home  -Seen by pulmonary, recommendations appreciated; resume breo and spiriva on dc  discharge home today

## 2023-04-26 NOTE — PROGRESS NOTE ADULT - PROBLEM SELECTOR PLAN 2
presenting after traumatic fall which was preceded by dizziness, no LOC or head trauma   h/o falls and syncopal episodes with injury in the setting of unsteady gait, as per daughter   CT thoracic/ Lumbar: acute lumbar compression fracture of L1 vertebrae with 25% loss in vertebral height   Pain management consulted:   - Oxycodone 5 mg PO q 4 hours PRN severe pain. Monitor for sedation/ respiratory depression.   - Acetaminophen 1 gram PO q 8 hours x 3 days. Monitor LFTs  - Gabapentin 100mg po q 8 hours. Monitor renal function.   - c/w Lidoderm 4% patch daily  - c/w bowel regiment   MRI shows Moderate acute compression deformity inferior greater than superior endplates of L1. No significant bony retropulsion/spinal canal narrowing. Small right paracentral disc protrusion L3-4 with mass effect and   displacement of the right L4 nerve root  f/u X-Ray L-Spine Flexion/Extension  PT consulted: recommends home PT (family would prefer [facility]), pending re-eval  Ortho following, f/u reccs presenting after traumatic fall which was preceded by dizziness, no LOC or head trauma   h/o falls and syncopal episodes with injury in the setting of unsteady gait, as per daughter   CT thoracic/ Lumbar: acute lumbar compression fracture of L1 vertebrae with 25% loss in vertebral height   Pain management consulted:   - Oxycodone 5 mg PO q 4 hours PRN severe pain. Monitor for sedation/ respiratory depression.   - Acetaminophen 1 gram PO q 8 hours x 3 days. Monitor LFTs  - Gabapentin 100mg po q 8 hours. Monitor renal function.   - c/w Lidoderm 4% patch daily  - c/w bowel regiment   MRI shows Moderate acute compression deformity inferior greater than superior endplates of L1. No significant bony retropulsion/spinal canal narrowing. Small right paracentral disc protrusion L3-4 with mass effect and   displacement of the right L4 nerve root  X-Ray L-Spine Flexion/Extension shows similar L1 fracture  PT consulted: recommends home PT  Neurosurgery following, conservative mgmt, conservative measures

## 2023-04-26 NOTE — DISCHARGE NOTE PROVIDER - NSDCMRMEDTOKEN_GEN_ALL_CORE_FT
amLODIPine 5 mg oral tablet: 1 tab(s) orally once a day  Astelin 137 mcg/inh nasal spray: 2 spray(s) intranasally 2 times a day  Breo Ellipta 200 mcg-25 mcg/inh inhalation powder: 1 puff(s) inhaled 2 times a day  Flonase Allergy Relief 50 mcg/inh nasal spray: 1 spray(s) in each nostril 2 times a day  losartan 100 mg oral tablet: 1 tab(s) orally once a day  metFORMIN 500 mg oral tablet: 1 tab(s) orally 2 times a day  montelukast 10 mg oral tablet: 1 tab(s) orally once a day (at bedtime)  omeprazole 20 mg oral delayed release capsule: 1 cap(s) orally once a day  Proventil HFA 90 mcg/inh inhalation aerosol: 2 puff(s) inhaled every 6 hours, As Needed -for shortness of breath and/or wheezing   theophylline 300 mg oral tablet: 1 tab(s) orally 2 times a day   amLODIPine 5 mg oral tablet: 1 tab(s) orally once a day  Astelin 137 mcg/inh nasal spray: 2 spray(s) intranasally 2 times a day  Breo Ellipta 200 mcg-25 mcg/inh inhalation powder: 1 puff(s) inhaled 2 times a day  cyclobenzaprine 5 mg oral tablet: 1 tab(s) orally every 8 hours as needed for Muscle Spasm  Flonase Allergy Relief 50 mcg/inh nasal spray: 1 spray(s) in each nostril 2 times a day  gabapentin 300 mg oral capsule: 1 cap(s) orally 3 times a day  lidocaine 4% topical film: Apply topically to affected area once a day as needed for back pain  losartan 100 mg oral tablet: 1 tab(s) orally once a day  metFORMIN 500 mg oral tablet: 1 tab(s) orally 2 times a day  montelukast 10 mg oral tablet: 1 tab(s) orally once a day (at bedtime)  omeprazole 20 mg oral delayed release capsule: 1 cap(s) orally once a day  polyethylene glycol 3350 oral powder for reconstitution: 17 gram(s) orally once a day as needed for  constipation  Proventil HFA 90 mcg/inh inhalation aerosol: 2 puff(s) inhaled every 6 hours, As Needed -for shortness of breath and/or wheezing   senna leaf extract oral tablet: 2 tab(s) orally once a day (at bedtime) as needed for  constipation  theophylline 300 mg oral tablet: 1 tab(s) orally 2 times a day  tiotropium 2.5 mcg/inh inhalation aerosol: 2 puff(s) inhaled once a day   amLODIPine 10 mg oral tablet: 1 tab(s) orally once a day  Astelin 137 mcg/inh nasal spray: 2 spray(s) intranasally 2 times a day  Breo Ellipta 200 mcg-25 mcg/inh inhalation powder: 1 puff(s) inhaled 2 times a day  cyclobenzaprine 5 mg oral tablet: 1 tab(s) orally every 8 hours as needed for Muscle Spasm  Flonase Allergy Relief 50 mcg/inh nasal spray: 1 spray(s) in each nostril 2 times a day  gabapentin 300 mg oral capsule: 1 cap(s) orally 3 times a day  lidocaine 4% topical film: Apply topically to affected area once a day as needed for back pain  losartan 100 mg oral tablet: 1 tab(s) orally once a day  metFORMIN 500 mg oral tablet: 1 tab(s) orally 2 times a day  montelukast 10 mg oral tablet: 1 tab(s) orally once a day (at bedtime)  omeprazole 20 mg oral delayed release capsule: 1 cap(s) orally once a day  polyethylene glycol 3350 oral powder for reconstitution: 17 gram(s) orally once a day as needed for  constipation  Proventil HFA 90 mcg/inh inhalation aerosol: 2 puff(s) inhaled every 6 hours, As Needed -for shortness of breath and/or wheezing   senna leaf extract oral tablet: 2 tab(s) orally once a day (at bedtime) as needed for  constipation  theophylline 300 mg oral tablet: 1 tab(s) orally 2 times a day  tiotropium 2.5 mcg/inh inhalation aerosol: 2 puff(s) inhaled once a day   amLODIPine 10 mg oral tablet: 1 tab(s) orally once a day  Astelin 137 mcg/inh nasal spray: 2 spray(s) intranasally 2 times a day  Breo Ellipta 200 mcg-25 mcg/inh inhalation powder: 1 puff(s) inhaled 2 times a day  cyclobenzaprine 5 mg oral tablet: 1 tab(s) orally every 8 hours as needed for Muscle Spasm  Flonase Allergy Relief 50 mcg/inh nasal spray: 1 spray(s) in each nostril 2 times a day  gabapentin 300 mg oral capsule: 1 cap(s) orally 3 times a day  lidocaine 4% topical film: Apply topically to affected area once a day as needed for back pain  losartan 100 mg oral tablet: 1 tab(s) orally once a day  metFORMIN 500 mg oral tablet: 1 tab(s) orally 2 times a day  montelukast 10 mg oral tablet: 1 tab(s) orally once a day (at bedtime)  omeprazole 20 mg oral delayed release capsule: 1 cap(s) orally once a day  oxyCODONE 5 mg oral tablet: 1 tab(s) orally every 6 hours as needed for Severe Pain (7 - 10) Take 1 tab by mouth every 6 hours as needed for severe pain MDD: 20 mg  polyethylene glycol 3350 oral powder for reconstitution: 17 gram(s) orally once a day as needed for  constipation  Proventil HFA 90 mcg/inh inhalation aerosol: 2 puff(s) inhaled every 6 hours, As Needed -for shortness of breath and/or wheezing   senna leaf extract oral tablet: 2 tab(s) orally once a day (at bedtime) as needed for  constipation  theophylline 300 mg oral tablet: 1 tab(s) orally 2 times a day  tiotropium 2.5 mcg/inh inhalation aerosol: 2 puff(s) inhaled once a day

## 2023-04-26 NOTE — DISCHARGE NOTE PROVIDER - PROVIDER TOKENS
PROVIDER:[TOKEN:[[id number]],FOLLOWUP:[2 weeks]] PROVIDER:[TOKEN:[[id number]],FOLLOWUP:[2 weeks]],PROVIDER:[TOKEN:[[id number]]],PROVIDER:[TOKEN:[[id number]]]

## 2023-04-26 NOTE — PROGRESS NOTE ADULT - SUBJECTIVE AND OBJECTIVE BOX
PGY-1 Progress Note discussed with attending    PAGER #: [[phone]] TILL 5:00 PM  PLEASE CONTACT ON CALL TEAM:   - On Call Team (Please refer to Angel) FROM 5:00 PM - 8:30PM  - Nightfloat Team FROM 8:30 -7:30 AM    INTERVAL HPI/OVERNIGHT EVENTS:       REVIEW OF SYSTEMS:  CONSTITUTIONAL: No fever, weight loss, or fatigue  RESPIRATORY: No cough, wheezing, chills or hemoptysis; No shortness of breath  CARDIOVASCULAR: No chest pain, palpitations, dizziness, or leg swelling  GASTROINTESTINAL: No abdominal pain. No nausea, vomiting, or hematemesis; No diarrhea or constipation. No melena or hematochezia.  GENITOURINARY: No dysuria or hematuria, urinary frequency  NEUROLOGICAL: No headaches, memory loss, loss of strength, numbness, or tremors  SKIN: No itching, burning, rashes, or lesions     MEDICATIONS  (STANDING):  acetaminophen     Tablet .. 1000 milliGRAM(s) Oral every 8 hours  amLODIPine   Tablet 10 milliGRAM(s) Oral daily  budesonide  80 MICROgram(s)/formoterol 4.5 MICROgram(s) Inhaler 2 Puff(s) Inhalation two times a day  fluticasone propionate 50 MICROgram(s)/spray Nasal Spray 1 Spray(s) Both Nostrils two times a day  folic acid 1 milliGRAM(s) Oral daily  gabapentin 100 milliGRAM(s) Oral three times a day  heparin   Injectable 5000 Unit(s) SubCutaneous every 12 hours  lidocaine   4% Patch 1 Patch Transdermal every 24 hours  montelukast 10 milliGRAM(s) Oral at bedtime  multivitamin 1 Tablet(s) Oral daily  pantoprazole    Tablet 40 milliGRAM(s) Oral before breakfast  polyethylene glycol 3350 17 Gram(s) Oral daily  senna 2 Tablet(s) Oral at bedtime  thiamine 100 milliGRAM(s) Oral daily  tiotropium 2.5 MICROgram(s) Inhaler 2 Puff(s) Inhalation daily    MEDICATIONS  (PRN):  albuterol    90 MICROgram(s) HFA Inhaler 2 Puff(s) Inhalation every 6 hours PRN Shortness of Breath and/or Wheezing  bisacodyl 5 milliGRAM(s) Oral every 12 hours PRN Constipation  ondansetron Injectable 4 milliGRAM(s) IV Push every 8 hours PRN Nausea and/or Vomiting  oxyCODONE    IR 5 milliGRAM(s) Oral every 4 hours PRN Severe Pain (7 - 10)      Vital Signs Last 24 Hrs  T(C): 36.9 (26 Apr 2023 11:16), Max: 37 (26 Apr 2023 07:32)  T(F): 98.4 (26 Apr 2023 11:16), Max: 98.6 (26 Apr 2023 07:32)  HR: 80 (26 Apr 2023 11:16) (71 - 80)  BP: 145/79 (26 Apr 2023 11:16) (132/58 - 178/64)  BP(mean): --  RR: 19 (26 Apr 2023 11:16) (17 - 19)  SpO2: 99% (26 Apr 2023 11:16) (97% - 100%)    Parameters below as of 26 Apr 2023 11:16  Patient On (Oxygen Delivery Method): room air        PHYSICAL EXAMINATION:  GENERAL: NAD, well built  HEAD:  Atraumatic, Normocephalic  EYES:  conjunctiva and sclera clear  NECK: Supple, No JVD, Normal thyroid  CHEST/LUNG: Clear to auscultation. Clear to percussion bilaterally; No rales, rhonchi, wheezing, or rubs  HEART: Regular rate and rhythm; No murmurs, rubs, or gallops  ABDOMEN: Soft, Nontender, Nondistended; Bowel sounds present  NERVOUS SYSTEM:  Alert & Oriented X3,    EXTREMITIES:  2+ Peripheral Pulses, No clubbing, cyanosis, or edema  SKIN: warm dry                          11.4   8.21  )-----------( 172      ( 26 Apr 2023 07:31 )             35.6     04-26    140  |  109<H>  |  18  ----------------------------<  102<H>  3.6   |  21<L>  |  0.84    Ca    9.4      26 Apr 2023 07:31  Phos  3.6     04-26  Mg     2.0     04-26                    I&O's Summary      CAPILLARY BLOOD GLUCOSE        CAPILLARY BLOOD GLUCOSE          RADIOLOGY & ADDITIONAL TESTS:                   PGY-1 Progress Note discussed with attending    PAGER #: [[phone]] TILL 5:00 PM  PLEASE CONTACT ON CALL TEAM:   - On Call Team (Please refer to Angel) FROM 5:00 PM - 8:30PM  - Nightfloat Team FROM 8:30 -7:30 AM    INTERVAL HPI/OVERNIGHT EVENTS:   No acute overnight events noted. Pt states he has persistent low back pain and mild abdominal cramping today.     REVIEW OF SYSTEMS:  CONSTITUTIONAL: No fever, weight loss, or fatigue  RESPIRATORY: No cough, wheezing, chills or hemoptysis; No shortness of breath  CARDIOVASCULAR: No chest pain, palpitations, dizziness, or leg swelling  GASTROINTESTINAL: + mild abdominal pain. No nausea, vomiting, or hematemesis; No diarrhea or constipation. No melena or hematochezia.  GENITOURINARY: No dysuria or hematuria, urinary frequency  NEUROLOGICAL: No headaches, memory loss, loss of strength, numbness, or tremors  MSK: + low back pain   SKIN: No itching, burning, rashes, or lesions     MEDICATIONS  (STANDING):  acetaminophen     Tablet .. 1000 milliGRAM(s) Oral every 8 hours  amLODIPine   Tablet 10 milliGRAM(s) Oral daily  budesonide  80 MICROgram(s)/formoterol 4.5 MICROgram(s) Inhaler 2 Puff(s) Inhalation two times a day  fluticasone propionate 50 MICROgram(s)/spray Nasal Spray 1 Spray(s) Both Nostrils two times a day  folic acid 1 milliGRAM(s) Oral daily  gabapentin 100 milliGRAM(s) Oral three times a day  heparin   Injectable 5000 Unit(s) SubCutaneous every 12 hours  lidocaine   4% Patch 1 Patch Transdermal every 24 hours  montelukast 10 milliGRAM(s) Oral at bedtime  multivitamin 1 Tablet(s) Oral daily  pantoprazole    Tablet 40 milliGRAM(s) Oral before breakfast  polyethylene glycol 3350 17 Gram(s) Oral daily  senna 2 Tablet(s) Oral at bedtime  thiamine 100 milliGRAM(s) Oral daily  tiotropium 2.5 MICROgram(s) Inhaler 2 Puff(s) Inhalation daily    MEDICATIONS  (PRN):  albuterol    90 MICROgram(s) HFA Inhaler 2 Puff(s) Inhalation every 6 hours PRN Shortness of Breath and/or Wheezing  bisacodyl 5 milliGRAM(s) Oral every 12 hours PRN Constipation  ondansetron Injectable 4 milliGRAM(s) IV Push every 8 hours PRN Nausea and/or Vomiting  oxyCODONE    IR 5 milliGRAM(s) Oral every 4 hours PRN Severe Pain (7 - 10)      Vital Signs Last 24 Hrs  T(C): 36.9 (26 Apr 2023 11:16), Max: 37 (26 Apr 2023 07:32)  T(F): 98.4 (26 Apr 2023 11:16), Max: 98.6 (26 Apr 2023 07:32)  HR: 80 (26 Apr 2023 11:16) (71 - 80)  BP: 145/79 (26 Apr 2023 11:16) (132/58 - 178/64)  BP(mean): --  RR: 19 (26 Apr 2023 11:16) (17 - 19)  SpO2: 99% (26 Apr 2023 11:16) (97% - 100%)    Parameters below as of 26 Apr 2023 11:16  Patient On (Oxygen Delivery Method): room air        PHYSICAL EXAMINATION:  GENERAL: NAD, well built, elderly, appears older than stated age  HEAD:  Atraumatic, Normocephalic  EYES:  conjunctiva and sclera clear  NECK: Supple, No JVD  CHEST/LUNG: Clear to auscultation. No rales, rhonchi, wheezing, or rubs  HEART: Regular rate and rhythm  ABDOMEN: Soft, Nontender, Nondistended; Bowel sounds present, no pain or masses on palpation  NERVOUS SYSTEM:  Alert & Oriented X3, following commands  PSYCH: appropriate affect  : voiding well  EXTREMITIES:  No clubbing, cyanosis, or edema  MSK: tenderness to palp of lumbar spine  SKIN: warm dry                        11.4   8.21  )-----------( 172      ( 26 Apr 2023 07:31 )             35.6     04-26    140  |  109<H>  |  18  ----------------------------<  102<H>  3.6   |  21<L>  |  0.84    Ca    9.4      26 Apr 2023 07:31  Phos  3.6     04-26  Mg     2.0     04-26

## 2023-04-26 NOTE — DISCHARGE NOTE PROVIDER - CARE PROVIDER_API CALL
TIFFANIE OLMSTEAD  Specialist  [address] [place] [address]  Phone: [phone]  Fax: [phone])-  Follow Up Time: 2 weeks   TIFFANIE OLMSTEAD  Specialist  [address] [place] [address]  Phone: ([phone]  Fax: ()-  Follow Up Time: 2 weeks    Christopher Granados; JEROME; MS)  Neurosurgery  [address], [address]  [place] [address]  Phone: [phone]  Fax: [phone]  Follow Up Time:     Va Sparks)  Critical Care Medicine; Pulmonary [place] [address]  Phone: [phone]  Fax: [phone]  Follow Up Time:

## 2023-04-26 NOTE — PROGRESS NOTE ADULT - PROBLEM SELECTOR PLAN 1
p/w /78, asymptomatic on admission, not compliant with meds  h/o HTN on amlodipine 5mg and losartan 100mg  EKG showing NSR with LVH and QRS widening and QTc 539  No signs of end organ ischemia; Trop negative x1 and no CORTES  c/w amlodipine 10mg  increase losartan to 100mg daily  BP still elevated  Monitor BP closely p/w /78, asymptomatic on admission, not compliant with meds  h/o HTN on amlodipine 5mg and losartan 100mg  EKG showing NSR with LVH and QRS widening and QTc 539  No signs of end organ ischemia; Trop negative x1 and no CORTES  c/w amlodipine 10mg  c/w losartan to 100mg daily  BP still elevated  Monitor BP closely

## 2023-04-26 NOTE — CONSULT NOTE ADULT - SUBJECTIVE AND OBJECTIVE BOX
SUBJECTIVE: Mr. Rome is a 66 y/o, Palestinian, male, with a hxHLD, DM, Syncope and falls, COPD (not on home O2) and active smoker and not compliant with home inhalers with a prior ICU admission requiring intubation for AHRF with hypercapnia and hypoxia, HTN noncompliant on medication c/b hypertensive encephalopathy, and chronic ETOH dependence and daily use c/b liver cirrhosis and portal HTN with EGD (01/2019) showing esophageal varices, non-bleeding, presenting after traumatic fall with preceding dizziness, now c/o intractable mid-lower back pain secondary to prior fall. During workup CT lumbar spine with L1 compression fx. Pt reports his low back pain has improved with current medication regimen. Denies any numbness, pain, tingling of LE. No urine/fecal incontinence reported.        Vital Signs Last 24 Hrs  T(C): 36.9 (26 Apr 2023 11:16), Max: 37 (26 Apr 2023 07:32)  T(F): 98.4 (26 Apr 2023 11:16), Max: 98.6 (26 Apr 2023 07:32)  HR: 80 (26 Apr 2023 11:16) (71 - 80)  BP: 145/79 (26 Apr 2023 11:16) (132/58 - 178/64)  BP(mean): -Mr. Rome is a 66 y/o, Palestinian, male, with a hxHLD, DM, Syncope and falls, COPD (not on home O2) and active smoker and not compliant with home inhalers with a prior ICU admission requiring intubation for AHRF with hypercapnia and hypoxia, HTN noncompliant on medication c/b hypertensive encephalopathy, and chronic ETOH dependence and daily use c/b liver cirrhosis and portal HTN with EGD (01/2019) showing esophageal varices, non-bleeding, presenting after traumatic fall with preceding dizziness, now c/o intractable mid-lower back pain secondary to prior fall. During workup CT lumbar spine with L1 compression fx. Pt reports his low back pain has improved with current medication regimen. Denies any numbness, pain, tingling of LE. No urine/fecal incontinence reported.   RR: 19 (26 Apr 2023 11:16) (17 - 19)  SpO2: 99% (26 Apr 2023 11:16) (97% - 100%)    Parameters below as of 26 Apr 2023 11:16  Patient On (Oxygen Delivery Method): room air      PHYSICAL EXAM:    Constitutional: No Acute Distress     Neurological: AOx3, Following Commands, Moving all Extremities. Strength 4-5/5 b/l LE. - SLT. No clonus                                                    Sensation: [x] intact to light touch  [] decreased:     Extremities: No calf tenderness     Incision:   LABS:                        11.4   8.21  )-----------( 172      ( 26 Apr 2023 07:31 )             35.6    04-26    140  |  109<H>  |  18  ----------------------------<  102<H>  3.6   |  21<L>  |  0.84    Ca    9.4      26 Apr 2023 07:31  Phos  3.6     04-26  Mg     2.0     04-26        MEDICATIONS:  Anticoagulation:   heparin   Injectable 5000 Unit(s) SubCutaneous every 12 hours    Antibiotics:    Endo:    Neuro:  acetaminophen     Tablet .. 1000 milliGRAM(s) Oral every 8 hours  gabapentin 100 milliGRAM(s) Oral three times a day  ondansetron Injectable 4 milliGRAM(s) IV Push every 8 hours PRN Nausea and/or Vomiting  oxyCODONE    IR 5 milliGRAM(s) Oral every 4 hours PRN Severe Pain (7 - 10)    Cardiac:  amLODIPine   Tablet 10 milliGRAM(s) Oral daily    Pulm:  albuterol    90 MICROgram(s) HFA Inhaler 2 Puff(s) Inhalation every 6 hours PRN Shortness of Breath and/or Wheezing  budesonide  80 MICROgram(s)/formoterol 4.5 MICROgram(s) Inhaler 2 Puff(s) Inhalation two times a day  montelukast 10 milliGRAM(s) Oral at bedtime  tiotropium 2.5 MICROgram(s) Inhaler 2 Puff(s) Inhalation daily    GI/:  bisacodyl 5 milliGRAM(s) Oral every 12 hours PRN Constipation  pantoprazole    Tablet 40 milliGRAM(s) Oral before breakfast  polyethylene glycol 3350 17 Gram(s) Oral daily  senna 2 Tablet(s) Oral at bedtime    Other:   fluticasone propionate 50 MICROgram(s)/spray Nasal Spray 1 Spray(s) Both Nostrils two times a day  folic acid 1 milliGRAM(s) Oral daily  lidocaine   4% Patch 1 Patch Transdermal every 24 hours  multivitamin 1 Tablet(s) Oral daily  thiamine 100 milliGRAM(s) Oral daily    IMAGING:   < from: MR Lumbar Spine No Cont (04.25.23 @ 12:24) >  IMPRESSION:  Moderate acute compression deformity inferior greater than superior   endplates of L1. No significant bony retropulsion/spinal canal narrowing.  Small right paracentral disc protrusion L3-4 with mass effect and   displacement of the right L4 nerve root    --- End of Report ---    < end of copied text >  < from: CT Lumbar Spine No Cont (04.21.23 @ 05:24) >  IMPRESSION:    Acute compression fracture at L1 with approximately 25% loss of vertebral   body height. No retropulsion into the ventral thecal sac. No traumatic   malalignment.    Chronic degenerative disc disease within the lumbar spine, see above for   details.    < end of copied text >

## 2023-04-26 NOTE — PROGRESS NOTE ADULT - SUBJECTIVE AND OBJECTIVE BOX
PATIENT SEEN AND EXAMINED ON :- 4/26/23  DATE OF SERVICE:   4/26/23          Interim events noted,Labs ,Radiological studies and Cardiology tests reviewed .    MR#[id number]  PATIENT NAME:DENNY ELIZABETH       [facility] COURSE: HPI:  67M from home, ambulates with caution uses cane sparingly, PMHx HLD, DM, Syncope and falls, COPD (not on home O2) and active smoker and not compliant with home inhalers with a prior ICU admission requiring intubation for AHRF with hypercapnia and hypoxia, HTN noncompliant on medication c/b hypertensive encephalopathy, and chronic ETOH dependence and daily use c/b liver cirrhosis and portal HTN with EGD (01/2019) showing esophageal varices, non-bleeding, presenting after traumatic fall with preceding dizziness, now c/o intractable mid-lower back pain. Patient is AOx2-3, unreliable historian unable to provide history without inaccuracies, compared to chart review and collateral obtained from daughter, Nikole Elizabeth [phone] who is out-of state but communicates daily with patient and patient's domestic common law partner, Reza Hercules. Patient was reported to get up from bed at night to turn off a light, when he became dizzy without HA, chest pain, SOB, or palpitations and lost his balance falling backwards and striking his back on a dresser. Denies any LOC or head trauma, as well as no nausea or vomiting. Reports severe pain throughout his lower back after fall, but denies any loss of sensation or weakness of his legs. He denies any loss of sensation in his groin or loss of continence.  Reports back pain had no relief with OTC ibuprofen. He is not on blood thinners. Although he reports no longer drinking or smoking, and then later reporting "sometimes on occasion", his daughter confirmed that he smokes 1/2-pack/day and drinks 3-glasses of vodka daily. Noted to have slight slurring in speech, which he states is his normal speech. He denies any preceding HA, changes in vision, ringing in the ears, chest pain, SOB, and no palpitations. Patient denies fevers, chills, and recent illnesses.  (21 Apr 2023 10:32)      INTERIM EVENTS:Patient seen at bedside ,interim events noted.      [facility] -reviewed admission note, no change since admission  HEART FAILURE: Acute[ ]Chronic[ ] Systolic[ ] Diastolic[ ] Combined Systolic and Diastolic[ ]  CAD[ ] CABG[ ] PCI[ ]  DEVICES[ ] PPM[ ] ICD[ ] ILR[ ]  ATRIAL FIBRILLATION[ ] Paroxysmal[ ] Permanent[ ] CHADS2-[  ]  CORTES[ ] CKD1[ ] CKD2[ ] CKD3[ ] CKD4[ ] ESRD[ ]  COPD[ ] HTN[ ]   DM[ ] Type1[ ] Type 2[ ]   CVA[ ] Paresis[ ]    AMBULATION: Assisted[ ] Cane/walker[ ] Independent[ ]    MEDICATIONS  (STANDING):  acetaminophen     Tablet .. 1000 milliGRAM(s) Oral every 8 hours  amLODIPine   Tablet 10 milliGRAM(s) Oral daily  budesonide  80 MICROgram(s)/formoterol 4.5 MICROgram(s) Inhaler 2 Puff(s) Inhalation two times a day  fluticasone propionate 50 MICROgram(s)/spray Nasal Spray 1 Spray(s) Both Nostrils two times a day  folic acid 1 milliGRAM(s) Oral daily  gabapentin 100 milliGRAM(s) Oral three times a day  heparin   Injectable 5000 Unit(s) SubCutaneous every 12 hours  lidocaine   4% Patch 1 Patch Transdermal every 24 hours  montelukast 10 milliGRAM(s) Oral at bedtime  multivitamin 1 Tablet(s) Oral daily  pantoprazole    Tablet 40 milliGRAM(s) Oral before breakfast  polyethylene glycol 3350 17 Gram(s) Oral daily  senna 2 Tablet(s) Oral at bedtime  thiamine 100 milliGRAM(s) Oral daily  tiotropium 2.5 MICROgram(s) Inhaler 2 Puff(s) Inhalation daily    MEDICATIONS  (PRN):  albuterol    90 MICROgram(s) HFA Inhaler 2 Puff(s) Inhalation every 6 hours PRN Shortness of Breath and/or Wheezing  bisacodyl 5 milliGRAM(s) Oral every 12 hours PRN Constipation  ondansetron Injectable 4 milliGRAM(s) IV Push every 8 hours PRN Nausea and/or Vomiting  oxyCODONE    IR 5 milliGRAM(s) Oral every 4 hours PRN Severe Pain (7 - 10)            REVIEW OF SYSTEMS:  Constitutional: [ ] fever, [ ]weight loss,  [ ]fatigue [ ]weight gain  Eyes: [ ] visual changes  Respiratory: [ ]shortness of breath;  [ ] cough, [ ]wheezing, [ ]chills, [ ]hemoptysis  Cardiovascular: [ ] chest pain, [ ]palpitations, [ ]dizziness,  [ ]leg swelling[ ]orthopnea[ ]PND  Gastrointestinal: [ ] abdominal pain, [ ]nausea, [ ]vomiting,  [ ]diarrhea [ ]Constipation [ ]Melena  Genitourinary: [ ] dysuria, [ ] hematuria [ ]Goss  Neurologic: [ ] headaches [ ] tremors[ ]weakness [ ]Paralysis Right[ ] Left[ ]  Skin: [ ] itching, [ ]burning, [ ] rashes  Endocrine: [ ] heat or cold intolerance  Musculoskeletal: [ ] joint pain or swelling; [ ] muscle, back, or extremity pain  Psychiatric: [ ] depression, [ ]anxiety, [ ]mood swings, or [ ]difficulty sleeping  Hematologic: [ ] easy bruising, [ ] bleeding gums    [ ] All remaining systems negative except as per above.   [ ]Unable to obtain.  [x] No change in ROS since admission      Vital Signs Last 24 Hrs  T(C): 37.1 (26 Apr 2023 19:33), Max: 37.1 (26 Apr 2023 19:33)  T(F): 98.8 (26 Apr 2023 19:33), Max: 98.8 (26 Apr 2023 19:33)  HR: 71 (26 Apr 2023 19:33) (69 - 80)  BP: 140/54 (26 Apr 2023 16:40) (140/54 - 178/64)  BP(mean): --  RR: 17 (26 Apr 2023 19:33) (17 - 19)  SpO2: 98% (26 Apr 2023 19:33) (97% - 100%)    Parameters below as of 26 Apr 2023 19:33  Patient On (Oxygen Delivery Method): room air      I&O's Summary      PHYSICAL EXAM:  General: No acute distress BMI-  HEENT: EOMI, PERRL  Neck: Supple, [ ] JVD  Lungs: Equal air entry bilaterally; [ ] rales [ ] wheezing [ ] rhonchi  Heart: Regular rate and rhythm; [x ] murmur   2/6 [ x] systolic [ ] diastolic [ ] radiation[ ] rubs [ ]  gallops  Abdomen: Nontender, bowel sounds present  Extremities: No clubbing, cyanosis, [ ] edema [ ]Pulses  equal and intact  Nervous system:  Alert & Oriented X3, no focal deficits  Psychiatric: Normal affect  Skin: No rashes or lesions    LABS:  04-26    140  |  109<H>  |  18  ----------------------------<  102<H>  3.6   |  21<L>  |  0.84    Ca    9.4      26 Apr 2023 07:31  Phos  3.6     04-26  Mg     2.0     04-26      Creatinine Trend: 0.84<--, 0.98<--, 1.01<--, 1.54<--, 1.36<--, 0.85<--                        11.4   8.21  )-----------( 172      ( 26 Apr 2023 07:31 )             35.6     < from: Transthoracic Echocardiogram (04.21.23 @ 12:26) >    Patient name: GREGORY ELIZABETH  YOB: 1955   Age: 67 (M)   MR#: [id number]  Study Date: 4/21/2023  Location: [address] [place], [place] [address]Sonographer: Ciera Duran [facility]  Study quality: Technically good  Referring Physician: Francis Sherman MD  Blood Pressure:210/84 mmHg  Height: 160 cm  Weight: 75 kg  BSA: 1.8 m2  ------------------------------------------------------------------------    PROCEDURE: Transthoracic echocardiogram with 2-D, M-Mode  and complete spectral and color flow Doppler.  INDICATION: Dyspnea, unspecified (R06.00)  HISTORY:  ------------------------------------------------------------------------  DIMENSIONS:  Dimensions:     Normal Values:  LA:     4.6 cm    2.0 - 4.0 cm  Ao:     3.7 cm    2.0 - 3.8 cm  SEPTUM: 1.1 cm    0.6 - 1.2cm  PWT:    1.0 cm    0.6 - 1.1 cm  LVIDd:  5.0 cm    3.0 - 5.6 cm  LVIDs:  4.0 cm    1.8 - 4.0 cm      Derived Variables:  LVMI: 109 g/m2  RWT: 0.40  Ejection Fraction Visual Estimate: 50-55 %    ------------------------------------------------------------------------  OBSERVATIONS:  Mitral Valve: Mitral annular calcification. Trace mitral  regurgitation.  Aortic Root: Aortic Root: 3.7 cm.    Aortic Valve: Aortic valve not well visualized. Estimated  aortic valve area equals 1.9 sqcm (by continuity equation),  consistent with mild aortic stenosis. Mild aortic  regurgitation.  Left Atrium: Normal left atrium.  LA volume index = 33  cc/m2.  Left Ventricle: Normal Left Ventricular Systolic Function,  (EF = 50-55%) Normal left ventricular internal dimensions  and wall thicknesses. Grade II diastolic dysfunction  (moderate).  Right Heart: Normal right atrium. Normal right ventricular  size and systolic function (TAPSE  2.6cm). There is mild  tricuspid regurgitation. Normal pulmonic valve.  Pericardium/PleuraNormal pericardium with no pericardial  effusion.  Hemodynamic: RV systolic pressure is moderately increased  at  51 mm Hg.  ------------------------------------------------------------------------  CONCLUSIONS:  1. Mitral annular calcification. Trace mitral  regurgitation.  2. Aortic valve not well visualized. Estimated aortic valve  area equals 1.9 sqcm (by continuity equation), consistent  with mild aortic stenosis. Mild aortic regurgitation.  3. Aortic Root: 3.7 cm.    4. Normal left atrium.  LA volume index = 33 cc/m2.  5. Normal left ventricular internal dimensions and wall  thicknesses.  6. Normal Left Ventricular Systolic Function,  (EF =  50-55%)  7. Grade II diastolic dysfunction (moderate).  8. Normal right atrium.  9. Normal right ventricular size and systolic function  (TAPSE  2.6cm).  10. RV systolic pressure is moderately increased at  51 mm  Hg.  11. There is mild tricuspid regurgitation.  12. Normal pulmonic valve.  13. Normal pericardium with no pericardial effusion.    ------------------------------------------------------------------------  Confirmed on  4/21/2023 - 17:40:54 by Kristen Hawkins MD  ------------------------------------------------------------------------    < end of copied text >

## 2023-04-26 NOTE — CONSULT NOTE ADULT - ASSESSMENT
Mr. Rome is a 66 y/o, [place], male, with a hxHLD, DM, Syncope and falls, COPD (not on home O2) and active smoker and not compliant with home inhalers with a prior ICU admission requiring intubation for AHRF with hypercapnia and hypoxia, HTN noncompliant on medication c/b hypertensive encephalopathy, and chronic ETOH dependence and daily use c/b liver cirrhosis and portal HTN with EGD (01/2019) showing esophageal varices, non-bleeding, presenting after traumatic fall with preceding dizziness, now c/o intractable mid-lower back pain secondary to prior fall. During workup CT lumbar spine with L1 compression fx. MRI lumbar spine with L1 deformity, ddd l spine and L3-L4 herniated disc with stenosis.     Plan:  Neurosurgery:   Neuro checks daily   Considering pt medical hx and tolerance to back pain no neurosurgical intervention recommended at this time inpatient.   Pt to f/u with Dr. Granados once d/c for outpatient f/u and further management   Continue with current pain management w/ gabapentin and oxycodone. Considering adding muscle relaxer if needed, or increasing gabapentin to 300 mg TID.   Continue with BP regimen- amolidipine and losartan.   Bowel regimen with miralax and senna   Counseled on smoking cessation and cutting back on alcohol intake   Pulmonary consulted: Dr. Navarrete. CT chest non-contrast as outpatient.  DVT ppx with heparin subq   PT consulted: recommends home PT (family would prefer [facility]), pending re-eval

## 2023-04-26 NOTE — DISCHARGE NOTE PROVIDER - CARE PROVIDERS DIRECT ADDRESSES
,DirectAddress_Unknown ,DirectAddress_Unknown,DirectAddress_Unknown,[email or web address]

## 2023-04-26 NOTE — PROGRESS NOTE ADULT - PROBLEM SELECTOR PLAN 2
TTE (01/2019): 50-55% EF with mild MR, mild AS, G2DD, and mild pulmonary HTN   EKG showing NSR with LVH and QRS widening and QTc 539  admits to medication non-compliance  Echo showed EF 50-55%, grade 2 DD, RVP mod increased at 51mm Hg

## 2023-04-26 NOTE — PROGRESS NOTE ADULT - ATTENDING COMMENTS
Patient seen/evaluated at bedside. I agree with the resident progress note/outlined plan of care. My independent findings and conclusions are documented.    [place]  Jorge ID# [id number]      s: + constipation. expresses some apprehension in discharge home as he feels unsteady.   AAOx3,  dentures in today  3/5 LLE, 4+ RLE    MRI spine: Moderate acute compression deformity inferior greater than superior   endplates of L1. No significant bony retropulsion/spinal canal narrowing.  Small right paracentral disc protrusion L3-4 with mass effect and   displacement of the right L4 nerve root    1.Acute L1 compression fracture   2. back pain  3. ambulatory dysfunction  4. bronchitis  5. constipation  6. dizziness  7.Hypertensive urgency- resolved  8.CORTES, resolved  9 Hypokalemia, resolved  10HFpEF, not in acute exacerbation  11DM    67yM, [place]or[place] speaking, with PMH of COPD, asthma, DM, HFpEF, seasonal allergies, and  HTN who presented  with acute onset back pain after fall at home. Admitted with L1 compression fracture, seen on CT thoracic and lumbar spine.      MRI results reviewed- known L1 compression fracture and L3-L4 disc protrusion  -pending flexion/extension xrays of the lumbar spine  -appreciate neurosurgery consult evaluation- agree with increase of neurontin to 300mg tid  -fall precautions  -repeat cxr, check full RVP, if cough progresses and RVP is negative, could add azithromycin for bronchitis  -improved control with amlodipine 10mg, resumed losartan 50mg as CORTES resolved  -Orthostatics negative; tele monitoring  -ACHS FS, ISS; on metformin BID at home  -Pain management following; on bowel regimen- if no bm today, administer dose of mag citrate  -Seen by pulmonary, recommendations appreciated; resume breo and spiriva on dc  -PT rec: home therapy- PT reassessment, appears to be requiring additional assistance to ambulate  -DVT ppx: heparin  -Dispo: can likely be dc'd in 24-48h, once BP stabilizes and pending ortho recs. Patient seen/evaluated at bedside 4/26/23. I agree with the resident progress note/outlined plan of care. My independent findings and conclusions are documented.    Mongolian  Jorge ID# [id number]      s: + constipation. expresses some apprehension in discharge home as he feels unsteady.   AAOx3,  dentures in today  3/5 LLE, 4+ RLE    MRI spine: Moderate acute compression deformity inferior greater than superior   endplates of L1. No significant bony retropulsion/spinal canal narrowing.  Small right paracentral disc protrusion L3-4 with mass effect and   displacement of the right L4 nerve root    1.Acute L1 compression fracture   2. back pain  3. ambulatory dysfunction  4. bronchitis  5. constipation  6. dizziness  7.Hypertensive urgency- resolved  8.CORTES, resolved  9 Hypokalemia, resolved  10HFpEF, not in acute exacerbation  11DM    67yM, Mongolian speaking, with PMH of COPD, asthma, DM, HFpEF, seasonal allergies, and  HTN who presented  with acute onset back pain after fall at home. Admitted with L1 compression fracture, seen on CT thoracic and lumbar spine.      MRI results reviewed- known L1 compression fracture and L3-L4 disc protrusion  -pending flexion/extension xrays of the lumbar spine  -appreciate neurosurgery consult evaluation- agree with increase of neurontin to 300mg tid  -fall precautions  -repeat cxr, check full RVP, if cough progresses and RVP is negative, could add azithromycin for bronchitis  -improved control with amlodipine 10mg, resumed losartan 50mg as CORTES resolved  -Orthostatics negative; tele monitoring  -ACHS FS, ISS; on metformin BID at home  -Pain management following; on bowel regimen- if no bm today, administer dose of mag citrate  -Seen by pulmonary, recommendations appreciated; resume breo and spiriva on dc  -PT rec: home therapy- PT reassessment, appears to be requiring additional assistance to ambulate  -DVT ppx: heparin  -Dispo: can likely be dc'd in 24-48h, once BP stabilizes and pending ortho recs.

## 2023-04-26 NOTE — PROGRESS NOTE ADULT - PROBLEM SELECTOR PLAN 5
although denies daily smoking, daughter confirms drinks 1-3 glasses of vodka daily   h/o chronic ETOH use with liver cirrhosis and portal HTN, compensated   will hold off on CIWA protocol for now, no signs of withdrawal  c/w thiamine, multivitamin, and folic acid  SBIRT consulted

## 2023-04-27 ENCOUNTER — TRANSCRIPTION ENCOUNTER (OUTPATIENT)
Age: 68
End: 2023-04-27

## 2023-04-27 VITALS — WEIGHT: 148.81 LBS

## 2023-04-27 DIAGNOSIS — K59.00 CONSTIPATION, UNSPECIFIED: ICD-10-CM

## 2023-04-27 LAB
ALBUMIN SERPL ELPH-MCNC: 2.5 G/DL — LOW (ref 3.5–5)
ALP SERPL-CCNC: 80 U/L — SIGNIFICANT CHANGE UP (ref 40–120)
ALT FLD-CCNC: 12 U/L DA — SIGNIFICANT CHANGE UP (ref 10–60)
ANION GAP SERPL CALC-SCNC: 3 MMOL/L — LOW (ref 5–17)
AST SERPL-CCNC: 17 U/L — SIGNIFICANT CHANGE UP (ref 10–40)
BILIRUB SERPL-MCNC: 0.4 MG/DL — SIGNIFICANT CHANGE UP (ref 0.2–1.2)
BUN SERPL-MCNC: 20 MG/DL — HIGH (ref 7–18)
CALCIUM SERPL-MCNC: 9 MG/DL — SIGNIFICANT CHANGE UP (ref 8.4–10.5)
CHLORIDE SERPL-SCNC: 112 MMOL/L — HIGH (ref 96–108)
CO2 SERPL-SCNC: 26 MMOL/L — SIGNIFICANT CHANGE UP (ref 22–31)
CREAT SERPL-MCNC: 0.86 MG/DL — SIGNIFICANT CHANGE UP (ref 0.5–1.3)
EGFR: 94 ML/MIN/1.73M2 — SIGNIFICANT CHANGE UP
GLUCOSE SERPL-MCNC: 97 MG/DL — SIGNIFICANT CHANGE UP (ref 70–99)
HCT VFR BLD CALC: 34 % — LOW (ref 39–50)
HGB BLD-MCNC: 10.7 G/DL — LOW (ref 13–17)
MAGNESIUM SERPL-MCNC: 2.1 MG/DL — SIGNIFICANT CHANGE UP (ref 1.6–2.6)
MCHC RBC-ENTMCNC: 30.1 PG — SIGNIFICANT CHANGE UP (ref 27–34)
MCHC RBC-ENTMCNC: 31.5 GM/DL — LOW (ref 32–36)
MCV RBC AUTO: 95.8 FL — SIGNIFICANT CHANGE UP (ref 80–100)
NRBC # BLD: 0 /100 WBCS — SIGNIFICANT CHANGE UP (ref 0–0)
PHOSPHATE SERPL-MCNC: 3.3 MG/DL — SIGNIFICANT CHANGE UP (ref 2.5–4.5)
PLATELET # BLD AUTO: 155 K/UL — SIGNIFICANT CHANGE UP (ref 150–400)
POTASSIUM SERPL-MCNC: 3.6 MMOL/L — SIGNIFICANT CHANGE UP (ref 3.5–5.3)
POTASSIUM SERPL-SCNC: 3.6 MMOL/L — SIGNIFICANT CHANGE UP (ref 3.5–5.3)
PROT SERPL-MCNC: 6.6 G/DL — SIGNIFICANT CHANGE UP (ref 6–8.3)
RBC # BLD: 3.55 M/UL — LOW (ref 4.2–5.8)
RBC # FLD: 16.7 % — HIGH (ref 10.3–14.5)
SODIUM SERPL-SCNC: 141 MMOL/L — SIGNIFICANT CHANGE UP (ref 135–145)
WBC # BLD: 6.33 K/UL — SIGNIFICANT CHANGE UP (ref 3.8–10.5)
WBC # FLD AUTO: 6.33 K/UL — SIGNIFICANT CHANGE UP (ref 3.8–10.5)

## 2023-04-27 PROCEDURE — 72110 X-RAY EXAM L-2 SPINE 4/>VWS: CPT

## 2023-04-27 PROCEDURE — 0225U NFCT DS DNA&RNA 21 SARSCOV2: CPT

## 2023-04-27 PROCEDURE — 84484 ASSAY OF TROPONIN QUANT: CPT

## 2023-04-27 PROCEDURE — 93005 ELECTROCARDIOGRAM TRACING: CPT

## 2023-04-27 PROCEDURE — 85027 COMPLETE CBC AUTOMATED: CPT

## 2023-04-27 PROCEDURE — 96374 THER/PROPH/DIAG INJ IV PUSH: CPT

## 2023-04-27 PROCEDURE — 99239 HOSP IP/OBS DSCHRG MGMT >30: CPT | Mod: GC

## 2023-04-27 PROCEDURE — 97162 PT EVAL MOD COMPLEX 30 MIN: CPT

## 2023-04-27 PROCEDURE — 86803 HEPATITIS C AB TEST: CPT

## 2023-04-27 PROCEDURE — 93306 TTE W/DOPPLER COMPLETE: CPT

## 2023-04-27 PROCEDURE — 72128 CT CHEST SPINE W/O DYE: CPT | Mod: MA

## 2023-04-27 PROCEDURE — 99285 EMERGENCY DEPT VISIT HI MDM: CPT

## 2023-04-27 PROCEDURE — 72148 MRI LUMBAR SPINE W/O DYE: CPT

## 2023-04-27 PROCEDURE — 80053 COMPREHEN METABOLIC PANEL: CPT

## 2023-04-27 PROCEDURE — 83735 ASSAY OF MAGNESIUM: CPT

## 2023-04-27 PROCEDURE — 84100 ASSAY OF PHOSPHORUS: CPT

## 2023-04-27 PROCEDURE — 71045 X-RAY EXAM CHEST 1 VIEW: CPT

## 2023-04-27 PROCEDURE — 80048 BASIC METABOLIC PNL TOTAL CA: CPT

## 2023-04-27 PROCEDURE — 85025 COMPLETE CBC W/AUTO DIFF WBC: CPT

## 2023-04-27 PROCEDURE — 70450 CT HEAD/BRAIN W/O DYE: CPT

## 2023-04-27 PROCEDURE — 72131 CT LUMBAR SPINE W/O DYE: CPT | Mod: MA

## 2023-04-27 PROCEDURE — 99232 SBSQ HOSP IP/OBS MODERATE 35: CPT

## 2023-04-27 PROCEDURE — 94640 AIRWAY INHALATION TREATMENT: CPT

## 2023-04-27 PROCEDURE — 36415 COLL VENOUS BLD VENIPUNCTURE: CPT

## 2023-04-27 RX ORDER — SENNA PLUS 8.6 MG/1
2 TABLET ORAL
Qty: 60 | Refills: 0
Start: 2023-04-27 | End: 2023-05-26

## 2023-04-27 RX ORDER — LIDOCAINE 4 G/100G
1 CREAM TOPICAL
Qty: 10 | Refills: 0
Start: 2023-04-27 | End: 2023-05-06

## 2023-04-27 RX ORDER — CYCLOBENZAPRINE HYDROCHLORIDE 10 MG/1
5 TABLET, FILM COATED ORAL EVERY 8 HOURS
Refills: 0 | Status: DISCONTINUED | OUTPATIENT
Start: 2023-04-27 | End: 2023-04-27

## 2023-04-27 RX ORDER — MAGNESIUM HYDROXIDE 400 MG/1
30 TABLET, CHEWABLE ORAL DAILY
Refills: 0 | Status: DISCONTINUED | OUTPATIENT
Start: 2023-04-27 | End: 2023-04-27

## 2023-04-27 RX ORDER — GABAPENTIN 400 MG/1
1 CAPSULE ORAL
Qty: 90 | Refills: 0
Start: 2023-04-27 | End: 2023-05-26

## 2023-04-27 RX ORDER — TIOTROPIUM BROMIDE 18 UG/1
2 CAPSULE ORAL; RESPIRATORY (INHALATION)
Qty: 2 | Refills: 0
Start: 2023-04-27 | End: 2023-05-26

## 2023-04-27 RX ORDER — CYCLOBENZAPRINE HYDROCHLORIDE 10 MG/1
1 TABLET, FILM COATED ORAL
Qty: 30 | Refills: 0
Start: 2023-04-27 | End: 2023-05-06

## 2023-04-27 RX ORDER — POLYETHYLENE GLYCOL 3350 17 G/17G
17 POWDER, FOR SOLUTION ORAL
Qty: 1 | Refills: 0
Start: 2023-04-27 | End: 2023-05-26

## 2023-04-27 RX ORDER — AMLODIPINE BESYLATE 2.5 MG/1
1 TABLET ORAL
Qty: 30 | Refills: 0
Start: 2023-04-27 | End: 2023-05-26

## 2023-04-27 RX ORDER — GABAPENTIN 400 MG/1
300 CAPSULE ORAL THREE TIMES A DAY
Refills: 0 | Status: DISCONTINUED | OUTPATIENT
Start: 2023-04-27 | End: 2023-04-27

## 2023-04-27 RX ADMIN — GABAPENTIN 100 MILLIGRAM(S): 400 CAPSULE ORAL at 06:55

## 2023-04-27 RX ADMIN — LIDOCAINE 1 PATCH: 4 CREAM TOPICAL at 20:24

## 2023-04-27 RX ADMIN — Medication 1000 MILLIGRAM(S): at 14:48

## 2023-04-27 RX ADMIN — POLYETHYLENE GLYCOL 3350 17 GRAM(S): 17 POWDER, FOR SOLUTION ORAL at 12:34

## 2023-04-27 RX ADMIN — TIOTROPIUM BROMIDE 2 PUFF(S): 18 CAPSULE ORAL; RESPIRATORY (INHALATION) at 12:34

## 2023-04-27 RX ADMIN — Medication 1 TABLET(S): at 12:33

## 2023-04-27 RX ADMIN — PANTOPRAZOLE SODIUM 40 MILLIGRAM(S): 20 TABLET, DELAYED RELEASE ORAL at 06:55

## 2023-04-27 RX ADMIN — OXYCODONE HYDROCHLORIDE 5 MILLIGRAM(S): 5 TABLET ORAL at 13:48

## 2023-04-27 RX ADMIN — Medication 1 SPRAY(S): at 06:56

## 2023-04-27 RX ADMIN — Medication 1000 MILLIGRAM(S): at 07:20

## 2023-04-27 RX ADMIN — LIDOCAINE 1 PATCH: 4 CREAM TOPICAL at 12:32

## 2023-04-27 RX ADMIN — Medication 100 MILLIGRAM(S): at 12:33

## 2023-04-27 RX ADMIN — Medication 1000 MILLIGRAM(S): at 13:53

## 2023-04-27 RX ADMIN — LOSARTAN POTASSIUM 100 MILLIGRAM(S): 100 TABLET, FILM COATED ORAL at 06:55

## 2023-04-27 RX ADMIN — Medication 1 MILLIGRAM(S): at 12:39

## 2023-04-27 RX ADMIN — BUDESONIDE AND FORMOTEROL FUMARATE DIHYDRATE 2 PUFF(S): 160; 4.5 AEROSOL RESPIRATORY (INHALATION) at 10:45

## 2023-04-27 RX ADMIN — AMLODIPINE BESYLATE 10 MILLIGRAM(S): 2.5 TABLET ORAL at 06:55

## 2023-04-27 RX ADMIN — Medication 1000 MILLIGRAM(S): at 06:55

## 2023-04-27 RX ADMIN — GABAPENTIN 300 MILLIGRAM(S): 400 CAPSULE ORAL at 13:53

## 2023-04-27 RX ADMIN — OXYCODONE HYDROCHLORIDE 5 MILLIGRAM(S): 5 TABLET ORAL at 12:39

## 2023-04-27 RX ADMIN — HEPARIN SODIUM 5000 UNIT(S): 5000 INJECTION INTRAVENOUS; SUBCUTANEOUS at 06:55

## 2023-04-27 NOTE — DISCHARGE NOTE NURSING/CASE MANAGEMENT/SOCIAL WORK - PATIENT PORTAL LINK FT
You can access the FollowMyHealth Patient Portal offered by [facility] by registering at the following website: [email or web address]. By joining [facility]’s FollowMyHealth portal, you will also be able to view your health information using other applications (apps) compatible with our system.

## 2023-04-27 NOTE — DIETITIAN INITIAL EVALUATION ADULT - OTHER INFO
Patient from home admitted for s/p fall with lumbar fracture & HTN urgency. Visited pt. with RN at bedside, pt. alert but "mildly agitated" & Malagasy speaking, accepted "[employer]"  ID# [id number] - Alen, per pt. "do not want the food served & not eating for 5 days", further stated disliked "chopped food & I like regular foods do not have to be kosher" noted, denies chewing/swallowing difficulties, at times pt. "speech not clear" for  to understand. Patient from home admitted for s/p fall with lumbar fracture & HTN urgency. Visited pt. with RN at bedside, pt. alert but "mildly agitated" & [place] Islander speaking, accepted "[employer]"  ID# [id number] - Alen, per pt. "do not want the food served & not eating for 5 days", in addition stated disliked "chopped food & I like regular foods do not have to be kosher" noted, denies chewing/swallowing difficulties, at times pt. "speech not clear" for  to understand, pt. did confirmed his weight has been fluctuating "gain & losing weight"? dosing wt. 161.1 Lbs on 04/23/23. Attempted to obtained food preferences, pt. "kept repeating wants regular foods", reassured pt. & offered nutrition supplements Patient from home admitted for s/p fall with lumbar fracture & HTN urgency. Visited pt. with RN at bedside, pt. alert but "mildly agitated" & Latvian speaking, accepted "[employer]"  ID# [id number] - Alen, per pt. "do not want the food served & not eating for 5 days", in addition stated disliked "chopped food & I like regular foods do not have to be kosher" noted, denies chewing/swallowing difficulties, at times pt. "speech not clear" for  to understand, pt. did confirmed his weight has been fluctuating "gain & losing weight"? dosing wt. 161.1 Lbs on 04/23/23. Attempted to obtained food preferences, pt. kept repeating "wants regular foods", reassured pt. & offered nutrition supplements, encourage po intake with meal, discussed with RN, awaiting JIGNESH placement.

## 2023-04-27 NOTE — DISCHARGE NOTE NURSING/CASE MANAGEMENT/SOCIAL WORK - NSDCPEFALRISK_GEN_ALL_CORE
For information on Fall & Injury Prevention, visit: [email or web address] OR  [email or web address] OR  https://www.cdc.gov/steadi/patient.html

## 2023-04-27 NOTE — DIETITIAN INITIAL EVALUATION ADULT - PERTINENT MEDS FT
MEDICATIONS  (STANDING):  acetaminophen     Tablet .. 1000 milliGRAM(s) Oral every 8 hours  amLODIPine   Tablet 10 milliGRAM(s) Oral daily  budesonide  80 MICROgram(s)/formoterol 4.5 MICROgram(s) Inhaler 2 Puff(s) Inhalation two times a day  fluticasone propionate 50 MICROgram(s)/spray Nasal Spray 1 Spray(s) Both Nostrils two times a day  folic acid 1 milliGRAM(s) Oral daily  gabapentin 300 milliGRAM(s) Oral three times a day  heparin   Injectable 5000 Unit(s) SubCutaneous every 12 hours  lidocaine   4% Patch 1 Patch Transdermal every 24 hours  losartan 100 milliGRAM(s) Oral daily  montelukast 10 milliGRAM(s) Oral at bedtime  multivitamin 1 Tablet(s) Oral daily  pantoprazole    Tablet 40 milliGRAM(s) Oral before breakfast  polyethylene glycol 3350 17 Gram(s) Oral daily  senna 2 Tablet(s) Oral at bedtime  thiamine 100 milliGRAM(s) Oral daily  tiotropium 2.5 MICROgram(s) Inhaler 2 Puff(s) Inhalation daily    MEDICATIONS  (PRN):  albuterol    90 MICROgram(s) HFA Inhaler 2 Puff(s) Inhalation every 6 hours PRN Shortness of Breath and/or Wheezing  bisacodyl 5 milliGRAM(s) Oral every 12 hours PRN Constipation  melatonin 5 milliGRAM(s) Oral at bedtime PRN Insomnia  ondansetron Injectable 4 milliGRAM(s) IV Push every 8 hours PRN Nausea and/or Vomiting  oxyCODONE    IR 5 milliGRAM(s) Oral every 4 hours PRN Severe Pain (7 - 10)

## 2023-04-27 NOTE — PROGRESS NOTE ADULT - PROBLEM SELECTOR PLAN 1
p/w /78, asymptomatic on admission, not compliant with meds  h/o HTN on amlodipine 5mg and losartan 100mg  EKG showing NSR with LVH and QRS widening and QTc 539  No signs of end organ ischemia; Trop negative x1 and no CORTES  c/w amlodipine 10mg  c/w losartan to 100mg daily

## 2023-04-27 NOTE — PROGRESS NOTE ADULT - PROBLEM SELECTOR PLAN 1
Pt with acute lumbar back pain which is somatic and neuropathic in nature due to acute L1 compression fracture and degenerative disc disease. MRI lumbar spine demonstrates-Moderate acute compression deformity inferior greater than superior endplates of L1. No significant bony retropulsion/spinal canal narrowing. Small right paracentral disc protrusion L3-4 with mass effect and displacement of the right L4 nerve root. Pt seen by Neurosurgery team and no surgical intervention recommended at this time. Pt to f/u with Dr. Granados after dc x outpatient.    Opioid pain recommendations   - Continue Oxycodone 5 mg PO q 4 hours PRN severe pain. Monitor for sedation/ respiratory depression.   Non-opioid pain recommendations   - Continue Acetaminophen 1 gram PO q 8 hours x 4 days. Monitor LFTs  - Continue Gabapentin 300 mg po TID  - Continue Lidoderm 4% patch daily.   - Added Flexeril 5 mg po q8h PRN  - Avoid NSAIDs- recent CORTES, now resolved.   Bowel Regimen  - Miralax 17G PO daily  - Senna 2 tablets at bedtime for constipation  - Dulcolax 5mg PO BID PRN constipation and Magnesium Hydroxide suspension for constipation.  Mild pain   - Non-pharmacological pain treatment recommendations  - Warm/ Cool packs PRN   - Repositioning, imagery, relaxation, distraction.  - Physical therapy OOB if no contraindications   Recommendations discussed with primary team and RN.

## 2023-04-27 NOTE — DIETITIAN INITIAL EVALUATION ADULT - ETIOLOGY
HTN with fracture of unspecified lumbar vertebra, ETOH abuse, cirrhosis, COPD & multiple comorbidities

## 2023-04-27 NOTE — PROGRESS NOTE ADULT - SUBJECTIVE AND OBJECTIVE BOX
Source of information: GREGORY ELIZABETH, Chart review  Patient language: English  : n/a    HPI:  67M from home, ambulates with caution uses cane sparingly, PMHx HLD, DM, Syncope and falls, COPD (not on home O2) and active smoker and not compliant with home inhalers with a prior ICU admission requiring intubation for AHRF with hypercapnia and hypoxia, HTN noncompliant on medication c/b hypertensive encephalopathy, and chronic ETOH dependence and daily use c/b liver cirrhosis and portal HTN with EGD (01/2019) showing esophageal varices, non-bleeding, presenting after traumatic fall with preceding dizziness, now c/o intractable mid-lower back pain. Patient is AOx2-3, unreliable historian unable to provide history without inaccuracies, compared to chart review and collateral obtained from daughter, Nikole Elizabeth [phone] who is out-of state but communicates daily with patient and patient's domestic common law partner, Reza Hercules. Patient was reported to get up from bed at night to turn off a light, when he became dizzy without HA, chest pain, SOB, or palpitations and lost his balance falling backwards and striking his back on a dresser. Denies any LOC or head trauma, as well as no nausea or vomiting. Reports severe pain throughout his lower back after fall, but denies any loss of sensation or weakness of his legs. He denies any loss of sensation in his groin or loss of continence.  Reports back pain had no relief with OTC ibuprofen. He is not on blood thinners. Although he reports no longer drinking or smoking, and then later reporting "sometimes on occasion", his daughter confirmed that he smokes 1/2-pack/day and drinks 3-glasses of vodka daily. Noted to have slight slurring in speech, which he states is his normal speech. He denies any preceding HA, changes in vision, ringing in the ears, chest pain, SOB, and no palpitations. Patient denies fevers, chills, and recent illnesses.  (21 Apr 2023 10:32)    Pain consulted for back pain. Pt seen and examined at bedside. Pt found lying in bed, awake, alert and oriented x 3, speech clear. Pt reports lumbar back pain score 5/10 and tolerable at present time. SCALE USED: (1-10 VNRS). Pt describes pain as aching, non- radiating, alleviated by pain medication, exacerbated by movement and palpation. Pt tolerating PO diet. Denies lethargy, chest pain, nausea, vomiting, SOB. Reports constipation, last BM 4/22. Also reports b/l leg cramps. Denies numbness/ tingling. Patient stated goal for pain control: to be able to take deep breaths, get out of bed to chair and ambulate with tolerable pain control. Pt ambulated with PT using walker, tolerated well. Pt reports taking ibuprofen for pain at home.     PAST MEDICAL & SURGICAL HISTORY:  HTN (hypertension)    Varicose veins    HLD (hyperlipidemia)    Liver cirrhosis    Portal hypertension with esophageal varices    COPD (chronic obstructive pulmonary disease)    Syncope    Alcohol dependence, daily use    Smokes tobacco daily    DM (diabetes mellitus)    No significant past surgical history    FAMILY HISTORY:    Social History:  Lives at home with domestic partner (21 Apr 2023 10:32)   [x]Denies ETOH use, illicit drug use, and smoking     Allergies    No Known Allergies    Intolerances    MEDICATIONS  (STANDING):  acetaminophen     Tablet .. 1000 milliGRAM(s) Oral every 8 hours  amLODIPine   Tablet 10 milliGRAM(s) Oral daily  budesonide  80 MICROgram(s)/formoterol 4.5 MICROgram(s) Inhaler 2 Puff(s) Inhalation two times a day  fluticasone propionate 50 MICROgram(s)/spray Nasal Spray 1 Spray(s) Both Nostrils two times a day  folic acid 1 milliGRAM(s) Oral daily  gabapentin 300 milliGRAM(s) Oral three times a day  heparin   Injectable 5000 Unit(s) SubCutaneous every 12 hours  lidocaine   4% Patch 1 Patch Transdermal every 24 hours  losartan 100 milliGRAM(s) Oral daily  montelukast 10 milliGRAM(s) Oral at bedtime  multivitamin 1 Tablet(s) Oral daily  pantoprazole    Tablet 40 milliGRAM(s) Oral before breakfast  polyethylene glycol 3350 17 Gram(s) Oral daily  senna 2 Tablet(s) Oral at bedtime  thiamine 100 milliGRAM(s) Oral daily  tiotropium 2.5 MICROgram(s) Inhaler 2 Puff(s) Inhalation daily    MEDICATIONS  (PRN):  albuterol    90 MICROgram(s) HFA Inhaler 2 Puff(s) Inhalation every 6 hours PRN Shortness of Breath and/or Wheezing  bisacodyl 5 milliGRAM(s) Oral every 12 hours PRN Constipation  magnesium hydroxide Suspension 30 milliLiter(s) Oral daily PRN Constipation  melatonin 5 milliGRAM(s) Oral at bedtime PRN Insomnia  ondansetron Injectable 4 milliGRAM(s) IV Push every 8 hours PRN Nausea and/or Vomiting  oxyCODONE    IR 5 milliGRAM(s) Oral every 4 hours PRN Severe Pain (7 - 10)    Vital Signs Last 24 Hrs  T(C): 36.5 (27 Apr 2023 12:32), Max: 37.1 (26 Apr 2023 19:33)  T(F): 97.7 (27 Apr 2023 12:32), Max: 98.8 (26 Apr 2023 19:33)  HR: 68 (27 Apr 2023 12:32) (68 - 71)  BP: 151/72 (27 Apr 2023 12:32) (140/54 - 153/76)  BP(mean): --  RR: 18 (27 Apr 2023 12:32) (17 - 18)  SpO2: 96% (27 Apr 2023 12:32) (96% - 99%)    Parameters below as of 27 Apr 2023 12:32  Patient On (Oxygen Delivery Method): room air    SARS-CoV-2: [facility] (25 Apr 2023 19:00)    LABS: Reviewed                        10.7   6.33  )-----------( 155      ( 27 Apr 2023 08:08 )             34.0     04-27    141  |  112<H>  |  20<H>  ----------------------------<  97  3.6   |  26  |  0.86    Ca    9.0      27 Apr 2023 08:08  Phos  3.3     04-27  Mg     2.1     04-27    TPro  6.6  /  Alb  2.5<L>  /  TBili  0.4  /  DBili  x   /  AST  17  /  ALT  12  /  AlkPhos  80  04-27    LIVER FUNCTIONS - ( 27 Apr 2023 08:08 )  Alb: 2.5 g/dL / Pro: 6.6 g/dL / ALK PHOS: 80 U/L / ALT: 12 U/L DA / AST: 17 U/L / GGT: x           CAPILLARY BLOOD GLUCOSE    SARS-CoV-2: [facility] (25 Apr 2023 19:00)    Radiology: Reviewed  ACC: [id number] EXAM:  XR CHEST PORTABLE ROUTINE 1V   ORDERED BY: SHAGGY HANNON     PROCEDURE DATE:  04/26/2023      INTERPRETATION:  TIME OF EXAM: April 26, 2023 at 9:42 AM.    CLINICAL INFORMATION: Productive cough with green sputum.    COMPARISON:  April 21, 2023.    TECHNIQUE:   AP Portable chest x-ray.    INTERPRETATION:    Heart size and the mediastinum cannot be accurately evaluated on this   projection. Calcified thoracic aorta.  Extensive left pleural calcifications are again noted.  No focal lung consolidation, pleural effusion, or pneumothorax is seen.  There is osteoarthritic degenerative change of the spine and left   shoulder.  Right and left neck soft tissue calcifications may be vascular.    IMPRESSION:  No focal lung consolidation.    Extensive left pleural calcifications again seen.    --- End of Report ---    RICARDO PEMBERTON MD; Attending Radiologist  This document has been electronically signed. Apr 27 2023  1:52PM  ACC: [id number] EXAM:  MR SPINE LUMBAR   ORDERED BY:  SALVADOR PETERS     PROCEDURE DATE:  04/25/2023      INTERPRETATION:  MR LUMBAR SPINE    Clinical information: L1 fracture    ADDITIONAL CLINICAL INFORMATION: Not Applicable    A noncontrast MRI of the lumbar spine was performed.    TECHNIQUE:  In the sagittal plane T1, T2, and STIR imaging was performed.  In the   axial plane T1 and T2 weighted imaging was utilized. In the coronal plane   T1 weighted images were obtained.    COMPARISON:  Lumbar spine CT of 4/21/2023    FINDINGS:  Exam is degraded by motion. Particularly the axial imaging.  SPINAL COLUMN:  Moderate acute compression deformity inferior greater than superior   endplates of L1. No significant bony retropulsion/spinal canal narrowing.   Associated paraspinal edema is seen.  There is straightening of the normal lumbar lordosis. Disc desiccation is   seen throughout. Mild degrees of disc space narrowing greatest at L5-S1.    DISC LEVELS:  T12-L1: No disc bulge or protrusion. Canal and neural foramina are patent.  L1-2: Mild disc bulge. Mild facet degenerative changes. Minimal canal and   neural foramina narrowing  L2-3: Mild disc bulge. Mild facet and ligamentous degenerative changes.   Mild canal and bilateral neural foramina narrowing  L3-4: Small right paracentral disc protrusion superimposed on a diffuse   disc bulge. Mass effect and displacement of the right L4 nerve roots.   Mild canal, severe right lateral recess narrowing, and moderate bilateral   neural foramina narrowing.  L4-5: Mild disc bulge. Mild facet degenerative changes. Mild canal and   moderate bilateral neural foramina narrowing  L5-S1: Mild disc bulge. Mild facet degenerative changes. Mild canal and   bilateral neural foramina narrowing    CANAL CONTENTS:  Mild epidural fluid/edema about the thecal sac at the L1 level without   significant narrowing.    OTHER:  Mild presacral edema. Visualized proximal sacrum appears intact.  Probable bilateral renal cysts.    IMPRESSION:  Moderate acute compression deformity inferior greater than superior   endplates of L1. No significant bony retropulsion/spinal canal narrowing.  Small right paracentral disc protrusion L3-4 with mass effect and   displacement of the right L4 nerve root    --- End of Report ---    TIFFANIE GALLOWAY MD; Attending Radiologist  This document has been electronically signed. Apr 25 2023 12:51PM    ORT Score -   Family Hx of substance abuse	Female	      Male  Alcohol 	                                           1                     3  Illegal drugs	                                   2                     3  Rx drugs                                           4 	                  4  Personal Hx of substance abuse		  Alcohol 	                                          3	                  3  Illegal drugs                                     4	                  4  Rx drugs                                            5 	                  5  Age between 16- 45 years	           1                     1  hx preadolescent sexual abuse	   3 	                  0  Psychological disease		  ADD, OCD, bipolar, schizophrenia   2	          2  Depression                                           1 	          1  Total: 0    a score of 3 or lower indicates low risk for opioid abuse		  a score of 4-7 indicates moderate risk for opioid abuse		  a score of 8 or higher indicates high risk for opioid abuse    REVIEW OF SYSTEMS:  CONSTITUTIONAL: No fever, no fatigue  HEENT:  No difficulty hearing, no change in vision  NECK: No pain or stiffness  RESPIRATORY: No cough, wheezing, chills or hemoptysis; no SOB   CARDIOVASCULAR: No chest pain, palpitations, dizziness, or leg swelling   GASTROINTESTINAL: No loss of appetite, decreased PO intake. No abdominal or epigastric pain. No nausea, No vomiting; No diarrhea, + constipation.   GENITOURINARY: No dysuria, frequency, hematuria, retention or incontinence  MUSCULOSKELETAL: + lumbar back pain no swelling; + b/l leg cramps, no upper motor strength weakness, + lower motor strength weakness, no saddle anesthesia, bowel/bladder incontinence, no falls   NEURO: No headaches, No numbness/tingling b/l LE    PHYSICAL EXAM:  GENERAL:  awake, alert & Oriented X3, cooperative, NAD, Good concentration. Speech is soft  RESPIRATORY: Respirations even and unlabored. Diminished to auscultation bilaterally; No rales, rhonchi, wheezing, or rubs  CARDIOVASCULAR: Normal S1/S2, regular rate and rhythm; No murmurs, rubs, or gallops. No JVD. + cardiac monitor in place  GASTROINTESTINAL:  Soft, Nontender, + distended; Bowel sounds present; + abdominal binder in place.   PERIPHERAL VASCULAR:  Extremities warm without edema. 2+ Peripheral Pulses, No cyanosis, No calf tenderness  MUSCULOSKELETAL: Motor Strength 5/5 B/L upper and 4/5 lower extremities; + decreased ROM left LE; + left SLR at 45 degrees; + lumbar back tenderness on palpation   SKIN: Warm, dry, intact. No rashes, lesions, scars or wounds.     Risk factors associated with adverse outcomes related to opioid treatment  [ ]  Concurrent benzodiazepine use  [ ]  History/ Active substance use or alcohol use disorder  [ ] Psychiatric co-morbidity  [ ] Sleep apnea  [x] COPD  [ ] BMI> 35  [ ] Liver dysfunction  [ ] Renal dysfunction  [ ] CHF  [x] Smoker  [x]  Age > 60 years    [x]  [place]  Reviewed and Copied to Chart. See below.    Plan of care and goal oriented pain management treatment options were discussed with patient and /or primary care giver; all questions and concerns were addressed and care was aligned with patient's wishes.    Educated patient on goal oriented pain management treatment options     04-27-23 @ 15:19     Source of information: GREGORY ELIZABETH, Chart review  Patient language: English  : n/a    HPI:  67M from home, ambulates with caution uses cane sparingly, PMHx HLD, DM, Syncope and falls, COPD (not on home O2) and active smoker and not compliant with home inhalers with a prior ICU admission requiring intubation for AHRF with hypercapnia and hypoxia, HTN noncompliant on medication c/b hypertensive encephalopathy, and chronic ETOH dependence and daily use c/b liver cirrhosis and portal HTN with EGD (01/2019) showing esophageal varices, non-bleeding, presenting after traumatic fall with preceding dizziness, now c/o intractable mid-lower back pain. Patient is AOx2-3, unreliable historian unable to provide history without inaccuracies, compared to chart review and collateral obtained from daughter, Nikole Elizabeth [phone] who is out-of state but communicates daily with patient and patient's domestic common law partner, Reza Hercules. Patient was reported to get up from bed at night to turn off a light, when he became dizzy without HA, chest pain, SOB, or palpitations and lost his balance falling backwards and striking his back on a dresser. Denies any LOC or head trauma, as well as no nausea or vomiting. Reports severe pain throughout his lower back after fall, but denies any loss of sensation or weakness of his legs. He denies any loss of sensation in his groin or loss of continence.  Reports back pain had no relief with OTC ibuprofen. He is not on blood thinners. Although he reports no longer drinking or smoking, and then later reporting "sometimes on occasion", his daughter confirmed that he smokes 1/2-pack/day and drinks 3-glasses of vodka daily. Noted to have slight slurring in speech, which he states is his normal speech. He denies any preceding HA, changes in vision, ringing in the ears, chest pain, SOB, and no palpitations. Patient denies fevers, chills, and recent illnesses.  (21 Apr 2023 10:32)    Pain consulted for back pain. Pt seen and examined at bedside. Pt found lying in bed, awake, alert and oriented x 3, speech clear. Pt reports lumbar back pain score 5/10 and tolerable at present time. SCALE USED: (1-10 VNRS). Pt describes pain as aching, non- radiating, alleviated by pain medication, exacerbated by movement and palpation. Pt tolerating PO diet. Denies lethargy, chest pain, nausea, vomiting, SOB. Reports constipation, last BM 4/22. Also reports b/l leg cramps. Denies numbness/ tingling. Patient stated goal for pain control: to be able to take deep breaths, get out of bed to chair and ambulate with tolerable pain control. Pt ambulated with PT using walker, tolerated well. Pt reports taking ibuprofen for pain at home.     PAST MEDICAL & SURGICAL HISTORY:  HTN (hypertension)    Varicose veins    HLD (hyperlipidemia)    Liver cirrhosis    Portal hypertension with esophageal varices    COPD (chronic obstructive pulmonary disease)    Syncope    Alcohol dependence, daily use    Smokes tobacco daily    DM (diabetes mellitus)    No significant past surgical history    FAMILY HISTORY:    Social History:  Lives at home with domestic partner (21 Apr 2023 10:32)   [x]Denies illicit drug use,   + smoking, +ETOH use    Allergies    No Known Allergies    Intolerances    MEDICATIONS  (STANDING):  acetaminophen     Tablet .. 1000 milliGRAM(s) Oral every 8 hours  amLODIPine   Tablet 10 milliGRAM(s) Oral daily  budesonide  80 MICROgram(s)/formoterol 4.5 MICROgram(s) Inhaler 2 Puff(s) Inhalation two times a day  fluticasone propionate 50 MICROgram(s)/spray Nasal Spray 1 Spray(s) Both Nostrils two times a day  folic acid 1 milliGRAM(s) Oral daily  gabapentin 300 milliGRAM(s) Oral three times a day  heparin   Injectable 5000 Unit(s) SubCutaneous every 12 hours  lidocaine   4% Patch 1 Patch Transdermal every 24 hours  losartan 100 milliGRAM(s) Oral daily  montelukast 10 milliGRAM(s) Oral at bedtime  multivitamin 1 Tablet(s) Oral daily  pantoprazole    Tablet 40 milliGRAM(s) Oral before breakfast  polyethylene glycol 3350 17 Gram(s) Oral daily  senna 2 Tablet(s) Oral at bedtime  thiamine 100 milliGRAM(s) Oral daily  tiotropium 2.5 MICROgram(s) Inhaler 2 Puff(s) Inhalation daily    MEDICATIONS  (PRN):  albuterol    90 MICROgram(s) HFA Inhaler 2 Puff(s) Inhalation every 6 hours PRN Shortness of Breath and/or Wheezing  bisacodyl 5 milliGRAM(s) Oral every 12 hours PRN Constipation  magnesium hydroxide Suspension 30 milliLiter(s) Oral daily PRN Constipation  melatonin 5 milliGRAM(s) Oral at bedtime PRN Insomnia  ondansetron Injectable 4 milliGRAM(s) IV Push every 8 hours PRN Nausea and/or Vomiting  oxyCODONE    IR 5 milliGRAM(s) Oral every 4 hours PRN Severe Pain (7 - 10)    Vital Signs Last 24 Hrs  T(C): 36.5 (27 Apr 2023 12:32), Max: 37.1 (26 Apr 2023 19:33)  T(F): 97.7 (27 Apr 2023 12:32), Max: 98.8 (26 Apr 2023 19:33)  HR: 68 (27 Apr 2023 12:32) (68 - 71)  BP: 151/72 (27 Apr 2023 12:32) (140/54 - 153/76)  BP(mean): --  RR: 18 (27 Apr 2023 12:32) (17 - 18)  SpO2: 96% (27 Apr 2023 12:32) (96% - 99%)    Parameters below as of 27 Apr 2023 12:32  Patient On (Oxygen Delivery Method): room air    [facility]    LABS: Reviewed                        10.7   6.33  )-----------( 155      ( 27 Apr 2023 08:08 )             34.0     04-27    141  |  112<H>  |  20<H>  ----------------------------<  97  3.6   |  26  |  0.86    Ca    9.0      27 Apr 2023 08:08  Phos  3.3     04-27  Mg     2.1     04-27    TPro  6.6  /  Alb  2.5<L>  /  TBili  0.4  /  DBili  x   /  AST  17  /  ALT  12  /  AlkPhos  80  04-27    LIVER FUNCTIONS - ( 27 Apr 2023 08:08 )  Alb: 2.5 g/dL / Pro: 6.6 g/dL / ALK PHOS: 80 U/L / ALT: 12 U/L DA / AST: 17 U/L / GGT: x           CAPILLARY BLOOD GLUCOSE    SARS-CoV-2: [facility] (25 Apr 2023 19:00)    Radiology: Reviewed  ACC: [id number] EXAM:  XR CHEST PORTABLE ROUTINE 1V   ORDERED BY: SHAGGY HANNON     PROCEDURE DATE:  04/26/2023      INTERPRETATION:  TIME OF EXAM: April 26, 2023 at 9:42 AM.    CLINICAL INFORMATION: Productive cough with green sputum.    COMPARISON:  April 21, 2023.    TECHNIQUE:   AP Portable chest x-ray.    INTERPRETATION:    Heart size and the mediastinum cannot be accurately evaluated on this   projection. Calcified thoracic aorta.  Extensive left pleural calcifications are again noted.  No focal lung consolidation, pleural effusion, or pneumothorax is seen.  There is osteoarthritic degenerative change of the spine and left   shoulder.  Right and left neck soft tissue calcifications may be vascular.    IMPRESSION:  No focal lung consolidation.    Extensive left pleural calcifications again seen.    --- End of Report ---    RICARDO PEMBERTON MD; Attending Radiologist  This document has been electronically signed. Apr 27 2023  1:52PM  ACC: [id number] EXAM:  MR SPINE LUMBAR   ORDERED BY:  SALVADOR PETERS     PROCEDURE DATE:  04/25/2023      INTERPRETATION:  MR LUMBAR SPINE    Clinical information: L1 fracture    ADDITIONAL CLINICAL INFORMATION: Not Applicable    A noncontrast MRI of the lumbar spine was performed.    TECHNIQUE:  In the sagittal plane T1, T2, and STIR imaging was performed.  In the   axial plane T1 and T2 weighted imaging was utilized. In the coronal plane   T1 weighted images were obtained.    COMPARISON:  Lumbar spine CT of 4/21/2023    FINDINGS:  Exam is degraded by motion. Particularly the axial imaging.  SPINAL COLUMN:  Moderate acute compression deformity inferior greater than superior   endplates of L1. No significant bony retropulsion/spinal canal narrowing.   Associated paraspinal edema is seen.  There is straightening of the normal lumbar lordosis. Disc desiccation is   seen throughout. Mild degrees of disc space narrowing greatest at L5-S1.    DISC LEVELS:  T12-L1: No disc bulge or protrusion. Canal and neural foramina are patent.  L1-2: Mild disc bulge. Mild facet degenerative changes. Minimal canal and   neural foramina narrowing  L2-3: Mild disc bulge. Mild facet and ligamentous degenerative changes.   Mild canal and bilateral neural foramina narrowing  L3-4: Small right paracentral disc protrusion superimposed on a diffuse   disc bulge. Mass effect and displacement of the right L4 nerve roots.   Mild canal, severe right lateral recess narrowing, and moderate bilateral   neural foramina narrowing.  L4-5: Mild disc bulge. Mild facet degenerative changes. Mild canal and   moderate bilateral neural foramina narrowing  L5-S1: Mild disc bulge. Mild facet degenerative changes. Mild canal and   bilateral neural foramina narrowing    CANAL CONTENTS:  Mild epidural fluid/edema about the thecal sac at the L1 level without   significant narrowing.    OTHER:  Mild presacral edema. Visualized proximal sacrum appears intact.  Probable bilateral renal cysts.    IMPRESSION:  Moderate acute compression deformity inferior greater than superior   endplates of L1. No significant bony retropulsion/spinal canal narrowing.  Small right paracentral disc protrusion L3-4 with mass effect and   displacement of the right L4 nerve root    --- End of Report ---    TIFFANIE GALLOWAY MD; Attending Radiologist  This document has been electronically signed. Apr 25 2023 12:51PM    ORT Score -   Family Hx of substance abuse	Female	      Male  Alcohol 	                                           1                     3  Illegal drugs	                                   2                     3  Rx drugs                                           4 	                  4  Personal Hx of substance abuse		  Alcohol 	                                          3	                  3  Illegal drugs                                     4	                  4  Rx drugs                                            5 	                  5  Age between 16- 45 years	           1                     1  hx preadolescent sexual abuse	   3 	                  0  Psychological disease		  ADD, OCD, bipolar, schizophrenia   2	          2  Depression                                           1 	          1  Total: 0    a score of 3 or lower indicates low risk for opioid abuse		  a score of 4-7 indicates moderate risk for opioid abuse		  a score of 8 or higher indicates high risk for opioid abuse    REVIEW OF SYSTEMS:  CONSTITUTIONAL: No fever, no fatigue  HEENT:  No difficulty hearing, no change in vision  NECK: No pain or stiffness  RESPIRATORY: No cough, wheezing, chills or hemoptysis; no SOB   CARDIOVASCULAR: No chest pain, palpitations, dizziness, or leg swelling   GASTROINTESTINAL: No loss of appetite, decreased PO intake. No abdominal or epigastric pain. No nausea, No vomiting; No diarrhea, + constipation.   GENITOURINARY: No dysuria, frequency, hematuria, retention or incontinence  MUSCULOSKELETAL: + lumbar back pain no swelling; + b/l leg cramps, no upper motor strength weakness, + lower motor strength weakness, no saddle anesthesia, bowel/bladder incontinence, + falls   NEURO: No headaches, No numbness/tingling b/l LE    PHYSICAL EXAM:  GENERAL:  awake, alert & Oriented X3, cooperative, NAD, Good concentration. Speech is soft  RESPIRATORY: Respirations even and unlabored. Diminished to auscultation bilaterally; No rales, rhonchi, wheezing, or rubs  CARDIOVASCULAR: Normal S1/S2, regular rate and rhythm; No murmurs, rubs, or gallops. No JVD. + cardiac monitor in place  GASTROINTESTINAL:  Soft, Nontender, + distended; Bowel sounds present; + abdominal binder in place.   PERIPHERAL VASCULAR:  Extremities warm without edema. 2+ Peripheral Pulses, No cyanosis, No calf tenderness  MUSCULOSKELETAL: Motor Strength 5/5 B/L upper and 4/5 lower extremities; + decreased ROM left LE; + left SLR at 45 degrees; + lumbar back tenderness on palpation   SKIN: Warm, dry, intact. No rashes, lesions, scars or wounds.     Risk factors associated with adverse outcomes related to opioid treatment  [ ]  Concurrent benzodiazepine use  [ ]  History/ Active substance use or alcohol use disorder  [ ] Psychiatric co-morbidity  [ ] Sleep apnea  [x] COPD  [ ] BMI> 35  [ ] Liver dysfunction  [ ] Renal dysfunction  [ ] CHF  [x] Smoker  [x]  Age > 60 years    [x]  [place]  Reviewed and Copied to Chart. See below.    Plan of care and goal oriented pain management treatment options were discussed with patient and /or primary care giver; all questions and concerns were addressed and care was aligned with patient's wishes.    Educated patient on goal oriented pain management treatment options     04-27-23 @ 15:19

## 2023-04-27 NOTE — PROGRESS NOTE ADULT - TIME BILLING
- Review of records, telemetry, vital signs and daily labs.   - General and cardiovascular physical examination.  - Generation of cardiovascular treatment plan.  - Coordination of care.      Patient was seen and examined by me on 4/27/23,interim events noted,labs and radiology studies reviewed.  Nate Alcazar MD,FACC.  [address] [place], [place] [id number].  [phone]
- Review of records, telemetry, vital signs and daily labs.   - General and cardiovascular physical examination.  - Generation of cardiovascular treatment plan.  - Coordination of care.      Patient was seen and examined by me on 4/26/23interim events noted,labs and radiology studies reviewed.  Nate Alcazar MD,FACC.  [address] [place], [place] [id number].  [phone]

## 2023-04-27 NOTE — PROGRESS NOTE ADULT - SUBJECTIVE AND OBJECTIVE BOX
PATIENT SEEN AND EXAMINED ON :- 4/27/23  DATE OF SERVICE:     4/27/23        Interim events noted,Labs ,Radiological studies and Cardiology tests reviewed .    MR#[id number]  PATIENT NAME:DENNY ELIZABETH       [facility] COURSE: HPI:  67M from home, ambulates with caution uses cane sparingly, PMHx HLD, DM, Syncope and falls, COPD (not on home O2) and active smoker and not compliant with home inhalers with a prior ICU admission requiring intubation for AHRF with hypercapnia and hypoxia, HTN noncompliant on medication c/b hypertensive encephalopathy, and chronic ETOH dependence and daily use c/b liver cirrhosis and portal HTN with EGD (01/2019) showing esophageal varices, non-bleeding, presenting after traumatic fall with preceding dizziness, now c/o intractable mid-lower back pain. Patient is AOx2-3, unreliable historian unable to provide history without inaccuracies, compared to chart review and collateral obtained from daughter, Nikole Elizabeth [phone] who is out-of state but communicates daily with patient and patient's domestic common law partner, Reza Hercules. Patient was reported to get up from bed at night to turn off a light, when he became dizzy without HA, chest pain, SOB, or palpitations and lost his balance falling backwards and striking his back on a dresser. Denies any LOC or head trauma, as well as no nausea or vomiting. Reports severe pain throughout his lower back after fall, but denies any loss of sensation or weakness of his legs. He denies any loss of sensation in his groin or loss of continence.  Reports back pain had no relief with OTC ibuprofen. He is not on blood thinners. Although he reports no longer drinking or smoking, and then later reporting "sometimes on occasion", his daughter confirmed that he smokes 1/2-pack/day and drinks 3-glasses of vodka daily. Noted to have slight slurring in speech, which he states is his normal speech. He denies any preceding HA, changes in vision, ringing in the ears, chest pain, SOB, and no palpitations. Patient denies fevers, chills, and recent illnesses.  (21 Apr 2023 10:32)      INTERIM EVENTS:Patient seen at bedside ,interim events noted.      [facility] -reviewed admission note, no change since admission  HEART FAILURE: Acute[ ]Chronic[ ] Systolic[ ] Diastolic[ ] Combined Systolic and Diastolic[ ]  CAD[ ] CABG[ ] PCI[ ]  DEVICES[ ] PPM[ ] ICD[ ] ILR[ ]  ATRIAL FIBRILLATION[ ] Paroxysmal[ ] Permanent[ ] CHADS2-[  ]  CORTES[ ] CKD1[ ] CKD2[ ] CKD3[ ] CKD4[ ] ESRD[ ]  COPD[ ] HTN[ ]   DM[ ] Type1[ ] Type 2[ ]   CVA[ ] Paresis[ ]    AMBULATION: Assisted[ ] Cane/walker[ ] Independent[ ]    MEDICATIONS  (STANDING):  acetaminophen     Tablet .. 1000 milliGRAM(s) Oral every 8 hours  amLODIPine   Tablet 10 milliGRAM(s) Oral daily  budesonide  80 MICROgram(s)/formoterol 4.5 MICROgram(s) Inhaler 2 Puff(s) Inhalation two times a day  fluticasone propionate 50 MICROgram(s)/spray Nasal Spray 1 Spray(s) Both Nostrils two times a day  folic acid 1 milliGRAM(s) Oral daily  gabapentin 300 milliGRAM(s) Oral three times a day  heparin   Injectable 5000 Unit(s) SubCutaneous every 12 hours  lidocaine   4% Patch 1 Patch Transdermal every 24 hours  losartan 100 milliGRAM(s) Oral daily  montelukast 10 milliGRAM(s) Oral at bedtime  multivitamin 1 Tablet(s) Oral daily  pantoprazole    Tablet 40 milliGRAM(s) Oral before breakfast  polyethylene glycol 3350 17 Gram(s) Oral daily  senna 2 Tablet(s) Oral at bedtime  thiamine 100 milliGRAM(s) Oral daily  tiotropium 2.5 MICROgram(s) Inhaler 2 Puff(s) Inhalation daily    MEDICATIONS  (PRN):  albuterol    90 MICROgram(s) HFA Inhaler 2 Puff(s) Inhalation every 6 hours PRN Shortness of Breath and/or Wheezing  bisacodyl 5 milliGRAM(s) Oral every 12 hours PRN Constipation  cyclobenzaprine 5 milliGRAM(s) Oral every 8 hours PRN Muscle Spasm  magnesium hydroxide Suspension 30 milliLiter(s) Oral daily PRN Constipation  melatonin 5 milliGRAM(s) Oral at bedtime PRN Insomnia  ondansetron Injectable 4 milliGRAM(s) IV Push every 8 hours PRN Nausea and/or Vomiting  oxyCODONE    IR 5 milliGRAM(s) Oral every 4 hours PRN Severe Pain (7 - 10)            REVIEW OF SYSTEMS:  Constitutional: [ ] fever, [ ]weight loss,  [ ]fatigue [ ]weight gain  Eyes: [ ] visual changes  Respiratory: [ ]shortness of breath;  [ ] cough, [ ]wheezing, [ ]chills, [ ]hemoptysis  Cardiovascular: [ ] chest pain, [ ]palpitations, [ ]dizziness,  [ ]leg swelling[ ]orthopnea[ ]PND  Gastrointestinal: [ ] abdominal pain, [ ]nausea, [ ]vomiting,  [ ]diarrhea [ ]Constipation [ ]Melena  Genitourinary: [ ] dysuria, [ ] hematuria [ ]Goss  Neurologic: [ ] headaches [ ] tremors[ ]weakness [ ]Paralysis Right[ ] Left[ ]  Skin: [ ] itching, [ ]burning, [ ] rashes  Endocrine: [ ] heat or cold intolerance  Musculoskeletal: [ ] joint pain or swelling; [ ] muscle, back, or extremity pain  Psychiatric: [ ] depression, [ ]anxiety, [ ]mood swings, or [ ]difficulty sleeping  Hematologic: [ ] easy bruising, [ ] bleeding gums    [ ] All remaining systems negative except as per above.   [ ]Unable to obtain.  [x] No change in ROS since admission      Vital Signs Last 24 Hrs  T(C): 36.5 (27 Apr 2023 12:32), Max: 37.1 (26 Apr 2023 22:04)  T(F): 97.7 (27 Apr 2023 12:32), Max: 98.8 (26 Apr 2023 22:04)  HR: 68 (27 Apr 2023 12:32) (68 - 68)  BP: 151/72 (27 Apr 2023 12:32) (144/71 - 153/76)  BP(mean): --  RR: 18 (27 Apr 2023 12:32) (18 - 18)  SpO2: 96% (27 Apr 2023 12:32) (96% - 98%)    Parameters below as of 27 Apr 2023 12:32  Patient On (Oxygen Delivery Method): room air      I&O's Summary      PHYSICAL EXAM:  General: No acute distress BMI-  HEENT: EOMI, PERRL  Neck: Supple, [ ] JVD  Lungs: Equal air entry bilaterally; [ ] rales [ ] wheezing [ ] rhonchi  Heart: Regular rate and rhythm; [x ] murmur   2/6 [ x] systolic [ ] diastolic [ ] radiation[ ] rubs [ ]  gallops  Abdomen: Nontender, bowel sounds present  Extremities: No clubbing, cyanosis, [ ] edema [ ]Pulses  equal and intact  Nervous system:  Alert & Oriented X3, no focal deficits  Psychiatric: Normal affect  Skin: No rashes or lesions    LABS:  04-27    141  |  112<H>  |  20<H>  ----------------------------<  97  3.6   |  26  |  0.86    Ca    9.0      27 Apr 2023 08:08  Phos  3.3     04-27  Mg     2.1     04-27    TPro  6.6  /  Alb  2.5<L>  /  TBili  0.4  /  DBili  x   /  AST  17  /  ALT  12  /  AlkPhos  80  04-27    Creatinine Trend: 0.86<--, 0.84<--, 0.98<--, 1.01<--, 1.54<--, 1.36<--                        10.7   6.33  )-----------( 155      ( 27 Apr 2023 08:08 )             34.0       < from: Transthoracic Echocardiogram (04.21.23 @ 12:26) >    Patient name: GREGORY ELIZABETH  YOB: 1955   Age: 67 (M)   MR#: [id number]  Study Date: 4/21/2023  Location: [address] [place], [place] [address]Sonographer: Ciera Duran [facility]  Study quality: Technically good  Referring Physician: Francis Sherman MD  Blood Pressure:210/84 mmHg  Height: 160 cm  Weight: 75 kg  BSA: 1.8 m2  ------------------------------------------------------------------------    PROCEDURE: Transthoracic echocardiogram with 2-D, M-Mode  and complete spectral and color flow Doppler.  INDICATION: Dyspnea, unspecified (R06.00)  HISTORY:  ------------------------------------------------------------------------  DIMENSIONS:  Dimensions:     Normal Values:  LA:     4.6 cm    2.0 - 4.0 cm  Ao:     3.7 cm    2.0 - 3.8 cm  SEPTUM: 1.1 cm    0.6 - 1.2cm  PWT:    1.0 cm    0.6 - 1.1 cm  LVIDd:  5.0 cm    3.0 - 5.6 cm  LVIDs:  4.0 cm    1.8 - 4.0 cm      Derived Variables:  LVMI: 109 g/m2  RWT: 0.40  Ejection Fraction Visual Estimate: 50-55 %    ------------------------------------------------------------------------  OBSERVATIONS:  Mitral Valve: Mitral annular calcification. Trace mitral  regurgitation.  Aortic Root: Aortic Root: 3.7 cm.    Aortic Valve: Aortic valve not well visualized. Estimated  aortic valve area equals 1.9 sqcm (by continuity equation),  consistent with mild aortic stenosis. Mild aortic  regurgitation.  Left Atrium: Normal left atrium.  LA volume index = 33  cc/m2.  Left Ventricle: Normal Left Ventricular Systolic Function,  (EF = 50-55%) Normal left ventricular internal dimensions  and wall thicknesses. Grade II diastolic dysfunction  (moderate).  Right Heart: Normal right atrium. Normal right ventricular  size and systolic function (TAPSE  2.6cm). There is mild  tricuspid regurgitation. Normal pulmonic valve.  Pericardium/PleuraNormal pericardium with no pericardial  effusion.  Hemodynamic: RV systolic pressure is moderately increased  at  51 mm Hg.  ------------------------------------------------------------------------  CONCLUSIONS:  1. Mitral annular calcification. Trace mitral  regurgitation.  2. Aortic valve not well visualized. Estimated aortic valve  area equals 1.9 sqcm (by continuity equation), consistent  with mild aortic stenosis. Mild aortic regurgitation.  3. Aortic Root: 3.7 cm.    4. Normal left atrium.  LA volume index = 33 cc/m2.  5. Normal left ventricular internal dimensions and wall  thicknesses.  6. Normal Left Ventricular Systolic Function,  (EF =  50-55%)  7. Grade II diastolic dysfunction (moderate).  8. Normal right atrium.  9. Normal right ventricular size and systolic function  (TAPSE  2.6cm).  10. RV systolic pressure is moderately increased at  51 mm  Hg.  11. There is mild tricuspid regurgitation.  12. Normal pulmonic valve.  13. Normal pericardium with no pericardial effusion.    ------------------------------------------------------------------------  Confirmed on  4/21/2023 - 17:40:54 by Kristen Hawkins MD  ------------------------------------------------------------------------    < end of copied text >

## 2023-04-27 NOTE — PROGRESS NOTE ADULT - REASON FOR ADMISSION
Lumbar fracture, HTN urgency

## 2023-04-27 NOTE — PROGRESS NOTE ADULT - SUBJECTIVE AND OBJECTIVE BOX
PGY-1 Progress Note discussed with attending    PAGER #: [[phone]] TILL 5:00 PM  PLEASE CONTACT ON CALL TEAM:  - On Call Team (Please refer to Angel) FROM 5:00 PM - 8:30PM  - Nightfloat Team FROM 8:30 -7:30 AM    INTERVAL HPI/OVERNIGHT EVENTS: Patient seen and examined at bedside. No acute events overnight. Complains he is tired this morning.     MEDICATIONS  (STANDING):  acetaminophen     Tablet .. 1000 milliGRAM(s) Oral every 8 hours  amLODIPine   Tablet 10 milliGRAM(s) Oral daily  budesonide  80 MICROgram(s)/formoterol 4.5 MICROgram(s) Inhaler 2 Puff(s) Inhalation two times a day  fluticasone propionate 50 MICROgram(s)/spray Nasal Spray 1 Spray(s) Both Nostrils two times a day  folic acid 1 milliGRAM(s) Oral daily  gabapentin 300 milliGRAM(s) Oral three times a day  heparin   Injectable 5000 Unit(s) SubCutaneous every 12 hours  lidocaine   4% Patch 1 Patch Transdermal every 24 hours  losartan 100 milliGRAM(s) Oral daily  montelukast 10 milliGRAM(s) Oral at bedtime  multivitamin 1 Tablet(s) Oral daily  pantoprazole    Tablet 40 milliGRAM(s) Oral before breakfast  polyethylene glycol 3350 17 Gram(s) Oral daily  senna 2 Tablet(s) Oral at bedtime  thiamine 100 milliGRAM(s) Oral daily  tiotropium 2.5 MICROgram(s) Inhaler 2 Puff(s) Inhalation daily    MEDICATIONS  (PRN):  albuterol    90 MICROgram(s) HFA Inhaler 2 Puff(s) Inhalation every 6 hours PRN Shortness of Breath and/or Wheezing  bisacodyl 5 milliGRAM(s) Oral every 12 hours PRN Constipation  melatonin 5 milliGRAM(s) Oral at bedtime PRN Insomnia  ondansetron Injectable 4 milliGRAM(s) IV Push every 8 hours PRN Nausea and/or Vomiting  oxyCODONE    IR 5 milliGRAM(s) Oral every 4 hours PRN Severe Pain (7 - 10)      REVIEW OF SYSTEMS:  CONSTITUTIONAL: No fever, weight loss, or fatigue  RESPIRATORY: No cough, wheezing, chills or hemoptysis; No shortness of breath  CARDIOVASCULAR: No chest pain, palpitations, dizziness, or leg swelling  GASTROINTESTINAL: No abdominal pain. No nausea, vomiting, or hematemesis; No diarrhea or constipation. No melena or hematochezia.  GENITOURINARY: No dysuria or hematuria, urinary frequency  NEUROLOGICAL: No headaches, memory loss, loss of strength, numbness, or tremors  SKIN: No itching, burning, rashes, or lesions     Vital Signs Last 24 Hrs  T(C): 36.5 (27 Apr 2023 12:32), Max: 37.1 (26 Apr 2023 19:33)  T(F): 97.7 (27 Apr 2023 12:32), Max: 98.8 (26 Apr 2023 19:33)  HR: 68 (27 Apr 2023 12:32) (68 - 71)  BP: 151/72 (27 Apr 2023 12:32) (140/54 - 153/76)  BP(mean): --  RR: 18 (27 Apr 2023 12:32) (17 - 18)  SpO2: 96% (27 Apr 2023 12:32) (96% - 99%)    Parameters below as of 27 Apr 2023 12:32  Patient On (Oxygen Delivery Method): room air        PHYSICAL EXAMINATION:  GENERAL: NAD, well built  HEAD:  Atraumatic, Normocephalic  EYES:  conjunctiva and sclera clear  NECK: Supple, No JVD  CHEST/LUNG: Clear to auscultation. Clear to percussion bilaterally; No rales, rhonchi, wheezing, or rubs  HEART: Regular rate and rhythm; No murmurs, rubs, or gallops  ABDOMEN: Soft, Nontender, Nondistended; Bowel sounds present  NERVOUS SYSTEM:  Alert & Oriented X3    EXTREMITIES:  2+ Peripheral Pulses, No clubbing, cyanosis, or edema  SKIN: warm dry                          10.7   6.33  )-----------( 155      ( 27 Apr 2023 08:08 )             34.0     04-27    141  |  112<H>  |  20<H>  ----------------------------<  97  3.6   |  26  |  0.86    Ca    9.0      27 Apr 2023 08:08  Phos  3.3     04-27  Mg     2.1     04-27    TPro  6.6  /  Alb  2.5<L>  /  TBili  0.4  /  DBili  x   /  AST  17  /  ALT  12  /  AlkPhos  80  04-27    LIVER FUNCTIONS - ( 27 Apr 2023 08:08 )  Alb: 2.5 g/dL / Pro: 6.6 g/dL / ALK PHOS: 80 U/L / ALT: 12 U/L DA / AST: 17 U/L / GGT: x                   CAPILLARY BLOOD GLUCOSE      RADIOLOGY & ADDITIONAL TESTS:                   PGY-1 Progress Note discussed with attending    PAGER #: [[phone]] TILL 5:00 PM  PLEASE CONTACT ON CALL TEAM:  - On Call Team (Please refer to Angel) FROM 5:00 PM - 8:30PM  - Nightfloat Team FROM 8:30 -7:30 AM    INTERVAL HPI/OVERNIGHT EVENTS: Patient seen and examined at bedside. No acute events overnight. Complains he is tired this morning.     MEDICATIONS  (STANDING):  acetaminophen     Tablet .. 1000 milliGRAM(s) Oral every 8 hours  amLODIPine   Tablet 10 milliGRAM(s) Oral daily  budesonide  80 MICROgram(s)/formoterol 4.5 MICROgram(s) Inhaler 2 Puff(s) Inhalation two times a day  fluticasone propionate 50 MICROgram(s)/spray Nasal Spray 1 Spray(s) Both Nostrils two times a day  folic acid 1 milliGRAM(s) Oral daily  gabapentin 300 milliGRAM(s) Oral three times a day  heparin   Injectable 5000 Unit(s) SubCutaneous every 12 hours  lidocaine   4% Patch 1 Patch Transdermal every 24 hours  losartan 100 milliGRAM(s) Oral daily  montelukast 10 milliGRAM(s) Oral at bedtime  multivitamin 1 Tablet(s) Oral daily  pantoprazole    Tablet 40 milliGRAM(s) Oral before breakfast  polyethylene glycol 3350 17 Gram(s) Oral daily  senna 2 Tablet(s) Oral at bedtime  thiamine 100 milliGRAM(s) Oral daily  tiotropium 2.5 MICROgram(s) Inhaler 2 Puff(s) Inhalation daily    MEDICATIONS  (PRN):  albuterol    90 MICROgram(s) HFA Inhaler 2 Puff(s) Inhalation every 6 hours PRN Shortness of Breath and/or Wheezing  bisacodyl 5 milliGRAM(s) Oral every 12 hours PRN Constipation  melatonin 5 milliGRAM(s) Oral at bedtime PRN Insomnia  ondansetron Injectable 4 milliGRAM(s) IV Push every 8 hours PRN Nausea and/or Vomiting  oxyCODONE    IR 5 milliGRAM(s) Oral every 4 hours PRN Severe Pain (7 - 10)      REVIEW OF SYSTEMS:  CONSTITUTIONAL: No fever, weight loss, or fatigue  RESPIRATORY: No cough, wheezing, chills or hemoptysis; No shortness of breath  CARDIOVASCULAR: No chest pain, palpitations, dizziness, or leg swelling  GASTROINTESTINAL: No abdominal pain. No nausea, vomiting, or hematemesis; No diarrhea or constipation. No melena or hematochezia.  GENITOURINARY: No dysuria or hematuria, urinary frequency  NEUROLOGICAL: No headaches, memory loss, loss of strength, numbness, or tremors  SKIN: No itching, burning, rashes, or lesions     Vital Signs Last 24 Hrs  T(C): 36.5 (27 Apr 2023 12:32), Max: 37.1 (26 Apr 2023 19:33)  T(F): 97.7 (27 Apr 2023 12:32), Max: 98.8 (26 Apr 2023 19:33)  HR: 68 (27 Apr 2023 12:32) (68 - 71)  BP: 151/72 (27 Apr 2023 12:32) (140/54 - 153/76)  BP(mean): --  RR: 18 (27 Apr 2023 12:32) (17 - 18)  SpO2: 96% (27 Apr 2023 12:32) (96% - 99%)    Parameters below as of 27 Apr 2023 12:32  Patient On (Oxygen Delivery Method): room air        PHYSICAL EXAMINATION:  GENERAL: NAD, well built, elderly, appears older than stated age  HEAD:  Atraumatic, Normocephalic  EYES:  conjunctiva and sclera clear  NECK: Supple, No JVD  CHEST/LUNG: Clear to auscultation. No rales, rhonchi, wheezing, or rubs  HEART: Regular rate and rhythm  ABDOMEN: Soft, Nontender, Nondistended; Bowel sounds present, no pain or masses on palpation  NERVOUS SYSTEM:  Alert & Oriented X3, following commands  PSYCH: appropriate affect  EXTREMITIES:  No clubbing, cyanosis, or edema  MSK: tenderness to palp of lumbar spine  SKIN: warm dry                          10.7   6.33  )-----------( 155      ( 27 Apr 2023 08:08 )             34.0     04-27    141  |  112<H>  |  20<H>  ----------------------------<  97  3.6   |  26  |  0.86    Ca    9.0      27 Apr 2023 08:08  Phos  3.3     04-27  Mg     2.1     04-27    TPro  6.6  /  Alb  2.5<L>  /  TBili  0.4  /  DBili  x   /  AST  17  /  ALT  12  /  AlkPhos  80  04-27    LIVER FUNCTIONS - ( 27 Apr 2023 08:08 )  Alb: 2.5 g/dL / Pro: 6.6 g/dL / ALK PHOS: 80 U/L / ALT: 12 U/L DA / AST: 17 U/L / GGT: x                   CAPILLARY BLOOD GLUCOSE      RADIOLOGY & ADDITIONAL TESTS:

## 2023-04-27 NOTE — DISCHARGE NOTE NURSING/CASE MANAGEMENT/SOCIAL WORK - HAVE YOU HAD COVID IN THE LAST 60 DAYS?
No Vascular Rooming Note     Izabella Og's goals for this visit include:   Chief Complaint   Patient presents with     Consult     Izabella, is participating in a virtual visit today for a consult regarding ESRD, dialysis, experiencing nausea due to BP dropping low, as reported by patient.     Sammie Isaac LPN

## 2023-04-27 NOTE — DIETITIAN INITIAL EVALUATION ADULT - NSICDXPASTMEDICALHX_GEN_ALL_CORE_FT
PAST MEDICAL HISTORY:  Alcohol dependence, daily use     COPD (chronic obstructive pulmonary disease)     DM (diabetes mellitus)     HLD (hyperlipidemia)     HTN (hypertension)     Liver cirrhosis     Portal hypertension with esophageal varices     Smokes tobacco daily     Syncope     Varicose veins

## 2023-04-27 NOTE — PROGRESS NOTE ADULT - PROVIDER SPECIALTY LIST ADULT
Internal Medicine
Pain Medicine
Cardiology
Pain Medicine
Cardiology
Pain Medicine
Internal Medicine

## 2023-04-27 NOTE — DIETITIAN INITIAL EVALUATION ADULT - PERTINENT LABORATORY DATA
04-27    141  |  112<H>  |  20<H>  ----------------------------<  97  3.6   |  26  |  0.86    Ca    9.0      27 Apr 2023 08:08  Phos  3.3     04-27  Mg     2.1     04-27    TPro  6.6  /  Alb  2.5<L>  /  TBili  0.4  /  DBili  x   /  AST  17  /  ALT  12  /  AlkPhos  80  04-27

## 2023-04-27 NOTE — DIETITIAN INITIAL EVALUATION ADULT - REASON FOR ADMISSION
Wedge compression fracture of unspecified lumbar vertebra, initial encounter for closed fracture

## 2023-04-27 NOTE — PROGRESS NOTE ADULT - PROBLEM SELECTOR PLAN 2
presenting after traumatic fall which was preceded by dizziness, no LOC or head trauma   h/o falls and syncopal episodes with injury in the setting of unsteady gait, as per daughter   CT thoracic/ Lumbar: acute lumbar compression fracture of L1 vertebrae with 25% loss in vertebral height   Pain management consulted:   - Oxycodone 5 mg PO q 4 hours PRN severe pain. Monitor for sedation/ respiratory depression.   - Acetaminophen 1 gram PO q 8 hours x 3 days. Monitor LFTs  - increase Gabapentin 300mg po TID. Monitor renal function.   - c/w Lidoderm 4% patch daily  - c/w bowel regiment   MRI shows Moderate acute compression deformity inferior greater than superior endplates of L1. No significant bony retropulsion/spinal canal narrowing. Small right paracentral disc protrusion L3-4 with mass effect and   displacement of the right L4 nerve root  X-Ray L-Spine Flexion/Extension shows similar L1 fracture  PT consulted: recommends home PT  Neurosurgery following, conservative mgmt, conservative measures

## 2023-04-27 NOTE — PROGRESS NOTE ADULT - ASSESSMENT
Confidential Drug Utilization Report  Search Terms: Jose Rome, 1955Search Date: 04/21/2023 13:37:23 PM  The Drug Utilization Report below displays all of the controlled substance prescriptions, if any, that your patient has filled in the last twelve months. The information displayed on this report is compiled from pharmacy submissions to the Department, and accurately reflects the information as submitted by the pharmacies.    This report was requested by: Kina Baldwin | Reference #: [id number]    
67M from home, ambulates with caution uses cane sparingly, PMHx HLD, DM, Syncope and falls, COPD (not on home O2) and active smoker and not compliant with home inhalers with a prior ICU admission requiring intubation for AHRF with hypercapnia and hypoxia, HTN noncompliant on medication c/b hypertensive encephalopathy, and chronic ETOH dependence and daily use c/b liver cirrhosis and portal HTN with EGD (01/2019) showing esophageal varices, non-bleeding, presenting after traumatic fall with preceding dizziness, now c/o intractable mid-lower back pain. Found to have elevated BP, asymptomatic. CTH with no acute pathology, BP with mild improvement s/p IV labetalol and Hydralazine with resuming home meds. Admitted to tele for further monitoring. Ortho, Pain Management, and Pulmonary consulted. 
67M from home, ambulates with caution uses cane sparingly, PMHx HLD, DM, Syncope and falls, COPD (not on home O2) and active smoker and not compliant with home inhalers with a prior ICU admission requiring intubation for AHRF with hypercapnia and hypoxia, HTN noncompliant on medication c/b hypertensive encephalopathy, and chronic ETOH dependence and daily use c/b liver cirrhosis and portal HTN with EGD (01/2019) showing esophageal varices, non-bleeding, presenting after traumatic fall with preceding dizziness, now c/o intractable mid-lower back pain. Found to have elevated BP, asymptomatic. CTH with no acute pathology, BP with mild improvement s/p IV labetalol and Hydralazine with resuming home meds. Admitted to tele for further monitoring. Ortho, Pain Management, and Pulmonary consulted. 
Confidential Drug Utilization Report  Search Terms: Jose Rome, 1955Search Date: 04/21/2023 13:37:23 PM  The Drug Utilization Report below displays all of the controlled substance prescriptions, if any, that your patient has filled in the last twelve months. The information displayed on this report is compiled from pharmacy submissions to the Department, and accurately reflects the information as submitted by the pharmacies.    This report was requested by: Kina Baldwin | Reference #: [id number]  
67M from home, ambulates with caution uses cane sparingly, PMHx HLD, DM, Syncope and falls, COPD (not on home O2) and active smoker and not compliant with home inhalers with a prior ICU admission requiring intubation for AHRF with hypercapnia and hypoxia, HTN noncompliant on medication c/b hypertensive encephalopathy, and chronic ETOH dependence and daily use c/b liver cirrhosis and portal HTN with EGD (01/2019) showing esophageal varices, non-bleeding, presenting after traumatic fall with preceding dizziness, now c/o intractable mid-lower back pain. Found to have elevated BP, asymptomatic. CTH with no acute pathology, BP with mild improvement s/p IV labetalol and Hydralazine with resuming home meds. Admitted to tele for further monitoring. Ortho, Pain Management, and Pulmonary consulted. 
Confidential Drug Utilization Report  Search Terms: Jose Rome, 1955Search Date: 04/21/2023 13:37:23 PM  The Drug Utilization Report below displays all of the controlled substance prescriptions, if any, that your patient has filled in the last twelve months. The information displayed on this report is compiled from pharmacy submissions to the Department, and accurately reflects the information as submitted by the pharmacies.    This report was requested by: Kina Baldwin | Reference #: [id number]
67M from home, ambulates with caution uses cane sparingly, PMHx HLD, DM, Syncope and falls, COPD (not on home O2) and active smoker and not compliant with home inhalers with a prior ICU admission requiring intubation for AHRF with hypercapnia and hypoxia, HTN noncompliant on medication c/b hypertensive encephalopathy, and chronic ETOH dependence and daily use c/b liver cirrhosis and portal HTN with EGD (01/2019) showing esophageal varices, non-bleeding, presenting after traumatic fall with preceding dizziness, now c/o intractable mid-lower back pain. Found to have elevated BP, asymptomatic. CTH with no acute pathology, BP with mild improvement s/p IV labetalol and Hydralazine with resuming home meds. Admitted to tele for further monitoring. Ortho, Pain Management, and Pulmonary consulted. 

## 2023-04-27 NOTE — PROGRESS NOTE ADULT - PROBLEM SELECTOR PLAN 1
p/w /78, asymptomatic on admission, not compliant with meds  h/o HTN on amlodipine 5mg and losartan 100mg  EKG showing NSR with LVH and QRS widening and QTc 539  No signs of end organ ischemia; Trop negative x1 and no CORTES  c/w amlodipine 10mg  c/w losartan to 100mg daily  BP still elevated  Monitor BP closely  DISPO: JIGNESH pending auth

## 2023-04-29 RX ORDER — OXYCODONE HYDROCHLORIDE 5 MG/1
1 TABLET ORAL
Qty: 24 | Refills: 0
Start: 2023-04-29 | End: 2023-05-04

## 2023-05-24 RX ORDER — THEOPHYLLINE ANHYDROUS 300 MG
300 TABLET, EXTENDED RELEASE 12 HR ORAL
Refills: 0 | Status: ACTIVE | COMMUNITY

## 2023-05-24 RX ORDER — MONTELUKAST 10 MG/1
TABLET, FILM COATED ORAL
Refills: 0 | Status: ACTIVE | COMMUNITY

## 2023-05-24 RX ORDER — LORATADINE 10 MG
TABLET,DISINTEGRATING ORAL
Refills: 0 | Status: ACTIVE | COMMUNITY

## 2023-05-24 RX ORDER — AMLODIPINE BESYLATE 5 MG/1
TABLET ORAL
Refills: 0 | Status: ACTIVE | COMMUNITY

## 2023-05-24 RX ORDER — OMEPRAZOLE 20 MG/1
TABLET, ORALLY DISINTEGRATING, DELAYED RELEASE ORAL
Refills: 0 | Status: ACTIVE | COMMUNITY

## 2023-05-24 RX ORDER — SENNOSIDES 8.6 MG/1
8.6 CAPSULE, GELATIN COATED ORAL
Refills: 0 | Status: ACTIVE | COMMUNITY

## 2023-05-24 RX ORDER — LOSARTAN POTASSIUM 100 MG/1
TABLET, FILM COATED ORAL
Refills: 0 | Status: ACTIVE | COMMUNITY

## 2023-05-24 RX ORDER — METFORMIN HYDROCHLORIDE 500 MG/1
500 TABLET, COATED ORAL
Refills: 0 | Status: ACTIVE | COMMUNITY

## 2023-05-24 RX ORDER — AZELASTINE HYDROCHLORIDE 137 UG/1
SPRAY, METERED NASAL
Refills: 0 | Status: ACTIVE | COMMUNITY

## 2023-05-24 RX ORDER — CYCLOBENZAPRINE HYDROCHLORIDE 5 MG/1
5 TABLET, FILM COATED ORAL
Refills: 0 | Status: ACTIVE | COMMUNITY

## 2023-05-24 RX ORDER — LIDOCAINE 30 MG/G
CREAM TOPICAL
Refills: 0 | Status: ACTIVE | COMMUNITY

## 2023-05-24 RX ORDER — FLUTICASONE FUROATE AND VILANTEROL TRIFENATATE 50; 25 UG/1; UG/1
POWDER RESPIRATORY (INHALATION)
Refills: 0 | Status: ACTIVE | COMMUNITY

## 2023-11-18 ENCOUNTER — INPATIENT (INPATIENT)
Facility: HOSPITAL | Age: 68
LOS: 11 days | Discharge: HOME | DRG: 871 | End: 2023-11-30
Attending: STUDENT IN AN ORGANIZED HEALTH CARE EDUCATION/TRAINING PROGRAM | Admitting: STUDENT IN AN ORGANIZED HEALTH CARE EDUCATION/TRAINING PROGRAM
Payer: MEDICAID

## 2023-11-18 VITALS
TEMPERATURE: 98 F | OXYGEN SATURATION: 62 % | HEART RATE: 162 BPM | RESPIRATION RATE: 38 BRPM | SYSTOLIC BLOOD PRESSURE: 160 MMHG | HEIGHT: 68 IN | WEIGHT: 156.53 LBS | DIASTOLIC BLOOD PRESSURE: 113 MMHG

## 2023-11-18 DIAGNOSIS — J96.01 ACUTE RESPIRATORY FAILURE WITH HYPOXIA: ICD-10-CM

## 2023-11-18 PROBLEM — J44.9 CHRONIC OBSTRUCTIVE PULMONARY DISEASE, UNSPECIFIED: Chronic | Status: ACTIVE | Noted: 2023-04-21

## 2023-11-18 PROBLEM — F10.20 ALCOHOL DEPENDENCE, UNCOMPLICATED: Chronic | Status: ACTIVE | Noted: 2023-04-21

## 2023-11-18 PROBLEM — E11.9 TYPE 2 DIABETES MELLITUS WITHOUT COMPLICATIONS: Chronic | Status: ACTIVE | Noted: 2023-04-21

## 2023-11-18 PROBLEM — R55 SYNCOPE AND COLLAPSE: Chronic | Status: ACTIVE | Noted: 2023-04-21

## 2023-11-18 PROBLEM — K76.6 PORTAL HYPERTENSION: Chronic | Status: ACTIVE | Noted: 2023-04-21

## 2023-11-18 PROBLEM — F17.200 NICOTINE DEPENDENCE, UNSPECIFIED, UNCOMPLICATED: Chronic | Status: ACTIVE | Noted: 2023-04-21

## 2023-11-18 PROBLEM — K74.60 UNSPECIFIED CIRRHOSIS OF LIVER: Chronic | Status: ACTIVE | Noted: 2023-04-21

## 2023-11-18 LAB
ALBUMIN SERPL ELPH-MCNC: 3.6 G/DL — SIGNIFICANT CHANGE UP (ref 3.5–5)
ALBUMIN SERPL ELPH-MCNC: 3.6 G/DL — SIGNIFICANT CHANGE UP (ref 3.5–5)
ALP SERPL-CCNC: 79 U/L — SIGNIFICANT CHANGE UP (ref 40–120)
ALP SERPL-CCNC: 79 U/L — SIGNIFICANT CHANGE UP (ref 40–120)
ALT FLD-CCNC: 14 U/L DA — SIGNIFICANT CHANGE UP (ref 10–60)
ALT FLD-CCNC: 14 U/L DA — SIGNIFICANT CHANGE UP (ref 10–60)
ANION GAP SERPL CALC-SCNC: 7 MMOL/L — SIGNIFICANT CHANGE UP (ref 5–17)
ANION GAP SERPL CALC-SCNC: 7 MMOL/L — SIGNIFICANT CHANGE UP (ref 5–17)
ANISOCYTOSIS BLD QL: SLIGHT — SIGNIFICANT CHANGE UP
ANISOCYTOSIS BLD QL: SLIGHT — SIGNIFICANT CHANGE UP
APPEARANCE UR: ABNORMAL
APPEARANCE UR: ABNORMAL
APTT BLD: 27.5 SEC — SIGNIFICANT CHANGE UP (ref 24.5–35.6)
APTT BLD: 27.5 SEC — SIGNIFICANT CHANGE UP (ref 24.5–35.6)
AST SERPL-CCNC: 23 U/L — SIGNIFICANT CHANGE UP (ref 10–40)
AST SERPL-CCNC: 23 U/L — SIGNIFICANT CHANGE UP (ref 10–40)
BACTERIA # UR AUTO: ABNORMAL /HPF
BACTERIA # UR AUTO: ABNORMAL /HPF
BASE EXCESS BLDV CALC-SCNC: -8.7 MMOL/L — SIGNIFICANT CHANGE UP
BASE EXCESS BLDV CALC-SCNC: -8.7 MMOL/L — SIGNIFICANT CHANGE UP
BASOPHILS # BLD AUTO: 0.18 K/UL — SIGNIFICANT CHANGE UP (ref 0–0.2)
BASOPHILS # BLD AUTO: 0.18 K/UL — SIGNIFICANT CHANGE UP (ref 0–0.2)
BASOPHILS NFR BLD AUTO: 0.9 % — SIGNIFICANT CHANGE UP (ref 0–2)
BASOPHILS NFR BLD AUTO: 0.9 % — SIGNIFICANT CHANGE UP (ref 0–2)
BILIRUB SERPL-MCNC: 0.9 MG/DL — SIGNIFICANT CHANGE UP (ref 0.2–1.2)
BILIRUB SERPL-MCNC: 0.9 MG/DL — SIGNIFICANT CHANGE UP (ref 0.2–1.2)
BILIRUB UR-MCNC: NEGATIVE — SIGNIFICANT CHANGE UP
BILIRUB UR-MCNC: NEGATIVE — SIGNIFICANT CHANGE UP
BUN SERPL-MCNC: 11 MG/DL — SIGNIFICANT CHANGE UP (ref 7–18)
BUN SERPL-MCNC: 11 MG/DL — SIGNIFICANT CHANGE UP (ref 7–18)
CA-I SERPL-SCNC: SIGNIFICANT CHANGE UP MMOL/L (ref 1.15–1.33)
CA-I SERPL-SCNC: SIGNIFICANT CHANGE UP MMOL/L (ref 1.15–1.33)
CALCIUM SERPL-MCNC: 8.7 MG/DL — SIGNIFICANT CHANGE UP (ref 8.4–10.5)
CALCIUM SERPL-MCNC: 8.7 MG/DL — SIGNIFICANT CHANGE UP (ref 8.4–10.5)
CHLORIDE SERPL-SCNC: 108 MMOL/L — SIGNIFICANT CHANGE UP (ref 96–108)
CHLORIDE SERPL-SCNC: 108 MMOL/L — SIGNIFICANT CHANGE UP (ref 96–108)
CK MB BLD-MCNC: 2.2 % — SIGNIFICANT CHANGE UP (ref 0–3.5)
CK MB BLD-MCNC: 2.2 % — SIGNIFICANT CHANGE UP (ref 0–3.5)
CK MB CFR SERPL CALC: 3 NG/ML — SIGNIFICANT CHANGE UP (ref 0–3.6)
CK MB CFR SERPL CALC: 3 NG/ML — SIGNIFICANT CHANGE UP (ref 0–3.6)
CK SERPL-CCNC: 138 U/L — SIGNIFICANT CHANGE UP (ref 35–232)
CK SERPL-CCNC: 138 U/L — SIGNIFICANT CHANGE UP (ref 35–232)
CO2 SERPL-SCNC: 23 MMOL/L — SIGNIFICANT CHANGE UP (ref 22–31)
CO2 SERPL-SCNC: 23 MMOL/L — SIGNIFICANT CHANGE UP (ref 22–31)
COLOR SPEC: SIGNIFICANT CHANGE UP
COLOR SPEC: SIGNIFICANT CHANGE UP
COMMENT - URINE: SIGNIFICANT CHANGE UP
COMMENT - URINE: SIGNIFICANT CHANGE UP
CREAT SERPL-MCNC: 1.65 MG/DL — HIGH (ref 0.5–1.3)
CREAT SERPL-MCNC: 1.65 MG/DL — HIGH (ref 0.5–1.3)
DIFF PNL FLD: ABNORMAL
DIFF PNL FLD: ABNORMAL
EGFR: 45 ML/MIN/1.73M2 — LOW
EGFR: 45 ML/MIN/1.73M2 — LOW
EOSINOPHIL # BLD AUTO: 0.83 K/UL — HIGH (ref 0–0.5)
EOSINOPHIL # BLD AUTO: 0.83 K/UL — HIGH (ref 0–0.5)
EOSINOPHIL NFR BLD AUTO: 4.1 % — SIGNIFICANT CHANGE UP (ref 0–6)
EOSINOPHIL NFR BLD AUTO: 4.1 % — SIGNIFICANT CHANGE UP (ref 0–6)
EPI CELLS # UR: PRESENT
EPI CELLS # UR: PRESENT
GAS PNL BLDA: SIGNIFICANT CHANGE UP
GAS PNL BLDA: SIGNIFICANT CHANGE UP
GAS PNL BLDV: 138 MMOL/L — SIGNIFICANT CHANGE UP (ref 136–145)
GAS PNL BLDV: 138 MMOL/L — SIGNIFICANT CHANGE UP (ref 136–145)
GAS PNL BLDV: SIGNIFICANT CHANGE UP
GAS PNL BLDV: SIGNIFICANT CHANGE UP
GIANT PLATELETS BLD QL SMEAR: PRESENT — SIGNIFICANT CHANGE UP
GIANT PLATELETS BLD QL SMEAR: PRESENT — SIGNIFICANT CHANGE UP
GLUCOSE BLDC GLUCOMTR-MCNC: 129 MG/DL — HIGH (ref 70–99)
GLUCOSE BLDC GLUCOMTR-MCNC: 129 MG/DL — HIGH (ref 70–99)
GLUCOSE BLDC GLUCOMTR-MCNC: 137 MG/DL — HIGH (ref 70–99)
GLUCOSE BLDC GLUCOMTR-MCNC: 137 MG/DL — HIGH (ref 70–99)
GLUCOSE SERPL-MCNC: 229 MG/DL — HIGH (ref 70–99)
GLUCOSE SERPL-MCNC: 229 MG/DL — HIGH (ref 70–99)
GLUCOSE UR QL: 100 MG/DL
GLUCOSE UR QL: 100 MG/DL
GRAN CASTS # UR COMP ASSIST: PRESENT
GRAN CASTS # UR COMP ASSIST: PRESENT
HCO3 BLDV-SCNC: 21 MMOL/L — LOW (ref 22–29)
HCO3 BLDV-SCNC: 21 MMOL/L — LOW (ref 22–29)
HCT VFR BLD CALC: 43.9 % — SIGNIFICANT CHANGE UP (ref 39–50)
HCT VFR BLD CALC: 43.9 % — SIGNIFICANT CHANGE UP (ref 39–50)
HGB BLD-MCNC: 13.8 G/DL — SIGNIFICANT CHANGE UP (ref 13–17)
HGB BLD-MCNC: 13.8 G/DL — SIGNIFICANT CHANGE UP (ref 13–17)
HOROWITZ INDEX BLDV+IHG-RTO: 60 — SIGNIFICANT CHANGE UP
HOROWITZ INDEX BLDV+IHG-RTO: 60 — SIGNIFICANT CHANGE UP
HYALINE CASTS # UR AUTO: PRESENT
HYALINE CASTS # UR AUTO: PRESENT
IMM GRANULOCYTES NFR BLD AUTO: 0.5 % — SIGNIFICANT CHANGE UP (ref 0–0.9)
IMM GRANULOCYTES NFR BLD AUTO: 0.5 % — SIGNIFICANT CHANGE UP (ref 0–0.9)
INR BLD: 1.03 RATIO — SIGNIFICANT CHANGE UP (ref 0.85–1.18)
INR BLD: 1.03 RATIO — SIGNIFICANT CHANGE UP (ref 0.85–1.18)
KETONES UR-MCNC: NEGATIVE MG/DL — SIGNIFICANT CHANGE UP
KETONES UR-MCNC: NEGATIVE MG/DL — SIGNIFICANT CHANGE UP
LACTATE BLDV-MCNC: 5 MMOL/L — CRITICAL HIGH (ref 0.5–2)
LACTATE BLDV-MCNC: 5 MMOL/L — CRITICAL HIGH (ref 0.5–2)
LACTATE SERPL-SCNC: 2 MMOL/L — SIGNIFICANT CHANGE UP (ref 0.7–2)
LACTATE SERPL-SCNC: 2 MMOL/L — SIGNIFICANT CHANGE UP (ref 0.7–2)
LACTATE SERPL-SCNC: 5.7 MMOL/L — CRITICAL HIGH (ref 0.7–2)
LACTATE SERPL-SCNC: 5.7 MMOL/L — CRITICAL HIGH (ref 0.7–2)
LEUKOCYTE ESTERASE UR-ACNC: ABNORMAL
LEUKOCYTE ESTERASE UR-ACNC: ABNORMAL
LYMPHOCYTES # BLD AUTO: 36.1 % — SIGNIFICANT CHANGE UP (ref 13–44)
LYMPHOCYTES # BLD AUTO: 36.1 % — SIGNIFICANT CHANGE UP (ref 13–44)
LYMPHOCYTES # BLD AUTO: 7.34 K/UL — HIGH (ref 1–3.3)
LYMPHOCYTES # BLD AUTO: 7.34 K/UL — HIGH (ref 1–3.3)
MACROCYTES BLD QL: SLIGHT — SIGNIFICANT CHANGE UP
MACROCYTES BLD QL: SLIGHT — SIGNIFICANT CHANGE UP
MANUAL SMEAR VERIFICATION: SIGNIFICANT CHANGE UP
MANUAL SMEAR VERIFICATION: SIGNIFICANT CHANGE UP
MCHC RBC-ENTMCNC: 31.4 GM/DL — LOW (ref 32–36)
MCHC RBC-ENTMCNC: 31.4 GM/DL — LOW (ref 32–36)
MCHC RBC-ENTMCNC: 31.7 PG — SIGNIFICANT CHANGE UP (ref 27–34)
MCHC RBC-ENTMCNC: 31.7 PG — SIGNIFICANT CHANGE UP (ref 27–34)
MCV RBC AUTO: 100.7 FL — HIGH (ref 80–100)
MCV RBC AUTO: 100.7 FL — HIGH (ref 80–100)
MONOCYTES # BLD AUTO: 1.94 K/UL — HIGH (ref 0–0.9)
MONOCYTES # BLD AUTO: 1.94 K/UL — HIGH (ref 0–0.9)
MONOCYTES NFR BLD AUTO: 9.5 % — SIGNIFICANT CHANGE UP (ref 2–14)
MONOCYTES NFR BLD AUTO: 9.5 % — SIGNIFICANT CHANGE UP (ref 2–14)
NEUTROPHILS # BLD AUTO: 9.96 K/UL — HIGH (ref 1.8–7.4)
NEUTROPHILS # BLD AUTO: 9.96 K/UL — HIGH (ref 1.8–7.4)
NEUTROPHILS NFR BLD AUTO: 48.9 % — SIGNIFICANT CHANGE UP (ref 43–77)
NEUTROPHILS NFR BLD AUTO: 48.9 % — SIGNIFICANT CHANGE UP (ref 43–77)
NITRITE UR-MCNC: NEGATIVE — SIGNIFICANT CHANGE UP
NITRITE UR-MCNC: NEGATIVE — SIGNIFICANT CHANGE UP
NRBC # BLD: 0 /100 WBCS — SIGNIFICANT CHANGE UP (ref 0–0)
NRBC # BLD: 0 /100 WBCS — SIGNIFICANT CHANGE UP (ref 0–0)
NT-PROBNP SERPL-SCNC: 4463 PG/ML — HIGH (ref 0–125)
NT-PROBNP SERPL-SCNC: 4463 PG/ML — HIGH (ref 0–125)
OVALOCYTES BLD QL SMEAR: SLIGHT — SIGNIFICANT CHANGE UP
OVALOCYTES BLD QL SMEAR: SLIGHT — SIGNIFICANT CHANGE UP
PCO2 BLDV: 60 MMHG — HIGH (ref 42–55)
PCO2 BLDV: 60 MMHG — HIGH (ref 42–55)
PH BLDV: 7.15 — LOW (ref 7.32–7.43)
PH BLDV: 7.15 — LOW (ref 7.32–7.43)
PH UR: 5.5 — SIGNIFICANT CHANGE UP (ref 5–8)
PH UR: 5.5 — SIGNIFICANT CHANGE UP (ref 5–8)
PLAT MORPH BLD: NORMAL — SIGNIFICANT CHANGE UP
PLAT MORPH BLD: NORMAL — SIGNIFICANT CHANGE UP
PLATELET # BLD AUTO: 167 K/UL — SIGNIFICANT CHANGE UP (ref 150–400)
PLATELET # BLD AUTO: 167 K/UL — SIGNIFICANT CHANGE UP (ref 150–400)
PLATELET CLUMP BLD QL SMEAR: SLIGHT
PLATELET CLUMP BLD QL SMEAR: SLIGHT
PO2 BLDV: 36 MMHG — SIGNIFICANT CHANGE UP
PO2 BLDV: 36 MMHG — SIGNIFICANT CHANGE UP
POIKILOCYTOSIS BLD QL AUTO: SLIGHT — SIGNIFICANT CHANGE UP
POIKILOCYTOSIS BLD QL AUTO: SLIGHT — SIGNIFICANT CHANGE UP
POTASSIUM BLDV-SCNC: 4.8 MMOL/L — SIGNIFICANT CHANGE UP (ref 3.5–5.1)
POTASSIUM BLDV-SCNC: 4.8 MMOL/L — SIGNIFICANT CHANGE UP (ref 3.5–5.1)
POTASSIUM SERPL-MCNC: 3.7 MMOL/L — SIGNIFICANT CHANGE UP (ref 3.5–5.3)
POTASSIUM SERPL-MCNC: 3.7 MMOL/L — SIGNIFICANT CHANGE UP (ref 3.5–5.3)
POTASSIUM SERPL-SCNC: 3.7 MMOL/L — SIGNIFICANT CHANGE UP (ref 3.5–5.3)
POTASSIUM SERPL-SCNC: 3.7 MMOL/L — SIGNIFICANT CHANGE UP (ref 3.5–5.3)
PROT SERPL-MCNC: 8.3 G/DL — SIGNIFICANT CHANGE UP (ref 6–8.3)
PROT SERPL-MCNC: 8.3 G/DL — SIGNIFICANT CHANGE UP (ref 6–8.3)
PROT UR-MCNC: >=1000 MG/DL
PROT UR-MCNC: >=1000 MG/DL
PROTHROM AB SERPL-ACNC: 11.7 SEC — SIGNIFICANT CHANGE UP (ref 9.5–13)
PROTHROM AB SERPL-ACNC: 11.7 SEC — SIGNIFICANT CHANGE UP (ref 9.5–13)
RAPID RVP RESULT: SIGNIFICANT CHANGE UP
RAPID RVP RESULT: SIGNIFICANT CHANGE UP
RBC # BLD: 4.36 M/UL — SIGNIFICANT CHANGE UP (ref 4.2–5.8)
RBC # BLD: 4.36 M/UL — SIGNIFICANT CHANGE UP (ref 4.2–5.8)
RBC # FLD: 17.8 % — HIGH (ref 10.3–14.5)
RBC # FLD: 17.8 % — HIGH (ref 10.3–14.5)
RBC BLD AUTO: ABNORMAL
RBC BLD AUTO: ABNORMAL
RBC CASTS # UR COMP ASSIST: 5 /HPF — HIGH (ref 0–4)
RBC CASTS # UR COMP ASSIST: 5 /HPF — HIGH (ref 0–4)
SAO2 % BLDV: 43.3 % — SIGNIFICANT CHANGE UP
SAO2 % BLDV: 43.3 % — SIGNIFICANT CHANGE UP
SARS-COV-2 RNA SPEC QL NAA+PROBE: SIGNIFICANT CHANGE UP
SARS-COV-2 RNA SPEC QL NAA+PROBE: SIGNIFICANT CHANGE UP
SODIUM SERPL-SCNC: 138 MMOL/L — SIGNIFICANT CHANGE UP (ref 135–145)
SODIUM SERPL-SCNC: 138 MMOL/L — SIGNIFICANT CHANGE UP (ref 135–145)
SP GR SPEC: 1.02 — SIGNIFICANT CHANGE UP (ref 1–1.03)
SP GR SPEC: 1.02 — SIGNIFICANT CHANGE UP (ref 1–1.03)
TROPONIN I, HIGH SENSITIVITY RESULT: 564.2 NG/L — HIGH
TROPONIN I, HIGH SENSITIVITY RESULT: 564.2 NG/L — HIGH
TROPONIN I, HIGH SENSITIVITY RESULT: 640.9 NG/L — HIGH
TROPONIN I, HIGH SENSITIVITY RESULT: 640.9 NG/L — HIGH
TROPONIN I, HIGH SENSITIVITY RESULT: 641.7 NG/L — HIGH
TROPONIN I, HIGH SENSITIVITY RESULT: 641.7 NG/L — HIGH
UROBILINOGEN FLD QL: 1 MG/DL — SIGNIFICANT CHANGE UP (ref 0.2–1)
UROBILINOGEN FLD QL: 1 MG/DL — SIGNIFICANT CHANGE UP (ref 0.2–1)
WBC # BLD: 20.36 K/UL — HIGH (ref 3.8–10.5)
WBC # BLD: 20.36 K/UL — HIGH (ref 3.8–10.5)
WBC # FLD AUTO: 20.36 K/UL — HIGH (ref 3.8–10.5)
WBC # FLD AUTO: 20.36 K/UL — HIGH (ref 3.8–10.5)
WBC UR QL: 4 /HPF — SIGNIFICANT CHANGE UP (ref 0–5)
WBC UR QL: 4 /HPF — SIGNIFICANT CHANGE UP (ref 0–5)

## 2023-11-18 PROCEDURE — 99291 CRITICAL CARE FIRST HOUR: CPT | Mod: 25

## 2023-11-18 PROCEDURE — 71045 X-RAY EXAM CHEST 1 VIEW: CPT | Mod: 26,77

## 2023-11-18 PROCEDURE — 74018 RADEX ABDOMEN 1 VIEW: CPT | Mod: 26

## 2023-11-18 PROCEDURE — 31500 INSERT EMERGENCY AIRWAY: CPT

## 2023-11-18 PROCEDURE — 71045 X-RAY EXAM CHEST 1 VIEW: CPT | Mod: 26

## 2023-11-18 PROCEDURE — 76604 US EXAM CHEST: CPT | Mod: 26

## 2023-11-18 PROCEDURE — 93308 TTE F-UP OR LMTD: CPT | Mod: 26

## 2023-11-18 PROCEDURE — 99291 CRITICAL CARE FIRST HOUR: CPT | Mod: 25,GC

## 2023-11-18 PROCEDURE — 99292 CRITICAL CARE ADDL 30 MIN: CPT | Mod: 25

## 2023-11-18 RX ORDER — AZITHROMYCIN 500 MG/1
500 TABLET, FILM COATED ORAL ONCE
Refills: 0 | Status: COMPLETED | OUTPATIENT
Start: 2023-11-18 | End: 2023-11-18

## 2023-11-18 RX ORDER — PROPOFOL 10 MG/ML
30 INJECTION, EMULSION INTRAVENOUS
Qty: 1000 | Refills: 0 | Status: DISCONTINUED | OUTPATIENT
Start: 2023-11-18 | End: 2023-11-19

## 2023-11-18 RX ORDER — MIDAZOLAM HYDROCHLORIDE 1 MG/ML
2 INJECTION, SOLUTION INTRAMUSCULAR; INTRAVENOUS ONCE
Refills: 0 | Status: DISCONTINUED | OUTPATIENT
Start: 2023-11-18 | End: 2023-11-18

## 2023-11-18 RX ORDER — SODIUM CHLORIDE 9 MG/ML
1000 INJECTION, SOLUTION INTRAVENOUS ONCE
Refills: 0 | Status: COMPLETED | OUTPATIENT
Start: 2023-11-18 | End: 2023-11-18

## 2023-11-18 RX ORDER — FENTANYL CITRATE 50 UG/ML
50 INJECTION INTRAVENOUS EVERY 6 HOURS
Refills: 0 | Status: DISCONTINUED | OUTPATIENT
Start: 2023-11-18 | End: 2023-11-19

## 2023-11-18 RX ORDER — BUDESONIDE AND FORMOTEROL FUMARATE DIHYDRATE 160; 4.5 UG/1; UG/1
2 AEROSOL RESPIRATORY (INHALATION)
Refills: 0 | Status: DISCONTINUED | OUTPATIENT
Start: 2023-11-18 | End: 2023-11-18

## 2023-11-18 RX ORDER — CHLORHEXIDINE GLUCONATE 213 G/1000ML
1 SOLUTION TOPICAL
Refills: 0 | Status: DISCONTINUED | OUTPATIENT
Start: 2023-11-18 | End: 2023-11-22

## 2023-11-18 RX ORDER — PROPOFOL 10 MG/ML
10 INJECTION, EMULSION INTRAVENOUS
Qty: 1000 | Refills: 0 | Status: DISCONTINUED | OUTPATIENT
Start: 2023-11-18 | End: 2023-11-18

## 2023-11-18 RX ORDER — DILTIAZEM HCL 120 MG
10 CAPSULE, EXT RELEASE 24 HR ORAL ONCE
Refills: 0 | Status: COMPLETED | OUTPATIENT
Start: 2023-11-18 | End: 2023-11-18

## 2023-11-18 RX ORDER — ETOMIDATE 2 MG/ML
30 INJECTION INTRAVENOUS ONCE
Refills: 0 | Status: COMPLETED | OUTPATIENT
Start: 2023-11-18 | End: 2023-11-18

## 2023-11-18 RX ORDER — CEFTRIAXONE 500 MG/1
1000 INJECTION, POWDER, FOR SOLUTION INTRAMUSCULAR; INTRAVENOUS ONCE
Refills: 0 | Status: COMPLETED | OUTPATIENT
Start: 2023-11-18 | End: 2023-11-18

## 2023-11-18 RX ORDER — HEPARIN SODIUM 5000 [USP'U]/ML
5000 INJECTION INTRAVENOUS; SUBCUTANEOUS EVERY 8 HOURS
Refills: 0 | Status: DISCONTINUED | OUTPATIENT
Start: 2023-11-18 | End: 2023-11-20

## 2023-11-18 RX ORDER — SODIUM CHLORIDE 9 MG/ML
500 INJECTION INTRAMUSCULAR; INTRAVENOUS; SUBCUTANEOUS ONCE
Refills: 0 | Status: COMPLETED | OUTPATIENT
Start: 2023-11-18 | End: 2023-11-18

## 2023-11-18 RX ORDER — IPRATROPIUM/ALBUTEROL SULFATE 18-103MCG
3 AEROSOL WITH ADAPTER (GRAM) INHALATION ONCE
Refills: 0 | Status: COMPLETED | OUTPATIENT
Start: 2023-11-18 | End: 2023-11-18

## 2023-11-18 RX ORDER — FENTANYL CITRATE 50 UG/ML
50 INJECTION INTRAVENOUS ONCE
Refills: 0 | Status: DISCONTINUED | OUTPATIENT
Start: 2023-11-18 | End: 2023-11-18

## 2023-11-18 RX ORDER — CEFTRIAXONE 500 MG/1
2000 INJECTION, POWDER, FOR SOLUTION INTRAMUSCULAR; INTRAVENOUS EVERY 24 HOURS
Refills: 0 | Status: DISCONTINUED | OUTPATIENT
Start: 2023-11-18 | End: 2023-11-20

## 2023-11-18 RX ORDER — INSULIN LISPRO 100/ML
VIAL (ML) SUBCUTANEOUS EVERY 6 HOURS
Refills: 0 | Status: DISCONTINUED | OUTPATIENT
Start: 2023-11-18 | End: 2023-11-21

## 2023-11-18 RX ORDER — SUCCINYLCHOLINE CHLORIDE 100 MG/5ML
100 SYRINGE (ML) INTRAVENOUS ONCE
Refills: 0 | Status: COMPLETED | OUTPATIENT
Start: 2023-11-18 | End: 2023-11-18

## 2023-11-18 RX ORDER — CHLORHEXIDINE GLUCONATE 213 G/1000ML
15 SOLUTION TOPICAL EVERY 12 HOURS
Refills: 0 | Status: DISCONTINUED | OUTPATIENT
Start: 2023-11-18 | End: 2023-11-19

## 2023-11-18 RX ORDER — AZITHROMYCIN 500 MG/1
500 TABLET, FILM COATED ORAL EVERY 24 HOURS
Refills: 0 | Status: DISCONTINUED | OUTPATIENT
Start: 2023-11-19 | End: 2023-11-20

## 2023-11-18 RX ADMIN — CEFTRIAXONE 100 MILLIGRAM(S): 500 INJECTION, POWDER, FOR SOLUTION INTRAMUSCULAR; INTRAVENOUS at 19:21

## 2023-11-18 RX ADMIN — Medication 10 MILLIGRAM(S): at 08:53

## 2023-11-18 RX ADMIN — SODIUM CHLORIDE 1000 MILLILITER(S): 9 INJECTION, SOLUTION INTRAVENOUS at 14:19

## 2023-11-18 RX ADMIN — PROPOFOL 12.8 MICROGRAM(S)/KG/MIN: 10 INJECTION, EMULSION INTRAVENOUS at 23:15

## 2023-11-18 RX ADMIN — Medication 125 MILLIGRAM(S): at 08:52

## 2023-11-18 RX ADMIN — MIDAZOLAM HYDROCHLORIDE 2 MILLIGRAM(S): 1 INJECTION, SOLUTION INTRAMUSCULAR; INTRAVENOUS at 10:59

## 2023-11-18 RX ADMIN — PROPOFOL 4.26 MICROGRAM(S)/KG/MIN: 10 INJECTION, EMULSION INTRAVENOUS at 14:20

## 2023-11-18 RX ADMIN — PROPOFOL 4.26 MICROGRAM(S)/KG/MIN: 10 INJECTION, EMULSION INTRAVENOUS at 10:47

## 2023-11-18 RX ADMIN — AZITHROMYCIN 255 MILLIGRAM(S): 500 TABLET, FILM COATED ORAL at 08:56

## 2023-11-18 RX ADMIN — CHLORHEXIDINE GLUCONATE 1 APPLICATION(S): 213 SOLUTION TOPICAL at 14:20

## 2023-11-18 RX ADMIN — FENTANYL CITRATE 50 MICROGRAM(S): 50 INJECTION INTRAVENOUS at 23:12

## 2023-11-18 RX ADMIN — Medication 100 MILLIGRAM(S): at 11:53

## 2023-11-18 RX ADMIN — SODIUM CHLORIDE 500 MILLILITER(S): 9 INJECTION INTRAMUSCULAR; INTRAVENOUS; SUBCUTANEOUS at 09:25

## 2023-11-18 RX ADMIN — FENTANYL CITRATE 50 MICROGRAM(S): 50 INJECTION INTRAVENOUS at 12:00

## 2023-11-18 RX ADMIN — CEFTRIAXONE 100 MILLIGRAM(S): 500 INJECTION, POWDER, FOR SOLUTION INTRAMUSCULAR; INTRAVENOUS at 08:56

## 2023-11-18 RX ADMIN — SODIUM CHLORIDE 1000 MILLILITER(S): 9 INJECTION, SOLUTION INTRAVENOUS at 11:27

## 2023-11-18 RX ADMIN — FENTANYL CITRATE 50 MICROGRAM(S): 50 INJECTION INTRAVENOUS at 17:49

## 2023-11-18 RX ADMIN — CEFTRIAXONE 100 MILLIGRAM(S): 500 INJECTION, POWDER, FOR SOLUTION INTRAMUSCULAR; INTRAVENOUS at 14:19

## 2023-11-18 RX ADMIN — FENTANYL CITRATE 50 MICROGRAM(S): 50 INJECTION INTRAVENOUS at 10:57

## 2023-11-18 RX ADMIN — ETOMIDATE 30 MILLIGRAM(S): 2 INJECTION INTRAVENOUS at 11:52

## 2023-11-18 RX ADMIN — HEPARIN SODIUM 5000 UNIT(S): 5000 INJECTION INTRAVENOUS; SUBCUTANEOUS at 21:05

## 2023-11-18 RX ADMIN — Medication 10 MILLIGRAM(S): at 09:25

## 2023-11-18 RX ADMIN — CHLORHEXIDINE GLUCONATE 15 MILLILITER(S): 213 SOLUTION TOPICAL at 17:49

## 2023-11-18 RX ADMIN — FENTANYL CITRATE 50 MICROGRAM(S): 50 INJECTION INTRAVENOUS at 19:18

## 2023-11-18 NOTE — H&P ADULT - HISTORY OF PRESENT ILLNESS
67M current smoker with PMHx  COPD, HLD, DM, HTN, chronic ETOH dependence, cirrhosis presents with SOB. Patient is a poor historian and states he feels SOB but unable to provide further details. Patient denies fever, chills, cough, chest pain, palpitations, abdominal pain, or change in bowel habits.     In the ED:  Afebrile, HR 160s. /94, RR 36, 100% on NRB  WBC 20, lactate 5.7  CXR b/l lung markings  Trop 640, BNP 4K  S/p Cardizem 10mg x2, solumedrol 125mg, 500cc bolus  68M current smoker with PMHx  COPD, HLD, DM, HTN, chronic ETOH dependence, cirrhosis presents with SOB. Patient is a poor historian and states he feels SOB but unable to provide further details. Patient denies fever, chills, cough, chest pain, palpitations, abdominal pain, or change in bowel habits.     In the ED:  Afebrile, HR 160s. /94, RR 36, 100% on NRB  WBC 20, lactate 5.7  CXR b/l lung markings  Trop 640, BNP 4K  S/p Cardizem 10mg x2, solumedrol 125mg, 500cc bolus

## 2023-11-18 NOTE — ED ADULT NURSE NOTE - NSFALLUNIVINTERV_ED_ALL_ED
Bed/Stretcher in lowest position, wheels locked, appropriate side rails in place/Call bell, personal items and telephone in reach/Instruct patient to call for assistance before getting out of bed/chair/stretcher/Non-slip footwear applied when patient is off stretcher/[place] to call system/Physically safe environment - no spills, clutter or unnecessary equipment/Purposeful proactive rounding/Room/bathroom lighting operational, light cord in reach

## 2023-11-18 NOTE — ED PROVIDER NOTE - PROGRESS NOTE DETAILS
Patient evaluated with ICU team, decision made to intubate, performed by myself, see separate note, will admit ICU.

## 2023-11-18 NOTE — PATIENT PROFILE ADULT - FALL HARM RISK - HARM RISK INTERVENTIONS

## 2023-11-18 NOTE — H&P ADULT - ASSESSMENT
67M current smoker with PMHx  COPD, HLD, DM, HTN, chronic ETOH dependence, cirrhosis presents with SOB. Patient was found to be septic likely 2/2 PNA.  Patient continued to be  tachypneic, and  developed New onset Afib on Bipap Patient was intubated for increase WOB and admitted to ICU for further management.     # Sepsis  # New Onset Afib  # PNA   # Alcohol Abuse  # Cirrhosis       ============ Neuro============================  # Acute encephalopathy  - baseline A/O x2 poor historian  - was A/O x1 on admission   - now intubated and sedated  -continue with propofol for vent synchrony     ================= Cardiovascular==========================  # New Onset Afib w/ RVR  -developed Afib w/ rvr HR 160s  -s/p Cardizem 10mg x2  -EKG LBBB - similar to previous, but now new Afib  -will consider amiodarone if HR uncontrolled   -will hold AC for now as Afib likely from sepsis     # Elevated trops  -EKG LBBB - similar to previous, but now new Afib  -trops 640, BNP 4K  -Echo 4/23: grade 2 DD  -POCUS preserved LV function apical view, no signs of RV dysfunction   likely demand ischemia  -f/u trop x2      # HTN  hx of HTN, non complaint with meds  on amlodipine and losartan at home  Will hold as pt has sepsis    ================- Pulm=================================  # PNA  - p/w SOB  - patient was intubated for increase WOB   -CXR b/l lung markings  -POCUS revealed small RLL consolidation  - likely has PNA  -continue with Rocephin and azithro  -mycoplasma, legionella, strep  -sputum cx    # COPD  -hx COPD on home 02  - poor airway entry on admission, no wheezing  -s/p solumedrol 125mg  -will c/w duonebs  -will start solumedrol 40 BID IV      ==================ID===================================  #Sepsis  -Afebrile, HR 160s. /94, RR 36, 100% on NRB  WBC 20, lactate 5.7  CXR b/l lung markings  s/p 2.5L   -continue with Rocephin and azithro  -mycoplasma, legionella, strep  -sputum cx, blood cx      ================= Nephro================================  # CORTES  Bun/creatinine 11/1.65  likely pre renal  minimal urine output, concern for ATN  s/p 2.5L     =================GI====================================  # Umbilical Hernia  -not reproducible, but no sign of obstruction   - Consult Surgery      ================ Heme==================================  # No issues     =================Endocrine===============================  # Diabetes  hx of DM, on metformin at home  NPO  Insulin ss Q 6 hrs     ================= Skin/Catheters============================  Peripheral IV lines   Goss catheter       =================Prophylaxis =============================  DVT prophylaxis : heparin Sub Q  GI prophylaxis     ==================GOC==================================  FULL CODE   Disposition : ICU     67M current smoker with PMHx  COPD, HLD, DM, HTN, chronic ETOH dependence, cirrhosis presents with SOB. Patient was found to be septic likely 2/2 PNA.  Patient continued to be  tachypneic, and  developed New onset Afib on Bipap Patient was intubated for increase WOB and admitted to ICU for further management.     # Sepsis  # New Onset Afib  # PNA   # Alcohol Abuse  # Cirrhosis       ============ Neuro============================  # Acute encephalopathy  - baseline A/O x2 poor historian  - was A/O x1 on admission   - now intubated and sedated  -continue with propofol for vent synchrony     ================= Cardiovascular==========================  # New Onset Afib w/ RVR  -developed Afib w/ rvr HR 160s  -s/p Cardizem 10mg x2  -EKG LBBB - similar to previous, but now new Afib  -will consider amiodarone if HR uncontrolled   -will hold AC for now as Afib likely from sepsis     # Elevated trops  -EKG LBBB - similar to previous, but now new Afib  -trops 640, BNP 4K  -Echo 4/23: grade 2 DD  -POCUS preserved LV function apical view, no signs of RV dysfunction   likely demand ischemia  -f/u trop x2      # HTN  hx of HTN, non complaint with meds  on amlodipine and losartan at home  Will hold as pt has sepsis    ================- Pulm=================================  # PNA  - p/w SOB  - patient was intubated for increase WOB   -CXR b/l lung markings  -POCUS revealed small RLL consolidation  - likely has PNA  -continue with Rocephin and azithro  -mycoplasma, legionella, strep  -sputum cx    # COPD  -hx COPD on home 02  - poor airway entry on admission, no wheezing  -s/p solumedrol 125mg  -will c/w duonebs  -will start solumedrol 40 BID IV      ==================ID===================================  #Sepsis  -Afebrile, HR 160s. /94, RR 36, 100% on NRB  WBC 20, lactate 5.7  CXR b/l lung markings  s/p 2.5L   -continue with Rocephin and azithro  -mycoplasma, legionella, strep  -sputum cx, blood cx      ================= Nephro================================  # CORTES  Bun/creatinine 11/1.65  likely pre renal  minimal urine output, concern for ATN  s/p 2.5L     #Lactic Acidosis  lactate 5.7  s/p 2.5 L bolus  likely 2/2 sepsis   f/u rpt lactate     =================GI====================================  # Umbilical Hernia  -not reproducible, but no sign of obstruction   - Consult Surgery      ================ Heme==================================  # No issues     =================Endocrine===============================  # Diabetes  hx of DM, on metformin at home  NPO  Insulin ss Q 6 hrs     ================= Skin/Catheters============================  Peripheral IV lines   Goss catheter       =================Prophylaxis =============================  DVT prophylaxis : heparin Sub Q  GI prophylaxis     ==================GOC==================================  FULL CODE   Disposition : ICU

## 2023-11-18 NOTE — ED ADULT NURSE NOTE - OBJECTIVE STATEMENT
Pt walked into ED SOB, tachy, but able to ambulate. Pt HR in 160s placed on bipap sepsis workup done. Bilateral IVs placed abx given.

## 2023-11-18 NOTE — PROCEDURE NOTE - NSUSBLINES_ED_ALL
"    9/26/2017         RE: Deshawn Burrows  [address] [place] [address]        Dear Colleague,    Thank you for referring your patient, Deshawn Burrows, to the [facility]. Please see a copy of my visit note below.    Foot & Ankle Surgery  September 26, 2017    CC: ingrown nail medial L hallux    I was asked to see Deshawn Burrows regarding the chief complaint by:  JASON Blanca    HPI:  Pt is a 83 year old male who presents with above complaint.  Ingrown nail medial L hallux for \"years\".  No previous procedures.  Painful with direct pressure.    ROS:   Pos for CC.  The patient denies current nausea, vomiting, chills, fevers, belly pain, calf pain, chest pain or SOB.  Complete remainder of ROS is otherwise neg.    VITALS:    Vitals:    09/26/17 1306   BP: 130/80   Weight: 71.2 kg (157 lb)   Height: 1.854 m (6' 1\")       PMH:    Past Medical History:   Diagnosis Date     CAD (coronary artery disease) 2004    2 cardiac stents        SXHX:    Past Surgical History:   Procedure Laterality Date     HERNIA REPAIR  2007    right inguinal hernia repair     STENT, CORONARY, ANTIONE  2004    2 stents        MEDS:    Current Outpatient Prescriptions   Medication     atorvastatin (LIPITOR) 40 MG tablet     metoprolol (TOPROL-XL) 50 MG 24 hr tablet     multivitamin, therapeutic with minerals (THERA-VIT-M) TABS     METOPROLOL SUCCINATE ER PO     No current facility-administered medications for this visit.        ALL:     Allergies   Allergen Reactions     Penicillins        FMH:    Family History   Problem Relation Age of Onset     Family history unknown: Yes       SocHx:    Social History     Social History     Marital status:      Spouse name: N/A     Number of children: N/A     Years of education: N/A     Occupational History     Not on file.     Social History Main Topics     Smoking status: Former Smoker     Smokeless tobacco: Never Used      Comment: Quit 10 years ago     Alcohol use 0.0 " oz/week     0 Standard drinks or equivalent per week      Comment: Moderate     Drug use: No     Sexual activity: Not Currently     Other Topics Concern     Not on file     Social History Narrative           EXAMINATION:  Gen:   No apparent distress  Neuro:   A&Ox3, no deficits  Psych:    Answering questions appropriately for age and situation with normal affect  Head:    NCAT  Eye:    Visual scanning without deficit  Ear:    Response to auditory stimuli wnl  Lung:    Non-labored breathing on RA noted  Abd:    NTND per patient report  Lymph:    Neg for pitting/non-pitting edema BLE  Vasc:    Pulses palpable, CFT minimally delayed  Neuro:    Light touch sensation intact to all sensory nerve distributions without paresthesias  Derm:    Onychocryptosis medial L hallux without wound/paronychia.  The nail is dystrophick, thickened with subungual debris.  MSK:    ROM, strength wnl without limitation, no pain on palpation noted.  Calf:    Neg for redness, swelling or tenderness      Assessment:  83 year old male with onychocryptosis medial L hallux      Plan:  Discussed etiologies and options  1.  Onychocryptosis medial L hallux  -discussed procedure options, from slantback to partial temp and permanent avulsions.  -pain is at leading edge without paronychia  -slantback performed, leading edge removed with improvement in symptoms  -nail care handout dispensed for routine debridement of nails and medial edge  -consider avulsion should slantback provide insufficient relief    Follow up:  prn or sooner with acute issues      Patient's medical history was reviewed today    Body mass index is 20.71 kg/(m^2).            Fredrick Peralta DPM   Podiatric Foot & Ankle Surgeon  [facility]  [phone]      Again, thank you for allowing me to participate in the care of your patient.        Sincerely,        Fredrick Peralta DPM, TENA     Right Lower Lobe

## 2023-11-18 NOTE — ED PROVIDER NOTE - PHYSICAL EXAMINATION
Exam:  General: Patient ill appearing, satting 60's room air tachy to 160-180  HEENT: airway patent with moist mucous membranes  Cardiac: irregular tachycardia S1/S2 with strong peripheral pulses  Respiratory: diffusely diminished breath sounds  GI: distended, + fluid wave

## 2023-11-18 NOTE — H&P ADULT - ATTENDING COMMENTS
67yo man current smoker w/ PMH COPD (not on home O2), HTN, DM2, h/o syncope and falls, h/o prior ICU admission requiring intubation for AHRF with hypercapnia and hypoxemia, h/o medication non-compliance, h/o chronic EtOH dependence/daily use c/b liver cirrhosis and portal HTN with EGD (01/2019) showing esophageal varices who presented to ED with dyspnea; found with acute hypercapneic hypoxemic respiratory failure possibly 2/2 AECOPD +/- acute PNA w/ sepsis c/b Afib RVR, failed NIV trial necessitating intubation and admission to ICU for further management.     #Acute hypoxemic respiratory failure w/ hypercapnia  #Severe sepsis  #COPD, possible exacerbation  #RLL infiltrate, concern for PNA  #Afib w/ RVR  #HFpEF w/ G1DD  #CORTES/ATN  #Lactic acidosis  #H/o HTN  #H/o DM2    Plan:  - admit to ICU  - continue CMV w/ TV 6-8cc/kg IBW and FiO2 titrated to maintain SpO2 88-95%  - continuous hemodynamic monitoring  - sedation titrated to RASS -2 to -3  - bronchodilators ATC  - s/p solumedrol load in ED  - cover with empiric abx for now for sepsis possible PNA  - f/u culture data, RVP  - IVF resuscitation for sepsis and follow on reperfusion exam  - trend lactate, cardiac enzymes  - trend BMP and replete electrolytes  - insert randolph, strict I/O and UOP monitoring  - rate vs. rhythm control if RVR persists  - hold antihypertensives  - formal TTE  - DVT PPx

## 2023-11-18 NOTE — H&P ADULT - NSHPLABSRESULTS_GEN_ALL_CORE
LABS:                        13.8   20.36 )-----------( 167      ( 2023 08:38 )             43.9         138  |  108  |  11  ----------------------------<  229<H>  3.7   |  23  |  1.65<H>    Ca    8.7      2023 08:38    TPro  8.3  /  Alb  3.6  /  TBili  0.9  /  DBili  x   /  AST  23  /  ALT  14  /  AlkPhos  79      PT/INR - ( 2023 08:38 )   PT: 11.7 sec;   INR: 1.03 ratio         PTT - ( 2023 08:38 )  PTT:27.5 sec  Urinalysis Basic - ( 2023 11:23 )    Color: Dark Yellow / Appearance: Slightly Cloudy / S.018 / pH: x  Gluc: x / Ketone: Negative mg/dL  / Bili: Negative / Urobili: 1.0 mg/dL   Blood: x / Protein: >=1000 mg/dL / Nitrite: Negative   Leuk Esterase: Trace / RBC: 5 /HPF / WBC 4 /HPF   Sq Epi: x / Non Sq Epi: x / Bacteria: Moderate /HPF      LIVER FUNCTIONS - ( 2023 08:38 )  Alb: 3.6 g/dL / Pro: 8.3 g/dL / ALK PHOS: 79 U/L / ALT: 14 U/L DA / AST: 23 U/L / GGT: x           Lactate, Blood: 5.7 mmol/L (23 @ 08:38)

## 2023-11-18 NOTE — ED PROVIDER NOTE - CLINICAL SUMMARY MEDICAL DECISION MAKING FREE TEXT BOX
Patient presenting with severe dyspnea, started on BiPAP for respiratory distress with inline nebs, given steroids as well, CXR concerning for multifocal PNA so antibiotics ordered, also given cardizem to prevent sustained HR in 160s-180s, ICU consulted.

## 2023-11-18 NOTE — ED ADULT NURSE REASSESSMENT NOTE - NS ED NURSE REASSESS COMMENT FT1
Pt intubated @1028 23@lip 30 of etomidate and 100 of succinyicholine on 40 of propofol with push of 50 of fent and 2 of versaid HR fluctuates between 140s to 120s afib.

## 2023-11-18 NOTE — PROCEDURE NOTE - NSUSCPTCODES_ED_ALL
78221 US Chest (PTX, Pleural Effussion/CHF vs COPD)
41740 Echocardiography Transthoracic with Image 2D (Echo/FAST)

## 2023-11-19 LAB
ALBUMIN SERPL ELPH-MCNC: 2.9 G/DL — LOW (ref 3.5–5)
ALBUMIN SERPL ELPH-MCNC: 2.9 G/DL — LOW (ref 3.5–5)
ALP SERPL-CCNC: 48 U/L — SIGNIFICANT CHANGE UP (ref 40–120)
ALP SERPL-CCNC: 48 U/L — SIGNIFICANT CHANGE UP (ref 40–120)
ALT FLD-CCNC: 12 U/L DA — SIGNIFICANT CHANGE UP (ref 10–60)
ALT FLD-CCNC: 12 U/L DA — SIGNIFICANT CHANGE UP (ref 10–60)
ANION GAP SERPL CALC-SCNC: 6 MMOL/L — SIGNIFICANT CHANGE UP (ref 5–17)
ANION GAP SERPL CALC-SCNC: 6 MMOL/L — SIGNIFICANT CHANGE UP (ref 5–17)
ANION GAP SERPL CALC-SCNC: 9 MMOL/L — SIGNIFICANT CHANGE UP (ref 5–17)
ANION GAP SERPL CALC-SCNC: 9 MMOL/L — SIGNIFICANT CHANGE UP (ref 5–17)
ANISOCYTOSIS BLD QL: SLIGHT — SIGNIFICANT CHANGE UP
ANISOCYTOSIS BLD QL: SLIGHT — SIGNIFICANT CHANGE UP
AST SERPL-CCNC: 13 U/L — SIGNIFICANT CHANGE UP (ref 10–40)
AST SERPL-CCNC: 13 U/L — SIGNIFICANT CHANGE UP (ref 10–40)
BASE EXCESS BLDA CALC-SCNC: -4.2 MMOL/L — LOW (ref -2–3)
BASE EXCESS BLDA CALC-SCNC: -4.2 MMOL/L — LOW (ref -2–3)
BASE EXCESS BLDV CALC-SCNC: -5.7 MMOL/L — SIGNIFICANT CHANGE UP
BASE EXCESS BLDV CALC-SCNC: -5.7 MMOL/L — SIGNIFICANT CHANGE UP
BASOPHILS # BLD AUTO: 0 K/UL — SIGNIFICANT CHANGE UP (ref 0–0.2)
BASOPHILS # BLD AUTO: 0 K/UL — SIGNIFICANT CHANGE UP (ref 0–0.2)
BASOPHILS # BLD AUTO: 0.01 K/UL — SIGNIFICANT CHANGE UP (ref 0–0.2)
BASOPHILS # BLD AUTO: 0.01 K/UL — SIGNIFICANT CHANGE UP (ref 0–0.2)
BASOPHILS NFR BLD AUTO: 0 % — SIGNIFICANT CHANGE UP (ref 0–2)
BASOPHILS NFR BLD AUTO: 0 % — SIGNIFICANT CHANGE UP (ref 0–2)
BASOPHILS NFR BLD AUTO: 0.2 % — SIGNIFICANT CHANGE UP (ref 0–2)
BASOPHILS NFR BLD AUTO: 0.2 % — SIGNIFICANT CHANGE UP (ref 0–2)
BILIRUB SERPL-MCNC: 0.4 MG/DL — SIGNIFICANT CHANGE UP (ref 0.2–1.2)
BILIRUB SERPL-MCNC: 0.4 MG/DL — SIGNIFICANT CHANGE UP (ref 0.2–1.2)
BLOOD GAS COMMENTS, VENOUS: SIGNIFICANT CHANGE UP
BLOOD GAS COMMENTS, VENOUS: SIGNIFICANT CHANGE UP
BUN SERPL-MCNC: 18 MG/DL — SIGNIFICANT CHANGE UP (ref 7–18)
BUN SERPL-MCNC: 18 MG/DL — SIGNIFICANT CHANGE UP (ref 7–18)
BUN SERPL-MCNC: 23 MG/DL — HIGH (ref 7–18)
BUN SERPL-MCNC: 23 MG/DL — HIGH (ref 7–18)
CA-I SERPL-SCNC: SIGNIFICANT CHANGE UP MMOL/L (ref 1.15–1.33)
CA-I SERPL-SCNC: SIGNIFICANT CHANGE UP MMOL/L (ref 1.15–1.33)
CALCIUM SERPL-MCNC: 8.7 MG/DL — SIGNIFICANT CHANGE UP (ref 8.4–10.5)
CALCIUM SERPL-MCNC: 8.7 MG/DL — SIGNIFICANT CHANGE UP (ref 8.4–10.5)
CALCIUM SERPL-MCNC: 9.2 MG/DL — SIGNIFICANT CHANGE UP (ref 8.4–10.5)
CALCIUM SERPL-MCNC: 9.2 MG/DL — SIGNIFICANT CHANGE UP (ref 8.4–10.5)
CHLORIDE SERPL-SCNC: 109 MMOL/L — HIGH (ref 96–108)
CHLORIDE SERPL-SCNC: 109 MMOL/L — HIGH (ref 96–108)
CHLORIDE SERPL-SCNC: 110 MMOL/L — HIGH (ref 96–108)
CHLORIDE SERPL-SCNC: 110 MMOL/L — HIGH (ref 96–108)
CK MB BLD-MCNC: 2.6 % — SIGNIFICANT CHANGE UP (ref 0–3.5)
CK MB BLD-MCNC: 2.6 % — SIGNIFICANT CHANGE UP (ref 0–3.5)
CK MB CFR SERPL CALC: 3 NG/ML — SIGNIFICANT CHANGE UP (ref 0–3.6)
CK MB CFR SERPL CALC: 3 NG/ML — SIGNIFICANT CHANGE UP (ref 0–3.6)
CK SERPL-CCNC: 116 U/L — SIGNIFICANT CHANGE UP (ref 35–232)
CK SERPL-CCNC: 116 U/L — SIGNIFICANT CHANGE UP (ref 35–232)
CO2 SERPL-SCNC: 19 MMOL/L — LOW (ref 22–31)
CO2 SERPL-SCNC: 19 MMOL/L — LOW (ref 22–31)
CO2 SERPL-SCNC: 22 MMOL/L — SIGNIFICANT CHANGE UP (ref 22–31)
CO2 SERPL-SCNC: 22 MMOL/L — SIGNIFICANT CHANGE UP (ref 22–31)
CREAT SERPL-MCNC: 1.7 MG/DL — HIGH (ref 0.5–1.3)
CREAT SERPL-MCNC: 1.7 MG/DL — HIGH (ref 0.5–1.3)
CREAT SERPL-MCNC: 2.27 MG/DL — HIGH (ref 0.5–1.3)
CREAT SERPL-MCNC: 2.27 MG/DL — HIGH (ref 0.5–1.3)
EGFR: 30 ML/MIN/1.73M2 — LOW
EGFR: 30 ML/MIN/1.73M2 — LOW
EGFR: 43 ML/MIN/1.73M2 — LOW
EGFR: 43 ML/MIN/1.73M2 — LOW
ELLIPTOCYTES BLD QL SMEAR: SLIGHT — SIGNIFICANT CHANGE UP
ELLIPTOCYTES BLD QL SMEAR: SLIGHT — SIGNIFICANT CHANGE UP
EOSINOPHIL # BLD AUTO: 0 K/UL — SIGNIFICANT CHANGE UP (ref 0–0.5)
EOSINOPHIL # BLD AUTO: 0 K/UL — SIGNIFICANT CHANGE UP (ref 0–0.5)
EOSINOPHIL # BLD AUTO: 0.42 K/UL — SIGNIFICANT CHANGE UP (ref 0–0.5)
EOSINOPHIL # BLD AUTO: 0.42 K/UL — SIGNIFICANT CHANGE UP (ref 0–0.5)
EOSINOPHIL NFR BLD AUTO: 0 % — SIGNIFICANT CHANGE UP (ref 0–6)
EOSINOPHIL NFR BLD AUTO: 0 % — SIGNIFICANT CHANGE UP (ref 0–6)
EOSINOPHIL NFR BLD AUTO: 2 % — SIGNIFICANT CHANGE UP (ref 0–6)
EOSINOPHIL NFR BLD AUTO: 2 % — SIGNIFICANT CHANGE UP (ref 0–6)
GAS PNL BLDA: SIGNIFICANT CHANGE UP
GAS PNL BLDA: SIGNIFICANT CHANGE UP
GAS PNL BLDV: 136 MMOL/L — SIGNIFICANT CHANGE UP (ref 136–145)
GAS PNL BLDV: 136 MMOL/L — SIGNIFICANT CHANGE UP (ref 136–145)
GAS PNL BLDV: SIGNIFICANT CHANGE UP
GAS PNL BLDV: SIGNIFICANT CHANGE UP
GLUCOSE BLDC GLUCOMTR-MCNC: 125 MG/DL — HIGH (ref 70–99)
GLUCOSE BLDC GLUCOMTR-MCNC: 125 MG/DL — HIGH (ref 70–99)
GLUCOSE BLDC GLUCOMTR-MCNC: 157 MG/DL — HIGH (ref 70–99)
GLUCOSE BLDC GLUCOMTR-MCNC: 157 MG/DL — HIGH (ref 70–99)
GLUCOSE BLDC GLUCOMTR-MCNC: 164 MG/DL — HIGH (ref 70–99)
GLUCOSE BLDC GLUCOMTR-MCNC: 164 MG/DL — HIGH (ref 70–99)
GLUCOSE BLDC GLUCOMTR-MCNC: 171 MG/DL — HIGH (ref 70–99)
GLUCOSE BLDC GLUCOMTR-MCNC: 171 MG/DL — HIGH (ref 70–99)
GLUCOSE SERPL-MCNC: 145 MG/DL — HIGH (ref 70–99)
GLUCOSE SERPL-MCNC: 145 MG/DL — HIGH (ref 70–99)
GLUCOSE SERPL-MCNC: 178 MG/DL — HIGH (ref 70–99)
GLUCOSE SERPL-MCNC: 178 MG/DL — HIGH (ref 70–99)
HCO3 BLDA-SCNC: 20 MMOL/L — LOW (ref 21–28)
HCO3 BLDA-SCNC: 20 MMOL/L — LOW (ref 21–28)
HCO3 BLDV-SCNC: 21 MMOL/L — LOW (ref 22–29)
HCO3 BLDV-SCNC: 21 MMOL/L — LOW (ref 22–29)
HCT VFR BLD CALC: 34.1 % — LOW (ref 39–50)
HCT VFR BLD CALC: 34.1 % — LOW (ref 39–50)
HCT VFR BLD CALC: 42 % — SIGNIFICANT CHANGE UP (ref 39–50)
HCT VFR BLD CALC: 42 % — SIGNIFICANT CHANGE UP (ref 39–50)
HGB BLD-MCNC: 11.1 G/DL — LOW (ref 13–17)
HGB BLD-MCNC: 11.1 G/DL — LOW (ref 13–17)
HGB BLD-MCNC: 14 G/DL — SIGNIFICANT CHANGE UP (ref 13–17)
HGB BLD-MCNC: 14 G/DL — SIGNIFICANT CHANGE UP (ref 13–17)
HOROWITZ INDEX BLDA+IHG-RTO: 40 — SIGNIFICANT CHANGE UP
HOROWITZ INDEX BLDA+IHG-RTO: 40 — SIGNIFICANT CHANGE UP
HOROWITZ INDEX BLDV+IHG-RTO: 100 — SIGNIFICANT CHANGE UP
HOROWITZ INDEX BLDV+IHG-RTO: 100 — SIGNIFICANT CHANGE UP
HYPOCHROMIA BLD QL: SLIGHT — SIGNIFICANT CHANGE UP
HYPOCHROMIA BLD QL: SLIGHT — SIGNIFICANT CHANGE UP
IMM GRANULOCYTES NFR BLD AUTO: 0.3 % — SIGNIFICANT CHANGE UP (ref 0–0.9)
IMM GRANULOCYTES NFR BLD AUTO: 0.3 % — SIGNIFICANT CHANGE UP (ref 0–0.9)
INR BLD: 1.01 RATIO — SIGNIFICANT CHANGE UP (ref 0.85–1.18)
INR BLD: 1.01 RATIO — SIGNIFICANT CHANGE UP (ref 0.85–1.18)
LACTATE BLDV-MCNC: 2.5 MMOL/L — HIGH (ref 0.5–2)
LACTATE BLDV-MCNC: 2.5 MMOL/L — HIGH (ref 0.5–2)
LACTATE SERPL-SCNC: 2.8 MMOL/L — HIGH (ref 0.7–2)
LACTATE SERPL-SCNC: 2.8 MMOL/L — HIGH (ref 0.7–2)
LG PLATELETS BLD QL AUTO: SLIGHT — SIGNIFICANT CHANGE UP
LG PLATELETS BLD QL AUTO: SLIGHT — SIGNIFICANT CHANGE UP
LYMPHOCYTES # BLD AUTO: 0.52 K/UL — LOW (ref 1–3.3)
LYMPHOCYTES # BLD AUTO: 0.52 K/UL — LOW (ref 1–3.3)
LYMPHOCYTES # BLD AUTO: 1.26 K/UL — SIGNIFICANT CHANGE UP (ref 1–3.3)
LYMPHOCYTES # BLD AUTO: 1.26 K/UL — SIGNIFICANT CHANGE UP (ref 1–3.3)
LYMPHOCYTES # BLD AUTO: 6 % — LOW (ref 13–44)
LYMPHOCYTES # BLD AUTO: 6 % — LOW (ref 13–44)
LYMPHOCYTES # BLD AUTO: 8.6 % — LOW (ref 13–44)
LYMPHOCYTES # BLD AUTO: 8.6 % — LOW (ref 13–44)
MAGNESIUM SERPL-MCNC: 1.8 MG/DL — SIGNIFICANT CHANGE UP (ref 1.6–2.6)
MANUAL SMEAR VERIFICATION: SIGNIFICANT CHANGE UP
MANUAL SMEAR VERIFICATION: SIGNIFICANT CHANGE UP
MCHC RBC-ENTMCNC: 31.5 PG — SIGNIFICANT CHANGE UP (ref 27–34)
MCHC RBC-ENTMCNC: 31.5 PG — SIGNIFICANT CHANGE UP (ref 27–34)
MCHC RBC-ENTMCNC: 32.1 PG — SIGNIFICANT CHANGE UP (ref 27–34)
MCHC RBC-ENTMCNC: 32.1 PG — SIGNIFICANT CHANGE UP (ref 27–34)
MCHC RBC-ENTMCNC: 32.6 GM/DL — SIGNIFICANT CHANGE UP (ref 32–36)
MCHC RBC-ENTMCNC: 32.6 GM/DL — SIGNIFICANT CHANGE UP (ref 32–36)
MCHC RBC-ENTMCNC: 33.3 GM/DL — SIGNIFICANT CHANGE UP (ref 32–36)
MCHC RBC-ENTMCNC: 33.3 GM/DL — SIGNIFICANT CHANGE UP (ref 32–36)
MCV RBC AUTO: 96.3 FL — SIGNIFICANT CHANGE UP (ref 80–100)
MCV RBC AUTO: 96.3 FL — SIGNIFICANT CHANGE UP (ref 80–100)
MCV RBC AUTO: 96.9 FL — SIGNIFICANT CHANGE UP (ref 80–100)
MCV RBC AUTO: 96.9 FL — SIGNIFICANT CHANGE UP (ref 80–100)
MICROCYTES BLD QL: SLIGHT — SIGNIFICANT CHANGE UP
MICROCYTES BLD QL: SLIGHT — SIGNIFICANT CHANGE UP
MONOCYTES # BLD AUTO: 0.4 K/UL — SIGNIFICANT CHANGE UP (ref 0–0.9)
MONOCYTES # BLD AUTO: 0.4 K/UL — SIGNIFICANT CHANGE UP (ref 0–0.9)
MONOCYTES # BLD AUTO: 1.05 K/UL — HIGH (ref 0–0.9)
MONOCYTES # BLD AUTO: 1.05 K/UL — HIGH (ref 0–0.9)
MONOCYTES NFR BLD AUTO: 5 % — SIGNIFICANT CHANGE UP (ref 2–14)
MONOCYTES NFR BLD AUTO: 5 % — SIGNIFICANT CHANGE UP (ref 2–14)
MONOCYTES NFR BLD AUTO: 6.6 % — SIGNIFICANT CHANGE UP (ref 2–14)
MONOCYTES NFR BLD AUTO: 6.6 % — SIGNIFICANT CHANGE UP (ref 2–14)
MRSA PCR RESULT.: SIGNIFICANT CHANGE UP
MRSA PCR RESULT.: SIGNIFICANT CHANGE UP
NEUTROPHILS # BLD AUTO: 18.31 K/UL — HIGH (ref 1.8–7.4)
NEUTROPHILS # BLD AUTO: 18.31 K/UL — HIGH (ref 1.8–7.4)
NEUTROPHILS # BLD AUTO: 5.13 K/UL — SIGNIFICANT CHANGE UP (ref 1.8–7.4)
NEUTROPHILS # BLD AUTO: 5.13 K/UL — SIGNIFICANT CHANGE UP (ref 1.8–7.4)
NEUTROPHILS NFR BLD AUTO: 84.3 % — HIGH (ref 43–77)
NEUTROPHILS NFR BLD AUTO: 84.3 % — HIGH (ref 43–77)
NEUTROPHILS NFR BLD AUTO: 86 % — HIGH (ref 43–77)
NEUTROPHILS NFR BLD AUTO: 86 % — HIGH (ref 43–77)
NEUTS BAND # BLD: 1 % — SIGNIFICANT CHANGE UP (ref 0–8)
NEUTS BAND # BLD: 1 % — SIGNIFICANT CHANGE UP (ref 0–8)
NRBC # BLD: 0 /100 WBCS — SIGNIFICANT CHANGE UP (ref 0–0)
NRBC # BLD: 0 /100 WBCS — SIGNIFICANT CHANGE UP (ref 0–0)
NRBC # BLD: 0 /100 — SIGNIFICANT CHANGE UP (ref 0–0)
NRBC # BLD: 0 /100 — SIGNIFICANT CHANGE UP (ref 0–0)
PCO2 BLDA: 34 MMHG — LOW (ref 35–48)
PCO2 BLDA: 34 MMHG — LOW (ref 35–48)
PCO2 BLDV: 43 MMHG — SIGNIFICANT CHANGE UP (ref 42–55)
PCO2 BLDV: 43 MMHG — SIGNIFICANT CHANGE UP (ref 42–55)
PH BLDA: 7.38 — SIGNIFICANT CHANGE UP (ref 7.35–7.45)
PH BLDA: 7.38 — SIGNIFICANT CHANGE UP (ref 7.35–7.45)
PH BLDV: 7.29 — LOW (ref 7.32–7.43)
PH BLDV: 7.29 — LOW (ref 7.32–7.43)
PHOSPHATE SERPL-MCNC: 4.8 MG/DL — HIGH (ref 2.5–4.5)
PHOSPHATE SERPL-MCNC: 4.8 MG/DL — HIGH (ref 2.5–4.5)
PHOSPHATE SERPL-MCNC: 6.5 MG/DL — HIGH (ref 2.5–4.5)
PHOSPHATE SERPL-MCNC: 6.5 MG/DL — HIGH (ref 2.5–4.5)
PLAT MORPH BLD: NORMAL — SIGNIFICANT CHANGE UP
PLAT MORPH BLD: NORMAL — SIGNIFICANT CHANGE UP
PLATELET # BLD AUTO: 203 K/UL — SIGNIFICANT CHANGE UP (ref 150–400)
PLATELET # BLD AUTO: 203 K/UL — SIGNIFICANT CHANGE UP (ref 150–400)
PLATELET # BLD AUTO: 78 K/UL — LOW (ref 150–400)
PLATELET # BLD AUTO: 78 K/UL — LOW (ref 150–400)
PO2 BLDA: 175 MMHG — HIGH (ref 83–108)
PO2 BLDA: 175 MMHG — HIGH (ref 83–108)
PO2 BLDV: 47 MMHG — SIGNIFICANT CHANGE UP
PO2 BLDV: 47 MMHG — SIGNIFICANT CHANGE UP
POIKILOCYTOSIS BLD QL AUTO: SLIGHT — SIGNIFICANT CHANGE UP
POIKILOCYTOSIS BLD QL AUTO: SLIGHT — SIGNIFICANT CHANGE UP
POTASSIUM BLDV-SCNC: 5 MMOL/L — SIGNIFICANT CHANGE UP (ref 3.5–5.1)
POTASSIUM BLDV-SCNC: 5 MMOL/L — SIGNIFICANT CHANGE UP (ref 3.5–5.1)
POTASSIUM SERPL-MCNC: 4.8 MMOL/L — SIGNIFICANT CHANGE UP (ref 3.5–5.3)
POTASSIUM SERPL-MCNC: 4.8 MMOL/L — SIGNIFICANT CHANGE UP (ref 3.5–5.3)
POTASSIUM SERPL-MCNC: 5.2 MMOL/L — SIGNIFICANT CHANGE UP (ref 3.5–5.3)
POTASSIUM SERPL-MCNC: 5.2 MMOL/L — SIGNIFICANT CHANGE UP (ref 3.5–5.3)
POTASSIUM SERPL-SCNC: 4.8 MMOL/L — SIGNIFICANT CHANGE UP (ref 3.5–5.3)
POTASSIUM SERPL-SCNC: 4.8 MMOL/L — SIGNIFICANT CHANGE UP (ref 3.5–5.3)
POTASSIUM SERPL-SCNC: 5.2 MMOL/L — SIGNIFICANT CHANGE UP (ref 3.5–5.3)
POTASSIUM SERPL-SCNC: 5.2 MMOL/L — SIGNIFICANT CHANGE UP (ref 3.5–5.3)
PROT SERPL-MCNC: 6.9 G/DL — SIGNIFICANT CHANGE UP (ref 6–8.3)
PROT SERPL-MCNC: 6.9 G/DL — SIGNIFICANT CHANGE UP (ref 6–8.3)
PROTHROM AB SERPL-ACNC: 11.5 SEC — SIGNIFICANT CHANGE UP (ref 9.5–13)
PROTHROM AB SERPL-ACNC: 11.5 SEC — SIGNIFICANT CHANGE UP (ref 9.5–13)
RBC # BLD: 3.52 M/UL — LOW (ref 4.2–5.8)
RBC # BLD: 3.52 M/UL — LOW (ref 4.2–5.8)
RBC # BLD: 4.36 M/UL — SIGNIFICANT CHANGE UP (ref 4.2–5.8)
RBC # BLD: 4.36 M/UL — SIGNIFICANT CHANGE UP (ref 4.2–5.8)
RBC # FLD: 17.5 % — HIGH (ref 10.3–14.5)
RBC # FLD: 17.5 % — HIGH (ref 10.3–14.5)
RBC # FLD: 18.1 % — HIGH (ref 10.3–14.5)
RBC # FLD: 18.1 % — HIGH (ref 10.3–14.5)
RBC BLD AUTO: ABNORMAL
RBC BLD AUTO: ABNORMAL
S AUREUS DNA NOSE QL NAA+PROBE: SIGNIFICANT CHANGE UP
S AUREUS DNA NOSE QL NAA+PROBE: SIGNIFICANT CHANGE UP
S PNEUM AG UR QL: NEGATIVE — SIGNIFICANT CHANGE UP
S PNEUM AG UR QL: NEGATIVE — SIGNIFICANT CHANGE UP
SAO2 % BLDA: 100 % — SIGNIFICANT CHANGE UP
SAO2 % BLDA: 100 % — SIGNIFICANT CHANGE UP
SAO2 % BLDV: 73 % — SIGNIFICANT CHANGE UP
SAO2 % BLDV: 73 % — SIGNIFICANT CHANGE UP
SODIUM SERPL-SCNC: 137 MMOL/L — SIGNIFICANT CHANGE UP (ref 135–145)
SODIUM SERPL-SCNC: 137 MMOL/L — SIGNIFICANT CHANGE UP (ref 135–145)
SODIUM SERPL-SCNC: 138 MMOL/L — SIGNIFICANT CHANGE UP (ref 135–145)
SODIUM SERPL-SCNC: 138 MMOL/L — SIGNIFICANT CHANGE UP (ref 135–145)
SPHEROCYTES BLD QL SMEAR: SLIGHT — SIGNIFICANT CHANGE UP
SPHEROCYTES BLD QL SMEAR: SLIGHT — SIGNIFICANT CHANGE UP
TROPONIN I, HIGH SENSITIVITY RESULT: 404.2 NG/L — HIGH
TROPONIN I, HIGH SENSITIVITY RESULT: 404.2 NG/L — HIGH
WBC # BLD: 21.05 K/UL — HIGH (ref 3.8–10.5)
WBC # BLD: 21.05 K/UL — HIGH (ref 3.8–10.5)
WBC # BLD: 6.08 K/UL — SIGNIFICANT CHANGE UP (ref 3.8–10.5)
WBC # BLD: 6.08 K/UL — SIGNIFICANT CHANGE UP (ref 3.8–10.5)
WBC # FLD AUTO: 21.05 K/UL — HIGH (ref 3.8–10.5)
WBC # FLD AUTO: 21.05 K/UL — HIGH (ref 3.8–10.5)
WBC # FLD AUTO: 6.08 K/UL — SIGNIFICANT CHANGE UP (ref 3.8–10.5)
WBC # FLD AUTO: 6.08 K/UL — SIGNIFICANT CHANGE UP (ref 3.8–10.5)

## 2023-11-19 PROCEDURE — 71045 X-RAY EXAM CHEST 1 VIEW: CPT | Mod: 26

## 2023-11-19 PROCEDURE — 99291 CRITICAL CARE FIRST HOUR: CPT | Mod: GC

## 2023-11-19 RX ORDER — TIOTROPIUM BROMIDE 18 UG/1
2 CAPSULE ORAL; RESPIRATORY (INHALATION) DAILY
Refills: 0 | Status: COMPLETED | OUTPATIENT
Start: 2023-11-19 | End: 2024-10-17

## 2023-11-19 RX ORDER — ALBUTEROL 90 UG/1
2 AEROSOL, METERED ORAL EVERY 6 HOURS
Refills: 0 | Status: DISCONTINUED | OUTPATIENT
Start: 2023-11-19 | End: 2023-11-21

## 2023-11-19 RX ORDER — ACETAMINOPHEN 500 MG
1000 TABLET ORAL ONCE
Refills: 0 | Status: COMPLETED | OUTPATIENT
Start: 2023-11-19 | End: 2023-11-19

## 2023-11-19 RX ORDER — ALBUTEROL 90 UG/1
2 AEROSOL, METERED ORAL EVERY 6 HOURS
Refills: 0 | Status: COMPLETED | OUTPATIENT
Start: 2023-11-19 | End: 2024-10-17

## 2023-11-19 RX ORDER — SODIUM CHLORIDE 9 MG/ML
1000 INJECTION, SOLUTION INTRAVENOUS ONCE
Refills: 0 | Status: COMPLETED | OUTPATIENT
Start: 2023-11-19 | End: 2023-11-19

## 2023-11-19 RX ORDER — FUROSEMIDE 40 MG
40 TABLET ORAL DAILY
Refills: 0 | Status: DISCONTINUED | OUTPATIENT
Start: 2023-11-19 | End: 2023-11-20

## 2023-11-19 RX ORDER — FUROSEMIDE 40 MG
40 TABLET ORAL DAILY
Refills: 0 | Status: DISCONTINUED | OUTPATIENT
Start: 2023-11-19 | End: 2023-11-19

## 2023-11-19 RX ORDER — BUDESONIDE AND FORMOTEROL FUMARATE DIHYDRATE 160; 4.5 UG/1; UG/1
2 AEROSOL RESPIRATORY (INHALATION)
Refills: 0 | Status: DISCONTINUED | OUTPATIENT
Start: 2023-11-19 | End: 2023-11-30

## 2023-11-19 RX ORDER — IPRATROPIUM/ALBUTEROL SULFATE 18-103MCG
3 AEROSOL WITH ADAPTER (GRAM) INHALATION EVERY 6 HOURS
Refills: 0 | Status: DISCONTINUED | OUTPATIENT
Start: 2023-11-19 | End: 2023-11-19

## 2023-11-19 RX ORDER — AMIODARONE HYDROCHLORIDE 400 MG/1
1 TABLET ORAL
Qty: 450 | Refills: 0 | Status: DISCONTINUED | OUTPATIENT
Start: 2023-11-19 | End: 2023-11-20

## 2023-11-19 RX ORDER — METOPROLOL TARTRATE 50 MG
12.5 TABLET ORAL EVERY 12 HOURS
Refills: 0 | Status: DISCONTINUED | OUTPATIENT
Start: 2023-11-19 | End: 2023-11-19

## 2023-11-19 RX ORDER — METOPROLOL TARTRATE 50 MG
5 TABLET ORAL ONCE
Refills: 0 | Status: COMPLETED | OUTPATIENT
Start: 2023-11-19 | End: 2023-11-19

## 2023-11-19 RX ORDER — AMIODARONE HYDROCHLORIDE 400 MG/1
0.5 TABLET ORAL
Qty: 450 | Refills: 0 | Status: DISCONTINUED | OUTPATIENT
Start: 2023-11-19 | End: 2023-11-20

## 2023-11-19 RX ORDER — THIAMINE MONONITRATE (VIT B1) 100 MG
100 TABLET ORAL DAILY
Refills: 0 | Status: DISCONTINUED | OUTPATIENT
Start: 2023-11-19 | End: 2023-11-21

## 2023-11-19 RX ORDER — AMIODARONE HYDROCHLORIDE 400 MG/1
150 TABLET ORAL ONCE
Refills: 0 | Status: COMPLETED | OUTPATIENT
Start: 2023-11-19 | End: 2023-11-19

## 2023-11-19 RX ORDER — TIOTROPIUM BROMIDE 18 UG/1
2 CAPSULE ORAL; RESPIRATORY (INHALATION) DAILY
Refills: 0 | Status: DISCONTINUED | OUTPATIENT
Start: 2023-11-19 | End: 2023-11-30

## 2023-11-19 RX ADMIN — Medication 1000 MILLIGRAM(S): at 13:48

## 2023-11-19 RX ADMIN — HEPARIN SODIUM 5000 UNIT(S): 5000 INJECTION INTRAVENOUS; SUBCUTANEOUS at 21:23

## 2023-11-19 RX ADMIN — TIOTROPIUM BROMIDE 2 PUFF(S): 18 CAPSULE ORAL; RESPIRATORY (INHALATION) at 11:16

## 2023-11-19 RX ADMIN — AMIODARONE HYDROCHLORIDE 600 MILLIGRAM(S): 400 TABLET ORAL at 17:19

## 2023-11-19 RX ADMIN — HEPARIN SODIUM 5000 UNIT(S): 5000 INJECTION INTRAVENOUS; SUBCUTANEOUS at 14:22

## 2023-11-19 RX ADMIN — Medication 5 MILLIGRAM(S): at 09:54

## 2023-11-19 RX ADMIN — CHLORHEXIDINE GLUCONATE 1 APPLICATION(S): 213 SOLUTION TOPICAL at 05:10

## 2023-11-19 RX ADMIN — AZITHROMYCIN 255 MILLIGRAM(S): 500 TABLET, FILM COATED ORAL at 04:24

## 2023-11-19 RX ADMIN — FENTANYL CITRATE 50 MICROGRAM(S): 50 INJECTION INTRAVENOUS at 06:08

## 2023-11-19 RX ADMIN — Medication 5 MILLIGRAM(S): at 13:15

## 2023-11-19 RX ADMIN — FENTANYL CITRATE 50 MICROGRAM(S): 50 INJECTION INTRAVENOUS at 00:12

## 2023-11-19 RX ADMIN — HEPARIN SODIUM 5000 UNIT(S): 5000 INJECTION INTRAVENOUS; SUBCUTANEOUS at 05:09

## 2023-11-19 RX ADMIN — Medication 40 MILLIGRAM(S): at 17:32

## 2023-11-19 RX ADMIN — CHLORHEXIDINE GLUCONATE 15 MILLILITER(S): 213 SOLUTION TOPICAL at 05:08

## 2023-11-19 RX ADMIN — Medication 100 MILLIGRAM(S): at 11:27

## 2023-11-19 RX ADMIN — Medication 1: at 18:38

## 2023-11-19 RX ADMIN — PROPOFOL 12.8 MICROGRAM(S)/KG/MIN: 10 INJECTION, EMULSION INTRAVENOUS at 05:59

## 2023-11-19 RX ADMIN — Medication 400 MILLIGRAM(S): at 12:48

## 2023-11-19 RX ADMIN — BUDESONIDE AND FORMOTEROL FUMARATE DIHYDRATE 2 PUFF(S): 160; 4.5 AEROSOL RESPIRATORY (INHALATION) at 11:14

## 2023-11-19 RX ADMIN — ALBUTEROL 2 PUFF(S): 90 AEROSOL, METERED ORAL at 14:45

## 2023-11-19 RX ADMIN — CEFTRIAXONE 100 MILLIGRAM(S): 500 INJECTION, POWDER, FOR SOLUTION INTRAMUSCULAR; INTRAVENOUS at 18:53

## 2023-11-19 RX ADMIN — FENTANYL CITRATE 50 MICROGRAM(S): 50 INJECTION INTRAVENOUS at 05:08

## 2023-11-19 RX ADMIN — Medication 40 MILLIGRAM(S): at 05:08

## 2023-11-19 RX ADMIN — AMIODARONE HYDROCHLORIDE 33.3 MG/MIN: 400 TABLET ORAL at 17:30

## 2023-11-19 RX ADMIN — SODIUM CHLORIDE 1000 MILLILITER(S): 9 INJECTION, SOLUTION INTRAVENOUS at 14:22

## 2023-11-19 RX ADMIN — Medication 2 MILLIGRAM(S): at 21:22

## 2023-11-19 NOTE — PROGRESS NOTE ADULT - ASSESSMENT
67M current smoker with PMHx  COPD, HLD, DM, HTN, chronic ETOH dependence, cirrhosis presents with SOB. Patient was found to be septic likely 2/2 PNA.  Patient continued to be  tachypneic, and  developed New onset Afib on Bipap Patient was intubated for increase WOB and admitted to ICU for further management.     # Sepsis  # New Onset Afib  # PNA   # Alcohol Abuse  # Cirrhosis       ============ Neuro============================  # Acute encephalopathy  - baseline A/O x2 poor historian  - was A/O x1 on admission   - now intubated and sedated  -continue with propofol for vent synchrony     ================= Cardiovascular==========================  # New Onset Afib w/ RVR  -developed Afib w/ rvr HR 160s  -s/p Cardizem 10mg x2  -EKG LBBB - similar to previous, but now new Afib  -will consider amiodarone if HR uncontrolled   -will hold AC for now as Afib likely from sepsis     # Elevated trops  -EKG LBBB - similar to previous, but now new Afib  -trops 640, BNP 4K  -Echo 4/23: grade 2 DD  -POCUS preserved LV function apical view, no signs of RV dysfunction   likely demand ischemia  -f/u trop x2      # HTN  hx of HTN, non complaint with meds  on amlodipine and losartan at home  Will hold as pt has sepsis    ================- Pulm=================================  # PNA  - p/w SOB  - patient was intubated for increase WOB   -CXR b/l lung markings  -POCUS revealed small RLL consolidation  - likely has PNA  -continue with Rocephin and azithro  -mycoplasma, legionella, strep  -sputum cx    # COPD  -hx COPD on home 02  - poor airway entry on admission, no wheezing  -s/p solumedrol 125mg  -will c/w duonebs  -will start solumedrol 40 BID IV      ==================ID===================================  #Sepsis  -Afebrile, HR 160s. /94, RR 36, 100% on NRB  WBC 20, lactate 5.7  CXR b/l lung markings  s/p 2.5L   -continue with Rocephin and azithro  -mycoplasma, legionella, strep  -sputum cx, blood cx      ================= Nephro================================  # CORTES  Bun/creatinine 11/1.65  likely pre renal  minimal urine output, concern for ATN  s/p 2.5L     #Lactic Acidosis  lactate 5.7  s/p 2.5 L bolus  likely 2/2 sepsis   f/u rpt lactate     =================GI====================================  # Umbilical Hernia  -not reproducible, but no sign of obstruction   - Consult Surgery      ================ Heme==================================  # No issues     =================Endocrine===============================  # Diabetes  hx of DM, on metformin at home  NPO  Insulin ss Q 6 hrs     ================= Skin/Catheters============================  Peripheral IV lines   Goss catheter       =================Prophylaxis =============================  DVT prophylaxis : heparin Sub Q  GI prophylaxis     ==================GOC==================================  FULL CODE   Disposition : ICU   67M current smoker with PMHx  COPD, HLD, DM, HTN, chronic ETOH dependence, cirrhosis presents with SOB. Patient was found to be septic likely 2/2 PNA.  Patient continued to be  tachypneic, and  developed New onset Afib on Bipap Patient was intubated for increase WOB and admitted to ICU for further management 2/2 PNA vs COPD exacerbation.    # Sepsis  # New Onset Afib  # PNA   # Alcohol Abuse  # Cirrhosis       ============ Neuro============================  Extubated, GCS 11  ================= Cardiovascular==========================  # New Onset Afib w/ RVR  -developed Afib w/ rvr HR 160s  -s/p Cardizem 10mg x2  -EKG LBBB - similar to previous, but now new Afib  -will consider amiodarone if HR uncontrolled   -will hold AC for now as Afib likely from sepsis     # Elevated trops  Downtrending Trops      # HTN  hx of HTN, non complaint with meds  on amlodipine and losartan at home  Held in setting of septic shock  ================- Pulm=================================  # PNA  - patient extubated  -CXR b/l lung markings  -continue with Rocephin and azithro  -mycoplasma, legionella pending   strep -ve  -sputum cx     # COPD  -hx COPD on home 02  - poor airway entry on admission, no wheezing  -s/p solumedrol 125mg  -will c/w duonebs  -will start solumedrol 40 BID IV      ==================ID===================================  #Sepsis  -Afebrile, HR 160s. /94, RR 36, 100% on NRB  WBC 20, lactate 5.7  CXR b/l lung markings  s/p 2.5L   -continue with Rocephin and azithro  -mycoplasma, legionella pending   strep -ve  -sputum cx, blood cx      ================= Nephro================================  # ATN  # CORTES  Bun/creatinine 11/1.65  Granular casts  s/p 2.5L     #Lactic Acidosis  lactate 5.7>2.0  s/p 2.5 L bolus  likely 2/2 sepsis   RESOLVED     =================GI====================================  # Umbilical Hernia  -not reproducible, but no sign of obstruction   - Consult Surgery      ================ Heme==================================  # No issues     =================Endocrine===============================  # Diabetes  hx of DM, on metformin at home  NPO  Insulin ss Q 6 hrs     ================= Skin/Catheters============================  Peripheral IV lines   Goss catheter       =================Prophylaxis =============================  DVT prophylaxis : heparin Sub Q  GI prophylaxis     ==================GOC==================================  FULL CODE   Disposition : ICU   68M current smoker with PMHx  COPD, HLD, DM, HTN, chronic ETOH dependence, cirrhosis presents with SOB. Patient was found to be septic likely 2/2 PNA.  Patient continued to be  tachypneic, and  developed New onset Afib on Bipap Patient was intubated for increase WOB and admitted to ICU for further management 2/2 PNA vs COPD exacerbation.    # Sepsis  # New Onset Afib  # PNA   # Alcohol Abuse  # Cirrhosis       ============ Neuro============================  Extubated, GCS 11  ================= Cardiovascular==========================  # New Onset Afib w/ RVR  -developed Afib w/ rvr HR 160s  -s/p Cardizem 10mg x2  -EKG LBBB - similar to previous, but now new Afib  -will consider amiodarone if HR uncontrolled   -will hold AC for now as Afib likely from sepsis     # Elevated trops  Downtrending Trops      # HTN  hx of HTN, non complaint with meds  on amlodipine and losartan at home  Held in setting of septic shock  ================- Pulm=================================  # PNA  - patient extubated  -CXR b/l lung markings  -continue with Rocephin and azithro  -mycoplasma, legionella pending   strep -ve  -sputum cx     # COPD  -hx COPD on home 02  - poor airway entry on admission, no wheezing  -s/p solumedrol 125mg  -will c/w duonebs  -will start solumedrol 40 BID IV      ==================ID===================================  #Sepsis  -Afebrile, HR 160s. /94, RR 36, 100% on NRB  WBC 20, lactate 5.7  CXR b/l lung markings  s/p 2.5L   -continue with Rocephin and azithro  -mycoplasma, legionella pending   strep -ve  -sputum cx, blood cx      ================= Nephro================================  # ATN  # CORTES  Bun/creatinine 11/1.65  Granular casts  s/p 2.5L     #Lactic Acidosis  lactate 5.7>2.0  s/p 2.5 L bolus  likely 2/2 sepsis   RESOLVED     =================GI====================================  # Umbilical Hernia  -not reproducible, but no sign of obstruction   - Consult Surgery      ================ Heme==================================  # No issues     =================Endocrine===============================  # Diabetes  hx of DM, on metformin at home  NPO  Insulin ss Q 6 hrs     ================= Skin/Catheters============================  Peripheral IV lines   Goss catheter       =================Prophylaxis =============================  DVT prophylaxis : heparin Sub Q  GI prophylaxis     ==================GOC==================================  FULL CODE   Disposition : ICU

## 2023-11-19 NOTE — PROGRESS NOTE ADULT - SUBJECTIVE AND OBJECTIVE BOX
INTERVAL HPI/OVERNIGHT EVENTS:  Seen and examined at bedside.    PRESSORS: [  ] YES [ X ] NO  WHICH:    Antimicrobial:  azithromycin  IVPB 500 milliGRAM(s) IV Intermittent every 24 hours  cefTRIAXone   IVPB 2000 milliGRAM(s) IV Intermittent every 24 hours    Cardiovascular:    Pulmonary:    Hematalogic:  heparin   Injectable 5000 Unit(s) SubCutaneous every 8 hours    Other:  chlorhexidine 0.12% Liquid 15 milliLiter(s) Oral Mucosa every 12 hours  chlorhexidine 2% Cloths 1 Application(s) Topical <User Schedule>  fentaNYL    Injectable 50 MICROGram(s) IV Push every 6 hours  insulin lispro (ADMELOG) corrective regimen sliding scale   SubCutaneous every 6 hours  methylPREDNISolone sodium succinate Injectable 40 milliGRAM(s) IV Push every 12 hours  propofol Infusion 30 MICROgram(s)/kG/Min IV Continuous <Continuous>    azithromycin  IVPB 500 milliGRAM(s) IV Intermittent every 24 hours  cefTRIAXone   IVPB 2000 milliGRAM(s) IV Intermittent every 24 hours  chlorhexidine 0.12% Liquid 15 milliLiter(s) Oral Mucosa every 12 hours  chlorhexidine 2% Cloths 1 Application(s) Topical <User Schedule>  fentaNYL    Injectable 50 MICROGram(s) IV Push every 6 hours  heparin   Injectable 5000 Unit(s) SubCutaneous every 8 hours  insulin lispro (ADMELOG) corrective regimen sliding scale   SubCutaneous every 6 hours  methylPREDNISolone sodium succinate Injectable 40 milliGRAM(s) IV Push every 12 hours  propofol Infusion 30 MICROgram(s)/kG/Min IV Continuous <Continuous>    Drug Dosing Weight  Height (cm): 172.7 (2023 08:28)  Weight (kg): 71 (2023 08:28)  BMI (kg/m2): 23.8 (2023 08:28)  BSA (m2): 1.84 (2023 08:28)    CENTRAL LINE: [ ] YES [ ] NO  LOCATION:   DATE INSERTED:  REMOVE: [ ] YES [ ] NO  EXPLAIN:    MUÑOZ: [ ] YES [ ] NO    DATE INSERTED:  REMOVE:  [ ] YES [ ] NO  EXPLAIN:    A-LINE:  [ ] YES [ ] NO  LOCATION:   DATE INSERTED:  REMOVE:  [ ] YES [ ] NO  EXPLAIN:    PMH -reviewed admission note, no change since admission  PAST MEDICAL & SURGICAL HISTORY:  HTN (hypertension)      Varicose veins      HLD (hyperlipidemia)      Liver cirrhosis      Portal hypertension with esophageal varices      COPD (chronic obstructive pulmonary disease)      Syncope      Alcohol dependence, daily use      Smokes tobacco daily      DM (diabetes mellitus)      No significant past surgical history          ICU Vital Signs Last 24 Hrs  T(C): 36.4 (2023 00:00), Max: 36.7 (2023 08:28)  T(F): 97.5 (2023 00:00), Max: 98 (2023 08:28)  HR: 120 (2023 00:04) (105 - 162)  BP: 94/63 (2023 00:00) (94/63 - 167/93)  BP(mean): 74 (2023 00:00) (74 - 101)  ABP: --  ABP(mean): --  RR: 14 (2023 00:00) (12 - 38)  SpO2: 100% (2023 00:04) (62% - 100%)    O2 Parameters below as of 2023 00:00  Patient On (Oxygen Delivery Method): ventilator    O2 Concentration (%): 40        ABG - ( 2023 11:05 )  pH, Arterial: 7.32  pH, Blood: x     /  pCO2: 36    /  pO2: 155   / HCO3: 18    / Base Excess: -6.9  /  SaO2: 100                       Mode: AC/ CMV (Assist Control/ Continuous Mandatory Ventilation)  RR (machine): 12  TV (machine): 450  FiO2: 40  PEEP: 5  ITime: 1  MAP: 7  PIP: 18      PHYSICAL EXAM:    GENERAL: NAD, intubated, sedated  HEAD:  Atraumatic, Normocephalic  EYES: EOMI, PERRLA, conjunctiva and sclera clear  ENMT: intubated   NECK: Supple, normal appearance, No JVD; Normal thyroid; Trachea midline  NERVOUS SYSTEM:  sedated  CHEST/LUNG: No chest deformity; Normal percussion bilaterally; No rales, rhonchi, wheezing   HEART: Regular rate and rhythm; No murmurs, rubs, or gallops  ABDOMEN: Soft, Nontender, Nondistended; Bowel sounds present, umbilical hernia  EXTREMITIES:  2+ Peripheral Pulses, No clubbing, cyanosis, or edema  LYMPH: No lymphadenopathy noted  SKIN: No rashes or lesions;  Good capillary refill      LABS:  CBC Full  -  ( 2023 08:38 )  WBC Count : 20.36 K/uL  RBC Count : 4.36 M/uL  Hemoglobin : 13.8 g/dL  Hematocrit : 43.9 %  Platelet Count - Automated : 167 K/uL  Mean Cell Volume : 100.7 fl  Mean Cell Hemoglobin : 31.7 pg  Mean Cell Hemoglobin Concentration : 31.4 gm/dL  Auto Neutrophil # : 9.96 K/uL  Auto Lymphocyte # : 7.34 K/uL  Auto Monocyte # : 1.94 K/uL  Auto Eosinophil # : 0.83 K/uL  Auto Basophil # : 0.18 K/uL  Auto Neutrophil % : 48.9 %  Auto Lymphocyte % : 36.1 %  Auto Monocyte % : 9.5 %  Auto Eosinophil % : 4.1 %  Auto Basophil % : 0.9 %        138  |  108  |  11  ----------------------------<  229<H>  3.7   |  23  |  1.65<H>    Ca    8.7      2023 08:38    TPro  8.3  /  Alb  3.6  /  TBili  0.9  /  DBili  x   /  AST  23  /  ALT  14  /  AlkPhos  79  11-18    PT/INR - ( 2023 08:38 )   PT: 11.7 sec;   INR: 1.03 ratio         PTT - ( 2023 08:38 )  PTT:27.5 sec  Urinalysis Basic - ( 2023 11:23 )    Color: Dark Yellow / Appearance: Slightly Cloudy / S.018 / pH: x  Gluc: x / Ketone: Negative mg/dL  / Bili: Negative / Urobili: 1.0 mg/dL   Blood: x / Protein: >=1000 mg/dL / Nitrite: Negative   Leuk Esterase: Trace / RBC: 5 /HPF / WBC 4 /HPF   Sq Epi: x / Non Sq Epi: x / Bacteria: Moderate /HPF          RADIOLOGY & ADDITIONAL STUDIES REVIEWED:  ***    [ ]GOALS OF CARE DISCUSSION WITH PATIENT/FAMILY/PROXY:    CRITICAL CARE TIME SPENT: 35 minutes INTERVAL HPI/OVERNIGHT EVENTS:  Seen and examined at bedside.    PRESSORS: [  ] YES [ X ] NO  WHICH:    Antimicrobial:  azithromycin  IVPB 500 milliGRAM(s) IV Intermittent every 24 hours  cefTRIAXone   IVPB 2000 milliGRAM(s) IV Intermittent every 24 hours    Cardiovascular:    Pulmonary:    Hematalogic:  heparin   Injectable 5000 Unit(s) SubCutaneous every 8 hours    Other:  chlorhexidine 0.12% Liquid 15 milliLiter(s) Oral Mucosa every 12 hours  chlorhexidine 2% Cloths 1 Application(s) Topical <User Schedule>  fentaNYL    Injectable 50 MICROGram(s) IV Push every 6 hours  insulin lispro (ADMELOG) corrective regimen sliding scale   SubCutaneous every 6 hours  methylPREDNISolone sodium succinate Injectable 40 milliGRAM(s) IV Push every 12 hours  propofol Infusion 30 MICROgram(s)/kG/Min IV Continuous <Continuous>    azithromycin  IVPB 500 milliGRAM(s) IV Intermittent every 24 hours  cefTRIAXone   IVPB 2000 milliGRAM(s) IV Intermittent every 24 hours  chlorhexidine 0.12% Liquid 15 milliLiter(s) Oral Mucosa every 12 hours  chlorhexidine 2% Cloths 1 Application(s) Topical <User Schedule>  fentaNYL    Injectable 50 MICROGram(s) IV Push every 6 hours  heparin   Injectable 5000 Unit(s) SubCutaneous every 8 hours  insulin lispro (ADMELOG) corrective regimen sliding scale   SubCutaneous every 6 hours  methylPREDNISolone sodium succinate Injectable 40 milliGRAM(s) IV Push every 12 hours  propofol Infusion 30 MICROgram(s)/kG/Min IV Continuous <Continuous>    Drug Dosing Weight  Height (cm): 172.7 (2023 08:28)  Weight (kg): 71 (2023 08:28)  BMI (kg/m2): 23.8 (2023 08:28)  BSA (m2): 1.84 (2023 08:28)    CENTRAL LINE: [ ] YES [ ] NO  LOCATION:   DATE INSERTED:  REMOVE: [ ] YES [ ] NO  EXPLAIN:    MUÑOZ: [ ] YES [ ] NO    DATE INSERTED:  REMOVE:  [ ] YES [ ] NO  EXPLAIN:    A-LINE:  [ ] YES [ ] NO  LOCATION:   DATE INSERTED:  REMOVE:  [ ] YES [ ] NO  EXPLAIN:    PMH -reviewed admission note, no change since admission  PAST MEDICAL & SURGICAL HISTORY:  HTN (hypertension)      Varicose veins      HLD (hyperlipidemia)      Liver cirrhosis      Portal hypertension with esophageal varices      COPD (chronic obstructive pulmonary disease)      Syncope      Alcohol dependence, daily use      Smokes tobacco daily      DM (diabetes mellitus)      No significant past surgical history          ICU Vital Signs Last 24 Hrs  T(C): 36.4 (2023 00:00), Max: 36.7 (2023 08:28)  T(F): 97.5 (2023 00:00), Max: 98 (2023 08:28)  HR: 120 (2023 00:04) (105 - 162)  BP: 94/63 (2023 00:00) (94/63 - 167/93)  BP(mean): 74 (2023 00:00) (74 - 101)  ABP: --  ABP(mean): --  RR: 14 (2023 00:00) (12 - 38)  SpO2: 100% (2023 00:04) (62% - 100%)    O2 Parameters below as of 2023 00:00  Patient On (Oxygen Delivery Method): ventilator    O2 Concentration (%): 40        ABG - ( 2023 11:05 )  pH, Arterial: 7.32  pH, Blood: x     /  pCO2: 36    /  pO2: 155   / HCO3: 18    / Base Excess: -6.9  /  SaO2: 100                       Mode: AC/ CMV (Assist Control/ Continuous Mandatory Ventilation)  RR (machine): 12  TV (machine): 450  FiO2: 40  PEEP: 5  ITime: 1  MAP: 7  PIP: 18      PHYSICAL EXAM:    GENERAL: NAD, intubated, awake  HEAD:  Atraumatic, Normocephalic  NERVOUS SYSTEM:  GCS 11, intubated   CHEST/LUNG: RLL crackles  HEART: Regular rate and rhythm; No murmurs, rubs, or gallops  ABDOMEN: Soft, Nontender, Nondistended; Bowel sounds present, umbilical hernia  EXTREMITIES:  No clubbing, cyanosis, or edema        LABS:  CBC Full  -  ( 2023 08:38 )  WBC Count : 20.36 K/uL  RBC Count : 4.36 M/uL  Hemoglobin : 13.8 g/dL  Hematocrit : 43.9 %  Platelet Count - Automated : 167 K/uL  Mean Cell Volume : 100.7 fl  Mean Cell Hemoglobin : 31.7 pg  Mean Cell Hemoglobin Concentration : 31.4 gm/dL  Auto Neutrophil # : 9.96 K/uL  Auto Lymphocyte # : 7.34 K/uL  Auto Monocyte # : 1.94 K/uL  Auto Eosinophil # : 0.83 K/uL  Auto Basophil # : 0.18 K/uL  Auto Neutrophil % : 48.9 %  Auto Lymphocyte % : 36.1 %  Auto Monocyte % : 9.5 %  Auto Eosinophil % : 4.1 %  Auto Basophil % : 0.9 %        138  |  108  |  11  ----------------------------<  229<H>  3.7   |  23  |  1.65<H>    Ca    8.7      2023 08:38    TPro  8.3  /  Alb  3.6  /  TBili  0.9  /  DBili  x   /  AST  23  /  ALT  14  /  AlkPhos  79      PT/INR - ( 2023 08:38 )   PT: 11.7 sec;   INR: 1.03 ratio         PTT - ( 2023 08:38 )  PTT:27.5 sec  Urinalysis Basic - ( 2023 11:23 )    Color: Dark Yellow / Appearance: Slightly Cloudy / S.018 / pH: x  Gluc: x / Ketone: Negative mg/dL  / Bili: Negative / Urobili: 1.0 mg/dL   Blood: x / Protein: >=1000 mg/dL / Nitrite: Negative   Leuk Esterase: Trace / RBC: 5 /HPF / WBC 4 /HPF   Sq Epi: x / Non Sq Epi: x / Bacteria: Moderate /HPF          RADIOLOGY & ADDITIONAL STUDIES REVIEWED:  ***    [ ]GOALS OF CARE DISCUSSION WITH PATIENT/FAMILY/PROXY:    CRITICAL CARE TIME SPENT: 35 minutes

## 2023-11-19 NOTE — PROCEDURE NOTE - ADDITIONAL PROCEDURE DETAILS
20g 5.25cm extended dwell IV (Arrow) placed with sterile technique under ultrasound guidance. Confirmed via ultrasound and VBG. Can be used for up to 30 days. Above details and extended dwell policy reviewed in full with primary team.

## 2023-11-19 NOTE — PROGRESS NOTE ADULT - ATTENDING COMMENTS
67yo man current smoker w/ PMH COPD (not on home O2), HTN, DM2, h/o syncope and falls, h/o prior ICU admission requiring intubation for AHRF with hypercapnia and hypoxemia, h/o medication non-compliance, h/o chronic EtOH dependence/daily use c/b liver cirrhosis and portal HTN with EGD (01/2019) showing esophageal varices who presented to ED with dyspnea; found with acute hypercapneic hypoxemic respiratory failure possibly 2/2 AECOPD +/- acute PNA w/ sepsis c/b Afib RVR, failed NIV trial necessitating intubation and admission to ICU for further management.     #Acute hypoxemic respiratory failure w/ hypercapnia  #Severe sepsis  #COPD, possible exacerbation  #RLL infiltrate, concern for PNA  #Afib w/ RVR  #HFpEF w/ G1DD  #CORTES/ATN  #Lactic acidosis  #H/o HTN  #H/o DM2    Plan:  - SAT/SBT passed in AM - extubated to BiPAP and transition to nasal cannula   - continuous hemodynamic monitoring  - bronchodilators ATC  - continue solumedrol  - continue abx coverage for suspected RLL PNA   - f/u culture data  - RVP neg  - trend lactate, cardiac enzymes  - trend BMP and replete electrolytes  - strict I/O and UOP monitoring  - rate control for AFib +/- RVR  - hold antihypertensives  - formal TTE  - DVT PPx 69yo man current smoker w/ PMH COPD (not on home O2), HTN, DM2, h/o syncope and falls, h/o prior ICU admission requiring intubation for AHRF with hypercapnia and hypoxemia, h/o medication non-compliance, h/o chronic EtOH dependence/daily use c/b liver cirrhosis and portal HTN with EGD (01/2019) showing esophageal varices who presented to ED with dyspnea; found with acute hypercapneic hypoxemic respiratory failure possibly 2/2 AECOPD +/- acute PNA w/ sepsis c/b Afib RVR, failed NIV trial necessitating intubation and admission to ICU for further management.     #Acute hypoxemic respiratory failure w/ hypercapnia  #Severe sepsis  #AECOPD  #RLL infiltrate, concern for PNA  #Afib w/ RVR  #HFpEF w/ G1DD  #CORTES/ATN  #Lactic acidosis  #H/o HTN  #H/o DM2    Plan:  - SAT/SBT passed in AM - extubated to BiPAP and transition to nasal cannula   - continuous hemodynamic monitoring  - bronchodilators ATC  - continue solumedrol  - continue abx coverage for suspected RLL PNA   - f/u culture data  - RVP neg  - trop downtrended  - lactate cleared  - trend BMP and replete electrolytes  - strict I/O and UOP monitoring in ATN  - rate control for AFib +/- RVR  - formal TTE  - DVT PPx

## 2023-11-20 LAB
ALBUMIN SERPL ELPH-MCNC: 3.2 G/DL — LOW (ref 3.5–5)
ALBUMIN SERPL ELPH-MCNC: 3.2 G/DL — LOW (ref 3.5–5)
ALP SERPL-CCNC: 53 U/L — SIGNIFICANT CHANGE UP (ref 40–120)
ALP SERPL-CCNC: 53 U/L — SIGNIFICANT CHANGE UP (ref 40–120)
ALT FLD-CCNC: 12 U/L DA — SIGNIFICANT CHANGE UP (ref 10–60)
ALT FLD-CCNC: 12 U/L DA — SIGNIFICANT CHANGE UP (ref 10–60)
AMMONIA BLD-MCNC: <10 UMOL/L — LOW (ref 11–32)
AMMONIA BLD-MCNC: <10 UMOL/L — LOW (ref 11–32)
AMPHET UR-MCNC: NEGATIVE — SIGNIFICANT CHANGE UP
AMPHET UR-MCNC: NEGATIVE — SIGNIFICANT CHANGE UP
ANION GAP SERPL CALC-SCNC: 8 MMOL/L — SIGNIFICANT CHANGE UP (ref 5–17)
ANION GAP SERPL CALC-SCNC: 8 MMOL/L — SIGNIFICANT CHANGE UP (ref 5–17)
ANION GAP SERPL CALC-SCNC: 9 MMOL/L — SIGNIFICANT CHANGE UP (ref 5–17)
ANION GAP SERPL CALC-SCNC: 9 MMOL/L — SIGNIFICANT CHANGE UP (ref 5–17)
APPEARANCE UR: CLEAR — SIGNIFICANT CHANGE UP
APPEARANCE UR: CLEAR — SIGNIFICANT CHANGE UP
APTT BLD: 190.2 SEC — CRITICAL HIGH (ref 24.5–35.6)
APTT BLD: 190.2 SEC — CRITICAL HIGH (ref 24.5–35.6)
APTT BLD: 27.1 SEC — SIGNIFICANT CHANGE UP (ref 24.5–35.6)
APTT BLD: 27.1 SEC — SIGNIFICANT CHANGE UP (ref 24.5–35.6)
APTT BLD: 64.4 SEC — HIGH (ref 24.5–35.6)
APTT BLD: 64.4 SEC — HIGH (ref 24.5–35.6)
AST SERPL-CCNC: 15 U/L — SIGNIFICANT CHANGE UP (ref 10–40)
AST SERPL-CCNC: 15 U/L — SIGNIFICANT CHANGE UP (ref 10–40)
BACTERIA # UR AUTO: ABNORMAL /HPF
BACTERIA # UR AUTO: ABNORMAL /HPF
BARBITURATES UR SCN-MCNC: NEGATIVE — SIGNIFICANT CHANGE UP
BARBITURATES UR SCN-MCNC: NEGATIVE — SIGNIFICANT CHANGE UP
BASE EXCESS BLDV CALC-SCNC: -5.6 MMOL/L — SIGNIFICANT CHANGE UP
BASE EXCESS BLDV CALC-SCNC: -5.6 MMOL/L — SIGNIFICANT CHANGE UP
BASOPHILS # BLD AUTO: 0.01 K/UL — SIGNIFICANT CHANGE UP (ref 0–0.2)
BASOPHILS # BLD AUTO: 0.01 K/UL — SIGNIFICANT CHANGE UP (ref 0–0.2)
BASOPHILS NFR BLD AUTO: 0.1 % — SIGNIFICANT CHANGE UP (ref 0–2)
BASOPHILS NFR BLD AUTO: 0.1 % — SIGNIFICANT CHANGE UP (ref 0–2)
BENZODIAZ UR-MCNC: POSITIVE
BENZODIAZ UR-MCNC: POSITIVE
BILIRUB SERPL-MCNC: 0.5 MG/DL — SIGNIFICANT CHANGE UP (ref 0.2–1.2)
BILIRUB SERPL-MCNC: 0.5 MG/DL — SIGNIFICANT CHANGE UP (ref 0.2–1.2)
BILIRUB UR-MCNC: NEGATIVE — SIGNIFICANT CHANGE UP
BILIRUB UR-MCNC: NEGATIVE — SIGNIFICANT CHANGE UP
BUN SERPL-MCNC: 33 MG/DL — HIGH (ref 7–18)
BUN SERPL-MCNC: 33 MG/DL — HIGH (ref 7–18)
BUN SERPL-MCNC: 39 MG/DL — HIGH (ref 7–18)
BUN SERPL-MCNC: 39 MG/DL — HIGH (ref 7–18)
CA-I SERPL-SCNC: SIGNIFICANT CHANGE UP MMOL/L (ref 1.15–1.33)
CA-I SERPL-SCNC: SIGNIFICANT CHANGE UP MMOL/L (ref 1.15–1.33)
CALCIUM SERPL-MCNC: 8.7 MG/DL — SIGNIFICANT CHANGE UP (ref 8.4–10.5)
CALCIUM SERPL-MCNC: 8.7 MG/DL — SIGNIFICANT CHANGE UP (ref 8.4–10.5)
CALCIUM SERPL-MCNC: 9.3 MG/DL — SIGNIFICANT CHANGE UP (ref 8.4–10.5)
CALCIUM SERPL-MCNC: 9.3 MG/DL — SIGNIFICANT CHANGE UP (ref 8.4–10.5)
CHLORIDE SERPL-SCNC: 107 MMOL/L — SIGNIFICANT CHANGE UP (ref 96–108)
CHLORIDE SERPL-SCNC: 107 MMOL/L — SIGNIFICANT CHANGE UP (ref 96–108)
CHLORIDE SERPL-SCNC: 108 MMOL/L — SIGNIFICANT CHANGE UP (ref 96–108)
CHLORIDE SERPL-SCNC: 108 MMOL/L — SIGNIFICANT CHANGE UP (ref 96–108)
CO2 SERPL-SCNC: 20 MMOL/L — LOW (ref 22–31)
CO2 SERPL-SCNC: 20 MMOL/L — LOW (ref 22–31)
CO2 SERPL-SCNC: 22 MMOL/L — SIGNIFICANT CHANGE UP (ref 22–31)
CO2 SERPL-SCNC: 22 MMOL/L — SIGNIFICANT CHANGE UP (ref 22–31)
COCAINE METAB.OTHER UR-MCNC: NEGATIVE — SIGNIFICANT CHANGE UP
COCAINE METAB.OTHER UR-MCNC: NEGATIVE — SIGNIFICANT CHANGE UP
COLOR SPEC: YELLOW — SIGNIFICANT CHANGE UP
COLOR SPEC: YELLOW — SIGNIFICANT CHANGE UP
CREAT ?TM UR-MCNC: 78 MG/DL — SIGNIFICANT CHANGE UP
CREAT ?TM UR-MCNC: 78 MG/DL — SIGNIFICANT CHANGE UP
CREAT SERPL-MCNC: 2.88 MG/DL — HIGH (ref 0.5–1.3)
CREAT SERPL-MCNC: 2.88 MG/DL — HIGH (ref 0.5–1.3)
CREAT SERPL-MCNC: 2.94 MG/DL — HIGH (ref 0.5–1.3)
CREAT SERPL-MCNC: 2.94 MG/DL — HIGH (ref 0.5–1.3)
DIFF PNL FLD: ABNORMAL
DIFF PNL FLD: ABNORMAL
EGFR: 22 ML/MIN/1.73M2 — LOW
EGFR: 22 ML/MIN/1.73M2 — LOW
EGFR: 23 ML/MIN/1.73M2 — LOW
EGFR: 23 ML/MIN/1.73M2 — LOW
EOSINOPHIL # BLD AUTO: 0.25 K/UL — SIGNIFICANT CHANGE UP (ref 0–0.5)
EOSINOPHIL # BLD AUTO: 0.25 K/UL — SIGNIFICANT CHANGE UP (ref 0–0.5)
EOSINOPHIL NFR BLD AUTO: 1.7 % — SIGNIFICANT CHANGE UP (ref 0–6)
EOSINOPHIL NFR BLD AUTO: 1.7 % — SIGNIFICANT CHANGE UP (ref 0–6)
EPI CELLS # UR: PRESENT
EPI CELLS # UR: PRESENT
GAS PNL BLDA: SIGNIFICANT CHANGE UP
GAS PNL BLDA: SIGNIFICANT CHANGE UP
GAS PNL BLDV: 135 MMOL/L — LOW (ref 136–145)
GAS PNL BLDV: 135 MMOL/L — LOW (ref 136–145)
GAS PNL BLDV: SIGNIFICANT CHANGE UP
GAS PNL BLDV: SIGNIFICANT CHANGE UP
GLUCOSE BLDC GLUCOMTR-MCNC: 121 MG/DL — HIGH (ref 70–99)
GLUCOSE BLDC GLUCOMTR-MCNC: 121 MG/DL — HIGH (ref 70–99)
GLUCOSE BLDC GLUCOMTR-MCNC: 133 MG/DL — HIGH (ref 70–99)
GLUCOSE BLDC GLUCOMTR-MCNC: 133 MG/DL — HIGH (ref 70–99)
GLUCOSE BLDC GLUCOMTR-MCNC: 140 MG/DL — HIGH (ref 70–99)
GLUCOSE BLDC GLUCOMTR-MCNC: 140 MG/DL — HIGH (ref 70–99)
GLUCOSE BLDC GLUCOMTR-MCNC: 147 MG/DL — HIGH (ref 70–99)
GLUCOSE BLDC GLUCOMTR-MCNC: 147 MG/DL — HIGH (ref 70–99)
GLUCOSE SERPL-MCNC: 128 MG/DL — HIGH (ref 70–99)
GLUCOSE SERPL-MCNC: 128 MG/DL — HIGH (ref 70–99)
GLUCOSE SERPL-MCNC: 184 MG/DL — HIGH (ref 70–99)
GLUCOSE SERPL-MCNC: 184 MG/DL — HIGH (ref 70–99)
GLUCOSE UR QL: NEGATIVE MG/DL — SIGNIFICANT CHANGE UP
GLUCOSE UR QL: NEGATIVE MG/DL — SIGNIFICANT CHANGE UP
HCO3 BLDV-SCNC: 20 MMOL/L — LOW (ref 22–29)
HCO3 BLDV-SCNC: 20 MMOL/L — LOW (ref 22–29)
HCT VFR BLD CALC: 35.9 % — LOW (ref 39–50)
HCT VFR BLD CALC: 35.9 % — LOW (ref 39–50)
HCT VFR BLD CALC: 38.8 % — LOW (ref 39–50)
HCT VFR BLD CALC: 38.8 % — LOW (ref 39–50)
HGB BLD-MCNC: 11.5 G/DL — LOW (ref 13–17)
HGB BLD-MCNC: 11.5 G/DL — LOW (ref 13–17)
HGB BLD-MCNC: 12.2 G/DL — LOW (ref 13–17)
HGB BLD-MCNC: 12.2 G/DL — LOW (ref 13–17)
HOROWITZ INDEX BLDV+IHG-RTO: 40 — SIGNIFICANT CHANGE UP
HOROWITZ INDEX BLDV+IHG-RTO: 40 — SIGNIFICANT CHANGE UP
IMM GRANULOCYTES NFR BLD AUTO: 0.5 % — SIGNIFICANT CHANGE UP (ref 0–0.9)
IMM GRANULOCYTES NFR BLD AUTO: 0.5 % — SIGNIFICANT CHANGE UP (ref 0–0.9)
KETONES UR-MCNC: NEGATIVE MG/DL — SIGNIFICANT CHANGE UP
KETONES UR-MCNC: NEGATIVE MG/DL — SIGNIFICANT CHANGE UP
LACTATE BLDV-MCNC: 2.6 MMOL/L — HIGH (ref 0.5–2)
LACTATE BLDV-MCNC: 2.6 MMOL/L — HIGH (ref 0.5–2)
LACTATE SERPL-SCNC: 2.4 MMOL/L — HIGH (ref 0.7–2)
LACTATE SERPL-SCNC: 2.4 MMOL/L — HIGH (ref 0.7–2)
LACTATE SERPL-SCNC: 2.8 MMOL/L — HIGH (ref 0.7–2)
LACTATE SERPL-SCNC: 2.8 MMOL/L — HIGH (ref 0.7–2)
LEGIONELLA AG UR QL: NEGATIVE — SIGNIFICANT CHANGE UP
LEGIONELLA AG UR QL: NEGATIVE — SIGNIFICANT CHANGE UP
LEUKOCYTE ESTERASE UR-ACNC: ABNORMAL
LEUKOCYTE ESTERASE UR-ACNC: ABNORMAL
LYMPHOCYTES # BLD AUTO: 0.63 K/UL — LOW (ref 1–3.3)
LYMPHOCYTES # BLD AUTO: 0.63 K/UL — LOW (ref 1–3.3)
LYMPHOCYTES # BLD AUTO: 4.3 % — LOW (ref 13–44)
LYMPHOCYTES # BLD AUTO: 4.3 % — LOW (ref 13–44)
MAGNESIUM SERPL-MCNC: 2 MG/DL — SIGNIFICANT CHANGE UP (ref 1.6–2.6)
MAGNESIUM SERPL-MCNC: 2 MG/DL — SIGNIFICANT CHANGE UP (ref 1.6–2.6)
MCHC RBC-ENTMCNC: 30.6 PG — SIGNIFICANT CHANGE UP (ref 27–34)
MCHC RBC-ENTMCNC: 30.6 PG — SIGNIFICANT CHANGE UP (ref 27–34)
MCHC RBC-ENTMCNC: 31.3 PG — SIGNIFICANT CHANGE UP (ref 27–34)
MCHC RBC-ENTMCNC: 31.3 PG — SIGNIFICANT CHANGE UP (ref 27–34)
MCHC RBC-ENTMCNC: 31.4 GM/DL — LOW (ref 32–36)
MCHC RBC-ENTMCNC: 31.4 GM/DL — LOW (ref 32–36)
MCHC RBC-ENTMCNC: 32 GM/DL — SIGNIFICANT CHANGE UP (ref 32–36)
MCHC RBC-ENTMCNC: 32 GM/DL — SIGNIFICANT CHANGE UP (ref 32–36)
MCV RBC AUTO: 97.2 FL — SIGNIFICANT CHANGE UP (ref 80–100)
MCV RBC AUTO: 97.2 FL — SIGNIFICANT CHANGE UP (ref 80–100)
MCV RBC AUTO: 97.6 FL — SIGNIFICANT CHANGE UP (ref 80–100)
MCV RBC AUTO: 97.6 FL — SIGNIFICANT CHANGE UP (ref 80–100)
METHADONE UR-MCNC: NEGATIVE — SIGNIFICANT CHANGE UP
METHADONE UR-MCNC: NEGATIVE — SIGNIFICANT CHANGE UP
MONOCYTES # BLD AUTO: 0.95 K/UL — HIGH (ref 0–0.9)
MONOCYTES # BLD AUTO: 0.95 K/UL — HIGH (ref 0–0.9)
MONOCYTES NFR BLD AUTO: 6.5 % — SIGNIFICANT CHANGE UP (ref 2–14)
MONOCYTES NFR BLD AUTO: 6.5 % — SIGNIFICANT CHANGE UP (ref 2–14)
NEUTROPHILS # BLD AUTO: 12.64 K/UL — HIGH (ref 1.8–7.4)
NEUTROPHILS # BLD AUTO: 12.64 K/UL — HIGH (ref 1.8–7.4)
NEUTROPHILS NFR BLD AUTO: 86.9 % — HIGH (ref 43–77)
NEUTROPHILS NFR BLD AUTO: 86.9 % — HIGH (ref 43–77)
NITRITE UR-MCNC: NEGATIVE — SIGNIFICANT CHANGE UP
NITRITE UR-MCNC: NEGATIVE — SIGNIFICANT CHANGE UP
NRBC # BLD: 0 /100 WBCS — SIGNIFICANT CHANGE UP (ref 0–0)
OPIATES UR-MCNC: NEGATIVE — SIGNIFICANT CHANGE UP
OPIATES UR-MCNC: NEGATIVE — SIGNIFICANT CHANGE UP
OSMOLALITY UR: 380 MOS/KG — SIGNIFICANT CHANGE UP (ref 50–1200)
OSMOLALITY UR: 380 MOS/KG — SIGNIFICANT CHANGE UP (ref 50–1200)
PCO2 BLDV: 39 MMHG — LOW (ref 42–55)
PCO2 BLDV: 39 MMHG — LOW (ref 42–55)
PCP SPEC-MCNC: SIGNIFICANT CHANGE UP
PCP SPEC-MCNC: SIGNIFICANT CHANGE UP
PCP UR-MCNC: NEGATIVE — SIGNIFICANT CHANGE UP
PCP UR-MCNC: NEGATIVE — SIGNIFICANT CHANGE UP
PH BLDV: 7.32 — SIGNIFICANT CHANGE UP (ref 7.32–7.43)
PH BLDV: 7.32 — SIGNIFICANT CHANGE UP (ref 7.32–7.43)
PH UR: 5 — SIGNIFICANT CHANGE UP (ref 5–8)
PH UR: 5 — SIGNIFICANT CHANGE UP (ref 5–8)
PHOSPHATE SERPL-MCNC: 6.8 MG/DL — HIGH (ref 2.5–4.5)
PHOSPHATE SERPL-MCNC: 6.8 MG/DL — HIGH (ref 2.5–4.5)
PLATELET # BLD AUTO: 121 K/UL — LOW (ref 150–400)
PLATELET # BLD AUTO: 121 K/UL — LOW (ref 150–400)
PLATELET # BLD AUTO: 164 K/UL — SIGNIFICANT CHANGE UP (ref 150–400)
PLATELET # BLD AUTO: 164 K/UL — SIGNIFICANT CHANGE UP (ref 150–400)
PO2 BLDV: 46 MMHG — SIGNIFICANT CHANGE UP
PO2 BLDV: 46 MMHG — SIGNIFICANT CHANGE UP
POTASSIUM BLDV-SCNC: 4.7 MMOL/L — SIGNIFICANT CHANGE UP (ref 3.5–5.1)
POTASSIUM BLDV-SCNC: 4.7 MMOL/L — SIGNIFICANT CHANGE UP (ref 3.5–5.1)
POTASSIUM SERPL-MCNC: 4.5 MMOL/L — SIGNIFICANT CHANGE UP (ref 3.5–5.3)
POTASSIUM SERPL-MCNC: 4.5 MMOL/L — SIGNIFICANT CHANGE UP (ref 3.5–5.3)
POTASSIUM SERPL-MCNC: 4.7 MMOL/L — SIGNIFICANT CHANGE UP (ref 3.5–5.3)
POTASSIUM SERPL-MCNC: 4.7 MMOL/L — SIGNIFICANT CHANGE UP (ref 3.5–5.3)
POTASSIUM SERPL-SCNC: 4.5 MMOL/L — SIGNIFICANT CHANGE UP (ref 3.5–5.3)
POTASSIUM SERPL-SCNC: 4.5 MMOL/L — SIGNIFICANT CHANGE UP (ref 3.5–5.3)
POTASSIUM SERPL-SCNC: 4.7 MMOL/L — SIGNIFICANT CHANGE UP (ref 3.5–5.3)
POTASSIUM SERPL-SCNC: 4.7 MMOL/L — SIGNIFICANT CHANGE UP (ref 3.5–5.3)
PROT SERPL-MCNC: 7.8 G/DL — SIGNIFICANT CHANGE UP (ref 6–8.3)
PROT SERPL-MCNC: 7.8 G/DL — SIGNIFICANT CHANGE UP (ref 6–8.3)
PROT UR-MCNC: 30 MG/DL
PROT UR-MCNC: 30 MG/DL
RBC # BLD: 3.68 M/UL — LOW (ref 4.2–5.8)
RBC # BLD: 3.68 M/UL — LOW (ref 4.2–5.8)
RBC # BLD: 3.99 M/UL — LOW (ref 4.2–5.8)
RBC # BLD: 3.99 M/UL — LOW (ref 4.2–5.8)
RBC # FLD: 17.8 % — HIGH (ref 10.3–14.5)
RBC # FLD: 17.8 % — HIGH (ref 10.3–14.5)
RBC # FLD: 18.2 % — HIGH (ref 10.3–14.5)
RBC # FLD: 18.2 % — HIGH (ref 10.3–14.5)
RBC CASTS # UR COMP ASSIST: 38 /HPF — HIGH (ref 0–4)
RBC CASTS # UR COMP ASSIST: 38 /HPF — HIGH (ref 0–4)
SAO2 % BLDV: 73 % — SIGNIFICANT CHANGE UP
SAO2 % BLDV: 73 % — SIGNIFICANT CHANGE UP
SODIUM SERPL-SCNC: 136 MMOL/L — SIGNIFICANT CHANGE UP (ref 135–145)
SODIUM SERPL-SCNC: 136 MMOL/L — SIGNIFICANT CHANGE UP (ref 135–145)
SODIUM SERPL-SCNC: 138 MMOL/L — SIGNIFICANT CHANGE UP (ref 135–145)
SODIUM SERPL-SCNC: 138 MMOL/L — SIGNIFICANT CHANGE UP (ref 135–145)
SODIUM UR-SCNC: 64 MMOL/L — SIGNIFICANT CHANGE UP
SODIUM UR-SCNC: 64 MMOL/L — SIGNIFICANT CHANGE UP
SP GR SPEC: 1.01 — SIGNIFICANT CHANGE UP (ref 1–1.03)
SP GR SPEC: 1.01 — SIGNIFICANT CHANGE UP (ref 1–1.03)
THC UR QL: NEGATIVE — SIGNIFICANT CHANGE UP
THC UR QL: NEGATIVE — SIGNIFICANT CHANGE UP
UROBILINOGEN FLD QL: 0.2 MG/DL — SIGNIFICANT CHANGE UP (ref 0.2–1)
UROBILINOGEN FLD QL: 0.2 MG/DL — SIGNIFICANT CHANGE UP (ref 0.2–1)
WBC # BLD: 14.55 K/UL — HIGH (ref 3.8–10.5)
WBC # BLD: 14.55 K/UL — HIGH (ref 3.8–10.5)
WBC # BLD: 9.81 K/UL — SIGNIFICANT CHANGE UP (ref 3.8–10.5)
WBC # BLD: 9.81 K/UL — SIGNIFICANT CHANGE UP (ref 3.8–10.5)
WBC # FLD AUTO: 14.55 K/UL — HIGH (ref 3.8–10.5)
WBC # FLD AUTO: 14.55 K/UL — HIGH (ref 3.8–10.5)
WBC # FLD AUTO: 9.81 K/UL — SIGNIFICANT CHANGE UP (ref 3.8–10.5)
WBC # FLD AUTO: 9.81 K/UL — SIGNIFICANT CHANGE UP (ref 3.8–10.5)
WBC UR QL: 2 /HPF — SIGNIFICANT CHANGE UP (ref 0–5)
WBC UR QL: 2 /HPF — SIGNIFICANT CHANGE UP (ref 0–5)

## 2023-11-20 PROCEDURE — 70450 CT HEAD/BRAIN W/O DYE: CPT | Mod: 26

## 2023-11-20 PROCEDURE — 36556 INSERT NON-TUNNEL CV CATH: CPT

## 2023-11-20 PROCEDURE — 74176 CT ABD & PELVIS W/O CONTRAST: CPT | Mod: 26

## 2023-11-20 PROCEDURE — 71250 CT THORAX DX C-: CPT | Mod: 26

## 2023-11-20 RX ORDER — THIAMINE MONONITRATE (VIT B1) 100 MG
500 TABLET ORAL EVERY 8 HOURS
Refills: 0 | Status: COMPLETED | OUTPATIENT
Start: 2023-11-20 | End: 2023-11-23

## 2023-11-20 RX ORDER — POLYETHYLENE GLYCOL 3350 17 G/17G
17 POWDER, FOR SOLUTION ORAL ONCE
Refills: 0 | Status: COMPLETED | OUTPATIENT
Start: 2023-11-20 | End: 2023-11-20

## 2023-11-20 RX ORDER — ACETAMINOPHEN 500 MG
1000 TABLET ORAL ONCE
Refills: 0 | Status: COMPLETED | OUTPATIENT
Start: 2023-11-20 | End: 2023-11-20

## 2023-11-20 RX ORDER — SENNA PLUS 8.6 MG/1
2 TABLET ORAL AT BEDTIME
Refills: 0 | Status: DISCONTINUED | OUTPATIENT
Start: 2023-11-20 | End: 2023-11-24

## 2023-11-20 RX ORDER — HEPARIN SODIUM 5000 [USP'U]/ML
3000 INJECTION INTRAVENOUS; SUBCUTANEOUS EVERY 6 HOURS
Refills: 0 | Status: DISCONTINUED | OUTPATIENT
Start: 2023-11-20 | End: 2023-11-21

## 2023-11-20 RX ORDER — FOLIC ACID 0.8 MG
1 TABLET ORAL DAILY
Refills: 0 | Status: DISCONTINUED | OUTPATIENT
Start: 2023-11-20 | End: 2023-11-30

## 2023-11-20 RX ORDER — ATORVASTATIN CALCIUM 80 MG/1
80 TABLET, FILM COATED ORAL DAILY
Refills: 0 | Status: DISCONTINUED | OUTPATIENT
Start: 2023-11-20 | End: 2023-11-20

## 2023-11-20 RX ORDER — HEPARIN SODIUM 5000 [USP'U]/ML
6000 INJECTION INTRAVENOUS; SUBCUTANEOUS EVERY 6 HOURS
Refills: 0 | Status: DISCONTINUED | OUTPATIENT
Start: 2023-11-20 | End: 2023-11-21

## 2023-11-20 RX ORDER — HEPARIN SODIUM 5000 [USP'U]/ML
6000 INJECTION INTRAVENOUS; SUBCUTANEOUS ONCE
Refills: 0 | Status: COMPLETED | OUTPATIENT
Start: 2023-11-20 | End: 2023-11-20

## 2023-11-20 RX ORDER — METOPROLOL TARTRATE 50 MG
12.5 TABLET ORAL EVERY 12 HOURS
Refills: 0 | Status: DISCONTINUED | OUTPATIENT
Start: 2023-11-20 | End: 2023-11-21

## 2023-11-20 RX ORDER — THIAMINE MONONITRATE (VIT B1) 100 MG
500 TABLET ORAL EVERY 8 HOURS
Refills: 0 | Status: DISCONTINUED | OUTPATIENT
Start: 2023-11-20 | End: 2023-11-20

## 2023-11-20 RX ORDER — LABETALOL HCL 100 MG
100 TABLET ORAL ONCE
Refills: 0 | Status: COMPLETED | OUTPATIENT
Start: 2023-11-20 | End: 2023-11-20

## 2023-11-20 RX ORDER — ASPIRIN/CALCIUM CARB/MAGNESIUM 324 MG
81 TABLET ORAL DAILY
Refills: 0 | Status: DISCONTINUED | OUTPATIENT
Start: 2023-11-20 | End: 2023-11-30

## 2023-11-20 RX ORDER — METOPROLOL TARTRATE 50 MG
5 TABLET ORAL ONCE
Refills: 0 | Status: COMPLETED | OUTPATIENT
Start: 2023-11-20 | End: 2023-11-20

## 2023-11-20 RX ORDER — BUMETANIDE 0.25 MG/ML
1 INJECTION INTRAMUSCULAR; INTRAVENOUS ONCE
Refills: 0 | Status: COMPLETED | OUTPATIENT
Start: 2023-11-20 | End: 2023-11-20

## 2023-11-20 RX ORDER — HEPARIN SODIUM 5000 [USP'U]/ML
INJECTION INTRAVENOUS; SUBCUTANEOUS
Qty: 25000 | Refills: 0 | Status: DISCONTINUED | OUTPATIENT
Start: 2023-11-20 | End: 2023-11-21

## 2023-11-20 RX ADMIN — ALBUTEROL 2 PUFF(S): 90 AEROSOL, METERED ORAL at 21:19

## 2023-11-20 RX ADMIN — Medication 1 MILLIGRAM(S): at 12:15

## 2023-11-20 RX ADMIN — SENNA PLUS 2 TABLET(S): 8.6 TABLET ORAL at 22:36

## 2023-11-20 RX ADMIN — Medication 1000 MILLIGRAM(S): at 23:00

## 2023-11-20 RX ADMIN — HEPARIN SODIUM 1300 UNIT(S)/HR: 5000 INJECTION INTRAVENOUS; SUBCUTANEOUS at 08:10

## 2023-11-20 RX ADMIN — Medication 400 MILLIGRAM(S): at 22:36

## 2023-11-20 RX ADMIN — Medication 105 MILLIGRAM(S): at 14:40

## 2023-11-20 RX ADMIN — CHLORHEXIDINE GLUCONATE 1 APPLICATION(S): 213 SOLUTION TOPICAL at 05:14

## 2023-11-20 RX ADMIN — Medication 12.5 MILLIGRAM(S): at 17:53

## 2023-11-20 RX ADMIN — Medication 81 MILLIGRAM(S): at 14:52

## 2023-11-20 RX ADMIN — POLYETHYLENE GLYCOL 3350 17 GRAM(S): 17 POWDER, FOR SOLUTION ORAL at 22:37

## 2023-11-20 RX ADMIN — HEPARIN SODIUM 6000 UNIT(S): 5000 INJECTION INTRAVENOUS; SUBCUTANEOUS at 08:10

## 2023-11-20 RX ADMIN — BUDESONIDE AND FORMOTEROL FUMARATE DIHYDRATE 2 PUFF(S): 160; 4.5 AEROSOL RESPIRATORY (INHALATION) at 10:00

## 2023-11-20 RX ADMIN — HEPARIN SODIUM 1100 UNIT(S)/HR: 5000 INJECTION INTRAVENOUS; SUBCUTANEOUS at 22:37

## 2023-11-20 RX ADMIN — Medication 1 TABLET(S): at 12:15

## 2023-11-20 RX ADMIN — TIOTROPIUM BROMIDE 2 PUFF(S): 18 CAPSULE ORAL; RESPIRATORY (INHALATION) at 12:00

## 2023-11-20 RX ADMIN — AMIODARONE HYDROCHLORIDE 16.7 MG/MIN: 400 TABLET ORAL at 02:59

## 2023-11-20 RX ADMIN — Medication 40 MILLIGRAM(S): at 17:53

## 2023-11-20 RX ADMIN — Medication 40 MILLIGRAM(S): at 05:13

## 2023-11-20 RX ADMIN — Medication 105 MILLIGRAM(S): at 22:37

## 2023-11-20 RX ADMIN — Medication 2 MILLIGRAM(S): at 02:03

## 2023-11-20 RX ADMIN — Medication 2 MILLIGRAM(S): at 04:36

## 2023-11-20 RX ADMIN — Medication 5 MILLIGRAM(S): at 23:20

## 2023-11-20 RX ADMIN — BUMETANIDE 1 MILLIGRAM(S): 0.25 INJECTION INTRAMUSCULAR; INTRAVENOUS at 14:52

## 2023-11-20 RX ADMIN — BUDESONIDE AND FORMOTEROL FUMARATE DIHYDRATE 2 PUFF(S): 160; 4.5 AEROSOL RESPIRATORY (INHALATION) at 23:27

## 2023-11-20 RX ADMIN — Medication 40 MILLIGRAM(S): at 05:14

## 2023-11-20 RX ADMIN — Medication 100 MILLIGRAM(S): at 11:31

## 2023-11-20 RX ADMIN — HEPARIN SODIUM 0 UNIT(S)/HR: 5000 INJECTION INTRAVENOUS; SUBCUTANEOUS at 15:14

## 2023-11-20 RX ADMIN — HEPARIN SODIUM 5000 UNIT(S): 5000 INJECTION INTRAVENOUS; SUBCUTANEOUS at 05:14

## 2023-11-20 RX ADMIN — AZITHROMYCIN 255 MILLIGRAM(S): 500 TABLET, FILM COATED ORAL at 03:54

## 2023-11-20 RX ADMIN — HEPARIN SODIUM 1100 UNIT(S)/HR: 5000 INJECTION INTRAVENOUS; SUBCUTANEOUS at 16:13

## 2023-11-20 RX ADMIN — HEPARIN SODIUM 1100 UNIT(S)/HR: 5000 INJECTION INTRAVENOUS; SUBCUTANEOUS at 19:09

## 2023-11-20 NOTE — PROGRESS NOTE ADULT - ASSESSMENT
68M current smoker with PMHx  COPD, HLD, DM, HTN, chronic ETOH dependence, cirrhosis presents with SOB. Patient was found to be septic likely 2/2 PNA.  Patient continued to be  tachypneic, and  developed New onset Afib on Bipap Patient was intubated for increase WOB and admitted to ICU for further management 2/2 PNA vs COPD exacerbation.        ============ Neuro============================  Extubated, AOx0  Baseline AOx3, no functional impairment  #metabolic encephalopathy vs ativan effect vs stroke  ================= Cardiovascular==========================  # New Onset Afib w/ RVR  -developed Afib w/ rvr HR 160s  -s/p Cardizem 10mg x2    #New onset HFREF 2/2 NSTEMI  #NSTEMI  EKG New(?) LBBB  Elevated Trops, now downtrending  Echo showed newly decreased EF 20-25%, previously 50-55  Started on heparin drip, aspirin 81  Metoprolol 12.5 BID    # HTN  hx of HTN, non complaint with meds  on amlodipine and losartan at home  Metoprolol 12.5 BID  ================- Pulm=================================  #AHRF 2/2 Pulmonary edema vs PNA vs COPD exacerbation  2/2 HFREF  Bumex 1mg IV    # PNA  on NC  continue with Rocephin  d/c azithro  mycoplasma, legionella pending   strep -ve  sputum cx pending  f/u CT Chest    # COPD  - hx COPD on home 02  - poor airway entry on admission, no wheezing  - s/p solumedrol 125mg  - c/w duonebs  - c/w solumedrol 40 BID IV      ==================ID===================================  #Sepsis  WBC 14.55, lactate 2.8  s/p 2.5L   -continue with Rocephin   -d/c azithro  -mycoplasma, legionella pending   -strep -ve  -sputum cx, blood cx      ================= Nephro================================  # ATN  # CORTES  Bun/creatinine 11/1.65<<<33/2.88  worsening CORTES  Granular casts  s/p 2.5L   f/u CT AP  f/u urine lytes    #Lactic Acidosis  s/p 2.5 L bolus  lactate 5.7>2.0  11/20 Lactate 2.2           =================GI====================================  # Umbilical Hernia  -not reproducible, but no sign of obstruction   - no acute intervention recommended by surgery  f/u CT Abdomen    ================ Heme==================================  # No issues     =================Endocrine===============================  # Diabetes  hx of DM, on metformin at home  NPO  Insulin ss Q 6 hrs     ================= Skin/Catheters============================  Peripheral IV lines   Goss catheter       =================Prophylaxis =============================  DVT prophylaxis : heparin Sub Q  GI prophylaxis

## 2023-11-20 NOTE — CONSULT NOTE ADULT - SUBJECTIVE AND OBJECTIVE BOX
[facility] NEPHROLOGY- CONSULTATION NOTE    Patient is a 67yo Male active smoker with h/o COPD COPD, HLD, DM, HTN, chronic ETOH dependence, cirrhosis presents with SOB. Pt a/w Sepsis 2/2 PNA, COPD,  Acute hypoxic hypercapnic respiratory failure s/p brief intubation, Acute encephalopathy new onset Afib with CORTES. Nephrology consulted for Elevated serum creatinine.      Unable to obtain history due to mental status    PAST MEDICAL & SURGICAL HISTORY:  HTN (hypertension)      Varicose veins      HLD (hyperlipidemia)      Liver cirrhosis      Portal hypertension with esophageal varices      COPD (chronic obstructive pulmonary disease)      Syncope      Alcohol dependence, daily use      Smokes tobacco daily      DM (diabetes mellitus)      No significant past surgical history        No Known Allergies    Home Medications Reviewed  Hospital Medications:   MEDICATIONS  (STANDING):  albuterol    90 MICROgram(s) HFA Inhaler 2 Puff(s) Inhalation every 6 hours  aspirin  chewable 81 milliGRAM(s) Oral daily  budesonide 160 MICROgram(s)/formoterol 4.5 MICROgram(s) Inhaler 2 Puff(s) Inhalation two times a day  chlorhexidine 2% Cloths 1 Application(s) Topical <User Schedule>  folic acid 1 milliGRAM(s) Oral daily  heparin  Infusion.  Unit(s)/Hr (13 mL/Hr) IV Continuous <Continuous>  insulin lispro (ADMELOG) corrective regimen sliding scale   SubCutaneous every 6 hours  methylPREDNISolone sodium succinate Injectable 40 milliGRAM(s) IV Push every 12 hours  metoprolol tartrate 12.5 milliGRAM(s) Oral every 12 hours  multivitamin 1 Tablet(s) Oral daily  thiamine Injectable 100 milliGRAM(s) IV Push daily  thiamine IVPB 500 milliGRAM(s) IV Intermittent every 8 hours  tiotropium 2.5 MICROgram(s) Inhaler 2 Puff(s) Inhalation daily      REVIEW OF SYSTEMS: Unable to obtain due to mental status      VITALS:  T(F): 98.8 (23 @ 18:00), Max: 99.9 (23 @ 21:00)  HR: 101 (23 @ 18:00)  BP: 123/86 (23 @ 18:00)  RR: 21 (23 @ 18:00)  SpO2: 100% (23 @ 18:00)  Wt(kg): --     @ 07:01  -   @ 07:00  --------------------------------------------------------  IN: 1750.2 mL / OUT: 158 mL / NET: 1592.2 mL     @ 07:01  -   @ 20:14  --------------------------------------------------------  IN: 300.1 mL / OUT: 320 mL / NET: -19.9 mL      Height (cm): 172.7 ( @ 16:41)  PHYSICAL EXAM:  Gen: NAD, calm  HEENT: MMM  Neck: no JVD  Cards: RRR, +S1/S2, no M/G/R  Resp: CTA B/L  GI: soft, NT/ND, NABS  : no CVA tenderness  Extremities: no LE edema B/L  Derm: no rashes  Neuro: non-focal    LABS:      138  |  108  |  39<H>  ----------------------------<  128<H>  4.5   |  22  |  2.94<H>    Ca    9.3      2023 14:24  Phos  6.8       Mg     2.0         TPro  7.8  /  Alb  3.2<L>  /  TBili  0.5  /  DBili      /  AST  15  /  ALT  12  /  AlkPhos  53      Creatinine Trend: 2.94 <--, 2.88 <--, 2.27 <--, 1.70 <--, 1.65 <--                        11.5   9.81  )-----------( 121      ( 2023 14:24 )             35.9     Urine Studies:  Urinalysis Basic - ( 2023 15:15 )    Color: Yellow / Appearance: Clear / S.013 / pH:   Gluc:  / Ketone: Negative mg/dL  / Bili: Negative / Urobili: 0.2 mg/dL   Blood:  / Protein: 30 mg/dL / Nitrite: Negative   Leuk Esterase: Trace / RBC: 38 /HPF / WBC 2 /HPF   Sq Epi:  / Non Sq Epi:  / Bacteria: Few /HPF      Sodium, Random Urine: 64 mmol/L (-20 @ 15:15)  Creatinine, Random Urine: 78 mg/dL ( @ 15:15)  Osmolality, Random Urine: 380 mos/kg ( @ 15:15)    RADIOLOGY & ADDITIONAL STUDIES:                 [facility] NEPHROLOGY- CONSULTATION NOTE    Patient is a 69yo Male active smoker with h/o COPD COPD, HLD, DM, HTN, chronic ETOH dependence, cirrhosis presents with SOB. Pt a/w Sepsis 2/2 PNA, COPD,  Acute hypoxic hypercapnic respiratory failure s/p brief intubation, Acute encephalopathy new onset Afib with CORTES. Nephrology consulted for Elevated serum creatinine.  Pt with NSTEMI with reduced EF; concerning for new onset HF. Pt pending possible cath once renal function improves  Pt extubated       Unable to obtain history due to mental status    PAST MEDICAL & SURGICAL HISTORY:  HTN (hypertension)      Varicose veins      HLD (hyperlipidemia)      Liver cirrhosis      Portal hypertension with esophageal varices      COPD (chronic obstructive pulmonary disease)      Syncope      Alcohol dependence, daily use      Smokes tobacco daily      DM (diabetes mellitus)      No significant past surgical history        No Known Allergies    Home Medications Reviewed  Hospital Medications:   MEDICATIONS  (STANDING):  albuterol    90 MICROgram(s) HFA Inhaler 2 Puff(s) Inhalation every 6 hours  aspirin  chewable 81 milliGRAM(s) Oral daily  budesonide 160 MICROgram(s)/formoterol 4.5 MICROgram(s) Inhaler 2 Puff(s) Inhalation two times a day  chlorhexidine 2% Cloths 1 Application(s) Topical <User Schedule>  folic acid 1 milliGRAM(s) Oral daily  heparin  Infusion.  Unit(s)/Hr (13 mL/Hr) IV Continuous <Continuous>  insulin lispro (ADMELOG) corrective regimen sliding scale   SubCutaneous every 6 hours  methylPREDNISolone sodium succinate Injectable 40 milliGRAM(s) IV Push every 12 hours  metoprolol tartrate 12.5 milliGRAM(s) Oral every 12 hours  multivitamin 1 Tablet(s) Oral daily  thiamine Injectable 100 milliGRAM(s) IV Push daily  thiamine IVPB 500 milliGRAM(s) IV Intermittent every 8 hours  tiotropium 2.5 MICROgram(s) Inhaler 2 Puff(s) Inhalation daily      REVIEW OF SYSTEMS: Unable to obtain due to mental status      VITALS:  T(F): 98.8 (23 @ 18:00), Max: 99.9 (23 @ 21:00)  HR: 101 (23 @ 18:00)  BP: 123/86 (23 @ 18:00)  RR: 21 (23 @ 18:00)  SpO2: 100% (23 @ 18:00)  Wt(kg): --     @ 07:01  -   @ 07:00  --------------------------------------------------------  IN: 1750.2 mL / OUT: 158 mL / NET: 1592.2 mL     @ 07:01  -   @ 20:14  --------------------------------------------------------  IN: 300.1 mL / OUT: 320 mL / NET: -19.9 mL      Height (cm): 172.7 ( @ 16:41)    PHYSICAL EXAM:  Gen: NAD, altered   HEENT: anicteric +NGT  Neck: +JVD  Cards: RRR, +S1/S2, no M/G/R  Resp: diffuse wheezing b/l   GI: soft, +mild distention +umbilical hernia   : +randolph   Extremities: no LE edema B/L  Derm: no rashes  Neuro: altered     LABS:      138  |  108  |  39<H>  ----------------------------<  128<H>  4.5   |  22  |  2.94<H>    Ca    9.3      2023 14:24  Phos  6.8       Mg     2.0         TPro  7.8  /  Alb  3.2<L>  /  TBili  0.5  /  DBili      /  AST  15  /  ALT  12  /  AlkPhos  53      Creatinine Trend: 2.94 <--, 2.88 <--, 2.27 <--, 1.70 <--, 1.65 <--                        11.5   9.81  )-----------( 121      ( 2023 14:24 )             35.9     Urine Studies:  Urinalysis Basic - ( 2023 15:15 )    Color: Yellow / Appearance: Clear / S.013 / pH:   Gluc:  / Ketone: Negative mg/dL  / Bili: Negative / Urobili: 0.2 mg/dL   Blood:  / Protein: 30 mg/dL / Nitrite: Negative   Leuk Esterase: Trace / RBC: 38 /HPF / WBC 2 /HPF   Sq Epi:  / Non Sq Epi:  / Bacteria: Few /HPF      Sodium, Random Urine: 64 mmol/L ( @ 15:15)  Creatinine, Random Urine: 78 mg/dL ( @ 15:15)  Osmolality, Random Urine: 380 mos/kg ( @ 15:15)    RADIOLOGY & ADDITIONAL STUDIES:      < from: CT Head No Cont (23 @ 16:10) >    ACC: [id number] EXAM:  CT BRAIN   ORDERED BY: CORETTA ANDRE     PROCEDURE DATE:  2023      < end of copied text >  < from: CT Head No Cont (23 @ 16:10) >  IMPRESSION:    No acute intracranial  hemorrhage, midline shift or mass effect.      < end of copied text >  < from: CT Abdomen and Pelvis No Cont (23 @ 16:10) >    ACC: [id number] EXAM:  CT ABDOMEN AND PELVIS   ORDERED BY: CORETTA ANDRE     ACC: [id number] EXAM:  CT CHEST   ORDERED BY: CORETTA ANDRE     PROCEDURE DATE:  2023      < end of copied text >  < from: CT Abdomen and Pelvis No Cont (23 @ 16:10) >  KIDNEYS/URETERS: No hydronephrosis.    < end of copied text >  < from: CT Abdomen and Pelvis No Cont (23 @ 16:10) >  IMPRESSION:  No CT evidence of pneumonia.    Infiltrative changes surrounding the second duodenum suggesting   duodenitis or peptic ulcer disease.    Compression fracture of first lumbar vertebra, new since CT of .          < end of copied text >  < from: Xray Chest 1 View-PORTABLE IMMEDIATE (Xray Chest 1 View-PORTABLE IMMEDIATE .) (23 @ 17:50) >  ACC: [id number] EXAM:  XR CHEST PORTABLE IMMED 1V   ORDERED BY: DAVID GARCIAS     PROCEDURE DATE:  2023          INTERPRETATION:  AP chest on 2023 at 5:41 PM. Patient had NG   tube insertion.    Heart is enlarged.    There is some pleural type scarring with calcification adjacent to old   left rib fractures mainly on the left. This is similar to .    There is mild central CHF which is unchanged.    Endotracheal tube has been removed and a nasogastric tube has been   inserted.    IMPRESSION: Persistent mild central CHF and chronic left pleural scarring   and calcification. Endotracheal tube removed. NG tube inserted.    --- End of Report ---    < end of copied text >  < from: Transthoracic Echocardiogram (23 @ 13:36) >    Patient name: GREGORY ELIZABETH  YOB: 1955   Age: 68 (M)   MR#: [id number]  Study Date: 2023  [address]: Ciera Duran SAND[place]  Study quality: Technically difficult  Referring Physician: Matteo Johansen MD  Blood Pressure: 117/79 mmHg  Height: 173 cm  Weight: 71 kg  BSA: 1.8 m2  ------------------------------------------------------------------------    PROCEDURE: Transthoracic echocardiogram with 2-D, M-Mode  and complete spectral and color flow Doppler.  INDICATION: Shock, unspecified (R57.9)  HISTORY:  ------------------------------------------------------------------------  DIMENSIONS:  Dimensions:     Normal Values:  LA:     4.8 cm    2.0 - 4.0 cm  Ao:     3.6 cm    2.0 - 3.8 cm  SEPTUM: 1.2 cm    0.6 - 1.2 cm  PWT:    1.0 cm    0.6 - 1.1 cm  LVIDd:  5.8 cm    3.0 - 5.6 cm  LVIDs:  4.8 cm    1.8 - 4.0 cm      Derived Variables:  LVMI: 144 g/m2  RWT: 0.34  Ejection Fraction Visual Estimate: 20-25 %      < end of copied text >  < from: Transthoracic Echocardiogram (23 @ 13:36) >  1. Moderate mitral regurgitation.  2.  Mild aortic regurgitation.  3. Severely dilated left atrium.  LA volume index = 52  cc/m2.  4. Normal left ventricular internal dimensions and wall  thicknesses.  5. Endocardium not well visualized; grossly severe global  left ventricular systolic dysfunction.   Segmental wall  motion could not be assessed.  Septal motion consistent  with conduction disease.  6. Mild right atrial enlargement.  7. Normal right ventricular size and function.    < end of copied text >             [facility] NEPHROLOGY- CONSULTATION NOTE    Patient is a 67yo Male active smoker with h/o COPD COPD, HLD, DM, HTN, chronic ETOH dependence, cirrhosis presents with SOB. Pt a/w Sepsis 2/2 PNA, COPD,  Acute hypoxic hypercapnic respiratory failure s/p brief intubation, Acute encephalopathy new onset Afib with CORTES. Nephrology consulted for Elevated serum creatinine.  Pt with NSTEMI with reduced EF; concerning for new onset HF. Pt pending possible cath once renal function improves  Pt extubated       Unable to obtain history due to mental status    PAST MEDICAL & SURGICAL HISTORY:  HTN (hypertension)      Varicose veins      HLD (hyperlipidemia)      Liver cirrhosis      Portal hypertension with esophageal varices      COPD (chronic obstructive pulmonary disease)      Syncope      Alcohol dependence, daily use      Smokes tobacco daily      DM (diabetes mellitus)      No significant past surgical history        No Known Allergies    Home Medications Reviewed  Hospital Medications:   MEDICATIONS  (STANDING):  albuterol    90 MICROgram(s) HFA Inhaler 2 Puff(s) Inhalation every 6 hours  aspirin  chewable 81 milliGRAM(s) Oral daily  budesonide 160 MICROgram(s)/formoterol 4.5 MICROgram(s) Inhaler 2 Puff(s) Inhalation two times a day  chlorhexidine 2% Cloths 1 Application(s) Topical <User Schedule>  folic acid 1 milliGRAM(s) Oral daily  heparin  Infusion.  Unit(s)/Hr (13 mL/Hr) IV Continuous <Continuous>  insulin lispro (ADMELOG) corrective regimen sliding scale   SubCutaneous every 6 hours  methylPREDNISolone sodium succinate Injectable 40 milliGRAM(s) IV Push every 12 hours  metoprolol tartrate 12.5 milliGRAM(s) Oral every 12 hours  multivitamin 1 Tablet(s) Oral daily  thiamine Injectable 100 milliGRAM(s) IV Push daily  thiamine IVPB 500 milliGRAM(s) IV Intermittent every 8 hours  tiotropium 2.5 MICROgram(s) Inhaler 2 Puff(s) Inhalation daily      REVIEW OF SYSTEMS: Unable to obtain due to mental status      VITALS:  T(F): 98.8 (23 @ 18:00), Max: 99.9 (23 @ 21:00)  HR: 101 (23 @ 18:00)  BP: 123/86 (23 @ 18:00)  RR: 21 (23 @ 18:00)  SpO2: 100% (23 @ 18:00)  Wt(kg): --     @ 07:01  -   @ 07:00  --------------------------------------------------------  IN: 1750.2 mL / OUT: 158 mL / NET: 1592.2 mL     @ 07:01  -   @ 20:14  --------------------------------------------------------  IN: 300.1 mL / OUT: 320 mL / NET: -19.9 mL      Height (cm): 172.7 ( @ 16:41)    PHYSICAL EXAM:  Gen: NAD, altered   HEENT: anicteric +NGT  Neck: +JVD  Cards: +tachy , +S1/S2, no M/G/R  Resp: diffuse wheezing b/l   GI: soft, +mild distention +umbilical hernia   : +randolph   Extremities: no LE edema B/L  Derm: no rashes  Neuro: altered     LABS:      138  |  108  |  39<H>  ----------------------------<  128<H>  4.5   |  22  |  2.94<H>    Ca    9.3      2023 14:24  Phos  6.8       Mg     2.0         TPro  7.8  /  Alb  3.2<L>  /  TBili  0.5  /  DBili      /  AST  15  /  ALT  12  /  AlkPhos  53      Creatinine Trend: 2.94 <--, 2.88 <--, 2.27 <--, 1.70 <--, 1.65 <--                        11.5   9.81  )-----------( 121      ( 2023 14:24 )             35.9     Urine Studies:  Urinalysis Basic - ( 2023 15:15 )    Color: Yellow / Appearance: Clear / S.013 / pH:   Gluc:  / Ketone: Negative mg/dL  / Bili: Negative / Urobili: 0.2 mg/dL   Blood:  / Protein: 30 mg/dL / Nitrite: Negative   Leuk Esterase: Trace / RBC: 38 /HPF / WBC 2 /HPF   Sq Epi:  / Non Sq Epi:  / Bacteria: Few /HPF      Sodium, Random Urine: 64 mmol/L ( @ 15:15)  Creatinine, Random Urine: 78 mg/dL ( @ 15:15)  Osmolality, Random Urine: 380 mos/kg ( @ 15:15)    RADIOLOGY & ADDITIONAL STUDIES:      < from: CT Head No Cont (23 @ 16:10) >    ACC: [id number] EXAM:  CT BRAIN   ORDERED BY: CORETTA ANDRE     PROCEDURE DATE:  2023      < end of copied text >  < from: CT Head No Cont (23 @ 16:10) >  IMPRESSION:    No acute intracranial  hemorrhage, midline shift or mass effect.      < end of copied text >  < from: CT Abdomen and Pelvis No Cont (23 @ 16:10) >    ACC: [id number] EXAM:  CT ABDOMEN AND PELVIS   ORDERED BY: CORETTA ANDRE     ACC: [id number] EXAM:  CT CHEST   ORDERED BY: CORETTA ANDRE     PROCEDURE DATE:  2023      < end of copied text >  < from: CT Abdomen and Pelvis No Cont (23 @ 16:10) >  KIDNEYS/URETERS: No hydronephrosis.    < end of copied text >  < from: CT Abdomen and Pelvis No Cont (23 @ 16:10) >  IMPRESSION:  No CT evidence of pneumonia.    Infiltrative changes surrounding the second duodenum suggesting   duodenitis or peptic ulcer disease.    Compression fracture of first lumbar vertebra, new since CT of .          < end of copied text >  < from: Xray Chest 1 View-PORTABLE IMMEDIATE (Xray Chest 1 View-PORTABLE IMMEDIATE .) (23 @ 17:50) >  ACC: [id number] EXAM:  XR CHEST PORTABLE IMMED 1V   ORDERED BY: DAVID GARCIAS     PROCEDURE DATE:  2023          INTERPRETATION:  AP chest on 2023 at 5:41 PM. Patient had NG   tube insertion.    Heart is enlarged.    There is some pleural type scarring with calcification adjacent to old   left rib fractures mainly on the left. This is similar to .    There is mild central CHF which is unchanged.    Endotracheal tube has been removed and a nasogastric tube has been   inserted.    IMPRESSION: Persistent mild central CHF and chronic left pleural scarring   and calcification. Endotracheal tube removed. NG tube inserted.    --- End of Report ---    < end of copied text >  < from: Transthoracic Echocardiogram (23 @ 13:36) >    Patient name: GREGORY ELIZABETH  YOB: 1955   Age: 68 (M)   MR#: [id number]  Study Date: 2023  [id number]: Ciera Duran [facility]  Study quality: Technically difficult  Referring Physician: Matteo Johansen MD  Blood Pressure: 117/79 mmHg  Height: 173 cm  Weight: 71 kg  BSA: 1.8 m2  ------------------------------------------------------------------------    PROCEDURE: Transthoracic echocardiogram with 2-D, M-Mode  and complete spectral and color flow Doppler.  INDICATION: Shock, unspecified (R57.9)  HISTORY:  ------------------------------------------------------------------------  DIMENSIONS:  Dimensions:     Normal Values:  LA:     4.8 cm    2.0 - 4.0 cm  Ao:     3.6 cm    2.0 - 3.8 cm  SEPTUM: 1.2 cm    0.6 - 1.2 cm  PWT:    1.0 cm    0.6 - 1.1 cm  LVIDd:  5.8 cm    3.0 - 5.6 cm  LVIDs:  4.8 cm    1.8 - 4.0 cm      Derived Variables:  LVMI: 144 g/m2  RWT: 0.34  Ejection Fraction Visual Estimate: 20-25 %      < end of copied text >  < from: Transthoracic Echocardiogram (23 @ 13:36) >  1. Moderate mitral regurgitation.  2.  Mild aortic regurgitation.  3. Severely dilated left atrium.  LA volume index = 52  cc/m2.  4. Normal left ventricular internal dimensions and wall  thicknesses.  5. Endocardium not well visualized; grossly severe global  left ventricular systolic dysfunction.   Segmental wall  motion could not be assessed.  Septal motion consistent  with conduction disease.  6. Mild right atrial enlargement.  7. Normal right ventricular size and function.    < end of copied text >

## 2023-11-20 NOTE — DIETITIAN INITIAL EVALUATION ADULT - OTHER INFO
Discussed on ICU IDR. Pt was NPO 2/2 BIPAP. Pt seen, briefly, this PM, on way to CAT scan, w/ NG, no longer on BIPAP. Noted w/ AHRF, chronic ETOH, cirrhosis.

## 2023-11-20 NOTE — DIETITIAN INITIAL EVALUATION ADULT - PERTINENT MEDS FT
MEDICATIONS  (STANDING):  albuterol    90 MICROgram(s) HFA Inhaler 2 Puff(s) Inhalation every 6 hours  aspirin  chewable 81 milliGRAM(s) Oral daily  budesonide 160 MICROgram(s)/formoterol 4.5 MICROgram(s) Inhaler 2 Puff(s) Inhalation two times a day  cefTRIAXone   IVPB 2000 milliGRAM(s) IV Intermittent every 24 hours  chlorhexidine 2% Cloths 1 Application(s) Topical <User Schedule>  folic acid 1 milliGRAM(s) Oral daily  heparin  Infusion.  Unit(s)/Hr (13 mL/Hr) IV Continuous <Continuous>  insulin lispro (ADMELOG) corrective regimen sliding scale   SubCutaneous every 6 hours  methylPREDNISolone sodium succinate Injectable 40 milliGRAM(s) IV Push every 12 hours  metoprolol tartrate 12.5 milliGRAM(s) Oral every 12 hours  multivitamin 1 Tablet(s) Oral daily  thiamine Injectable 100 milliGRAM(s) IV Push daily  thiamine IVPB 500 milliGRAM(s) IV Intermittent every 8 hours  tiotropium 2.5 MICROgram(s) Inhaler 2 Puff(s) Inhalation daily    MEDICATIONS  (PRN):  heparin   Injectable 6000 Unit(s) IV Push every 6 hours PRN For aPTT less than 40  heparin   Injectable 3000 Unit(s) IV Push every 6 hours PRN For aPTT between 40 - 57

## 2023-11-20 NOTE — DIETITIAN INITIAL EVALUATION ADULT - PERTINENT LABORATORY DATA
11-20    138  |  108  |  39<H>  ----------------------------<  128<H>  4.5   |  22  |  2.94<H>    Ca    9.3      20 Nov 2023 14:24  Phos  6.8     11-20  Mg     2.0     11-20    TPro  7.8  /  Alb  3.2<L>  /  TBili  0.5  /  DBili  x   /  AST  15  /  ALT  12  /  AlkPhos  53  11-20  POCT Blood Glucose.: 133 mg/dL (11-20-23 @ 11:45)

## 2023-11-20 NOTE — CONSULT NOTE ADULT - ASSESSMENT
68M current smoker with PMHx  COPD, HLD, DM, HTN, chronic ETOH dependence, cirrhosis presents with SOB. Patient was found to be septic likely 2/2 PNA.  Patient continued to be  tachypneic, and  developed New onset Afib on Bipap Patient was intubated for increase WOB and admitted to ICU for further management 2/2 PNA vs COPD exacerbation.    # AHRF 2/2 COPD exacerbation  # Sepsis  # New Onset Afib  # PNA   # Alcohol Abuse  # Cirrhosis       ============ Neuro============================  Extubated, GCS 11  ================= Cardiovascular==========================  # New Onset Afib w/ RVR  -developed Afib w/ rvr HR 160s  -s/p Cardizem 10mg x2  -EKG LBBB - similar to previous, but now new Afib  -will consider amiodarone if HR uncontrolled   -will hold AC for now as Afib likely from sepsis   started on heparin drip  start lopressor low dose    # Elevated trops  Downtrending Trops  started on heparin drip  plan for cath @ [facility] pending improvement in Cr    # HTN  hx of HTN, non complaint with meds  on amlodipine and losartan at home  Held in setting of septic shock - now off pressors and hypertensive  plan per primary team  ================- Pulm=================================  # PNA  - patient extubated  -CXR b/l lung markings  -continue with Rocephin and azithro  -mycoplasma, legionella pending   strep -ve  -sputum cx     # COPD  -hx COPD on home 02  - poor airway entry on admission, no wheezing  -s/p solumedrol 125mg  -will c/w duonebs  -will start solumedrol 40 BID IV      ==================ID===================================  #Sepsis  -Afebrile, HR 160s. /94, RR 36, 100% on NRB  WBC 20, lactate 5.7  CXR b/l lung markings  s/p 2.5L   -continue with Rocephin and azithro  -mycoplasma, legionella pending   strep -ve  -sputum cx, blood cx      ================= Nephro================================  # ATN  # CORTES  Bun/creatinine 11/1.65 > 2.88  baseline 0.85 in 4/2023  Granular casts  s/p 2.5L   hold IV lasix for now, may be cardiorenal  may consider dobutamine vs nicardipine drip    #Lactic Acidosis  lactate 5.7>2.0  s/p 2.5 L bolus  likely 2/2 sepsis   RESOLVED      68M current smoker with PMHx  COPD, HLD, DM, HTN, chronic ETOH dependence, cirrhosis presents with SOB. Patient was found to be septic likely 2/2 PNA.  Patient continued to be  tachypneic, and  developed New onset Afib on Bipap Patient was intubated for increase WOB and admitted to ICU for further management 2/2 PNA vs COPD exacerbation.    # AHRF 2/2 COPD exacerbation  # Sepsis  # New Onset Afib  # PNA   # Alcohol Abuse  # Cirrhosis       ============ Neuro============================  Extubated continues to be lethargic  ================= Cardiovascular==========================  # New Onset Afib w/ RVR  -developed Afib w/ rvr HR 160s  -s/p Cardizem 10mg x2  -EKG LBBB - similar to previous, but now new Afib  -will consider amiodarone if HR uncontrolled   -will hold AC for now as Afib likely from sepsis   started on heparin drip  start lopressor low dose, monitor HR/tele    # Elevated trops  Downtrending Trops  EKG with LBBB, now also with new onset Afib  started on heparin drip  plan for cath @ [facility] pending improvement in Cr    # HTN  hx of HTN, non complaint with meds  on amlodipine and losartan at home  Held in setting of septic shock - now off pressors and hypertensive  plan per primary team  ================- Pulm=================================  # PNA  - patient extubated  -CXR b/l lung markings  -continue with Rocephin and azithro  -mycoplasma, legionella pending   strep -ve  -sputum cx     # COPD  -hx COPD on home 02  - poor airway entry on admission, no wheezing  -s/p solumedrol 125mg  - c/w duonebs  - c/w IV steroids    ==================ID===================================  #Sepsis  -Afebrile, HR 160s. /94, RR 36, 100% on NRB  WBC 20, lactate 5.7  CXR b/l lung markings  s/p 2.5L   -continue with Rocephin and azithro  -mycoplasma, legionella pending   strep -ve  -sputum cx, blood cx      ================= Nephro================================  # ATN  # CORTES  Bun/creatinine 11/1.65 > 2.88  baseline 0.85 in 4/2023  Granular casts  s/p 2.5L   hold IV lasix for now, may be cardiorenal  may consider dobutamine vs nicardipine drip    #Lactic Acidosis  lactate 5.7>2.0  s/p 2.5 L bolus  likely 2/2 sepsis   RESOLVED

## 2023-11-20 NOTE — PROGRESS NOTE ADULT - ATTENDING COMMENTS
67yo man current smoker w/ PMH COPD (not on home O2), HTN, DM2, h/o syncope and falls, h/o prior ICU admission requiring intubation for AHRF with hypercapnia and hypoxemia, h/o medication non-compliance, h/o chronic EtOH dependence/daily use c/b liver cirrhosis and portal HTN with EGD (01/2019) showing esophageal varices who presented to ED with dyspnea; found with acute hypercapneic hypoxemic respiratory failure possibly 2/2 AECOPD +/- acute PNA w/ sepsis c/b Afib RVR, failed NIV trial necessitating intubation and admission to ICU for further management.     #Acute hypoxemic and hypercapnic respiratory failure   #NSTEMI  #Afib w/ RVR  #Acute heart failure with reduced EF   #Acute kidney failure   #Lactic acidosis  #Acute delirium   #Dm type 2     Plan:  - Found on bipap this morning, though appears minimally responsive  - Reported to have gotten Ativan overnight for agitation  - Would hold off all sedatives for now  - ABG with out CO2 retention  - Close neurologic monitoring  - If no improvement in mentation will obtain CT scan of the head  - continuous hemodynamic monitoring  - Concern for possible Ischemic cardiac event, as bump in troponin, new onset afib and new heart failure   - Cont. Heparin infusion for now  - Cardiology consult   - Diuretic challenge, may need to reinsert randolph catheter   - Monitor hemodynamics  - D/c amiodarone for now   - bronchodilators ATC  - continue solumedrol, taper as tolerated   - Empiric abx coverage   - f/u culture data  - Legionella negative d/c azithromycin  - RVP neg  - trop downtrended  - lactate cleared  - trend BMP and replete electrolytes  - Close glucose monitoring with coverage for hyperglycemia  - strict I/O and UOP monitoring in ATN  - rate control for AFib +/- RVR  - Aspiration precautions  - Cont. ICU Care

## 2023-11-20 NOTE — PROGRESS NOTE ADULT - SUBJECTIVE AND OBJECTIVE BOX
INTERVAL HPI/OVERNIGHT EVENTS:  Seen and examined at bedside.    PRESSORS: [  ] YES [ X ] NO  WHICH:    Antimicrobial:  cefTRIAXone   IVPB 2000 milliGRAM(s) IV Intermittent every 24 hours    Cardiovascular:  buMETAnide Injectable 1 milliGRAM(s) IV Push once  metoprolol tartrate 12.5 milliGRAM(s) Oral every 12 hours    Pulmonary:  albuterol    90 MICROgram(s) HFA Inhaler 2 Puff(s) Inhalation every 6 hours  budesonide 160 MICROgram(s)/formoterol 4.5 MICROgram(s) Inhaler 2 Puff(s) Inhalation two times a day  tiotropium 2.5 MICROgram(s) Inhaler 2 Puff(s) Inhalation daily    Hematalogic:  aspirin  chewable 81 milliGRAM(s) Oral daily  heparin   Injectable 6000 Unit(s) IV Push every 6 hours PRN  heparin   Injectable 3000 Unit(s) IV Push every 6 hours PRN  heparin  Infusion.  Unit(s)/Hr IV Continuous <Continuous>    Other:  chlorhexidine 2% Cloths 1 Application(s) Topical <User Schedule>  folic acid 1 milliGRAM(s) Oral daily  insulin lispro (ADMELOG) corrective regimen sliding scale   SubCutaneous every 6 hours  methylPREDNISolone sodium succinate Injectable 40 milliGRAM(s) IV Push every 12 hours  multivitamin 1 Tablet(s) Oral daily  thiamine Injectable 100 milliGRAM(s) IV Push daily  thiamine IVPB 500 milliGRAM(s) IV Intermittent every 8 hours    albuterol    90 MICROgram(s) HFA Inhaler 2 Puff(s) Inhalation every 6 hours  aspirin  chewable 81 milliGRAM(s) Oral daily  budesonide 160 MICROgram(s)/formoterol 4.5 MICROgram(s) Inhaler 2 Puff(s) Inhalation two times a day  buMETAnide Injectable 1 milliGRAM(s) IV Push once  cefTRIAXone   IVPB 2000 milliGRAM(s) IV Intermittent every 24 hours  chlorhexidine 2% Cloths 1 Application(s) Topical <User Schedule>  folic acid 1 milliGRAM(s) Oral daily  heparin   Injectable 6000 Unit(s) IV Push every 6 hours PRN  heparin   Injectable 3000 Unit(s) IV Push every 6 hours PRN  heparin  Infusion.  Unit(s)/Hr IV Continuous <Continuous>  insulin lispro (ADMELOG) corrective regimen sliding scale   SubCutaneous every 6 hours  methylPREDNISolone sodium succinate Injectable 40 milliGRAM(s) IV Push every 12 hours  metoprolol tartrate 12.5 milliGRAM(s) Oral every 12 hours  multivitamin 1 Tablet(s) Oral daily  thiamine Injectable 100 milliGRAM(s) IV Push daily  thiamine IVPB 500 milliGRAM(s) IV Intermittent every 8 hours  tiotropium 2.5 MICROgram(s) Inhaler 2 Puff(s) Inhalation daily    Drug Dosing Weight  Height (cm): 172.7 (18 Nov 2023 08:28)  Weight (kg): 71 (18 Nov 2023 08:28)  BMI (kg/m2): 23.8 (18 Nov 2023 08:28)  BSA (m2): 1.84 (18 Nov 2023 08:28)    CENTRAL LINE: [ ] YES [ ] NO  LOCATION:   DATE INSERTED:  REMOVE: [ ] YES [ ] NO  EXPLAIN:    MUÑOZ: [ ] YES [ ] NO    DATE INSERTED:  REMOVE:  [ ] YES [ ] NO  EXPLAIN:    A-LINE:  [ ] YES [ ] NO  LOCATION:   DATE INSERTED:  REMOVE:  [ ] YES [ ] NO  EXPLAIN:    PMH -reviewed admission note, no change since admission  PAST MEDICAL & SURGICAL HISTORY:  HTN (hypertension)      Varicose veins      HLD (hyperlipidemia)      Liver cirrhosis      Portal hypertension with esophageal varices      COPD (chronic obstructive pulmonary disease)      Syncope      Alcohol dependence, daily use      Smokes tobacco daily      DM (diabetes mellitus)      No significant past surgical history          ICU Vital Signs Last 24 Hrs  T(C): 37.1 (20 Nov 2023 12:00), Max: 37.9 (19 Nov 2023 17:00)  T(F): 98.8 (20 Nov 2023 12:00), Max: 100.2 (19 Nov 2023 17:00)  HR: 115 (20 Nov 2023 12:00) (102 - 138)  BP: 145/96 (20 Nov 2023 12:00) (126/82 - 187/111)  BP(mean): 104 (20 Nov 2023 12:00) (95 - 144)  ABP: --  ABP(mean): --  RR: 19 (20 Nov 2023 12:00) (19 - 31)  SpO2: 100% (20 Nov 2023 12:00) (100% - 100%)    O2 Parameters below as of 20 Nov 2023 11:00  Patient On (Oxygen Delivery Method): nasal cannula  O2 Flow (L/min): 4          ABG - ( 19 Nov 2023 16:47 )  pH, Arterial: 7.29  pH, Blood: x     /  pCO2: 37    /  pO2: 127   / HCO3: 18    / Base Excess: -8.1  /  SaO2: 100                   11-19 @ 07:01  -  11-20 @ 07:00  --------------------------------------------------------  IN: 1750.2 mL / OUT: 158 mL / NET: 1592.2 mL            PHYSICAL EXAM:    GENERAL: NAD  HEAD:  Atraumatic, Normocephalic  NERVOUS SYSTEM:  Alert & Oriented X0  CHEST/LUNG: No chest deformity. No rales, rhonchi, wheezing   HEART: Regular rate and rhythm; No murmurs, rubs, or gallops  ABDOMEN: tenderness on palpation of umbilical hernia  EXTREMITIES:  No clubbing, cyanosis, or edema    LABS:  CBC Full  -  ( 20 Nov 2023 03:00 )  WBC Count : 14.55 K/uL  RBC Count : 3.99 M/uL  Hemoglobin : 12.2 g/dL  Hematocrit : 38.8 %  Platelet Count - Automated : 164 K/uL  Mean Cell Volume : 97.2 fl  Mean Cell Hemoglobin : 30.6 pg  Mean Cell Hemoglobin Concentration : 31.4 gm/dL  Auto Neutrophil # : 12.64 K/uL  Auto Lymphocyte # : 0.63 K/uL  Auto Monocyte # : 0.95 K/uL  Auto Eosinophil # : 0.25 K/uL  Auto Basophil # : 0.01 K/uL  Auto Neutrophil % : 86.9 %  Auto Lymphocyte % : 4.3 %  Auto Monocyte % : 6.5 %  Auto Eosinophil % : 1.7 %  Auto Basophil % : 0.1 %    11-20    136  |  107  |  33<H>  ----------------------------<  184<H>  4.7   |  20<L>  |  2.88<H>    Ca    8.7      20 Nov 2023 03:00  Phos  6.8     11-20  Mg     2.0     11-20    TPro  7.8  /  Alb  3.2<L>  /  TBili  0.5  /  DBili  x   /  AST  15  /  ALT  12  /  AlkPhos  53  11-20    PT/INR - ( 19 Nov 2023 16:41 )   PT: 11.5 sec;   INR: 1.01 ratio         PTT - ( 20 Nov 2023 08:12 )  PTT:27.1 sec  Urinalysis Basic - ( 20 Nov 2023 03:00 )    Color: x / Appearance: x / SG: x / pH: x  Gluc: 184 mg/dL / Ketone: x  / Bili: x / Urobili: x   Blood: x / Protein: x / Nitrite: x   Leuk Esterase: x / RBC: x / WBC x   Sq Epi: x / Non Sq Epi: x / Bacteria: x      Culture Results:   No growth at 24 hours (11-18 @ 08:49)  Culture Results:   No growth at 24 hours (11-18 @ 08:39)      RADIOLOGY & ADDITIONAL STUDIES REVIEWED:  ***    [ ]GOALS OF CARE DISCUSSION WITH PATIENT/FAMILY/PROXY:    CRITICAL CARE TIME SPENT: 35 minutes

## 2023-11-20 NOTE — CONSULT NOTE ADULT - SUBJECTIVE AND OBJECTIVE BOX
SUBJ:  extubated, titrated off pressors due to hypertension  Afib, rate in 110s    MEDICATIONS  (STANDING):  albuterol    90 MICROgram(s) HFA Inhaler 2 Puff(s) Inhalation every 6 hours  azithromycin  IVPB 500 milliGRAM(s) IV Intermittent every 24 hours  budesonide 160 MICROgram(s)/formoterol 4.5 MICROgram(s) Inhaler 2 Puff(s) Inhalation two times a day  cefTRIAXone   IVPB 2000 milliGRAM(s) IV Intermittent every 24 hours  chlorhexidine 2% Cloths 1 Application(s) Topical <User Schedule>  heparin  Infusion.  Unit(s)/Hr (13 mL/Hr) IV Continuous <Continuous>  insulin lispro (ADMELOG) corrective regimen sliding scale   SubCutaneous every 6 hours  labetalol 100 milliGRAM(s) Oral once  methylPREDNISolone sodium succinate Injectable 40 milliGRAM(s) IV Push every 12 hours  metoprolol tartrate 12.5 milliGRAM(s) Oral every 12 hours  thiamine Injectable 100 milliGRAM(s) IV Push daily  tiotropium 2.5 MICROgram(s) Inhaler 2 Puff(s) Inhalation daily    MEDICATIONS  (PRN):  heparin   Injectable 6000 Unit(s) IV Push every 6 hours PRN For aPTT less than 40  heparin   Injectable 3000 Unit(s) IV Push every 6 hours PRN For aPTT between 40 - 57      Vital Signs Last 24 Hrs  T(C): 36.9 (20 Nov 2023 10:00), Max: 37.9 (19 Nov 2023 17:00)  T(F): 98.4 (20 Nov 2023 10:00), Max: 100.2 (19 Nov 2023 17:00)  HR: 112 (20 Nov 2023 10:00) (103 - 138)  BP: 159/98 (20 Nov 2023 10:00) (126/82 - 187/111)  BP(mean): 113 (20 Nov 2023 10:00) (95 - 144)  RR: 23 (20 Nov 2023 10:00) (19 - 31)  SpO2: 100% (20 Nov 2023 10:00) (100% - 100%)    Parameters below as of 20 Nov 2023 08:00  Patient On (Oxygen Delivery Method): BiPAP/CPAP    O2 Concentration (%): 40    REVIEW OF SYSTEMS:  CONSTITUTIONAL: No fever, weight loss, or fatigue  EYES: No eye pain, visual disturbances, or discharge  ENMT:  No difficulty hearing, tinnitus, vertigo; No sinus or throat pain  NECK: No pain or stiffness  RESPIRATORY: No cough, wheezing, chills or hemoptysis; No shortness of breath  CARDIOVASCULAR: No chest pain, palpitations, dizziness, or leg swelling  GASTROINTESTINAL: No abdominal or epigastric pain. No nausea, vomiting, or hematemesis; No diarrhea or constipation. No melena or hematochezia.  GENITOURINARY: No dysuria, frequency, hematuria, or incontinence  NEUROLOGICAL: No headaches, memory loss, loss of strength, numbness, or tremors  SKIN: No itching, burning, rashes, or lesions   LYMPH NODES: No enlarged glands  ENDOCRINE: No heat or cold intolerance; No hair loss  MUSCULOSKELETAL: No joint pain or swelling; No muscle, back, or extremity pain  PSYCHIATRIC: No depression, anxiety, mood swings, or difficulty sleeping  HEME/LYMPH: No easy bruising, or bleeding gums  ALLERY AND IMMUNOLOGIC: No hives or eczema    PHYSICAL EXAM:        TELEMETRY:    ECG:    TTE:    LABS:                        12.2   14.55 )-----------( 164      ( 20 Nov 2023 03:00 )             38.8     11-20    136  |  107  |  33<H>  ----------------------------<  184<H>  4.7   |  20<L>  |  2.88<H>    Ca    8.7      20 Nov 2023 03:00  Phos  6.8     11-20  Mg     2.0     11-20    TPro  7.8  /  Alb  3.2<L>  /  TBili  0.5  /  DBili  x   /  AST  15  /  ALT  12  /  AlkPhos  53  11-20    CARDIAC MARKERS ( 19 Nov 2023 16:41 )  x     / x     / 116 U/L / x     / 3.0 ng/mL  CARDIAC MARKERS ( 18 Nov 2023 14:20 )  x     / x     / 138 U/L / x     / 3.0 ng/mL      PT/INR - ( 19 Nov 2023 16:41 )   PT: 11.5 sec;   INR: 1.01 ratio         PTT - ( 20 Nov 2023 08:12 )  PTT:27.1 sec  Creatinine Trend: 2.88<--, 2.27<--, 1.70<--, 1.65<--  I&O's Summary    19 Nov 2023 07:01  -  20 Nov 2023 07:00  --------------------------------------------------------  IN: 1750.2 mL / OUT: 158 mL / NET: 1592.2 mL    20 Nov 2023 07:01  -  20 Nov 2023 11:29  --------------------------------------------------------  IN: 91.4 mL / OUT: 0 mL / NET: 91.4 mL      BNP  RADIOLOGY & ADDITIONAL STUDIES:    IMPRESSION AND PLAN:         SUBJ:  extubated, titrated off pressors due to hypertension  Afib, rate in 110s    MEDICATIONS  (STANDING):  albuterol    90 MICROgram(s) HFA Inhaler 2 Puff(s) Inhalation every 6 hours  azithromycin  IVPB 500 milliGRAM(s) IV Intermittent every 24 hours  budesonide 160 MICROgram(s)/formoterol 4.5 MICROgram(s) Inhaler 2 Puff(s) Inhalation two times a day  cefTRIAXone   IVPB 2000 milliGRAM(s) IV Intermittent every 24 hours  chlorhexidine 2% Cloths 1 Application(s) Topical <User Schedule>  heparin  Infusion.  Unit(s)/Hr (13 mL/Hr) IV Continuous <Continuous>  insulin lispro (ADMELOG) corrective regimen sliding scale   SubCutaneous every 6 hours  labetalol 100 milliGRAM(s) Oral once  methylPREDNISolone sodium succinate Injectable 40 milliGRAM(s) IV Push every 12 hours  metoprolol tartrate 12.5 milliGRAM(s) Oral every 12 hours  thiamine Injectable 100 milliGRAM(s) IV Push daily  tiotropium 2.5 MICROgram(s) Inhaler 2 Puff(s) Inhalation daily    MEDICATIONS  (PRN):  heparin   Injectable 6000 Unit(s) IV Push every 6 hours PRN For aPTT less than 40  heparin   Injectable 3000 Unit(s) IV Push every 6 hours PRN For aPTT between 40 - 57      Vital Signs Last 24 Hrs  T(C): 36.9 (20 Nov 2023 10:00), Max: 37.9 (19 Nov 2023 17:00)  T(F): 98.4 (20 Nov 2023 10:00), Max: 100.2 (19 Nov 2023 17:00)  HR: 112 (20 Nov 2023 10:00) (103 - 138)  BP: 159/98 (20 Nov 2023 10:00) (126/82 - 187/111)  BP(mean): 113 (20 Nov 2023 10:00) (95 - 144)  RR: 23 (20 Nov 2023 10:00) (19 - 31)  SpO2: 100% (20 Nov 2023 10:00) (100% - 100%)    Parameters below as of 20 Nov 2023 08:00  Patient On (Oxygen Delivery Method): BiPAP/CPAP    O2 Concentration (%): 40    REVIEW OF SYSTEMS:  unable to perform due to mental status    PHYSICAL EXAM:  PHYSICAL EXAM:  GENERAL: NAD, obtunded  HEAD:  Atraumatic, Normocephalic  EYES: EOMI, PERRLA, conjunctiva and sclera clear  ENT: Dry mucous membranes, + NG tube in place  NECK: Supple, No JVD  CHEST/LUNG: Clear to auscultation bilaterally; bilateral lower lobe exp wheezing  HEART: Regular rate and rhythm; No murmurs, rubs, or gallops  ABDOMEN: Bowel sounds present; Soft, Nontender, Nondistended - umbilical hernia  EXTREMITIES:  2+ Peripheral Pulses, brisk capillary refill. No clubbing, cyanosis, or edema  NERVOUS SYSTEM:  obtunded  MSK: FROM all 4 extremities, full and equal strength  SKIN: No rashes or lesions      TELEMETRY:    ECG:    TTE:    LABS:                        12.2   14.55 )-----------( 164      ( 20 Nov 2023 03:00 )             38.8     11-20    136  |  107  |  33<H>  ----------------------------<  184<H>  4.7   |  20<L>  |  2.88<H>    Ca    8.7      20 Nov 2023 03:00  Phos  6.8     11-20  Mg     2.0     11-20    TPro  7.8  /  Alb  3.2<L>  /  TBili  0.5  /  DBili  x   /  AST  15  /  ALT  12  /  AlkPhos  53  11-20    CARDIAC MARKERS ( 19 Nov 2023 16:41 )  x     / x     / 116 U/L / x     / 3.0 ng/mL  CARDIAC MARKERS ( 18 Nov 2023 14:20 )  x     / x     / 138 U/L / x     / 3.0 ng/mL      PT/INR - ( 19 Nov 2023 16:41 )   PT: 11.5 sec;   INR: 1.01 ratio         PTT - ( 20 Nov 2023 08:12 )  PTT:27.1 sec  Creatinine Trend: 2.88<--, 2.27<--, 1.70<--, 1.65<--  I&O's Summary    19 Nov 2023 07:01  -  20 Nov 2023 07:00  --------------------------------------------------------  IN: 1750.2 mL / OUT: 158 mL / NET: 1592.2 mL    20 Nov 2023 07:01  -  20 Nov 2023 11:29  --------------------------------------------------------  IN: 91.4 mL / OUT: 0 mL / NET: 91.4 mL      BNP  RADIOLOGY & ADDITIONAL STUDIES:    IMPRESSION AND PLAN:

## 2023-11-20 NOTE — CONSULT NOTE ADULT - ASSESSMENT
Patient is a 67yo Male active smoker with h/o COPD COPD, HLD, DM, HTN, chronic ETOH dependence, cirrhosis presents with SOB. Pt a/w Sepsis 2/2 PNA, COPD,  Acute hypoxic hypercapnic respiratory failure s/p brief intubation, Acute encephalopathy new onset Afib with CORTES. Nephrology consulted for Elevated serum creatinine.    [facility] NEPHROLOGY  Iggy Hernandez M.D.  Richard Cook D.O.  Bárbara Slaughter M.D.  MD Breanna Schulte, MSN, ANP-C    Telephone: [phone]  Facsimile: [phone]    [address] [place] [address], [address]  [place] [address]   Patient is a 67yo Male active smoker with h/o COPD COPD, HLD, DM, HTN, chronic ETOH dependence, cirrhosis presents with SOB. Pt a/w Sepsis 2/2 PNA, COPD,  Acute hypoxic hypercapnic respiratory failure s/p brief intubation, Acute encephalopathy new onset Afib with CORTES. Nephrology consulted for Elevated serum creatinine.  Pt with NSTEMI with reduced EF; concerning for new onset HF (prev EF 50-55% in April 2023 --> now EF 20-25%). Pt pending possible cath once renal function improves  Pt extubated 11/19    1 CORTES- previous SCr 0.8-1 in April 2023        [facility] NEPHROLOGY  Iggy Hernandez M.D.  Richard Cook D.O.  Bárbara Slaughter M.D.  MD Breanna Schulte, MSN, ANP-C    Telephone: [phone]  Facsimile: [phone]    [address] [place] [address], [address]  [place] [address]   Patient is a 67yo Male active smoker with h/o COPD, HLD, DM, HTN, chronic ETOH dependence, cirrhosis presents with SOB. Pt a/w Sepsis 2/2 PNA, COPD,  Acute hypoxic hypercapnic respiratory failure s/p brief intubation, Acute encephalopathy new onset Afib with CORTES. Nephrology consulted for Elevated serum creatinine.  Pt with NSTEMI with reduced EF; concerning for new onset HF (prev EF 50-55% in April 2023 --> now EF 20-25%). Pt pending possible cath once renal function improves  Pt extubated 11/19    1 CORTES- previous SCr 0.8-1 in April 2023. CORTES likely ATN    2. Acute encephalopathy- CT head neg. Ammonia level <10. Plan as per ICU    3. Acute CHF- TTE with severe global LV systolic dysfunction; EF 20-25%. Cardiology following;    4. COPD exacerbation- CT chest with no PNA for which abx was d/c. Bronchodilators/ Steroids as per ICU.     [facility] NEPHROLOGY  Iggy Hernandez M.D.  WILLIAM RayO.  Bárbara Slaughter M.D.  MD Breanna Schulte, MSN, ANP-C    Telephone: [phone]  Facsimile: [phone] [place] [address], [address]  [place] [id number]   Patient is a 69yo Male active smoker with h/o COPD, HLD, DM, HTN, chronic ETOH dependence, cirrhosis presents with SOB. Pt a/w Sepsis 2/2 PNA, COPD,  Acute hypoxic hypercapnic respiratory failure s/p brief intubation, Acute encephalopathy new onset Afib with CORTES. Nephrology consulted for Elevated serum creatinine.  Pt with NSTEMI with reduced EF; concerning for new onset HF (prev EF 50-55% in April 2023 --> now EF 20-25%). Pt pending possible cath once renal function improves  Pt extubated 11/19    1 CORTES- previous SCr 0.8-1 in April 2023. CORTES likely ATN. UA with 30 protein; large blood ?traumatic randloph. FeNa 1.75%; inconclusive. Rate of rise of SCr decreasing;? plateau in the next 1-2 days. Monitor urine o/p.   Ct abd/pelvis with no hydro. Strict I/Os. Avoid nephrotoxins/ NSAIDs/ RCA. Monitor BMP.    2. Acute encephalopathy- CT head neg. Ammonia level <10. Plan as per ICU    3. Acute CHF- TTE with severe global LV systolic dysfunction; EF 20-25%. Cardiology following;    4. COPD exacerbation- CT chest with no PNA for which abx was d/c. Bronchodilators/ Steroids as per ICU.     [facility] NEPHROLOGY  Iggy Hernandez M.D.  Richard Cook D.O.  Bárbara Slaughter M.D.  MD Breanna Schulte, MSN, ANP-C    Telephone: [phone]  Facsimile: [phone]    [address] [place] [address], [address]  [place] [address]   Patient is a 67yo Male active smoker with h/o COPD, HLD, DM, HTN, chronic ETOH dependence, cirrhosis presents with SOB. Pt a/w Sepsis 2/2 PNA, COPD,  Acute hypoxic hypercapnic respiratory failure s/p brief intubation, Acute encephalopathy new onset Afib with CORTES. Nephrology consulted for Elevated serum creatinine.  Pt with NSTEMI with reduced EF; concerning for new onset HF (prev EF 50-55% in April 2023 --> now EF 20-25%). Pt pending possible cath once renal function improves  Pt extubated 11/19    1 CORTES- previous SCr 0.8-1 in April 2023. CORTES likely ATN. UA with 30 protein; large blood ?traumatic randolph. FeNa 1.75%; inconclusive. Check FeUrea.  Rate of rise of SCr decreasing;? plateau in the next 1-2 days. Monitor urine o/p.   Ct abd/pelvis with no hydro. Strict I/Os. Avoid nephrotoxins/ NSAIDs/ RCA. Monitor BMP.    2. Acute encephalopathy- CT head neg. Ammonia level <10. Plan as per ICU    3. Acute CHF- TTE with severe global LV systolic dysfunction; EF 20-25%. Cardiology following;    4. COPD exacerbation- CT chest with no PNA for which abx was d/c. Bronchodilators/ Steroids as per ICU.     [facility] NEPHROLOGY  Iggy Hernandez M.D.  Richard Cook D.O.  Bárbara Slaughter M.D.  MD Breanna Schulte, MSN, ANP-C    Telephone: [phone]  Facsimile: [phone]    [address] [place] [address], [address]  [place] [address]

## 2023-11-21 LAB
ALBUMIN SERPL ELPH-MCNC: 2.9 G/DL — LOW (ref 3.5–5)
ALBUMIN SERPL ELPH-MCNC: 2.9 G/DL — LOW (ref 3.5–5)
ALP SERPL-CCNC: 38 U/L — LOW (ref 40–120)
ALP SERPL-CCNC: 38 U/L — LOW (ref 40–120)
ALT FLD-CCNC: 17 U/L DA — SIGNIFICANT CHANGE UP (ref 10–60)
ALT FLD-CCNC: 17 U/L DA — SIGNIFICANT CHANGE UP (ref 10–60)
ANION GAP SERPL CALC-SCNC: 6 MMOL/L — SIGNIFICANT CHANGE UP (ref 5–17)
ANION GAP SERPL CALC-SCNC: 6 MMOL/L — SIGNIFICANT CHANGE UP (ref 5–17)
ANION GAP SERPL CALC-SCNC: 8 MMOL/L — SIGNIFICANT CHANGE UP (ref 5–17)
ANION GAP SERPL CALC-SCNC: 8 MMOL/L — SIGNIFICANT CHANGE UP (ref 5–17)
APTT BLD: 63.3 SEC — HIGH (ref 24.5–35.6)
APTT BLD: 63.3 SEC — HIGH (ref 24.5–35.6)
AST SERPL-CCNC: 15 U/L — SIGNIFICANT CHANGE UP (ref 10–40)
AST SERPL-CCNC: 15 U/L — SIGNIFICANT CHANGE UP (ref 10–40)
BASOPHILS # BLD AUTO: 0.01 K/UL — SIGNIFICANT CHANGE UP (ref 0–0.2)
BASOPHILS # BLD AUTO: 0.01 K/UL — SIGNIFICANT CHANGE UP (ref 0–0.2)
BASOPHILS NFR BLD AUTO: 0.1 % — SIGNIFICANT CHANGE UP (ref 0–2)
BASOPHILS NFR BLD AUTO: 0.1 % — SIGNIFICANT CHANGE UP (ref 0–2)
BILIRUB SERPL-MCNC: 0.5 MG/DL — SIGNIFICANT CHANGE UP (ref 0.2–1.2)
BILIRUB SERPL-MCNC: 0.5 MG/DL — SIGNIFICANT CHANGE UP (ref 0.2–1.2)
BUN SERPL-MCNC: 45 MG/DL — HIGH (ref 7–18)
BUN SERPL-MCNC: 45 MG/DL — HIGH (ref 7–18)
BUN SERPL-MCNC: 48 MG/DL — HIGH (ref 7–18)
BUN SERPL-MCNC: 48 MG/DL — HIGH (ref 7–18)
CALCIUM SERPL-MCNC: 8.6 MG/DL — SIGNIFICANT CHANGE UP (ref 8.4–10.5)
CALCIUM SERPL-MCNC: 8.6 MG/DL — SIGNIFICANT CHANGE UP (ref 8.4–10.5)
CALCIUM SERPL-MCNC: 8.7 MG/DL — SIGNIFICANT CHANGE UP (ref 8.4–10.5)
CALCIUM SERPL-MCNC: 8.7 MG/DL — SIGNIFICANT CHANGE UP (ref 8.4–10.5)
CHLORIDE SERPL-SCNC: 108 MMOL/L — SIGNIFICANT CHANGE UP (ref 96–108)
CO2 SERPL-SCNC: 22 MMOL/L — SIGNIFICANT CHANGE UP (ref 22–31)
CREAT ?TM UR-MCNC: 128 MG/DL — SIGNIFICANT CHANGE UP
CREAT ?TM UR-MCNC: 128 MG/DL — SIGNIFICANT CHANGE UP
CREAT SERPL-MCNC: 2.44 MG/DL — HIGH (ref 0.5–1.3)
CREAT SERPL-MCNC: 2.44 MG/DL — HIGH (ref 0.5–1.3)
CREAT SERPL-MCNC: 2.72 MG/DL — HIGH (ref 0.5–1.3)
CREAT SERPL-MCNC: 2.72 MG/DL — HIGH (ref 0.5–1.3)
EGFR: 25 ML/MIN/1.73M2 — LOW
EGFR: 25 ML/MIN/1.73M2 — LOW
EGFR: 28 ML/MIN/1.73M2 — LOW
EGFR: 28 ML/MIN/1.73M2 — LOW
EOSINOPHIL # BLD AUTO: 0.01 K/UL — SIGNIFICANT CHANGE UP (ref 0–0.5)
EOSINOPHIL # BLD AUTO: 0.01 K/UL — SIGNIFICANT CHANGE UP (ref 0–0.5)
EOSINOPHIL NFR BLD AUTO: 0.1 % — SIGNIFICANT CHANGE UP (ref 0–6)
EOSINOPHIL NFR BLD AUTO: 0.1 % — SIGNIFICANT CHANGE UP (ref 0–6)
GLUCOSE BLDC GLUCOMTR-MCNC: 113 MG/DL — HIGH (ref 70–99)
GLUCOSE BLDC GLUCOMTR-MCNC: 113 MG/DL — HIGH (ref 70–99)
GLUCOSE BLDC GLUCOMTR-MCNC: 129 MG/DL — HIGH (ref 70–99)
GLUCOSE BLDC GLUCOMTR-MCNC: 129 MG/DL — HIGH (ref 70–99)
GLUCOSE BLDC GLUCOMTR-MCNC: 158 MG/DL — HIGH (ref 70–99)
GLUCOSE BLDC GLUCOMTR-MCNC: 158 MG/DL — HIGH (ref 70–99)
GLUCOSE SERPL-MCNC: 138 MG/DL — HIGH (ref 70–99)
GLUCOSE SERPL-MCNC: 138 MG/DL — HIGH (ref 70–99)
GLUCOSE SERPL-MCNC: 139 MG/DL — HIGH (ref 70–99)
GLUCOSE SERPL-MCNC: 139 MG/DL — HIGH (ref 70–99)
HCT VFR BLD CALC: 34.8 % — LOW (ref 39–50)
HCT VFR BLD CALC: 34.8 % — LOW (ref 39–50)
HGB BLD-MCNC: 11 G/DL — LOW (ref 13–17)
HGB BLD-MCNC: 11 G/DL — LOW (ref 13–17)
IMM GRANULOCYTES NFR BLD AUTO: 0.6 % — SIGNIFICANT CHANGE UP (ref 0–0.9)
IMM GRANULOCYTES NFR BLD AUTO: 0.6 % — SIGNIFICANT CHANGE UP (ref 0–0.9)
LACTATE SERPL-SCNC: 1.4 MMOL/L — SIGNIFICANT CHANGE UP (ref 0.7–2)
LACTATE SERPL-SCNC: 1.4 MMOL/L — SIGNIFICANT CHANGE UP (ref 0.7–2)
LYMPHOCYTES # BLD AUTO: 0.58 K/UL — LOW (ref 1–3.3)
LYMPHOCYTES # BLD AUTO: 0.58 K/UL — LOW (ref 1–3.3)
LYMPHOCYTES # BLD AUTO: 7.1 % — LOW (ref 13–44)
LYMPHOCYTES # BLD AUTO: 7.1 % — LOW (ref 13–44)
M PNEUMO IGM SER-ACNC: 0.35 INDEX — SIGNIFICANT CHANGE UP (ref 0–0.9)
M PNEUMO IGM SER-ACNC: 0.35 INDEX — SIGNIFICANT CHANGE UP (ref 0–0.9)
MAGNESIUM SERPL-MCNC: 2 MG/DL — SIGNIFICANT CHANGE UP (ref 1.6–2.6)
MAGNESIUM SERPL-MCNC: 2 MG/DL — SIGNIFICANT CHANGE UP (ref 1.6–2.6)
MCHC RBC-ENTMCNC: 31.3 PG — SIGNIFICANT CHANGE UP (ref 27–34)
MCHC RBC-ENTMCNC: 31.3 PG — SIGNIFICANT CHANGE UP (ref 27–34)
MCHC RBC-ENTMCNC: 31.6 GM/DL — LOW (ref 32–36)
MCHC RBC-ENTMCNC: 31.6 GM/DL — LOW (ref 32–36)
MCV RBC AUTO: 99.1 FL — SIGNIFICANT CHANGE UP (ref 80–100)
MCV RBC AUTO: 99.1 FL — SIGNIFICANT CHANGE UP (ref 80–100)
MONOCYTES # BLD AUTO: 0.55 K/UL — SIGNIFICANT CHANGE UP (ref 0–0.9)
MONOCYTES # BLD AUTO: 0.55 K/UL — SIGNIFICANT CHANGE UP (ref 0–0.9)
MONOCYTES NFR BLD AUTO: 6.7 % — SIGNIFICANT CHANGE UP (ref 2–14)
MONOCYTES NFR BLD AUTO: 6.7 % — SIGNIFICANT CHANGE UP (ref 2–14)
MYCOPLASMA AG SPEC QL: NEGATIVE — SIGNIFICANT CHANGE UP
MYCOPLASMA AG SPEC QL: NEGATIVE — SIGNIFICANT CHANGE UP
NEUTROPHILS # BLD AUTO: 6.98 K/UL — SIGNIFICANT CHANGE UP (ref 1.8–7.4)
NEUTROPHILS # BLD AUTO: 6.98 K/UL — SIGNIFICANT CHANGE UP (ref 1.8–7.4)
NEUTROPHILS NFR BLD AUTO: 85.4 % — HIGH (ref 43–77)
NEUTROPHILS NFR BLD AUTO: 85.4 % — HIGH (ref 43–77)
NRBC # BLD: 0 /100 WBCS — SIGNIFICANT CHANGE UP (ref 0–0)
NRBC # BLD: 0 /100 WBCS — SIGNIFICANT CHANGE UP (ref 0–0)
PHOSPHATE SERPL-MCNC: 5 MG/DL — HIGH (ref 2.5–4.5)
PHOSPHATE SERPL-MCNC: 5 MG/DL — HIGH (ref 2.5–4.5)
PLATELET # BLD AUTO: 100 K/UL — LOW (ref 150–400)
PLATELET # BLD AUTO: 100 K/UL — LOW (ref 150–400)
POTASSIUM SERPL-MCNC: 4.5 MMOL/L — SIGNIFICANT CHANGE UP (ref 3.5–5.3)
POTASSIUM SERPL-SCNC: 4.5 MMOL/L — SIGNIFICANT CHANGE UP (ref 3.5–5.3)
PROT SERPL-MCNC: 6.8 G/DL — SIGNIFICANT CHANGE UP (ref 6–8.3)
PROT SERPL-MCNC: 6.8 G/DL — SIGNIFICANT CHANGE UP (ref 6–8.3)
RBC # BLD: 3.51 M/UL — LOW (ref 4.2–5.8)
RBC # BLD: 3.51 M/UL — LOW (ref 4.2–5.8)
RBC # FLD: 17.5 % — HIGH (ref 10.3–14.5)
RBC # FLD: 17.5 % — HIGH (ref 10.3–14.5)
SODIUM SERPL-SCNC: 136 MMOL/L — SIGNIFICANT CHANGE UP (ref 135–145)
SODIUM SERPL-SCNC: 136 MMOL/L — SIGNIFICANT CHANGE UP (ref 135–145)
SODIUM SERPL-SCNC: 138 MMOL/L — SIGNIFICANT CHANGE UP (ref 135–145)
SODIUM SERPL-SCNC: 138 MMOL/L — SIGNIFICANT CHANGE UP (ref 135–145)
WBC # BLD: 8.18 K/UL — SIGNIFICANT CHANGE UP (ref 3.8–10.5)
WBC # BLD: 8.18 K/UL — SIGNIFICANT CHANGE UP (ref 3.8–10.5)
WBC # FLD AUTO: 8.18 K/UL — SIGNIFICANT CHANGE UP (ref 3.8–10.5)
WBC # FLD AUTO: 8.18 K/UL — SIGNIFICANT CHANGE UP (ref 3.8–10.5)

## 2023-11-21 RX ORDER — METOPROLOL TARTRATE 50 MG
25 TABLET ORAL
Refills: 0 | Status: DISCONTINUED | OUTPATIENT
Start: 2023-11-21 | End: 2023-11-22

## 2023-11-21 RX ORDER — PANTOPRAZOLE SODIUM 20 MG/1
40 TABLET, DELAYED RELEASE ORAL DAILY
Refills: 0 | Status: DISCONTINUED | OUTPATIENT
Start: 2023-11-21 | End: 2023-11-22

## 2023-11-21 RX ORDER — APIXABAN 2.5 MG/1
5 TABLET, FILM COATED ORAL EVERY 12 HOURS
Refills: 0 | Status: DISCONTINUED | OUTPATIENT
Start: 2023-11-21 | End: 2023-11-24

## 2023-11-21 RX ORDER — BUMETANIDE 0.25 MG/ML
2 INJECTION INTRAMUSCULAR; INTRAVENOUS ONCE
Refills: 0 | Status: COMPLETED | OUTPATIENT
Start: 2023-11-21 | End: 2023-11-21

## 2023-11-21 RX ORDER — NICOTINE POLACRILEX 2 MG
1 GUM BUCCAL DAILY
Refills: 0 | Status: DISCONTINUED | OUTPATIENT
Start: 2023-11-21 | End: 2023-11-30

## 2023-11-21 RX ORDER — INSULIN LISPRO 100/ML
VIAL (ML) SUBCUTANEOUS
Refills: 0 | Status: DISCONTINUED | OUTPATIENT
Start: 2023-11-21 | End: 2023-11-30

## 2023-11-21 RX ORDER — ALBUTEROL 90 UG/1
2 AEROSOL, METERED ORAL EVERY 6 HOURS
Refills: 0 | Status: DISCONTINUED | OUTPATIENT
Start: 2023-11-21 | End: 2023-11-24

## 2023-11-21 RX ADMIN — BUMETANIDE 2 MILLIGRAM(S): 0.25 INJECTION INTRAMUSCULAR; INTRAVENOUS at 11:10

## 2023-11-21 RX ADMIN — Medication 105 MILLIGRAM(S): at 06:02

## 2023-11-21 RX ADMIN — Medication 1 TABLET(S): at 11:12

## 2023-11-21 RX ADMIN — ALBUTEROL 2 PUFF(S): 90 AEROSOL, METERED ORAL at 14:31

## 2023-11-21 RX ADMIN — BUDESONIDE AND FORMOTEROL FUMARATE DIHYDRATE 2 PUFF(S): 160; 4.5 AEROSOL RESPIRATORY (INHALATION) at 14:31

## 2023-11-21 RX ADMIN — Medication 12.5 MILLIGRAM(S): at 06:10

## 2023-11-21 RX ADMIN — Medication 105 MILLIGRAM(S): at 14:24

## 2023-11-21 RX ADMIN — Medication 1 PATCH: at 14:23

## 2023-11-21 RX ADMIN — Medication 1 PATCH: at 19:09

## 2023-11-21 RX ADMIN — HEPARIN SODIUM 1100 UNIT(S)/HR: 5000 INJECTION INTRAVENOUS; SUBCUTANEOUS at 06:04

## 2023-11-21 RX ADMIN — Medication 25 MILLIGRAM(S): at 16:06

## 2023-11-21 RX ADMIN — HEPARIN SODIUM 1100 UNIT(S)/HR: 5000 INJECTION INTRAVENOUS; SUBCUTANEOUS at 06:08

## 2023-11-21 RX ADMIN — Medication 81 MILLIGRAM(S): at 11:12

## 2023-11-21 RX ADMIN — CHLORHEXIDINE GLUCONATE 1 APPLICATION(S): 213 SOLUTION TOPICAL at 06:07

## 2023-11-21 RX ADMIN — ALBUTEROL 2 PUFF(S): 90 AEROSOL, METERED ORAL at 06:11

## 2023-11-21 RX ADMIN — BUDESONIDE AND FORMOTEROL FUMARATE DIHYDRATE 2 PUFF(S): 160; 4.5 AEROSOL RESPIRATORY (INHALATION) at 21:28

## 2023-11-21 RX ADMIN — Medication 1: at 17:48

## 2023-11-21 RX ADMIN — Medication 40 MILLIGRAM(S): at 06:10

## 2023-11-21 RX ADMIN — TIOTROPIUM BROMIDE 2 PUFF(S): 18 CAPSULE ORAL; RESPIRATORY (INHALATION) at 16:10

## 2023-11-21 RX ADMIN — APIXABAN 5 MILLIGRAM(S): 2.5 TABLET, FILM COATED ORAL at 17:48

## 2023-11-21 RX ADMIN — Medication 1 MILLIGRAM(S): at 11:12

## 2023-11-21 RX ADMIN — HEPARIN SODIUM 1100 UNIT(S)/HR: 5000 INJECTION INTRAVENOUS; SUBCUTANEOUS at 07:24

## 2023-11-21 RX ADMIN — Medication 105 MILLIGRAM(S): at 21:29

## 2023-11-21 NOTE — SWALLOW BEDSIDE ASSESSMENT ADULT - MODE OF PRESENTATION
x 2/spoon/fed by clinician x 1/fed by clinician 3 oz/straw x 4 (unable to self feed)/cup/fed by clinician x 5/spoon/fed by clinician 4 oz/cup/straw

## 2023-11-21 NOTE — SWALLOW BEDSIDE ASSESSMENT ADULT - SWALLOW EVAL: RECOMMENDED FEEDING/EATING TECHNIQUES
allow for swallow between intakes/alternate food with liquid/crush medication (when feasible)/no straws/oral hygiene/position upright (90 degrees)/small sips/bites

## 2023-11-21 NOTE — PROGRESS NOTE ADULT - SUBJECTIVE AND OBJECTIVE BOX
[facility] NEPHROLOGY- PROGRESS NOTE    Patient is a 69yo Male active smoker with h/o COPD, HLD, DM, HTN, chronic ETOH dependence, cirrhosis presents with SOB. Pt a/w Sepsis 2/2 PNA, COPD,  Acute hypoxic hypercapnic respiratory failure s/p brief intubation, Acute encephalopathy new onset Afib with CORTES. Nephrology consulted for Elevated serum creatinine.  Pt with NSTEMI with reduced EF; concerning for new onset HF (prev EF 50-55% in April 2023 --> now EF 20-25%). Pt pending possible cath once renal function improves  Pt extubated 11/19      Hospital Medications: Medications reviewed.  REVIEW OF SYSTEMS: Unable to obtain; pt altered    VITALS:  T(F): 98.6 (11-21-23 @ 06:00), Max: 99.7 (11-20-23 @ 21:00)  HR: 130 (11-21-23 @ 13:00)  BP: 138/84 (11-21-23 @ 13:00)  RR: 13 (11-21-23 @ 13:00)  SpO2: 97% (11-21-23 @ 13:00)  Wt(kg): --  Height (cm): 172.7 (11-20 @ 16:41)  Weight (kg): 71 (11-18 @ 08:28)  BMI (kg/m2): 23.8 (11-20 @ 16:41)  BSA (m2): 1.84 (11-20 @ 16:41)    11-20 @ 07:01  -  11-21 @ 07:00  --------------------------------------------------------  IN: 632.1 mL / OUT: 680 mL / NET: -47.9 mL    11-21 @ 07:01  -  11-21 @ 14:32  --------------------------------------------------------  IN: 0 mL / OUT: 160 mL / NET: -160 mL      PHYSICAL EXAM:  Gen: NAD, altered   HEENT: anicteric +NGT  Neck: +JVD  Cards: +tachy irreguldar , +S1/S2,   Resp: decreased BS at bases; no wheezing at this time   GI: soft, +mild distention +umbilical hernia   : +randolph   Extremities: no LE edema B/L  Neuro: altered       LABS:  11-21    138  |  108  |  48<H>  ----------------------------<  139<H>  4.5   |  22  |  2.44<H>    Ca    8.7      21 Nov 2023 10:25  Phos  5.0     11-21  Mg     2.0     11-21    TPro  6.8  /  Alb  2.9<L>  /  TBili  0.5  /  DBili      /  AST  15  /  ALT  17  /  AlkPhos  38<L>  11-21    Creatinine Trend: 2.44 <--, 2.72 <--, 2.94 <--, 2.88 <--, 2.27 <--, 1.70 <--, 1.65 <--                        11.0   8.18  )-----------( 100      ( 21 Nov 2023 04:11 )             34.8     Urine Studies:  Urinalysis Basic - ( 21 Nov 2023 10:25 )    Color:  / Appearance:  / SG:  / pH:   Gluc: 139 mg/dL / Ketone:   / Bili:  / Urobili:    Blood:  / Protein:  / Nitrite:    Leuk Esterase:  / RBC:  / WBC    Sq Epi:  / Non Sq Epi:  / Bacteria:       Creatinine, Random Urine: 128 mg/dL (11-21 @ 10:25)  Sodium, Random Urine: 64 mmol/L (11-20 @ 15:15)  Creatinine, Random Urine: 78 mg/dL (11-20 @ 15:15)  Osmolality, Random Urine: 380 mos/kg (11-20 @ 15:15)    RADIOLOGY & ADDITIONAL STUDIES:

## 2023-11-21 NOTE — SWALLOW BEDSIDE ASSESSMENT ADULT - COMMENTS
Pt AA+Ox1, confused (nonsequitur responses), baseline dysarthria, [place]  #[id number]. HOB elevated to 90°. Pt tended to initiate talking while food still present in mouth. Needs reminders & re-direction; frequently distracted. Pt tended to initiate talking while drinking; reduced self-monitoring. Pt AA+Ox1, confused, nonsequitur responses (e.g.  "I have the food ordered, we are all waiting at the restaurant.), restless, baseline dysarthria, [place]  Anat #[id number]. HOB elevated to 90°.

## 2023-11-21 NOTE — SWALLOW BEDSIDE ASSESSMENT ADULT - SWALLOW EVAL: PATIENT/FAMILY GOALS STATEMENT
"I eat everything, I have no problems" Pt asked for vodka with strawberry juice when offered ice chips.

## 2023-11-21 NOTE — PROGRESS NOTE ADULT - ASSESSMENT
68M current smoker with PMHx  COPD, HLD, DM, HTN, chronic ETOH dependence, cirrhosis presents with SOB. Patient was found to be septic likely 2/2 PNA.  Patient continued to be  tachypneic, and  developed New onset Afib on Bipap Patient was intubated for increase WOB and admitted to ICU for further management 2/2 PNA vs COPD exacerbation.        ============ Neuro============================  Extubated, AOx1-2  Baseline AOx3, no functional impairment  #metabolic encephalopathy vs Wernickes encephalopathy  On high dose thiamine  ================= Cardiovascular==========================  # New Onset Afib w/ RVR  -developed Afib w/ rvr HR 160s  -s/p Cardizem 10mg x2  Metoprolol 25 BID    #New onset HFREF 2/2 NSTEMI  #NSTEMI  EKG New(?) LBBB  Elevated Trops, now downtrending  Echo showed newly decreased EF 20-25%, previously 50-55  Started on heparin drip, aspirin 81  Metoprolol 25 BID    # HTN  hx of HTN, non complaint with meds  on amlodipine and losartan at home  Metoprolol 25 BID  ================- Pulm=================================  #AHRF 2/2 Pulmonary edema vs PNA vs COPD exacerbation  2/2 HFREF  s/p Bumex 1mg IV,2mg IV    # PNA  no infiltrates on CT  d/c antibiotics    # COPD  - hx COPD on home 02  - poor airway entry on admission, no wheezing  - s/p solumedrol 125mg  - c/w duonebs  d/c solumedrol 40 IV  - one dose prednisone 20mg 11/22      ==================ID===================================  #Sepsis  WBC 14.55, lactate 2.8  s/p 2.5L   -continue with Rocephin   -d/c azithro  -mycoplasma, legionella pending   -strep -ve  -sputum cx, blood cx      ================= Nephro================================  # ATN  # CORTES  Bun/creatinine 11/1.65<<<33/2.88  worsening CORTES  Granular casts  s/p 2.5L   f/u CT AP  f/u urine lytes    #Lactic Acidosis  s/p 2.5 L bolus  lactate 5.7>2.0  11/20 Lactate 2.2           =================GI====================================  # Umbilical Hernia  -not reproducible, but no sign of obstruction   - no acute intervention recommended by surgery  f/u CT Abdomen    ================ Heme==================================  # No issues     =================Endocrine===============================  # Diabetes  hx of DM, on metformin at home  NPO  Insulin ss Q 6 hrs     ================= Skin/Catheters============================  Peripheral IV lines   Goss catheter       =================Prophylaxis =============================  DVT prophylaxis : heparin Sub Q  GI prophylaxis    68M current smoker with PMHx  COPD, HLD, DM, HTN, chronic ETOH dependence, cirrhosis presents with SOB. Patient was found to be septic likely 2/2 PNA.  Patient continued to be  tachypneic, and  developed New onset Afib on Bipap Patient was intubated for increase WOB and admitted to ICU for further management 2/2 PNA vs COPD exacerbation.        ============ Neuro============================  Extubated, AOx1-2  Baseline AOx3, no functional impairment  #metabolic encephalopathy vs Wernickes encephalopathy  On high dose thiamine  #nictoine withdrawal  Nicotine patch 21mg  ================= Cardiovascular==========================  # New Onset Afib w/ RVR  -developed Afib w/ rvr HR 160s  -s/p Cardizem 10mg x2  Metoprolol 25 BID    #New onset HFREF 2/2 NSTEMI  #NSTEMI  EKG New(?) LBBB  Elevated Trops, now downtrending  Echo showed newly decreased EF 20-25%, previously 50-55  Started on heparin drip, aspirin 81  Metoprolol 25 BID    # HTN  hx of HTN, non complaint with meds  on amlodipine and losartan at home  Metoprolol 25 BID    # Calcified aorta  Syphillis testing  f/u radio  ================- Pulm=================================  #AHRF 2/2 Pulmonary edema vs PNA vs COPD exacerbation  2/2 HFREF  s/p Bumex 1mg IV,2mg IV    # PNA  no infiltrates on CT  d/c antibiotics    # COPD  - hx COPD on home 02  - poor airway entry on admission, no wheezing  - s/p solumedrol 125mg  - c/w duonebs  d/c solumedrol 40 IV  - one dose prednisone 20mg 11/22      ==================ID===================================  #Sepsis  WBC 14.55, lactate 2.8  s/p 2.5L   d/c antibiotics  -mycoplasma-ve, legionella ve, strep -ve  -sputum cx pending  blood NGTD      ================= Nephro================================  # ATN  # CORTES  Bun/creatinine 11/1.65<<<33/2.88  worsening CORTES  Granular casts  s/p 2.5L   CT AP negative for acute findings  feNA 1.6 redo with feUREA in setting of diuretic use    #Lactic Acidosis  s/p 2.5 L bolus  lactate 5.7>2.0  11/20 Lactate 1.4          =================GI====================================  # Umbilical Hernia  -not reproducible, but no sign of obstruction   - no acute intervention recommended by surgery    #PUD  CT AP showed possible duodenitis/ PUD  started on PPI   ================ Heme==================================  #thrombocytopenia  downtrending platelets  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

## 2023-11-21 NOTE — SWALLOW BEDSIDE ASSESSMENT ADULT - ORAL PREPARATORY PHASE
Within functional limits Pt spit out all solid pieces/Reduced oral grading/Decreased mastication ability Reduced oral grading/Decreased mastication ability Reduced oral grading

## 2023-11-21 NOTE — SWALLOW BEDSIDE ASSESSMENT ADULT - CONSISTENCIES ADMINISTERED
cracker thin liquid/mildly thick/pureed/soft & bite-sized thin liquid soft & bite-sized mildly thick pureed

## 2023-11-21 NOTE — PROGRESS NOTE ADULT - ASSESSMENT
68M current smoker with PMHx  COPD, HLD, DM, HTN, chronic ETOH dependence, cirrhosis presents with SOB. Patient was found to be septic likely 2/2 PNA.  Patient continued to be  tachypneic, and  developed New onset Afib on Bipap Patient was intubated for increase WOB and admitted to ICU for further management 2/2 PNA vs COPD exacerbation.        ============ Neuro============================  Extubated, AOx1-2  Baseline AOx3, no functional impairment  #metabolic encephalopathy vs Wernickes encephalopathy  On high dose thiamine  #nictoine withdrawal  Nicotine patch 21mg  ================= Cardiovascular==========================  # New Onset Afib w/ RVR  -developed Afib w/ rvr HR 160s  -s/p Cardizem 10mg x2  Metoprolol 25 BID    #New onset HFREF 2/2 NSTEMI  #NSTEMI  EKG New(?) LBBB  Elevated Trops, now downtrending  Echo showed newly decreased EF 20-25%, previously 50-55  Started on heparin drip, aspirin 81  Metoprolol 25 BID    # HTN  hx of HTN, non complaint with meds  on amlodipine and losartan at home  Metoprolol 25 BID    # Calcified aorta  Syphillis testing  f/u radio  ================- Pulm=================================  #AHRF 2/2 Pulmonary edema vs PNA vs COPD exacerbation  2/2 HFREF  s/p Bumex 1mg IV,2mg IV    # PNA  no infiltrates on CT  d/c antibiotics    # COPD  - hx COPD on home 02  - poor airway entry on admission, no wheezing  - s/p solumedrol 125mg  - c/w duonebs  d/c solumedrol 40 IV  - one dose prednisone 20mg 11/22      ==================ID===================================  #Sepsis  WBC 14.55, lactate 2.8  s/p 2.5L   d/c antibiotics  -mycoplasma-ve, legionella ve, strep -ve  -sputum cx pending  blood NGTD      ================= Nephro================================  # ATN  # CORTES  Bun/creatinine 11/1.65<<<33/2.88  worsening CORTES  Granular casts  s/p 2.5L   CT AP negative for acute findings  feNA 1.6 redo with feUREA in setting of diuretic use    #Lactic Acidosis  s/p 2.5 L bolus  lactate 5.7>2.0  11/20 Lactate 1.4          =================GI====================================  # Umbilical Hernia  -not reproducible, but no sign of obstruction   - no acute intervention recommended by surgery    #PUD  CT AP showed possible duodenitis/ PUD  started on PPI   ================ Heme==================================  #thrombocytopenia  downtrending platelets  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

## 2023-11-21 NOTE — SWALLOW BEDSIDE ASSESSMENT ADULT - SWALLOW EVAL: DIAGNOSIS
Pt p/w s&s mild oropharyngeal dysphagia c/b impaired reception, weak labial seal, decreased bolus formation, slow A-P transport w/ base-of-tongue pumping, prolonged oral bolus holding, oral stasis, delayed swallow trigger, & decreased hyolaryngeal elevation. No overt s/s aspiration on trials.

## 2023-11-21 NOTE — PROGRESS NOTE ADULT - ATTENDING COMMENTS
67yo man current smoker w/ PMH COPD (not on home O2), HTN, DM2, h/o syncope and falls, h/o prior ICU admission requiring intubation for AHRF with hypercapnia and hypoxemia, h/o medication non-compliance, h/o chronic EtOH dependence/daily use c/b liver cirrhosis and portal HTN with EGD (01/2019) showing esophageal varices who presented to ED with dyspnea; found with acute hypercapneic hypoxemic respiratory failure possibly 2/2 AECOPD +/- acute PNA w/ sepsis c/b Afib RVR, failed NIV trial necessitating intubation and admission to ICU for further management.     #Acute hypoxemic and hypercapnic respiratory failure   #NSTEMI  #Afib w/ RVR  #Acute heart failure with reduced EF   #Acute kidney failure   #Lactic acidosis  #Acute delirium   #Dm type 2     Plan:  - Awake this morning, following commands but intermittently unclear speech- mentation is improved compared to yesterday  - Would hold off all sedatives for now  - Close neurologic monitoring  - CT head with out acute pathology  - continuous hemodynamic monitoring  - Concern for possible Ischemic cardiac event, as bump in troponin, new onset afib and new heart failure   - Cont. Heparin infusion for now  - Cardiology consult   - Cont. bumex for diuresis   - Monitor hemodynamics  - Beta blockers for rate control   - bronchodilators with symbicort and spiriva  - Transition to PO steroids   - Empiric abx coverage completed  - trop downtrended  - lactate cleared  - trend BMP and replete electrolytes  - Close glucose monitoring with coverage for hyperglycemia  - strict I/O and UOP monitoring in ATN  - rate control for AFib +/- RVR  - Aspiration precautions  - Obtain swallow evaluation  - Cont. ICU Care 69yo man current smoker w/ PMH COPD (not on home O2), HTN, DM2, h/o syncope and falls, h/o prior ICU admission requiring intubation for AHRF with hypercapnia and hypoxemia, h/o medication non-compliance, h/o chronic EtOH dependence/daily use c/b liver cirrhosis and portal HTN with EGD (01/2019) showing esophageal varices who presented to ED with dyspnea; found with acute hypercapneic hypoxemic respiratory failure possibly 2/2 AECOPD +/- acute PNA w/ sepsis c/b Afib RVR, failed NIV trial necessitating intubation and admission to ICU for further management.     #Acute hypoxemic and hypercapnic respiratory failure   #NSTEMI  #Afib w/ RVR  #Acute heart failure with reduced EF   #Acute kidney failure   #Lactic acidosis  #Acute delirium   #Dm type 2     Plan:  - Awake this morning, following commands but intermittently unclear speech- mentation is improved compared to yesterday  - Would hold off all sedatives for now  - Close neurologic monitoring  - Check RPR  - CT head with out acute pathology  - continuous hemodynamic monitoring  - Concern for possible Ischemic cardiac event, as bump in troponin, new onset afib and new heart failure   - Cont. Heparin infusion for now  - Cardiology consult   - Cont. bumex for diuresis   - Monitor hemodynamics  - Beta blockers for rate control   - bronchodilators with symbicort and spiriva  - Transition to PO steroids   - Empiric abx coverage completed  - trop downtrended  - lactate cleared  - trend BMP and replete electrolytes  - Close glucose monitoring with coverage for hyperglycemia  - strict I/O and UOP monitoring in ATN  - rate control for AFib +/- RVR  - Aspiration precautions  - Obtain swallow evaluation  - Cont. ICU Care

## 2023-11-21 NOTE — PROGRESS NOTE ADULT - SUBJECTIVE AND OBJECTIVE BOX
INTERVAL HPI/OVERNIGHT EVENTS:  Seen and examined at bedside.    PRESSORS: [  ] YES [ X ] NO  WHICH:    Antimicrobial:    Cardiovascular:  metoprolol tartrate 25 milliGRAM(s) Oral two times a day    Pulmonary:  albuterol    90 MICROgram(s) HFA Inhaler 2 Puff(s) Inhalation every 6 hours PRN  budesonide 160 MICROgram(s)/formoterol 4.5 MICROgram(s) Inhaler 2 Puff(s) Inhalation two times a day  tiotropium 2.5 MICROgram(s) Inhaler 2 Puff(s) Inhalation daily    Hematalogic:  apixaban 5 milliGRAM(s) Oral every 12 hours  aspirin  chewable 81 milliGRAM(s) Oral daily    Other:  chlorhexidine 2% Cloths 1 Application(s) Topical <User Schedule>  folic acid 1 milliGRAM(s) Oral daily  insulin lispro (ADMELOG) corrective regimen sliding scale   SubCutaneous every 6 hours  multivitamin 1 Tablet(s) Oral daily  nicotine - 21 mG/24Hr(s) Patch 1 Patch Transdermal daily  senna 2 Tablet(s) Oral at bedtime  thiamine IVPB 500 milliGRAM(s) IV Intermittent every 8 hours    albuterol    90 MICROgram(s) HFA Inhaler 2 Puff(s) Inhalation every 6 hours PRN  apixaban 5 milliGRAM(s) Oral every 12 hours  aspirin  chewable 81 milliGRAM(s) Oral daily  budesonide 160 MICROgram(s)/formoterol 4.5 MICROgram(s) Inhaler 2 Puff(s) Inhalation two times a day  chlorhexidine 2% Cloths 1 Application(s) Topical <User Schedule>  folic acid 1 milliGRAM(s) Oral daily  insulin lispro (ADMELOG) corrective regimen sliding scale   SubCutaneous every 6 hours  metoprolol tartrate 25 milliGRAM(s) Oral two times a day  multivitamin 1 Tablet(s) Oral daily  nicotine - 21 mG/24Hr(s) Patch 1 Patch Transdermal daily  senna 2 Tablet(s) Oral at bedtime  thiamine IVPB 500 milliGRAM(s) IV Intermittent every 8 hours  tiotropium 2.5 MICROgram(s) Inhaler 2 Puff(s) Inhalation daily    Drug Dosing Weight  Height (cm): 172.7 (20 Nov 2023 16:41)  Weight (kg): 71 (18 Nov 2023 08:28)  BMI (kg/m2): 23.8 (20 Nov 2023 16:41)  BSA (m2): 1.84 (20 Nov 2023 16:41)    CENTRAL LINE: [ ] YES [ ] NO  LOCATION:   DATE INSERTED:  REMOVE: [ ] YES [ ] NO  EXPLAIN:    MUÑOZ: [ ] YES [ ] NO    DATE INSERTED:  REMOVE:  [ ] YES [ ] NO  EXPLAIN:    A-LINE:  [ ] YES [ ] NO  LOCATION:   DATE INSERTED:  REMOVE:  [ ] YES [ ] NO  EXPLAIN:    PMH -reviewed admission note, no change since admission  PAST MEDICAL & SURGICAL HISTORY:  HTN (hypertension)      Varicose veins      HLD (hyperlipidemia)      Liver cirrhosis      Portal hypertension with esophageal varices      COPD (chronic obstructive pulmonary disease)      Syncope      Alcohol dependence, daily use      Smokes tobacco daily      DM (diabetes mellitus)      No significant past surgical history          ICU Vital Signs Last 24 Hrs  T(C): 37 (21 Nov 2023 06:00), Max: 37.6 (20 Nov 2023 21:00)  T(F): 98.6 (21 Nov 2023 06:00), Max: 99.7 (20 Nov 2023 21:00)  HR: 120 (21 Nov 2023 11:34) (101 - 131)  BP: 131/93 (21 Nov 2023 11:00) (103/70 - 161/98)  BP(mean): 103 (21 Nov 2023 11:00) (82 - 113)  ABP: --  ABP(mean): --  RR: 16 (21 Nov 2023 11:00) (16 - 26)  SpO2: 99% (21 Nov 2023 11:34) (95% - 100%)    O2 Parameters below as of 21 Nov 2023 06:00  Patient On (Oxygen Delivery Method): nasal cannula  O2 Flow (L/min): 2          ABG - ( 19 Nov 2023 16:47 )  pH, Arterial: 7.29  pH, Blood: x     /  pCO2: 37    /  pO2: 127   / HCO3: 18    / Base Excess: -8.1  /  SaO2: 100                   11-20 @ 07:01  -  11-21 @ 07:00  --------------------------------------------------------  IN: 632.1 mL / OUT: 680 mL / NET: -47.9 mL            PHYSICAL EXAM:    GENERAL: NAD  HEAD:  Atraumatic, Normocephalic  NERVOUS SYSTEM:  Alert & Oriented X1-2  CHEST/LUNG: Left sided upper to middle lung field crackles  HEART: Regular rate and rhythm; No murmurs, rubs, or gallops  ABDOMEN: Soft, Nontender, Umbilical hernia, non tender  EXTREMITIES: No clubbing, cyanosis, or edema        LABS:  CBC Full  -  ( 21 Nov 2023 04:11 )  WBC Count : 8.18 K/uL  RBC Count : 3.51 M/uL  Hemoglobin : 11.0 g/dL  Hematocrit : 34.8 %  Platelet Count - Automated : 100 K/uL  Mean Cell Volume : 99.1 fl  Mean Cell Hemoglobin : 31.3 pg  Mean Cell Hemoglobin Concentration : 31.6 gm/dL  Auto Neutrophil # : 6.98 K/uL  Auto Lymphocyte # : 0.58 K/uL  Auto Monocyte # : 0.55 K/uL  Auto Eosinophil # : 0.01 K/uL  Auto Basophil # : 0.01 K/uL  Auto Neutrophil % : 85.4 %  Auto Lymphocyte % : 7.1 %  Auto Monocyte % : 6.7 %  Auto Eosinophil % : 0.1 %  Auto Basophil % : 0.1 %    11-21    138  |  108  |  48<H>  ----------------------------<  139<H>  4.5   |  22  |  2.44<H>    Ca    8.7      21 Nov 2023 10:25  Phos  5.0     11-21  Mg     2.0     11-21    TPro  6.8  /  Alb  2.9<L>  /  TBili  0.5  /  DBili  x   /  AST  15  /  ALT  17  /  AlkPhos  38<L>  11-21    PT/INR - ( 19 Nov 2023 16:41 )   PT: 11.5 sec;   INR: 1.01 ratio         PTT - ( 21 Nov 2023 05:13 )  PTT:63.3 sec  Urinalysis Basic - ( 21 Nov 2023 10:25 )    Color: x / Appearance: x / SG: x / pH: x  Gluc: 139 mg/dL / Ketone: x  / Bili: x / Urobili: x   Blood: x / Protein: x / Nitrite: x   Leuk Esterase: x / RBC: x / WBC x   Sq Epi: x / Non Sq Epi: x / Bacteria: x          RADIOLOGY & ADDITIONAL STUDIES REVIEWED:  ***    [ ]GOALS OF CARE DISCUSSION WITH PATIENT/FAMILY/PROXY:    CRITICAL CARE TIME SPENT: 35 minutes

## 2023-11-21 NOTE — PROGRESS NOTE ADULT - SUBJECTIVE AND OBJECTIVE BOX
PATIENT SEEN AND EXAMINED BY EDGARDO DRISCOLL M.D. ON :- 11/21/23  DATE OF SERVICE:     11/21/23        Interim events noted,Labs ,Radiological studies and Cardiology tests reviewed .  DISCUSSED WITH ACP/MEDICAL RESIDENT ON PLAN OF CARE.    MR#[id number]  PATIENT NAME:[facility] COURSE: HPI:  68M current smoker with PMHx  COPD, HLD, DM, HTN, chronic ETOH dependence, cirrhosis presents with SOB. Patient is a poor historian and states he feels SOB but unable to provide further details. Patient denies fever, chills, cough, chest pain, palpitations, abdominal pain, or change in bowel habits.     In the ED:  Afebrile, HR 160s. /94, RR 36, 100% on NRB  WBC 20, lactate 5.7  CXR b/l lung markings  Trop 640, BNP 4K  S/p Cardizem 10mg x2, solumedrol 125mg, 500cc bolus  (18 Nov 2023 10:37)      INTERIM EVENTS:Patient seen at bedside ,interim events noted.      [facility] -reviewed admission note, no change since admission  HEART FAILURE: Acute[ ]Chronic[ ] Systolic[ ] Diastolic[ ] Combined Systolic and Diastolic[ ]  CAD[ ] CABG[ ] PCI[ ]  DEVICES[ ] PPM[ ] ICD[ ] ILR[ ]  ATRIAL FIBRILLATION[ ] Paroxysmal[ ] Permanent[ ] CHADS2-[  ]  CORTES[ ] CKD1[ ] CKD2[ ] CKD3[ ] CKD4[ ] ESRD[ ]  COPD[ ] HTN[ ]   DM[ ] Type1[ ] Type 2[ ]   CVA[ ] Paresis[ ]    AMBULATION: Assisted[ ] Cane/walker[ ] Independent[ ]    MEDICATIONS  (STANDING):  apixaban 5 milliGRAM(s) Oral every 12 hours  aspirin  chewable 81 milliGRAM(s) Oral daily  budesonide 160 MICROgram(s)/formoterol 4.5 MICROgram(s) Inhaler 2 Puff(s) Inhalation two times a day  chlorhexidine 2% Cloths 1 Application(s) Topical <User Schedule>  folic acid 1 milliGRAM(s) Oral daily  insulin lispro (ADMELOG) corrective regimen sliding scale   SubCutaneous Before meals and at bedtime  metoprolol tartrate 25 milliGRAM(s) Oral two times a day  multivitamin 1 Tablet(s) Oral daily  nicotine - 21 mG/24Hr(s) Patch 1 Patch Transdermal daily  pantoprazole  Injectable 40 milliGRAM(s) IV Push daily  senna 2 Tablet(s) Oral at bedtime  thiamine IVPB 500 milliGRAM(s) IV Intermittent every 8 hours  tiotropium 2.5 MICROgram(s) Inhaler 2 Puff(s) Inhalation daily    MEDICATIONS  (PRN):  albuterol    90 MICROgram(s) HFA Inhaler 2 Puff(s) Inhalation every 6 hours PRN Shortness of Breath and/or Wheezing            REVIEW OF SYSTEMS:  Constitutional: [ ] fever, [ ]weight loss,  [ ]fatigue [ ]weight gain  Eyes: [ ] visual changes  Respiratory: [ ]shortness of breath;  [ ] cough, [ ]wheezing, [ ]chills, [ ]hemoptysis  Cardiovascular: [ ] chest pain, [ ]palpitations, [ ]dizziness,  [ ]leg swelling[ ]orthopnea[ ]PND  Gastrointestinal: [ ] abdominal pain, [ ]nausea, [ ]vomiting,  [ ]diarrhea [ ]Constipation [ ]Melena  Genitourinary: [ ] dysuria, [ ] hematuria [ ]Goss  Neurologic: [ ] headaches [ ] tremors[ ]weakness [ ]Paralysis Right[ ] Left[ ]  Skin: [ ] itching, [ ]burning, [ ] rashes  Endocrine: [ ] heat or cold intolerance  Musculoskeletal: [ ] joint pain or swelling; [ ] muscle, back, or extremity pain  Psychiatric: [ ] depression, [ ]anxiety, [ ]mood swings, or [ ]difficulty sleeping  Hematologic: [ ] easy bruising, [ ] bleeding gums    [ ] All remaining systems negative except as per above.   [ ]Unable to obtain.  [x] No change in ROS since admission      Vital Signs Last 24 Hrs  T(C): 36.4 (21 Nov 2023 16:53), Max: 37.5 (20 Nov 2023 22:00)  T(F): 97.6 (21 Nov 2023 16:53), Max: 99.5 (20 Nov 2023 22:00)  HR: 121 (21 Nov 2023 21:00) (113 - 143)  BP: 126/73 (21 Nov 2023 21:00) (118/68 - 159/107)  BP(mean): 89 (21 Nov 2023 21:00) (83 - 116)  RR: 17 (21 Nov 2023 21:00) (13 - 26)  SpO2: 96% (21 Nov 2023 21:00) (95% - 100%)    Parameters below as of 21 Nov 2023 19:00  Patient On (Oxygen Delivery Method): room air      I&O's Summary    20 Nov 2023 07:01  -  21 Nov 2023 07:00  --------------------------------------------------------  IN: 632.1 mL / OUT: 680 mL / NET: -47.9 mL    21 Nov 2023 07:01  -  21 Nov 2023 21:50  --------------------------------------------------------  IN: 100 mL / OUT: 1005 mL / NET: -905 mL        PHYSICAL EXAM:  General: No acute distress BMI-  HEENT: EOMI, PERRL  Neck: Supple, [ ] JVD  Lungs: Equal air entry bilaterally; [ ] rales [ ] wheezing [ ] rhonchi  Heart: Regular rate and rhythm; [x ] murmur   2/6 [ x] systolic [ ] diastolic [ ] radiation[ ] rubs [ ]  gallops  Abdomen: Nontender, bowel sounds present  Extremities: No clubbing, cyanosis, [ ] edema [ ]Pulses  equal and intact  Nervous system:  Alert & Oriented X3, no focal deficits  Psychiatric: Normal affect  Skin: No rashes or lesions    LABS:  11-21    138  |  108  |  48<H>  ----------------------------<  139<H>  4.5   |  22  |  2.44<H>    Ca    8.7      21 Nov 2023 10:25  Phos  5.0     11-21  Mg     2.0     11-21    TPro  6.8  /  Alb  2.9<L>  /  TBili  0.5  /  DBili  x   /  AST  15  /  ALT  17  /  AlkPhos  38<L>  11-21    Creatinine Trend: 2.44<--, 2.72<--, 2.94<--, 2.88<--, 2.27<--, 1.70<--                        11.0   8.18  )-----------( 100      ( 21 Nov 2023 04:11 )             34.8     PTT - ( 21 Nov 2023 05:13 )  PTT:63.3 sec

## 2023-11-21 NOTE — PROGRESS NOTE ADULT - ASSESSMENT
Patient is a 69yo Male active smoker with h/o COPD, HLD, DM, HTN, chronic ETOH dependence, cirrhosis presents with SOB. Pt a/w Sepsis 2/2 PNA, COPD,  Acute hypoxic hypercapnic respiratory failure s/p brief intubation, Acute encephalopathy new onset Afib with CORTES. Nephrology consulted for Elevated serum creatinine.  Pt with NSTEMI with reduced EF; concerning for new onset HF (prev EF 50-55% in April 2023 --> now EF 20-25%). Pt pending possible cath once renal function improves  Pt extubated 11/19    1 CORTES- previous SCr 0.8-1 in April 2023. CORTES likely ATN. UA with 30 protein; large blood ?traumatic randolph with A/C use. FeNa 1.75%; inconclusive. Check FeUrea.  Rate of rise of SCr decreasing; renal function plateaued and now improving.  Recc Bumex 1mg PO bid. Monitor urine o/p.   Ct abd/pelvis with no hydro. Strict I/Os. Avoid nephrotoxins/ NSAIDs/ RCA. Monitor BMP.    2. Acute encephalopathy- CT head neg. Ammonia level <10. Plan as per ICU    3. Acute CHF- TTE with severe global LV systolic dysfunction; EF 20-25%. Cardiology following; Hold off on cath until renal function improves further.     4. COPD exacerbation- CT chest with no PNA for which abx was d/c. Bronchodilators/ Steroids as per ICU.     [facility] NEPHROLOGY  Iggy Hernandez M.D.  Richard Cook D.O.  Bárbara Slaughter M.D.  MD Breanna Schulte, MSN, ANP-C    Telephone: [phone]  Facsimile: [phone] [place] [address], [address]  [place] [address]

## 2023-11-21 NOTE — SWALLOW BEDSIDE ASSESSMENT ADULT - SLP PERTINENT HISTORY OF CURRENT PROBLEM
68M current smoker with PMHx  COPD, HLD, DM, HTN, chronic ETOH dependence, cirrhosis presents with SOB. Patient was found to be septic likely 2/2 PNA.  Patient continued to be  tachypneic, and  developed New onset Afib on Bipap Patient was intubated for increase WOB and admitted to ICU for further management 2/2 PNA vs COPD exacerbation.

## 2023-11-22 LAB
A1C WITH ESTIMATED AVERAGE GLUCOSE RESULT: 4.9 % — SIGNIFICANT CHANGE UP (ref 4–5.6)
A1C WITH ESTIMATED AVERAGE GLUCOSE RESULT: 4.9 % — SIGNIFICANT CHANGE UP (ref 4–5.6)
ALBUMIN SERPL ELPH-MCNC: 3 G/DL — LOW (ref 3.5–5)
ALBUMIN SERPL ELPH-MCNC: 3 G/DL — LOW (ref 3.5–5)
ALP SERPL-CCNC: 40 U/L — SIGNIFICANT CHANGE UP (ref 40–120)
ALP SERPL-CCNC: 40 U/L — SIGNIFICANT CHANGE UP (ref 40–120)
ALT FLD-CCNC: 16 U/L DA — SIGNIFICANT CHANGE UP (ref 10–60)
ALT FLD-CCNC: 16 U/L DA — SIGNIFICANT CHANGE UP (ref 10–60)
ANION GAP SERPL CALC-SCNC: 6 MMOL/L — SIGNIFICANT CHANGE UP (ref 5–17)
ANION GAP SERPL CALC-SCNC: 6 MMOL/L — SIGNIFICANT CHANGE UP (ref 5–17)
APTT BLD: 29.6 SEC — SIGNIFICANT CHANGE UP (ref 24.5–35.6)
APTT BLD: 29.6 SEC — SIGNIFICANT CHANGE UP (ref 24.5–35.6)
AST SERPL-CCNC: 16 U/L — SIGNIFICANT CHANGE UP (ref 10–40)
AST SERPL-CCNC: 16 U/L — SIGNIFICANT CHANGE UP (ref 10–40)
BASOPHILS # BLD AUTO: 0 K/UL — SIGNIFICANT CHANGE UP (ref 0–0.2)
BASOPHILS # BLD AUTO: 0 K/UL — SIGNIFICANT CHANGE UP (ref 0–0.2)
BASOPHILS NFR BLD AUTO: 0 % — SIGNIFICANT CHANGE UP (ref 0–2)
BASOPHILS NFR BLD AUTO: 0 % — SIGNIFICANT CHANGE UP (ref 0–2)
BILIRUB SERPL-MCNC: 0.5 MG/DL — SIGNIFICANT CHANGE UP (ref 0.2–1.2)
BILIRUB SERPL-MCNC: 0.5 MG/DL — SIGNIFICANT CHANGE UP (ref 0.2–1.2)
BUN SERPL-MCNC: 51 MG/DL — HIGH (ref 7–18)
BUN SERPL-MCNC: 51 MG/DL — HIGH (ref 7–18)
CALCIUM SERPL-MCNC: 8.9 MG/DL — SIGNIFICANT CHANGE UP (ref 8.4–10.5)
CALCIUM SERPL-MCNC: 8.9 MG/DL — SIGNIFICANT CHANGE UP (ref 8.4–10.5)
CHLORIDE SERPL-SCNC: 109 MMOL/L — HIGH (ref 96–108)
CHLORIDE SERPL-SCNC: 109 MMOL/L — HIGH (ref 96–108)
CHOLEST SERPL-MCNC: 173 MG/DL — SIGNIFICANT CHANGE UP
CHOLEST SERPL-MCNC: 173 MG/DL — SIGNIFICANT CHANGE UP
CO2 SERPL-SCNC: 24 MMOL/L — SIGNIFICANT CHANGE UP (ref 22–31)
CO2 SERPL-SCNC: 24 MMOL/L — SIGNIFICANT CHANGE UP (ref 22–31)
CREAT ?TM UR-MCNC: 128 MG/DL — SIGNIFICANT CHANGE UP
CREAT ?TM UR-MCNC: 128 MG/DL — SIGNIFICANT CHANGE UP
CREAT SERPL-MCNC: 1.96 MG/DL — HIGH (ref 0.5–1.3)
CREAT SERPL-MCNC: 1.96 MG/DL — HIGH (ref 0.5–1.3)
EGFR: 36 ML/MIN/1.73M2 — LOW
EGFR: 36 ML/MIN/1.73M2 — LOW
EOSINOPHIL # BLD AUTO: 0.03 K/UL — SIGNIFICANT CHANGE UP (ref 0–0.5)
EOSINOPHIL # BLD AUTO: 0.03 K/UL — SIGNIFICANT CHANGE UP (ref 0–0.5)
EOSINOPHIL NFR BLD AUTO: 0.3 % — SIGNIFICANT CHANGE UP (ref 0–6)
EOSINOPHIL NFR BLD AUTO: 0.3 % — SIGNIFICANT CHANGE UP (ref 0–6)
ESTIMATED AVERAGE GLUCOSE: 94 MG/DL — SIGNIFICANT CHANGE UP (ref 68–114)
ESTIMATED AVERAGE GLUCOSE: 94 MG/DL — SIGNIFICANT CHANGE UP (ref 68–114)
GLUCOSE BLDC GLUCOMTR-MCNC: 108 MG/DL — HIGH (ref 70–99)
GLUCOSE BLDC GLUCOMTR-MCNC: 108 MG/DL — HIGH (ref 70–99)
GLUCOSE BLDC GLUCOMTR-MCNC: 150 MG/DL — HIGH (ref 70–99)
GLUCOSE BLDC GLUCOMTR-MCNC: 150 MG/DL — HIGH (ref 70–99)
GLUCOSE BLDC GLUCOMTR-MCNC: 92 MG/DL — SIGNIFICANT CHANGE UP (ref 70–99)
GLUCOSE BLDC GLUCOMTR-MCNC: 92 MG/DL — SIGNIFICANT CHANGE UP (ref 70–99)
GLUCOSE SERPL-MCNC: 105 MG/DL — HIGH (ref 70–99)
GLUCOSE SERPL-MCNC: 105 MG/DL — HIGH (ref 70–99)
HCT VFR BLD CALC: 33.6 % — LOW (ref 39–50)
HCT VFR BLD CALC: 33.6 % — LOW (ref 39–50)
HDLC SERPL-MCNC: 44 MG/DL — SIGNIFICANT CHANGE UP
HDLC SERPL-MCNC: 44 MG/DL — SIGNIFICANT CHANGE UP
HEPARIN-PF4 AB RESULT: <0.6 U/ML — SIGNIFICANT CHANGE UP (ref 0–0.9)
HEPARIN-PF4 AB RESULT: <0.6 U/ML — SIGNIFICANT CHANGE UP (ref 0–0.9)
HGB BLD-MCNC: 11.1 G/DL — LOW (ref 13–17)
HGB BLD-MCNC: 11.1 G/DL — LOW (ref 13–17)
IMM GRANULOCYTES NFR BLD AUTO: 0.7 % — SIGNIFICANT CHANGE UP (ref 0–0.9)
IMM GRANULOCYTES NFR BLD AUTO: 0.7 % — SIGNIFICANT CHANGE UP (ref 0–0.9)
LIPID PNL WITH DIRECT LDL SERPL: 93 MG/DL — SIGNIFICANT CHANGE UP
LIPID PNL WITH DIRECT LDL SERPL: 93 MG/DL — SIGNIFICANT CHANGE UP
LYMPHOCYTES # BLD AUTO: 1.23 K/UL — SIGNIFICANT CHANGE UP (ref 1–3.3)
LYMPHOCYTES # BLD AUTO: 1.23 K/UL — SIGNIFICANT CHANGE UP (ref 1–3.3)
LYMPHOCYTES # BLD AUTO: 14.3 % — SIGNIFICANT CHANGE UP (ref 13–44)
LYMPHOCYTES # BLD AUTO: 14.3 % — SIGNIFICANT CHANGE UP (ref 13–44)
MAGNESIUM SERPL-MCNC: 2.2 MG/DL — SIGNIFICANT CHANGE UP (ref 1.6–2.6)
MAGNESIUM SERPL-MCNC: 2.2 MG/DL — SIGNIFICANT CHANGE UP (ref 1.6–2.6)
MCHC RBC-ENTMCNC: 31.3 PG — SIGNIFICANT CHANGE UP (ref 27–34)
MCHC RBC-ENTMCNC: 31.3 PG — SIGNIFICANT CHANGE UP (ref 27–34)
MCHC RBC-ENTMCNC: 33 GM/DL — SIGNIFICANT CHANGE UP (ref 32–36)
MCHC RBC-ENTMCNC: 33 GM/DL — SIGNIFICANT CHANGE UP (ref 32–36)
MCV RBC AUTO: 94.6 FL — SIGNIFICANT CHANGE UP (ref 80–100)
MCV RBC AUTO: 94.6 FL — SIGNIFICANT CHANGE UP (ref 80–100)
MONOCYTES # BLD AUTO: 0.94 K/UL — HIGH (ref 0–0.9)
MONOCYTES # BLD AUTO: 0.94 K/UL — HIGH (ref 0–0.9)
MONOCYTES NFR BLD AUTO: 10.9 % — SIGNIFICANT CHANGE UP (ref 2–14)
MONOCYTES NFR BLD AUTO: 10.9 % — SIGNIFICANT CHANGE UP (ref 2–14)
NEUTROPHILS # BLD AUTO: 6.33 K/UL — SIGNIFICANT CHANGE UP (ref 1.8–7.4)
NEUTROPHILS # BLD AUTO: 6.33 K/UL — SIGNIFICANT CHANGE UP (ref 1.8–7.4)
NEUTROPHILS NFR BLD AUTO: 73.8 % — SIGNIFICANT CHANGE UP (ref 43–77)
NEUTROPHILS NFR BLD AUTO: 73.8 % — SIGNIFICANT CHANGE UP (ref 43–77)
NON HDL CHOLESTEROL: 129 MG/DL — SIGNIFICANT CHANGE UP
NON HDL CHOLESTEROL: 129 MG/DL — SIGNIFICANT CHANGE UP
NRBC # BLD: 0 /100 WBCS — SIGNIFICANT CHANGE UP (ref 0–0)
NRBC # BLD: 0 /100 WBCS — SIGNIFICANT CHANGE UP (ref 0–0)
PF4 HEPARIN CMPLX AB SER-ACNC: NEGATIVE — SIGNIFICANT CHANGE UP
PF4 HEPARIN CMPLX AB SER-ACNC: NEGATIVE — SIGNIFICANT CHANGE UP
PHOSPHATE SERPL-MCNC: 3.3 MG/DL — SIGNIFICANT CHANGE UP (ref 2.5–4.5)
PHOSPHATE SERPL-MCNC: 3.3 MG/DL — SIGNIFICANT CHANGE UP (ref 2.5–4.5)
PLATELET # BLD AUTO: 116 K/UL — LOW (ref 150–400)
PLATELET # BLD AUTO: 116 K/UL — LOW (ref 150–400)
POTASSIUM SERPL-MCNC: 3.7 MMOL/L — SIGNIFICANT CHANGE UP (ref 3.5–5.3)
POTASSIUM SERPL-MCNC: 3.7 MMOL/L — SIGNIFICANT CHANGE UP (ref 3.5–5.3)
POTASSIUM SERPL-SCNC: 3.7 MMOL/L — SIGNIFICANT CHANGE UP (ref 3.5–5.3)
POTASSIUM SERPL-SCNC: 3.7 MMOL/L — SIGNIFICANT CHANGE UP (ref 3.5–5.3)
PROT SERPL-MCNC: 6.8 G/DL — SIGNIFICANT CHANGE UP (ref 6–8.3)
PROT SERPL-MCNC: 6.8 G/DL — SIGNIFICANT CHANGE UP (ref 6–8.3)
RBC # BLD: 3.55 M/UL — LOW (ref 4.2–5.8)
RBC # BLD: 3.55 M/UL — LOW (ref 4.2–5.8)
RBC # FLD: 17.5 % — HIGH (ref 10.3–14.5)
RBC # FLD: 17.5 % — HIGH (ref 10.3–14.5)
SODIUM SERPL-SCNC: 139 MMOL/L — SIGNIFICANT CHANGE UP (ref 135–145)
SODIUM SERPL-SCNC: 139 MMOL/L — SIGNIFICANT CHANGE UP (ref 135–145)
SODIUM UR-SCNC: 21 MMOL/L — SIGNIFICANT CHANGE UP
SODIUM UR-SCNC: 21 MMOL/L — SIGNIFICANT CHANGE UP
T PALLIDUM AB TITR SER: NEGATIVE — SIGNIFICANT CHANGE UP
T PALLIDUM AB TITR SER: NEGATIVE — SIGNIFICANT CHANGE UP
TRIGL SERPL-MCNC: 178 MG/DL — HIGH
TRIGL SERPL-MCNC: 178 MG/DL — HIGH
UUN UR-MCNC: 1014 MG/DL — SIGNIFICANT CHANGE UP
UUN UR-MCNC: 1014 MG/DL — SIGNIFICANT CHANGE UP
UUN UR-MCNC: 828 MG/DL — SIGNIFICANT CHANGE UP
UUN UR-MCNC: 828 MG/DL — SIGNIFICANT CHANGE UP
WBC # BLD: 8.59 K/UL — SIGNIFICANT CHANGE UP (ref 3.8–10.5)
WBC # BLD: 8.59 K/UL — SIGNIFICANT CHANGE UP (ref 3.8–10.5)
WBC # FLD AUTO: 8.59 K/UL — SIGNIFICANT CHANGE UP (ref 3.8–10.5)
WBC # FLD AUTO: 8.59 K/UL — SIGNIFICANT CHANGE UP (ref 3.8–10.5)

## 2023-11-22 PROCEDURE — 76775 US EXAM ABDO BACK WALL LIM: CPT | Mod: 26

## 2023-11-22 RX ORDER — METOPROLOL TARTRATE 50 MG
50 TABLET ORAL
Refills: 0 | Status: DISCONTINUED | OUTPATIENT
Start: 2023-11-22 | End: 2023-11-22

## 2023-11-22 RX ORDER — METOPROLOL TARTRATE 50 MG
50 TABLET ORAL THREE TIMES A DAY
Refills: 0 | Status: DISCONTINUED | OUTPATIENT
Start: 2023-11-22 | End: 2023-11-23

## 2023-11-22 RX ORDER — PANTOPRAZOLE SODIUM 20 MG/1
40 TABLET, DELAYED RELEASE ORAL
Refills: 0 | Status: DISCONTINUED | OUTPATIENT
Start: 2023-11-22 | End: 2023-11-25

## 2023-11-22 RX ORDER — BUMETANIDE 0.25 MG/ML
2 INJECTION INTRAMUSCULAR; INTRAVENOUS DAILY
Refills: 0 | Status: DISCONTINUED | OUTPATIENT
Start: 2023-11-22 | End: 2023-11-23

## 2023-11-22 RX ORDER — QUETIAPINE FUMARATE 200 MG/1
25 TABLET, FILM COATED ORAL DAILY
Refills: 0 | Status: DISCONTINUED | OUTPATIENT
Start: 2023-11-22 | End: 2023-11-23

## 2023-11-22 RX ORDER — METOPROLOL TARTRATE 50 MG
5 TABLET ORAL ONCE
Refills: 0 | Status: COMPLETED | OUTPATIENT
Start: 2023-11-22 | End: 2023-11-22

## 2023-11-22 RX ORDER — OLANZAPINE 15 MG/1
2.5 TABLET, FILM COATED ORAL ONCE
Refills: 0 | Status: COMPLETED | OUTPATIENT
Start: 2023-11-22 | End: 2023-11-22

## 2023-11-22 RX ORDER — OLANZAPINE 15 MG/1
2.5 TABLET, FILM COATED ORAL ONCE
Refills: 0 | Status: DISCONTINUED | OUTPATIENT
Start: 2023-11-22 | End: 2023-11-22

## 2023-11-22 RX ORDER — METOPROLOL TARTRATE 50 MG
25 TABLET ORAL ONCE
Refills: 0 | Status: COMPLETED | OUTPATIENT
Start: 2023-11-22 | End: 2023-11-22

## 2023-11-22 RX ORDER — IPRATROPIUM/ALBUTEROL SULFATE 18-103MCG
3 AEROSOL WITH ADAPTER (GRAM) INHALATION ONCE
Refills: 0 | Status: DISCONTINUED | OUTPATIENT
Start: 2023-11-22 | End: 2023-11-22

## 2023-11-22 RX ADMIN — Medication 5 MILLIGRAM(S): at 08:21

## 2023-11-22 RX ADMIN — Medication 5 MILLIGRAM(S): at 14:35

## 2023-11-22 RX ADMIN — Medication 1 PATCH: at 18:41

## 2023-11-22 RX ADMIN — OLANZAPINE 2.5 MILLIGRAM(S): 15 TABLET, FILM COATED ORAL at 17:11

## 2023-11-22 RX ADMIN — ALBUTEROL 2 PUFF(S): 90 AEROSOL, METERED ORAL at 08:21

## 2023-11-22 RX ADMIN — Medication 1 PATCH: at 11:34

## 2023-11-22 RX ADMIN — TIOTROPIUM BROMIDE 2 PUFF(S): 18 CAPSULE ORAL; RESPIRATORY (INHALATION) at 11:08

## 2023-11-22 RX ADMIN — Medication 105 MILLIGRAM(S): at 05:24

## 2023-11-22 RX ADMIN — Medication 1 TABLET(S): at 11:07

## 2023-11-22 RX ADMIN — BUDESONIDE AND FORMOTEROL FUMARATE DIHYDRATE 2 PUFF(S): 160; 4.5 AEROSOL RESPIRATORY (INHALATION) at 09:59

## 2023-11-22 RX ADMIN — Medication 25 MILLIGRAM(S): at 08:22

## 2023-11-22 RX ADMIN — APIXABAN 5 MILLIGRAM(S): 2.5 TABLET, FILM COATED ORAL at 05:24

## 2023-11-22 RX ADMIN — Medication 1 PATCH: at 11:08

## 2023-11-22 RX ADMIN — Medication 1 PATCH: at 13:00

## 2023-11-22 RX ADMIN — Medication 105 MILLIGRAM(S): at 13:58

## 2023-11-22 RX ADMIN — Medication 25 MILLIGRAM(S): at 06:30

## 2023-11-22 RX ADMIN — Medication 40 MILLIGRAM(S): at 06:30

## 2023-11-22 RX ADMIN — Medication 105 MILLIGRAM(S): at 22:05

## 2023-11-22 RX ADMIN — Medication 5 MILLIGRAM(S): at 13:02

## 2023-11-22 RX ADMIN — Medication 81 MILLIGRAM(S): at 11:07

## 2023-11-22 RX ADMIN — Medication 5 MILLIGRAM(S): at 15:46

## 2023-11-22 RX ADMIN — Medication 1 MILLIGRAM(S): at 11:07

## 2023-11-22 RX ADMIN — Medication 5 MILLIGRAM(S): at 22:08

## 2023-11-22 RX ADMIN — BUDESONIDE AND FORMOTEROL FUMARATE DIHYDRATE 2 PUFF(S): 160; 4.5 AEROSOL RESPIRATORY (INHALATION) at 22:05

## 2023-11-22 RX ADMIN — Medication 5 MILLIGRAM(S): at 02:14

## 2023-11-22 RX ADMIN — CHLORHEXIDINE GLUCONATE 1 APPLICATION(S): 213 SOLUTION TOPICAL at 06:29

## 2023-11-22 NOTE — PROGRESS NOTE ADULT - ASSESSMENT
68M current smoker with PMHx  COPD, HLD, DM, HTN, chronic ETOH dependence, cirrhosis presents with SOB. Patient was found to be septic likely 2/2 PNA.  Patient continued to be  tachypneic, and  developed New onset Afib on Bipap Patient was intubated for increase WOB 2/2 pulmonary edema iso cardiogenic shock vs COPD exacerbation  and admitted to ICU for further management        ============ Neuro============================  AOx3  Baseline AOx3, no functional impairment  #metabolic encephalopathy vs Wernickes encephalopathy  On high dose thiamine  #nictoine withdrawal  Nicotine patch 21mg  #Delirium  Seroquel 25 if patient not redirectable  ================= Cardiovascular==========================  # New Onset Afib w/ RVR  -developed Afib w/ rvr HR 160s  -s/p Cardizem 10mg x2  Metoprolol 50 BID    #New onset HFREF 2/2 NSTEMI  #NSTEMI  EKG New(?) LBBB  Elevated Trops, now downtrending  Echo showed newly decreased EF 20-25%, previously 50-55  D/c heparin drip in setting of suspected HIT, aspirin 81  on eliquis 5 BID  Patient cannot undergo cardiac catheterization iso CORTES/ATN  Metoprolol 50 BID    # HTN  hx of HTN, non complaint with meds  on amlodipine and losartan at home  Metoprolol 50 BID    # Calcified aorta  Syphillis testing  f/u radio  ================- Pulm=================================  #Acute hypercapnic RF 2/2 Pulmonary edema vs PNA vs COPD exacerbation  2/2 HFREF  Bumex 2mg qd    # COPD  - hx COPD on home 02  - poor airway entry on admission, no wheezing  - s/p solumedrol 125mg  - c/w duonebs  - one dose prednisone 20mg 11/22      ==================ID===================================  BCx NGTD      ================= Nephro================================  # ATN  # CORTES  Bun/creatinine 11/1.65<<<33/2.88>>1.96  SCr downtrending  f/u repeat FeNa  Granular casts  s/p 2.5L   CT AP negative for acute findings  FEUrea 32.9%, likely not pre renal              =================GI====================================  # Umbilical Hernia  -not reproducible, but no sign of obstruction   - no acute intervention recommended by surgery    #PUD  CT AP showed possible duodenitis/ PUD  started on PPI   ================ Heme==================================  #thrombocytopenia  downtrending platelets  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

## 2023-11-22 NOTE — PROGRESS NOTE ADULT - SUBJECTIVE AND OBJECTIVE BOX
PATIENT SEEN AND EXAMINED BY EDGARDO DRISCOLL M.D. ON :- 11/22/23  DATE OF SERVICE:     11/22/23        Interim events noted,Labs ,Radiological studies and Cardiology tests reviewed .  DISCUSSED WITH ACP/MEDICAL RESIDENT ON PLAN OF CARE.    MR#[id number]  PATIENT NAME:[facility] COURSE: HPI:  68M current smoker with PMHx  COPD, HLD, DM, HTN, chronic ETOH dependence, cirrhosis presents with SOB. Patient is a poor historian and states he feels SOB but unable to provide further details. Patient denies fever, chills, cough, chest pain, palpitations, abdominal pain, or change in bowel habits.     In the ED:  Afebrile, HR 160s. /94, RR 36, 100% on NRB  WBC 20, lactate 5.7  CXR b/l lung markings  Trop 640, BNP 4K  S/p Cardizem 10mg x2, solumedrol 125mg, 500cc bolus  (18 Nov 2023 10:37)      INTERIM EVENTS:Patient seen at bedside ,interim events noted.      [facility] -reviewed admission note, no change since admission  HEART FAILURE: Acute[ ]Chronic[ ] Systolic[ ] Diastolic[ ] Combined Systolic and Diastolic[ ]  CAD[ ] CABG[ ] PCI[ ]  DEVICES[ ] PPM[ ] ICD[ ] ILR[ ]  ATRIAL FIBRILLATION[ ] Paroxysmal[ ] Permanent[ ] CHADS2-[  ]  CORTES[ ] CKD1[ ] CKD2[ ] CKD3[ ] CKD4[ ] ESRD[ ]  COPD[ ] HTN[ ]   DM[ ] Type1[ ] Type 2[ ]   CVA[ ] Paresis[ ]    AMBULATION: Assisted[ ] Cane/walker[ ] Independent[ ]    MEDICATIONS  (STANDING):  apixaban 5 milliGRAM(s) Oral every 12 hours  aspirin  chewable 81 milliGRAM(s) Oral daily  budesonide 160 MICROgram(s)/formoterol 4.5 MICROgram(s) Inhaler 2 Puff(s) Inhalation two times a day  buMETAnide 2 milliGRAM(s) Oral daily  folic acid 1 milliGRAM(s) Oral daily  insulin lispro (ADMELOG) corrective regimen sliding scale   SubCutaneous Before meals and at bedtime  metoprolol tartrate 50 milliGRAM(s) Oral three times a day  multivitamin 1 Tablet(s) Oral daily  nicotine - 21 mG/24Hr(s) Patch 1 Patch Transdermal daily  pantoprazole    Tablet 40 milliGRAM(s) Oral before breakfast  senna 2 Tablet(s) Oral at bedtime  thiamine IVPB 500 milliGRAM(s) IV Intermittent every 8 hours  tiotropium 2.5 MICROgram(s) Inhaler 2 Puff(s) Inhalation daily    MEDICATIONS  (PRN):  albuterol    90 MICROgram(s) HFA Inhaler 2 Puff(s) Inhalation every 6 hours PRN Shortness of Breath and/or Wheezing  QUEtiapine 25 milliGRAM(s) Oral daily PRN delirium, give if not redirectable            REVIEW OF SYSTEMS:  Constitutional: [ ] fever, [ ]weight loss,  [ ]fatigue [ ]weight gain  Eyes: [ ] visual changes  Respiratory: [ ]shortness of breath;  [ ] cough, [ ]wheezing, [ ]chills, [ ]hemoptysis  Cardiovascular: [ ] chest pain, [ ]palpitations, [ ]dizziness,  [ ]leg swelling[ ]orthopnea[ ]PND  Gastrointestinal: [ ] abdominal pain, [ ]nausea, [ ]vomiting,  [ ]diarrhea [ ]Constipation [ ]Melena  Genitourinary: [ ] dysuria, [ ] hematuria [ ]Goss  Neurologic: [ ] headaches [ ] tremors[ ]weakness [ ]Paralysis Right[ ] Left[ ]  Skin: [ ] itching, [ ]burning, [ ] rashes  Endocrine: [ ] heat or cold intolerance  Musculoskeletal: [ ] joint pain or swelling; [ ] muscle, back, or extremity pain  Psychiatric: [ ] depression, [ ]anxiety, [ ]mood swings, or [ ]difficulty sleeping  Hematologic: [ ] easy bruising, [ ] bleeding gums    [ ] All remaining systems negative except as per above.   [ ]Unable to obtain.  [x] No change in ROS since admission      Vital Signs Last 24 Hrs  T(C): 36.1 (22 Nov 2023 16:00), Max: 36.2 (22 Nov 2023 12:00)  T(F): 97 (22 Nov 2023 16:00), Max: 97.2 (22 Nov 2023 12:00)  HR: 127 (22 Nov 2023 21:00) (106 - 144)  BP: 130/86 (22 Nov 2023 21:00) (102/70 - 150/93)  BP(mean): 100 (22 Nov 2023 21:00) (76 - 121)  RR: 17 (22 Nov 2023 21:00) (15 - 26)  SpO2: 98% (22 Nov 2023 21:00) (93% - 100%)    Parameters below as of 22 Nov 2023 20:15  Patient On (Oxygen Delivery Method): room air      I&O's Summary    21 Nov 2023 07:01  -  22 Nov 2023 07:00  --------------------------------------------------------  IN: 200 mL / OUT: 1390 mL / NET: -1190 mL    22 Nov 2023 07:01  -  22 Nov 2023 21:18  --------------------------------------------------------  IN: 100 mL / OUT: 275 mL / NET: -175 mL        PHYSICAL EXAM:  General: No acute distress BMI-  HEENT: EOMI, PERRL  Neck: Supple, [ ] JVD  Lungs: Equal air entry bilaterally; [ ] rales [ ] wheezing [ ] rhonchi  Heart: Regular rate and rhythm; [x ] murmur   2/6 [ x] systolic [ ] diastolic [ ] radiation[ ] rubs [ ]  gallops  Abdomen: Nontender, bowel sounds present  Extremities: No clubbing, cyanosis, [ ] edema [ ]Pulses  equal and intact  Nervous system:  Alert & Oriented X3, no focal deficits  Psychiatric: Normal affect  Skin: No rashes or lesions    LABS:  11-22    139  |  109<H>  |  51<H>  ----------------------------<  105<H>  3.7   |  24  |  1.96<H>    Ca    8.9      22 Nov 2023 04:39  Phos  3.3     11-22  Mg     2.2     11-22    TPro  6.8  /  Alb  3.0<L>  /  TBili  0.5  /  DBili  x   /  AST  16  /  ALT  16  /  AlkPhos  40  11-22    Creatinine Trend: 1.96<--, 2.44<--, 2.72<--, 2.94<--, 2.88<--, 2.27<--                        11.1   8.59  )-----------( 116      ( 22 Nov 2023 04:39 )             33.6     PTT - ( 22 Nov 2023 04:39 )  PTT:29.6 sec

## 2023-11-22 NOTE — CHART NOTE - NSCHARTNOTEFT_GEN_A_CORE
67M current smoker with PMHx  COPD, HLD, DM, HTN, chronic ETOH dependence, cirrhosis presents with SOB. Patient was found to be septic likely 2/2 PNA.  Patient continued to be  tachypneic, and  developed New onset Afib on Bipap Patient was intubated for increase WOB and admitted to ICU for further management. Patient was intubated in the ED. Patient had new onset Afib initially thought to be 2/2 sepsis with LBBB on EKG and elevated trops and lactic acidosis. Patient received 2.5 L bolus. Patient recieved cardizem push x2. Patient was started on Rocephin and Azithro empirically for CAP and was started on solumedrol and duonebs for possible COPD exacerbation. Trops downtrending. Patient was producing minimal urine and UA showed granular casts indicative of ATN. Patient was extubated on 11/19 to BIPAP and weaned to NC but had to be put back on BIPAP in setting of inc WOB. Patient was started on metoprolol and was given one dose of bumex to attempt to diurese patient in setting of B lines on POCUS.11/20  Patients mental status was altered likely in setting of ativan use, but patietnt was started on high dose thiamine to cover for possible wernickes encephalopathy. Pan CT was negative for any acute abnormalities on head and chest and positive for possible duodenitis/Peptic ulcer disease on AP. Echo showed newly reduced EF of 20-25%. Patient was started on heparin drip and aspirin. 11/21 Mentation much improved. Heparin was held in setting of thrombocytopenia, apixiban was started. SCr downtrending. Patient started on diet, now on 2L NC. ABx discontinued, no infiltrates on CT chest. Solumedrol discontinued. 11/22 Patient AOx3 today. Still tachycardic, metoprolol increased to 50. Goss removed. HIT panel negative.    f/u kidney ultrasound   f/u syphillis screen  f/u A1c 67M current smoker with PMHx  COPD, HLD, DM, HTN, chronic ETOH dependence, cirrhosis presents with SOB. Patient was found to be septic likely 2/2 PNA.  Patient continued to be  tachypneic, and  developed New onset Afib on Bipap Patient was intubated for increase WOB and admitted to ICU for further management. Patient was intubated in the ED. Patient had new onset Afib initially thought to be 2/2 sepsis with LBBB on EKG and elevated trops and lactic acidosis. Patient received 2.5 L bolus. Patient recieved cardizem push x2. Patient was started on Rocephin and Azithro empirically for CAP and was started on solumedrol and duonebs for possible COPD exacerbation. Trops downtrending. Patient was producing minimal urine and UA showed granular casts indicative of ATN. Patient was extubated on 11/19 to BIPAP and weaned to NC but had to be put back on BIPAP in setting of inc WOB. Patient was started on metoprolol and was given one dose of bumex to attempt to diurese patient in setting of B lines on POCUS.11/20  Patients mental status was altered likely in setting of ativan use, but patietnt was started on high dose thiamine to cover for possible wernickes encephalopathy. Pan CT was negative for any acute abnormalities on head and chest and positive for possible duodenitis/Peptic ulcer disease on AP. Echo showed newly reduced EF of 20-25%. Patient was started on heparin drip and aspirin. 11/21 Mentation much improved. Heparin was held in setting of thrombocytopenia, apixiban was started. SCr downtrending. Patient started on diet, now on 2L NC. ABx discontinued, no infiltrates on CT chest. Solumedrol discontinued. 11/22 Patient AOx3 today. Still tachycardic, metoprolol increased to 50. Goss removed. HIT panel negative.    f/u kidney ultrasound   f/u syphillis screen  f/u A1c    signed out to 67M current smoker with PMHx  COPD, HLD, DM, HTN, chronic ETOH dependence, cirrhosis presents with SOB. Patient was found to be septic likely 2/2 PNA.  Patient continued to be  tachypneic, and  developed New onset Afib on Bipap Patient was intubated for increase WOB and admitted to ICU for further management. Patient was intubated in the ED. Patient had new onset Afib initially thought to be 2/2 sepsis with LBBB on EKG and elevated trops and lactic acidosis. Patient received 2.5 L bolus. Patient recieved cardizem push x2. Patient was started on Rocephin and Azithro empirically for CAP and was started on solumedrol and duonebs for possible COPD exacerbation. Trops downtrending. Patient was producing minimal urine and UA showed granular casts indicative of ATN. Patient was extubated on 11/19 to BIPAP and weaned to NC but had to be put back on BIPAP in setting of inc WOB. Patient was started on metoprolol and was given one dose of bumex to attempt to diurese patient in setting of B lines on POCUS.11/20  Patients mental status was altered likely in setting of ativan use, but patietnt was started on high dose thiamine to cover for possible wernickes encephalopathy. Pan CT was negative for any acute abnormalities on head and chest and positive for possible duodenitis/Peptic ulcer disease on AP. Echo showed newly reduced EF of 20-25%. Patient was started on heparin drip and aspirin. 11/21 Mentation much improved. Heparin was held in setting of thrombocytopenia, apixiban was started. SCr downtrending. Patient started on diet, now on 2L NC. ABx discontinued, no infiltrates on CT chest. Solumedrol discontinued. 11/22 Patient AOx3 today. Still tachycardic, metoprolol increased to 50. Goss removed. HIT panel negative.    f/u kidney ultrasound   f/u syphillis screen  f/u A1c  f/u cardio  f/u SCr    signed out to Dr. Isaak George 67M current smoker with PMHx  COPD, HLD, DM, HTN, chronic ETOH dependence, cirrhosis presents with SOB. Patient was found to be septic likely 2/2 PNA.  Patient continued to be  tachypneic, and  developed New onset Afib on Bipap Patient was intubated for increase WOB and admitted to ICU for further management. Patient was intubated in the ED. Patient had new onset Afib initially thought to be 2/2 sepsis with LBBB on EKG and elevated trops and lactic acidosis. Patient received 2.5 L bolus. Patient received cardizem push x2. Patient was started on Rocephin and Azithro empirically for CAP and was started on solumedrol and duonebs for possible COPD exacerbation. Trops downtrending. Patient was producing minimal urine and UA showed granular casts indicative of ATN. Patient was extubated on 11/19 to BIPAP and weaned to NC but had to be put back on BIPAP in setting of inc WOB. Patient was started on metoprolol and was given one dose of bumex to attempt to diurese patient in setting of B lines on POCUS.11/20  Patients mental status was altered likely in setting of ativan use, but patietnt was started on high dose thiamine to cover for possible wernickes encephalopathy. Pan CT was negative for any acute abnormalities on head and chest and positive for possible duodenitis/Peptic ulcer disease on AP. Echo showed newly reduced EF of 20-25%. Patient was started on heparin drip and aspirin. 11/21 Mentation much improved. Heparin was held in setting of thrombocytopenia, apixiban was started. SCr downtrending. Patient started on diet, now on 2L NC. ABx discontinued, no infiltrates on CT chest. Solumedrol discontinued. 11/22 Patient AOx3 today. Still tachycardic, metoprolol increased to 50. Goss removed. HIT panel negative.11/23 started digoxin load.    f/u kidney ultrasound   f/u syphillis screen  f/u A1c  f/u cardio  f/u SCr    signed out to Dr. Isaak George 67M current smoker with PMHx  COPD, HLD, DM, HTN, chronic ETOH dependence, cirrhosis presents with SOB. Patient was found to be septic likely 2/2 PNA.  Patient continued to be  tachypneic, and  developed New onset Afib on Bipap Patient was intubated for increase WOB and admitted to ICU for further management. Patient was intubated in the ED. Patient had new onset Afib initially thought to be 2/2 sepsis with LBBB on EKG and elevated trops and lactic acidosis. Patient received 2.5 L bolus. Patient received cardizem push x2. Patient was started on Rocephin and Azithro empirically for CAP and was started on solumedrol and duonebs for possible COPD exacerbation. Trops downtrending. Patient was producing minimal urine and UA showed granular casts indicative of ATN. Patient was extubated on 11/19 to BIPAP and weaned to NC but had to be put back on BIPAP in setting of inc WOB. Patient was started on metoprolol and was given one dose of bumex to attempt to diurese patient in setting of B lines on POCUS.11/20  Patients mental status was altered likely in setting of ativan use, but patietnt was started on high dose thiamine to cover for possible wernickes encephalopathy. Pan CT was negative for any acute abnormalities on head and chest and positive for possible duodenitis/Peptic ulcer disease on AP. Echo showed newly reduced EF of 20-25%. Patient was started on heparin drip and aspirin. 11/21 Mentation much improved. Heparin was held in setting of thrombocytopenia, apixiban was started. SCr downtrending. Patient started on diet, now on 2L NC. ABx discontinued, no infiltrates on CT chest. Solumedrol discontinued. 11/22 Patient AOx3 today. Still tachycardic, metoprolol increased to 50. Goss removed. HIT panel negative.11/23 started digoxin load.    f/u kidney ultrasound   f/u syphillis screen  f/u A1c  f/u cardio  f/u SCr    signed out to Dr. Isaak George and Dr. Zoila Bermeo

## 2023-11-22 NOTE — PROGRESS NOTE ADULT - ATTENDING COMMENTS
67yo man current smoker w/ PMH COPD (not on home O2), HTN, DM2, h/o syncope and falls, h/o prior ICU admission requiring intubation for AHRF with hypercapnia and hypoxemia, h/o medication non-compliance, h/o chronic EtOH dependence/daily use c/b liver cirrhosis and portal HTN with EGD (01/2019) showing esophageal varices who presented to ED with dyspnea; found with acute hypercapneic hypoxemic respiratory failure possibly 2/2 AECOPD +/- acute PNA w/ sepsis c/b Afib RVR, failed NIV trial necessitating intubation and admission to ICU for further management.     #Acute hypoxemic and hypercapnic respiratory failure   #NSTEMI  #Afib w/ RVR  #Acute heart failure with reduced EF   #Acute kidney failure   #Lactic acidosis  #Acute delirium   #Dm type 2     Plan:  - Awake and following commands   - Avoid sedation   - Close neurologic monitoring  - Check RPR  - CT head with out acute pathology  - continuous hemodynamic monitoring  - Concern for possible Ischemic cardiac event, as bump in troponin, new onset afib and new heart failure   - Eliquis for Afib  - Cardiology consult - may need an ischemic work up  - Cont. bumex for diuresis and maintain euvolemia  - Monitor hemodynamics  - Beta blockers for rate control, increase as tolerated   - bronchodilators with symbicort and spiriva  - Completed PO steroids   - Empiric abx coverage completed  - trop downtrended  - lactate cleared  - trend BMP and replete electrolytes  - Close glucose monitoring with coverage for hyperglycemia  - strict I/O and UOP monitoring in ATN  - Void trial  - rate control for AFib +/- RVR  - Aspiration precautions  - Oral dysphagia diet per swallow pathologist   - OOB to chair   - PT evaluation   - Transfer out of ICU

## 2023-11-22 NOTE — PHARMACOTHERAPY INTERVENTION NOTE - COMMENTS
Recommended to add folic acid and multivitamin to regimen.
Recommend to discontinue azithromycin legionella negative. 
Recommended switching pantoprazole from IV to PO due to patient’s oral diet.
Recommend to discontinue due to duplicate therapy.

## 2023-11-22 NOTE — PROGRESS NOTE ADULT - SUBJECTIVE AND OBJECTIVE BOX
[facility] NEPHROLOGY- PROGRESS NOTE    Patient is a 69yo Male active smoker with h/o COPD, HLD, DM, HTN, chronic ETOH dependence, cirrhosis presents with SOB. Pt a/w Sepsis 2/2 PNA, COPD,  Acute hypoxic hypercapnic respiratory failure s/p brief intubation, Acute encephalopathy new onset Afib with CORTES. Nephrology consulted for Elevated serum creatinine.  Pt with NSTEMI with reduced EF; concerning for new onset HF (prev EF 50-55% in April 2023 --> now EF 20-25%). Pt pending possible cath once renal function improves  Pt extubated 11/19      Hospital Medications: Medications reviewed.  REVIEW OF SYSTEMS: Limited ROS; +SOB; denies any pain. Confused at times    VITALS:  T(F): 97 (11-22-23 @ 08:00), Max: 97.6 (11-21-23 @ 16:53)  HR: 106 (11-22-23 @ 10:00)  BP: 129/92 (11-22-23 @ 10:00)  RR: 19 (11-22-23 @ 10:00)  SpO2: 93% (11-22-23 @ 09:00)  Wt(kg): --    11-21 @ 07:01  -  11-22 @ 07:00  --------------------------------------------------------  IN: 200 mL / OUT: 1390 mL / NET: -1190 mL      PHYSICAL EXAM:  Gen: NAD, altered   HEENT: anicteric +NGT  Neck: +JVD  Cards: +tachy irregular , +S1/S2,   Resp: overall clear; no active wheezing at this time  GI: soft, +mild distention +umbilical hernia   : +randolph   Extremities: no LE edema B/L  Neuro: confused at times      LABS:  11-22    139  |  109<H>  |  51<H>  ----------------------------<  105<H>  3.7   |  24  |  1.96<H>    Ca    8.9      22 Nov 2023 04:39  Phos  3.3     11-22  Mg     2.2     11-22    TPro  6.8  /  Alb  3.0<L>  /  TBili  0.5  /  DBili      /  AST  16  /  ALT  16  /  AlkPhos  40  11-22    Creatinine Trend: 1.96 <--, 2.44 <--, 2.72 <--, 2.94 <--, 2.88 <--, 2.27 <--, 1.70 <--, 1.65 <--                        11.1   8.59  )-----------( 116      ( 22 Nov 2023 04:39 )             33.6     Urine Studies:  Urinalysis Basic - ( 22 Nov 2023 04:39 )    Color:  / Appearance:  / SG:  / pH:   Gluc: 105 mg/dL / Ketone:   / Bili:  / Urobili:    Blood:  / Protein:  / Nitrite:    Leuk Esterase:  / RBC:  / WBC    Sq Epi:  / Non Sq Epi:  / Bacteria:       Sodium, Random Urine: 21 mmol/L (11-22 @ 09:25)  Creatinine, Random Urine: 128 mg/dL (11-22 @ 09:25)  Creatinine, Random Urine: 128 mg/dL (11-21 @ 10:25)  Sodium, Random Urine: 64 mmol/L (11-20 @ 15:15)  Creatinine, Random Urine: 78 mg/dL (11-20 @ 15:15)  Osmolality, Random Urine: 380 mos/kg (11-20 @ 15:15)

## 2023-11-22 NOTE — PROGRESS NOTE ADULT - ASSESSMENT
Patient is a 69yo Male active smoker with h/o COPD, HLD, DM, HTN, chronic ETOH dependence, cirrhosis presents with SOB. Pt a/w Sepsis 2/2 PNA, COPD,  Acute hypoxic hypercapnic respiratory failure s/p brief intubation, Acute encephalopathy new onset Afib with CORTES. Nephrology consulted for Elevated serum creatinine.  Pt with NSTEMI with reduced EF; concerning for new onset HF (prev EF 50-55% in April 2023 --> now EF 20-25%). Pt pending possible cath once renal function improves  Pt extubated 11/19    1 CORTES- previous SCr 0.8-1 in April 2023. CORETS likely ATN. UA with 30 protein; large blood ?traumatic randolph with A/C use. FeNa 1.75%; inconclusive. FeUrea 33%; borderline; likely due to poor EABV.  Renal function improving.   Pt now on Bumex 2mg PO daily for maintenance due to poor EF.    Ct abd/pelvis with no hydro. Strict I/Os. Avoid nephrotoxins/ NSAIDs/ RCA. Monitor BMP.    2. Acute encephalopathy- CT head neg. Ammonia level <10. Plan as per ICU    3. Acute CHF- TTE with severe global LV systolic dysfunction; EF 20-25%. Cardiology following; Hold off on cath until renal function improves further.     4. COPD exacerbation- CT chest with no PNA for which abx was d/c. Bronchodilators/ Steroids as per ICU.     [facility] NEPHROLOGY  Iggy Hernandez M.D.  Richard Cook D.O.  Bárbara Slaughter M.D.  MD Breanna Schulte, MSN, ANP-C    Telephone: [phone]  Facsimile: [phone] [place] [address], [address]  [place] [address]

## 2023-11-22 NOTE — PROGRESS NOTE ADULT - SUBJECTIVE AND OBJECTIVE BOX
INTERVAL HPI/OVERNIGHT EVENTS:  Seen and examined at bedside.    PRESSORS: [  ] YES [ X ] NO  WHICH:    Antimicrobial:    Cardiovascular:  buMETAnide 2 milliGRAM(s) Oral daily  metoprolol tartrate 50 milliGRAM(s) Oral two times a day    Pulmonary:  albuterol    90 MICROgram(s) HFA Inhaler 2 Puff(s) Inhalation every 6 hours PRN  budesonide 160 MICROgram(s)/formoterol 4.5 MICROgram(s) Inhaler 2 Puff(s) Inhalation two times a day  tiotropium 2.5 MICROgram(s) Inhaler 2 Puff(s) Inhalation daily    Hematalogic:  apixaban 5 milliGRAM(s) Oral every 12 hours  aspirin  chewable 81 milliGRAM(s) Oral daily    Other:  chlorhexidine 2% Cloths 1 Application(s) Topical <User Schedule>  folic acid 1 milliGRAM(s) Oral daily  insulin lispro (ADMELOG) corrective regimen sliding scale   SubCutaneous Before meals and at bedtime  multivitamin 1 Tablet(s) Oral daily  nicotine - 21 mG/24Hr(s) Patch 1 Patch Transdermal daily  pantoprazole    Tablet 40 milliGRAM(s) Oral before breakfast  senna 2 Tablet(s) Oral at bedtime  thiamine IVPB 500 milliGRAM(s) IV Intermittent every 8 hours    albuterol    90 MICROgram(s) HFA Inhaler 2 Puff(s) Inhalation every 6 hours PRN  apixaban 5 milliGRAM(s) Oral every 12 hours  aspirin  chewable 81 milliGRAM(s) Oral daily  budesonide 160 MICROgram(s)/formoterol 4.5 MICROgram(s) Inhaler 2 Puff(s) Inhalation two times a day  buMETAnide 2 milliGRAM(s) Oral daily  chlorhexidine 2% Cloths 1 Application(s) Topical <User Schedule>  folic acid 1 milliGRAM(s) Oral daily  insulin lispro (ADMELOG) corrective regimen sliding scale   SubCutaneous Before meals and at bedtime  metoprolol tartrate 50 milliGRAM(s) Oral two times a day  multivitamin 1 Tablet(s) Oral daily  nicotine - 21 mG/24Hr(s) Patch 1 Patch Transdermal daily  pantoprazole    Tablet 40 milliGRAM(s) Oral before breakfast  senna 2 Tablet(s) Oral at bedtime  thiamine IVPB 500 milliGRAM(s) IV Intermittent every 8 hours  tiotropium 2.5 MICROgram(s) Inhaler 2 Puff(s) Inhalation daily    Drug Dosing Weight  Height (cm): 172.7 (20 Nov 2023 16:41)  Weight (kg): 71 (18 Nov 2023 08:28)  BMI (kg/m2): 23.8 (20 Nov 2023 16:41)  BSA (m2): 1.84 (20 Nov 2023 16:41)    CENTRAL LINE: [ ] YES [ ] NO  LOCATION:   DATE INSERTED:  REMOVE: [ ] YES [ ] NO  EXPLAIN:    MUÑOZ: [ ] YES [ ] NO    DATE INSERTED:  REMOVE:  [ ] YES [ ] NO  EXPLAIN:    A-LINE:  [ ] YES [ ] NO  LOCATION:   DATE INSERTED:  REMOVE:  [ ] YES [ ] NO  EXPLAIN:    PMH -reviewed admission note, no change since admission  PAST MEDICAL & SURGICAL HISTORY:  HTN (hypertension)      Varicose veins      HLD (hyperlipidemia)      Liver cirrhosis      Portal hypertension with esophageal varices      COPD (chronic obstructive pulmonary disease)      Syncope      Alcohol dependence, daily use      Smokes tobacco daily      DM (diabetes mellitus)      No significant past surgical history          ICU Vital Signs Last 24 Hrs  T(C): 36.1 (22 Nov 2023 08:00), Max: 36.4 (21 Nov 2023 16:53)  T(F): 97 (22 Nov 2023 08:00), Max: 97.6 (21 Nov 2023 16:53)  HR: 106 (22 Nov 2023 10:00) (106 - 144)  BP: 129/92 (22 Nov 2023 10:00) (102/70 - 159/107)  BP(mean): 104 (22 Nov 2023 10:00) (80 - 116)  ABP: --  ABP(mean): --  RR: 19 (22 Nov 2023 10:00) (13 - 26)  SpO2: 93% (22 Nov 2023 09:00) (93% - 100%)    O2 Parameters below as of 22 Nov 2023 00:00  Patient On (Oxygen Delivery Method): room air                  11-21 @ 07:01  -  11-22 @ 07:00  --------------------------------------------------------  IN: 200 mL / OUT: 1390 mL / NET: -1190 mL            PHYSICAL EXAM:    GENERAL: NAD  HEAD:  Atraumatic, Normocephalic  NERVOUS SYSTEM:  Alert & Oriented X3,    CHEST/LUNG: Bilateral wheeze  HEART: Regular rate and rhythm; No murmurs, rubs, or gallops  ABDOMEN: Soft, Nontender, Nondistended; Bowel sounds present  EXTREMITIES: No clubbing, cyanosis, or edema, slow cap refill        LABS:  CBC Full  -  ( 22 Nov 2023 04:39 )  WBC Count : 8.59 K/uL  RBC Count : 3.55 M/uL  Hemoglobin : 11.1 g/dL  Hematocrit : 33.6 %  Platelet Count - Automated : 116 K/uL  Mean Cell Volume : 94.6 fl  Mean Cell Hemoglobin : 31.3 pg  Mean Cell Hemoglobin Concentration : 33.0 gm/dL  Auto Neutrophil # : 6.33 K/uL  Auto Lymphocyte # : 1.23 K/uL  Auto Monocyte # : 0.94 K/uL  Auto Eosinophil # : 0.03 K/uL  Auto Basophil # : 0.00 K/uL  Auto Neutrophil % : 73.8 %  Auto Lymphocyte % : 14.3 %  Auto Monocyte % : 10.9 %  Auto Eosinophil % : 0.3 %  Auto Basophil % : 0.0 %    11-22    139  |  109<H>  |  51<H>  ----------------------------<  105<H>  3.7   |  24  |  1.96<H>    Ca    8.9      22 Nov 2023 04:39  Phos  3.3     11-22  Mg     2.2     11-22    TPro  6.8  /  Alb  3.0<L>  /  TBili  0.5  /  DBili  x   /  AST  16  /  ALT  16  /  AlkPhos  40  11-22    PTT - ( 22 Nov 2023 04:39 )  PTT:29.6 sec  Urinalysis Basic - ( 22 Nov 2023 04:39 )    Color: x / Appearance: x / SG: x / pH: x  Gluc: 105 mg/dL / Ketone: x  / Bili: x / Urobili: x   Blood: x / Protein: x / Nitrite: x   Leuk Esterase: x / RBC: x / WBC x   Sq Epi: x / Non Sq Epi: x / Bacteria: x          RADIOLOGY & ADDITIONAL STUDIES REVIEWED:  ***    [ ]GOALS OF CARE DISCUSSION WITH PATIENT/FAMILY/PROXY:    CRITICAL CARE TIME SPENT: 35 minutes

## 2023-11-22 NOTE — PHARMACOTHERAPY INTERVENTION NOTE - INTERVENTION TYPE RECOOMEND
Therapy duplication
Therapy Recommended - Additional therapy
IV to PO
Therapy Discontinuation Recommended - No indication

## 2023-11-22 NOTE — PROGRESS NOTE ADULT - ASSESSMENT
68M current smoker with PMHx  COPD, HLD, DM, HTN, chronic ETOH dependence, cirrhosis presents with SOB. Patient was found to be septic likely 2/2 PNA.  Patient continued to be  tachypneic, and  developed New onset Afib on Bipap Patient was intubated for increase WOB and admitted to ICU for further management 2/2 PNA vs COPD exacerbation.        ============ Neuro============================  Extubated, AOx1-2  Baseline AOx3, no functional impairment  #metabolic encephalopathy vs Wernickes encephalopathy  On high dose thiamine  #nictoine withdrawal  Nicotine patch 21mg  ================= Cardiovascular==========================  # New Onset Afib w/ RVR  -developed Afib w/ rvr HR 160s  -s/p Cardizem 10mg x2  Metoprolol 50 BID    #New onset HFREF 2/2 NSTEMI  #NSTEMI  EKG New(?) LBBB  Elevated Trops, now downtrending  Echo showed newly decreased EF 20-25%, previously 50-55  Started on heparin drip, aspirin 81  Metoprolol 50 BID    # HTN  hx of HTN, non complaint with meds  on amlodipine and losartan at home  Metoprolol 50 BID    # Calcified aorta  Syphillis testing  f/u radio  ================- Pulm=================================  #AHRF 2/2 Pulmonary edema vs PNA vs COPD exacerbation  2/2 HFREF  Bumex 2mg qd    # COPD  - hx COPD on home 02  - poor airway entry on admission, no wheezing  - s/p solumedrol 125mg  - c/w duonebs  - one dose prednisone 20mg 11/22      ==================ID===================================  #Sepsis  WBC 14.55, lactate 2.8  s/p 2.5L   d/c antibiotics  -mycoplasma-ve, legionella ve, strep -ve  -sputum cx pending  blood NGTD      ================= Nephro================================  # ATN  # CORTES  Bun/creatinine 11/1.65<<<33/2.88  worsening CORTES  Granular casts  s/p 2.5L   CT AP negative for acute findings  feNA 1.6 redo with feUREA in setting of diuretic use    #Lactic Acidosis  s/p 2.5 L bolus  lactate 5.7>2.0  11/20 Lactate 1.4          =================GI====================================  # Umbilical Hernia  -not reproducible, but no sign of obstruction   - no acute intervention recommended by surgery    #PUD  CT AP showed possible duodenitis/ PUD  started on PPI   ================ Heme==================================  #thrombocytopenia  downtrending platelets  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      68M current smoker with PMHx  COPD, HLD, DM, HTN, chronic ETOH dependence, cirrhosis presents with SOB. Patient was found to be septic likely 2/2 PNA.  Patient continued to be  tachypneic, and  developed New onset Afib on Bipap Patient was intubated for increase WOB and admitted to ICU for further management 2/2 PNA vs COPD exacerbation.        ============ Neuro============================  AOx3  Baseline AOx3, no functional impairment  #metabolic encephalopathy vs Wernickes encephalopathy  On high dose thiamine  #nictoine withdrawal  Nicotine patch 21mg  ================= Cardiovascular==========================  # New Onset Afib w/ RVR  -developed Afib w/ rvr HR 160s  -s/p Cardizem 10mg x2  Metoprolol 50 BID    #New onset HFREF 2/2 NSTEMI  #NSTEMI  EKG New(?) LBBB  Elevated Trops, now downtrending  Echo showed newly decreased EF 20-25%, previously 50-55  Started on heparin drip, aspirin 81  Metoprolol 50 BID    # HTN  hx of HTN, non complaint with meds  on amlodipine and losartan at home  Metoprolol 50 BID    # Calcified aorta  Syphillis testing  f/u radio  ================- Pulm=================================  #AHRF 2/2 Pulmonary edema vs PNA vs COPD exacerbation  2/2 HFREF  Bumex 2mg qd    # COPD  - hx COPD on home 02  - poor airway entry on admission, no wheezing  - s/p solumedrol 125mg  - c/w duonebs  - one dose prednisone 20mg 11/22      ==================ID===================================  #Sepsis  WBC 14.55, lactate 2.8  s/p 2.5L   d/c antibiotics  -mycoplasma-ve, legionella ve, strep -ve  -sputum cx pending  blood NGTD      ================= Nephro================================  # ATN  # CORTES  Bun/creatinine 11/1.65<<<33/2.88>>1.96  SCr downtrending  f/u repeat FeNa  Granular casts  s/p 2.5L   CT AP negative for acute findings  FEUrea 32.9%, likely not pre renal              =================GI====================================  # Umbilical Hernia  -not reproducible, but no sign of obstruction   - no acute intervention recommended by surgery    #PUD  CT AP showed possible duodenitis/ PUD  started on PPI   ================ Heme==================================  #thrombocytopenia  downtrending platelets  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      68M current smoker with PMHx  COPD, HLD, DM, HTN, chronic ETOH dependence, cirrhosis presents with SOB. Patient was found to be septic likely 2/2 PNA.  Patient continued to be  tachypneic, and  developed New onset Afib on Bipap Patient was intubated for increase WOB 2/2 pulmonary edema iso cardiogenic shock vs COPD exacerbation  and admitted to ICU for further management        ============ Neuro============================  AOx3  Baseline AOx3, no functional impairment  #metabolic encephalopathy vs Wernickes encephalopathy  On high dose thiamine  #nictoine withdrawal  Nicotine patch 21mg  ================= Cardiovascular==========================  # New Onset Afib w/ RVR  -developed Afib w/ rvr HR 160s  -s/p Cardizem 10mg x2  Metoprolol 50 BID    #New onset HFREF 2/2 NSTEMI  #NSTEMI  EKG New(?) LBBB  Elevated Trops, now downtrending  Echo showed newly decreased EF 20-25%, previously 50-55  Started on heparin drip, aspirin 81  Metoprolol 50 BID    # HTN  hx of HTN, non complaint with meds  on amlodipine and losartan at home  Metoprolol 50 BID    # Calcified aorta  Syphillis testing  f/u radio  ================- Pulm=================================  #Acute hypercapnic RF 2/2 Pulmonary edema vs PNA vs COPD exacerbation  2/2 HFREF  Bumex 2mg qd    # COPD  - hx COPD on home 02  - poor airway entry on admission, no wheezing  - s/p solumedrol 125mg  - c/w duonebs  - one dose prednisone 20mg 11/22      ==================ID===================================  BCx NGTD      ================= Nephro================================  # ATN  # CORTES  Bun/creatinine 11/1.65<<<33/2.88>>1.96  SCr downtrending  f/u repeat FeNa  Granular casts  s/p 2.5L   CT AP negative for acute findings  FEUrea 32.9%, likely not pre renal              =================GI====================================  # Umbilical Hernia  -not reproducible, but no sign of obstruction   - no acute intervention recommended by surgery    #PUD  CT AP showed possible duodenitis/ PUD  started on PPI   ================ Heme==================================  #thrombocytopenia  downtrending platelets  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      68M current smoker with PMHx  COPD, HLD, DM, HTN, chronic ETOH dependence, cirrhosis presents with SOB. Patient was found to be septic likely 2/2 PNA.  Patient continued to be  tachypneic, and  developed New onset Afib on Bipap Patient was intubated for increase WOB 2/2 pulmonary edema iso cardiogenic shock vs COPD exacerbation  and admitted to ICU for further management        ============ Neuro============================  AOx3  Baseline AOx3, no functional impairment  #metabolic encephalopathy vs Wernickes encephalopathy  On high dose thiamine  #nictoine withdrawal  Nicotine patch 21mg  #Delirium  Seroquel 25 if patient not redirectable  ================= Cardiovascular==========================  # New Onset Afib w/ RVR  -developed Afib w/ rvr HR 160s  -s/p Cardizem 10mg x2  Metoprolol 50 BID    #New onset HFREF 2/2 NSTEMI  #NSTEMI  EKG New(?) LBBB  Elevated Trops, now downtrending  Echo showed newly decreased EF 20-25%, previously 50-55  Started on heparin drip, aspirin 81  Metoprolol 50 BID    # HTN  hx of HTN, non complaint with meds  on amlodipine and losartan at home  Metoprolol 50 BID    # Calcified aorta  Syphillis testing  f/u radio  ================- Pulm=================================  #Acute hypercapnic RF 2/2 Pulmonary edema vs PNA vs COPD exacerbation  2/2 HFREF  Bumex 2mg qd    # COPD  - hx COPD on home 02  - poor airway entry on admission, no wheezing  - s/p solumedrol 125mg  - c/w duonebs  - one dose prednisone 20mg 11/22      ==================ID===================================  BCx NGTD      ================= Nephro================================  # ATN  # CORTES  Bun/creatinine 11/1.65<<<33/2.88>>1.96  SCr downtrending  f/u repeat FeNa  Granular casts  s/p 2.5L   CT AP negative for acute findings  FEUrea 32.9%, likely not pre renal              =================GI====================================  # Umbilical Hernia  -not reproducible, but no sign of obstruction   - no acute intervention recommended by surgery    #PUD  CT AP showed possible duodenitis/ PUD  started on PPI   ================ Heme==================================  #thrombocytopenia  downtrending platelets  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      68M current smoker with PMHx  COPD, HLD, DM, HTN, chronic ETOH dependence, cirrhosis presents with SOB. Patient was found to be septic likely 2/2 PNA.  Patient continued to be  tachypneic, and  developed New onset Afib on Bipap Patient was intubated for increase WOB 2/2 pulmonary edema iso cardiogenic shock vs COPD exacerbation  and admitted to ICU for further management        ============ Neuro============================  AOx3  Baseline AOx3, no functional impairment  #metabolic encephalopathy vs Wernickes encephalopathy  On high dose thiamine  #nictoine withdrawal  Nicotine patch 21mg  #Delirium  Seroquel 25 if patient not redirectable  ================= Cardiovascular==========================  # New Onset Afib w/ RVR  -developed Afib w/ rvr HR 160s  -s/p Cardizem 10mg x2  Metoprolol 50 BID    #New onset HFREF 2/2 NSTEMI  #NSTEMI  EKG New(?) LBBB  Elevated Trops, now downtrending  Echo showed newly decreased EF 20-25%, previously 50-55  D/c heparin drip in setting of suspected HIT, aspirin 81  on eliquis 5 BID  Metoprolol 50 BID    # HTN  hx of HTN, non complaint with meds  on amlodipine and losartan at home  Metoprolol 50 BID    # Calcified aorta  Syphillis testing  f/u radio  ================- Pulm=================================  #Acute hypercapnic RF 2/2 Pulmonary edema vs PNA vs COPD exacerbation  2/2 HFREF  Bumex 2mg qd    # COPD  - hx COPD on home 02  - poor airway entry on admission, no wheezing  - s/p solumedrol 125mg  - c/w duonebs  - one dose prednisone 20mg 11/22      ==================ID===================================  BCx NGTD      ================= Nephro================================  # ATN  # CORTES  Bun/creatinine 11/1.65<<<33/2.88>>1.96  SCr downtrending  f/u repeat FeNa  Granular casts  s/p 2.5L   CT AP negative for acute findings  FEUrea 32.9%, likely not pre renal              =================GI====================================  # Umbilical Hernia  -not reproducible, but no sign of obstruction   - no acute intervention recommended by surgery    #PUD  CT AP showed possible duodenitis/ PUD  started on PPI   ================ Heme==================================  #thrombocytopenia  downtrending platelets  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      68M current smoker with PMHx  COPD, HLD, DM, HTN, chronic ETOH dependence, cirrhosis presents with SOB. Patient was found to be septic likely 2/2 PNA.  Patient continued to be  tachypneic, and  developed New onset Afib on Bipap Patient was intubated for increase WOB 2/2 pulmonary edema iso cardiogenic shock vs COPD exacerbation  and admitted to ICU for further management        ============ Neuro============================  AOx3  Baseline AOx3, no functional impairment  #metabolic encephalopathy vs Wernickes encephalopathy  On high dose thiamine  #nictoine withdrawal  Nicotine patch 21mg  #Delirium  Seroquel 25 if patient not redirectable  ================= Cardiovascular==========================  # New Onset Afib w/ RVR  -developed Afib w/ rvr HR 160s  -s/p Cardizem 10mg x2  Metoprolol 50 BID    #New onset HFREF 2/2 NSTEMI  #NSTEMI  EKG New(?) LBBB  Elevated Trops, now downtrending  Echo showed newly decreased EF 20-25%, previously 50-55  D/c heparin drip in setting of suspected HIT, aspirin 81  on eliquis 5 BID  Patient cannot undergo cardiac catheterization iso CORTES/ATN  Metoprolol 50 BID    # HTN  hx of HTN, non complaint with meds  on amlodipine and losartan at home  Metoprolol 50 BID    # Calcified aorta  Syphillis testing  f/u radio  ================- Pulm=================================  #Acute hypercapnic RF 2/2 Pulmonary edema vs PNA vs COPD exacerbation  2/2 HFREF  Bumex 2mg qd    # COPD  - hx COPD on home 02  - poor airway entry on admission, no wheezing  - s/p solumedrol 125mg  - c/w duonebs  - one dose prednisone 20mg 11/22      ==================ID===================================  BCx NGTD      ================= Nephro================================  # ATN  # CORTES  Bun/creatinine 11/1.65<<<33/2.88>>1.96  SCr downtrending  f/u repeat FeNa  Granular casts  s/p 2.5L   CT AP negative for acute findings  FEUrea 32.9%, likely not pre renal              =================GI====================================  # Umbilical Hernia  -not reproducible, but no sign of obstruction   - no acute intervention recommended by surgery    #PUD  CT AP showed possible duodenitis/ PUD  started on PPI   ================ Heme==================================  #thrombocytopenia  downtrending platelets  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

## 2023-11-23 LAB
ALBUMIN SERPL ELPH-MCNC: 3.3 G/DL — LOW (ref 3.5–5)
ALBUMIN SERPL ELPH-MCNC: 3.3 G/DL — LOW (ref 3.5–5)
ALP SERPL-CCNC: 47 U/L — SIGNIFICANT CHANGE UP (ref 40–120)
ALP SERPL-CCNC: 47 U/L — SIGNIFICANT CHANGE UP (ref 40–120)
ALT FLD-CCNC: 25 U/L DA — SIGNIFICANT CHANGE UP (ref 10–60)
ALT FLD-CCNC: 25 U/L DA — SIGNIFICANT CHANGE UP (ref 10–60)
ANION GAP SERPL CALC-SCNC: 6 MMOL/L — SIGNIFICANT CHANGE UP (ref 5–17)
ANION GAP SERPL CALC-SCNC: 6 MMOL/L — SIGNIFICANT CHANGE UP (ref 5–17)
AST SERPL-CCNC: 22 U/L — SIGNIFICANT CHANGE UP (ref 10–40)
AST SERPL-CCNC: 22 U/L — SIGNIFICANT CHANGE UP (ref 10–40)
BASOPHILS # BLD AUTO: 0.01 K/UL — SIGNIFICANT CHANGE UP (ref 0–0.2)
BASOPHILS # BLD AUTO: 0.01 K/UL — SIGNIFICANT CHANGE UP (ref 0–0.2)
BASOPHILS NFR BLD AUTO: 0.1 % — SIGNIFICANT CHANGE UP (ref 0–2)
BASOPHILS NFR BLD AUTO: 0.1 % — SIGNIFICANT CHANGE UP (ref 0–2)
BILIRUB SERPL-MCNC: 0.5 MG/DL — SIGNIFICANT CHANGE UP (ref 0.2–1.2)
BILIRUB SERPL-MCNC: 0.5 MG/DL — SIGNIFICANT CHANGE UP (ref 0.2–1.2)
BUN SERPL-MCNC: 54 MG/DL — HIGH (ref 7–18)
BUN SERPL-MCNC: 54 MG/DL — HIGH (ref 7–18)
CALCIUM SERPL-MCNC: 8.9 MG/DL — SIGNIFICANT CHANGE UP (ref 8.4–10.5)
CALCIUM SERPL-MCNC: 8.9 MG/DL — SIGNIFICANT CHANGE UP (ref 8.4–10.5)
CHLORIDE SERPL-SCNC: 115 MMOL/L — HIGH (ref 96–108)
CHLORIDE SERPL-SCNC: 115 MMOL/L — HIGH (ref 96–108)
CO2 SERPL-SCNC: 22 MMOL/L — SIGNIFICANT CHANGE UP (ref 22–31)
CO2 SERPL-SCNC: 22 MMOL/L — SIGNIFICANT CHANGE UP (ref 22–31)
CREAT SERPL-MCNC: 1.87 MG/DL — HIGH (ref 0.5–1.3)
CREAT SERPL-MCNC: 1.87 MG/DL — HIGH (ref 0.5–1.3)
CULTURE RESULTS: SIGNIFICANT CHANGE UP
EGFR: 38 ML/MIN/1.73M2 — LOW
EGFR: 38 ML/MIN/1.73M2 — LOW
EOSINOPHIL # BLD AUTO: 0.07 K/UL — SIGNIFICANT CHANGE UP (ref 0–0.5)
EOSINOPHIL # BLD AUTO: 0.07 K/UL — SIGNIFICANT CHANGE UP (ref 0–0.5)
EOSINOPHIL NFR BLD AUTO: 1 % — SIGNIFICANT CHANGE UP (ref 0–6)
EOSINOPHIL NFR BLD AUTO: 1 % — SIGNIFICANT CHANGE UP (ref 0–6)
GLUCOSE BLDC GLUCOMTR-MCNC: 109 MG/DL — HIGH (ref 70–99)
GLUCOSE BLDC GLUCOMTR-MCNC: 109 MG/DL — HIGH (ref 70–99)
GLUCOSE BLDC GLUCOMTR-MCNC: 117 MG/DL — HIGH (ref 70–99)
GLUCOSE BLDC GLUCOMTR-MCNC: 117 MG/DL — HIGH (ref 70–99)
GLUCOSE BLDC GLUCOMTR-MCNC: 119 MG/DL — HIGH (ref 70–99)
GLUCOSE BLDC GLUCOMTR-MCNC: 119 MG/DL — HIGH (ref 70–99)
GLUCOSE BLDC GLUCOMTR-MCNC: 99 MG/DL — SIGNIFICANT CHANGE UP (ref 70–99)
GLUCOSE BLDC GLUCOMTR-MCNC: 99 MG/DL — SIGNIFICANT CHANGE UP (ref 70–99)
GLUCOSE SERPL-MCNC: 109 MG/DL — HIGH (ref 70–99)
GLUCOSE SERPL-MCNC: 109 MG/DL — HIGH (ref 70–99)
HCT VFR BLD CALC: 35.5 % — LOW (ref 39–50)
HCT VFR BLD CALC: 35.5 % — LOW (ref 39–50)
HGB BLD-MCNC: 11.4 G/DL — LOW (ref 13–17)
HGB BLD-MCNC: 11.4 G/DL — LOW (ref 13–17)
IMM GRANULOCYTES NFR BLD AUTO: 0.3 % — SIGNIFICANT CHANGE UP (ref 0–0.9)
IMM GRANULOCYTES NFR BLD AUTO: 0.3 % — SIGNIFICANT CHANGE UP (ref 0–0.9)
LYMPHOCYTES # BLD AUTO: 1.31 K/UL — SIGNIFICANT CHANGE UP (ref 1–3.3)
LYMPHOCYTES # BLD AUTO: 1.31 K/UL — SIGNIFICANT CHANGE UP (ref 1–3.3)
LYMPHOCYTES # BLD AUTO: 17.9 % — SIGNIFICANT CHANGE UP (ref 13–44)
LYMPHOCYTES # BLD AUTO: 17.9 % — SIGNIFICANT CHANGE UP (ref 13–44)
MAGNESIUM SERPL-MCNC: 2.5 MG/DL — SIGNIFICANT CHANGE UP (ref 1.6–2.6)
MAGNESIUM SERPL-MCNC: 2.5 MG/DL — SIGNIFICANT CHANGE UP (ref 1.6–2.6)
MCHC RBC-ENTMCNC: 31 PG — SIGNIFICANT CHANGE UP (ref 27–34)
MCHC RBC-ENTMCNC: 31 PG — SIGNIFICANT CHANGE UP (ref 27–34)
MCHC RBC-ENTMCNC: 32.1 GM/DL — SIGNIFICANT CHANGE UP (ref 32–36)
MCHC RBC-ENTMCNC: 32.1 GM/DL — SIGNIFICANT CHANGE UP (ref 32–36)
MCV RBC AUTO: 96.5 FL — SIGNIFICANT CHANGE UP (ref 80–100)
MCV RBC AUTO: 96.5 FL — SIGNIFICANT CHANGE UP (ref 80–100)
MONOCYTES # BLD AUTO: 0.8 K/UL — SIGNIFICANT CHANGE UP (ref 0–0.9)
MONOCYTES # BLD AUTO: 0.8 K/UL — SIGNIFICANT CHANGE UP (ref 0–0.9)
MONOCYTES NFR BLD AUTO: 10.9 % — SIGNIFICANT CHANGE UP (ref 2–14)
MONOCYTES NFR BLD AUTO: 10.9 % — SIGNIFICANT CHANGE UP (ref 2–14)
NEUTROPHILS # BLD AUTO: 5.11 K/UL — SIGNIFICANT CHANGE UP (ref 1.8–7.4)
NEUTROPHILS # BLD AUTO: 5.11 K/UL — SIGNIFICANT CHANGE UP (ref 1.8–7.4)
NEUTROPHILS NFR BLD AUTO: 69.8 % — SIGNIFICANT CHANGE UP (ref 43–77)
NEUTROPHILS NFR BLD AUTO: 69.8 % — SIGNIFICANT CHANGE UP (ref 43–77)
NRBC # BLD: 0 /100 WBCS — SIGNIFICANT CHANGE UP (ref 0–0)
NRBC # BLD: 0 /100 WBCS — SIGNIFICANT CHANGE UP (ref 0–0)
PHOSPHATE SERPL-MCNC: 3.2 MG/DL — SIGNIFICANT CHANGE UP (ref 2.5–4.5)
PHOSPHATE SERPL-MCNC: 3.2 MG/DL — SIGNIFICANT CHANGE UP (ref 2.5–4.5)
PLATELET # BLD AUTO: 112 K/UL — LOW (ref 150–400)
PLATELET # BLD AUTO: 112 K/UL — LOW (ref 150–400)
POTASSIUM SERPL-MCNC: 4 MMOL/L — SIGNIFICANT CHANGE UP (ref 3.5–5.3)
POTASSIUM SERPL-MCNC: 4 MMOL/L — SIGNIFICANT CHANGE UP (ref 3.5–5.3)
POTASSIUM SERPL-SCNC: 4 MMOL/L — SIGNIFICANT CHANGE UP (ref 3.5–5.3)
POTASSIUM SERPL-SCNC: 4 MMOL/L — SIGNIFICANT CHANGE UP (ref 3.5–5.3)
PROT SERPL-MCNC: 7 G/DL — SIGNIFICANT CHANGE UP (ref 6–8.3)
PROT SERPL-MCNC: 7 G/DL — SIGNIFICANT CHANGE UP (ref 6–8.3)
RBC # BLD: 3.68 M/UL — LOW (ref 4.2–5.8)
RBC # BLD: 3.68 M/UL — LOW (ref 4.2–5.8)
RBC # FLD: 17.4 % — HIGH (ref 10.3–14.5)
RBC # FLD: 17.4 % — HIGH (ref 10.3–14.5)
SODIUM SERPL-SCNC: 143 MMOL/L — SIGNIFICANT CHANGE UP (ref 135–145)
SODIUM SERPL-SCNC: 143 MMOL/L — SIGNIFICANT CHANGE UP (ref 135–145)
SPECIMEN SOURCE: SIGNIFICANT CHANGE UP
TSH SERPL-MCNC: 2.31 UU/ML — SIGNIFICANT CHANGE UP (ref 0.34–4.82)
TSH SERPL-MCNC: 2.31 UU/ML — SIGNIFICANT CHANGE UP (ref 0.34–4.82)
WBC # BLD: 7.32 K/UL — SIGNIFICANT CHANGE UP (ref 3.8–10.5)
WBC # BLD: 7.32 K/UL — SIGNIFICANT CHANGE UP (ref 3.8–10.5)
WBC # FLD AUTO: 7.32 K/UL — SIGNIFICANT CHANGE UP (ref 3.8–10.5)
WBC # FLD AUTO: 7.32 K/UL — SIGNIFICANT CHANGE UP (ref 3.8–10.5)

## 2023-11-23 RX ORDER — METOPROLOL TARTRATE 50 MG
50 TABLET ORAL THREE TIMES A DAY
Refills: 0 | Status: DISCONTINUED | OUTPATIENT
Start: 2023-11-23 | End: 2023-11-30

## 2023-11-23 RX ORDER — DIGOXIN 250 MCG
250 TABLET ORAL ONCE
Refills: 0 | Status: COMPLETED | OUTPATIENT
Start: 2023-11-23 | End: 2023-11-23

## 2023-11-23 RX ORDER — BUMETANIDE 0.25 MG/ML
1 INJECTION INTRAMUSCULAR; INTRAVENOUS DAILY
Refills: 0 | Status: DISCONTINUED | OUTPATIENT
Start: 2023-11-24 | End: 2023-11-28

## 2023-11-23 RX ORDER — METOPROLOL TARTRATE 50 MG
5 TABLET ORAL ONCE
Refills: 0 | Status: COMPLETED | OUTPATIENT
Start: 2023-11-23 | End: 2023-11-23

## 2023-11-23 RX ORDER — DIGOXIN 250 MCG
125 TABLET ORAL EVERY 6 HOURS
Refills: 0 | Status: COMPLETED | OUTPATIENT
Start: 2023-11-23 | End: 2023-11-23

## 2023-11-23 RX ORDER — DILTIAZEM HCL 120 MG
10 CAPSULE, EXT RELEASE 24 HR ORAL ONCE
Refills: 0 | Status: DISCONTINUED | OUTPATIENT
Start: 2023-11-23 | End: 2023-11-23

## 2023-11-23 RX ORDER — QUETIAPINE FUMARATE 200 MG/1
25 TABLET, FILM COATED ORAL AT BEDTIME
Refills: 0 | Status: DISCONTINUED | OUTPATIENT
Start: 2023-11-23 | End: 2023-11-30

## 2023-11-23 RX ADMIN — Medication 1 PATCH: at 11:56

## 2023-11-23 RX ADMIN — BUMETANIDE 2 MILLIGRAM(S): 0.25 INJECTION INTRAMUSCULAR; INTRAVENOUS at 06:19

## 2023-11-23 RX ADMIN — SENNA PLUS 2 TABLET(S): 8.6 TABLET ORAL at 21:31

## 2023-11-23 RX ADMIN — Medication 5 MILLIGRAM(S): at 05:47

## 2023-11-23 RX ADMIN — Medication 50 MILLIGRAM(S): at 21:30

## 2023-11-23 RX ADMIN — Medication 125 MICROGRAM(S): at 21:31

## 2023-11-23 RX ADMIN — APIXABAN 5 MILLIGRAM(S): 2.5 TABLET, FILM COATED ORAL at 17:31

## 2023-11-23 RX ADMIN — Medication 125 MICROGRAM(S): at 15:19

## 2023-11-23 RX ADMIN — Medication 50 MILLIGRAM(S): at 14:34

## 2023-11-23 RX ADMIN — Medication 1 MILLIGRAM(S): at 11:55

## 2023-11-23 RX ADMIN — BUDESONIDE AND FORMOTEROL FUMARATE DIHYDRATE 2 PUFF(S): 160; 4.5 AEROSOL RESPIRATORY (INHALATION) at 10:29

## 2023-11-23 RX ADMIN — PANTOPRAZOLE SODIUM 40 MILLIGRAM(S): 20 TABLET, DELAYED RELEASE ORAL at 06:20

## 2023-11-23 RX ADMIN — Medication 1 PATCH: at 11:30

## 2023-11-23 RX ADMIN — Medication 1 TABLET(S): at 11:55

## 2023-11-23 RX ADMIN — QUETIAPINE FUMARATE 25 MILLIGRAM(S): 200 TABLET, FILM COATED ORAL at 21:31

## 2023-11-23 RX ADMIN — BUDESONIDE AND FORMOTEROL FUMARATE DIHYDRATE 2 PUFF(S): 160; 4.5 AEROSOL RESPIRATORY (INHALATION) at 21:47

## 2023-11-23 RX ADMIN — Medication 81 MILLIGRAM(S): at 11:55

## 2023-11-23 RX ADMIN — Medication 50 MILLIGRAM(S): at 09:42

## 2023-11-23 RX ADMIN — Medication 1 PATCH: at 07:59

## 2023-11-23 RX ADMIN — TIOTROPIUM BROMIDE 2 PUFF(S): 18 CAPSULE ORAL; RESPIRATORY (INHALATION) at 12:04

## 2023-11-23 RX ADMIN — ALBUTEROL 2 PUFF(S): 90 AEROSOL, METERED ORAL at 12:03

## 2023-11-23 RX ADMIN — Medication 1 PATCH: at 20:04

## 2023-11-23 RX ADMIN — APIXABAN 5 MILLIGRAM(S): 2.5 TABLET, FILM COATED ORAL at 05:48

## 2023-11-23 RX ADMIN — Medication 250 MICROGRAM(S): at 09:36

## 2023-11-23 RX ADMIN — Medication 105 MILLIGRAM(S): at 06:19

## 2023-11-23 NOTE — PROGRESS NOTE ADULT - SUBJECTIVE AND OBJECTIVE BOX
INTERVAL HPI/OVERNIGHT EVENTS:  Seen and examined at bedside. No acute overnight events.     PRESSORS: [  ] YES [ X ] NO  WHICH:    Antimicrobial:    Cardiovascular:  digoxin  Injectable 125 MICROGram(s) IV Push every 6 hours  metoprolol tartrate 50 milliGRAM(s) Oral three times a day    Pulmonary:  albuterol    90 MICROgram(s) HFA Inhaler 2 Puff(s) Inhalation every 6 hours PRN  budesonide 160 MICROgram(s)/formoterol 4.5 MICROgram(s) Inhaler 2 Puff(s) Inhalation two times a day  tiotropium 2.5 MICROgram(s) Inhaler 2 Puff(s) Inhalation daily    Hematalogic:  apixaban 5 milliGRAM(s) Oral every 12 hours  aspirin  chewable 81 milliGRAM(s) Oral daily    Other:  folic acid 1 milliGRAM(s) Oral daily  insulin lispro (ADMELOG) corrective regimen sliding scale   SubCutaneous Before meals and at bedtime  multivitamin 1 Tablet(s) Oral daily  nicotine - 21 mG/24Hr(s) Patch 1 Patch Transdermal daily  pantoprazole    Tablet 40 milliGRAM(s) Oral before breakfast  QUEtiapine 25 milliGRAM(s) Oral at bedtime  senna 2 Tablet(s) Oral at bedtime    albuterol    90 MICROgram(s) HFA Inhaler 2 Puff(s) Inhalation every 6 hours PRN  apixaban 5 milliGRAM(s) Oral every 12 hours  aspirin  chewable 81 milliGRAM(s) Oral daily  budesonide 160 MICROgram(s)/formoterol 4.5 MICROgram(s) Inhaler 2 Puff(s) Inhalation two times a day  digoxin  Injectable 125 MICROGram(s) IV Push every 6 hours  folic acid 1 milliGRAM(s) Oral daily  insulin lispro (ADMELOG) corrective regimen sliding scale   SubCutaneous Before meals and at bedtime  metoprolol tartrate 50 milliGRAM(s) Oral three times a day  multivitamin 1 Tablet(s) Oral daily  nicotine - 21 mG/24Hr(s) Patch 1 Patch Transdermal daily  pantoprazole    Tablet 40 milliGRAM(s) Oral before breakfast  QUEtiapine 25 milliGRAM(s) Oral at bedtime  senna 2 Tablet(s) Oral at bedtime  tiotropium 2.5 MICROgram(s) Inhaler 2 Puff(s) Inhalation daily    Drug Dosing Weight  Height (cm): 172.7 (20 Nov 2023 16:41)  Weight (kg): 71 (18 Nov 2023 08:28)  BMI (kg/m2): 23.8 (20 Nov 2023 16:41)  BSA (m2): 1.84 (20 Nov 2023 16:41)    CENTRAL LINE: [ ] YES [ ] NO  LOCATION:   DATE INSERTED:  REMOVE: [ ] YES [ ] NO  EXPLAIN:    MUÑOZ: [ ] YES [ ] NO    DATE INSERTED:  REMOVE:  [ ] YES [ ] NO  EXPLAIN:    A-LINE:  [ ] YES [ ] NO  LOCATION:   DATE INSERTED:  REMOVE:  [ ] YES [ ] NO  EXPLAIN:    PMH -reviewed admission note, no change since admission  PAST MEDICAL & SURGICAL HISTORY:  HTN (hypertension)      Varicose veins      HLD (hyperlipidemia)      Liver cirrhosis      Portal hypertension with esophageal varices      COPD (chronic obstructive pulmonary disease)      Syncope      Alcohol dependence, daily use      Smokes tobacco daily      DM (diabetes mellitus)      No significant past surgical history          ICU Vital Signs Last 24 Hrs  T(C): 35.7 (23 Nov 2023 08:00), Max: 36.2 (22 Nov 2023 12:00)  T(F): 96.2 (23 Nov 2023 08:00), Max: 97.2 (22 Nov 2023 12:00)  HR: 148 (23 Nov 2023 08:00) (107 - 148)  BP: 118/64 (23 Nov 2023 08:00) (114/81 - 157/91)  BP(mean): 73 (23 Nov 2023 08:00) (73 - 121)  ABP: --  ABP(mean): --  RR: 18 (23 Nov 2023 08:00) (17 - 28)  SpO2: 96% (23 Nov 2023 06:00) (96% - 100%)    O2 Parameters below as of 23 Nov 2023 08:00  Patient On (Oxygen Delivery Method): room air                  11-22 @ 07:01  -  11-23 @ 07:00  --------------------------------------------------------  IN: 300 mL / OUT: 306 mL / NET: -6 mL            PHYSICAL EXAM:    GENERAL: NAD  HEAD:  Atraumatic, Normocephalic  NERVOUS SYSTEM:  Alert & Oriented X1, waxing waning  CHEST/LUNG: No rales, rhonchi, wheezing   HEART: Regular rate and rhythm; No murmurs, rubs, or gallops  ABDOMEN: Soft, Nontender, Nondistended; Bowel sounds present  EXTREMITIES: No clubbing, cyanosis, or edema      LABS:  CBC Full  -  ( 23 Nov 2023 04:20 )  WBC Count : 7.32 K/uL  RBC Count : 3.68 M/uL  Hemoglobin : 11.4 g/dL  Hematocrit : 35.5 %  Platelet Count - Automated : 112 K/uL  Mean Cell Volume : 96.5 fl  Mean Cell Hemoglobin : 31.0 pg  Mean Cell Hemoglobin Concentration : 32.1 gm/dL  Auto Neutrophil # : 5.11 K/uL  Auto Lymphocyte # : 1.31 K/uL  Auto Monocyte # : 0.80 K/uL  Auto Eosinophil # : 0.07 K/uL  Auto Basophil # : 0.01 K/uL  Auto Neutrophil % : 69.8 %  Auto Lymphocyte % : 17.9 %  Auto Monocyte % : 10.9 %  Auto Eosinophil % : 1.0 %  Auto Basophil % : 0.1 %    11-23    143  |  115<H>  |  54<H>  ----------------------------<  109<H>  4.0   |  22  |  1.87<H>    Ca    8.9      23 Nov 2023 04:20  Phos  3.2     11-23  Mg     2.5     11-23    TPro  7.0  /  Alb  3.3<L>  /  TBili  0.5  /  DBili  x   /  AST  22  /  ALT  25  /  AlkPhos  47  11-23    PTT - ( 22 Nov 2023 04:39 )  PTT:29.6 sec  Urinalysis Basic - ( 23 Nov 2023 04:20 )    Color: x / Appearance: x / SG: x / pH: x  Gluc: 109 mg/dL / Ketone: x  / Bili: x / Urobili: x   Blood: x / Protein: x / Nitrite: x   Leuk Esterase: x / RBC: x / WBC x   Sq Epi: x / Non Sq Epi: x / Bacteria: x          RADIOLOGY & ADDITIONAL STUDIES REVIEWED:  ***    [ ]GOALS OF CARE DISCUSSION WITH PATIENT/FAMILY/PROXY:    CRITICAL CARE TIME SPENT: 35 minutes

## 2023-11-23 NOTE — PROGRESS NOTE ADULT - ATTENDING COMMENTS
67yo man current smoker w/ PMH COPD (not on home O2), HTN, DM2, h/o syncope and falls, h/o prior ICU admission requiring intubation for AHRF with hypercapnia and hypoxemia, h/o medication non-compliance, h/o chronic EtOH dependence/daily use c/b liver cirrhosis and portal HTN with EGD (01/2019) showing esophageal varices who presented to ED with dyspnea; found with acute hypercapneic hypoxemic respiratory failure possibly 2/2 AECOPD +/- acute PNA w/ sepsis c/b Afib RVR, failed NIV trial necessitating intubation and admission to ICU for further management.     #Acute hypoxemic and hypercapnic respiratory failure   #NSTEMI  #Afib w/ RVR  #Acute heart failure with reduced EF   #Acute kidney failure   #Lactic acidosis  #Acute delirium   #Dm type 2     Plan:  - Awake and following commands   - Avoid sedation   - Close neurologic monitoring  - RPR negatyive  - CT head with out acute pathology  - continuous hemodynamic monitoring  - Concern for possible Ischemic cardiac event, as bump in troponin, new onset afib and new heart failure   - Eliquis for Afib  - Cardiology consult - may need an ischemic work up  - Cont. bumex for diuresis and maintain euvolemia  - Monitor hemodynamics  - Beta blockers for rate control, increase as tolerated   - Add digoxin   - bronchodilators with symbicort and spiriva  - Completed PO steroids   - Empiric abx coverage completed  - trop downtrended  - lactate cleared  - trend BMP and replete electrolytes  - Close glucose monitoring with coverage for hyperglycemia  - strict I/O and UOP monitoring in ATN  - Void trial  - rate control for AFib +/- RVR  - Aspiration precautions  - Oral dysphagia diet per swallow pathologist   - OOB to chair   - PT evaluation   - Transfer out of ICU

## 2023-11-23 NOTE — PROGRESS NOTE ADULT - ASSESSMENT
68M current smoker with PMHx  COPD, HLD, DM, HTN, chronic ETOH dependence, cirrhosis presents with SOB. Patient was found to be septic likely 2/2 PNA.  Patient continued to be  tachypneic, and  developed New onset Afib on Bipap Patient was intubated for increase WOB 2/2 pulmonary edema iso cardiogenic shock vs COPD exacerbation  and admitted to ICU for further management        ============ Neuro============================  AOx3  Baseline AOx3, no functional impairment    #nictoine withdrawal  Nicotine patch 21mg  #Delirium  Seroquel 25 if patient not redirectable  ================= Cardiovascular==========================  # New Onset Afib w/ RVR  -developed Afib w/ rvr HR 160s  -s/p Cardizem 10mg x2  Metoprolol 50 TID  Digoxin loaded, renally dosed    #New onset HFREF 2/2 NSTEMI  #NSTEMI  EKG New(?) LBBB  Elevated Trops, now downtrending  Echo showed newly decreased EF 20-25%, previously 50-55  D/c heparin drip in setting of suspected HIT, aspirin 81  on eliquis 5 BID  Patient cannot undergo cardiac catheterization iso CORTES/ATN  Metoprolol 50 TID    # HTN  hx of HTN, non complaint with meds  on amlodipine and losartan at home  Metoprolol 50 TID    # Calcified aorta  Syphillis testing negative  ================- Pulm=================================  #Acute hypercapnic RF 2/2 Pulmonary edema vs PNA vs COPD exacerbation  2/2 HFREF  Bumex 1mg qd    # COPD  - hx COPD on home 02  - poor airway entry on admission, no wheezing  - c/w duonebs  - completed steroid course    ==================ID===================================  BCx NGTD      ================= Nephro================================  # ATN  # CORTES  Bun/creatinine 11/1.65<<<33/2.88>>1.87  SCr downtrending  Granular casts  s/p 2.5L   CT AP negative for acute findings  FEUrea 27.7%, likely not pre renal              =================GI====================================  # Umbilical Hernia  -not reproducible, but no sign of obstruction   - no acute intervention recommended by surgery    #PUD  CT AP showed possible duodenitis/ PUD  started on PPI   ================ Heme==================================  #thrombocytopenia  Platelets now stable  HIT panel negative    =================Endocrine===============================  # Diabetes  hx of DM, on metformin at home  Soft and bite sized diet  Insulin ss Q 6 hrs     TG elevated   A1c 4.9  TSH 2.31    ================= Skin/Catheters============================  Peripheral IV lines       =================Prophylaxis =============================  DVT prophylaxis : eliquis  GI prophylaxis

## 2023-11-23 NOTE — PROGRESS NOTE ADULT - SUBJECTIVE AND OBJECTIVE BOX
PATIENT SEEN AND EXAMINED BY EDGARDO DRISCOLL M.D. ON :- 11/23/23  DATE OF SERVICE:   11/23/23          Interim events noted,Labs ,Radiological studies and Cardiology tests reviewed .  DISCUSSED WITH ACP/MEDICAL RESIDENT ON PLAN OF CARE.    MR#[id number]  PATIENT NAME:[facility] COURSE: HPI:  68M current smoker with PMHx  COPD, HLD, DM, HTN, chronic ETOH dependence, cirrhosis presents with SOB. Patient is a poor historian and states he feels SOB but unable to provide further details. Patient denies fever, chills, cough, chest pain, palpitations, abdominal pain, or change in bowel habits.     In the ED:  Afebrile, HR 160s. /94, RR 36, 100% on NRB  WBC 20, lactate 5.7  CXR b/l lung markings  Trop 640, BNP 4K  S/p Cardizem 10mg x2, solumedrol 125mg, 500cc bolus  (18 Nov 2023 10:37)      INTERIM EVENTS:Patient seen at bedside ,interim events noted.      [facility] -reviewed admission note, no change since admission  HEART FAILURE: Acute[ ]Chronic[ ] Systolic[ ] Diastolic[ ] Combined Systolic and Diastolic[ ]  CAD[ ] CABG[ ] PCI[ ]  DEVICES[ ] PPM[ ] ICD[ ] ILR[ ]  ATRIAL FIBRILLATION[ ] Paroxysmal[ ] Permanent[ ] CHADS2-[  ]  CORTES[ ] CKD1[ ] CKD2[ ] CKD3[ ] CKD4[ ] ESRD[ ]  COPD[ ] HTN[ ]   DM[ ] Type1[ ] Type 2[ ]   CVA[ ] Paresis[ ]    AMBULATION: Assisted[ ] Cane/walker[ ] Independent[ ]    MEDICATIONS  (STANDING):  apixaban 5 milliGRAM(s) Oral every 12 hours  aspirin  chewable 81 milliGRAM(s) Oral daily  budesonide 160 MICROgram(s)/formoterol 4.5 MICROgram(s) Inhaler 2 Puff(s) Inhalation two times a day  buMETAnide 2 milliGRAM(s) Oral daily  folic acid 1 milliGRAM(s) Oral daily  insulin lispro (ADMELOG) corrective regimen sliding scale   SubCutaneous Before meals and at bedtime  metoprolol tartrate 50 milliGRAM(s) Oral three times a day  multivitamin 1 Tablet(s) Oral daily  nicotine - 21 mG/24Hr(s) Patch 1 Patch Transdermal daily  pantoprazole    Tablet 40 milliGRAM(s) Oral before breakfast  senna 2 Tablet(s) Oral at bedtime  tiotropium 2.5 MICROgram(s) Inhaler 2 Puff(s) Inhalation daily    MEDICATIONS  (PRN):  albuterol    90 MICROgram(s) HFA Inhaler 2 Puff(s) Inhalation every 6 hours PRN Shortness of Breath and/or Wheezing  QUEtiapine 25 milliGRAM(s) Oral daily PRN delirium, give if not redirectable            REVIEW OF SYSTEMS:  Constitutional: [ ] fever, [ ]weight loss,  [ ]fatigue [ ]weight gain  Eyes: [ ] visual changes  Respiratory: [ ]shortness of breath;  [ ] cough, [ ]wheezing, [ ]chills, [ ]hemoptysis  Cardiovascular: [ ] chest pain, [ ]palpitations, [ ]dizziness,  [ ]leg swelling[ ]orthopnea[ ]PND  Gastrointestinal: [ ] abdominal pain, [ ]nausea, [ ]vomiting,  [ ]diarrhea [ ]Constipation [ ]Melena  Genitourinary: [ ] dysuria, [ ] hematuria [ ]Goss  Neurologic: [ ] headaches [ ] tremors[ ]weakness [ ]Paralysis Right[ ] Left[ ]  Skin: [ ] itching, [ ]burning, [ ] rashes  Endocrine: [ ] heat or cold intolerance  Musculoskeletal: [ ] joint pain or swelling; [ ] muscle, back, or extremity pain  Psychiatric: [ ] depression, [ ]anxiety, [ ]mood swings, or [ ]difficulty sleeping  Hematologic: [ ] easy bruising, [ ] bleeding gums    [ ] All remaining systems negative except as per above.   [ ]Unable to obtain.  [x] No change in ROS since admission      Vital Signs Last 24 Hrs  T(C): 35.7 (23 Nov 2023 08:00), Max: 36.2 (22 Nov 2023 12:00)  T(F): 96.2 (23 Nov 2023 08:00), Max: 97.2 (22 Nov 2023 12:00)  HR: 148 (23 Nov 2023 08:00) (106 - 148)  BP: 118/64 (23 Nov 2023 08:00) (102/70 - 157/91)  BP(mean): 73 (23 Nov 2023 08:00) (73 - 121)  RR: 18 (23 Nov 2023 08:00) (17 - 28)  SpO2: 96% (23 Nov 2023 06:00) (93% - 100%)    Parameters below as of 23 Nov 2023 08:00  Patient On (Oxygen Delivery Method): room air      I&O's Summary    22 Nov 2023 07:01  -  23 Nov 2023 07:00  --------------------------------------------------------  IN: 300 mL / OUT: 306 mL / NET: -6 mL    23 Nov 2023 07:01  -  23 Nov 2023 08:39  --------------------------------------------------------  IN: 120 mL / OUT: 0 mL / NET: 120 mL        PHYSICAL EXAM:  General: No acute distress BMI-  HEENT: EOMI, PERRL  Neck: Supple, [ ] JVD  Lungs: Equal air entry bilaterally; [ ] rales [ ] wheezing [ ] rhonchi  Heart: Regular rate and rhythm; [x ] murmur   2/6 [ x] systolic [ ] diastolic [ ] radiation[ ] rubs [ ]  gallops  Abdomen: Nontender, bowel sounds present  Extremities: No clubbing, cyanosis, [ ] edema [ ]Pulses  equal and intact  Nervous system:  Alert & Oriented X3, no focal deficits  Psychiatric: Normal affect  Skin: No rashes or lesions    LABS:  11-23    143  |  115<H>  |  54<H>  ----------------------------<  109<H>  4.0   |  22  |  1.87<H>    Ca    8.9      23 Nov 2023 04:20  Phos  3.2     11-23  Mg     2.5     11-23    TPro  7.0  /  Alb  3.3<L>  /  TBili  0.5  /  DBili  x   /  AST  22  /  ALT  25  /  AlkPhos  47  11-23    Creatinine Trend: 1.87<--, 1.96<--, 2.44<--, 2.72<--, 2.94<--, 2.88<--                        11.4   7.32  )-----------( 112      ( 23 Nov 2023 04:20 )             35.5     PTT - ( 22 Nov 2023 04:39 )  PTT:29.6 sec

## 2023-11-23 NOTE — PROGRESS NOTE ADULT - SUBJECTIVE AND OBJECTIVE BOX
Full note to follow. [facility] NEPHROLOGY- PROGRESS NOTE    Patient is a 69yo Male active smoker with h/o COPD, HLD, DM, HTN, chronic ETOH dependence, cirrhosis presents with SOB. Pt a/w Sepsis 2/2 PNA, COPD,  Acute hypoxic hypercapnic respiratory failure s/p brief intubation, Acute encephalopathy new onset Afib with CORTES. Nephrology consulted for Elevated serum creatinine.  Pt with NSTEMI with reduced EF; concerning for new onset HF (prev EF 50-55% in April 2023 --> now EF 20-25%). Pt pending possible cath once renal function improves  Pt extubated 11/19      Hospital Medications: Medications reviewed.  REVIEW OF SYSTEMS: Limited ROS; +SOB; denies any pain. Confused at times    VITALS:  T(F): 97 (11-23-23 @ 13:00), Max: 97 (11-22-23 @ 16:00)  HR: 108 (11-23-23 @ 13:00)  BP: 97/54 (11-23-23 @ 13:00)  RR: 15 (11-23-23 @ 13:00)  SpO2: 94% (11-23-23 @ 12:00)  Wt(kg): --    11-22 @ 07:01  -  11-23 @ 07:00  --------------------------------------------------------  IN: 300 mL / OUT: 306 mL / NET: -6 mL    11-23 @ 07:01  -  11-23 @ 14:25  --------------------------------------------------------  IN: 240 mL / OUT: 1600 mL / NET: -1360 mL        PHYSICAL EXAM:  Gen: NAD, altered   HEENT: anicteric +NGT  Neck: +JVD  Cards: +tachy irregular , +S1/S2,   Resp: overall clear; no active wheezing at this time  GI: soft, +mild distention +umbilical hernia   : +randolph   Extremities: no LE edema B/L  Neuro: confused at times      LABS:  11-23  143 <--, 139 <--, 138 <--, 136 <--, 138 <--, 136 <--, 137 <--  143  |  115<H>  |  54<H>  ----------------------------<  109<H>  4.0   |  22  |  1.87<H>    Ca    8.9      23 Nov 2023 04:20  Phos  3.2     11-23  Mg     2.5     11-23    TPro  7.0  /  Alb  3.3<L>  /  TBili  0.5  /  DBili      /  AST  22  /  ALT  25  /  AlkPhos  47  11-23    Creatinine Trend: 1.87 <--, 1.96 <--, 2.44 <--, 2.72 <--, 2.94 <--, 2.88 <--, 2.27 <--, 1.70 <--, 1.65 <--                        11.4   7.32  )-----------( 112      ( 23 Nov 2023 04:20 )             35.5     Urine Studies:  Urinalysis Basic - ( 23 Nov 2023 04:20 )    Color:  / Appearance:  / SG:  / pH:   Gluc: 109 mg/dL / Ketone:   / Bili:  / Urobili:    Blood:  / Protein:  / Nitrite:    Leuk Esterase:  / RBC:  / WBC    Sq Epi:  / Non Sq Epi:  / Bacteria:       Sodium, Random Urine: 21 mmol/L (11-22 @ 09:25)  Creatinine, Random Urine: 128 mg/dL (11-22 @ 09:25)  Creatinine, Random Urine: 128 mg/dL (11-21 @ 10:25)  Sodium, Random Urine: 64 mmol/L (11-20 @ 15:15)  Creatinine, Random Urine: 78 mg/dL (11-20 @ 15:15)  Osmolality, Random Urine: 380 mos/kg (11-20 @ 15:15)    < from: Transthoracic Echocardiogram (11.19.23 @ 13:36) >    Patient name: GREGORY ELIZABETH  YOB: 1955   Age: 68 (M)   MR#: [id number]  Study Date: 11/19/2023  Location: [place] [id number]: [facility]  Study quality: Technically difficult  Referring Physician: Matteo Johansen MD  Blood Pressure: 117/79 mmHg  Height: 173 cm  Weight: 71 kg  BSA: 1.8 m2  ------------------------------------------------------------------------    PROCEDURE: Transthoracic echocardiogram with 2-D, M-Mode  and complete spectral and color flow Doppler.  INDICATION: Shock, unspecified (R57.9)  HISTORY:  ------------------------------------------------------------------------  DIMENSIONS:  Dimensions:     Normal Values:  LA:     4.8 cm    2.0 - 4.0 cm  Ao:     3.6 cm    2.0 - 3.8 cm  SEPTUM: 1.2 cm    0.6 - 1.2 cm  PWT:    1.0 cm    0.6 - 1.1 cm  LVIDd:  5.8 cm    3.0 - 5.6 cm  LVIDs:  4.8 cm    1.8 - 4.0 cm      Derived Variables:  LVMI: 144 g/m2  RWT: 0.34  Ejection Fraction Visual Estimate: 20-25 %    ------------------------------------------------------------------------  OBSERVATIONS:  Mitral Valve: Normal mitral valve. Moderate mitral  regurgitation.  Aortic Root: Normal aortic root.  Aortic Valve: Aortic valve not well visualized. No elevated  gradients were noted across the valve. Mildaortic  regurgitation.  Left Atrium: Severely dilated left atrium.  LA volume index  = 52 cc/m2.  Left Ventricle: Endocardium not well visualized; grossly  severe global left ventricular systolic dysfunction.  Segmental wall motion could not be assessed.  Septal motion  consistent with conduction disease. Normal left ventricular  internal dimensions and wall thicknesses. Unable to  adequately assess diastolic function due to technical  aspects of this study.  Right Heart: Mild right atrial enlargement. Normal right  ventricular size and function. There is mild-moderate  tricuspid regurgitation. There is trace pulmonic  regurgitation.  Pericardium/PleuraSmall pericardial effusion. No  echocardiographic evidence of tamponade physiology.  Hemodynamic: RA Pressure is 10 mm Hg. RV systolic pressure  is 59 mm Hg. Moderate pulmonary hypertension.  ------------------------------------------------------------------------  CONCLUSIONS:  TECHNICALLY VERY DIFFICULT STUDY, POOR ACOUSTIC WINDOWS    1. Moderate mitral regurgitation.  2.  Mild aortic regurgitation.  3. Severely dilated left atrium.  LA volume index = 52  cc/m2.  4. Normal left ventricular internal dimensions and wall  thicknesses.  5. Endocardium not well visualized; grossly severe global  left ventricular systolic dysfunction.   Segmental wall  motion could not be assessed.  Septal motion consistent  with conduction disease.  6. Mild right atrial enlargement.  7. Normal right ventricular size and function.    ------------------------------------------------------------------------  Confirmed on  11/20/2023 - 12:28:32 by Salomon Beyer MD  ------------------------------------------------------------------------    < end of copied text >

## 2023-11-23 NOTE — PROGRESS NOTE ADULT - ASSESSMENT
Patient is a 67yo Male active smoker with h/o COPD, HLD, DM, HTN, chronic ETOH dependence, cirrhosis presents with SOB. Pt a/w Sepsis 2/2 PNA, COPD,  Acute hypoxic hypercapnic respiratory failure s/p brief intubation, Acute encephalopathy new onset Afib with CORTES. Nephrology consulted for Elevated serum creatinine.  Pt with NSTEMI with reduced EF; concerning for new onset HF (prev EF 50-55% in April 2023 --> now EF 20-25%). Pt pending possible cath once renal function improves  Pt extubated 11/19    1 CORTES- previous SCr 0.8-1 in April 2023. CORTES likely ATN. UA with 30 protein; large blood ?traumatic randolph with A/C use. FeNa 1.75%; inconclusive. FeUrea 33%; borderline; likely due to poor EABV.  Renal function improving.   Pt now on Bumex decreased to 1mg PO daily now for maintenance due to poor EF.  May need 2mg, but will monitor.  Ct abd/pelvis with no hydro. Strict I/Os. Avoid nephrotoxins/ NSAIDs/ RCA. Monitor BMP.    2. Acute encephalopathy- CT head neg. Ammonia level <10. Plan as per ICU    3. Acute CHF- TTE with severe global LV systolic dysfunction; EF 20-25%. Cardiology following.  Renal fxn improved further.  Probably ready for cardiac catheterization on Monday.    4. COPD exacerbation- CT chest with no PNA for which abx was d/c. Bronchodilators/ Steroids as per ICU.     [facility] NEPHROLOGY  Iggy Hernandez M.D.  Richard Cook D.O.  Bárbara Slaughter M.D.  MD Breanna Schulte, MSN, ANP-C    Telephone: [phone]  Facsimile: [phone]    [facility], #[facility]  [place] [address]

## 2023-11-24 DIAGNOSIS — J44.9 CHRONIC OBSTRUCTIVE PULMONARY DISEASE, UNSPECIFIED: ICD-10-CM

## 2023-11-24 DIAGNOSIS — A41.9 SEPSIS, UNSPECIFIED ORGANISM: ICD-10-CM

## 2023-11-24 DIAGNOSIS — I50.20 UNSPECIFIED SYSTOLIC (CONGESTIVE) HEART FAILURE: ICD-10-CM

## 2023-11-24 DIAGNOSIS — D69.6 THROMBOCYTOPENIA, UNSPECIFIED: ICD-10-CM

## 2023-11-24 DIAGNOSIS — N17.0 ACUTE KIDNEY FAILURE WITH TUBULAR NECROSIS: ICD-10-CM

## 2023-11-24 DIAGNOSIS — I48.91 UNSPECIFIED ATRIAL FIBRILLATION: ICD-10-CM

## 2023-11-24 DIAGNOSIS — E11.9 TYPE 2 DIABETES MELLITUS WITHOUT COMPLICATIONS: ICD-10-CM

## 2023-11-24 DIAGNOSIS — I21.4 NON-ST ELEVATION (NSTEMI) MYOCARDIAL INFARCTION: ICD-10-CM

## 2023-11-24 DIAGNOSIS — K42.9 UMBILICAL HERNIA WITHOUT OBSTRUCTION OR GANGRENE: ICD-10-CM

## 2023-11-24 DIAGNOSIS — K27.9 PEPTIC ULCER, SITE UNSPECIFIED, UNSPECIFIED AS ACUTE OR CHRONIC, WITHOUT HEMORRHAGE OR PERFORATION: ICD-10-CM

## 2023-11-24 DIAGNOSIS — J96.01 ACUTE RESPIRATORY FAILURE WITH HYPOXIA: ICD-10-CM

## 2023-11-24 DIAGNOSIS — Z29.9 ENCOUNTER FOR PROPHYLACTIC MEASURES, UNSPECIFIED: ICD-10-CM

## 2023-11-24 LAB
ALBUMIN SERPL ELPH-MCNC: 2.7 G/DL — LOW (ref 3.5–5)
ALBUMIN SERPL ELPH-MCNC: 2.7 G/DL — LOW (ref 3.5–5)
ALP SERPL-CCNC: 41 U/L — SIGNIFICANT CHANGE UP (ref 40–120)
ALP SERPL-CCNC: 41 U/L — SIGNIFICANT CHANGE UP (ref 40–120)
ALT FLD-CCNC: 25 U/L DA — SIGNIFICANT CHANGE UP (ref 10–60)
ALT FLD-CCNC: 25 U/L DA — SIGNIFICANT CHANGE UP (ref 10–60)
ANION GAP SERPL CALC-SCNC: 4 MMOL/L — LOW (ref 5–17)
ANION GAP SERPL CALC-SCNC: 4 MMOL/L — LOW (ref 5–17)
AST SERPL-CCNC: 26 U/L — SIGNIFICANT CHANGE UP (ref 10–40)
AST SERPL-CCNC: 26 U/L — SIGNIFICANT CHANGE UP (ref 10–40)
BASOPHILS # BLD AUTO: 0 K/UL — SIGNIFICANT CHANGE UP (ref 0–0.2)
BASOPHILS # BLD AUTO: 0 K/UL — SIGNIFICANT CHANGE UP (ref 0–0.2)
BASOPHILS NFR BLD AUTO: 0 % — SIGNIFICANT CHANGE UP (ref 0–2)
BASOPHILS NFR BLD AUTO: 0 % — SIGNIFICANT CHANGE UP (ref 0–2)
BILIRUB SERPL-MCNC: 0.6 MG/DL — SIGNIFICANT CHANGE UP (ref 0.2–1.2)
BILIRUB SERPL-MCNC: 0.6 MG/DL — SIGNIFICANT CHANGE UP (ref 0.2–1.2)
BUN SERPL-MCNC: 39 MG/DL — HIGH (ref 7–18)
BUN SERPL-MCNC: 39 MG/DL — HIGH (ref 7–18)
CALCIUM SERPL-MCNC: 9.1 MG/DL — SIGNIFICANT CHANGE UP (ref 8.4–10.5)
CALCIUM SERPL-MCNC: 9.1 MG/DL — SIGNIFICANT CHANGE UP (ref 8.4–10.5)
CHLORIDE SERPL-SCNC: 116 MMOL/L — HIGH (ref 96–108)
CHLORIDE SERPL-SCNC: 116 MMOL/L — HIGH (ref 96–108)
CO2 SERPL-SCNC: 26 MMOL/L — SIGNIFICANT CHANGE UP (ref 22–31)
CO2 SERPL-SCNC: 26 MMOL/L — SIGNIFICANT CHANGE UP (ref 22–31)
CREAT SERPL-MCNC: 1.43 MG/DL — HIGH (ref 0.5–1.3)
CREAT SERPL-MCNC: 1.43 MG/DL — HIGH (ref 0.5–1.3)
EGFR: 53 ML/MIN/1.73M2 — LOW
EGFR: 53 ML/MIN/1.73M2 — LOW
EOSINOPHIL # BLD AUTO: 0.18 K/UL — SIGNIFICANT CHANGE UP (ref 0–0.5)
EOSINOPHIL # BLD AUTO: 0.18 K/UL — SIGNIFICANT CHANGE UP (ref 0–0.5)
EOSINOPHIL NFR BLD AUTO: 3.7 % — SIGNIFICANT CHANGE UP (ref 0–6)
EOSINOPHIL NFR BLD AUTO: 3.7 % — SIGNIFICANT CHANGE UP (ref 0–6)
GLUCOSE BLDC GLUCOMTR-MCNC: 115 MG/DL — HIGH (ref 70–99)
GLUCOSE BLDC GLUCOMTR-MCNC: 115 MG/DL — HIGH (ref 70–99)
GLUCOSE BLDC GLUCOMTR-MCNC: 131 MG/DL — HIGH (ref 70–99)
GLUCOSE BLDC GLUCOMTR-MCNC: 131 MG/DL — HIGH (ref 70–99)
GLUCOSE BLDC GLUCOMTR-MCNC: 183 MG/DL — HIGH (ref 70–99)
GLUCOSE BLDC GLUCOMTR-MCNC: 183 MG/DL — HIGH (ref 70–99)
GLUCOSE BLDC GLUCOMTR-MCNC: 91 MG/DL — SIGNIFICANT CHANGE UP (ref 70–99)
GLUCOSE BLDC GLUCOMTR-MCNC: 91 MG/DL — SIGNIFICANT CHANGE UP (ref 70–99)
GLUCOSE SERPL-MCNC: 93 MG/DL — SIGNIFICANT CHANGE UP (ref 70–99)
GLUCOSE SERPL-MCNC: 93 MG/DL — SIGNIFICANT CHANGE UP (ref 70–99)
HCT VFR BLD CALC: 33.6 % — LOW (ref 39–50)
HCT VFR BLD CALC: 33.6 % — LOW (ref 39–50)
HGB BLD-MCNC: 10.7 G/DL — LOW (ref 13–17)
HGB BLD-MCNC: 10.7 G/DL — LOW (ref 13–17)
IMM GRANULOCYTES NFR BLD AUTO: 0.2 % — SIGNIFICANT CHANGE UP (ref 0–0.9)
IMM GRANULOCYTES NFR BLD AUTO: 0.2 % — SIGNIFICANT CHANGE UP (ref 0–0.9)
LYMPHOCYTES # BLD AUTO: 1.05 K/UL — SIGNIFICANT CHANGE UP (ref 1–3.3)
LYMPHOCYTES # BLD AUTO: 1.05 K/UL — SIGNIFICANT CHANGE UP (ref 1–3.3)
LYMPHOCYTES # BLD AUTO: 21.3 % — SIGNIFICANT CHANGE UP (ref 13–44)
LYMPHOCYTES # BLD AUTO: 21.3 % — SIGNIFICANT CHANGE UP (ref 13–44)
MAGNESIUM SERPL-MCNC: 2.2 MG/DL — SIGNIFICANT CHANGE UP (ref 1.6–2.6)
MAGNESIUM SERPL-MCNC: 2.2 MG/DL — SIGNIFICANT CHANGE UP (ref 1.6–2.6)
MCHC RBC-ENTMCNC: 30.9 PG — SIGNIFICANT CHANGE UP (ref 27–34)
MCHC RBC-ENTMCNC: 30.9 PG — SIGNIFICANT CHANGE UP (ref 27–34)
MCHC RBC-ENTMCNC: 31.8 GM/DL — LOW (ref 32–36)
MCHC RBC-ENTMCNC: 31.8 GM/DL — LOW (ref 32–36)
MCV RBC AUTO: 97.1 FL — SIGNIFICANT CHANGE UP (ref 80–100)
MCV RBC AUTO: 97.1 FL — SIGNIFICANT CHANGE UP (ref 80–100)
MONOCYTES # BLD AUTO: 0.47 K/UL — SIGNIFICANT CHANGE UP (ref 0–0.9)
MONOCYTES # BLD AUTO: 0.47 K/UL — SIGNIFICANT CHANGE UP (ref 0–0.9)
MONOCYTES NFR BLD AUTO: 9.5 % — SIGNIFICANT CHANGE UP (ref 2–14)
MONOCYTES NFR BLD AUTO: 9.5 % — SIGNIFICANT CHANGE UP (ref 2–14)
NEUTROPHILS # BLD AUTO: 3.22 K/UL — SIGNIFICANT CHANGE UP (ref 1.8–7.4)
NEUTROPHILS # BLD AUTO: 3.22 K/UL — SIGNIFICANT CHANGE UP (ref 1.8–7.4)
NEUTROPHILS NFR BLD AUTO: 65.3 % — SIGNIFICANT CHANGE UP (ref 43–77)
NEUTROPHILS NFR BLD AUTO: 65.3 % — SIGNIFICANT CHANGE UP (ref 43–77)
NRBC # BLD: 0 /100 WBCS — SIGNIFICANT CHANGE UP (ref 0–0)
NRBC # BLD: 0 /100 WBCS — SIGNIFICANT CHANGE UP (ref 0–0)
PHOSPHATE SERPL-MCNC: 3.6 MG/DL — SIGNIFICANT CHANGE UP (ref 2.5–4.5)
PHOSPHATE SERPL-MCNC: 3.6 MG/DL — SIGNIFICANT CHANGE UP (ref 2.5–4.5)
PLATELET # BLD AUTO: 106 K/UL — LOW (ref 150–400)
PLATELET # BLD AUTO: 106 K/UL — LOW (ref 150–400)
POTASSIUM SERPL-MCNC: 3.7 MMOL/L — SIGNIFICANT CHANGE UP (ref 3.5–5.3)
POTASSIUM SERPL-MCNC: 3.7 MMOL/L — SIGNIFICANT CHANGE UP (ref 3.5–5.3)
POTASSIUM SERPL-SCNC: 3.7 MMOL/L — SIGNIFICANT CHANGE UP (ref 3.5–5.3)
POTASSIUM SERPL-SCNC: 3.7 MMOL/L — SIGNIFICANT CHANGE UP (ref 3.5–5.3)
PROT SERPL-MCNC: 6.1 G/DL — SIGNIFICANT CHANGE UP (ref 6–8.3)
PROT SERPL-MCNC: 6.1 G/DL — SIGNIFICANT CHANGE UP (ref 6–8.3)
RBC # BLD: 3.46 M/UL — LOW (ref 4.2–5.8)
RBC # BLD: 3.46 M/UL — LOW (ref 4.2–5.8)
RBC # FLD: 16.8 % — HIGH (ref 10.3–14.5)
RBC # FLD: 16.8 % — HIGH (ref 10.3–14.5)
SODIUM SERPL-SCNC: 146 MMOL/L — HIGH (ref 135–145)
SODIUM SERPL-SCNC: 146 MMOL/L — HIGH (ref 135–145)
WBC # BLD: 4.93 K/UL — SIGNIFICANT CHANGE UP (ref 3.8–10.5)
WBC # BLD: 4.93 K/UL — SIGNIFICANT CHANGE UP (ref 3.8–10.5)
WBC # FLD AUTO: 4.93 K/UL — SIGNIFICANT CHANGE UP (ref 3.8–10.5)
WBC # FLD AUTO: 4.93 K/UL — SIGNIFICANT CHANGE UP (ref 3.8–10.5)

## 2023-11-24 PROCEDURE — 99232 SBSQ HOSP IP/OBS MODERATE 35: CPT | Mod: GC

## 2023-11-24 RX ORDER — ALBUTEROL 90 UG/1
2 AEROSOL, METERED ORAL EVERY 6 HOURS
Refills: 0 | Status: DISCONTINUED | OUTPATIENT
Start: 2023-11-24 | End: 2023-11-28

## 2023-11-24 RX ORDER — LOPERAMIDE HCL 2 MG
2 TABLET ORAL ONCE
Refills: 0 | Status: DISCONTINUED | OUTPATIENT
Start: 2023-11-24 | End: 2023-11-24

## 2023-11-24 RX ORDER — APIXABAN 2.5 MG/1
5 TABLET, FILM COATED ORAL EVERY 12 HOURS
Refills: 0 | Status: COMPLETED | OUTPATIENT
Start: 2023-11-24 | End: 2023-11-25

## 2023-11-24 RX ADMIN — Medication 50 MILLIGRAM(S): at 05:47

## 2023-11-24 RX ADMIN — QUETIAPINE FUMARATE 25 MILLIGRAM(S): 200 TABLET, FILM COATED ORAL at 21:53

## 2023-11-24 RX ADMIN — BUDESONIDE AND FORMOTEROL FUMARATE DIHYDRATE 2 PUFF(S): 160; 4.5 AEROSOL RESPIRATORY (INHALATION) at 09:08

## 2023-11-24 RX ADMIN — ALBUTEROL 2 PUFF(S): 90 AEROSOL, METERED ORAL at 21:53

## 2023-11-24 RX ADMIN — TIOTROPIUM BROMIDE 2 PUFF(S): 18 CAPSULE ORAL; RESPIRATORY (INHALATION) at 12:03

## 2023-11-24 RX ADMIN — Medication 81 MILLIGRAM(S): at 12:02

## 2023-11-24 RX ADMIN — Medication 50 MILLIGRAM(S): at 21:53

## 2023-11-24 RX ADMIN — Medication 1 PATCH: at 21:54

## 2023-11-24 RX ADMIN — Medication 1 TABLET(S): at 12:02

## 2023-11-24 RX ADMIN — Medication 1 MILLIGRAM(S): at 12:03

## 2023-11-24 RX ADMIN — ALBUTEROL 2 PUFF(S): 90 AEROSOL, METERED ORAL at 15:51

## 2023-11-24 RX ADMIN — BUDESONIDE AND FORMOTEROL FUMARATE DIHYDRATE 2 PUFF(S): 160; 4.5 AEROSOL RESPIRATORY (INHALATION) at 21:53

## 2023-11-24 RX ADMIN — PANTOPRAZOLE SODIUM 40 MILLIGRAM(S): 20 TABLET, DELAYED RELEASE ORAL at 06:12

## 2023-11-24 RX ADMIN — Medication 1 PATCH: at 14:09

## 2023-11-24 RX ADMIN — Medication 50 MILLIGRAM(S): at 15:51

## 2023-11-24 RX ADMIN — APIXABAN 5 MILLIGRAM(S): 2.5 TABLET, FILM COATED ORAL at 18:07

## 2023-11-24 RX ADMIN — Medication 1 PATCH: at 08:02

## 2023-11-24 RX ADMIN — Medication 1: at 12:05

## 2023-11-24 RX ADMIN — BUMETANIDE 1 MILLIGRAM(S): 0.25 INJECTION INTRAMUSCULAR; INTRAVENOUS at 05:47

## 2023-11-24 RX ADMIN — APIXABAN 5 MILLIGRAM(S): 2.5 TABLET, FILM COATED ORAL at 05:47

## 2023-11-24 RX ADMIN — Medication 1 PATCH: at 11:56

## 2023-11-24 NOTE — PROGRESS NOTE ADULT - ATTENDING COMMENTS
Patient seen/evaluated at bedside on 11/24/23. I agree with the resident progress note/outlined plan of care. My independent findings and conclusions are documented.    69yo male active smoker w/ PMH COPD, HTN, DM2, h/o syncope and falls, h/o prior ICU admission requiring intubation for AHRF with hypercapnia and hypoxemia, h/o medication non-compliance, h/o chronic EtOH dependence/daily use c/b liver cirrhosis and portal HTN with EGD (01/2019)   revealing esophageal varices presented to the ED with GUILLEN s/t hypercapneic hypoxemic respiratory failure requiring intubation after failed Bipap intervention secondary to acute COPD exacerbation complicated by a fib w/ RVR ,ATN, NSTEMI, mild thrombocytopenia managed with digoxin load, metoprolol, heparin infusion and AMS attributed to ativan administration. A TTE revealed new reduced EF of 20-25% with acute CHF- managed with bumex. Patient was initially treated w/ ceftriaxone/azithromycin with concern for PNA- CT imaging w/out ninfa consolidation and abx discontinued. Received course of solumedrol, and prednisone in the ICU, transitioned from heparin infusion to eliquis. Of note, in the ICU had CT abd/pelvis revealing infiltrative changes around the duodenum suggestive of duodenitis, PUD.     S: tangential and disorganized, unhappy regarding condom catheter  Physical exam reveals Alert, male oriented to self only- follows commands  noted with decreased breath sounds, S1S2 Irreg/irreg    creatinine 1.43<--peak 2.94    CT abd/pelvis: No CT evidence of pneumonia. Infiltrative changes surrounding the second duodenum suggesting   duodenitis or peptic ulcer disease. Compression fracture of first lumbar vertebra, new since CT of 2018.      #Acute hypoxemic and hypercapnic respiratory failure   #NSTEMI  #Afib w/ RVR  #Acute heart failure with reduced EF - EF 20-25%  #Acute kidney failure   #Lactic acidosis  #Acute delirium   #acute metabolic encephalopathy  #Dm type 2   #thrombocytopenia- mild to moderate    Plan:  planned for transfer on 11/27/23 for cardiac catheterization  -c/w eliquis, metoprolol  -c/w bumex  -prednisone 40mg, symbicort, spiriva, pulmonary consult assessment while on the medical floor for optimization  -given findings on CT a/p with duodenal infiltration suggestive of duodenitis vs. PUD, increase protonix to 40mg po bid for now. Patient requires a GI evaluation for f/u EGD  -s/p course of IV thiamine 500 mg x 3, resume po thiamine at 100mg, c/w folic acid  -f/u cardiology, nephrology, and  pulmonary input

## 2023-11-24 NOTE — PROGRESS NOTE ADULT - ASSESSMENT
Patient is a 69yo Male active smoker with h/o COPD, HLD, DM, HTN, chronic ETOH dependence, cirrhosis presents with SOB. Pt a/w Sepsis 2/2 PNA, COPD,  Acute hypoxic hypercapnic respiratory failure s/p brief intubation, Acute encephalopathy new onset Afib with CORTES. Nephrology consulted for Elevated serum creatinine.  Pt with NSTEMI with reduced EF; concerning for new onset HF (prev EF 50-55% in April 2023 --> now EF 20-25%). Pt pending possible cath once renal function improves  Pt extubated 11/19    1 CORTES- previous SCr 0.8-1 in April 2023. CORTES likely ATN. UA with 30 protein; large blood ?traumatic randolph with A/C use. FeNa 1.75%; inconclusive. FeUrea 33%; borderline; likely due to poor EABV.  Renal function improving.   Pt now on Bumex decreased to 1mg PO daily now for maintenance due to poor EF.  May need 2mg, but will monitor.  Ct abd/pelvis with no hydro. Strict I/Os. Avoid nephrotoxins/ NSAIDs/ RCA. Monitor BMP.    2. Acute encephalopathy- CT head neg. Ammonia level <10. Plan as per ICU    3. Acute CHF- TTE with severe global LV systolic dysfunction; EF 20-25%. Cardiology following.  Renal fxn improved further.  Probably ready for cardiac catheterization on Monday.    4. COPD exacerbation- CT chest with no PNA for which abx was d/c. Bronchodilators/ Steroids as per ICU.     [facility] NEPHROLOGY  Iggy Hernandez M.D.  Richard Cook D.O.  Bárbara Slaughter M.D.  MD Breanna Schulte, MSN, ANP-C    Telephone: [phone]  Facsimile: [phone]    [facility], #[facility]  [place] [address]

## 2023-11-24 NOTE — CHART NOTE - NSCHARTNOTEFT_GEN_A_CORE
Notified by RN that patient has 3 episodes of lose stools    Patient endorses that this usually happens when he drinks milk, states he has coffee/milk this morning.    Patient denies      Exam-  Vitals-      PLAN Notified by RN that patient has 3 episodes of lose stools    Patient endorses that this usually happens when he drinks milk, states he has coffee/milk this morning.    Patient denies abdominal pain, nausea vomiting      Exam-  Vitals- stable      PLAN- continue to monitor

## 2023-11-24 NOTE — CONSULT NOTE ADULT - SUBJECTIVE AND OBJECTIVE BOX
Time of visit:    CHIEF COMPLAINT: Patient is a 68y old  Male who presents with a chief complaint of Acute respiratory failure with hypoxia     (24 Nov 2023 15:24)      HPI:  68M current smoker with PMHx  COPD, HLD, DM, HTN, chronic ETOH dependence, cirrhosis presents with SOB. Patient is a poor historian and states he feels SOB but unable to provide further details. Patient denies fever, chills, cough, chest pain, palpitations, abdominal pain, or change in bowel habits.     In the ED:  Afebrile, HR 160s. /94, RR 36, 100% on NRB  WBC 20, lactate 5.7  CXR b/l lung markings  Trop 640, BNP 4K  S/p Cardizem 10mg x2, solumedrol 125mg, 500cc bolus  (18 Nov 2023 10:37)   Patient seen and examined.     PAST MEDICAL & SURGICAL HISTORY:  HTN (hypertension)      Varicose veins      HLD (hyperlipidemia)      Liver cirrhosis      Portal hypertension with esophageal varices      COPD (chronic obstructive pulmonary disease)      Syncope      Alcohol dependence, daily use      Smokes tobacco daily      DM (diabetes mellitus)      No significant past surgical history          Allergies    No Known Allergies    Intolerances        MEDICATIONS  (STANDING):  albuterol    90 MICROgram(s) HFA Inhaler 2 Puff(s) Inhalation every 6 hours  apixaban 5 milliGRAM(s) Oral every 12 hours  aspirin  chewable 81 milliGRAM(s) Oral daily  budesonide 160 MICROgram(s)/formoterol 4.5 MICROgram(s) Inhaler 2 Puff(s) Inhalation two times a day  buMETAnide 1 milliGRAM(s) Oral daily  folic acid 1 milliGRAM(s) Oral daily  insulin lispro (ADMELOG) corrective regimen sliding scale   SubCutaneous Before meals and at bedtime  metoprolol tartrate 50 milliGRAM(s) Oral three times a day  multivitamin 1 Tablet(s) Oral daily  nicotine - 21 mG/24Hr(s) Patch 1 Patch Transdermal daily  pantoprazole    Tablet 40 milliGRAM(s) Oral before breakfast  predniSONE   Tablet 40 milliGRAM(s) Oral daily  QUEtiapine 25 milliGRAM(s) Oral at bedtime  senna 2 Tablet(s) Oral at bedtime  tiotropium 2.5 MICROgram(s) Inhaler 2 Puff(s) Inhalation daily      MEDICATIONS  (PRN):   Medications up to date at time of exam.    Medications up to date at time of exam.    FAMILY HISTORY:      SOCIAL HISTORY  Smoking History: [   ] smoking/smoke exposure, [ x  ] former smoker  Living Condition: [   ] apartment, [   ] private house  Work History:   Travel History: denies recent travel  Illicit Substance Use: denies  Alcohol Use: + former alcohol use     REVIEW OF SYSTEMS:    CONSTITUTIONAL:  denies fevers, chills, sweats, weight loss    HEENT:  denies diplopia or blurred vision, sore throat or runny nose.    CARDIOVASCULAR:  denies pressure, squeezing, tightness, or heaviness about the chest; no palpitations.    RESPIRATORY:  denies SOB, cough, GUILLEN, wheezing.    GASTROINTESTINAL:  denies abdominal pain, nausea, vomiting or diarrhea.    GENITOURINARY: denies dysuria, frequency or urgency.    NEUROLOGIC:  denies numbness, tingling, seizures or weakness.    PSYCHIATRIC:  denies disorder of thought or mood.    MSK: denies swelling, redness      PHYSICAL EXAMINATION:    GENERAL: The patient is a well-developed, well-nourished, in no apparent distress.     Vital Signs Last 24 Hrs  T(C): 36.9 (24 Nov 2023 16:22), Max: 36.9 (23 Nov 2023 23:59)  T(F): 98.4 (24 Nov 2023 16:22), Max: 98.4 (23 Nov 2023 23:59)  HR: 124 (24 Nov 2023 16:22) (67 - 124)  BP: 94/73 (24 Nov 2023 16:22) (94/73 - 154/62)  BP(mean): --  RR: 18 (24 Nov 2023 16:22) (18 - 18)  SpO2: 94% (24 Nov 2023 16:22) (94% - 96%)    Parameters below as of 24 Nov 2023 16:22  Patient On (Oxygen Delivery Method): room air       (if applicable)    Chest Tube (if applicable)    HEENT: Head is normocephalic and atraumatic. Extraocular muscles are intact. Mucous membranes are moist.     NECK: Supple, no palpable adenopathy.    LUNGS: Clear to auscultation, no wheezing, rales, or rhonchi.    HEART: Regular rate and rhythm without murmur.    ABDOMEN: Soft, nontender, and nondistended.  No hepatosplenomegaly is noted.    RENAL: No difficulty voiding, no pelvic pain    EXTREMITIES: Without any cyanosis, clubbing, rash, lesions or edema.    NEUROLOGIC: Awake, alert, oriented, grossly intact    SKIN: Warm, dry, good turgor.      LABS:                        10.7   4.93  )-----------( 106      ( 24 Nov 2023 06:27 )             33.6     11-24    146<H>  |  116<H>  |  39<H>  ----------------------------<  93  3.7   |  26  |  1.43<H>    Ca    9.1      24 Nov 2023 06:27  Phos  3.6     11-24  Mg     2.2     11-24    TPro  6.1  /  Alb  2.7<L>  /  TBili  0.6  /  DBili  x   /  AST  26  /  ALT  25  /  AlkPhos  41  11-24      Urinalysis Basic - ( 24 Nov 2023 06:27 )    Color: x / Appearance: x / SG: x / pH: x  Gluc: 93 mg/dL / Ketone: x  / Bili: x / Urobili: x   Blood: x / Protein: x / Nitrite: x   Leuk Esterase: x / RBC: x / WBC x   Sq Epi: x / Non Sq Epi: x / Bacteria: x    MICROBIOLOGY: (if applicable)    RADIOLOGY & ADDITIONAL STUDIES:  EKG:   CXR:  < from: CT Chest No Cont (11.20.23 @ 16:10) >    ACC: [id number] EXAM:  CT ABDOMEN AND PELVIS   ORDERED BY: CORETTA ANDRE     ACC: [id number] EXAM:  CT CHEST   ORDERED BY: CORETTA ANDRE     *** ADDENDUM # 1 ***    Addendum: There is moderate calcified atheromatous plaque of the   abdominal aorta and common iliac arteries without aneurysmal dilatation.   Calcifications are present at the origin of the mesenteric and renal   arteries.    --- End of Report ---    *** END OF ADDENDUM # 1 ***      PROCEDURE DATE:  11/20/2023          INTERPRETATION:  CLINICAL INFORMATION: Acute respiratory failure, sepsis.    COMPARISON: CT abdomen pelvis dated 7/16/2018.    CONTRAST/COMPLICATIONS:  IV Contrast: NONE  Oral Contrast: NONE  Complications: None reported at time of study completion    PROCEDURE:  CT of the Chest, Abdomen and Pelvis was performed.  Sagittal and coronal reformats were performed.  Motion limited study.    FINDINGS:  CHEST:  LUNGS AND LARGE AIRWAYS: Patent central airways. Right basilar dependent   atelectasis.  PLEURA: Small bilateral pleural effusions.  VESSELS: Atheromatous calcifications of the thoracic aorta and its   branches. Prominent central pulmonary artery.  HEART: Heart size is normal. No pericardial effusion.  MEDIASTINUM AND JESUS: No lymphadenopathy.  CHEST WALL AND LOWER NECK: Within normal limits.    ABDOMEN AND PELVIS:  LIVER: Cirrhotic morphology.  BILE DUCTS: Normal caliber.  GALLBLADDER: Mildly contracted.  SPLEEN: Mild splenomegaly.  PANCREAS: Within normal limits.  ADRENALS: Within normal limits.  KIDNEYS/URETERS: No hydronephrosis.    BLADDER: Goss catheter.  REPRODUCTIVE ORGANS: Dystrophic calcifications in the prostate.    BOWEL: Enteric tube tip in the gastric body. No bowel obstruction. Soft   tissue infiltration surrounding the second and third portions of the   duodenum. Sigmoid diverticula. Rectal tube. Appendix is normal.  PERITONEUM: No ascites. No abscess.  VESSELS: Atheromatous calcifications.  RETROPERITONEUM/LYMPH NODES: No lymphadenopathy.  ABDOMINAL WALL: Fat-containing ventral hernia.  BONES: Compression fracture first lumbar vertebra, new since prior study.   Depression of superior endplate of the third lumbar vertebra, also new.    IMPRESSION:  No CT evidence of pneumonia.    Infiltrative changes surrounding the second duodenum suggesting   duodenitis or peptic ulcer disease.    Compression fracture of first lumbar vertebra, new since CT of 2018.            --- End of Report ---    ***Please see the addendum at the top of this report. It may contain   additional important information or changes.****        JUAN KIRK MD; Attending Radiologist  This document has been electronically signed. Nov 20 2023  5:06PM  1st Addendum: JUAN KIRK MD; Attending Radiologist  The first addendum was electronically signed on: Nov 22 2023  1:41PM.    < end of copied text >  ECHO:    IMPRESSION: 68y Male PAST MEDICAL & SURGICAL HISTORY:  HTN (hypertension)      Varicose veins      HLD (hyperlipidemia)      Liver cirrhosis      Portal hypertension with esophageal varices      COPD (chronic obstructive pulmonary disease)      Syncope      Alcohol dependence, daily use      Smokes tobacco daily      DM (diabetes mellitus)      No significant past surgical history       p/w           IMP: This is a 67 yr old man  current smoker with  COPD, HLD, DM, HTN, chronic ETOH dependence, cirrhosis presents with SOB. Patient was found to be septic likely 2/2 PNA.  Patient continued to be  tachypneic, and  developed New onset Afib on Bipap Patient was intubated for increase WOB and admitted to ICU for further management. Patient was intubated in the ED. Patient had new onset Afib initially thought to be 2/2 sepsis with LBBB on EKG and elevated trops and lactic acidosis. Patient received 2.5 L bolus. Patient received cardizem push x2. Patient was started on Rocephin and Azithro empirically for CAP and was started on solumedrol and duonebs for possible COPD exacerbation. Trops downtrending. Patient was producing minimal urine and UA showed granular casts indicative of ATN. Patient was extubated on 11/19 to BIPAP and weaned to NC but had to be put back on BIPAP in setting of inc WOB. Patient was started on metoprolol and was given one dose of bumex to attempt to diurese patient in setting of B lines on POCUS.11/20  Patients mental status was altered likely in setting of ativan use, but patietnt was started on high dose thiamine to cover for possible wernickes encephalopathy. Pan CT was negative for any acute abnormalities on head and chest and positive for possible duodenitis/Peptic ulcer disease on AP. Echo showed newly reduced EF of 20-25%. Patient was started on heparin drip and aspirin. 11/21 Mentation much improved. Heparin was held in setting of thrombocytopenia, apixiban was started. SCr downtrending. Patient started on diet, now on 2L NC. ABx discontinued, no infiltrates on CT chest. Solumedrol discontinued. 11/22 Patient AOx3 today. Still tachycardic, metoprolol increased to 50. Goss removed. HIT panel negative.11/23 started digoxin load.    Sugg    - Continue to monitor pulse oximetry   - Prednisone 40 mg Qd   - Continue Symbicort    - Tele monitoring   - Anticoag   - Duonebs q6h prn   - For Cardiac cath 11/27   - Agreed with nicotine patch   - Bumex   - Monitor electrolyte  daily   - Advise to stop smoking and using alcohol   - Monitor blood glucose with coverage

## 2023-11-24 NOTE — PROGRESS NOTE ADULT - SUBJECTIVE AND OBJECTIVE BOX
PATIENT SEEN AND EXAMINED BY EDGARDO DRISCOLL M.D. ON :- 11/24/23  DATE OF SERVICE:   11/24/23          Interim events noted,Labs ,Radiological studies and Cardiology tests reviewed .  DISCUSSED WITH ACP/MEDICAL RESIDENT ON PLAN OF CARE.    MR#[id number]  PATIENT NAME:[facility] COURSE: HPI:  68M current smoker with PMHx  COPD, HLD, DM, HTN, chronic ETOH dependence, cirrhosis presents with SOB. Patient is a poor historian and states he feels SOB but unable to provide further details. Patient denies fever, chills, cough, chest pain, palpitations, abdominal pain, or change in bowel habits.     In the ED:  Afebrile, HR 160s. /94, RR 36, 100% on NRB  WBC 20, lactate 5.7  CXR b/l lung markings  Trop 640, BNP 4K  S/p Cardizem 10mg x2, solumedrol 125mg, 500cc bolus  (18 Nov 2023 10:37)      INTERIM EVENTS:Patient seen at bedside ,interim events noted.      [facility] -reviewed admission note, no change since admission  HEART FAILURE: Acute[ ]Chronic[ ] Systolic[ ] Diastolic[ ] Combined Systolic and Diastolic[ ]  CAD[ ] CABG[ ] PCI[ ]  DEVICES[ ] PPM[ ] ICD[ ] ILR[ ]  ATRIAL FIBRILLATION[ ] Paroxysmal[ ] Permanent[ ] CHADS2-[  ]  CORTES[ ] CKD1[ ] CKD2[ ] CKD3[ ] CKD4[ ] ESRD[ ]  COPD[ ] HTN[ ]   DM[ ] Type1[ ] Type 2[ ]   CVA[ ] Paresis[ ]    AMBULATION: Assisted[ ] Cane/walker[ ] Independent[ ]    MEDICATIONS  (STANDING):  albuterol    90 MICROgram(s) HFA Inhaler 2 Puff(s) Inhalation every 6 hours  apixaban 5 milliGRAM(s) Oral every 12 hours  aspirin  chewable 81 milliGRAM(s) Oral daily  budesonide 160 MICROgram(s)/formoterol 4.5 MICROgram(s) Inhaler 2 Puff(s) Inhalation two times a day  buMETAnide 1 milliGRAM(s) Oral daily  folic acid 1 milliGRAM(s) Oral daily  insulin lispro (ADMELOG) corrective regimen sliding scale   SubCutaneous Before meals and at bedtime  metoprolol tartrate 50 milliGRAM(s) Oral three times a day  multivitamin 1 Tablet(s) Oral daily  nicotine - 21 mG/24Hr(s) Patch 1 Patch Transdermal daily  pantoprazole    Tablet 40 milliGRAM(s) Oral before breakfast  predniSONE   Tablet 40 milliGRAM(s) Oral daily  QUEtiapine 25 milliGRAM(s) Oral at bedtime  senna 2 Tablet(s) Oral at bedtime  tiotropium 2.5 MICROgram(s) Inhaler 2 Puff(s) Inhalation daily    MEDICATIONS  (PRN):            REVIEW OF SYSTEMS:  Constitutional: [ ] fever, [ ]weight loss,  [ ]fatigue [ ]weight gain  Eyes: [ ] visual changes  Respiratory: [ ]shortness of breath;  [ ] cough, [ ]wheezing, [ ]chills, [ ]hemoptysis  Cardiovascular: [ ] chest pain, [ ]palpitations, [ ]dizziness,  [ ]leg swelling[ ]orthopnea[ ]PND  Gastrointestinal: [ ] abdominal pain, [ ]nausea, [ ]vomiting,  [ ]diarrhea [ ]Constipation [ ]Melena  Genitourinary: [ ] dysuria, [ ] hematuria [ ]Goss  Neurologic: [ ] headaches [ ] tremors[ ]weakness [ ]Paralysis Right[ ] Left[ ]  Skin: [ ] itching, [ ]burning, [ ] rashes  Endocrine: [ ] heat or cold intolerance  Musculoskeletal: [ ] joint pain or swelling; [ ] muscle, back, or extremity pain  Psychiatric: [ ] depression, [ ]anxiety, [ ]mood swings, or [ ]difficulty sleeping  Hematologic: [ ] easy bruising, [ ] bleeding gums    [ ] All remaining systems negative except as per above.   [ ]Unable to obtain.  [x] No change in ROS since admission      Vital Signs Last 24 Hrs  T(C): 36.9 (24 Nov 2023 16:22), Max: 36.9 (23 Nov 2023 23:59)  T(F): 98.4 (24 Nov 2023 16:22), Max: 98.4 (23 Nov 2023 23:59)  HR: 124 (24 Nov 2023 16:22) (67 - 124)  BP: 94/73 (24 Nov 2023 16:22) (94/73 - 154/62)  BP(mean): --  RR: 18 (24 Nov 2023 16:22) (18 - 18)  SpO2: 94% (24 Nov 2023 16:22) (94% - 96%)    Parameters below as of 24 Nov 2023 16:22  Patient On (Oxygen Delivery Method): room air      I&O's Summary    23 Nov 2023 07:01  -  24 Nov 2023 07:00  --------------------------------------------------------  IN: 240 mL / OUT: 1950 mL / NET: -1710 mL    24 Nov 2023 07:01  -  24 Nov 2023 21:07  --------------------------------------------------------  IN: 550 mL / OUT: 1400 mL / NET: -850 mL        PHYSICAL EXAM:  General: No acute distress BMI-  HEENT: EOMI, PERRL  Neck: Supple, [ ] JVD  Lungs: Equal air entry bilaterally; [ ] rales [ ] wheezing [ ] rhonchi  Heart: Regular rate and rhythm; [x ] murmur   2/6 [ x] systolic [ ] diastolic [ ] radiation[ ] rubs [ ]  gallops  Abdomen: Nontender, bowel sounds present  Extremities: No clubbing, cyanosis, [ ] edema [ ]Pulses  equal and intact  Nervous system:  Alert & Oriented X3, no focal deficits  Psychiatric: Normal affect  Skin: No rashes or lesions    LABS:  11-24    146<H>  |  116<H>  |  39<H>  ----------------------------<  93  3.7   |  26  |  1.43<H>    Ca    9.1      24 Nov 2023 06:27  Phos  3.6     11-24  Mg     2.2     11-24    TPro  6.1  /  Alb  2.7<L>  /  TBili  0.6  /  DBili  x   /  AST  26  /  ALT  25  /  AlkPhos  41  11-24    Creatinine Trend: 1.43<--, 1.87<--, 1.96<--, 2.44<--, 2.72<--, 2.94<--                        10.7   4.93  )-----------( 106      ( 24 Nov 2023 06:27 )             33.6

## 2023-11-24 NOTE — PROGRESS NOTE ADULT - PROBLEM SELECTOR PLAN 1
Echo - EF 20-25%. Severe global LV systolic dysfunction. Moderate MR. Mild AR. Severely dilated LA. Segmental wall  motion could not be assessed.  Septal motion consistent with conduction disease.  Patient developed NSTEMI. Troponins peaked at 640, then downtrended s/p heparin drip.   - Acute CHF likely 2/2 ischemic cardiomyopathy.   - Pending transfer for cardiac cath on 11/27.

## 2023-11-24 NOTE — PROGRESS NOTE ADULT - SUBJECTIVE AND OBJECTIVE BOX
PGY-1 Progress Note discussed with attending.    PAGER #: [[phone]] TILL 5:00 PM  PLEASE CONTACT ON CALL TEAM:  - On Call Team (Please refer to Angel) FROM 5:00 PM - 8:30PM  - Nightfloat Team FROM 8:30 -7:30 AM      INTERVAL HPI/OVERNIGHT EVENTS:       REVIEW OF SYSTEMS:  CONSTITUTIONAL: No fever, weight loss, or fatigue.  RESPIRATORY: No cough, wheezing, chills, or hemoptysis. No shortness of breath.  CARDIOVASCULAR: No chest pain, palpitations, dizziness, or leg swelling.  GASTROINTESTINAL: No abdominal pain. No nausea, vomiting, or hematemesis. No diarrhea or constipation. No melena or hematochezia.  GENITOURINARY: No dysuria or hematuria, urinary frequency.  NEUROLOGICAL: No headaches, memory loss, loss of strength, numbness, or tremors.  SKIN: No itching, burning, rashes, or lesions.    Vital Signs Last 24 Hrs  T(C): 36.9 (24 Nov 2023 16:22), Max: 36.9 (23 Nov 2023 23:59)  T(F): 98.4 (24 Nov 2023 16:22), Max: 98.4 (23 Nov 2023 23:59)  HR: 124 (24 Nov 2023 16:22) (67 - 124)  BP: 94/73 (24 Nov 2023 16:22) (94/73 - 154/62)  BP(mean): --  RR: 18 (24 Nov 2023 16:22) (18 - 18)  SpO2: 94% (24 Nov 2023 16:22) (94% - 98%)    Parameters below as of 24 Nov 2023 16:22  Patient On (Oxygen Delivery Method): room air        PHYSICAL EXAMINATION:  GENERAL: NAD   HEAD:  Atraumatic, Normocephalic.  EYES:  Conjunctiva and sclera clear.  NECK: Supple, No JVD, Normal thyroid.  CHEST/LUNG: Clear to auscultation. Clear to percussion bilaterally. No rales, rhonchi, wheezing, or rubs.  HEART: Regular rate and rhythm. No murmurs, rubs, or gallops.  ABDOMEN: Soft, Nontender, Nondistended. Bowel sounds present.  NERVOUS SYSTEM:  Alert & Oriented X3  EXTREMITIES:  2+ Peripheral Pulses. No clubbing, cyanosis, or edema.  SKIN: Warm, dry.                          10.7   4.93  )-----------( 106      ( 24 Nov 2023 06:27 )             33.6     11-24    146<H>  |  116<H>  |  39<H>  ----------------------------<  93  3.7   |  26  |  1.43<H>    Ca    9.1      24 Nov 2023 06:27  Phos  3.6     11-24  Mg     2.2     11-24    TPro  6.1  /  Alb  2.7<L>  /  TBili  0.6  /  DBili  x   /  AST  26  /  ALT  25  /  AlkPhos  41  11-24    LIVER FUNCTIONS - ( 24 Nov 2023 06:27 )  Alb: 2.7 g/dL / Pro: 6.1 g/dL / ALK PHOS: 41 U/L / ALT: 25 U/L DA / AST: 26 U/L / GGT: x                   CAPILLARY BLOOD GLUCOSE:      RADIOLOGY & ADDITIONAL TESTS:               PGY-1 Progress Note discussed with attending.    PAGER #: [[phone]] TILL 5:00 PM  PLEASE CONTACT ON CALL TEAM:  - On Call Team (Please refer to Angel) FROM 5:00 PM - 8:30PM  - Nightfloat Team FROM 8:30 -7:30 AM      INTERVAL HPI/OVERNIGHT EVENTS: Patient seen and examined at bedside. Resting comfortably. No acute overnight events.      REVIEW OF SYSTEMS:  CONSTITUTIONAL: No fever, weight loss, or fatigue.  RESPIRATORY: +wheezing. No cough, chills, or hemoptysis. No shortness of breath.  CARDIOVASCULAR: No chest pain, palpitations, dizziness, or leg swelling.  GASTROINTESTINAL: No abdominal pain. No nausea, vomiting, or hematemesis. No diarrhea or constipation. No melena or hematochezia.  GENITOURINARY: No dysuria or hematuria, urinary frequency.  NEUROLOGICAL: No headaches, memory loss, loss of strength, numbness, or tremors.  SKIN: No itching, burning, rashes, or lesions.    Vital Signs Last 24 Hrs  T(C): 36.9 (24 Nov 2023 16:22), Max: 36.9 (23 Nov 2023 23:59)  T(F): 98.4 (24 Nov 2023 16:22), Max: 98.4 (23 Nov 2023 23:59)  HR: 124 (24 Nov 2023 16:22) (67 - 124)  BP: 94/73 (24 Nov 2023 16:22) (94/73 - 154/62)  BP(mean): --  RR: 18 (24 Nov 2023 16:22) (18 - 18)  SpO2: 94% (24 Nov 2023 16:22) (94% - 98%)    Parameters below as of 24 Nov 2023 16:22  Patient On (Oxygen Delivery Method): room air        PHYSICAL EXAMINATION:  GENERAL: NAD   HEAD:  Atraumatic, Normocephalic.  EYES:  Conjunctiva and sclera clear.  NECK: Supple, No JVD, Normal thyroid.  CHEST/LUNG: +bilateral expiratory wheezing, R>L.  HEART: Regular rate and rhythm. No murmurs, rubs, or gallops.  ABDOMEN: Soft, Nontender, Nondistended. Bowel sounds present. +umbilical hernia.  NERVOUS SYSTEM:  Alert & Oriented X3  EXTREMITIES:  2+ Peripheral Pulses. No clubbing, cyanosis, or edema.  SKIN: Warm, dry.                          10.7   4.93  )-----------( 106      ( 24 Nov 2023 06:27 )             33.6     11-24    146<H>  |  116<H>  |  39<H>  ----------------------------<  93  3.7   |  26  |  1.43<H>    Ca    9.1      24 Nov 2023 06:27  Phos  3.6     11-24  Mg     2.2     11-24    TPro  6.1  /  Alb  2.7<L>  /  TBili  0.6  /  DBili  x   /  AST  26  /  ALT  25  /  AlkPhos  41  11-24    LIVER FUNCTIONS - ( 24 Nov 2023 06:27 )  Alb: 2.7 g/dL / Pro: 6.1 g/dL / ALK PHOS: 41 U/L / ALT: 25 U/L DA / AST: 26 U/L / GGT: x                   CAPILLARY BLOOD GLUCOSE:      RADIOLOGY & ADDITIONAL TESTS:

## 2023-11-24 NOTE — PROGRESS NOTE ADULT - PROBLEM SELECTOR PLAN 10
- Eliquis for A fib  - GI ppx - Eliquis for A fib  - GI ppx    Will go for Cardiac Cath on Monday 11/27.  >> Last dose of Eliquis will be for 11/25 Sat.

## 2023-11-24 NOTE — PROGRESS NOTE ADULT - ASSESSMENT
68M current smoker with PMHx  COPD, HLD, DM, HTN, chronic ETOH dependence, cirrhosis presents with SOB. Patient was found to be septic likely 2/2 PNA.  Patient continued to be  tachypneic, and  developed New onset Afib on Bipap Patient was intubated for increase WOB 2/2 pulmonary edema iso cardiogenic shock vs COPD exacerbation  and admitted to ICU for further management      #New onset HFREF 2/2 NSTEMI  #NSTEMI  #  HFrEF (heart failure with reduced ejection fraction).   ·  Plan: Echo - EF 20-25%. Severe global LV systolic dysfunction. Moderate MR. Mild AR. Severely dilated LA. Segmental wall  motion could not be assessed.  Septal motion consistent with conduction disease.  Patient developed NSTEMI. Troponins peaked at 640, then downtrended s/p heparin drip.   - Acute CHF likely 2/2 ischemic cardiomyopathy.   -  transfer for cardiac cath on 11/27.    #  NSTEMI (non-ST elevation myocardial infarction).   ·  Plan: Troponins peaked at 640, then downtrended s/p heparin drip.     # New Atrial fibrillation.   - Developed A fib with RVR to 160s.  - cont  metoprolol and eliquis   - tele.    # HTN  hx of HTN, non complaint with meds  on amlodipine and losartan at home  Metoprolol 50 BID

## 2023-11-24 NOTE — PROGRESS NOTE ADULT - PROBLEM SELECTOR PLAN 4
h/o COPD, home meds tiotropium inhaler.  - patient still with bilateral expiratory wheezing.  - c/w tiotropium, albuterol, symbicort. h/o COPD, home meds tiotropium inhaler.  s/p extubation 11/19 for COPD exacerbation.  - patient still with bilateral expiratory wheezing.  - c/w tiotropium, albuterol, symbicort.  - Pulm Dr. Manrique consulted - pending recs. h/o COPD, home meds tiotropium inhaler.  s/p extubation 11/19 for COPD exacerbation.  - patient still with bilateral expiratory wheezing.  - c/w tiotropium, albuterol, symbicort.  - started prednisone 40mg daily.  - Pulm Dr. Manrique consulted - pending recs.

## 2023-11-24 NOTE — PROGRESS NOTE ADULT - SUBJECTIVE AND OBJECTIVE BOX
[facility] NEPHROLOGY- PROGRESS NOTE    Patient is a 69yo Male active smoker with h/o COPD, HLD, DM, HTN, chronic ETOH dependence, cirrhosis presents with SOB. Pt a/w Sepsis 2/2 PNA, COPD,  Acute hypoxic hypercapnic respiratory failure s/p brief intubation, Acute encephalopathy new onset Afib with CORTES. Nephrology consulted for Elevated serum creatinine.  Pt with NSTEMI with reduced EF; concerning for new onset HF (prev EF 50-55% in April 2023 --> now EF 20-25%). Pt pending possible cath once renal function improves  Pt extubated 11/19      Hospital Medications: Medications reviewed.  REVIEW OF SYSTEMS: Limited ROS; +SOB; denies any pain. Confused at times    Vital Signs Last 24 Hrs  T(C): 37 (24 Nov 2023 23:30), Max: 37 (24 Nov 2023 23:30)  T(F): 98.6 (24 Nov 2023 23:30), Max: 98.6 (24 Nov 2023 23:30)  HR: 94 (24 Nov 2023 23:30) (67 - 124)  BP: 124/76 (24 Nov 2023 23:30) (94/73 - 155/86)  BP(mean): --  RR: 18 (24 Nov 2023 23:30) (18 - 18)  SpO2: 96% (24 Nov 2023 23:30) (94% - 97%)    Parameters below as of 24 Nov 2023 23:30  Patient On (Oxygen Delivery Method): room air        PHYSICAL EXAM:  Gen: NAD, altered   HEENT: anicteric +NGT  Neck: +JVD  Cards: +tachy irregular , +S1/S2,   Resp: overall clear; no active wheezing at this time  GI: soft, +mild distention +umbilical hernia   : +randolph   Extremities: no LE edema B/L  Neuro: confused at times    LABS:  11-24  146 <--, 143 <--, 139 <--, 138 <--, 136 <--, 138 <--, 136 <--  146<H>  |  116<H>  |  39<H>  ----------------------------<  93  3.7   |  26  |  1.43<H>    Ca    9.1      24 Nov 2023 06:27  Phos  3.6     11-24  Mg     2.2     11-24    TPro  6.1  /  Alb  2.7<L>  /  TBili  0.6  /  DBili      /  AST  26  /  ALT  25  /  AlkPhos  41  11-24    Creatinine Trend: 1.43 <--, 1.87 <--, 1.96 <--, 2.44 <--, 2.72 <--, 2.94 <--, 2.88 <--, 2.27 <--, 1.70 <--, 1.65 <--                        10.7   4.93  )-----------( 106      ( 24 Nov 2023 06:27 )             33.6     Urine Studies:  Urinalysis Basic - ( 24 Nov 2023 06:27 )    Color:  / Appearance:  / SG:  / pH:   Gluc: 93 mg/dL / Ketone:   / Bili:  / Urobili:    Blood:  / Protein:  / Nitrite:    Leuk Esterase:  / RBC:  / WBC    Sq Epi:  / Non Sq Epi:  / Bacteria:       Sodium, Random Urine: 21 mmol/L (11-22 @ 09:25)  Creatinine, Random Urine: 128 mg/dL (11-22 @ 09:25)  Creatinine, Random Urine: 128 mg/dL (11-21 @ 10:25)  Sodium, Random Urine: 64 mmol/L (11-20 @ 15:15)  Creatinine, Random Urine: 78 mg/dL (11-20 @ 15:15)  Osmolality, Random Urine: 380 mos/kg (11-20 @ 15:15)

## 2023-11-24 NOTE — ACUTE INTERFACILITY TRANSFER NOTE - HOSPITAL COURSE
68M current smoker with PMHx  COPD, HLD, DM, HTN, chronic ETOH dependence, cirrhosis presenting with SOB. In the ED, was afebrile, /94, RR 36, 100% on NRB, WBC 20, lactate 5.7. CXR showing b/l lung markings. Found to be septic likely 2/2 PNA. Patient continued to be tachypneic, failed trial of BIPAP, and developed New onset Afib. Patient was upgraded to ICU for intubation for increased WOB 2/2 pulmonary edema iso cardiogenic shock vs COPD exacerbation iso PNA. S/p course of ceftrixone and azithro for CAP. S/p solumedrol and duonebs for COPD exacerbation. Hospital course c/b NSTEMI for which heparin drip wa started but was then discontinued due to thrombocytopenia, and new HFrEF with Echo showing newly reduced EF 20-25% . Pending transfer for cardiac cath on 11/27 for ischemic cardiomyopathy.   68M current smoker with PMHx  COPD, HLD, DM, HTN, chronic ETOH dependence, cirrhosis presenting with SOB. In the ED, was afebrile, /94, RR 36, 100% on NRB, WBC 20, lactate 5.7. CXR showing b/l lung markings. Found to be septic likely 2/2 PNA. Patient continued to be tachypneic, failed trial of BIPAP, and developed New onset Afib. Patient was upgraded to ICU for intubation for increased WOB 2/2 pulmonary edema iso cardiogenic shock vs COPD exacerbation iso PNA. S/p course of ceftrixone and azithro for CAP. S/p solumedrol and duonebs for COPD exacerbation. Hospital course c/b NSTEMI s/p heparin drip, and new HFrEF with Echo showing newly reduced EF 20-25% . Pending transfer for cardiac cath on 11/27 for ischemic cardiomyopathy.

## 2023-11-25 LAB
ANION GAP SERPL CALC-SCNC: 3 MMOL/L — LOW (ref 5–17)
ANION GAP SERPL CALC-SCNC: 3 MMOL/L — LOW (ref 5–17)
BUN SERPL-MCNC: 25 MG/DL — HIGH (ref 7–18)
BUN SERPL-MCNC: 25 MG/DL — HIGH (ref 7–18)
CALCIUM SERPL-MCNC: 9.2 MG/DL — SIGNIFICANT CHANGE UP (ref 8.4–10.5)
CALCIUM SERPL-MCNC: 9.2 MG/DL — SIGNIFICANT CHANGE UP (ref 8.4–10.5)
CHLORIDE SERPL-SCNC: 114 MMOL/L — HIGH (ref 96–108)
CHLORIDE SERPL-SCNC: 114 MMOL/L — HIGH (ref 96–108)
CO2 SERPL-SCNC: 29 MMOL/L — SIGNIFICANT CHANGE UP (ref 22–31)
CO2 SERPL-SCNC: 29 MMOL/L — SIGNIFICANT CHANGE UP (ref 22–31)
CREAT SERPL-MCNC: 1.33 MG/DL — HIGH (ref 0.5–1.3)
CREAT SERPL-MCNC: 1.33 MG/DL — HIGH (ref 0.5–1.3)
EGFR: 58 ML/MIN/1.73M2 — LOW
EGFR: 58 ML/MIN/1.73M2 — LOW
GLUCOSE BLDC GLUCOMTR-MCNC: 122 MG/DL — HIGH (ref 70–99)
GLUCOSE BLDC GLUCOMTR-MCNC: 122 MG/DL — HIGH (ref 70–99)
GLUCOSE BLDC GLUCOMTR-MCNC: 168 MG/DL — HIGH (ref 70–99)
GLUCOSE BLDC GLUCOMTR-MCNC: 168 MG/DL — HIGH (ref 70–99)
GLUCOSE BLDC GLUCOMTR-MCNC: 272 MG/DL — HIGH (ref 70–99)
GLUCOSE BLDC GLUCOMTR-MCNC: 272 MG/DL — HIGH (ref 70–99)
GLUCOSE BLDC GLUCOMTR-MCNC: 293 MG/DL — HIGH (ref 70–99)
GLUCOSE BLDC GLUCOMTR-MCNC: 293 MG/DL — HIGH (ref 70–99)
GLUCOSE SERPL-MCNC: 153 MG/DL — HIGH (ref 70–99)
GLUCOSE SERPL-MCNC: 153 MG/DL — HIGH (ref 70–99)
HCT VFR BLD CALC: 39.9 % — SIGNIFICANT CHANGE UP (ref 39–50)
HCT VFR BLD CALC: 39.9 % — SIGNIFICANT CHANGE UP (ref 39–50)
HGB BLD-MCNC: 12.6 G/DL — LOW (ref 13–17)
HGB BLD-MCNC: 12.6 G/DL — LOW (ref 13–17)
MAGNESIUM SERPL-MCNC: 1.7 MG/DL — SIGNIFICANT CHANGE UP (ref 1.6–2.6)
MAGNESIUM SERPL-MCNC: 1.7 MG/DL — SIGNIFICANT CHANGE UP (ref 1.6–2.6)
MCHC RBC-ENTMCNC: 30.6 PG — SIGNIFICANT CHANGE UP (ref 27–34)
MCHC RBC-ENTMCNC: 30.6 PG — SIGNIFICANT CHANGE UP (ref 27–34)
MCHC RBC-ENTMCNC: 31.6 GM/DL — LOW (ref 32–36)
MCHC RBC-ENTMCNC: 31.6 GM/DL — LOW (ref 32–36)
MCV RBC AUTO: 96.8 FL — SIGNIFICANT CHANGE UP (ref 80–100)
MCV RBC AUTO: 96.8 FL — SIGNIFICANT CHANGE UP (ref 80–100)
NRBC # BLD: 0 /100 WBCS — SIGNIFICANT CHANGE UP (ref 0–0)
NRBC # BLD: 0 /100 WBCS — SIGNIFICANT CHANGE UP (ref 0–0)
PHOSPHATE SERPL-MCNC: 3.5 MG/DL — SIGNIFICANT CHANGE UP (ref 2.5–4.5)
PHOSPHATE SERPL-MCNC: 3.5 MG/DL — SIGNIFICANT CHANGE UP (ref 2.5–4.5)
PLATELET # BLD AUTO: 123 K/UL — LOW (ref 150–400)
PLATELET # BLD AUTO: 123 K/UL — LOW (ref 150–400)
POTASSIUM SERPL-MCNC: 3.7 MMOL/L — SIGNIFICANT CHANGE UP (ref 3.5–5.3)
POTASSIUM SERPL-MCNC: 3.7 MMOL/L — SIGNIFICANT CHANGE UP (ref 3.5–5.3)
POTASSIUM SERPL-SCNC: 3.7 MMOL/L — SIGNIFICANT CHANGE UP (ref 3.5–5.3)
POTASSIUM SERPL-SCNC: 3.7 MMOL/L — SIGNIFICANT CHANGE UP (ref 3.5–5.3)
RBC # BLD: 4.12 M/UL — LOW (ref 4.2–5.8)
RBC # BLD: 4.12 M/UL — LOW (ref 4.2–5.8)
RBC # FLD: 16.2 % — HIGH (ref 10.3–14.5)
RBC # FLD: 16.2 % — HIGH (ref 10.3–14.5)
SODIUM SERPL-SCNC: 146 MMOL/L — HIGH (ref 135–145)
SODIUM SERPL-SCNC: 146 MMOL/L — HIGH (ref 135–145)
WBC # BLD: 8.18 K/UL — SIGNIFICANT CHANGE UP (ref 3.8–10.5)
WBC # BLD: 8.18 K/UL — SIGNIFICANT CHANGE UP (ref 3.8–10.5)
WBC # FLD AUTO: 8.18 K/UL — SIGNIFICANT CHANGE UP (ref 3.8–10.5)
WBC # FLD AUTO: 8.18 K/UL — SIGNIFICANT CHANGE UP (ref 3.8–10.5)

## 2023-11-25 PROCEDURE — 99232 SBSQ HOSP IP/OBS MODERATE 35: CPT | Mod: GC

## 2023-11-25 RX ORDER — PANTOPRAZOLE SODIUM 20 MG/1
40 TABLET, DELAYED RELEASE ORAL EVERY 12 HOURS
Refills: 0 | Status: DISCONTINUED | OUTPATIENT
Start: 2023-11-25 | End: 2023-11-29

## 2023-11-25 RX ORDER — IPRATROPIUM/ALBUTEROL SULFATE 18-103MCG
3 AEROSOL WITH ADAPTER (GRAM) INHALATION EVERY 6 HOURS
Refills: 0 | Status: DISCONTINUED | OUTPATIENT
Start: 2023-11-25 | End: 2023-11-28

## 2023-11-25 RX ORDER — THIAMINE MONONITRATE (VIT B1) 100 MG
100 TABLET ORAL DAILY
Refills: 0 | Status: DISCONTINUED | OUTPATIENT
Start: 2023-11-25 | End: 2023-11-30

## 2023-11-25 RX ADMIN — TIOTROPIUM BROMIDE 2 PUFF(S): 18 CAPSULE ORAL; RESPIRATORY (INHALATION) at 13:26

## 2023-11-25 RX ADMIN — APIXABAN 5 MILLIGRAM(S): 2.5 TABLET, FILM COATED ORAL at 18:49

## 2023-11-25 RX ADMIN — ALBUTEROL 2 PUFF(S): 90 AEROSOL, METERED ORAL at 09:13

## 2023-11-25 RX ADMIN — Medication 1 PATCH: at 07:00

## 2023-11-25 RX ADMIN — PANTOPRAZOLE SODIUM 40 MILLIGRAM(S): 20 TABLET, DELAYED RELEASE ORAL at 17:35

## 2023-11-25 RX ADMIN — Medication 1 PATCH: at 20:24

## 2023-11-25 RX ADMIN — Medication 40 MILLIGRAM(S): at 06:03

## 2023-11-25 RX ADMIN — Medication 50 MILLIGRAM(S): at 21:34

## 2023-11-25 RX ADMIN — BUDESONIDE AND FORMOTEROL FUMARATE DIHYDRATE 2 PUFF(S): 160; 4.5 AEROSOL RESPIRATORY (INHALATION) at 21:37

## 2023-11-25 RX ADMIN — Medication 81 MILLIGRAM(S): at 13:24

## 2023-11-25 RX ADMIN — Medication 1: at 17:36

## 2023-11-25 RX ADMIN — Medication 50 MILLIGRAM(S): at 13:24

## 2023-11-25 RX ADMIN — Medication 3: at 13:23

## 2023-11-25 RX ADMIN — Medication 1 PATCH: at 13:30

## 2023-11-25 RX ADMIN — ALBUTEROL 2 PUFF(S): 90 AEROSOL, METERED ORAL at 21:33

## 2023-11-25 RX ADMIN — Medication 1 MILLIGRAM(S): at 13:25

## 2023-11-25 RX ADMIN — ALBUTEROL 2 PUFF(S): 90 AEROSOL, METERED ORAL at 03:32

## 2023-11-25 RX ADMIN — APIXABAN 5 MILLIGRAM(S): 2.5 TABLET, FILM COATED ORAL at 06:03

## 2023-11-25 RX ADMIN — BUMETANIDE 1 MILLIGRAM(S): 0.25 INJECTION INTRAMUSCULAR; INTRAVENOUS at 06:03

## 2023-11-25 RX ADMIN — Medication 3: at 22:00

## 2023-11-25 RX ADMIN — ALBUTEROL 2 PUFF(S): 90 AEROSOL, METERED ORAL at 17:37

## 2023-11-25 RX ADMIN — Medication 100 MILLIGRAM(S): at 13:24

## 2023-11-25 RX ADMIN — BUDESONIDE AND FORMOTEROL FUMARATE DIHYDRATE 2 PUFF(S): 160; 4.5 AEROSOL RESPIRATORY (INHALATION) at 09:13

## 2023-11-25 RX ADMIN — PANTOPRAZOLE SODIUM 40 MILLIGRAM(S): 20 TABLET, DELAYED RELEASE ORAL at 06:04

## 2023-11-25 RX ADMIN — Medication 50 MILLIGRAM(S): at 06:03

## 2023-11-25 RX ADMIN — QUETIAPINE FUMARATE 25 MILLIGRAM(S): 200 TABLET, FILM COATED ORAL at 21:34

## 2023-11-25 RX ADMIN — Medication 1 TABLET(S): at 13:24

## 2023-11-25 NOTE — PROGRESS NOTE ADULT - PROBLEM SELECTOR PLAN 10
- Eliquis for A fib  - GI ppx    Will go for Cardiac Cath on Monday 11/27.  >> Last dose of Eliquis will be for 11/25 Sat.

## 2023-11-25 NOTE — PROGRESS NOTE ADULT - SUBJECTIVE AND OBJECTIVE BOX
Time of Visit:  Patient seen and examined.     MEDICATIONS  (STANDING):  albuterol    90 MICROgram(s) HFA Inhaler 2 Puff(s) Inhalation every 6 hours  albuterol/ipratropium for Nebulization 3 milliLiter(s) Nebulizer every 6 hours  apixaban 5 milliGRAM(s) Oral every 12 hours  aspirin  chewable 81 milliGRAM(s) Oral daily  budesonide 160 MICROgram(s)/formoterol 4.5 MICROgram(s) Inhaler 2 Puff(s) Inhalation two times a day  buMETAnide 1 milliGRAM(s) Oral daily  folic acid 1 milliGRAM(s) Oral daily  insulin lispro (ADMELOG) corrective regimen sliding scale   SubCutaneous Before meals and at bedtime  metoprolol tartrate 50 milliGRAM(s) Oral three times a day  multivitamin 1 Tablet(s) Oral daily  nicotine - 21 mG/24Hr(s) Patch 1 Patch Transdermal daily  pantoprazole    Tablet 40 milliGRAM(s) Oral every 12 hours  predniSONE   Tablet 40 milliGRAM(s) Oral daily  QUEtiapine 25 milliGRAM(s) Oral at bedtime  thiamine 100 milliGRAM(s) Oral daily  tiotropium 2.5 MICROgram(s) Inhaler 2 Puff(s) Inhalation daily      MEDICATIONS  (PRN):       Medications up to date at time of exam.      PHYSICAL EXAMINATION:  Patient has no new complaints.  GENERAL: The patient is a well-developed, well-nourished, in no apparent distress.     Vital Signs Last 24 Hrs  T(C): 36.3 (25 Nov 2023 16:07), Max: 37 (24 Nov 2023 23:30)  T(F): 97.3 (25 Nov 2023 16:07), Max: 98.6 (24 Nov 2023 23:30)  HR: 96 (25 Nov 2023 16:07) (69 - 117)  BP: 153/58 (25 Nov 2023 16:07) (120/95 - 155/86)  BP(mean): --  RR: 18 (25 Nov 2023 16:07) (18 - 19)  SpO2: 98% (25 Nov 2023 16:07) (96% - 99%)    Parameters below as of 25 Nov 2023 16:07  Patient On (Oxygen Delivery Method): room air       (if applicable)    Chest Tube (if applicable)    HEENT: Head is normocephalic and atraumatic. Extraocular muscles are intact. Mucous membranes are moist.     NECK: Supple, no palpable adenopathy.    LUNGS: Clear to auscultation, no wheezing, rales, or rhonchi.    HEART: Regular rate and rhythm without murmur.    ABDOMEN: Soft, nontender, and nondistended.  No hepatosplenomegaly is noted.    : No painful voiding, no pelvic pain    EXTREMITIES: Without any cyanosis, clubbing, rash, lesions or edema.    NEUROLOGIC: Awake, alert, oriented, grossly intact    SKIN: Warm, dry, good turgor.      LABS:                        12.6   8.18  )-----------( 123      ( 25 Nov 2023 08:35 )             39.9     11-25    146<H>  |  114<H>  |  25<H>  ----------------------------<  153<H>  3.7   |  29  |  1.33<H>    Ca    9.2      25 Nov 2023 08:35  Phos  3.5     11-25  Mg     1.7     11-25    TPro  6.1  /  Alb  2.7<L>  /  TBili  0.6  /  DBili  x   /  AST  26  /  ALT  25  /  AlkPhos  41  11-24      Urinalysis Basic - ( 25 Nov 2023 08:35 )    Color: x / Appearance: x / SG: x / pH: x  Gluc: 153 mg/dL / Ketone: x  / Bili: x / Urobili: x   Blood: x / Protein: x / Nitrite: x   Leuk Esterase: x / RBC: x / WBC x   Sq Epi: x / Non Sq Epi: x / Bacteria: x                      MICROBIOLOGY: (if applicable)    RADIOLOGY & ADDITIONAL STUDIES:  EKG:   CXR:  ECHO:    IMPRESSION: 68y Male PAST MEDICAL & SURGICAL HISTORY:  HTN (hypertension)      Varicose veins      HLD (hyperlipidemia)      Liver cirrhosis      Portal hypertension with esophageal varices      COPD (chronic obstructive pulmonary disease)      Syncope      Alcohol dependence, daily use      Smokes tobacco daily      DM (diabetes mellitus)      No significant past surgical history       p/w       IMP: This is a 67 yr old man  current smoker with  COPD, HLD, DM, HTN, chronic ETOH dependence, cirrhosis presents with SOB. Patient was found to be septic likely 2/2 PNA.  Patient continued to be  tachypneic, and  developed New onset Afib on Bipap Patient was intubated for increase WOB and admitted to ICU for further management. Patient was intubated in the ED. Patient had new onset Afib initially thought to be 2/2 sepsis with LBBB on EKG and elevated trops and lactic acidosis. Patient received 2.5 L bolus. Patient received cardizem push x2. Patient was started on Rocephin and Azithro empirically for CAP and was started on solumedrol and duonebs for possible COPD exacerbation. Trops downtrending. Patient was producing minimal urine and UA showed granular casts indicative of ATN. Patient was extubated on 11/19 to BIPAP and weaned to NC but had to be put back on BIPAP in setting of inc WOB. Patient was started on metoprolol and was given one dose of bumex to attempt to diurese patient in setting of B lines on POCUS.11/20  Patients mental status was altered likely in setting of ativan use, but patietnt was started on high dose thiamine to cover for possible wernickes encephalopathy. Pan CT was negative for any acute abnormalities on head and chest and positive for possible duodenitis/Peptic ulcer disease on AP. Echo showed newly reduced EF of 20-25%. Patient was started on heparin drip and aspirin. 11/21 Mentation much improved. Heparin was held in setting of thrombocytopenia, apixiban was started. SCr downtrending. Patient started on diet, now on 2L NC. ABx discontinued, no infiltrates on CT chest. Solumedrol discontinued. 11/22 Patient AOx3 today. Still tachycardic, metoprolol increased to 50. Goss removed. HIT panel negative.11/23 started digoxin load.    Sugg    - Continue to monitor pulse oximetry   - Prednisone 40 mg Qd   - Continue Symbicort    - Tele monitoring   - Anticoag   - Duonebs q6h prn   - For Cardiac cath 11/27   - Agreed with nicotine patch   - Bumex   - Monitor electrolyte  daily   - Advise to stop smoking and using alcohol   - Monitor blood glucose with coverage

## 2023-11-25 NOTE — PROGRESS NOTE ADULT - SUBJECTIVE AND OBJECTIVE BOX
PATIENT SEEN AND EXAMINED BY EDGARDO DRISCOLL M.D. ON :- 11/25/23  DATE OF SERVICE:    11/25/23         Interim events noted,Labs ,Radiological studies and Cardiology tests reviewed .  DISCUSSED WITH ACP/MEDICAL RESIDENT ON PLAN OF CARE.    MR#[id number]  PATIENT NAME:[facility] COURSE: HPI:  68M current smoker with PMHx  COPD, HLD, DM, HTN, chronic ETOH dependence, cirrhosis presents with SOB. Patient is a poor historian and states he feels SOB but unable to provide further details. Patient denies fever, chills, cough, chest pain, palpitations, abdominal pain, or change in bowel habits.     In the ED:  Afebrile, HR 160s. /94, RR 36, 100% on NRB  WBC 20, lactate 5.7  CXR b/l lung markings  Trop 640, BNP 4K  S/p Cardizem 10mg x2, solumedrol 125mg, 500cc bolus  (18 Nov 2023 10:37)      INTERIM EVENTS:Patient seen at bedside ,interim events noted.      [facility] -reviewed admission note, no change since admission  HEART FAILURE: Acute[ ]Chronic[ ] Systolic[ ] Diastolic[ ] Combined Systolic and Diastolic[ ]  CAD[ ] CABG[ ] PCI[ ]  DEVICES[ ] PPM[ ] ICD[ ] ILR[ ]  ATRIAL FIBRILLATION[ ] Paroxysmal[ ] Permanent[ ] CHADS2-[  ]  CORTES[ ] CKD1[ ] CKD2[ ] CKD3[ ] CKD4[ ] ESRD[ ]  COPD[ ] HTN[ ]   DM[ ] Type1[ ] Type 2[ ]   CVA[ ] Paresis[ ]    AMBULATION: Assisted[ ] Cane/walker[ ] Independent[ ]    MEDICATIONS  (STANDING):  albuterol    90 MICROgram(s) HFA Inhaler 2 Puff(s) Inhalation every 6 hours  apixaban 5 milliGRAM(s) Oral every 12 hours  aspirin  chewable 81 milliGRAM(s) Oral daily  budesonide 160 MICROgram(s)/formoterol 4.5 MICROgram(s) Inhaler 2 Puff(s) Inhalation two times a day  buMETAnide 1 milliGRAM(s) Oral daily  folic acid 1 milliGRAM(s) Oral daily  insulin lispro (ADMELOG) corrective regimen sliding scale   SubCutaneous Before meals and at bedtime  metoprolol tartrate 50 milliGRAM(s) Oral three times a day  multivitamin 1 Tablet(s) Oral daily  nicotine - 21 mG/24Hr(s) Patch 1 Patch Transdermal daily  pantoprazole    Tablet 40 milliGRAM(s) Oral every 12 hours  predniSONE   Tablet 40 milliGRAM(s) Oral daily  QUEtiapine 25 milliGRAM(s) Oral at bedtime  thiamine 100 milliGRAM(s) Oral daily  tiotropium 2.5 MICROgram(s) Inhaler 2 Puff(s) Inhalation daily    MEDICATIONS  (PRN):            REVIEW OF SYSTEMS:  Constitutional: [ ] fever, [ ]weight loss,  [ ]fatigue [ ]weight gain  Eyes: [ ] visual changes  Respiratory: [ ]shortness of breath;  [ ] cough, [ ]wheezing, [ ]chills, [ ]hemoptysis  Cardiovascular: [ ] chest pain, [ ]palpitations, [ ]dizziness,  [ ]leg swelling[ ]orthopnea[ ]PND  Gastrointestinal: [ ] abdominal pain, [ ]nausea, [ ]vomiting,  [ ]diarrhea [ ]Constipation [ ]Melena  Genitourinary: [ ] dysuria, [ ] hematuria [ ]Goss  Neurologic: [ ] headaches [ ] tremors[ ]weakness [ ]Paralysis Right[ ] Left[ ]  Skin: [ ] itching, [ ]burning, [ ] rashes  Endocrine: [ ] heat or cold intolerance  Musculoskeletal: [ ] joint pain or swelling; [ ] muscle, back, or extremity pain  Psychiatric: [ ] depression, [ ]anxiety, [ ]mood swings, or [ ]difficulty sleeping  Hematologic: [ ] easy bruising, [ ] bleeding gums    [ ] All remaining systems negative except as per above.   [ ]Unable to obtain.  [x] No change in ROS since admission      Vital Signs Last 24 Hrs  T(C): 36.5 (25 Nov 2023 07:32), Max: 37 (24 Nov 2023 23:30)  T(F): 97.7 (25 Nov 2023 07:32), Max: 98.6 (24 Nov 2023 23:30)  HR: 117 (25 Nov 2023 07:32) (67 - 124)  BP: 150/88 (25 Nov 2023 07:32) (94/73 - 155/86)  BP(mean): --  RR: 19 (25 Nov 2023 07:32) (18 - 19)  SpO2: 96% (25 Nov 2023 07:32) (94% - 98%)    Parameters below as of 25 Nov 2023 07:32  Patient On (Oxygen Delivery Method): room air      I&O's Summary    24 Nov 2023 07:01  -  25 Nov 2023 07:00  --------------------------------------------------------  IN: 550 mL / OUT: 1750 mL / NET: -1200 mL        PHYSICAL EXAM:  General: No acute distress BMI-  HEENT: EOMI, PERRL  Neck: Supple, [ ] JVD  Lungs: Equal air entry bilaterally; [ ] rales [ ] wheezing [ ] rhonchi  Heart: Regular rate and rhythm; [x ] murmur   2/6 [ x] systolic [ ] diastolic [ ] radiation[ ] rubs [ ]  gallops  Abdomen: Nontender, bowel sounds present  Extremities: No clubbing, cyanosis, [ ] edema [ ]Pulses  equal and intact  Nervous system:  Alert & Oriented X3, no focal deficits  Psychiatric: Normal affect  Skin: No rashes or lesions    LABS:  11-24    146<H>  |  116<H>  |  39<H>  ----------------------------<  93  3.7   |  26  |  1.43<H>    Ca    9.1      24 Nov 2023 06:27  Phos  3.6     11-24  Mg     2.2     11-24    TPro  6.1  /  Alb  2.7<L>  /  TBili  0.6  /  DBili  x   /  AST  26  /  ALT  25  /  AlkPhos  41  11-24    Creatinine Trend: 1.43<--, 1.87<--, 1.96<--, 2.44<--, 2.72<--, 2.94<--                        12.6   8.18  )-----------( 123      ( 25 Nov 2023 08:35 )             39.9

## 2023-11-25 NOTE — PROGRESS NOTE ADULT - ASSESSMENT
Patient is a 69yo Male active smoker with h/o COPD, HLD, DM, HTN, chronic ETOH dependence, cirrhosis presents with SOB. Pt a/w Sepsis 2/2 PNA, COPD,  Acute hypoxic hypercapnic respiratory failure s/p brief intubation, Acute encephalopathy new onset Afib with CORTES. Nephrology consulted for Elevated serum creatinine.  Pt with NSTEMI with reduced EF; concerning for new onset HF (prev EF 50-55% in April 2023 --> now EF 20-25%). Pt pending possible cath once renal function improves  Pt extubated 11/19    1 CORTES- previous SCr 0.8-1 in April 2023. CORTES likely ATN. UA with 30 protein; large blood ?traumatic randolph with A/C use. FeNa 1.75%; inconclusive. FeUrea 33%; borderline; likely due to poor EABV.  Renal function improving.   Pt now on Bumex decreased to 1mg PO daily now for maintenance due to poor EF.  May need 2mg, but will monitor.  Ct abd/pelvis with no hydro. Strict I/Os. Avoid nephrotoxins/ NSAIDs/ RCA. Monitor BMP.    2. Acute encephalopathy- CT head neg. Ammonia level <10. Plan as per ICU    3. Acute CHF- TTE with severe global LV systolic dysfunction; EF 20-25%. Cardiology following.  Renal fxn improved further. May proceed with cardiac catheterization on Monday 11/27 from a renal persepctive.    4. COPD exacerbation- CT chest with no PNA for which abx was d/c. Bronchodilators/ Steroids as per ICU.     [facility] NEPHROLOGY  Iggy Hernandez M.D.  Richard Cook D.O.  Bárbara Slaughter M.D.  MD Breanna Schulte, MSN, ANP-C    Telephone: [phone]  Facsimile: [phone], [address]  [place] [address]

## 2023-11-25 NOTE — PROGRESS NOTE ADULT - SUBJECTIVE AND OBJECTIVE BOX
Full note to follow. [facility] NEPHROLOGY- PROGRESS NOTE    Patient is a 69yo Male active smoker with h/o COPD, HLD, DM, HTN, chronic ETOH dependence, cirrhosis presents with SOB. Pt a/w Sepsis 2/2 PNA, COPD,  Acute hypoxic hypercapnic respiratory failure s/p brief intubation, Acute encephalopathy new onset Afib with CORTES. Nephrology consulted for Elevated serum creatinine.  Pt with NSTEMI with reduced EF; concerning for new onset HF (prev EF 50-55% in April 2023 --> now EF 20-25%). Pt pending possible cath once renal function improves  Pt extubated 11/19      Hospital Medications: Medications reviewed.  REVIEW OF SYSTEMS: Limited ROS; +SOB; denies any pain. Confused at times    VITALS:  T(F): 98.2 (11-25-23 @ 19:31), Max: 98.6 (11-24-23 @ 23:30)  HR: 67 (11-25-23 @ 19:31)  BP: 130/87 (11-25-23 @ 19:31)  RR: 18 (11-25-23 @ 19:31)  SpO2: 100% (11-25-23 @ 19:31)      11-24 @ 07:01  -  11-25 @ 07:00  --------------------------------------------------------  IN: 550 mL / OUT: 1750 mL / NET: -1200 mL    11-25 @ 07:01  -  11-25 @ 23:10  --------------------------------------------------------  IN: 565 mL / OUT: 300 mL / NET: 265 mL        PHYSICAL EXAM:  Gen: NAD, altered   HEENT: anicteric +NGT  Neck: +JVD  Cards: +tachy irregular , +S1/S2,   Resp: overall clear; no active wheezing at this time  GI: soft, +mild distention +umbilical hernia   : +randolph   Extremities: no LE edema B/L  Neuro: confused at times      LABS:  11-25    146<H>  |  114<H>  |  25<H>  ----------------------------<  153<H>  3.7   |  29  |  1.33<H>    Ca    9.2      25 Nov 2023 08:35  Phos  3.5     11-25  Mg     1.7     11-25    TPro  6.1  /  Alb  2.7<L>  /  TBili  0.6  /  DBili      /  AST  26  /  ALT  25  /  AlkPhos  41  11-24    Creatinine Trend: 1.33 <--, 1.43 <--, 1.87 <--, 1.96 <--, 2.44 <--, 2.72 <--, 2.94 <--, 2.88 <--, 2.27 <--, 1.70 <--                        12.6   8.18  )-----------( 123      ( 25 Nov 2023 08:35 )             39.9     Urine Studies:  Urinalysis Basic - ( 25 Nov 2023 08:35 )    Color:  / Appearance:  / SG:  / pH:   Gluc: 153 mg/dL / Ketone:   / Bili:  / Urobili:    Blood:  / Protein:  / Nitrite:    Leuk Esterase:  / RBC:  / WBC    Sq Epi:  / Non Sq Epi:  / Bacteria:       Sodium, Random Urine: 21 mmol/L (11-22 @ 09:25)  Creatinine, Random Urine: 128 mg/dL (11-22 @ 09:25)  Creatinine, Random Urine: 128 mg/dL (11-21 @ 10:25)  Sodium, Random Urine: 64 mmol/L (11-20 @ 15:15)  Creatinine, Random Urine: 78 mg/dL (11-20 @ 15:15)  Osmolality, Random Urine: 380 mos/kg (11-20 @ 15:15)

## 2023-11-25 NOTE — PROGRESS NOTE ADULT - PROBLEM SELECTOR PLAN 4
Patient calling very upset about her cpap supplies.  Patient states per the nurse she call Leonila at [facility] and was told that the supplies would be sent that day and this was over a week ago. Transferred call to nurse.   h/o COPD, home meds tiotropium inhaler.  s/p extubation 11/19 for COPD exacerbation.  - patient still with bilateral expiratory wheezing.  - c/w tiotropium, albuterol, symbicort.  - started prednisone 40mg daily.  - c/w standing Judi   - Pulmichelle Manrique consulted - pending recs.

## 2023-11-25 NOTE — PROGRESS NOTE ADULT - SUBJECTIVE AND OBJECTIVE BOX
PGY-1 Progress Note discussed with attending    PAGER #: [[phone]] TILL 5:00 PM  PLEASE CONTACT ON CALL TEAM:  - On Call Team (Please refer to Angel) FROM 5:00 PM - 8:30PM  - Nightfloat Team FROM 8:30 -7:30 AM    CHIEF COMPLAINT & BRIEF HOSPITAL COURSE: No acute overnight events.  Jasper patient was more incoherent today. Reports no complaints.     INTERVAL HPI/OVERNIGHT EVENTS:   MEDICATIONS  (STANDING):  albuterol    90 MICROgram(s) HFA Inhaler 2 Puff(s) Inhalation every 6 hours  apixaban 5 milliGRAM(s) Oral every 12 hours  aspirin  chewable 81 milliGRAM(s) Oral daily  budesonide 160 MICROgram(s)/formoterol 4.5 MICROgram(s) Inhaler 2 Puff(s) Inhalation two times a day  buMETAnide 1 milliGRAM(s) Oral daily  folic acid 1 milliGRAM(s) Oral daily  insulin lispro (ADMELOG) corrective regimen sliding scale   SubCutaneous Before meals and at bedtime  metoprolol tartrate 50 milliGRAM(s) Oral three times a day  multivitamin 1 Tablet(s) Oral daily  nicotine - 21 mG/24Hr(s) Patch 1 Patch Transdermal daily  pantoprazole    Tablet 40 milliGRAM(s) Oral every 12 hours  predniSONE   Tablet 40 milliGRAM(s) Oral daily  QUEtiapine 25 milliGRAM(s) Oral at bedtime  thiamine 100 milliGRAM(s) Oral daily  tiotropium 2.5 MICROgram(s) Inhaler 2 Puff(s) Inhalation daily    MEDICATIONS  (PRN):      REVIEW OF SYSTEMS:  CONSTITUTIONAL: No fever, weight loss, or fatigue  RESPIRATORY: No shortness of breath  CARDIOVASCULAR: No chest pain  GASTROINTESTINAL: No abdominal pain.  GENITOURINARY: No dysuria  NEUROLOGICAL: No headaches  SKIN: No itching, burning, rashes    Vital Signs Last 24 Hrs  T(C): 36.6 (25 Nov 2023 11:36), Max: 37 (24 Nov 2023 23:30)  T(F): 97.8 (25 Nov 2023 11:36), Max: 98.6 (24 Nov 2023 23:30)  HR: 103 (25 Nov 2023 13:00) (69 - 124)  BP: 136/92 (25 Nov 2023 13:00) (94/73 - 155/86)  BP(mean): --  RR: 18 (25 Nov 2023 11:36) (18 - 19)  SpO2: 99% (25 Nov 2023 11:36) (94% - 99%)    Parameters below as of 25 Nov 2023 11:36  Patient On (Oxygen Delivery Method): room air        PHYSICAL EXAMINATION:  GENERAL: NAD, well built  HEAD:  Atraumatic, Normocephalic  EYES:  conjunctiva and sclera clear  CHEST/LUNG: Clear to auscultation. No rales, rhonchi, wheezing, or rubs  HEART: Regular rate and rhythm; No murmurs, rubs, or gallops  ABDOMEN: Soft, Nontender, Nondistended; Bowel sounds present  EXTREMITIES:  2+ Peripheral Pulses, No clubbing, cyanosis, or edema  SKIN: warm dry                          12.6   8.18  )-----------( 123      ( 25 Nov 2023 08:35 )             39.9     11-25    146<H>  |  114<H>  |  25<H>  ----------------------------<  153<H>  3.7   |  29  |  1.33<H>    Ca    9.2      25 Nov 2023 08:35  Phos  3.5     11-25  Mg     1.7     11-25    TPro  6.1  /  Alb  2.7<L>  /  TBili  0.6  /  DBili  x   /  AST  26  /  ALT  25  /  AlkPhos  41  11-24    LIVER FUNCTIONS - ( 24 Nov 2023 06:27 )  Alb: 2.7 g/dL / Pro: 6.1 g/dL / ALK PHOS: 41 U/L / ALT: 25 U/L DA / AST: 26 U/L / GGT: x                   CAPILLARY BLOOD GLUCOSE      RADIOLOGY & ADDITIONAL TESTS:

## 2023-11-25 NOTE — PROGRESS NOTE ADULT - ATTENDING COMMENTS
#Acute hypoxemic and hypercapnic respiratory failure   #NSTEMI  #Afib w/ RVR  #Acute heart failure with reduced EF - EF 20-25%  #Acute kidney failure   #Lactic acidosis  #Acute delirium   #acute metabolic encephalopathy  #Dm type 2   #thrombocytopenia- mild to moderate    Plan:  planned for transfer on 11/27/23 for cardiac catheterization  -c/w eliquis, metoprolol  -c/w bumex  -prednisone 40mg, symbicort, spiriva, pulmonary consult assessment while on the medical floor for optimization  -given findings on CT a/p with duodenal infiltration suggestive of duodenitis vs. PUD, increase protonix to 40mg po bid for now. Patient requires a GI evaluation for f/u EGD  -s/p course of IV thiamine 500 mg x 3, resume po thiamine at 100mg, c/w folic acid  -f/u cardiology, nephrology, and  pulmonary input

## 2023-11-25 NOTE — PROGRESS NOTE ADULT - ASSESSMENT
68M current smoker with PMHx  COPD, HLD, DM, HTN, chronic ETOH dependence, cirrhosis presenting with SOB. In the ED, was afebrile, /94, RR 36, 100% on NRB, WBC 20, lactate 5.7. CXR showing b/l lung markings. Found to be septic likely 2/2 PNA. Patient continued to be tachypneic, failed trial of BIPAP, and developed New onset Afib. Patient was upgraded to ICU for intubation for increased WOB 2/2 pulmonary edema iso cardiogenic shock vs COPD exacerbation iso PNA. S/p course of ceftrixone and azithro for CAP. S/p solumedrol and duonebs for COPD exacerbation. Hospital course c/b NSTEMI for which heparin drip was started but was then discontinued due to thrombocytopenia, and new HFrEF with Echo showing newly reduced EF 20-25% . Pending transfer for cardiac cath on 11/27 for ischemic cardiomyopathy.

## 2023-11-26 LAB
ANION GAP SERPL CALC-SCNC: 6 MMOL/L — SIGNIFICANT CHANGE UP (ref 5–17)
ANION GAP SERPL CALC-SCNC: 6 MMOL/L — SIGNIFICANT CHANGE UP (ref 5–17)
BUN SERPL-MCNC: 23 MG/DL — HIGH (ref 7–18)
BUN SERPL-MCNC: 23 MG/DL — HIGH (ref 7–18)
CALCIUM SERPL-MCNC: 8.5 MG/DL — SIGNIFICANT CHANGE UP (ref 8.4–10.5)
CALCIUM SERPL-MCNC: 8.5 MG/DL — SIGNIFICANT CHANGE UP (ref 8.4–10.5)
CHLORIDE SERPL-SCNC: 114 MMOL/L — HIGH (ref 96–108)
CHLORIDE SERPL-SCNC: 114 MMOL/L — HIGH (ref 96–108)
CO2 SERPL-SCNC: 26 MMOL/L — SIGNIFICANT CHANGE UP (ref 22–31)
CO2 SERPL-SCNC: 26 MMOL/L — SIGNIFICANT CHANGE UP (ref 22–31)
CREAT SERPL-MCNC: 1.31 MG/DL — HIGH (ref 0.5–1.3)
CREAT SERPL-MCNC: 1.31 MG/DL — HIGH (ref 0.5–1.3)
EGFR: 59 ML/MIN/1.73M2 — LOW
EGFR: 59 ML/MIN/1.73M2 — LOW
GLUCOSE BLDC GLUCOMTR-MCNC: 100 MG/DL — HIGH (ref 70–99)
GLUCOSE BLDC GLUCOMTR-MCNC: 100 MG/DL — HIGH (ref 70–99)
GLUCOSE BLDC GLUCOMTR-MCNC: 116 MG/DL — HIGH (ref 70–99)
GLUCOSE BLDC GLUCOMTR-MCNC: 116 MG/DL — HIGH (ref 70–99)
GLUCOSE BLDC GLUCOMTR-MCNC: 169 MG/DL — HIGH (ref 70–99)
GLUCOSE BLDC GLUCOMTR-MCNC: 169 MG/DL — HIGH (ref 70–99)
GLUCOSE BLDC GLUCOMTR-MCNC: 177 MG/DL — HIGH (ref 70–99)
GLUCOSE BLDC GLUCOMTR-MCNC: 177 MG/DL — HIGH (ref 70–99)
GLUCOSE SERPL-MCNC: 108 MG/DL — HIGH (ref 70–99)
GLUCOSE SERPL-MCNC: 108 MG/DL — HIGH (ref 70–99)
HCT VFR BLD CALC: 36.2 % — LOW (ref 39–50)
HCT VFR BLD CALC: 36.2 % — LOW (ref 39–50)
HGB BLD-MCNC: 11.3 G/DL — LOW (ref 13–17)
HGB BLD-MCNC: 11.3 G/DL — LOW (ref 13–17)
MAGNESIUM SERPL-MCNC: 1.8 MG/DL — SIGNIFICANT CHANGE UP (ref 1.6–2.6)
MAGNESIUM SERPL-MCNC: 1.8 MG/DL — SIGNIFICANT CHANGE UP (ref 1.6–2.6)
MCHC RBC-ENTMCNC: 30.5 PG — SIGNIFICANT CHANGE UP (ref 27–34)
MCHC RBC-ENTMCNC: 30.5 PG — SIGNIFICANT CHANGE UP (ref 27–34)
MCHC RBC-ENTMCNC: 31.2 GM/DL — LOW (ref 32–36)
MCHC RBC-ENTMCNC: 31.2 GM/DL — LOW (ref 32–36)
MCV RBC AUTO: 97.8 FL — SIGNIFICANT CHANGE UP (ref 80–100)
MCV RBC AUTO: 97.8 FL — SIGNIFICANT CHANGE UP (ref 80–100)
NRBC # BLD: 0 /100 WBCS — SIGNIFICANT CHANGE UP (ref 0–0)
NRBC # BLD: 0 /100 WBCS — SIGNIFICANT CHANGE UP (ref 0–0)
PHOSPHATE SERPL-MCNC: 3.3 MG/DL — SIGNIFICANT CHANGE UP (ref 2.5–4.5)
PHOSPHATE SERPL-MCNC: 3.3 MG/DL — SIGNIFICANT CHANGE UP (ref 2.5–4.5)
PLATELET # BLD AUTO: 104 K/UL — LOW (ref 150–400)
PLATELET # BLD AUTO: 104 K/UL — LOW (ref 150–400)
POTASSIUM SERPL-MCNC: 3.5 MMOL/L — SIGNIFICANT CHANGE UP (ref 3.5–5.3)
POTASSIUM SERPL-MCNC: 3.5 MMOL/L — SIGNIFICANT CHANGE UP (ref 3.5–5.3)
POTASSIUM SERPL-SCNC: 3.5 MMOL/L — SIGNIFICANT CHANGE UP (ref 3.5–5.3)
POTASSIUM SERPL-SCNC: 3.5 MMOL/L — SIGNIFICANT CHANGE UP (ref 3.5–5.3)
RBC # BLD: 3.7 M/UL — LOW (ref 4.2–5.8)
RBC # BLD: 3.7 M/UL — LOW (ref 4.2–5.8)
RBC # FLD: 15.9 % — HIGH (ref 10.3–14.5)
RBC # FLD: 15.9 % — HIGH (ref 10.3–14.5)
SODIUM SERPL-SCNC: 146 MMOL/L — HIGH (ref 135–145)
SODIUM SERPL-SCNC: 146 MMOL/L — HIGH (ref 135–145)
WBC # BLD: 9.68 K/UL — SIGNIFICANT CHANGE UP (ref 3.8–10.5)
WBC # BLD: 9.68 K/UL — SIGNIFICANT CHANGE UP (ref 3.8–10.5)
WBC # FLD AUTO: 9.68 K/UL — SIGNIFICANT CHANGE UP (ref 3.8–10.5)
WBC # FLD AUTO: 9.68 K/UL — SIGNIFICANT CHANGE UP (ref 3.8–10.5)

## 2023-11-26 PROCEDURE — 93010 ELECTROCARDIOGRAM REPORT: CPT

## 2023-11-26 PROCEDURE — 99232 SBSQ HOSP IP/OBS MODERATE 35: CPT | Mod: GC

## 2023-11-26 RX ORDER — METOPROLOL TARTRATE 50 MG
5 TABLET ORAL ONCE
Refills: 0 | Status: COMPLETED | OUTPATIENT
Start: 2023-11-26 | End: 2023-11-26

## 2023-11-26 RX ADMIN — Medication 81 MILLIGRAM(S): at 11:57

## 2023-11-26 RX ADMIN — PANTOPRAZOLE SODIUM 40 MILLIGRAM(S): 20 TABLET, DELAYED RELEASE ORAL at 05:46

## 2023-11-26 RX ADMIN — ALBUTEROL 2 PUFF(S): 90 AEROSOL, METERED ORAL at 04:39

## 2023-11-26 RX ADMIN — Medication 1 PATCH: at 11:58

## 2023-11-26 RX ADMIN — ALBUTEROL 2 PUFF(S): 90 AEROSOL, METERED ORAL at 10:03

## 2023-11-26 RX ADMIN — BUDESONIDE AND FORMOTEROL FUMARATE DIHYDRATE 2 PUFF(S): 160; 4.5 AEROSOL RESPIRATORY (INHALATION) at 10:04

## 2023-11-26 RX ADMIN — PANTOPRAZOLE SODIUM 40 MILLIGRAM(S): 20 TABLET, DELAYED RELEASE ORAL at 17:31

## 2023-11-26 RX ADMIN — Medication 1: at 11:57

## 2023-11-26 RX ADMIN — BUMETANIDE 1 MILLIGRAM(S): 0.25 INJECTION INTRAMUSCULAR; INTRAVENOUS at 05:46

## 2023-11-26 RX ADMIN — ALBUTEROL 2 PUFF(S): 90 AEROSOL, METERED ORAL at 16:38

## 2023-11-26 RX ADMIN — ALBUTEROL 2 PUFF(S): 90 AEROSOL, METERED ORAL at 22:02

## 2023-11-26 RX ADMIN — QUETIAPINE FUMARATE 25 MILLIGRAM(S): 200 TABLET, FILM COATED ORAL at 22:02

## 2023-11-26 RX ADMIN — Medication 1 TABLET(S): at 11:57

## 2023-11-26 RX ADMIN — TIOTROPIUM BROMIDE 2 PUFF(S): 18 CAPSULE ORAL; RESPIRATORY (INHALATION) at 16:38

## 2023-11-26 RX ADMIN — Medication 50 MILLIGRAM(S): at 22:02

## 2023-11-26 RX ADMIN — Medication 50 MILLIGRAM(S): at 16:37

## 2023-11-26 RX ADMIN — Medication 1: at 17:30

## 2023-11-26 RX ADMIN — Medication 5 MILLIGRAM(S): at 18:25

## 2023-11-26 RX ADMIN — Medication 3 MILLILITER(S): at 08:19

## 2023-11-26 RX ADMIN — Medication 1 PATCH: at 07:00

## 2023-11-26 RX ADMIN — Medication 1 PATCH: at 21:50

## 2023-11-26 RX ADMIN — Medication 40 MILLIGRAM(S): at 05:46

## 2023-11-26 RX ADMIN — Medication 50 MILLIGRAM(S): at 05:46

## 2023-11-26 RX ADMIN — Medication 100 MILLIGRAM(S): at 11:57

## 2023-11-26 RX ADMIN — Medication 1 MILLIGRAM(S): at 11:57

## 2023-11-26 RX ADMIN — BUDESONIDE AND FORMOTEROL FUMARATE DIHYDRATE 2 PUFF(S): 160; 4.5 AEROSOL RESPIRATORY (INHALATION) at 22:02

## 2023-11-26 NOTE — PROGRESS NOTE ADULT - ASSESSMENT
Patient is a 67yo Male active smoker with h/o COPD, HLD, DM, HTN, chronic ETOH dependence, cirrhosis presents with SOB. Pt a/w Sepsis 2/2 PNA, COPD,  Acute hypoxic hypercapnic respiratory failure s/p brief intubation, Acute encephalopathy new onset Afib with CORTES. Nephrology consulted for Elevated serum creatinine.  Pt with NSTEMI with reduced EF; concerning for new onset HF (prev EF 50-55% in April 2023 --> now EF 20-25%). Pt pending possible cath once renal function improves  Pt extubated 11/19    1 CORTES- previous SCr 0.8-1 in April 2023. CORTES likely ATN. UA with 30 protein; large blood ?traumatic randolph with A/C use. FeNa 1.75%; inconclusive. FeUrea 33%; borderline; likely due to poor EABV.  Renal function improving.   Pt now on Bumex decreased to 1mg PO daily now for maintenance due to poor EF.  May need 2mg, but will monitor.  Ct abd/pelvis with no hydro. Strict I/Os. Avoid nephrotoxins/ NSAIDs/ RCA. Monitor BMP.    2. Acute encephalopathy- CT head neg. Ammonia level <10. Plan as per ICU    3. Acute CHF- TTE with severe global LV systolic dysfunction; EF 20-25%. Cardiology following.  Renal fxn improved further and then stabilized.   May proceed with cardiac catheterization on Monday 11/27 from a renal persepctive.    4. COPD exacerbation- CT chest with no PNA for which abx was d/c. Bronchodilators/ Steroids as per ICU.     [facility] NEPHROLOGY  Iggy Hernandez M.D.  Richard Cook D.O.  Bárbara Slaughter M.D.  MD Breanna Schulte, MSN, ANP-C    Telephone: [phone]  Facsimile: [phone], [address]  [place] [address]

## 2023-11-26 NOTE — PROGRESS NOTE ADULT - SUBJECTIVE AND OBJECTIVE BOX
PGY-1 Progress Note discussed with attending.    PAGER #: [[phone]] TILL 5:00 PM  PLEASE CONTACT ON CALL TEAM:  - On Call Team (Please refer to Angel) FROM 5:00 PM - 8:30PM  - Nightfloat Team FROM 8:30 -7:30 AM      INTERVAL HPI/OVERNIGHT EVENTS:       REVIEW OF SYSTEMS:  CONSTITUTIONAL: No fever, weight loss, or fatigue.  RESPIRATORY: No cough, wheezing, chills, or hemoptysis. No shortness of breath.  CARDIOVASCULAR: No chest pain, palpitations, dizziness, or leg swelling.  GASTROINTESTINAL: No abdominal pain. No nausea, vomiting, or hematemesis. No diarrhea or constipation. No melena or hematochezia.  GENITOURINARY: No dysuria or hematuria, urinary frequency.  NEUROLOGICAL: No headaches, memory loss, loss of strength, numbness, or tremors.  SKIN: No itching, burning, rashes, or lesions.    Vital Signs Last 24 Hrs  T(C): 36.2 (26 Nov 2023 07:22), Max: 36.8 (25 Nov 2023 19:31)  T(F): 97.2 (26 Nov 2023 07:22), Max: 98.2 (25 Nov 2023 19:31)  HR: 91 (26 Nov 2023 07:22) (67 - 103)  BP: 122/79 (26 Nov 2023 07:22) (118/72 - 153/58)  BP(mean): --  RR: 18 (26 Nov 2023 07:22) (18 - 20)  SpO2: 98% (26 Nov 2023 07:22) (96% - 100%)    Parameters below as of 26 Nov 2023 07:22  Patient On (Oxygen Delivery Method): room air        PHYSICAL EXAMINATION:  GENERAL: NAD   HEAD:  Atraumatic, Normocephalic.  EYES:  Conjunctiva and sclera clear.  NECK: Supple, No JVD, Normal thyroid.  CHEST/LUNG: Clear to auscultation. Clear to percussion bilaterally. No rales, rhonchi, wheezing, or rubs.  HEART: Regular rate and rhythm. No murmurs, rubs, or gallops.  ABDOMEN: Soft, Nontender, Nondistended. Bowel sounds present.  NERVOUS SYSTEM:  Alert & Oriented X3  EXTREMITIES:  2+ Peripheral Pulses. No clubbing, cyanosis, or edema.  SKIN: Warm, dry.                          11.3   9.68  )-----------( 104      ( 26 Nov 2023 07:15 )             36.2     11-26    146<H>  |  114<H>  |  23<H>  ----------------------------<  108<H>  3.5   |  26  |  1.31<H>    Ca    8.5      26 Nov 2023 07:15  Phos  3.3     11-26  Mg     1.8     11-26                CAPILLARY BLOOD GLUCOSE:      RADIOLOGY & ADDITIONAL TESTS:               PGY-1 Progress Note discussed with attending.    PAGER #: [[phone]] TILL 5:00 PM  PLEASE CONTACT ON CALL TEAM:  - On Call Team (Please refer to Angel) FROM 5:00 PM - 8:30PM  - Nightfloat Team FROM 8:30 -7:30 AM      INTERVAL HPI/OVERNIGHT EVENTS: Patient seen and examined at bedside. Resting comfortably. No acute overnight events.      REVIEW OF SYSTEMS:  CONSTITUTIONAL: No fever, weight loss, or fatigue.  RESPIRATORY: No cough, wheezing, chills, or hemoptysis. No shortness of breath.  CARDIOVASCULAR: No chest pain, palpitations, dizziness, or leg swelling.  GASTROINTESTINAL: No abdominal pain. No nausea, vomiting, or hematemesis. No diarrhea or constipation. No melena or hematochezia.  GENITOURINARY: No dysuria or hematuria, urinary frequency.  NEUROLOGICAL: No headaches, memory loss, loss of strength, numbness, or tremors.  SKIN: No itching, burning, rashes, or lesions.    Vital Signs Last 24 Hrs  T(C): 36.2 (26 Nov 2023 07:22), Max: 36.8 (25 Nov 2023 19:31)  T(F): 97.2 (26 Nov 2023 07:22), Max: 98.2 (25 Nov 2023 19:31)  HR: 91 (26 Nov 2023 07:22) (67 - 103)  BP: 122/79 (26 Nov 2023 07:22) (118/72 - 153/58)  BP(mean): --  RR: 18 (26 Nov 2023 07:22) (18 - 20)  SpO2: 98% (26 Nov 2023 07:22) (96% - 100%)    Parameters below as of 26 Nov 2023 07:22  Patient On (Oxygen Delivery Method): room air        PHYSICAL EXAMINATION:  GENERAL: NAD   HEAD:  Atraumatic, Normocephalic.  EYES:  Conjunctiva and sclera clear.  NECK: Supple, No JVD, Normal thyroid.  CHEST/LUNG: +mild expiratory wheezing b/l.  HEART: Regular rate and rhythm. No murmurs, rubs, or gallops.  ABDOMEN: Soft, Nontender, Nondistended. Bowel sounds present.  NERVOUS SYSTEM:  Alert & Oriented X3  EXTREMITIES:  2+ Peripheral Pulses. No clubbing, cyanosis, or edema.  SKIN: Warm, dry.                          11.3   9.68  )-----------( 104      ( 26 Nov 2023 07:15 )             36.2     11-26    146<H>  |  114<H>  |  23<H>  ----------------------------<  108<H>  3.5   |  26  |  1.31<H>    Ca    8.5      26 Nov 2023 07:15  Phos  3.3     11-26  Mg     1.8     11-26                CAPILLARY BLOOD GLUCOSE:      RADIOLOGY & ADDITIONAL TESTS:

## 2023-11-26 NOTE — PROGRESS NOTE ADULT - SUBJECTIVE AND OBJECTIVE BOX
[facility] NEPHROLOGY- PROGRESS NOTE    Patient is a 69yo Male active smoker with h/o COPD, HLD, DM, HTN, chronic ETOH dependence, cirrhosis presents with SOB. Pt a/w Sepsis 2/2 PNA, COPD,  Acute hypoxic hypercapnic respiratory failure s/p brief intubation, Acute encephalopathy new onset Afib with CORTES. Nephrology consulted for Elevated serum creatinine.  Pt with NSTEMI with reduced EF; concerning for new onset HF (prev EF 50-55% in April 2023 --> now EF 20-25%). Pt pending possible cath once renal function improves  Pt extubated 11/19      Hospital Medications: Medications reviewed.  REVIEW OF SYSTEMS: Limited ROS; +SOB; denies any pain. Confused at times    VITALS:  T(F): 97.2 (11-26-23 @ 07:22), Max: 98.2 (11-25-23 @ 19:31)  HR: 91 (11-26-23 @ 07:22)  BP: 122/79 (11-26-23 @ 07:22)  RR: 18 (11-26-23 @ 07:22)  SpO2: 98% (11-26-23 @ 07:22)  Wt(kg): --    11-25 @ 07:01  -  11-26 @ 07:00  --------------------------------------------------------  IN: 565 mL / OUT: 300 mL / NET: 265 mL      PHYSICAL EXAM:  Gen: NAD, altered   HEENT: anicteric +NGT  Neck: +JVD  Cards: +tachy irregular , +S1/S2,   Resp: overall clear; no active wheezing at this time  GI: soft, +mild distention +umbilical hernia   : +randolph   Extremities: no LE edema B/L  Neuro: confused at times      LABS:  11-26  146 <--, 146 <--, 146 <--, 143 <--, 139 <--, 138 <--, 136 <--  146<H>  |  114<H>  |  23<H>  ----------------------------<  108<H>  3.5   |  26  |  1.31<H>    Ca    8.5      26 Nov 2023 07:15  Phos  3.3     11-26  Mg     1.8     11-26      Creatinine Trend: 1.31 <--, 1.33 <--, 1.43 <--, 1.87 <--, 1.96 <--, 2.44 <--, 2.72 <--, 2.94 <--, 2.88 <--, 2.27 <--                        11.3   9.68  )-----------( 104      ( 26 Nov 2023 07:15 )             36.2     Urine Studies:  Urinalysis Basic - ( 26 Nov 2023 07:15 )    Color:  / Appearance:  / SG:  / pH:   Gluc: 108 mg/dL / Ketone:   / Bili:  / Urobili:    Blood:  / Protein:  / Nitrite:    Leuk Esterase:  / RBC:  / WBC    Sq Epi:  / Non Sq Epi:  / Bacteria:       Sodium, Random Urine: 21 mmol/L (11-22 @ 09:25)  Creatinine, Random Urine: 128 mg/dL (11-22 @ 09:25)  Creatinine, Random Urine: 128 mg/dL (11-21 @ 10:25)  Sodium, Random Urine: 64 mmol/L (11-20 @ 15:15)  Creatinine, Random Urine: 78 mg/dL (11-20 @ 15:15)  Osmolality, Random Urine: 380 mos/kg (11-20 @ 15:15)

## 2023-11-26 NOTE — PROGRESS NOTE ADULT - PROBLEM SELECTOR PLAN 4
h/o COPD, home meds tiotropium inhaler.  s/p extubation 11/19 for COPD exacerbation.  - patient still with bilateral expiratory wheezing.  - c/w tiotropium, albuterol, symbicort.  - started prednisone 40mg daily.  - c/w standing Judi   - Pulmichelle Manrique consulted - pending recs. h/o COPD, home meds tiotropium inhaler.  s/p extubation 11/19 for COPD exacerbation.  - patient still with bilateral expiratory wheezing.  - c/w tiotropium, albuterol, symbicort.  - started prednisone 40mg daily.  - c/w standing Duoneb   - Pulm Dr. Burton fontanez.

## 2023-11-26 NOTE — PROGRESS NOTE ADULT - SUBJECTIVE AND OBJECTIVE BOX
Time of Visit:  Patient seen and examined.     MEDICATIONS  (STANDING):  albuterol    90 MICROgram(s) HFA Inhaler 2 Puff(s) Inhalation every 6 hours  albuterol/ipratropium for Nebulization 3 milliLiter(s) Nebulizer every 6 hours  aspirin  chewable 81 milliGRAM(s) Oral daily  budesonide 160 MICROgram(s)/formoterol 4.5 MICROgram(s) Inhaler 2 Puff(s) Inhalation two times a day  buMETAnide 1 milliGRAM(s) Oral daily  folic acid 1 milliGRAM(s) Oral daily  insulin lispro (ADMELOG) corrective regimen sliding scale   SubCutaneous Before meals and at bedtime  metoprolol tartrate 50 milliGRAM(s) Oral three times a day  multivitamin 1 Tablet(s) Oral daily  nicotine - 21 mG/24Hr(s) Patch 1 Patch Transdermal daily  pantoprazole    Tablet 40 milliGRAM(s) Oral every 12 hours  predniSONE   Tablet 40 milliGRAM(s) Oral daily  QUEtiapine 25 milliGRAM(s) Oral at bedtime  thiamine 100 milliGRAM(s) Oral daily  tiotropium 2.5 MICROgram(s) Inhaler 2 Puff(s) Inhalation daily      MEDICATIONS  (PRN):       Medications up to date at time of exam.      PHYSICAL EXAMINATION:  Patient has no new complaints.  GENERAL: The patient is a well-developed, well-nourished, in no apparent distress.     Vital Signs Last 24 Hrs  T(C): 36.3 (26 Nov 2023 15:14), Max: 36.9 (26 Nov 2023 11:08)  T(F): 97.3 (26 Nov 2023 15:14), Max: 98.4 (26 Nov 2023 11:08)  HR: 115 (26 Nov 2023 15:14) (67 - 142)  BP: 141/69 (26 Nov 2023 15:14) (118/72 - 142/72)  BP(mean): --  RR: 18 (26 Nov 2023 15:14) (18 - 20)  SpO2: 98% (26 Nov 2023 15:14) (96% - 100%)    Parameters below as of 26 Nov 2023 15:14  Patient On (Oxygen Delivery Method): room air       (if applicable)    Chest Tube (if applicable)    HEENT: Head is normocephalic and atraumatic. Extraocular muscles are intact. Mucous membranes are moist.     NECK: Supple, no palpable adenopathy.    LUNGS: Clear to auscultation, no wheezing, rales, or rhonchi.    HEART: Regular rate and rhythm without murmur.    ABDOMEN: Soft, nontender, and nondistended.  No hepatosplenomegaly is noted.    : No painful voiding, no pelvic pain    EXTREMITIES: Without any cyanosis, clubbing, rash, lesions or edema.    NEUROLOGIC: Awake, alert, oriented, grossly intact    SKIN: Warm, dry, good turgor.      LABS:                        11.3   9.68  )-----------( 104      ( 26 Nov 2023 07:15 )             36.2     11-26    146<H>  |  114<H>  |  23<H>  ----------------------------<  108<H>  3.5   |  26  |  1.31<H>    Ca    8.5      26 Nov 2023 07:15  Phos  3.3     11-26  Mg     1.8     11-26        Urinalysis Basic - ( 26 Nov 2023 07:15 )    Color: x / Appearance: x / SG: x / pH: x  Gluc: 108 mg/dL / Ketone: x  / Bili: x / Urobili: x   Blood: x / Protein: x / Nitrite: x   Leuk Esterase: x / RBC: x / WBC x   Sq Epi: x / Non Sq Epi: x / Bacteria: x      MICROBIOLOGY: (if applicable)    RADIOLOGY & ADDITIONAL STUDIES:  EKG:   CXR:  ECHO:    IMPRESSION: 68y Male PAST MEDICAL & SURGICAL HISTORY:  HTN (hypertension)      Varicose veins      HLD (hyperlipidemia)      Liver cirrhosis      Portal hypertension with esophageal varices      COPD (chronic obstructive pulmonary disease)      Syncope      Alcohol dependence, daily use      Smokes tobacco daily      DM (diabetes mellitus)      No significant past surgical history       p/w         IMP: This is a 67 yr old man  current smoker with  COPD, HLD, DM, HTN, chronic ETOH dependence, cirrhosis presents with SOB. Patient was found to be septic likely 2/2 PNA.  Patient continued to be  tachypneic, and  developed New onset Afib on Bipap Patient was intubated for increase WOB and admitted to ICU for further management. Patient was intubated in the ED. Patient had new onset Afib initially thought to be 2/2 sepsis with LBBB on EKG and elevated trops and lactic acidosis. Patient received 2.5 L bolus. Patient received cardizem push x2. Patient was started on Rocephin and Azithro empirically for CAP and was started on solumedrol and duonebs for possible COPD exacerbation. Trops downtrending. Patient was producing minimal urine and UA showed granular casts indicative of ATN. Patient was extubated on 11/19 to BIPAP and weaned to NC but had to be put back on BIPAP in setting of inc WOB. Patient was started on metoprolol and was given one dose of bumex to attempt to diurese patient in setting of B lines on POCUS.11/20  Patients mental status was altered likely in setting of ativan use, but patietnt was started on high dose thiamine to cover for possible wernickes encephalopathy. Pan CT was negative for any acute abnormalities on head and chest and positive for possible duodenitis/Peptic ulcer disease on AP. Echo showed newly reduced EF of 20-25%. Patient was started on heparin drip and aspirin. 11/21 Mentation much improved. Heparin was held in setting of thrombocytopenia, apixiban was started. SCr downtrending. Patient started on diet, now on 2L NC. ABx discontinued, no infiltrates on CT chest. Solumedrol discontinued. 11/22 Patient AOx3 today. Still tachycardic, metoprolol increased to 50. Goss removed. HIT panel negative.11/23 started digoxin load.    Sugg    - Continue to monitor pulse oximetry   - Prednisone 40 mg Qd   - Continue Symbicort    - Tele monitoring   - Anticoag   - Duonebs q6h prn   - For Cardiac cath 11/27   - Agreed with nicotine patch   - Bumex   - Monitor electrolyte  daily   - Advise to stop smoking and using alcohol   - Monitor blood glucose with coverage

## 2023-11-26 NOTE — PROGRESS NOTE ADULT - SUBJECTIVE AND OBJECTIVE BOX
MR#[id number]  PATIENT NAME:DENNY ELIZABETH    DATE OF SERVICE: 11-26-23  Patient was seen and examined by Nate Alcazar MD on    11-26-23   Interim events noted.Consultant notes ,Labs,Telemetry reviewed by me       HOSPITAL COURSE: HPI:  68M current smoker with PMHx  COPD, HLD, DM, HTN, chronic ETOH dependence, cirrhosis presents with SOB. Patient is a poor historian and states he feels SOB but unable to provide further details. Patient denies fever, chills, cough, chest pain, palpitations, abdominal pain, or change in bowel habits.     In the ED:  Afebrile, HR 160s. /94, RR 36, 100% on NRB  WBC 20, lactate 5.7  CXR b/l lung markings  Trop 640, BNP 4K  S/p Cardizem 10mg x2, solumedrol 125mg, 500cc bolus  (18 Nov 2023 10:37)      INTERIM EVENTS:Patient seen at bedside ,interim events noted.      PMH -reviewed admission note, no change since admission  HEART FAILURE: Acute[ ]Chronic[ ] Systolic[ ] Diastolic[ ] Combined Systolic and Diastolic[ ]  CAD[ ] CABG[ ] PCI[ ]  DEVICES[ ] PPM[ ] ICD[ ] ILR[ ]  ATRIAL FIBRILLATION[ ] Paroxysmal[ ] Permanent[ ] CHADS2-[  ]  CORTES[ ] CKD1[ ] CKD2[ ] CKD3[ ] CKD4[ ] ESRD[ ]  COPD[ ] HTN[ ]   DM[ ] Type1[ ] Type 2[ ]   CVA[ ] Paresis[ ]    AMBULATION: Assisted[ ] Cane/walker[ ] Independent[ ]    MEDICATIONS  (STANDING):  albuterol    90 MICROgram(s) HFA Inhaler 2 Puff(s) Inhalation every 6 hours  albuterol/ipratropium for Nebulization 3 milliLiter(s) Nebulizer every 6 hours  aspirin  chewable 81 milliGRAM(s) Oral daily  budesonide 160 MICROgram(s)/formoterol 4.5 MICROgram(s) Inhaler 2 Puff(s) Inhalation two times a day  buMETAnide 1 milliGRAM(s) Oral daily  folic acid 1 milliGRAM(s) Oral daily  insulin lispro (ADMELOG) corrective regimen sliding scale   SubCutaneous Before meals and at bedtime  metoprolol tartrate 50 milliGRAM(s) Oral three times a day  multivitamin 1 Tablet(s) Oral daily  nicotine - 21 mG/24Hr(s) Patch 1 Patch Transdermal daily  pantoprazole    Tablet 40 milliGRAM(s) Oral every 12 hours  predniSONE   Tablet 40 milliGRAM(s) Oral daily  QUEtiapine 25 milliGRAM(s) Oral at bedtime  thiamine 100 milliGRAM(s) Oral daily  tiotropium 2.5 MICROgram(s) Inhaler 2 Puff(s) Inhalation daily    MEDICATIONS  (PRN):            REVIEW OF SYSTEMS:  Constitutional: [ ] fever, [ ]weight loss,  [ ]fatigue [ ]weight gain  Eyes: [ ] visual changes  Respiratory: [ ]shortness of breath;  [ ] cough, [ ]wheezing, [ ]chills, [ ]hemoptysis  Cardiovascular: [ ] chest pain, [ ]palpitations, [ ]dizziness,  [ ]leg swelling[ ]orthopnea[ ]PND  Gastrointestinal: [ ] abdominal pain, [ ]nausea, [ ]vomiting,  [ ]diarrhea [ ]Constipation [ ]Melena  Genitourinary: [ ] dysuria, [ ] hematuria [ ]Goss  Neurologic: [ ] headaches [ ] tremors[ ]weakness [ ]Paralysis Right[ ] Left[ ]  Skin: [ ] itching, [ ]burning, [ ] rashes  Endocrine: [ ] heat or cold intolerance  Musculoskeletal: [ ] joint pain or swelling; [ ] muscle, back, or extremity pain  Psychiatric: [ ] depression, [ ]anxiety, [ ]mood swings, or [ ]difficulty sleeping  Hematologic: [ ] easy bruising, [ ] bleeding gums    [ ] All remaining systems negative except as per above.   [ ]Unable to obtain.  [x] No change in ROS since admission      Vital Signs Last 24 Hrs  T(C): 36.3 (26 Nov 2023 15:14), Max: 36.9 (26 Nov 2023 11:08)  T(F): 97.3 (26 Nov 2023 15:14), Max: 98.4 (26 Nov 2023 11:08)  HR: 162 (26 Nov 2023 16:53) (83 - 162)  BP: 139/72 (26 Nov 2023 16:53) (118/72 - 142/72)  BP(mean): 84 (26 Nov 2023 16:53) (84 - 84)  RR: 18 (26 Nov 2023 15:14) (18 - 20)  SpO2: 98% (26 Nov 2023 15:14) (96% - 100%)    Parameters below as of 26 Nov 2023 15:14  Patient On (Oxygen Delivery Method): room air      I&O's Summary    25 Nov 2023 07:01  -  26 Nov 2023 07:00  --------------------------------------------------------  IN: 565 mL / OUT: 300 mL / NET: 265 mL        PHYSICAL EXAM:  General: No acute distress BMI-  HEENT: EOMI, PERRL  Neck: Supple, [ ] JVD  Lungs: Equal air entry bilaterally; [ ] rales [ ] wheezing [ ] rhonchi  Heart: Regular rate and rhythm; [x ] murmur   2/6 [ x] systolic [ ] diastolic [ ] radiation[ ] rubs [ ]  gallops  Abdomen: Nontender, bowel sounds present  Extremities: No clubbing, cyanosis, [ ] edema [ ]Pulses  equal and intact  Nervous system:  Alert & Oriented X3, no focal deficits  Psychiatric: Normal affect  Skin: No rashes or lesions    LABS:  11-26    146<H>  |  114<H>  |  23<H>  ----------------------------<  108<H>  3.5   |  26  |  1.31<H>    Ca    8.5      26 Nov 2023 07:15  Phos  3.3     11-26  Mg     1.8     11-26      Creatinine Trend: 1.31<--, 1.33<--, 1.43<--, 1.87<--, 1.96<--, 2.44<--                        11.3   9.68  )-----------( 104      ( 26 Nov 2023 07:15 )             36.2

## 2023-11-26 NOTE — PROGRESS NOTE ADULT - ATTENDING COMMENTS
#Acute hypoxemic and hypercapnic respiratory failure   #NSTEMI  #Afib w/ RVR  #Acute heart failure with reduced EF - EF 20-25%  #Acute kidney failure   #Lactic acidosis  #Acute delirium   #acute metabolic encephalopathy  #Dm type 2   #thrombocytopenia- mild to moderate    Plan:  planned for transfer on 11/27/23 for cardiac catheterization  -c/w eliquis, metoprolol  -c/w bumex  -prednisone 40mg, symbicort, spiriva, pulmonary consult assessment while on the medical floor for optimization  -given findings on CT a/p with duodenal infiltration suggestive of duodenitis vs. PUD, increase protonix to 40mg po bid for now. Patient requires a GI evaluation for f/u EGD  -s/p course of IV thiamine 500 mg x 3, resume po thiamine at 100mg, c/w folic acid  -f/u cardiology, nephrology, and  pulmonary input .

## 2023-11-27 LAB
ANION GAP SERPL CALC-SCNC: 5 MMOL/L — SIGNIFICANT CHANGE UP (ref 5–17)
ANION GAP SERPL CALC-SCNC: 5 MMOL/L — SIGNIFICANT CHANGE UP (ref 5–17)
BUN SERPL-MCNC: 29 MG/DL — HIGH (ref 7–18)
BUN SERPL-MCNC: 29 MG/DL — HIGH (ref 7–18)
CALCIUM SERPL-MCNC: 9 MG/DL — SIGNIFICANT CHANGE UP (ref 8.4–10.5)
CALCIUM SERPL-MCNC: 9 MG/DL — SIGNIFICANT CHANGE UP (ref 8.4–10.5)
CHLORIDE SERPL-SCNC: 112 MMOL/L — HIGH (ref 96–108)
CHLORIDE SERPL-SCNC: 112 MMOL/L — HIGH (ref 96–108)
CO2 SERPL-SCNC: 26 MMOL/L — SIGNIFICANT CHANGE UP (ref 22–31)
CO2 SERPL-SCNC: 26 MMOL/L — SIGNIFICANT CHANGE UP (ref 22–31)
CREAT SERPL-MCNC: 1.46 MG/DL — HIGH (ref 0.5–1.3)
CREAT SERPL-MCNC: 1.46 MG/DL — HIGH (ref 0.5–1.3)
EGFR: 52 ML/MIN/1.73M2 — LOW
EGFR: 52 ML/MIN/1.73M2 — LOW
GLUCOSE BLDC GLUCOMTR-MCNC: 102 MG/DL — HIGH (ref 70–99)
GLUCOSE BLDC GLUCOMTR-MCNC: 102 MG/DL — HIGH (ref 70–99)
GLUCOSE BLDC GLUCOMTR-MCNC: 121 MG/DL — HIGH (ref 70–99)
GLUCOSE BLDC GLUCOMTR-MCNC: 121 MG/DL — HIGH (ref 70–99)
GLUCOSE BLDC GLUCOMTR-MCNC: 128 MG/DL — HIGH (ref 70–99)
GLUCOSE BLDC GLUCOMTR-MCNC: 128 MG/DL — HIGH (ref 70–99)
GLUCOSE BLDC GLUCOMTR-MCNC: 174 MG/DL — HIGH (ref 70–99)
GLUCOSE BLDC GLUCOMTR-MCNC: 174 MG/DL — HIGH (ref 70–99)
GLUCOSE SERPL-MCNC: 137 MG/DL — HIGH (ref 70–99)
GLUCOSE SERPL-MCNC: 137 MG/DL — HIGH (ref 70–99)
HCT VFR BLD CALC: 40.6 % — SIGNIFICANT CHANGE UP (ref 39–50)
HCT VFR BLD CALC: 40.6 % — SIGNIFICANT CHANGE UP (ref 39–50)
HGB BLD-MCNC: 12.8 G/DL — LOW (ref 13–17)
HGB BLD-MCNC: 12.8 G/DL — LOW (ref 13–17)
MAGNESIUM SERPL-MCNC: 1.7 MG/DL — SIGNIFICANT CHANGE UP (ref 1.6–2.6)
MAGNESIUM SERPL-MCNC: 1.7 MG/DL — SIGNIFICANT CHANGE UP (ref 1.6–2.6)
MCHC RBC-ENTMCNC: 30.1 PG — SIGNIFICANT CHANGE UP (ref 27–34)
MCHC RBC-ENTMCNC: 30.1 PG — SIGNIFICANT CHANGE UP (ref 27–34)
MCHC RBC-ENTMCNC: 31.5 GM/DL — LOW (ref 32–36)
MCHC RBC-ENTMCNC: 31.5 GM/DL — LOW (ref 32–36)
MCV RBC AUTO: 95.5 FL — SIGNIFICANT CHANGE UP (ref 80–100)
MCV RBC AUTO: 95.5 FL — SIGNIFICANT CHANGE UP (ref 80–100)
NRBC # BLD: 0 /100 WBCS — SIGNIFICANT CHANGE UP (ref 0–0)
NRBC # BLD: 0 /100 WBCS — SIGNIFICANT CHANGE UP (ref 0–0)
PHOSPHATE SERPL-MCNC: 3.3 MG/DL — SIGNIFICANT CHANGE UP (ref 2.5–4.5)
PHOSPHATE SERPL-MCNC: 3.3 MG/DL — SIGNIFICANT CHANGE UP (ref 2.5–4.5)
PLATELET # BLD AUTO: 143 K/UL — LOW (ref 150–400)
PLATELET # BLD AUTO: 143 K/UL — LOW (ref 150–400)
POTASSIUM SERPL-MCNC: 3.6 MMOL/L — SIGNIFICANT CHANGE UP (ref 3.5–5.3)
POTASSIUM SERPL-MCNC: 3.6 MMOL/L — SIGNIFICANT CHANGE UP (ref 3.5–5.3)
POTASSIUM SERPL-SCNC: 3.6 MMOL/L — SIGNIFICANT CHANGE UP (ref 3.5–5.3)
POTASSIUM SERPL-SCNC: 3.6 MMOL/L — SIGNIFICANT CHANGE UP (ref 3.5–5.3)
RBC # BLD: 4.25 M/UL — SIGNIFICANT CHANGE UP (ref 4.2–5.8)
RBC # BLD: 4.25 M/UL — SIGNIFICANT CHANGE UP (ref 4.2–5.8)
RBC # FLD: 15.9 % — HIGH (ref 10.3–14.5)
RBC # FLD: 15.9 % — HIGH (ref 10.3–14.5)
SODIUM SERPL-SCNC: 143 MMOL/L — SIGNIFICANT CHANGE UP (ref 135–145)
SODIUM SERPL-SCNC: 143 MMOL/L — SIGNIFICANT CHANGE UP (ref 135–145)
UNFRACTIONATED HEPARIN INTERPRETATION: SIGNIFICANT CHANGE UP
UNFRACTIONATED HEPARIN INTERPRETATION: SIGNIFICANT CHANGE UP
UNFRACTIONATED HEPARIN RESULT: NEGATIVE — SIGNIFICANT CHANGE UP
UNFRACTIONATED HEPARIN RESULT: NEGATIVE — SIGNIFICANT CHANGE UP
UNFRACTIONATED HEPARIN-HIGH DOSE: 0 % — SIGNIFICANT CHANGE UP
UNFRACTIONATED HEPARIN-HIGH DOSE: 0 % — SIGNIFICANT CHANGE UP
UNFRACTIONATED HEPARIN-LOW DOSE: 0 % — SIGNIFICANT CHANGE UP
UNFRACTIONATED HEPARIN-LOW DOSE: 0 % — SIGNIFICANT CHANGE UP
WBC # BLD: 14 K/UL — HIGH (ref 3.8–10.5)
WBC # BLD: 14 K/UL — HIGH (ref 3.8–10.5)
WBC # FLD AUTO: 14 K/UL — HIGH (ref 3.8–10.5)
WBC # FLD AUTO: 14 K/UL — HIGH (ref 3.8–10.5)

## 2023-11-27 PROCEDURE — 99232 SBSQ HOSP IP/OBS MODERATE 35: CPT | Mod: GC

## 2023-11-27 RX ORDER — ACETYLCYSTEINE 200 MG/ML
1200 VIAL (ML) MISCELLANEOUS EVERY 12 HOURS
Refills: 0 | Status: COMPLETED | OUTPATIENT
Start: 2023-11-27 | End: 2023-11-28

## 2023-11-27 RX ADMIN — Medication 1: at 12:15

## 2023-11-27 RX ADMIN — Medication 1 PATCH: at 05:34

## 2023-11-27 RX ADMIN — TIOTROPIUM BROMIDE 2 PUFF(S): 18 CAPSULE ORAL; RESPIRATORY (INHALATION) at 12:00

## 2023-11-27 RX ADMIN — Medication 1 PATCH: at 21:55

## 2023-11-27 RX ADMIN — Medication 40 MILLIGRAM(S): at 05:33

## 2023-11-27 RX ADMIN — Medication 1 PATCH: at 12:48

## 2023-11-27 RX ADMIN — Medication 1 MILLIGRAM(S): at 12:47

## 2023-11-27 RX ADMIN — Medication 81 MILLIGRAM(S): at 12:47

## 2023-11-27 RX ADMIN — Medication 1200 MILLIGRAM(S): at 17:32

## 2023-11-27 RX ADMIN — QUETIAPINE FUMARATE 25 MILLIGRAM(S): 200 TABLET, FILM COATED ORAL at 22:09

## 2023-11-27 RX ADMIN — Medication 50 MILLIGRAM(S): at 22:09

## 2023-11-27 RX ADMIN — Medication 100 MILLIGRAM(S): at 12:47

## 2023-11-27 RX ADMIN — BUDESONIDE AND FORMOTEROL FUMARATE DIHYDRATE 2 PUFF(S): 160; 4.5 AEROSOL RESPIRATORY (INHALATION) at 22:09

## 2023-11-27 RX ADMIN — ALBUTEROL 2 PUFF(S): 90 AEROSOL, METERED ORAL at 15:00

## 2023-11-27 RX ADMIN — PANTOPRAZOLE SODIUM 40 MILLIGRAM(S): 20 TABLET, DELAYED RELEASE ORAL at 05:33

## 2023-11-27 RX ADMIN — ALBUTEROL 2 PUFF(S): 90 AEROSOL, METERED ORAL at 09:00

## 2023-11-27 RX ADMIN — ALBUTEROL 2 PUFF(S): 90 AEROSOL, METERED ORAL at 22:09

## 2023-11-27 RX ADMIN — Medication 1200 MILLIGRAM(S): at 12:55

## 2023-11-27 RX ADMIN — PANTOPRAZOLE SODIUM 40 MILLIGRAM(S): 20 TABLET, DELAYED RELEASE ORAL at 17:37

## 2023-11-27 RX ADMIN — BUMETANIDE 1 MILLIGRAM(S): 0.25 INJECTION INTRAMUSCULAR; INTRAVENOUS at 05:33

## 2023-11-27 RX ADMIN — BUDESONIDE AND FORMOTEROL FUMARATE DIHYDRATE 2 PUFF(S): 160; 4.5 AEROSOL RESPIRATORY (INHALATION) at 10:00

## 2023-11-27 RX ADMIN — ALBUTEROL 2 PUFF(S): 90 AEROSOL, METERED ORAL at 05:32

## 2023-11-27 RX ADMIN — Medication 50 MILLIGRAM(S): at 05:33

## 2023-11-27 NOTE — PROGRESS NOTE ADULT - ASSESSMENT
Patient is a 69yo Male active smoker with h/o COPD, HLD, DM, HTN, chronic ETOH dependence, cirrhosis presents with SOB. Pt a/w Sepsis 2/2 PNA, COPD,  Acute hypoxic hypercapnic respiratory failure s/p brief intubation, Acute encephalopathy new onset Afib with CORTES. Nephrology consulted for Elevated serum creatinine.  Pt with NSTEMI with reduced EF; concerning for new onset HF (prev EF 50-55% in April 2023 --> now EF 20-25%). Pt pending possible cath once renal function improves  Pt extubated 11/19    1 CORTES- previous SCr 0.8-1 in April 2023. CORTES likely ATN. UA with 30 protein; large blood ?traumatic randolph with A/C use. FeNa 1.75%; inconclusive. FeUrea 33%; borderline; likely due to poor EABV.  Renal function improving.   Pt now on Bumex decreased to 1mg PO daily now for maintenance due to poor EF.  Would recc Bumex 1mg PO bid dosing.   Ct abd/pelvis with no hydro. Strict I/Os. Avoid nephrotoxins/ NSAIDs/ RCA. Monitor BMP.    2. Acute encephalopathy- resolving. CT head neg. Ammonia level <10.     3. Acute CHF- TTE with severe global LV systolic dysfunction; EF 20-25%. Cardiology following.  Renal fxn improved further and then stabilized.   Pt for cardiac catheterization today Monday 11/27. Recc Mucomyst 1200mg PO bid x 4 doses (2 doses pre contrast and 2 doses post contrast) and hold Bumex the day of cath (already given today).     4. COPD exacerbation- CT chest with no PNA for which abx was d/c. Bronchodilators/ Steroids as per Pul.     [facility] NEPHROLOGY  Iggy Hernandez M.D.  Richard Cook D.O.  Bárbara Slaughter M.D.  MD Breanna Shculte, MSN, ANP-C    Telephone: [phone]  Facsimile: [phone] [place] [address], [address]  [place] [address]

## 2023-11-27 NOTE — PROGRESS NOTE ADULT - ATTENDING COMMENTS
#Acute hypoxemic and hypercapnic respiratory failure   #NSTEMI  #Afib w/ RVR  #Acute heart failure with reduced EF - EF 20-25%  #Acute kidney failure   #Lactic acidosis  #Acute delirium   #acute metabolic encephalopathy  #Dm type 2   #thrombocytopenia- mild to moderate    Plan:  planned for transfer on 11/27/23 today for cardiac catheterization  -c/w eliquis, metoprolol  -c/w bumex  -prednisone 40mg, symbicort, spiriva, pulmonary consult assessment while on the medical floor for optimization  -given findings on CT a/p with duodenal infiltration suggestive of duodenitis vs. PUD, cw protonix to 40mg po bid for now. Patient requires a GI evaluation for f/u EGD  -s/p course of IV thiamine 500 mg x 3, resume po thiamine at 100mg, c/w folic acid  -f/u cardiology, nephrology, and  pulmonary input .

## 2023-11-27 NOTE — PROGRESS NOTE ADULT - SUBJECTIVE AND OBJECTIVE BOX
PGY-1 Progress Note discussed with attending.    PAGER #: [phone]] TILL 5:00 PM  PLEASE CONTACT ON CALL TEAM:  - On Call Team (Please refer to Angel) FROM 5:00 PM - 8:30PM  - Nightfloat Team FROM 8:30 -7:30 AM      INTERVAL HPI/OVERNIGHT EVENTS: Patient seen and examined at bedside. Resting comfortably. No acute overnight events.      REVIEW OF SYSTEMS:  CONSTITUTIONAL: No fever, weight loss, or fatigue.  RESPIRATORY: No cough, wheezing, chills, or hemoptysis. No shortness of breath.  CARDIOVASCULAR: No chest pain, palpitations, dizziness, or leg swelling.  GASTROINTESTINAL: No abdominal pain. No nausea, vomiting, or hematemesis. No diarrhea or constipation. No melena or hematochezia.  GENITOURINARY: No dysuria or hematuria, urinary frequency.  NEUROLOGICAL: No headaches, memory loss, loss of strength, numbness, or tremors.  SKIN: No itching, burning, rashes, or lesions.    Vital Signs Last 24 Hrs  T(C): 36.5 (27 Nov 2023 08:25), Max: 36.9 (26 Nov 2023 11:08)  T(F): 97.7 (27 Nov 2023 08:25), Max: 98.4 (26 Nov 2023 11:08)  HR: 120 (27 Nov 2023 08:25) (58 - 162)  BP: 119/65 (27 Nov 2023 08:25) (98/80 - 141/69)  BP(mean): 84 (26 Nov 2023 16:53) (84 - 84)  RR: 18 (27 Nov 2023 08:25) (18 - 18)  SpO2: 97% (27 Nov 2023 08:25) (97% - 98%)    Parameters below as of 27 Nov 2023 08:25  Patient On (Oxygen Delivery Method): room air        PHYSICAL EXAMINATION:  GENERAL: NAD   HEAD:  Atraumatic, Normocephalic.  EYES:  Conjunctiva and sclera clear.  NECK: Supple, No JVD, Normal thyroid.  CHEST/LUNG: Clear to auscultation. Clear to percussion bilaterally. No rales, rhonchi, wheezing, or rubs.  HEART: Regular rate and rhythm. No murmurs, rubs, or gallops.  ABDOMEN: Soft, Nontender, Nondistended. Bowel sounds present.  NERVOUS SYSTEM:  Alert & Oriented X3  EXTREMITIES:  2+ Peripheral Pulses. No clubbing, cyanosis, or edema.  SKIN: Warm, dry.                          12.8   14.00 )-----------( 143      ( 27 Nov 2023 08:25 )             40.6     11-27    143  |  112<H>  |  29<H>  ----------------------------<  137<H>  3.6   |  26  |  1.46<H>    Ca    9.0      27 Nov 2023 08:25  Phos  3.3     11-27  Mg     1.7     11-27                CAPILLARY BLOOD GLUCOSE:      RADIOLOGY & ADDITIONAL TESTS:

## 2023-11-27 NOTE — PROGRESS NOTE ADULT - SUBJECTIVE AND OBJECTIVE BOX
PATIENT SEEN AND EXAMINED BY EDGARDO DRISCOLL M.D. ON :- 11/27/23  DATE OF SERVICE:     11/27/23        Interim events noted,Labs ,Radiological studies and Cardiology tests reviewed .  DISCUSSED WITH ACP/MEDICAL RESIDENT ON PLAN OF CARE.    MR#[id number]  PATIENT NAME:[facility] COURSE: HPI:  68M current smoker with PMHx  COPD, HLD, DM, HTN, chronic ETOH dependence, cirrhosis presents with SOB. Patient is a poor historian and states he feels SOB but unable to provide further details. Patient denies fever, chills, cough, chest pain, palpitations, abdominal pain, or change in bowel habits.     In the ED:  Afebrile, HR 160s. /94, RR 36, 100% on NRB  WBC 20, lactate 5.7  CXR b/l lung markings  Trop 640, BNP 4K  S/p Cardizem 10mg x2, solumedrol 125mg, 500cc bolus  (18 Nov 2023 10:37)      INTERIM EVENTS:Patient seen at bedside ,interim events noted.      [facility] -reviewed admission note, no change since admission  HEART FAILURE: Acute[ ]Chronic[ ] Systolic[ ] Diastolic[ ] Combined Systolic and Diastolic[ ]  CAD[ ] CABG[ ] PCI[ ]  DEVICES[ ] PPM[ ] ICD[ ] ILR[ ]  ATRIAL FIBRILLATION[ ] Paroxysmal[ ] Permanent[ ] CHADS2-[  ]  CORTES[ ] CKD1[ ] CKD2[ ] CKD3[ ] CKD4[ ] ESRD[ ]  COPD[ ] HTN[ ]   DM[ ] Type1[ ] Type 2[ ]   CVA[ ] Paresis[ ]    AMBULATION: Assisted[ ] Cane/walker[ ] Independent[ ]    MEDICATIONS  (STANDING):  acetylcysteine  Oral Solution 1200 milliGRAM(s) Oral every 12 hours  albuterol    90 MICROgram(s) HFA Inhaler 2 Puff(s) Inhalation every 6 hours  albuterol/ipratropium for Nebulization 3 milliLiter(s) Nebulizer every 6 hours  aspirin  chewable 81 milliGRAM(s) Oral daily  budesonide 160 MICROgram(s)/formoterol 4.5 MICROgram(s) Inhaler 2 Puff(s) Inhalation two times a day  buMETAnide 1 milliGRAM(s) Oral daily  folic acid 1 milliGRAM(s) Oral daily  insulin lispro (ADMELOG) corrective regimen sliding scale   SubCutaneous Before meals and at bedtime  metoprolol tartrate 50 milliGRAM(s) Oral three times a day  multivitamin 1 Tablet(s) Oral daily  nicotine - 21 mG/24Hr(s) Patch 1 Patch Transdermal daily  pantoprazole    Tablet 40 milliGRAM(s) Oral every 12 hours  predniSONE   Tablet 40 milliGRAM(s) Oral daily  QUEtiapine 25 milliGRAM(s) Oral at bedtime  thiamine 100 milliGRAM(s) Oral daily  tiotropium 2.5 MICROgram(s) Inhaler 2 Puff(s) Inhalation daily    MEDICATIONS  (PRN):            REVIEW OF SYSTEMS:  Constitutional: [ ] fever, [ ]weight loss,  [ ]fatigue [ ]weight gain  Eyes: [ ] visual changes  Respiratory: [ ]shortness of breath;  [ ] cough, [ ]wheezing, [ ]chills, [ ]hemoptysis  Cardiovascular: [ ] chest pain, [ ]palpitations, [ ]dizziness,  [ ]leg swelling[ ]orthopnea[ ]PND  Gastrointestinal: [ ] abdominal pain, [ ]nausea, [ ]vomiting,  [ ]diarrhea [ ]Constipation [ ]Melena  Genitourinary: [ ] dysuria, [ ] hematuria [ ]Goss  Neurologic: [ ] headaches [ ] tremors[ ]weakness [ ]Paralysis Right[ ] Left[ ]  Skin: [ ] itching, [ ]burning, [ ] rashes  Endocrine: [ ] heat or cold intolerance  Musculoskeletal: [ ] joint pain or swelling; [ ] muscle, back, or extremity pain  Psychiatric: [ ] depression, [ ]anxiety, [ ]mood swings, or [ ]difficulty sleeping  Hematologic: [ ] easy bruising, [ ] bleeding gums    [ ] All remaining systems negative except as per above.   [ ]Unable to obtain.  [x] No change in ROS since admission      Vital Signs Last 24 Hrs  T(C): 36.9 (27 Nov 2023 15:10), Max: 36.9 (27 Nov 2023 15:10)  T(F): 98.4 (27 Nov 2023 15:10), Max: 98.4 (27 Nov 2023 15:10)  HR: 143 (27 Nov 2023 15:10) (58 - 143)  BP: 132/72 (27 Nov 2023 15:10) (98/80 - 140/88)  BP(mean): --  RR: 18 (27 Nov 2023 15:10) (18 - 18)  SpO2: 100% (27 Nov 2023 15:10) (97% - 100%)    Parameters below as of 27 Nov 2023 15:10  Patient On (Oxygen Delivery Method): room air      I&O's Summary    26 Nov 2023 07:01  -  27 Nov 2023 07:00  --------------------------------------------------------  IN: 0 mL / OUT: 500 mL / NET: -500 mL        PHYSICAL EXAM:  General: No acute distress BMI-  HEENT: EOMI, PERRL  Neck: Supple, [ ] JVD  Lungs: Equal air entry bilaterally; [ ] rales [ ] wheezing [ ] rhonchi  Heart: Regular rate and rhythm; [x ] murmur   2/6 [ x] systolic [ ] diastolic [ ] radiation[ ] rubs [ ]  gallops  Abdomen: Nontender, bowel sounds present  Extremities: No clubbing, cyanosis, [ ] edema [ ]Pulses  equal and intact  Nervous system:  Alert & Oriented X3, no focal deficits  Psychiatric: Normal affect  Skin: No rashes or lesions    LABS:  11-27    143  |  112<H>  |  29<H>  ----------------------------<  137<H>  3.6   |  26  |  1.46<H>    Ca    9.0      27 Nov 2023 08:25  Phos  3.3     11-27  Mg     1.7     11-27      Creatinine Trend: 1.46<--, 1.31<--, 1.33<--, 1.43<--, 1.87<--, 1.96<--                        12.8   14.00 )-----------( 143      ( 27 Nov 2023 08:25 )             40.6

## 2023-11-27 NOTE — PROGRESS NOTE ADULT - PROBLEM SELECTOR PLAN 4
h/o COPD, home meds tiotropium inhaler.  s/p extubation 11/19 for COPD exacerbation.  - patient still with bilateral expiratory wheezing.  - c/w tiotropium, albuterol, symbicort.  - started prednisone 40mg daily.  - c/w standing Duoneb   - Pulm Dr. Burton fontanez.

## 2023-11-27 NOTE — PROGRESS NOTE ADULT - SUBJECTIVE AND OBJECTIVE BOX
[facility] NEPHROLOGY- PROGRESS NOTE    Patient is a 67yo Male active smoker with h/o COPD, HLD, DM, HTN, chronic ETOH dependence, cirrhosis presents with SOB. Pt a/w Sepsis 2/2 PNA, COPD,  Acute hypoxic hypercapnic respiratory failure s/p brief intubation, Acute encephalopathy new onset Afib with CORTES. Nephrology consulted for Elevated serum creatinine.  Pt with NSTEMI with reduced EF; concerning for new onset HF (prev EF 50-55% in April 2023 --> now EF 20-25%). Pt pending possible cath once renal function improves  Pt extubated 11/19      Hospital Medications: Medications reviewed.  REVIEW OF SYSTEMS: Pt denies any SOB, chest pain, n/v/d, abd pain, urinary complaints or LE edema.    VITALS:  T(F): 97.5 (11-27-23 @ 11:21), Max: 98.2 (11-26-23 @ 20:03)  HR: 135 (11-27-23 @ 11:21)  BP: 140/88 (11-27-23 @ 11:21)  RR: 18 (11-27-23 @ 11:21)  SpO2: 100% (11-27-23 @ 11:21)  Wt(kg): --    11-26 @ 07:01  -  11-27 @ 07:00  --------------------------------------------------------  IN: 0 mL / OUT: 500 mL / NET: -500 mL      PHYSICAL EXAM:  Gen: NAD,   HEENT: anicteric   Cards: +tachy irregular , +S1/S2,   Resp: overall clear; no active wheezing at this time  GI: soft, +mild distention +umbilical hernia   Extremities: no LE edema B/L      LABS:  11-27    143  |  112<H>  |  29<H>  ----------------------------<  137<H>  3.6   |  26  |  1.46<H>    Ca    9.0      27 Nov 2023 08:25  Phos  3.3     11-27  Mg     1.7     11-27      Creatinine Trend: 1.46 <--, 1.31 <--, 1.33 <--, 1.43 <--, 1.87 <--, 1.96 <--, 2.44 <--, 2.72 <--, 2.94 <--                        12.8   14.00 )-----------( 143      ( 27 Nov 2023 08:25 )             40.6     Urine Studies:  Urinalysis Basic - ( 27 Nov 2023 08:25 )    Color:  / Appearance:  / SG:  / pH:   Gluc: 137 mg/dL / Ketone:   / Bili:  / Urobili:    Blood:  / Protein:  / Nitrite:    Leuk Esterase:  / RBC:  / WBC    Sq Epi:  / Non Sq Epi:  / Bacteria:       Sodium, Random Urine: 21 mmol/L (11-22 @ 09:25)  Creatinine, Random Urine: 128 mg/dL (11-22 @ 09:25)  Creatinine, Random Urine: 128 mg/dL (11-21 @ 10:25)  Sodium, Random Urine: 64 mmol/L (11-20 @ 15:15)  Creatinine, Random Urine: 78 mg/dL (11-20 @ 15:15)  Osmolality, Random Urine: 380 mos/kg (11-20 @ 15:15)

## 2023-11-27 NOTE — PROGRESS NOTE ADULT - SUBJECTIVE AND OBJECTIVE BOX
Time of Visit:  Patient seen and examined.     MEDICATIONS  (STANDING):  acetylcysteine  Oral Solution 1200 milliGRAM(s) Oral every 12 hours  albuterol    90 MICROgram(s) HFA Inhaler 2 Puff(s) Inhalation every 6 hours  albuterol/ipratropium for Nebulization 3 milliLiter(s) Nebulizer every 6 hours  aspirin  chewable 81 milliGRAM(s) Oral daily  budesonide 160 MICROgram(s)/formoterol 4.5 MICROgram(s) Inhaler 2 Puff(s) Inhalation two times a day  buMETAnide 1 milliGRAM(s) Oral daily  folic acid 1 milliGRAM(s) Oral daily  insulin lispro (ADMELOG) corrective regimen sliding scale   SubCutaneous Before meals and at bedtime  metoprolol tartrate 50 milliGRAM(s) Oral three times a day  multivitamin 1 Tablet(s) Oral daily  nicotine - 21 mG/24Hr(s) Patch 1 Patch Transdermal daily  pantoprazole    Tablet 40 milliGRAM(s) Oral every 12 hours  predniSONE   Tablet 40 milliGRAM(s) Oral daily  QUEtiapine 25 milliGRAM(s) Oral at bedtime  thiamine 100 milliGRAM(s) Oral daily  tiotropium 2.5 MICROgram(s) Inhaler 2 Puff(s) Inhalation daily      MEDICATIONS  (PRN):       Medications up to date at time of exam.    ROS; No fever, chills, cough, congestion, SOB on exam.   PHYSICAL EXAMINATION:  Vital Signs Last 24 Hrs  T(C): 36.4 (27 Nov 2023 11:21), Max: 36.8 (26 Nov 2023 20:03)  T(F): 97.5 (27 Nov 2023 11:21), Max: 98.2 (26 Nov 2023 20:03)  HR: 135 (27 Nov 2023 11:21) (58 - 162)  BP: 140/88 (27 Nov 2023 11:21) (98/80 - 141/69)  BP(mean): 84 (26 Nov 2023 16:53) (84 - 84)  RR: 18 (27 Nov 2023 11:21) (18 - 18)  SpO2: 100% (27 Nov 2023 11:21) (97% - 100%)    Parameters below as of 27 Nov 2023 11:21  Patient On (Oxygen Delivery Method): room air       (if applicable)    General : Alert and oriented. Able to answer question at rest with no SOB. No acute distress.     HEENT: Head is normocephalic and atraumatic. No nasal tenderness.  Extraocular muscles are intact. Mucous membranes are moist.     NECK: Supple, no palpable adenopathy.    LUNGS: Non labored. No wheezing on exam ( has episodic expiratory wheeze ). No use of accessory muscle.     HEART: S1 S2 irregular and no click / rub .    ABDOMEN: Soft, nontender, and nondistended. Active bowel sounds.     EXTREMITIES: Without any cyanosis, clubbing, rash, lesions or edema.    NEUROLOGIC: Awake, alert, oriented.     SKIN: Warm and moist . Non diaphoretic.       LABS:                        12.8   14.00 )-----------( 143      ( 27 Nov 2023 08:25 )             40.6     11-27    143  |  112<H>  |  29<H>  ----------------------------<  137<H>  3.6   |  26  |  1.46<H>    Ca    9.0      27 Nov 2023 08:25  Phos  3.3     11-27  Mg     1.7     11-27        Urinalysis Basic - ( 27 Nov 2023 08:25 )    Color: x / Appearance: x / SG: x / pH: x  Gluc: 137 mg/dL / Ketone: x  / Bili: x / Urobili: x   Blood: x / Protein: x / Nitrite: x   Leuk Esterase: x / RBC: x / WBC x   Sq Epi: x / Non Sq Epi: x / Bacteria: x                      MICROBIOLOGY: (if applicable)    RADIOLOGY & ADDITIONAL STUDIES:  EKG:   CXR:  ECHO:    IMPRESSION: 68y Male PAST MEDICAL & SURGICAL HISTORY:  HTN (hypertension)      Varicose veins      HLD (hyperlipidemia)      Liver cirrhosis      Portal hypertension with esophageal varices      COPD (chronic obstructive pulmonary disease)      Syncope      Alcohol dependence, daily use      Smokes tobacco daily      DM (diabetes mellitus)      No significant past surgical history       Impression: This is a 67 Y/O Male presented to ED with SOB. Patient continued to be Tachypneic and developed New Onset A Fib on Bipap. Was intubated in ED. Was in ICU due to Increased work of breathing due to Pulmonary Edema, cardiogenic shock and COPD exacerbation . Patient was extubated on 11-19-23.  Weaned to NC but had to be put back on BIPAP in setting of inc WOB, now in Medicine Unit , saturating good room air . Patient was started on metoprolol and was given one dose of bumex to attempt to diurese patient in setting of B lines on POCUS.11/20  Patients mental status was altered likely in setting of ativan use, but patietnt was started on high dose thiamine to cover for possible wernickes encephalopathy. Pan CT was negative for any acute abnormalities on head and chest and positive for possible duodenitis/Peptic ulcer disease on AP. Echo showed newly reduced EF of 20-25%. Patient was started on heparin drip and aspirin. 11/21 Mentation much improved. Heparin was held in setting of thrombocytopenia, apixiban was started. SCr downtrending. ABx discontinued, no infiltrates on CT chest. Per primary team For plan transfer for Cardiac Cath due to Ischemic Cardiomyopathy .     Suggestion:  O2 saturation 98% room air. So far saturating good room air. Monitor O2 saturation trend room air.   Continue Duoneb via nebulization Q 6 Hours. Do not give the Albuterol 90 mcg 2 puffs Q 6 Hours ( can use the oral inhaler outpatient ).   Continue Symbicort 160-4.5 mcg 2 Puffs Twice Daily.   Continue Tiotropium 2 puffs Daily.   Pulmonary oral hygiene care especially every after oral inhaler used.  Reinforced the importance of smoking cessation. On Nicotine patch on x 12 Hours.   On Prednisone 40 mg Oral daily.

## 2023-11-28 LAB
ANION GAP SERPL CALC-SCNC: 6 MMOL/L — SIGNIFICANT CHANGE UP (ref 5–17)
ANION GAP SERPL CALC-SCNC: 6 MMOL/L — SIGNIFICANT CHANGE UP (ref 5–17)
BUN SERPL-MCNC: 33 MG/DL — HIGH (ref 7–18)
BUN SERPL-MCNC: 33 MG/DL — HIGH (ref 7–18)
CALCIUM SERPL-MCNC: 8.8 MG/DL — SIGNIFICANT CHANGE UP (ref 8.4–10.5)
CALCIUM SERPL-MCNC: 8.8 MG/DL — SIGNIFICANT CHANGE UP (ref 8.4–10.5)
CHLORIDE SERPL-SCNC: 112 MMOL/L — HIGH (ref 96–108)
CHLORIDE SERPL-SCNC: 112 MMOL/L — HIGH (ref 96–108)
CO2 SERPL-SCNC: 24 MMOL/L — SIGNIFICANT CHANGE UP (ref 22–31)
CO2 SERPL-SCNC: 24 MMOL/L — SIGNIFICANT CHANGE UP (ref 22–31)
CREAT SERPL-MCNC: 1.68 MG/DL — HIGH (ref 0.5–1.3)
CREAT SERPL-MCNC: 1.68 MG/DL — HIGH (ref 0.5–1.3)
EGFR: 44 ML/MIN/1.73M2 — LOW
EGFR: 44 ML/MIN/1.73M2 — LOW
GLUCOSE BLDC GLUCOMTR-MCNC: 126 MG/DL — HIGH (ref 70–99)
GLUCOSE BLDC GLUCOMTR-MCNC: 126 MG/DL — HIGH (ref 70–99)
GLUCOSE BLDC GLUCOMTR-MCNC: 140 MG/DL — HIGH (ref 70–99)
GLUCOSE BLDC GLUCOMTR-MCNC: 140 MG/DL — HIGH (ref 70–99)
GLUCOSE BLDC GLUCOMTR-MCNC: 185 MG/DL — HIGH (ref 70–99)
GLUCOSE BLDC GLUCOMTR-MCNC: 185 MG/DL — HIGH (ref 70–99)
GLUCOSE BLDC GLUCOMTR-MCNC: 212 MG/DL — HIGH (ref 70–99)
GLUCOSE BLDC GLUCOMTR-MCNC: 212 MG/DL — HIGH (ref 70–99)
GLUCOSE SERPL-MCNC: 126 MG/DL — HIGH (ref 70–99)
GLUCOSE SERPL-MCNC: 126 MG/DL — HIGH (ref 70–99)
HCT VFR BLD CALC: 39.4 % — SIGNIFICANT CHANGE UP (ref 39–50)
HCT VFR BLD CALC: 39.4 % — SIGNIFICANT CHANGE UP (ref 39–50)
HGB BLD-MCNC: 12.7 G/DL — LOW (ref 13–17)
HGB BLD-MCNC: 12.7 G/DL — LOW (ref 13–17)
MAGNESIUM SERPL-MCNC: 1.9 MG/DL — SIGNIFICANT CHANGE UP (ref 1.6–2.6)
MAGNESIUM SERPL-MCNC: 1.9 MG/DL — SIGNIFICANT CHANGE UP (ref 1.6–2.6)
MCHC RBC-ENTMCNC: 30.7 PG — SIGNIFICANT CHANGE UP (ref 27–34)
MCHC RBC-ENTMCNC: 30.7 PG — SIGNIFICANT CHANGE UP (ref 27–34)
MCHC RBC-ENTMCNC: 32.2 GM/DL — SIGNIFICANT CHANGE UP (ref 32–36)
MCHC RBC-ENTMCNC: 32.2 GM/DL — SIGNIFICANT CHANGE UP (ref 32–36)
MCV RBC AUTO: 95.2 FL — SIGNIFICANT CHANGE UP (ref 80–100)
MCV RBC AUTO: 95.2 FL — SIGNIFICANT CHANGE UP (ref 80–100)
NRBC # BLD: 0 /100 WBCS — SIGNIFICANT CHANGE UP (ref 0–0)
NRBC # BLD: 0 /100 WBCS — SIGNIFICANT CHANGE UP (ref 0–0)
PHOSPHATE SERPL-MCNC: 4 MG/DL — SIGNIFICANT CHANGE UP (ref 2.5–4.5)
PHOSPHATE SERPL-MCNC: 4 MG/DL — SIGNIFICANT CHANGE UP (ref 2.5–4.5)
PLATELET # BLD AUTO: 119 K/UL — LOW (ref 150–400)
PLATELET # BLD AUTO: 119 K/UL — LOW (ref 150–400)
POTASSIUM SERPL-MCNC: 3.7 MMOL/L — SIGNIFICANT CHANGE UP (ref 3.5–5.3)
POTASSIUM SERPL-MCNC: 3.7 MMOL/L — SIGNIFICANT CHANGE UP (ref 3.5–5.3)
POTASSIUM SERPL-SCNC: 3.7 MMOL/L — SIGNIFICANT CHANGE UP (ref 3.5–5.3)
POTASSIUM SERPL-SCNC: 3.7 MMOL/L — SIGNIFICANT CHANGE UP (ref 3.5–5.3)
RAPID RVP RESULT: SIGNIFICANT CHANGE UP
RAPID RVP RESULT: SIGNIFICANT CHANGE UP
RBC # BLD: 4.14 M/UL — LOW (ref 4.2–5.8)
RBC # BLD: 4.14 M/UL — LOW (ref 4.2–5.8)
RBC # FLD: 15.9 % — HIGH (ref 10.3–14.5)
RBC # FLD: 15.9 % — HIGH (ref 10.3–14.5)
SARS-COV-2 RNA SPEC QL NAA+PROBE: SIGNIFICANT CHANGE UP
SARS-COV-2 RNA SPEC QL NAA+PROBE: SIGNIFICANT CHANGE UP
SODIUM SERPL-SCNC: 142 MMOL/L — SIGNIFICANT CHANGE UP (ref 135–145)
SODIUM SERPL-SCNC: 142 MMOL/L — SIGNIFICANT CHANGE UP (ref 135–145)
WBC # BLD: 13.25 K/UL — HIGH (ref 3.8–10.5)
WBC # BLD: 13.25 K/UL — HIGH (ref 3.8–10.5)
WBC # FLD AUTO: 13.25 K/UL — HIGH (ref 3.8–10.5)
WBC # FLD AUTO: 13.25 K/UL — HIGH (ref 3.8–10.5)

## 2023-11-28 PROCEDURE — 71045 X-RAY EXAM CHEST 1 VIEW: CPT | Mod: 26

## 2023-11-28 PROCEDURE — 99233 SBSQ HOSP IP/OBS HIGH 50: CPT | Mod: GC

## 2023-11-28 RX ORDER — ALBUTEROL 90 UG/1
2 AEROSOL, METERED ORAL EVERY 6 HOURS
Refills: 0 | Status: DISCONTINUED | OUTPATIENT
Start: 2023-11-28 | End: 2023-11-30

## 2023-11-28 RX ORDER — ENOXAPARIN SODIUM 100 MG/ML
70 INJECTION SUBCUTANEOUS EVERY 12 HOURS
Refills: 0 | Status: DISCONTINUED | OUTPATIENT
Start: 2023-11-28 | End: 2023-11-30

## 2023-11-28 RX ORDER — ATORVASTATIN CALCIUM 80 MG/1
40 TABLET, FILM COATED ORAL AT BEDTIME
Refills: 0 | Status: DISCONTINUED | OUTPATIENT
Start: 2023-11-28 | End: 2023-11-30

## 2023-11-28 RX ORDER — BUMETANIDE 0.25 MG/ML
1 INJECTION INTRAMUSCULAR; INTRAVENOUS
Refills: 0 | Status: DISCONTINUED | OUTPATIENT
Start: 2023-11-28 | End: 2023-11-30

## 2023-11-28 RX ADMIN — PANTOPRAZOLE SODIUM 40 MILLIGRAM(S): 20 TABLET, DELAYED RELEASE ORAL at 05:10

## 2023-11-28 RX ADMIN — Medication 1 PATCH: at 12:18

## 2023-11-28 RX ADMIN — ALBUTEROL 2 PUFF(S): 90 AEROSOL, METERED ORAL at 05:10

## 2023-11-28 RX ADMIN — BUDESONIDE AND FORMOTEROL FUMARATE DIHYDRATE 2 PUFF(S): 160; 4.5 AEROSOL RESPIRATORY (INHALATION) at 09:26

## 2023-11-28 RX ADMIN — Medication 50 MILLIGRAM(S): at 21:26

## 2023-11-28 RX ADMIN — Medication 1 PATCH: at 21:28

## 2023-11-28 RX ADMIN — QUETIAPINE FUMARATE 25 MILLIGRAM(S): 200 TABLET, FILM COATED ORAL at 21:27

## 2023-11-28 RX ADMIN — Medication 1 MILLIGRAM(S): at 12:16

## 2023-11-28 RX ADMIN — BUDESONIDE AND FORMOTEROL FUMARATE DIHYDRATE 2 PUFF(S): 160; 4.5 AEROSOL RESPIRATORY (INHALATION) at 21:27

## 2023-11-28 RX ADMIN — TIOTROPIUM BROMIDE 2 PUFF(S): 18 CAPSULE ORAL; RESPIRATORY (INHALATION) at 12:17

## 2023-11-28 RX ADMIN — ALBUTEROL 2 PUFF(S): 90 AEROSOL, METERED ORAL at 15:02

## 2023-11-28 RX ADMIN — PANTOPRAZOLE SODIUM 40 MILLIGRAM(S): 20 TABLET, DELAYED RELEASE ORAL at 17:23

## 2023-11-28 RX ADMIN — ATORVASTATIN CALCIUM 40 MILLIGRAM(S): 80 TABLET, FILM COATED ORAL at 21:26

## 2023-11-28 RX ADMIN — Medication 1 PATCH: at 05:12

## 2023-11-28 RX ADMIN — Medication 81 MILLIGRAM(S): at 12:18

## 2023-11-28 RX ADMIN — Medication 1 TABLET(S): at 12:16

## 2023-11-28 RX ADMIN — Medication 1200 MILLIGRAM(S): at 06:14

## 2023-11-28 RX ADMIN — Medication 100 MILLIGRAM(S): at 12:16

## 2023-11-28 RX ADMIN — Medication 1200 MILLIGRAM(S): at 17:23

## 2023-11-28 RX ADMIN — Medication 50 MILLIGRAM(S): at 05:11

## 2023-11-28 RX ADMIN — Medication 2: at 12:15

## 2023-11-28 RX ADMIN — Medication 40 MILLIGRAM(S): at 05:10

## 2023-11-28 RX ADMIN — BUMETANIDE 1 MILLIGRAM(S): 0.25 INJECTION INTRAMUSCULAR; INTRAVENOUS at 05:11

## 2023-11-28 RX ADMIN — ENOXAPARIN SODIUM 70 MILLIGRAM(S): 100 INJECTION SUBCUTANEOUS at 18:18

## 2023-11-28 RX ADMIN — ALBUTEROL 2 PUFF(S): 90 AEROSOL, METERED ORAL at 21:27

## 2023-11-28 RX ADMIN — Medication 1 PATCH: at 12:21

## 2023-11-28 RX ADMIN — Medication 50 MILLIGRAM(S): at 13:48

## 2023-11-28 RX ADMIN — ALBUTEROL 2 PUFF(S): 90 AEROSOL, METERED ORAL at 09:25

## 2023-11-28 NOTE — PROGRESS NOTE ADULT - ASSESSMENT
Patient is a 67yo Male active smoker with h/o COPD, HLD, DM, HTN, chronic ETOH dependence, cirrhosis presents with SOB. Pt a/w Sepsis 2/2 PNA, COPD,  Acute hypoxic hypercapnic respiratory failure s/p brief intubation, Acute encephalopathy new onset Afib with CORTES. Nephrology consulted for Elevated serum creatinine.  Pt with NSTEMI with reduced EF; concerning for new onset HF (prev EF 50-55% in April 2023 --> now EF 20-25%). Pt pending possible cath once renal function improves  Pt extubated 11/19    1 CORTES- previous SCr 0.8-1 in April 2023. CORTES likely ATN. UA with 30 protein; large blood ?traumatic randolph with A/C use. FeNa 1.75%; inconclusive. FeUrea 33%; borderline; likely due to poor EABV.  Renal function overall improved with mild uptrend  Pt now on Bumex decreased to 1mg PO daily now for maintenance due to poor EF.  Would recc Bumex 1mg PO bid dosing. Repeat CXR.   Ct abd/pelvis with no hydro. Strict I/Os. Avoid nephrotoxins/ NSAIDs/ RCA. Monitor BMP.    2. Acute encephalopathy- resolving. CT head neg. Ammonia level <10.     3. Acute CHF- TTE with severe global LV systolic dysfunction; EF 20-25%. Cardiology following.  Renal fxn improved further and then stabilized.   Pt pending transfer to [facility] for cardiac catheterization. Recc Mucomyst 1200mg PO bid x 4 doses (2 doses pre contrast and 2 doses post contrast) and hold Bumex the day of cath.     4. COPD exacerbation- CT chest with no PNA for which abx was d/c. Bronchodilators/ Steroids as per Pul.     [facility]  Iggy Hernandez M.D.  Richard Cook D.O.  Bárbara Slaughter M.D.  MD Breanna Schulte, MSN, ANP-C    Telephone: [phone]  Facsimile: [phone] [place] [address], [address]  [place] [address]

## 2023-11-28 NOTE — PROGRESS NOTE ADULT - SUBJECTIVE AND OBJECTIVE BOX
[facility] NEPHROLOGY- PROGRESS NOTE    Patient is a 69yo Male active smoker with h/o COPD, HLD, DM, HTN, chronic ETOH dependence, cirrhosis presents with SOB. Pt a/w Sepsis 2/2 PNA, COPD,  Acute hypoxic hypercapnic respiratory failure s/p brief intubation, Acute encephalopathy new onset Afib with CORTES. Nephrology consulted for Elevated serum creatinine.  Pt with NSTEMI with reduced EF; concerning for new onset HF (prev EF 50-55% in April 2023 --> now EF 20-25%). Pt pending possible cath once renal function improves  Pt extubated 11/19      Hospital Medications: Medications reviewed.  REVIEW OF SYSTEMS: Pt denies any SOB, chest pain, n/v/d, abd pain, urinary complaints or LE edema.    VITALS:  T(F): 97.5 (11-28-23 @ 11:25), Max: 98.4 (11-27-23 @ 15:10)  HR: 65 (11-28-23 @ 11:25)  BP: 117/63 (11-28-23 @ 11:25)  RR: 18 (11-28-23 @ 11:25)  SpO2: 100% (11-28-23 @ 11:25)  Wt(kg): --    PHYSICAL EXAM:  Gen: NAD,   HEENT: anicteric  +JVD  Cards: +irregular , +S1/S2,   Resp: overall clear; mild scattered wheezing   GI: soft, +mild distention +umbilical hernia   Extremities: no LE edema B/L      LABS:  11-28    142  |  112<H>  |  33<H>  ----------------------------<  126<H>  3.7   |  24  |  1.68<H>    Ca    8.8      28 Nov 2023 06:54  Phos  4.0     11-28  Mg     1.9     11-28      Creatinine Trend: 1.68 <--, 1.46 <--, 1.31 <--, 1.33 <--, 1.43 <--, 1.87 <--, 1.96 <--                        12.7   13.25 )-----------( 119      ( 28 Nov 2023 06:54 )             39.4     Urine Studies:  Urinalysis Basic - ( 28 Nov 2023 06:54 )    Color:  / Appearance:  / SG:  / pH:   Gluc: 126 mg/dL / Ketone:   / Bili:  / Urobili:    Blood:  / Protein:  / Nitrite:    Leuk Esterase:  / RBC:  / WBC    Sq Epi:  / Non Sq Epi:  / Bacteria:       Sodium, Random Urine: 21 mmol/L (11-22 @ 09:25)  Creatinine, Random Urine: 128 mg/dL (11-22 @ 09:25)

## 2023-11-28 NOTE — PROGRESS NOTE ADULT - SUBJECTIVE AND OBJECTIVE BOX
PGY-1 Progress Note discussed with attending.    PAGER #: [[phone]] TILL 5:00 PM  PLEASE CONTACT ON CALL TEAM:  - On Call Team (Please refer to Angel) FROM 5:00 PM - 8:30PM  - Nightfloat Team FROM 8:30 -7:30 AM      INTERVAL HPI/OVERNIGHT EVENTS:       REVIEW OF SYSTEMS:  CONSTITUTIONAL: No fever, weight loss, or fatigue.  RESPIRATORY: No cough, wheezing, chills, or hemoptysis. No shortness of breath.  CARDIOVASCULAR: No chest pain, palpitations, dizziness, or leg swelling.  GASTROINTESTINAL: No abdominal pain. No nausea, vomiting, or hematemesis. No diarrhea or constipation. No melena or hematochezia.  GENITOURINARY: No dysuria or hematuria, urinary frequency.  NEUROLOGICAL: No headaches, memory loss, loss of strength, numbness, or tremors.  SKIN: No itching, burning, rashes, or lesions.    Vital Signs Last 24 Hrs  T(C): 36.7 (28 Nov 2023 08:27), Max: 36.9 (27 Nov 2023 15:10)  T(F): 98.1 (28 Nov 2023 08:27), Max: 98.4 (27 Nov 2023 15:10)  HR: 67 (28 Nov 2023 08:27) (67 - 143)  BP: 111/67 (28 Nov 2023 08:27) (111/67 - 140/88)  BP(mean): --  RR: 18 (28 Nov 2023 08:27) (18 - 18)  SpO2: 100% (28 Nov 2023 08:27) (99% - 100%)    Parameters below as of 28 Nov 2023 08:27  Patient On (Oxygen Delivery Method): room air        PHYSICAL EXAMINATION:  GENERAL: NAD   HEAD:  Atraumatic, Normocephalic.  EYES:  Conjunctiva and sclera clear.  NECK: Supple, No JVD, Normal thyroid.  CHEST/LUNG: Clear to auscultation. Clear to percussion bilaterally. No rales, rhonchi, wheezing, or rubs.  HEART: Regular rate and rhythm. No murmurs, rubs, or gallops.  ABDOMEN: Soft, Nontender, Nondistended. Bowel sounds present.  NERVOUS SYSTEM:  Alert & Oriented X3  EXTREMITIES:  2+ Peripheral Pulses. No clubbing, cyanosis, or edema.  SKIN: Warm, dry.                          12.7   13.25 )-----------( 119      ( 28 Nov 2023 06:54 )             39.4     11-28    142  |  112<H>  |  33<H>  ----------------------------<  126<H>  3.7   |  24  |  1.68<H>    Ca    8.8      28 Nov 2023 06:54  Phos  4.0     11-28  Mg     1.9     11-28                CAPILLARY BLOOD GLUCOSE:      RADIOLOGY & ADDITIONAL TESTS:               PGY-1 Progress Note discussed with attending.    PAGER #: [[phone]] TILL 5:00 PM  PLEASE CONTACT ON CALL TEAM:  - On Call Team (Please refer to Angel) FROM 5:00 PM - 8:30PM  - Nightfloat Team FROM 8:30 -7:30 AM      INTERVAL HPI/OVERNIGHT EVENTS: Patient seen and examined at bedside. Resting comfortably. No acute overnight events.  Patient sleepy this morning. Responsive at his baseline mental status. Now with leukocytosis 13>14. Not endorsing any URI sx and low suspicion for UTI. Will order RVP. Cr also trending up.      REVIEW OF SYSTEMS:  CONSTITUTIONAL: No fever, weight loss, or fatigue.  RESPIRATORY: No cough, wheezing, chills, or hemoptysis. No shortness of breath.  CARDIOVASCULAR: No chest pain, palpitations, dizziness, or leg swelling.  GASTROINTESTINAL: No abdominal pain. No nausea, vomiting, or hematemesis. No diarrhea or constipation. No melena or hematochezia.  GENITOURINARY: No dysuria or hematuria, urinary frequency.  NEUROLOGICAL: No headaches, memory loss, loss of strength, numbness, or tremors.  SKIN: No itching, burning, rashes, or lesions.    Vital Signs Last 24 Hrs  T(C): 36.7 (28 Nov 2023 08:27), Max: 36.9 (27 Nov 2023 15:10)  T(F): 98.1 (28 Nov 2023 08:27), Max: 98.4 (27 Nov 2023 15:10)  HR: 67 (28 Nov 2023 08:27) (67 - 143)  BP: 111/67 (28 Nov 2023 08:27) (111/67 - 140/88)  BP(mean): --  RR: 18 (28 Nov 2023 08:27) (18 - 18)  SpO2: 100% (28 Nov 2023 08:27) (99% - 100%)    Parameters below as of 28 Nov 2023 08:27  Patient On (Oxygen Delivery Method): room air        PHYSICAL EXAMINATION:  GENERAL: NAD   HEAD:  Atraumatic, Normocephalic.  EYES:  Conjunctiva and sclera clear.  NECK: Supple, No JVD, Normal thyroid.  CHEST/LUNG: +mild expiratory wheezing b/l, improved.  HEART: Regular rate and rhythm. No murmurs, rubs, or gallops.  ABDOMEN: Soft, Nontender, Nondistended. Bowel sounds present.  NERVOUS SYSTEM:  Alert & Oriented X3  EXTREMITIES:  2+ Peripheral Pulses. No clubbing, cyanosis, or edema.  SKIN: Warm, dry.                            12.7   13.25 )-----------( 119      ( 28 Nov 2023 06:54 )             39.4     11-28    142  |  112<H>  |  33<H>  ----------------------------<  126<H>  3.7   |  24  |  1.68<H>    Ca    8.8      28 Nov 2023 06:54  Phos  4.0     11-28  Mg     1.9     11-28                CAPILLARY BLOOD GLUCOSE:      RADIOLOGY & ADDITIONAL TESTS:

## 2023-11-28 NOTE — PROGRESS NOTE ADULT - SUBJECTIVE AND OBJECTIVE BOX
PATIENT SEEN AND EXAMINED BY EDGARDO DRISCOLL M.D. ON :- 11/28/23  DATE OF SERVICE: 11/28/23            Interim events noted,Labs ,Radiological studies and Cardiology tests reviewed .  DISCUSSED WITH ACP/MEDICAL RESIDENT ON PLAN OF CARE.    MR#[id number]  PATIENT NAME:[facility] COURSE: HPI:  68M current smoker with PMHx  COPD, HLD, DM, HTN, chronic ETOH dependence, cirrhosis presents with SOB. Patient is a poor historian and states he feels SOB but unable to provide further details. Patient denies fever, chills, cough, chest pain, palpitations, abdominal pain, or change in bowel habits.     In the ED:  Afebrile, HR 160s. /94, RR 36, 100% on NRB  WBC 20, lactate 5.7  CXR b/l lung markings  Trop 640, BNP 4K  S/p Cardizem 10mg x2, solumedrol 125mg, 500cc bolus  (18 Nov 2023 10:37)      INTERIM EVENTS:Patient seen at bedside ,interim events noted.      [facility] -reviewed admission note, no change since admission  HEART FAILURE: Acute[ ]Chronic[ ] Systolic[ ] Diastolic[ ] Combined Systolic and Diastolic[ ]  CAD[ ] CABG[ ] PCI[ ]  DEVICES[ ] PPM[ ] ICD[ ] ILR[ ]  ATRIAL FIBRILLATION[ ] Paroxysmal[ ] Permanent[ ] CHADS2-[  ]  CORTES[ ] CKD1[ ] CKD2[ ] CKD3[ ] CKD4[ ] ESRD[ ]  COPD[ ] HTN[ ]   DM[ ] Type1[ ] Type 2[ ]   CVA[ ] Paresis[ ]    AMBULATION: Assisted[ ] Cane/walker[ ] Independent[ ]    MEDICATIONS  (STANDING):  albuterol    90 MICROgram(s) HFA Inhaler 2 Puff(s) Inhalation every 6 hours  aspirin  chewable 81 milliGRAM(s) Oral daily  atorvastatin 40 milliGRAM(s) Oral at bedtime  budesonide 160 MICROgram(s)/formoterol 4.5 MICROgram(s) Inhaler 2 Puff(s) Inhalation two times a day  buMETAnide 1 milliGRAM(s) Oral two times a day  enoxaparin Injectable 70 milliGRAM(s) SubCutaneous every 12 hours  folic acid 1 milliGRAM(s) Oral daily  insulin lispro (ADMELOG) corrective regimen sliding scale   SubCutaneous Before meals and at bedtime  metoprolol tartrate 50 milliGRAM(s) Oral three times a day  multivitamin 1 Tablet(s) Oral daily  nicotine - 21 mG/24Hr(s) Patch 1 Patch Transdermal daily  pantoprazole    Tablet 40 milliGRAM(s) Oral every 12 hours  QUEtiapine 25 milliGRAM(s) Oral at bedtime  thiamine 100 milliGRAM(s) Oral daily  tiotropium 2.5 MICROgram(s) Inhaler 2 Puff(s) Inhalation daily    MEDICATIONS  (PRN):            REVIEW OF SYSTEMS:  Constitutional: [ ] fever, [ ]weight loss,  [ ]fatigue [ ]weight gain  Eyes: [ ] visual changes  Respiratory: [ ]shortness of breath;  [ ] cough, [ ]wheezing, [ ]chills, [ ]hemoptysis  Cardiovascular: [ ] chest pain, [ ]palpitations, [ ]dizziness,  [ ]leg swelling[ ]orthopnea[ ]PND  Gastrointestinal: [ ] abdominal pain, [ ]nausea, [ ]vomiting,  [ ]diarrhea [ ]Constipation [ ]Melena  Genitourinary: [ ] dysuria, [ ] hematuria [ ]Goss  Neurologic: [ ] headaches [ ] tremors[ ]weakness [ ]Paralysis Right[ ] Left[ ]  Skin: [ ] itching, [ ]burning, [ ] rashes  Endocrine: [ ] heat or cold intolerance  Musculoskeletal: [ ] joint pain or swelling; [ ] muscle, back, or extremity pain  Psychiatric: [ ] depression, [ ]anxiety, [ ]mood swings, or [ ]difficulty sleeping  Hematologic: [ ] easy bruising, [ ] bleeding gums    [ ] All remaining systems negative except as per above.   [ ]Unable to obtain.  [x] No change in ROS since admission      Vital Signs Last 24 Hrs  T(C): 36.7 (28 Nov 2023 20:19), Max: 36.7 (28 Nov 2023 08:27)  T(F): 98.1 (28 Nov 2023 20:19), Max: 98.1 (28 Nov 2023 08:27)  HR: 50 (28 Nov 2023 20:19) (50 - 115)  BP: 112/79 (28 Nov 2023 20:19) (111/67 - 138/64)  BP(mean): --  RR: 18 (28 Nov 2023 20:19) (18 - 18)  SpO2: 100% (28 Nov 2023 20:19) (96% - 100%)    Parameters below as of 28 Nov 2023 20:19  Patient On (Oxygen Delivery Method): room air      I&O's Summary      PHYSICAL EXAM:  General: No acute distress BMI-  HEENT: EOMI, PERRL  Neck: Supple, [ ] JVD  Lungs: Equal air entry bilaterally; [ ] rales [ ] wheezing [ ] rhonchi  Heart: Regular rate and rhythm; [x ] murmur   2/6 [ x] systolic [ ] diastolic [ ] radiation[ ] rubs [ ]  gallops  Abdomen: Nontender, bowel sounds present  Extremities: No clubbing, cyanosis, [ ] edema [ ]Pulses  equal and intact  Nervous system:  Alert & Oriented X3, no focal deficits  Psychiatric: Normal affect  Skin: No rashes or lesions    LABS:  11-28    142  |  112<H>  |  33<H>  ----------------------------<  126<H>  3.7   |  24  |  1.68<H>    Ca    8.8      28 Nov 2023 06:54  Phos  4.0     11-28  Mg     1.9     11-28      Creatinine Trend: 1.68<--, 1.46<--, 1.31<--, 1.33<--, 1.43<--, 1.87<--                        12.7   13.25 )-----------( 119      ( 28 Nov 2023 06:54 )             39.4

## 2023-11-28 NOTE — PROGRESS NOTE ADULT - ATTENDING COMMENTS
Seen and examined this AM. Spoke to patient's wife providing an update. patient and family very upset as patient's belongings are missing. Noted leukocytosis- from steroids likely. No new complaints, fatigue    Vital Signs Last 24 Hrs  T(C): 36.7 (28 Nov 2023 08:27), Max: 36.9 (27 Nov 2023 15:10)  T(F): 98.1 (28 Nov 2023 08:27), Max: 98.4 (27 Nov 2023 15:10)  HR: 67 (28 Nov 2023 08:27) (67 - 143)  BP: 111/67 (28 Nov 2023 08:27) (111/67 - 140/88)  BP(mean): --  RR: 18 (28 Nov 2023 08:27) (18 - 18)  SpO2: 100% (28 Nov 2023 08:27) (99% - 100%)    Parameters below as of 28 Nov 2023 08:27  Patient On (Oxygen Delivery Method): room air    Physical Exam: mala cute distress, speaking completel sentences aaox3. heart irregular, lungs clear,a bd soft nontender, lower ext edema.     #Acute hypoxemic and hypercapnic respiratory failure   - Due to COPD exacerbation, now extubated, off NIV  - taper prednisone from 40mg to 30mg  - Continue inhalers- ics/laba with [facility]. Montelukast has been on hold  - appreciate pulm consult  - completed ceftriaxone in icu and also ayush    #NSTEMI  - Doubt active ACS, may be demand ischemia in the setting of hypoxia and icu course.  - However new heart failure must exclude ischemic heart disease  - Continue asa, I restarted atorvastatin 40mg, lipid panel noted  - Plans for transfer to [facility] for cath but on hold due to leukocytosis and renal function  - Leukocytosis due to steroid use  - Renal function worsening, on NAC. Continue close monitoring.     #Afib w/ RVR  - Controlled now on metoprolol  - echo noted- new decreased ef, needs cath  - cath postponed due to leukocytosis and renal function  - due to postponement of cath, will resume full dose lovenox, creatinine clearance ~40    #Acute heart failure with reduced EF - EF 20-25%  - Need cath to evaluate for ischemic heart disease  - continue asa. I started statin until this can be evaluated  - only gdmt currently is metoprolol  - holding addition of acei arb until renal function normal.  - could probably benefit from sglt2 inhibitor, will defer starting it inpatient  - bumex increased to BID    #Acute kidney failure   - likely complicated by atn  - Appreciate nephro recs  - On NAC for 4 doses, started yesterday    #Acute delirium   - improved, continue to hold montelukast  - likely icu induced  - On folate and thiamine due to original concerns of wernicke    #acute metabolic encephalopathy  - improved    #Dm type 2   - continue current insulin sliding scale management  - hold oral meds, can probably benefit from sglt2i also in the setting of heart failure    #thrombocytopenia- mild to moderate  - no signs and symptoms of bleeding  - continue monitoring

## 2023-11-28 NOTE — PROGRESS NOTE ADULT - ASSESSMENT
68M current smoker with PMHx  COPD, HLD, DM, HTN, chronic ETOH dependence, cirrhosis presents with SOB. Patient was found to be septic likely 2/2 PNA.  Patient continued to be  tachypneic, and  developed New onset Afib on Bipap Patient was intubated for increase WOB 2/2 pulmonary edema iso cardiogenic shock vs COPD exacerbation  and admitted to ICU for further management      #New onset HFREF 2/2 NSTEMI  #NSTEMI  #  HFrEF (heart failure with reduced ejection fraction).   ·  Plan: Echo - EF 20-25%. Severe global LV systolic dysfunction. Moderate MR. Mild AR. Severely dilated LA. Segmental wall  motion could not be assessed.  Septal motion consistent with conduction disease.  Patient developed NSTEMI. Troponins peaked at 640, then downtrended s/p heparin drip.   - Acute CHF likely 2/2 ischemic cardiomyopathy.   -  transfer for cardiac cath to [facility]     #  NSTEMI (non-ST elevation myocardial infarction).   ·  Plan: Troponins peaked at 640, then downtrended s/p heparin drip.     # New Atrial fibrillation.   - Developed A fib with RVR to 160s.  - cont  metoprolol and eliquis   - tele.    # HTN  hx of HTN, non complaint with meds  on amlodipine and losartan at home  Metoprolol 50 BID

## 2023-11-29 ENCOUNTER — TRANSCRIPTION ENCOUNTER (OUTPATIENT)
Age: 68
End: 2023-11-29

## 2023-11-29 LAB
ANION GAP SERPL CALC-SCNC: 7 MMOL/L — SIGNIFICANT CHANGE UP (ref 5–17)
ANION GAP SERPL CALC-SCNC: 7 MMOL/L — SIGNIFICANT CHANGE UP (ref 5–17)
BUN SERPL-MCNC: 36 MG/DL — HIGH (ref 7–18)
BUN SERPL-MCNC: 36 MG/DL — HIGH (ref 7–18)
CALCIUM SERPL-MCNC: 9.4 MG/DL — SIGNIFICANT CHANGE UP (ref 8.4–10.5)
CALCIUM SERPL-MCNC: 9.4 MG/DL — SIGNIFICANT CHANGE UP (ref 8.4–10.5)
CHLORIDE SERPL-SCNC: 111 MMOL/L — HIGH (ref 96–108)
CHLORIDE SERPL-SCNC: 111 MMOL/L — HIGH (ref 96–108)
CO2 SERPL-SCNC: 25 MMOL/L — SIGNIFICANT CHANGE UP (ref 22–31)
CO2 SERPL-SCNC: 25 MMOL/L — SIGNIFICANT CHANGE UP (ref 22–31)
CREAT SERPL-MCNC: 1.62 MG/DL — HIGH (ref 0.5–1.3)
CREAT SERPL-MCNC: 1.62 MG/DL — HIGH (ref 0.5–1.3)
EGFR: 46 ML/MIN/1.73M2 — LOW
EGFR: 46 ML/MIN/1.73M2 — LOW
GLUCOSE BLDC GLUCOMTR-MCNC: 103 MG/DL — HIGH (ref 70–99)
GLUCOSE BLDC GLUCOMTR-MCNC: 103 MG/DL — HIGH (ref 70–99)
GLUCOSE BLDC GLUCOMTR-MCNC: 132 MG/DL — HIGH (ref 70–99)
GLUCOSE BLDC GLUCOMTR-MCNC: 132 MG/DL — HIGH (ref 70–99)
GLUCOSE BLDC GLUCOMTR-MCNC: 177 MG/DL — HIGH (ref 70–99)
GLUCOSE BLDC GLUCOMTR-MCNC: 177 MG/DL — HIGH (ref 70–99)
GLUCOSE BLDC GLUCOMTR-MCNC: 91 MG/DL — SIGNIFICANT CHANGE UP (ref 70–99)
GLUCOSE BLDC GLUCOMTR-MCNC: 91 MG/DL — SIGNIFICANT CHANGE UP (ref 70–99)
GLUCOSE SERPL-MCNC: 108 MG/DL — HIGH (ref 70–99)
GLUCOSE SERPL-MCNC: 108 MG/DL — HIGH (ref 70–99)
HCT VFR BLD CALC: 35 % — LOW (ref 39–50)
HCT VFR BLD CALC: 35 % — LOW (ref 39–50)
HGB BLD-MCNC: 10.9 G/DL — LOW (ref 13–17)
HGB BLD-MCNC: 10.9 G/DL — LOW (ref 13–17)
MAGNESIUM SERPL-MCNC: 1.8 MG/DL — SIGNIFICANT CHANGE UP (ref 1.6–2.6)
MAGNESIUM SERPL-MCNC: 1.8 MG/DL — SIGNIFICANT CHANGE UP (ref 1.6–2.6)
MCHC RBC-ENTMCNC: 29.9 PG — SIGNIFICANT CHANGE UP (ref 27–34)
MCHC RBC-ENTMCNC: 29.9 PG — SIGNIFICANT CHANGE UP (ref 27–34)
MCHC RBC-ENTMCNC: 31.1 GM/DL — LOW (ref 32–36)
MCHC RBC-ENTMCNC: 31.1 GM/DL — LOW (ref 32–36)
MCV RBC AUTO: 96.2 FL — SIGNIFICANT CHANGE UP (ref 80–100)
MCV RBC AUTO: 96.2 FL — SIGNIFICANT CHANGE UP (ref 80–100)
NRBC # BLD: 0 /100 WBCS — SIGNIFICANT CHANGE UP (ref 0–0)
NRBC # BLD: 0 /100 WBCS — SIGNIFICANT CHANGE UP (ref 0–0)
PHOSPHATE SERPL-MCNC: 3.6 MG/DL — SIGNIFICANT CHANGE UP (ref 2.5–4.5)
PHOSPHATE SERPL-MCNC: 3.6 MG/DL — SIGNIFICANT CHANGE UP (ref 2.5–4.5)
PLATELET # BLD AUTO: 103 K/UL — LOW (ref 150–400)
PLATELET # BLD AUTO: 103 K/UL — LOW (ref 150–400)
POTASSIUM SERPL-MCNC: 3.2 MMOL/L — LOW (ref 3.5–5.3)
POTASSIUM SERPL-MCNC: 3.2 MMOL/L — LOW (ref 3.5–5.3)
POTASSIUM SERPL-SCNC: 3.2 MMOL/L — LOW (ref 3.5–5.3)
POTASSIUM SERPL-SCNC: 3.2 MMOL/L — LOW (ref 3.5–5.3)
RBC # BLD: 3.64 M/UL — LOW (ref 4.2–5.8)
RBC # BLD: 3.64 M/UL — LOW (ref 4.2–5.8)
RBC # FLD: 15.5 % — HIGH (ref 10.3–14.5)
RBC # FLD: 15.5 % — HIGH (ref 10.3–14.5)
SODIUM SERPL-SCNC: 143 MMOL/L — SIGNIFICANT CHANGE UP (ref 135–145)
SODIUM SERPL-SCNC: 143 MMOL/L — SIGNIFICANT CHANGE UP (ref 135–145)
WBC # BLD: 8.65 K/UL — SIGNIFICANT CHANGE UP (ref 3.8–10.5)
WBC # BLD: 8.65 K/UL — SIGNIFICANT CHANGE UP (ref 3.8–10.5)
WBC # FLD AUTO: 8.65 K/UL — SIGNIFICANT CHANGE UP (ref 3.8–10.5)
WBC # FLD AUTO: 8.65 K/UL — SIGNIFICANT CHANGE UP (ref 3.8–10.5)

## 2023-11-29 PROCEDURE — 78452 HT MUSCLE IMAGE SPECT MULT: CPT | Mod: 26

## 2023-11-29 PROCEDURE — 99233 SBSQ HOSP IP/OBS HIGH 50: CPT | Mod: GC

## 2023-11-29 PROCEDURE — 93016 CV STRESS TEST SUPVJ ONLY: CPT

## 2023-11-29 PROCEDURE — 93018 CV STRESS TEST I&R ONLY: CPT

## 2023-11-29 RX ORDER — POTASSIUM CHLORIDE 20 MEQ
40 PACKET (EA) ORAL ONCE
Refills: 0 | Status: COMPLETED | OUTPATIENT
Start: 2023-11-29 | End: 2023-11-29

## 2023-11-29 RX ORDER — PANTOPRAZOLE SODIUM 20 MG/1
40 TABLET, DELAYED RELEASE ORAL
Refills: 0 | Status: DISCONTINUED | OUTPATIENT
Start: 2023-11-29 | End: 2023-11-30

## 2023-11-29 RX ADMIN — ALBUTEROL 2 PUFF(S): 90 AEROSOL, METERED ORAL at 14:01

## 2023-11-29 RX ADMIN — Medication 30 MILLIGRAM(S): at 06:04

## 2023-11-29 RX ADMIN — Medication 1 MILLIGRAM(S): at 12:01

## 2023-11-29 RX ADMIN — Medication 50 MILLIGRAM(S): at 21:32

## 2023-11-29 RX ADMIN — TIOTROPIUM BROMIDE 2 PUFF(S): 18 CAPSULE ORAL; RESPIRATORY (INHALATION) at 12:01

## 2023-11-29 RX ADMIN — Medication 1: at 12:07

## 2023-11-29 RX ADMIN — ALBUTEROL 2 PUFF(S): 90 AEROSOL, METERED ORAL at 21:33

## 2023-11-29 RX ADMIN — BUMETANIDE 1 MILLIGRAM(S): 0.25 INJECTION INTRAMUSCULAR; INTRAVENOUS at 06:04

## 2023-11-29 RX ADMIN — BUDESONIDE AND FORMOTEROL FUMARATE DIHYDRATE 2 PUFF(S): 160; 4.5 AEROSOL RESPIRATORY (INHALATION) at 12:00

## 2023-11-29 RX ADMIN — ATORVASTATIN CALCIUM 40 MILLIGRAM(S): 80 TABLET, FILM COATED ORAL at 21:32

## 2023-11-29 RX ADMIN — ENOXAPARIN SODIUM 70 MILLIGRAM(S): 100 INJECTION SUBCUTANEOUS at 06:05

## 2023-11-29 RX ADMIN — Medication 100 MILLIGRAM(S): at 12:01

## 2023-11-29 RX ADMIN — PANTOPRAZOLE SODIUM 40 MILLIGRAM(S): 20 TABLET, DELAYED RELEASE ORAL at 06:04

## 2023-11-29 RX ADMIN — Medication 1 PATCH: at 12:02

## 2023-11-29 RX ADMIN — Medication 1 PATCH: at 19:52

## 2023-11-29 RX ADMIN — BUMETANIDE 1 MILLIGRAM(S): 0.25 INJECTION INTRAMUSCULAR; INTRAVENOUS at 14:00

## 2023-11-29 RX ADMIN — Medication 50 MILLIGRAM(S): at 14:01

## 2023-11-29 RX ADMIN — Medication 40 MILLIEQUIVALENT(S): at 08:36

## 2023-11-29 RX ADMIN — ENOXAPARIN SODIUM 70 MILLIGRAM(S): 100 INJECTION SUBCUTANEOUS at 17:43

## 2023-11-29 RX ADMIN — Medication 1 TABLET(S): at 12:01

## 2023-11-29 RX ADMIN — ALBUTEROL 2 PUFF(S): 90 AEROSOL, METERED ORAL at 08:37

## 2023-11-29 RX ADMIN — BUDESONIDE AND FORMOTEROL FUMARATE DIHYDRATE 2 PUFF(S): 160; 4.5 AEROSOL RESPIRATORY (INHALATION) at 21:33

## 2023-11-29 RX ADMIN — Medication 1 PATCH: at 12:09

## 2023-11-29 RX ADMIN — QUETIAPINE FUMARATE 25 MILLIGRAM(S): 200 TABLET, FILM COATED ORAL at 21:32

## 2023-11-29 RX ADMIN — Medication 81 MILLIGRAM(S): at 12:07

## 2023-11-29 RX ADMIN — Medication 1 PATCH: at 07:55

## 2023-11-29 NOTE — DISCHARGE NOTE PROVIDER - NSDCMRMEDTOKEN_GEN_ALL_CORE_FT
amLODIPine 10 mg oral tablet: 1 tab(s) orally once a day  Astelin 137 mcg/inh nasal spray: 2 spray(s) intranasally 2 times a day  Breo Ellipta 200 mcg-25 mcg/inh inhalation powder: 1 puff(s) inhaled 2 times a day  cyclobenzaprine 5 mg oral tablet: 1 tab(s) orally every 8 hours as needed for Muscle Spasm  Flonase Allergy Relief 50 mcg/inh nasal spray: 1 spray(s) in each nostril 2 times a day  gabapentin 300 mg oral capsule: 1 cap(s) orally 3 times a day  lidocaine 4% topical film: Apply topically to affected area once a day as needed for back pain  losartan 100 mg oral tablet: 1 tab(s) orally once a day  metFORMIN 500 mg oral tablet: 1 tab(s) orally 2 times a day  montelukast 10 mg oral tablet: 1 tab(s) orally once a day (at bedtime)  omeprazole 20 mg oral delayed release capsule: 1 cap(s) orally once a day  oxyCODONE 5 mg oral tablet: 1 tab(s) orally every 6 hours as needed for Severe Pain (7 - 10) Take 1 tab by mouth every 6 hours as needed for severe pain MDD: 20 mg  polyethylene glycol 3350 oral powder for reconstitution: 17 gram(s) orally once a day as needed for  constipation  Proventil HFA 90 mcg/inh inhalation aerosol: 2 puff(s) inhaled every 6 hours, As Needed -for shortness of breath and/or wheezing   senna leaf extract oral tablet: 2 tab(s) orally once a day (at bedtime) as needed for  constipation  theophylline 300 mg oral tablet: 1 tab(s) orally 2 times a day  tiotropium 2.5 mcg/inh inhalation aerosol: 2 puff(s) inhaled once a day   amLODIPine 10 mg oral tablet: 1 tab(s) orally once a day  Astelin 137 mcg/inh nasal spray: 2 spray(s) intranasally 2 times a day  Breo Ellipta 200 mcg-25 mcg/inh inhalation powder: 1 puff(s) inhaled 2 times a day  bumetanide 1 mg oral tablet: 1 tab(s) orally once a day On MONDAY, WEDNESDAY, FRIDAY please take 1 tablet at night as well.  cyclobenzaprine 5 mg oral tablet: 1 tab(s) orally every 8 hours as needed for Muscle Spasm  Flonase Allergy Relief 50 mcg/inh nasal spray: 1 spray(s) in each nostril 2 times a day  gabapentin 300 mg oral capsule: 1 cap(s) orally 3 times a day  lidocaine 4% topical film: Apply topically to affected area once a day as needed for back pain  losartan 100 mg oral tablet: 1 tab(s) orally once a day  metFORMIN 500 mg oral tablet: 1 tab(s) orally 2 times a day  montelukast 10 mg oral tablet: 1 tab(s) orally once a day (at bedtime)  omeprazole 20 mg oral delayed release capsule: 1 cap(s) orally once a day  oxyCODONE 5 mg oral tablet: 1 tab(s) orally every 6 hours as needed for Severe Pain (7 - 10) Take 1 tab by mouth every 6 hours as needed for severe pain MDD: 20 mg  polyethylene glycol 3350 oral powder for reconstitution: 17 gram(s) orally once a day as needed for  constipation  Proventil HFA 90 mcg/inh inhalation aerosol: 2 puff(s) inhaled every 6 hours, As Needed -for shortness of breath and/or wheezing   senna leaf extract oral tablet: 2 tab(s) orally once a day (at bedtime) as needed for  constipation  theophylline 300 mg oral tablet: 1 tab(s) orally 2 times a day  tiotropium 2.5 mcg/inh inhalation aerosol: 2 puff(s) inhaled once a day   aspirin 81 mg oral tablet, chewable: 1 tab(s) orally once a day  atorvastatin 40 mg oral tablet: 1 tab(s) orally once a day (at bedtime)  Breo Ellipta 200 mcg-25 mcg/inh inhalation powder: 1 puff(s) inhaled 2 times a day  bumetanide 1 mg oral tablet: 1 tab(s) orally once a day On MONDAY, WEDNESDAY, FRIDAY please take 1 tablet at night as well.  cyclobenzaprine 5 mg oral tablet: 1 tab(s) orally every 8 hours as needed for Muscle Spasm  Eliquis 5 mg oral tablet: 1 tab(s) orally 2 times a day  metFORMIN 500 mg oral tablet: 1 tab(s) orally 2 times a day  metoprolol tartrate 50 mg oral tablet: 1 tab(s) orally 3 times a day  montelukast 10 mg oral tablet: 1 tab(s) orally once a day (at bedtime)  omeprazole 20 mg oral delayed release capsule: 1 cap(s) orally once a day  polyethylene glycol 3350 oral powder for reconstitution: 17 gram(s) orally once a day as needed for  constipation  predniSONE 10 mg oral tablet: 1 tab(s) orally once a day  senna leaf extract oral tablet: 2 tab(s) orally once a day (at bedtime) as needed for  constipation  tiotropium 2.5 mcg/inh inhalation aerosol: 2 puff(s) inhaled once a day

## 2023-11-29 NOTE — PROGRESS NOTE ADULT - PROBLEM SELECTOR PLAN 1
Echo - EF 20-25%. Severe global LV systolic dysfunction. Moderate MR. Mild AR. Severely dilated LA. Segmental wall  motion could not be assessed.  Septal motion consistent with conduction disease.  Patient developed NSTEMI. Troponins peaked at 640, then downtrended s/p heparin drip.   - Acute CHF likely 2/2 ischemic cardiomyopathy.   - Pending transfer for cardiac cath on 11/27. Echo - EF 20-25%. Severe global LV systolic dysfunction. Moderate MR. Mild AR. Severely dilated LA. Segmental wall  motion could not be assessed.  Septal motion consistent with conduction disease.  Patient developed NSTEMI. Troponins peaked at 640, then downtrended s/p heparin drip.   - Acute CHF likely 2/2 ischemic cardiomyopathy.   - Holding off on transfer for cardiac cath for now.   >> Pending Stress test. Echo - EF 20-25%. Severe global LV systolic dysfunction. Moderate MR. Mild AR. Severely dilated LA. Segmental wall  motion could not be assessed.  Septal motion consistent with conduction disease.  Patient developed NSTEMI. Troponins peaked at 640, then downtrended s/p heparin drip.   - Acute CHF likely 2/2 ischemic cardiomyopathy.   - Holding off on transfer for cardiac cath for now.   >> f/u Stress test - Normal myocardial perfusion scan, with no evidence of infarction or inducible ischemia.  LVEF = 53 %  >> f/u Dr. Alcazar if still going for cardiac cath ?

## 2023-11-29 NOTE — PROGRESS NOTE ADULT - ASSESSMENT
68M current smoker with PMHx  COPD, HLD, DM, HTN, chronic ETOH dependence, cirrhosis presents with SOB. Patient was found to be septic likely 2/2 PNA.  Patient continued to be  tachypneic, and  developed New onset Afib on Bipap Patient was intubated for increase WOB 2/2 pulmonary edema iso cardiogenic shock vs COPD exacerbation  and admitted to ICU for further management      #New onset HFREF 2/2 NSTEMI  #NSTEMI  #  HFrEF (heart failure with reduced ejection fraction).   ·  Plan: Echo - EF 20-25%. Severe global LV systolic dysfunction. Moderate MR. Mild AR. Severely dilated LA. Segmental wall  motion could not be assessed.  Septal motion consistent with conduction disease.  Patient developed NSTEMI. Troponins peaked at 640, then downtrended s/p heparin drip.   - Acute CHF likely 2/2 ischemic cardiomyopathy.   -  transfer for cardiac cath to [facility] held for ow   f/u stress    #  NSTEMI (non-ST elevation myocardial infarction).   ·  Plan: Troponins peaked at 640, then downtrended s/p heparin drip.     # New Atrial fibrillation.   - Developed A fib with RVR to 160s.  - cont  metoprolol and eliquis   - tele.    # HTN  hx of HTN, non complaint with meds  on amlodipine and losartan at home  Metoprolol 50 BID

## 2023-11-29 NOTE — PROGRESS NOTE ADULT - TIME BILLING
- Review of records, telemetry, vital signs and daily labs.   - General and cardiovascular physical examination.  - Generation of cardiovascular treatment plan.  - Coordination of care.      Patient was seen and examined by me on 11/23/23,interim events noted,labs and radiology studies reviewed.  Nate Alcazar MD,FACC.  [address] [place], [place] [id number].  [phone]
- Review of records, telemetry, vital signs and daily labs.   - General and cardiovascular physical examination.  - Generation of cardiovascular treatment plan.  - Coordination of care.      Patient was seen and examined by me on 11/26/23,interim events noted,labs and radiology studies reviewed.  Nate Alcazar MD,FACC.  [address] [place], [place] [id number].  [phone]
- Review of records, telemetry, vital signs and daily labs.   - General and cardiovascular physical examination.  - Generation of cardiovascular treatment plan.  - Coordination of care.      Patient was seen and examined by me on 11/25/23,interim events noted,labs and radiology studies reviewed.  Nate Alcazar MD,FACC.  [address] [place], [place] [id number].  [phone]
- Review of records, telemetry, vital signs and daily labs.   - General and cardiovascular physical examination.  - Generation of cardiovascular treatment plan.  - Coordination of care.      Patient was seen and examined by me on 11/28/23,interim events noted,labs and radiology studies reviewed.  Nate Alcazar MD,FACC.  [address] [place] [employer], [place] [id number].  [phone]
- Review of records, telemetry, vital signs and daily labs.   - General and cardiovascular physical examination.  - Generation of cardiovascular treatment plan.  - Coordination of care.      Patient was seen and examined by me on 11/29/23,interim events noted,labs and radiology studies reviewed.  Nate Alcazar MD,FACC.  [address] [place], [place] [id number].  [phone]
- Review of records, telemetry, vital signs and daily labs.   - General and cardiovascular physical examination.  - Generation of cardiovascular treatment plan.  - Coordination of care.      Patient was seen and examined by me on 11/22/23,interim events noted,labs and radiology studies reviewed.  Nate Alcazar MD,FACC.  [address] [place], [place] [id number].  [phone]
- Review of records, telemetry, vital signs and daily labs.   - General and cardiovascular physical examination.  - Generation of cardiovascular treatment plan.  - Coordination of care.      Patient was seen and examined by me on 11/24/23,interim events noted,labs and radiology studies reviewed.  Nate Alcazar MD,FACC.  [address] [place], [place] [id number].  [phone]
- Review of records, telemetry, vital signs and daily labs.   - General and cardiovascular physical examination.  - Generation of cardiovascular treatment plan.  - Coordination of care.      Patient was seen and examined by me on 11/27/23,interim events noted,labs and radiology studies reviewed.  Nate Alcazar MD,FACC.  [address] [place], [place] [id number].  [phone]
- Review of records, telemetry, vital signs and daily labs.   - General and cardiovascular physical examination.  - Generation of cardiovascular treatment plan.  - Coordination of care.      Patient was seen and examined by me on 11/21/23,interim events noted,labs and radiology studies reviewed.  Nate Alcazar MD,FACC.  [address] [place], [place] [id number].  [phone]

## 2023-11-29 NOTE — PROGRESS NOTE ADULT - SUBJECTIVE AND OBJECTIVE BOX
PATIENT SEEN AND EXAMINED BY EDGARDO DRISCOLL M.D. ON :- 11/29/23  DATE OF SERVICE:   11/29/23          Interim events noted,Labs ,Radiological studies and Cardiology tests reviewed .  DISCUSSED WITH ACP/MEDICAL RESIDENT ON PLAN OF CARE.    MR#[id number]  PATIENT NAME:[facility] COURSE: HPI:  68M current smoker with PMHx  COPD, HLD, DM, HTN, chronic ETOH dependence, cirrhosis presents with SOB. Patient is a poor historian and states he feels SOB but unable to provide further details. Patient denies fever, chills, cough, chest pain, palpitations, abdominal pain, or change in bowel habits.     In the ED:  Afebrile, HR 160s. /94, RR 36, 100% on NRB  WBC 20, lactate 5.7  CXR b/l lung markings  Trop 640, BNP 4K  S/p Cardizem 10mg x2, solumedrol 125mg, 500cc bolus  (18 Nov 2023 10:37)      INTERIM EVENTS:Patient seen at bedside ,interim events noted.      [facility] -reviewed admission note, no change since admission  HEART FAILURE: Acute[ ]Chronic[ ] Systolic[ ] Diastolic[ ] Combined Systolic and Diastolic[ ]  CAD[ ] CABG[ ] PCI[ ]  DEVICES[ ] PPM[ ] ICD[ ] ILR[ ]  ATRIAL FIBRILLATION[ ] Paroxysmal[ ] Permanent[ ] CHADS2-[  ]  CORTES[ ] CKD1[ ] CKD2[ ] CKD3[ ] CKD4[ ] ESRD[ ]  COPD[ ] HTN[ ]   DM[ ] Type1[ ] Type 2[ ]   CVA[ ] Paresis[ ]    AMBULATION: Assisted[ ] Cane/walker[ ] Independent[ ]    MEDICATIONS  (STANDING):  albuterol    90 MICROgram(s) HFA Inhaler 2 Puff(s) Inhalation every 6 hours  aspirin  chewable 81 milliGRAM(s) Oral daily  atorvastatin 40 milliGRAM(s) Oral at bedtime  budesonide 160 MICROgram(s)/formoterol 4.5 MICROgram(s) Inhaler 2 Puff(s) Inhalation two times a day  buMETAnide 1 milliGRAM(s) Oral two times a day  enoxaparin Injectable 70 milliGRAM(s) SubCutaneous every 12 hours  folic acid 1 milliGRAM(s) Oral daily  insulin lispro (ADMELOG) corrective regimen sliding scale   SubCutaneous Before meals and at bedtime  metoprolol tartrate 50 milliGRAM(s) Oral three times a day  multivitamin 1 Tablet(s) Oral daily  nicotine - 21 mG/24Hr(s) Patch 1 Patch Transdermal daily  pantoprazole    Tablet 40 milliGRAM(s) Oral before breakfast  predniSONE   Tablet 30 milliGRAM(s) Oral daily  QUEtiapine 25 milliGRAM(s) Oral at bedtime  thiamine 100 milliGRAM(s) Oral daily  tiotropium 2.5 MICROgram(s) Inhaler 2 Puff(s) Inhalation daily    MEDICATIONS  (PRN):            REVIEW OF SYSTEMS:  Constitutional: [ ] fever, [ ]weight loss,  [ ]fatigue [ ]weight gain  Eyes: [ ] visual changes  Respiratory: [ ]shortness of breath;  [ ] cough, [ ]wheezing, [ ]chills, [ ]hemoptysis  Cardiovascular: [ ] chest pain, [ ]palpitations, [ ]dizziness,  [ ]leg swelling[ ]orthopnea[ ]PND  Gastrointestinal: [ ] abdominal pain, [ ]nausea, [ ]vomiting,  [ ]diarrhea [ ]Constipation [ ]Melena  Genitourinary: [ ] dysuria, [ ] hematuria [ ]Goss  Neurologic: [ ] headaches [ ] tremors[ ]weakness [ ]Paralysis Right[ ] Left[ ]  Skin: [ ] itching, [ ]burning, [ ] rashes  Endocrine: [ ] heat or cold intolerance  Musculoskeletal: [ ] joint pain or swelling; [ ] muscle, back, or extremity pain  Psychiatric: [ ] depression, [ ]anxiety, [ ]mood swings, or [ ]difficulty sleeping  Hematologic: [ ] easy bruising, [ ] bleeding gums    [ ] All remaining systems negative except as per above.   [ ]Unable to obtain.  [x] No change in ROS since admission      Vital Signs Last 24 Hrs  T(C): 36.4 (29 Nov 2023 20:08), Max: 36.9 (28 Nov 2023 23:24)  T(F): 97.5 (29 Nov 2023 20:08), Max: 98.4 (28 Nov 2023 23:24)  HR: 98 (29 Nov 2023 20:08) (74 - 140)  BP: 129/80 (29 Nov 2023 20:08) (104/60 - 132/82)  BP(mean): --  RR: 19 (29 Nov 2023 20:08) (18 - 19)  SpO2: 100% (29 Nov 2023 20:08) (98% - 100%)    Parameters below as of 29 Nov 2023 20:08  Patient On (Oxygen Delivery Method): room air      I&O's Summary    29 Nov 2023 07:01  -  29 Nov 2023 21:08  --------------------------------------------------------  IN: 650 mL / OUT: 0 mL / NET: 650 mL        PHYSICAL EXAM:  General: No acute distress BMI-  HEENT: EOMI, PERRL  Neck: Supple, [ ] JVD  Lungs: Equal air entry bilaterally; [ ] rales [ ] wheezing [ ] rhonchi  Heart: Regular rate and rhythm; [x ] murmur   2/6 [ x] systolic [ ] diastolic [ ] radiation[ ] rubs [ ]  gallops  Abdomen: Nontender, bowel sounds present  Extremities: No clubbing, cyanosis, [ ] edema [ ]Pulses  equal and intact  Nervous system:  Alert & Oriented X3, no focal deficits  Psychiatric: Normal affect  Skin: No rashes or lesions    LABS:  11-29    143  |  111<H>  |  36<H>  ----------------------------<  108<H>  3.2<L>   |  25  |  1.62<H>    Ca    9.4      29 Nov 2023 06:42  Phos  3.6     11-29  Mg     1.8     11-29      Creatinine Trend: 1.62<--, 1.68<--, 1.46<--, 1.31<--, 1.33<--, 1.43<--                        10.9   8.65  )-----------( 103      ( 29 Nov 2023 06:42 )             35.0

## 2023-11-29 NOTE — PROGRESS NOTE ADULT - SUBJECTIVE AND OBJECTIVE BOX
Time of Visit:  Patient seen and examined. pat is sitting in chair comfortable     MEDICATIONS  (STANDING):  albuterol    90 MICROgram(s) HFA Inhaler 2 Puff(s) Inhalation every 6 hours  aspirin  chewable 81 milliGRAM(s) Oral daily  atorvastatin 40 milliGRAM(s) Oral at bedtime  budesonide 160 MICROgram(s)/formoterol 4.5 MICROgram(s) Inhaler 2 Puff(s) Inhalation two times a day  buMETAnide 1 milliGRAM(s) Oral two times a day  enoxaparin Injectable 70 milliGRAM(s) SubCutaneous every 12 hours  folic acid 1 milliGRAM(s) Oral daily  insulin lispro (ADMELOG) corrective regimen sliding scale   SubCutaneous Before meals and at bedtime  metoprolol tartrate 50 milliGRAM(s) Oral three times a day  multivitamin 1 Tablet(s) Oral daily  nicotine - 21 mG/24Hr(s) Patch 1 Patch Transdermal daily  pantoprazole    Tablet 40 milliGRAM(s) Oral before breakfast  predniSONE   Tablet 30 milliGRAM(s) Oral daily  QUEtiapine 25 milliGRAM(s) Oral at bedtime  thiamine 100 milliGRAM(s) Oral daily  tiotropium 2.5 MICROgram(s) Inhaler 2 Puff(s) Inhalation daily      MEDICATIONS  (PRN):       Medications up to date at time of exam.      PHYSICAL EXAMINATION:  Patient has no new complaints.  GENERAL: The patient is a well-developed, well-nourished, in no apparent distress.     Vital Signs Last 24 Hrs  T(C): 36.8 (29 Nov 2023 16:04), Max: 36.9 (28 Nov 2023 23:24)  T(F): 98.2 (29 Nov 2023 16:04), Max: 98.4 (28 Nov 2023 23:24)  HR: 115 (29 Nov 2023 16:04) (50 - 140)  BP: 129/81 (29 Nov 2023 16:04) (104/60 - 132/82)  BP(mean): --  RR: 18 (29 Nov 2023 16:04) (18 - 19)  SpO2: 99% (29 Nov 2023 16:04) (98% - 100%)    Parameters below as of 29 Nov 2023 16:04  Patient On (Oxygen Delivery Method): room air       (if applicable)    Chest Tube (if applicable)    HEENT: Head is normocephalic and atraumatic. Extraocular muscles are intact. Mucous membranes are moist.     NECK: Supple, no palpable adenopathy.    LUNGS: Clear to auscultation, no wheezing, rales, or rhonchi.    HEART: Regular rate and rhythm without murmur.    ABDOMEN: Soft, nontender, and nondistended.  No hepatosplenomegaly is noted.    : No painful voiding, no pelvic pain    EXTREMITIES: Without any cyanosis, clubbing, rash, lesions or edema.    NEUROLOGIC: Awake, alert, oriented, grossly intact    SKIN: Warm, dry, good turgor.      LABS:                        10.9   8.65  )-----------( 103      ( 29 Nov 2023 06:42 )             35.0     11-29    143  |  111<H>  |  36<H>  ----------------------------<  108<H>  3.2<L>   |  25  |  1.62<H>    Ca    9.4      29 Nov 2023 06:42  Phos  3.6     11-29  Mg     1.8     11-29        Urinalysis Basic - ( 29 Nov 2023 06:42 )    Color: x / Appearance: x / SG: x / pH: x  Gluc: 108 mg/dL / Ketone: x  / Bili: x / Urobili: x   Blood: x / Protein: x / Nitrite: x   Leuk Esterase: x / RBC: x / WBC x   Sq Epi: x / Non Sq Epi: x / Bacteria: x    MICROBIOLOGY: (if applicable)    RADIOLOGY & ADDITIONAL STUDIES:  EKG:   CXR:  ECHO:    IMPRESSION: 68y Male PAST MEDICAL & SURGICAL HISTORY:  HTN (hypertension)      Varicose veins      HLD (hyperlipidemia)      Liver cirrhosis      Portal hypertension with esophageal varices      COPD (chronic obstructive pulmonary disease)      Syncope      Alcohol dependence, daily use      Smokes tobacco daily      DM (diabetes mellitus)      No significant past surgical history       p/w         Impression: This is a 68 yr old  man  presented to ED with SOB. Patient continued to be Tachypneic and developed New Onset A Fib on Bipap. Was intubated in ED. Was in ICU due to Increased work of breathing due to Pulmonary Edema, cardiogenic shock and COPD exacerbation . Patient was extubated on 11-19-23.  Weaned to NC but had to be put back on BIPAP in setting of inc WOB, now in Medicine Unit , saturating good room air . Patient was started on metoprolol and was given one dose of bumex to attempt to diurese patient in setting of B lines on POCUS.11/20  Patients mental status was altered likely in setting of ativan use, but patietnt was started on high dose thiamine to cover for possible wernickes encephalopathy. Pan CT was negative for any acute abnormalities on head and chest and positive for possible duodenitis/Peptic ulcer disease on AP. Echo showed newly reduced EF of 20-25%. Patient was started on heparin drip and aspirin. 11/21 Mentation much improved. Heparin was held in setting of thrombocytopenia, apixiban was started. SCr downtrending. ABx discontinued, no infiltrates on CT chest. Per primary team For plan transfer for Cardiac Cath due to Ischemic Cardiomyopathy .     Suggestion:  O2 saturation 98% room air. So far saturating good room air. Monitor O2 saturation trend room air.   Continue Duoneb via nebulization Q 6 Hours. Do not give the Albuterol 90 mcg 2 puffs Q 6 Hours ( can use the oral inhaler outpatient ).   Continue Symbicort 160-4.5 mcg 2 Puffs Twice Daily.   Continue Tiotropium 2 puffs Daily.   Pulmonary oral hygiene care especially every after oral inhaler used.  Reinforced the importance of smoking cessation. On Nicotine patch on x 12 Hours.   On Prednisone 40 mg Oral daily.        Cardiac cath as per Cards

## 2023-11-29 NOTE — DISCHARGE NOTE PROVIDER - CARE PROVIDER_API CALL
Nate Alcazar  Cardiology  [address] [place] [address]  Phone: [phone]  Fax: [phone]  Follow Up Time:    Nate Alcazar  Cardiology  [address] [place] [address]  Phone: [phone]  Fax: [phone]  Follow Up Time:     Bárbara Slaughter  Nephrology  [address] [place] [address] Road, [address]  [place] [address]  Phone: [phone]  Fax: [phone]  Follow Up Time:

## 2023-11-29 NOTE — DISCHARGE NOTE PROVIDER - ATTENDING DISCHARGE PHYSICAL EXAMINATION:
Seen and examined in the AM. Walking around the room, no difficulties. Wishing to go home. Lungs mostly clear, no tachypnea, speaking complete sentences, heart irregular.  Repeat stress test normal, no further evaluation

## 2023-11-29 NOTE — PROGRESS NOTE ADULT - ASSESSMENT
Patient is a 67yo Male active smoker with h/o COPD, HLD, DM, HTN, chronic ETOH dependence, cirrhosis presents with SOB. Pt a/w Sepsis 2/2 PNA, COPD,  Acute hypoxic hypercapnic respiratory failure s/p brief intubation, Acute encephalopathy new onset Afib with CORTES. Nephrology consulted for Elevated serum creatinine.  Pt with NSTEMI with reduced EF; concerning for new onset HF (prev EF 50-55% in April 2023 --> now EF 20-25%). Pt pending possible cath once renal function improves  Pt extubated 11/19    1 CORTES- previous SCr 0.8-1 in April 2023. CORTES likely ATN. UA with 30 protein; large blood ?traumatic randolph with A/C use. FeNa 1.75%; inconclusive. FeUrea 33%; borderline; likely due to poor EABV.  Renal function overall improved   c/w Bumex 1mg PO bid dosing for maintenance. Repeat CXR with no edema  Ct abd/pelvis with no hydro. Strict I/Os. Avoid nephrotoxins/ NSAIDs/ RCA. Monitor BMP.    2. Acute encephalopathy- resolving. CT head neg. Ammonia level <10.     3. Acute CHF- TTE with severe global LV systolic dysfunction; EF 20-25%. Cardiology following.  Renal fxn improved further and then stabilized.   Pt pending transfer to [facility] for ?cardiac catheterization. Recc Mucomyst 1200mg PO bid x 4 doses (2 doses pre contrast and 2 doses post contrast) and hold Bumex the day of cath.     4. COPD exacerbation- CT chest with no PNA for which abx was d/c. Bronchodilators/ Steroids as per [facility].     [facility] NEPHROLOGY  Iggy Hernandez M.D.  Richard Cook D.O.  Bárbara Slaughter M.D.  MD Breanna Schulte, MSN, ANP-C    Telephone: [phone]  Facsimile: [phone] [address], [address]  [place] [address]

## 2023-11-29 NOTE — PROGRESS NOTE ADULT - SUBJECTIVE AND OBJECTIVE BOX
[facility] NEPHROLOGY- PROGRESS NOTE    Patient is a 67yo Male active smoker with h/o COPD, HLD, DM, HTN, chronic ETOH dependence, cirrhosis presents with SOB. Pt a/w Sepsis 2/2 PNA, COPD,  Acute hypoxic hypercapnic respiratory failure s/p brief intubation, Acute encephalopathy new onset Afib with CORTES. Nephrology consulted for Elevated serum creatinine.  Pt with NSTEMI with reduced EF; concerning for new onset HF (prev EF 50-55% in April 2023 --> now EF 20-25%). Pt pending possible cath once renal function improves  Pt extubated 11/19      Hospital Medications: Medications reviewed.  REVIEW OF SYSTEMS: Pt denies any SOB, chest pain, n/v/d, abd pain, urinary complaints or LE edema.  Used [employer] video  # [id number]; c/o SOB sometimes at night; but not at this time    VITALS:  T(F): 98.2 (11-29-23 @ 16:04), Max: 98.4 (11-28-23 @ 23:24)  HR: 115 (11-29-23 @ 16:04)  BP: 129/81 (11-29-23 @ 16:04)  RR: 18 (11-29-23 @ 16:04)  SpO2: 99% (11-29-23 @ 16:04)  Wt(kg): --    11-29 @ 07:01  -  11-29 @ 16:23  --------------------------------------------------------  IN: 650 mL / OUT: 0 mL / NET: 650 mL      PHYSICAL EXAM:  Gen: NAD,   HEENT: anicteric    Cards: +irregular tachy, +S1/S2,   Resp: overall clear; no wheezing  GI: soft, +mild distention +umbilical hernia   Extremities: no LE edema B/L    LABS:  11-29    143  |  111<H>  |  36<H>  ----------------------------<  108<H>  3.2<L>   |  25  |  1.62<H>    Ca    9.4      29 Nov 2023 06:42  Phos  3.6     11-29  Mg     1.8     11-29      Creatinine Trend: 1.62 <--, 1.68 <--, 1.46 <--, 1.31 <--, 1.33 <--, 1.43 <--, 1.87 <--                        10.9   8.65  )-----------( 103      ( 29 Nov 2023 06:42 )             35.0     Urine Studies:  Urinalysis Basic - ( 29 Nov 2023 06:42 )    Color:  / Appearance:  / SG:  / pH:   Gluc: 108 mg/dL / Ketone:   / Bili:  / Urobili:    Blood:  / Protein:  / Nitrite:    Leuk Esterase:  / RBC:  / WBC    Sq Epi:  / Non Sq Epi:  / Bacteria:

## 2023-11-29 NOTE — PROGRESS NOTE ADULT - ATTENDING COMMENTS
Seen and examined this AM. Feeling generally well, no new complaints. Stating that he will go home one way or another today. Wondering why he has been in the hospital for 2 weeks. I explained that he was intubated and has a weak heart and needs it evaluated.    Vital Signs Last 24 Hrs  T(C): 36.3 (29 Nov 2023 07:57), Max: 36.9 (28 Nov 2023 23:24)  T(F): 97.3 (29 Nov 2023 07:57), Max: 98.4 (28 Nov 2023 23:24)  HR: 82 (29 Nov 2023 07:57) (50 - 140)  BP: 104/77 (29 Nov 2023 07:57) (104/60 - 138/64)  BP(mean): --  RR: 18 (29 Nov 2023 07:57) (18 - 19)  SpO2: 100% (29 Nov 2023 07:57) (96% - 100%)    Parameters below as of 29 Nov 2023 07:57  Patient On (Oxygen Delivery Method): room air                              10.9   8.65  )-----------( 103      ( 29 Nov 2023 06:42 )             35.0     11-29    143  |  111<H>  |  36<H>  ----------------------------<  108<H>  3.2<L>   |  25  |  1.62<H>    Ca    9.4      29 Nov 2023 06:42  Phos  3.6     11-29  Mg     1.8     11-29      Physical Exam: mala cute distress, speaking completely sentences aaox3. heart irregular, lungs clear,abd soft nontender, no lower ext edema. ?exophthalmos?    #Acute hypoxemic and hypercapnic respiratory failure   - Due to COPD exacerbation, now extubated, off NIV  - prednisone 30mg first dose today, continue with prolonged taper every 3 days  - Continue inhalers- ics/laba with [facility]. Montelukast has been on hold  - appreciate pulm consult  - completed ceftriaxone in icu and also ayush    #NSTEMI  - Doubt active ACS, may be demand ischemia in the setting of hypoxia and icu course.  - However new heart failure must exclude ischemic heart disease  - original plans for transfer- cardiac cath, cancelled due to leukocytosis and renal function  - Stress test performed today and apparently EF is now normal.. no ischemiia  - Will follow up cardiology, perhaps repeat echo      #Afib w/ RVR  - Controlled now on metoprolol  - echo noted- new decreased ef, needs cath  - cath postponed due to leukocytosis and renal function  - due to postponement of cath, will resume full dose lovenox, creatinine clearance ~40  - stress test now showing normal ef, need f/u cardiology    #Acute heart failure with reduced EF - EF 20-25%  - Need cath to evaluate for ischemic heart disease  - continue asa. I started statin until this can be evaluated  - only gdmt currently is metoprolol  - holding addition of acei arb until renal function normal.  - could probably benefit from sglt2 inhibitor, will defer starting it inpatient  - bumex increased to BID  - original cath plans cancelled, stress test today 11/29- no ischemia and normal ef, need repeat echo    #Acute kidney failure   - likely complicated by atn  - Appreciate nephro recs  - was on nac for 4 doses    #Acute delirium   - improved, continue to hold montelukast  - likely icu induced  - On folate and thiamine due to original concerns of wernicke    #acute metabolic encephalopathy  - improved    #Dm type 2   - continue current insulin sliding scale management  - hold oral meds    #thrombocytopenia- mild to moderate  - no signs and symptoms of bleeding  - continue monitoring  - decreased today

## 2023-11-29 NOTE — PROGRESS NOTE ADULT - PROBLEM SELECTOR PLAN 10
- Eliquis for A fib  - GI ppx    Will go for Cardiac Cath on Monday 11/27.  >> Last dose of Eliquis will be for 11/25 Sat. - full dose Lovenox for Afib (if creatinine gets worse will need to renally dose).  - GI ppx

## 2023-11-29 NOTE — DISCHARGE NOTE PROVIDER - NSDCCPCAREPLAN_GEN_ALL_CORE_FT
PRINCIPAL DISCHARGE DIAGNOSIS  Diagnosis: Acute respiratory failure with hypoxia and hypercapnia  Assessment and Plan of Treatment: You presented with shortness of breath. In the ED you were afebrile, blood pressure was elevated at 160/94, respiratory rate 36, saturating 100% on NRB oxygen mask, You had an elevated white blood cell count of 20 and lactate 5.7. Chest XRAY was done, which  showing bilateral lung markings. Found to be septic likely 2/2 PNA. Patient continued to be tachypneic, failed trial of BIPAP, and developed New onset Afib. Patient was upgraded to ICU for intubation for increased WOB 2/2 pulmonary edema iso cardiogenic shock vs COPD exacerbation iso PNA. S/p course of ceftrixone and azithro for CAP. S/p solumedrol and duonebs for COPD exacerbation. Hospital course c/b NSTEMI for which heparin drip was started but was then discontinued due to thrombocytopenia, and new HFrEF with Echo showing newly reduced EF 20-25% . Pending transfer for cardiac cath on 11/27 for ischemic cardiomyopathy.      SECONDARY DISCHARGE DIAGNOSES  Diagnosis: HFrEF (heart failure with reduced ejection fraction)  Assessment and Plan of Treatment:     Diagnosis: NSTEMI (non-ST elevation myocardial infarction)  Assessment and Plan of Treatment:     Diagnosis: Atrial fibrillation  Assessment and Plan of Treatment:     Diagnosis: COPD (chronic obstructive pulmonary disease)  Assessment and Plan of Treatment:     Diagnosis: Acute kidney injury with acute tubular necrosis  Assessment and Plan of Treatment:     Diagnosis: Umbilical hernia  Assessment and Plan of Treatment:      PRINCIPAL DISCHARGE DIAGNOSIS  Diagnosis: Acute respiratory failure with hypoxia and hypercapnia  Assessment and Plan of Treatment: You presented with shortness of breath. In the ED you were afebrile, blood pressure was elevated at 160/94, respiratory rate 36, saturating 100% on NRB oxygen mask, You had an elevated white blood cell count of 20 and lactate 5.7. Chest XRAY was done, which  showing bilateral lung markings and findings concerning for possible pnemonia. You continued to be tachypneic, failed trial of noninvasive ventilation known as BIPAP, and developed New onset Afib. You were trasnfered to the Intensive Care Unit for intubation for increased wprk of breathing secondary to your pulmonary edema in the settin gof cardiogenic shock from the Afib vs. COPD exacerbation in the setting of pneumonia. You completed an antibiotic course of ceftrixone and azithromycin for pneumonia. You received steriods and nebulizers for your COPD exacerbation. You were eventually extubated and transfered out of the ICU to the General Medicine Floors for further managment. You are not off supplemental oxygen and saturating well on room air.  Please FOLLOW UP with your primary care physician in one week to inform them of your recent hospitalization and further management of your medical conditions.        SECONDARY DISCHARGE DIAGNOSES  Diagnosis: HFrEF (heart failure with reduced ejection fraction)  Assessment and Plan of Treatment: You were found to have new onset heart failure with reduced ejection fraction. Your Echocardiogram, which is ultrasound of your heart, showed ejection fraction of 20-25%. Severe global LV systolic dysfunction. Moderate mitral regurgitation. Mild atrial regurgitation. Severely dilated left atrium. Septal motion consistent with conduction disease. We believe that your new onset HFrEF was due to the NSTEMI that you develped during your hospitalization. Your troponins, which is a measure of cardiac muscle death, elevated to 640, then downtrended after you were started on a heparin drip. We believe that this low Ejection Fraction was due in part by ischemic cardiomyopathy from all the stress that your body was under. You underwent Nuclear Stress Test, which showed Normal myocardial perfusion scan, with no evidence of infarction or inducible ischemia. LVEF = 53 %  Please FOLLOW UP with your primary care physician in one week to inform them of your recent hospitalization and further management of your medical conditions. Please FOLLOW UP with Cardiology Dr. Alcazar for further managment of your cardiac condition.      Diagnosis: NSTEMI (non-ST elevation myocardial infarction)  Assessment and Plan of Treatment: See as above.    Diagnosis: Atrial fibrillation  Assessment and Plan of Treatment: You developed atrial fibrillation with RVR during your hospitalization. This likely occurred because of the stress your body was under during your infection and COPD exacerbation. You were started on Metoprolol to control your heart rate and monitored on Telemtry. You were started on Eliquis, which is a blood thinner to decrease risk of stroke.   Please START taking Metoprolol and Eliquis as prescribed on your discharge.  Please FOLLOW UP with your primary care physician in one week to inform them of your recent hospitalization and further management of your atrial fibrillation.       Diagnosis: COPD (chronic obstructive pulmonary disease)  Assessment and Plan of Treatment: You were intubed for COPD exaceration in the setting of pneumonia. After extubation, you continued to have some wheezing. You were treated with tiotropium, albuterol, and symbicort. You were also treated with prednisone, which was tapered.   Please FOLLOW UP with your primary care physician in one week to inform them of your recent hospitalization and further management of your COPD.   Please CONTINUE to take your inhalers as prescribed.      Diagnosis: Acute kidney injury with acute tubular necrosis  Assessment and Plan of Treatment: You developed acute kidney injury with acute tubular necrosis. Your Urinalysis showed granular casts. You baseline Cr as per our records show that Cr was 0.8-1 in April 2023. You Cr during this hospitalization was 2.94. We believe that the bump in Cr was due in part to the sepsis that your body was in when you were fighting infection so that your kidneys were hypoperfused. Your Cr downtrended to 1.6, and is holding stable. Please FOLLOW UP with your primary care physician in one week to inform them of your recent hospitalization and further management your kidney function. They may consider to test your kidney function again to monitor for any changes.      Diagnosis: Umbilical hernia  Assessment and Plan of Treatment: You have history of an umbilical hernia. You were evaluated by General Surgery specialists during your hospital stay, who recommended no acute surgical intervention.   Please follow up with your primary care physician in one week to inform them of your recent hospitalization and further management of your umbilical hernia.       PRINCIPAL DISCHARGE DIAGNOSIS  Diagnosis: Acute respiratory failure with hypoxia and hypercapnia  Assessment and Plan of Treatment: You presented with shortness of breath. In the ED you were afebrile, blood pressure was elevated at 160/94, respiratory rate 36, saturating 100% on NRB oxygen mask, You had an elevated white blood cell count of 20 and lactate 5.7. Chest XRAY was done, which  showing bilateral lung markings and findings concerning for possible pnemonia. You continued to be tachypneic, failed trial of noninvasive ventilation known as BIPAP, and developed New onset Afib. You were trasnfered to the Intensive Care Unit for intubation for increased wprk of breathing secondary to your pulmonary edema in the settin gof cardiogenic shock from the Afib vs. COPD exacerbation in the setting of pneumonia. You completed an antibiotic course of ceftrixone and azithromycin for pneumonia. You received steriods and nebulizers for your COPD exacerbation. You were eventually extubated and transfered out of the ICU to the General Medicine Floors for further managment. You are not off supplemental oxygen and saturating well on room air.  Please FOLLOW UP with your primary care physician in one week to inform them of your recent hospitalization and further management of your medical conditions.        SECONDARY DISCHARGE DIAGNOSES  Diagnosis: HFrEF (heart failure with reduced ejection fraction)  Assessment and Plan of Treatment: You were found to have new onset heart failure with reduced ejection fraction. Your Echocardiogram, which is ultrasound of your heart, showed ejection fraction of 20-25%. Severe global LV systolic dysfunction. Moderate mitral regurgitation. Mild atrial regurgitation. Severely dilated left atrium. Septal motion consistent with conduction disease. We believe that your new onset HFrEF was due to the NSTEMI that you develped during your hospitalization. Your troponins, which is a measure of cardiac muscle death, elevated to 640, then downtrended after you were started on a heparin drip. We believe that this low Ejection Fraction was due in part by ischemic cardiomyopathy from all the stress that your body was under. You underwent Nuclear Stress Test, which showed Normal myocardial perfusion scan, with no evidence of infarction or inducible ischemia. LVEF = 53 %  You were started on Bumex during your hospital stay. Please START taking Bumex 1 mg daily. On MONDAY, WEDNESDAY, FRIDAY 's take the Bumex 1 mg in the morning, and then Bumex 1 mg night time.  Please FOLLOW UP with your primary care physician in one week to inform them of your recent hospitalization and further management of your medical conditions. Please FOLLOW UP with Cardiology Dr. Alcazar for further managment of your cardiac condition. Your providers may consider doing another Echo on you to monitor your cardiac function.      Diagnosis: NSTEMI (non-ST elevation myocardial infarction)  Assessment and Plan of Treatment: See as above.    Diagnosis: Atrial fibrillation  Assessment and Plan of Treatment: You developed atrial fibrillation with RVR during your hospitalization. This likely occurred because of the stress your body was under during your infection and COPD exacerbation. You were started on Metoprolol to control your heart rate and monitored on Telemtry. You were started on Eliquis, which is a blood thinner to decrease risk of stroke.   Please START taking Metoprolol and Eliquis as prescribed on your discharge.  Please FOLLOW UP with your primary care physician in one week to inform them of your recent hospitalization and further management of your atrial fibrillation.       Diagnosis: COPD (chronic obstructive pulmonary disease)  Assessment and Plan of Treatment: You were intubed for COPD exaceration in the setting of pneumonia. After extubation, you continued to have some wheezing. You were treated with tiotropium, albuterol, and symbicort. You were also treated with prednisone, which was tapered.   Please FOLLOW UP with your primary care physician in one week to inform them of your recent hospitalization and further management of your COPD.   Please CONTINUE to take your inhalers as prescribed.      Diagnosis: Acute kidney injury with acute tubular necrosis  Assessment and Plan of Treatment: You developed acute kidney injury with acute tubular necrosis. Your Urinalysis showed granular casts. You baseline Cr as per our records show that Cr was 0.8-1 in April 2023. You Cr during this hospitalization was 2.94. We believe that the bump in Cr was due in part to the sepsis that your body was in when you were fighting infection so that your kidneys were hypoperfused. Your Cr downtrended to 1.6, and is holding stable. Please FOLLOW UP with your primary care physician in one week to inform them of your recent hospitalization and further management your kidney function. Please FOLLOW UP with Neprhologist Dr. Slaughter on discharge as well. They may consider to test your kidney function again to monitor for any changes.      Diagnosis: HTN (hypertension)  Assessment and Plan of Treatment: You have history of Hypertension. Your home medications for blood pressure Losartan and Amlodipine were held during your hospital stay because your blood pressures were low. Please STOP taking your antihypertensives until you FOLLOW UP with your Primary Care Doctor within 1 week of discharge for further optimization of your medications.    Diagnosis: Umbilical hernia  Assessment and Plan of Treatment: You have history of an umbilical hernia. You were evaluated by General Surgery specialists during your hospital stay, who recommended no acute surgical intervention.   Please follow up with your primary care physician in one week to inform them of your recent hospitalization and further management of your umbilical hernia.       PRINCIPAL DISCHARGE DIAGNOSIS  Diagnosis: Acute respiratory failure with hypoxia and hypercapnia  Assessment and Plan of Treatment: You presented with shortness of breath. In the ED you were afebrile, blood pressure was elevated at 160/94, respiratory rate 36, saturating 100% on NRB oxygen mask, You had an elevated white blood cell count of 20 and lactate 5.7. Chest XRAY was done, which  showing bilateral lung markings and findings concerning for possible pnemonia. You continued to be tachypneic, failed trial of noninvasive ventilation known as BIPAP, and developed New onset Afib. You were trasnfered to the Intensive Care Unit for intubation for increased wprk of breathing secondary to your pulmonary edema in the settin gof cardiogenic shock from the Afib vs. COPD exacerbation in the setting of pneumonia. You completed an antibiotic course of ceftrixone and azithromycin for pneumonia. You received steriods and nebulizers for your COPD exacerbation. You were eventually extubated and transfered out of the ICU to the General Medicine Floors for further managment. You are not off supplemental oxygen and saturating well on room air.  You were started on Bumex during your hospital stay. Please START taking Bumex 1 mg daily. On MONDAY, WEDNESDAY, FRIDAY 's take the Bumex 1 mg in the morning, and then Bumex 1 mg night time.  Please FOLLOW UP with your primary care physician in one week to inform them of your recent hospitalization and further management of your medical conditions. Please FOLLOW UP with Cardiology Dr. Alcazar for further managment of your cardiac condition. Your providers may consider doing another Echo on you to monitor your cardiac function.        SECONDARY DISCHARGE DIAGNOSES  Diagnosis: NSTEMI (non-ST elevation myocardial infarction)  Assessment and Plan of Treatment: See as above.  You were found to have elevated troponin. Your Echocardiogram, which is ultrasound of your heart, showed ejection fraction of 20-25%. Severe global LV systolic dysfunction. Moderate mitral regurgitation. Mild atrial regurgitation. Severely dilated left atrium. Septal motion consistent with conduction disease. We believe that your inital reduced ejection fraction was due to the NSTEMI that you develped during your hospitalization. Your troponins, which is a measure of cardiac muscle death, elevated to 640, then downtrended after you were started on a heparin drip. You underwent Nuclear Stress Test, which showed Normal myocardial perfusion scan, with no evidence of infarction or inducible ischemia. LVEF = 53 %   Please START taking Aspirin and Atorvastatin on your discharge as prescribed.    Diagnosis: Atrial fibrillation  Assessment and Plan of Treatment: You developed atrial fibrillation with RVR during your hospitalization. This likely occurred because of the stress your body was under during your infection and COPD exacerbation. You were started on Metoprolol to control your heart rate and monitored on Telemtry. You were started on Eliquis, which is a blood thinner to decrease risk of stroke.   Please START taking Metoprolol and Eliquis as prescribed on your discharge.  Please FOLLOW UP with your primary care physician in one week to inform them of your recent hospitalization and further management of your atrial fibrillation.       Diagnosis: COPD (chronic obstructive pulmonary disease)  Assessment and Plan of Treatment: You were intubed for COPD exaceration in the setting of pneumonia. After extubation, you continued to have some wheezing. You were treated with tiotropium, albuterol, and symbicort. You were also treated with prednisone, which was tapered.   Please FOLLOW UP with your primary care physician in one week to inform them of your recent hospitalization and further management of your COPD.   Please CONTINUE to take your inhalers as prescribed.      Diagnosis: Acute kidney injury with acute tubular necrosis  Assessment and Plan of Treatment: You developed acute kidney injury with acute tubular necrosis. Your Urinalysis showed granular casts. You baseline Cr as per our records show that Cr was 0.8-1 in April 2023. You Cr during this hospitalization was 2.94. We believe that the bump in Cr was due in part to the sepsis that your body was in when you were fighting infection so that your kidneys were hypoperfused. Your Cr downtrended to 1.6, and is holding stable. Please FOLLOW UP with your primary care physician in one week to inform them of your recent hospitalization and further management your kidney function. Please FOLLOW UP with Neprhologist Dr. Slaughter on discharge as well. They may consider to test your kidney function again to monitor for any changes.      Diagnosis: HTN (hypertension)  Assessment and Plan of Treatment: You have history of Hypertension. Your home medications for blood pressure Losartan and Amlodipine were held during your hospital stay because your blood pressures were low. Please STOP taking your antihypertensives until you FOLLOW UP with your Primary Care Doctor within 1 week of discharge for further optimization of your medications.    Diagnosis: Umbilical hernia  Assessment and Plan of Treatment: You have history of an umbilical hernia. You were evaluated by General Surgery specialists during your hospital stay, who recommended no acute surgical intervention.   Please follow up with your primary care physician in one week to inform them of your recent hospitalization and further management of your umbilical hernia.

## 2023-11-29 NOTE — PROGRESS NOTE ADULT - PROBLEM SELECTOR PLAN 2
Troponins peaked at 640, then downtrended s/p heparin drip.   - see as above. Troponins peaked at 640, then downtrended s/p heparin drip.   - see as above.  - Stress test 11/29 showing EF 55%. Consider cardiac cath ?

## 2023-11-29 NOTE — PROGRESS NOTE ADULT - SUBJECTIVE AND OBJECTIVE BOX
PGY-1 Progress Note discussed with attending.    PAGER #: [[phone]] TILL 5:00 PM  PLEASE CONTACT ON CALL TEAM:  - On Call Team (Please refer to Angel) FROM 5:00 PM - 8:30PM  - Nightfloat Team FROM 8:30 -7:30 AM      INTERVAL HPI/OVERNIGHT EVENTS: Patient seen and examined at bedside. Resting comfortably. No acute overnight events.      REVIEW OF SYSTEMS:  CONSTITUTIONAL: No fever, weight loss, or fatigue.  RESPIRATORY: No cough, wheezing, chills, or hemoptysis. No shortness of breath.  CARDIOVASCULAR: No chest pain, palpitations, dizziness, or leg swelling.  GASTROINTESTINAL: No abdominal pain. No nausea, vomiting, or hematemesis. No diarrhea or constipation. No melena or hematochezia.  GENITOURINARY: No dysuria or hematuria, urinary frequency.  NEUROLOGICAL: No headaches, memory loss, loss of strength, numbness, or tremors.  SKIN: No itching, burning, rashes, or lesions.    Vital Signs Last 24 Hrs  T(C): 36.3 (29 Nov 2023 07:57), Max: 36.9 (28 Nov 2023 23:24)  T(F): 97.3 (29 Nov 2023 07:57), Max: 98.4 (28 Nov 2023 23:24)  HR: 82 (29 Nov 2023 07:57) (50 - 140)  BP: 104/77 (29 Nov 2023 07:57) (104/60 - 138/64)  BP(mean): --  RR: 18 (29 Nov 2023 07:57) (18 - 19)  SpO2: 100% (29 Nov 2023 07:57) (96% - 100%)    Parameters below as of 29 Nov 2023 07:57  Patient On (Oxygen Delivery Method): room air        PHYSICAL EXAMINATION:  GENERAL: NAD   HEAD:  Atraumatic, Normocephalic.  EYES:  Conjunctiva and sclera clear.  NECK: Supple, No JVD, Normal thyroid.  CHEST/LUNG: Clear to auscultation. Clear to percussion bilaterally. No rales, rhonchi, wheezing, or rubs. +mildly decreased breath sounds at the bases.  HEART: Regular rate and rhythm. No murmurs, rubs, or gallops.  ABDOMEN: Soft, Nontender, Nondistended. Bowel sounds present.  NERVOUS SYSTEM:  Alert & Oriented X3  EXTREMITIES:  2+ Peripheral Pulses. No clubbing, cyanosis, or edema.  SKIN: Warm, dry.                          10.9   8.65  )-----------( 103      ( 29 Nov 2023 06:42 )             35.0     11-29    143  |  111<H>  |  36<H>  ----------------------------<  108<H>  3.2<L>   |  25  |  1.62<H>    Ca    9.4      29 Nov 2023 06:42  Phos  3.6     11-29  Mg     1.8     11-29                CAPILLARY BLOOD GLUCOSE:      RADIOLOGY & ADDITIONAL TESTS:

## 2023-11-29 NOTE — DISCHARGE NOTE PROVIDER - HOSPITAL COURSE
68M current smoker with PMHx  COPD, HLD, DM, HTN, chronic ETOH dependence, cirrhosis presenting with SOB. In the ED, was afebrile, /94, RR 36, 100% on NRB, WBC 20, lactate 5.7. CXR showing b/l lung markings. Found to be septic likely 2/2 PNA. Patient continued to be tachypneic, failed trial of BIPAP, and developed New onset Afib. Patient was upgraded to ICU for intubation for increased WOB 2/2 pulmonary edema iso cardiogenic shock vs COPD exacerbation iso PNA. S/p course of ceftrixone and azithro for CAP. S/p solumedrol and duonebs for COPD exacerbation. Hospital course c/b NSTEMI for which heparin drip was started but was then discontinued due to thrombocytopenia, and new HFrEF with Echo showing newly reduced EF 20-25% . Underwent nuclear stress test on 11/29 to evaluate for ischemic cardiomyopathy, and potential for cardiac cath. Stress test showed Normal myocardial perfusion scan, with no evidence of infarction or inducible ischemia (LVEF = 53 %).     Hospital course complicated by:  Afib with RVR - Patient was continued on rate control with metoprolol, monitored on tele, and started on Eliquis.     COPD - Patient continued to have some wheezing, so was started on prednisone which was tapered, and continued on tiotropium, albuterol, symbicort.     CORTES with ATN - UA with granular casts. FENa 1.75%; inconclusive. FeUrea 33%; borderline. Baseline Cr 0.8-1 in April 2023. Cr 2.94 > downtrended and holding stable at 1.6, likely new baseline. ATN likely 2/2 renal hypoperfusion 2/2 sepsis. Bumex 1 mg daily was continued.     Patient is able to ambulate and tolerate diet prior to discharge. Patient is stable for discharge per attending and is advised to follow up with PCP as outpatient. Please refer to patient's complete medical chart with documents for a full hospital course, for this is only a brief summary.     68M current smoker with PMHx  COPD, HLD, DM, HTN, chronic ETOH dependence, cirrhosis presenting with SOB. In the ED, was afebrile, /94, RR 36, 100% on NRB, WBC 20, lactate 5.7. CXR showing b/l lung markings. Found to be septic likely 2/2 PNA. Patient continued to be tachypneic, failed trial of BIPAP, and developed New onset Afib. Patient was upgraded to ICU for intubation for increased WOB 2/2 pulmonary edema iso cardiogenic shock vs COPD exacerbation iso PNA. S/p course of ceftrixone and azithro for CAP. S/p solumedrol and duonebs for COPD exacerbation. Hospital course c/b NSTEMI for which heparin drip was started but was then discontinued due to thrombocytopenia, and new HFrEF with Echo showing newly reduced EF 20-25% . Underwent nuclear stress test on 11/29 to evaluate for ischemic cardiomyopathy, and potential for cardiac cath. Stress test showed Normal myocardial perfusion scan, with no evidence of infarction or inducible ischemia (LVEF = 53 %).     Hospital course complicated by:  Afib with RVR - Patient was continued on rate control with metoprolol, monitored on tele, and started on Eliquis.     COPD - Patient continued to have some wheezing, so was started on prednisone which was tapered, and continued on tiotropium, albuterol, symbicort.     CORTES with ATN - UA with granular casts. FENa 1.75%; inconclusive. FeUrea 33%; borderline. Baseline Cr 0.8-1 in April 2023. Cr 2.94 > downtrended and holding stable at 1.6, likely new baseline. ATN likely 2/2 renal hypoperfusion 2/2 sepsis. Bumex 1 mg BID was continued.     On discharge, will continue to hold patient's home antihypertensives. Will be discharged on Bumex.    Patient is able to ambulate and tolerate diet prior to discharge. Patient is stable for discharge per attending and is advised to follow up with PCP, Cardiology, and Neprhology as outpatient. Please refer to patient's complete medical chart with documents for a full hospital course, for this is only a brief summary.     68M current smoker with PMHx  COPD, HLD, DM, HTN, chronic ETOH dependence, cirrhosis presenting with SOB. In the ED, was afebrile, /94, RR 36, 100% on NRB, WBC 20, lactate 5.7. CXR showing b/l lung markings. Found to be septic likely 2/2 PNA. Patient continued to be tachypneic, failed trial of BIPAP, and developed New onset Afib. Patient was upgraded to ICU for intubation for increased WOB 2/2 pulmonary edema iso cardiogenic shock vs COPD exacerbation iso PNA. S/p course of ceftrixone and azithro for CAP. S/p solumedrol and Duoneb for COPD exacerbation. Hospital course c/b NSTEMI for which heparin drip was started but was then discontinued due to thrombocytopenia, and new HFrEF with Echo showing newly reduced EF 20-25% . Underwent nuclear stress test on 11/29 to evaluate for ischemic cardiomyopathy, and potential for cardiac cath. Stress test showed Normal myocardial perfusion scan, with no evidence of infarction or inducible ischemia (LVEF = 53 %).     Hospital course complicated by:  Afib with RVR - Patient was continued on rate control with metoprolol, monitored on tele, and started on Eliquis.     COPD - Patient continued to have some wheezing, so was started on prednisone which was tapered, and continued on tiotropium, albuterol, symbicort.     CORTES with ATN - UA with granular casts. FENa 1.75%; inconclusive. FeUrea 33%; borderline. Baseline Cr 0.8-1 in April 2023. Cr 2.94 > downtrended and holding stable at 1.6, likely new baseline. ATN likely 2/2 renal hypoperfusion 2/2 sepsis. Bumex 1 mg BID was continued.     On discharge, will continue to hold patient's home antihypertensives. Will be discharged on Bumex.    Patient is able to ambulate and tolerate diet prior to discharge. Patient is stable for discharge per attending and is advised to follow up with PCP, Cardiology, and Neprhology as outpatient. Please refer to patient's complete medical chart with documents for a full hospital course, for this is only a brief summary.

## 2023-11-29 NOTE — DISCHARGE NOTE PROVIDER - PROVIDER TOKENS
PROVIDER:[TOKEN:[[id number]]] PROVIDER:[TOKEN:[[id number]]],PROVIDER:[TOKEN:[[id number]]]

## 2023-11-29 NOTE — PROGRESS NOTE ADULT - PROBLEM SELECTOR PLAN 4
h/o COPD, home meds tiotropium inhaler.  s/p extubation 11/19 for COPD exacerbation.  - patient still with bilateral expiratory wheezing.  - c/w tiotropium, albuterol, symbicort.  - started prednisone 40mg daily.  - c/w standing Duoneb   - Pulm Dr. Burton fontanez. h/o COPD, home meds tiotropium inhaler.  s/p extubation 11/19 for COPD exacerbation.  - patient still with bilateral expiratory wheezing.  - c/w tiotropium, albuterol, symbicort.  - c/w prednisone 40mg daily.  - Pulm Dr. Burton fontanez. h/o COPD, home meds tiotropium inhaler.  s/p extubation 11/19 for COPD exacerbation.  - patient still with bilateral expiratory wheezing.  - c/w tiotropium, albuterol, symbicort.  - start to taper prednisone 40 > 30  - Pulm Dr. Burton fontanez.

## 2023-11-30 ENCOUNTER — TRANSCRIPTION ENCOUNTER (OUTPATIENT)
Age: 68
End: 2023-11-30

## 2023-11-30 VITALS
TEMPERATURE: 98 F | DIASTOLIC BLOOD PRESSURE: 78 MMHG | RESPIRATION RATE: 18 BRPM | OXYGEN SATURATION: 98 % | SYSTOLIC BLOOD PRESSURE: 145 MMHG | HEART RATE: 121 BPM

## 2023-11-30 LAB
ANION GAP SERPL CALC-SCNC: 7 MMOL/L — SIGNIFICANT CHANGE UP (ref 5–17)
ANION GAP SERPL CALC-SCNC: 7 MMOL/L — SIGNIFICANT CHANGE UP (ref 5–17)
BUN SERPL-MCNC: 45 MG/DL — HIGH (ref 7–18)
BUN SERPL-MCNC: 45 MG/DL — HIGH (ref 7–18)
CALCIUM SERPL-MCNC: 9.2 MG/DL — SIGNIFICANT CHANGE UP (ref 8.4–10.5)
CALCIUM SERPL-MCNC: 9.2 MG/DL — SIGNIFICANT CHANGE UP (ref 8.4–10.5)
CHLORIDE SERPL-SCNC: 109 MMOL/L — HIGH (ref 96–108)
CHLORIDE SERPL-SCNC: 109 MMOL/L — HIGH (ref 96–108)
CO2 SERPL-SCNC: 26 MMOL/L — SIGNIFICANT CHANGE UP (ref 22–31)
CO2 SERPL-SCNC: 26 MMOL/L — SIGNIFICANT CHANGE UP (ref 22–31)
CREAT SERPL-MCNC: 2.02 MG/DL — HIGH (ref 0.5–1.3)
CREAT SERPL-MCNC: 2.02 MG/DL — HIGH (ref 0.5–1.3)
EGFR: 35 ML/MIN/1.73M2 — LOW
EGFR: 35 ML/MIN/1.73M2 — LOW
GLUCOSE BLDC GLUCOMTR-MCNC: 121 MG/DL — HIGH (ref 70–99)
GLUCOSE BLDC GLUCOMTR-MCNC: 121 MG/DL — HIGH (ref 70–99)
GLUCOSE BLDC GLUCOMTR-MCNC: 259 MG/DL — HIGH (ref 70–99)
GLUCOSE BLDC GLUCOMTR-MCNC: 259 MG/DL — HIGH (ref 70–99)
GLUCOSE BLDC GLUCOMTR-MCNC: 79 MG/DL — SIGNIFICANT CHANGE UP (ref 70–99)
GLUCOSE BLDC GLUCOMTR-MCNC: 79 MG/DL — SIGNIFICANT CHANGE UP (ref 70–99)
GLUCOSE SERPL-MCNC: 177 MG/DL — HIGH (ref 70–99)
GLUCOSE SERPL-MCNC: 177 MG/DL — HIGH (ref 70–99)
HCT VFR BLD CALC: 37.4 % — LOW (ref 39–50)
HCT VFR BLD CALC: 37.4 % — LOW (ref 39–50)
HGB BLD-MCNC: 11.6 G/DL — LOW (ref 13–17)
HGB BLD-MCNC: 11.6 G/DL — LOW (ref 13–17)
MAGNESIUM SERPL-MCNC: 1.8 MG/DL — SIGNIFICANT CHANGE UP (ref 1.6–2.6)
MAGNESIUM SERPL-MCNC: 1.8 MG/DL — SIGNIFICANT CHANGE UP (ref 1.6–2.6)
MCHC RBC-ENTMCNC: 30.4 PG — SIGNIFICANT CHANGE UP (ref 27–34)
MCHC RBC-ENTMCNC: 30.4 PG — SIGNIFICANT CHANGE UP (ref 27–34)
MCHC RBC-ENTMCNC: 31 GM/DL — LOW (ref 32–36)
MCHC RBC-ENTMCNC: 31 GM/DL — LOW (ref 32–36)
MCV RBC AUTO: 97.9 FL — SIGNIFICANT CHANGE UP (ref 80–100)
MCV RBC AUTO: 97.9 FL — SIGNIFICANT CHANGE UP (ref 80–100)
NRBC # BLD: 0 /100 WBCS — SIGNIFICANT CHANGE UP (ref 0–0)
NRBC # BLD: 0 /100 WBCS — SIGNIFICANT CHANGE UP (ref 0–0)
PHOSPHATE SERPL-MCNC: 3.9 MG/DL — SIGNIFICANT CHANGE UP (ref 2.5–4.5)
PHOSPHATE SERPL-MCNC: 3.9 MG/DL — SIGNIFICANT CHANGE UP (ref 2.5–4.5)
PLATELET # BLD AUTO: 139 K/UL — LOW (ref 150–400)
PLATELET # BLD AUTO: 139 K/UL — LOW (ref 150–400)
POTASSIUM SERPL-MCNC: 3.5 MMOL/L — SIGNIFICANT CHANGE UP (ref 3.5–5.3)
POTASSIUM SERPL-MCNC: 3.5 MMOL/L — SIGNIFICANT CHANGE UP (ref 3.5–5.3)
POTASSIUM SERPL-SCNC: 3.5 MMOL/L — SIGNIFICANT CHANGE UP (ref 3.5–5.3)
POTASSIUM SERPL-SCNC: 3.5 MMOL/L — SIGNIFICANT CHANGE UP (ref 3.5–5.3)
RBC # BLD: 3.82 M/UL — LOW (ref 4.2–5.8)
RBC # BLD: 3.82 M/UL — LOW (ref 4.2–5.8)
RBC # FLD: 15.6 % — HIGH (ref 10.3–14.5)
RBC # FLD: 15.6 % — HIGH (ref 10.3–14.5)
SODIUM SERPL-SCNC: 142 MMOL/L — SIGNIFICANT CHANGE UP (ref 135–145)
SODIUM SERPL-SCNC: 142 MMOL/L — SIGNIFICANT CHANGE UP (ref 135–145)
WBC # BLD: 13.14 K/UL — HIGH (ref 3.8–10.5)
WBC # BLD: 13.14 K/UL — HIGH (ref 3.8–10.5)
WBC # FLD AUTO: 13.14 K/UL — HIGH (ref 3.8–10.5)
WBC # FLD AUTO: 13.14 K/UL — HIGH (ref 3.8–10.5)

## 2023-11-30 PROCEDURE — 94002 VENT MGMT INPAT INIT DAY: CPT

## 2023-11-30 PROCEDURE — 85025 COMPLETE CBC W/AUTO DIFF WBC: CPT

## 2023-11-30 PROCEDURE — 84443 ASSAY THYROID STIM HORMONE: CPT

## 2023-11-30 PROCEDURE — 80048 BASIC METABOLIC PNL TOTAL CA: CPT

## 2023-11-30 PROCEDURE — 86780 TREPONEMA PALLIDUM: CPT

## 2023-11-30 PROCEDURE — 87899 AGENT NOS ASSAY W/OPTIC: CPT

## 2023-11-30 PROCEDURE — 96376 TX/PRO/DX INJ SAME DRUG ADON: CPT

## 2023-11-30 PROCEDURE — 71250 CT THORAX DX C-: CPT

## 2023-11-30 PROCEDURE — 71045 X-RAY EXAM CHEST 1 VIEW: CPT

## 2023-11-30 PROCEDURE — 83605 ASSAY OF LACTIC ACID: CPT

## 2023-11-30 PROCEDURE — A9502: CPT

## 2023-11-30 PROCEDURE — 84484 ASSAY OF TROPONIN QUANT: CPT

## 2023-11-30 PROCEDURE — 93005 ELECTROCARDIOGRAM TRACING: CPT

## 2023-11-30 PROCEDURE — 83935 ASSAY OF URINE OSMOLALITY: CPT

## 2023-11-30 PROCEDURE — 85730 THROMBOPLASTIN TIME PARTIAL: CPT

## 2023-11-30 PROCEDURE — 83036 HEMOGLOBIN GLYCOSYLATED A1C: CPT

## 2023-11-30 PROCEDURE — 76775 US EXAM ABDO BACK WALL LIM: CPT

## 2023-11-30 PROCEDURE — 82962 GLUCOSE BLOOD TEST: CPT

## 2023-11-30 PROCEDURE — 82550 ASSAY OF CK (CPK): CPT

## 2023-11-30 PROCEDURE — 74176 CT ABD & PELVIS W/O CONTRAST: CPT

## 2023-11-30 PROCEDURE — 81001 URINALYSIS AUTO W/SCOPE: CPT

## 2023-11-30 PROCEDURE — 83880 ASSAY OF NATRIURETIC PEPTIDE: CPT

## 2023-11-30 PROCEDURE — 94003 VENT MGMT INPAT SUBQ DAY: CPT

## 2023-11-30 PROCEDURE — 82803 BLOOD GASES ANY COMBINATION: CPT

## 2023-11-30 PROCEDURE — 84295 ASSAY OF SERUM SODIUM: CPT

## 2023-11-30 PROCEDURE — 70450 CT HEAD/BRAIN W/O DYE: CPT

## 2023-11-30 PROCEDURE — 87641 MR-STAPH DNA AMP PROBE: CPT

## 2023-11-30 PROCEDURE — 85610 PROTHROMBIN TIME: CPT

## 2023-11-30 PROCEDURE — 84100 ASSAY OF PHOSPHORUS: CPT

## 2023-11-30 PROCEDURE — 94640 AIRWAY INHALATION TREATMENT: CPT

## 2023-11-30 PROCEDURE — 94660 CPAP INITIATION&MGMT: CPT

## 2023-11-30 PROCEDURE — 82140 ASSAY OF AMMONIA: CPT

## 2023-11-30 PROCEDURE — 80061 LIPID PANEL: CPT

## 2023-11-30 PROCEDURE — 86738 MYCOPLASMA ANTIBODY: CPT

## 2023-11-30 PROCEDURE — 87640 STAPH A DNA AMP PROBE: CPT

## 2023-11-30 PROCEDURE — 96374 THER/PROPH/DIAG INJ IV PUSH: CPT | Mod: XU

## 2023-11-30 PROCEDURE — 96375 TX/PRO/DX INJ NEW DRUG ADDON: CPT | Mod: XU

## 2023-11-30 PROCEDURE — 84132 ASSAY OF SERUM POTASSIUM: CPT

## 2023-11-30 PROCEDURE — 86022 PLATELET ANTIBODIES: CPT

## 2023-11-30 PROCEDURE — 99239 HOSP IP/OBS DSCHRG MGMT >30: CPT | Mod: GC

## 2023-11-30 PROCEDURE — 36415 COLL VENOUS BLD VENIPUNCTURE: CPT

## 2023-11-30 PROCEDURE — 84540 ASSAY OF URINE/UREA-N: CPT

## 2023-11-30 PROCEDURE — 93306 TTE W/DOPPLER COMPLETE: CPT

## 2023-11-30 PROCEDURE — 87449 NOS EACH ORGANISM AG IA: CPT

## 2023-11-30 PROCEDURE — 0225U NFCT DS DNA&RNA 21 SARSCOV2: CPT

## 2023-11-30 PROCEDURE — 74018 RADEX ABDOMEN 1 VIEW: CPT

## 2023-11-30 PROCEDURE — 80053 COMPREHEN METABOLIC PANEL: CPT

## 2023-11-30 PROCEDURE — 82553 CREATINE MB FRACTION: CPT

## 2023-11-30 PROCEDURE — 93017 CV STRESS TEST TRACING ONLY: CPT

## 2023-11-30 PROCEDURE — 78452 HT MUSCLE IMAGE SPECT MULT: CPT

## 2023-11-30 PROCEDURE — 87040 BLOOD CULTURE FOR BACTERIA: CPT

## 2023-11-30 PROCEDURE — 82330 ASSAY OF CALCIUM: CPT

## 2023-11-30 PROCEDURE — 31500 INSERT EMERGENCY AIRWAY: CPT

## 2023-11-30 PROCEDURE — 99291 CRITICAL CARE FIRST HOUR: CPT | Mod: 25

## 2023-11-30 PROCEDURE — 84300 ASSAY OF URINE SODIUM: CPT

## 2023-11-30 PROCEDURE — 99292 CRITICAL CARE ADDL 30 MIN: CPT

## 2023-11-30 PROCEDURE — 92610 EVALUATE SWALLOWING FUNCTION: CPT

## 2023-11-30 PROCEDURE — 82570 ASSAY OF URINE CREATININE: CPT

## 2023-11-30 PROCEDURE — 80307 DRUG TEST PRSMV CHEM ANLYZR: CPT

## 2023-11-30 PROCEDURE — 83735 ASSAY OF MAGNESIUM: CPT

## 2023-11-30 PROCEDURE — 85027 COMPLETE CBC AUTOMATED: CPT

## 2023-11-30 RX ORDER — MONTELUKAST 4 MG/1
1 TABLET, CHEWABLE ORAL
Refills: 0 | DISCHARGE

## 2023-11-30 RX ORDER — AZELASTINE 137 UG/1
2 SPRAY, METERED NASAL
Refills: 0 | DISCHARGE

## 2023-11-30 RX ORDER — ASPIRIN/CALCIUM CARB/MAGNESIUM 324 MG
1 TABLET ORAL
Qty: 30 | Refills: 0
Start: 2023-11-30

## 2023-11-30 RX ORDER — FLUTICASONE PROPIONATE 50 MCG
1 SPRAY, SUSPENSION NASAL
Refills: 0 | DISCHARGE

## 2023-11-30 RX ORDER — METOPROLOL TARTRATE 50 MG
1 TABLET ORAL
Qty: 30 | Refills: 0
Start: 2023-11-30

## 2023-11-30 RX ORDER — THEOPHYLLINE ANHYDROUS 200 MG
1 TABLET, EXTENDED RELEASE 12 HR ORAL
Refills: 0 | DISCHARGE

## 2023-11-30 RX ORDER — BUMETANIDE 0.25 MG/ML
1 INJECTION INTRAMUSCULAR; INTRAVENOUS
Qty: 30 | Refills: 0
Start: 2023-11-30

## 2023-11-30 RX ORDER — METFORMIN HYDROCHLORIDE 850 MG/1
1 TABLET ORAL
Refills: 0 | DISCHARGE

## 2023-11-30 RX ORDER — APIXABAN 2.5 MG/1
1 TABLET, FILM COATED ORAL
Qty: 30 | Refills: 0
Start: 2023-11-30

## 2023-11-30 RX ORDER — OMEPRAZOLE 10 MG/1
1 CAPSULE, DELAYED RELEASE ORAL
Qty: 0 | Refills: 0 | DISCHARGE

## 2023-11-30 RX ORDER — ATORVASTATIN CALCIUM 80 MG/1
1 TABLET, FILM COATED ORAL
Qty: 30 | Refills: 0
Start: 2023-11-30

## 2023-11-30 RX ADMIN — Medication 1 PATCH: at 11:46

## 2023-11-30 RX ADMIN — PANTOPRAZOLE SODIUM 40 MILLIGRAM(S): 20 TABLET, DELAYED RELEASE ORAL at 05:34

## 2023-11-30 RX ADMIN — Medication 100 MILLIGRAM(S): at 11:46

## 2023-11-30 RX ADMIN — Medication 1 PATCH: at 07:27

## 2023-11-30 RX ADMIN — Medication 3: at 12:30

## 2023-11-30 RX ADMIN — BUMETANIDE 1 MILLIGRAM(S): 0.25 INJECTION INTRAMUSCULAR; INTRAVENOUS at 13:11

## 2023-11-30 RX ADMIN — Medication 1 TABLET(S): at 11:45

## 2023-11-30 RX ADMIN — BUDESONIDE AND FORMOTEROL FUMARATE DIHYDRATE 2 PUFF(S): 160; 4.5 AEROSOL RESPIRATORY (INHALATION) at 11:09

## 2023-11-30 RX ADMIN — ALBUTEROL 2 PUFF(S): 90 AEROSOL, METERED ORAL at 10:54

## 2023-11-30 RX ADMIN — ALBUTEROL 2 PUFF(S): 90 AEROSOL, METERED ORAL at 05:29

## 2023-11-30 RX ADMIN — ENOXAPARIN SODIUM 70 MILLIGRAM(S): 100 INJECTION SUBCUTANEOUS at 05:32

## 2023-11-30 RX ADMIN — ALBUTEROL 2 PUFF(S): 90 AEROSOL, METERED ORAL at 16:26

## 2023-11-30 RX ADMIN — BUMETANIDE 1 MILLIGRAM(S): 0.25 INJECTION INTRAMUSCULAR; INTRAVENOUS at 05:31

## 2023-11-30 RX ADMIN — Medication 30 MILLIGRAM(S): at 05:32

## 2023-11-30 RX ADMIN — Medication 1 PATCH: at 11:45

## 2023-11-30 RX ADMIN — Medication 81 MILLIGRAM(S): at 11:46

## 2023-11-30 RX ADMIN — ENOXAPARIN SODIUM 70 MILLIGRAM(S): 100 INJECTION SUBCUTANEOUS at 17:25

## 2023-11-30 RX ADMIN — TIOTROPIUM BROMIDE 2 PUFF(S): 18 CAPSULE ORAL; RESPIRATORY (INHALATION) at 11:46

## 2023-11-30 RX ADMIN — Medication 1 MILLIGRAM(S): at 11:46

## 2023-11-30 RX ADMIN — Medication 50 MILLIGRAM(S): at 13:10

## 2023-11-30 NOTE — PROGRESS NOTE ADULT - SUBJECTIVE AND OBJECTIVE BOX
PGY-1 Progress Note discussed with attending.    PAGER #: [[phone]] TILL 5:00 PM  PLEASE CONTACT ON CALL TEAM:  - On Call Team (Please refer to Angel) FROM 5:00 PM - 8:30PM  - Nightfloat Team FROM 8:30 -7:30 AM      INTERVAL HPI/OVERNIGHT EVENTS:       REVIEW OF SYSTEMS:  CONSTITUTIONAL: No fever, weight loss, or fatigue.  RESPIRATORY: No cough, wheezing, chills, or hemoptysis. No shortness of breath.  CARDIOVASCULAR: No chest pain, palpitations, dizziness, or leg swelling.  GASTROINTESTINAL: No abdominal pain. No nausea, vomiting, or hematemesis. No diarrhea or constipation. No melena or hematochezia.  GENITOURINARY: No dysuria or hematuria, urinary frequency.  NEUROLOGICAL: No headaches, memory loss, loss of strength, numbness, or tremors.  SKIN: No itching, burning, rashes, or lesions.    Vital Signs Last 24 Hrs  T(C): 36.4 (30 Nov 2023 08:09), Max: 36.8 (29 Nov 2023 16:04)  T(F): 97.5 (30 Nov 2023 08:09), Max: 98.2 (29 Nov 2023 16:04)  HR: 125 (30 Nov 2023 08:09) (98 - 130)  BP: 123/84 (30 Nov 2023 08:09) (100/50 - 132/82)  BP(mean): --  RR: 18 (30 Nov 2023 08:09) (18 - 19)  SpO2: 98% (30 Nov 2023 08:09) (98% - 100%)    Parameters below as of 30 Nov 2023 08:09  Patient On (Oxygen Delivery Method): room air        PHYSICAL EXAMINATION:  GENERAL: NAD   HEAD:  Atraumatic, Normocephalic.  EYES:  Conjunctiva and sclera clear.  NECK: Supple, No JVD, Normal thyroid.  CHEST/LUNG: Clear to auscultation. Clear to percussion bilaterally. No rales, rhonchi, wheezing, or rubs.  HEART: Regular rate and rhythm. No murmurs, rubs, or gallops.  ABDOMEN: Soft, Nontender, Nondistended. Bowel sounds present.  NERVOUS SYSTEM:  Alert & Oriented X3  EXTREMITIES:  2+ Peripheral Pulses. No clubbing, cyanosis, or edema.  SKIN: Warm, dry.                          10.9   8.65  )-----------( 103      ( 29 Nov 2023 06:42 )             35.0     11-29    143  |  111<H>  |  36<H>  ----------------------------<  108<H>  3.2<L>   |  25  |  1.62<H>    Ca    9.4      29 Nov 2023 06:42  Phos  3.6     11-29  Mg     1.8     11-29                CAPILLARY BLOOD GLUCOSE:      RADIOLOGY & ADDITIONAL TESTS:               PGY-1 Progress Note discussed with attending.    PAGER #: [[phone]] TILL 5:00 PM  PLEASE CONTACT ON CALL TEAM:  - On Call Team (Please refer to Angel) FROM 5:00 PM - 8:30PM  - Nightfloat Team FROM 8:30 -7:30 AM      INTERVAL HPI/OVERNIGHT EVENTS:       REVIEW OF SYSTEMS:  CONSTITUTIONAL: No fever, weight loss, or fatigue.  RESPIRATORY: No cough, wheezing, chills, or hemoptysis. No shortness of breath.  CARDIOVASCULAR: No chest pain, palpitations, dizziness, or leg swelling.  GASTROINTESTINAL: No abdominal pain. No nausea, vomiting, or hematemesis. No diarrhea or constipation. No melena or hematochezia.  GENITOURINARY: No dysuria or hematuria, urinary frequency.  NEUROLOGICAL: No headaches, memory loss, loss of strength, numbness, or tremors.  SKIN: No itching, burning, rashes, or lesions.    Vital Signs Last 24 Hrs  T(C): 36.4 (30 Nov 2023 08:09), Max: 36.8 (29 Nov 2023 16:04)  T(F): 97.5 (30 Nov 2023 08:09), Max: 98.2 (29 Nov 2023 16:04)  HR: 125 (30 Nov 2023 08:09) (98 - 130)  BP: 123/84 (30 Nov 2023 08:09) (100/50 - 132/82)  BP(mean): --  RR: 18 (30 Nov 2023 08:09) (18 - 19)  SpO2: 98% (30 Nov 2023 08:09) (98% - 100%)    Parameters below as of 30 Nov 2023 08:09  Patient On (Oxygen Delivery Method): room air        PHYSICAL EXAMINATION:  GENERAL: NAD   HEAD:  Atraumatic, Normocephalic.  EYES:  Conjunctiva and sclera clear.  NECK: Supple, No JVD, Normal thyroid.  CHEST/LUNG: Clear to auscultation. Clear to percussion bilaterally. No rales, rhonchi, wheezing, or rubs.  HEART: Regular rate and rhythm. No murmurs, rubs, or gallops.  ABDOMEN: Soft, Nontender, Nondistended. Bowel sounds present.  NERVOUS SYSTEM:  Alert & Oriented X3  EXTREMITIES:  2+ Peripheral Pulses. No clubbing, cyanosis, or edema.  SKIN: Warm, dry.                          10.9   8.65  )-----------( 103      ( 29 Nov 2023 06:42 )             35.0     11-29    143  |  111<H>  |  36<H>  ----------------------------<  108<H>  3.2<L>   |  25  |  1.62<H>    Ca    9.4      29 Nov 2023 06:42  Phos  3.6     11-29  Mg     1.8     11-29                CAPILLARY BLOOD GLUCOSE:      RADIOLOGY & ADDITIONAL TESTS:               PGY-1 Progress Note discussed with attending.    PAGER #: [[phone]] TILL 5:00 PM  PLEASE CONTACT ON CALL TEAM:  - On Call Team (Please refer to Angel) FROM 5:00 PM - 8:30PM  - Nightfloat Team FROM 8:30 -7:30 AM      INTERVAL HPI/OVERNIGHT EVENTS:       REVIEW OF SYSTEMS:  CONSTITUTIONAL: No fever, weight loss, or fatigue.  RESPIRATORY: No cough, wheezing, chills, or hemoptysis. No shortness of breath.  CARDIOVASCULAR: No chest pain, palpitations, dizziness, or leg swelling.  GASTROINTESTINAL: No abdominal pain. No nausea, vomiting, or hematemesis. No diarrhea or constipation. No melena or hematochezia.  GENITOURINARY: No dysuria or hematuria, urinary frequency.  NEUROLOGICAL: No headaches, memory loss, loss of strength, numbness, or tremors.  SKIN: No itching, burning, rashes, or lesions.    Vital Signs Last 24 Hrs  T(C): 36.4 (30 Nov 2023 08:09), Max: 36.8 (29 Nov 2023 16:04)  T(F): 97.5 (30 Nov 2023 08:09), Max: 98.2 (29 Nov 2023 16:04)  HR: 125 (30 Nov 2023 08:09) (98 - 130)  BP: 123/84 (30 Nov 2023 08:09) (100/50 - 132/82)  BP(mean): --  RR: 18 (30 Nov 2023 08:09) (18 - 19)  SpO2: 98% (30 Nov 2023 08:09) (98% - 100%)    Parameters below as of 30 Nov 2023 08:09  Patient On (Oxygen Delivery Method): room air        PHYSICAL EXAMINATION:  GENERAL: NAD   HEAD:  Atraumatic, Normocephalic.  EYES:  Conjunctiva and sclera clear.  NECK: Supple, No JVD, Normal thyroid.  CHEST/LUNG: Clear to auscultation. Clear to percussion bilaterally. No rales, rhonchi, wheezing, or rubs.  HEART: Regular rate and rhythm. No murmurs, rubs, or gallops.  ABDOMEN: Soft, Nontender, Nondistended. Bowel sounds present.  NERVOUS SYSTEM:  Alert & Oriented X3  EXTREMITIES:  2+ Peripheral Pulses. No clubbing, cyanosis, or edema.  SKIN: Warm, dry.                          10.9   8.65  )-----------( 103      ( 29 Nov 2023 06:42 )             35.0     11-29    143  |  111<H>  |  36<H>  ----------------------------<  108<H>  3.2<L>   |  25  |  1.62<H>    Ca    9.4      29 Nov 2023 06:42  Phos  3.6     11-29  Mg     1.8     11-29                CAPILLARY BLOOD GLUCOSE:      RADIOLOGY & ADDITIONAL TESTS:               PGY-1 Progress Note discussed with attending.    PAGER #: [[phone]] TILL 5:00 PM  PLEASE CONTACT ON CALL TEAM:  - On Call Team (Please refer to Angel) FROM 5:00 PM - 8:30PM  - Nightfloat Team FROM 8:30 -7:30 AM      INTERVAL HPI/OVERNIGHT EVENTS: Patient seen and examined at bedside. Resting comfortably. No acute overnight events.      REVIEW OF SYSTEMS:  CONSTITUTIONAL: No fever, weight loss, or fatigue.  RESPIRATORY: No cough, wheezing, chills, or hemoptysis. No shortness of breath.  CARDIOVASCULAR: No chest pain, palpitations, dizziness, or leg swelling.  GASTROINTESTINAL: No abdominal pain. No nausea, vomiting, or hematemesis. No diarrhea or constipation. No melena or hematochezia.  GENITOURINARY: No dysuria or hematuria, urinary frequency.  NEUROLOGICAL: No headaches, memory loss, loss of strength, numbness, or tremors.  SKIN: No itching, burning, rashes, or lesions.    Vital Signs Last 24 Hrs  T(C): 36.4 (30 Nov 2023 08:09), Max: 36.8 (29 Nov 2023 16:04)  T(F): 97.5 (30 Nov 2023 08:09), Max: 98.2 (29 Nov 2023 16:04)  HR: 125 (30 Nov 2023 08:09) (98 - 130)  BP: 123/84 (30 Nov 2023 08:09) (100/50 - 132/82)  BP(mean): --  RR: 18 (30 Nov 2023 08:09) (18 - 19)  SpO2: 98% (30 Nov 2023 08:09) (98% - 100%)    Parameters below as of 30 Nov 2023 08:09  Patient On (Oxygen Delivery Method): room air        PHYSICAL EXAMINATION:  GENERAL: NAD   HEAD:  Atraumatic, Normocephalic.  EYES:  Conjunctiva and sclera clear.  NECK: Supple, No JVD, Normal thyroid.  CHEST/LUNG: Clear to auscultation. Clear to percussion bilaterally. +mild rhonchi b/l, improved.  HEART: Regular rate and rhythm. No murmurs, rubs, or gallops.  ABDOMEN: Soft, Nontender, Nondistended. Bowel sounds present. +umbilical hernia.  NERVOUS SYSTEM:  Alert & Oriented X3  EXTREMITIES:  2+ Peripheral Pulses. No clubbing, cyanosis, or edema.  SKIN: Warm, dry.                          10.9   8.65  )-----------( 103      ( 29 Nov 2023 06:42 )             35.0     11-29    143  |  111<H>  |  36<H>  ----------------------------<  108<H>  3.2<L>   |  25  |  1.62<H>    Ca    9.4      29 Nov 2023 06:42  Phos  3.6     11-29  Mg     1.8     11-29                CAPILLARY BLOOD GLUCOSE:      RADIOLOGY & ADDITIONAL TESTS:               PGY-1 Progress Note discussed with attending.    PAGER #: [[phone]] TILL 5:00 PM  PLEASE CONTACT ON CALL TEAM:  - On Call Team (Please refer to Angel) FROM 5:00 PM - 8:30PM  - Nightfloat Team FROM 8:30 -7:30 AM      INTERVAL HPI/OVERNIGHT EVENTS: Patient seen and examined at bedside. Resting comfortably. No acute overnight events.      REVIEW OF SYSTEMS:  CONSTITUTIONAL: No fever, weight loss, or fatigue.  RESPIRATORY: No cough, wheezing, chills, or hemoptysis. No shortness of breath.  CARDIOVASCULAR: No chest pain, palpitations, dizziness, or leg swelling.  GASTROINTESTINAL: No abdominal pain. No nausea, vomiting, or hematemesis. No diarrhea or constipation. No melena or hematochezia.  GENITOURINARY: No dysuria or hematuria, urinary frequency.  NEUROLOGICAL: No headaches, memory loss, loss of strength, numbness, or tremors.  SKIN: No itching, burning, rashes, or lesions.    Vital Signs Last 24 Hrs  T(C): 36.4 (30 Nov 2023 08:09), Max: 36.8 (29 Nov 2023 16:04)  T(F): 97.5 (30 Nov 2023 08:09), Max: 98.2 (29 Nov 2023 16:04)  HR: 125 (30 Nov 2023 08:09) (98 - 130)  BP: 123/84 (30 Nov 2023 08:09) (100/50 - 132/82)  BP(mean): --  RR: 18 (30 Nov 2023 08:09) (18 - 19)  SpO2: 98% (30 Nov 2023 08:09) (98% - 100%)    Parameters below as of 30 Nov 2023 08:09  Patient On (Oxygen Delivery Method): room air        PHYSICAL EXAMINATION:  GENERAL: NAD   HEAD:  Atraumatic, Normocephalic.  EYES:  Conjunctiva and sclera clear.  NECK: Supple, No JVD, Normal thyroid.  CHEST/LUNG: Clear to auscultation. Clear to percussion bilaterally. +mild rhonchi b/l, improved.  HEART: Regular rate and rhythm. No murmurs, rubs, or gallops.  ABDOMEN: Soft, Nontender, Nondistended. Bowel sounds present. +umbilical hernia.  NERVOUS SYSTEM:  Alert & Oriented X3  EXTREMITIES:  2+ Peripheral Pulses. No clubbing, cyanosis, or edema.  SKIN: Warm, dry.                          10.9   8.65  )-----------( 103      ( 29 Nov 2023 06:42 )             35.0     11-29    143  |  111<H>  |  36<H>  ----------------------------<  108<H>  3.2<L>   |  25  |  1.62<H>    Ca    9.4      29 Nov 2023 06:42  Phos  3.6     11-29  Mg     1.8     11-29                CAPILLARY BLOOD GLUCOSE:      RADIOLOGY & ADDITIONAL TESTS:               PGY-1 Progress Note discussed with attending.    PAGER #: [[phone]] TILL 5:00 PM  PLEASE CONTACT ON CALL TEAM:  - On Call Team (Please refer to Angel) FROM 5:00 PM - 8:30PM  - Nightfloat Team FROM 8:30 -7:30 AM      INTERVAL HPI/OVERNIGHT EVENTS: Patient seen and examined at bedside. Resting comfortably. No acute overnight events.      REVIEW OF SYSTEMS:  CONSTITUTIONAL: No fever, weight loss, or fatigue.  RESPIRATORY: No cough, wheezing, chills, or hemoptysis. No shortness of breath.  CARDIOVASCULAR: No chest pain, palpitations, dizziness, or leg swelling.  GASTROINTESTINAL: No abdominal pain. No nausea, vomiting, or hematemesis. No diarrhea or constipation. No melena or hematochezia.  GENITOURINARY: No dysuria or hematuria, urinary frequency.  NEUROLOGICAL: No headaches, memory loss, loss of strength, numbness, or tremors.  SKIN: No itching, burning, rashes, or lesions.    Vital Signs Last 24 Hrs  T(C): 36.4 (30 Nov 2023 08:09), Max: 36.8 (29 Nov 2023 16:04)  T(F): 97.5 (30 Nov 2023 08:09), Max: 98.2 (29 Nov 2023 16:04)  HR: 125 (30 Nov 2023 08:09) (98 - 130)  BP: 123/84 (30 Nov 2023 08:09) (100/50 - 132/82)  BP(mean): --  RR: 18 (30 Nov 2023 08:09) (18 - 19)  SpO2: 98% (30 Nov 2023 08:09) (98% - 100%)    Parameters below as of 30 Nov 2023 08:09  Patient On (Oxygen Delivery Method): room air        PHYSICAL EXAMINATION:  GENERAL: NAD   HEAD:  Atraumatic, Normocephalic.  EYES:  Conjunctiva and sclera clear.  NECK: Supple, No JVD, Normal thyroid.  CHEST/LUNG: Clear to auscultation. Clear to percussion bilaterally. +mild rhonchi b/l, improved.  HEART: Regular rate and rhythm. No murmurs, rubs, or gallops.  ABDOMEN: Soft, Nontender, Nondistended. Bowel sounds present. +umbilical hernia.  NERVOUS SYSTEM:  Alert & Oriented X3  EXTREMITIES:  2+ Peripheral Pulses. No clubbing, cyanosis, or edema.  SKIN: Warm, dry.                          10.9   8.65  )-----------( 103      ( 29 Nov 2023 06:42 )             35.0     11-29    143  |  111<H>  |  36<H>  ----------------------------<  108<H>  3.2<L>   |  25  |  1.62<H>    Ca    9.4      29 Nov 2023 06:42  Phos  3.6     11-29  Mg     1.8     11-29                CAPILLARY BLOOD GLUCOSE:      RADIOLOGY & ADDITIONAL TESTS:

## 2023-11-30 NOTE — PROGRESS NOTE ADULT - PROBLEM SELECTOR PLAN 7
on admission, .   > 100, around time when heparin drip started for NSTEMI.  However, HIT panel neg.  - Now  > 106  - continue to monitor.

## 2023-11-30 NOTE — PROGRESS NOTE ADULT - SUBJECTIVE AND OBJECTIVE BOX
Time of Visit:  Patient seen and examined.     MEDICATIONS  (STANDING):  albuterol    90 MICROgram(s) HFA Inhaler 2 Puff(s) Inhalation every 6 hours  aspirin  chewable 81 milliGRAM(s) Oral daily  atorvastatin 40 milliGRAM(s) Oral at bedtime  budesonide 160 MICROgram(s)/formoterol 4.5 MICROgram(s) Inhaler 2 Puff(s) Inhalation two times a day  buMETAnide 1 milliGRAM(s) Oral two times a day  enoxaparin Injectable 70 milliGRAM(s) SubCutaneous every 12 hours  folic acid 1 milliGRAM(s) Oral daily  insulin lispro (ADMELOG) corrective regimen sliding scale   SubCutaneous Before meals and at bedtime  metoprolol tartrate 50 milliGRAM(s) Oral three times a day  multivitamin 1 Tablet(s) Oral daily  nicotine - 21 mG/24Hr(s) Patch 1 Patch Transdermal daily  pantoprazole    Tablet 40 milliGRAM(s) Oral before breakfast  predniSONE   Tablet 30 milliGRAM(s) Oral daily  QUEtiapine 25 milliGRAM(s) Oral at bedtime  thiamine 100 milliGRAM(s) Oral daily  tiotropium 2.5 MICROgram(s) Inhaler 2 Puff(s) Inhalation daily      MEDICATIONS  (PRN):       Medications up to date at time of exam.    ROS; No fever, chills, cough, congestion on exam.   PHYSICAL EXAMINATION:    Vital Signs Last 24 Hrs  T(C): 36.7 (30 Nov 2023 11:10), Max: 36.8 (29 Nov 2023 16:04)  T(F): 98.1 (30 Nov 2023 11:10), Max: 98.2 (29 Nov 2023 16:04)  HR: 83 (30 Nov 2023 11:10) (83 - 130)  BP: 137/84 (30 Nov 2023 11:10) (100/50 - 137/84)  BP(mean): --  RR: 18 (30 Nov 2023 11:10) (18 - 19)  SpO2: 100% (30 Nov 2023 11:10) (98% - 100%)    Parameters below as of 30 Nov 2023 11:10  Patient On (Oxygen Delivery Method): room air    General : Alert and oriented. Able to answer question at rest with no SOB. No acute distress.     HEENT: Head is normocephalic and atraumatic. No nasal tenderness.  Extraocular muscles are intact. Mucous membranes are moist.     NECK: Supple, no palpable adenopathy.    LUNGS: Non labored. No wheezing on exam ( has episodic expiratory wheeze ). No use of accessory muscle.     HEART: S1 S2 irregular and no click / rub .    ABDOMEN: Soft, nontender, and nondistended. Active bowel sounds.     EXTREMITIES: Without any cyanosis, clubbing, rash, lesions or edema.    NEUROLOGIC: Awake, alert, oriented.     SKIN: Warm and moist . Non diaphoretic.          LABS:                        11.6   13.14 )-----------( 139      ( 30 Nov 2023 08:19 )             37.4     11-30    142  |  109<H>  |  45<H>  ----------------------------<  177<H>  3.5   |  26  |  2.02<H>    Ca    9.2      30 Nov 2023 08:19  Phos  3.9     11-30  Mg     1.8     11-30        Urinalysis Basic - ( 30 Nov 2023 08:19 )    Color: x / Appearance: x / SG: x / pH: x  Gluc: 177 mg/dL / Ketone: x  / Bili: x / Urobili: x   Blood: x / Protein: x / Nitrite: x   Leuk Esterase: x / RBC: x / WBC x   Sq Epi: x / Non Sq Epi: x / Bacteria: x      MICROBIOLOGY: (if applicable)    RADIOLOGY & ADDITIONAL STUDIES:  EKG:   CXR:  ECHO:    IMPRESSION: 68y Male PAST MEDICAL & SURGICAL HISTORY:  HTN (hypertension)      Varicose veins      HLD (hyperlipidemia)      Liver cirrhosis      Portal hypertension with esophageal varices      COPD (chronic obstructive pulmonary disease)      Syncope      Alcohol dependence, daily use      Smokes tobacco daily      DM (diabetes mellitus)      No significant past surgical history       Impression: This is a 67 Y/O Male presented to ED with SOB. Patient continued to be Tachypneic and developed New Onset A Fib on Bipap. Was intubated in ED. Was in ICU due to Increased work of breathing due to Pulmonary Edema, cardiogenic shock and COPD exacerbation . Patient was extubated on 11-19-23.  Weaned to NC but had to be put back on BIPAP in setting of inc WOB, now in Medicine Unit , saturating good room air . Patient was started on metoprolol and was given one dose of bumex to attempt to diurese patient in setting of B lines on POCUS.11/20  Patients mental status was altered likely in setting of ativan use, but patietnt was started on high dose thiamine to cover for possible wernickes encephalopathy. Pan CT was negative for any acute abnormalities on head and chest and positive for possible duodenitis/Peptic ulcer disease on AP. Echo showed newly reduced EF of 20-25%. Patient was started on heparin drip and aspirin. 11/21 Mentation much improved. Heparin was held in setting of thrombocytopenia, apixiban was started. SCr downtrending. ABx discontinued, no infiltrates on CT chest. 11-28-23 Negative RVP with Negative swab for Covid .      Suggestion:  O2 saturation 96% room air. So far been saturating good room air.    Continue  Albuterol 90 mcg 2 puffs Q 6 Hours .   Continue Symbicort 160-4.5 mcg 2 Puffs Twice Daily.   Continue Tiotropium 2 puffs Daily.   Pulmonary oral hygiene care especially every after oral inhaler used.  Reinforced the importance of smoking cessation. On Nicotine patch on x 12 Hours.   On Prednisone 30 mg Oral daily.

## 2023-11-30 NOTE — PROGRESS NOTE ADULT - PROBLEM SELECTOR PLAN 5
Finasteride refill. Last refill. 03/07/2018 LOV 03/07/2018  
UA with granular casts. FENa 1.75%; inconclusive. FeUrea 33%; borderline.  Baseline Cr 0.8-1 in April 2023  Cr 2.94 > now downtrending to 1.43  Likely 2/2 renal hypoperfusion 2/2 sepsis.  - c/w Bumex 1mg daily.  - monitor BMP.

## 2023-11-30 NOTE — PROGRESS NOTE ADULT - PROBLEM SELECTOR PLAN 8
CT abd pelvis showing Infiltrative changes surrounding the second duodenum suggesting duodenitis or peptic ulcer disease.  - c/w pantoprazole 40mg.

## 2023-11-30 NOTE — DISCHARGE NOTE NURSING/CASE MANAGEMENT/SOCIAL WORK - PATIENT PORTAL LINK FT
You can access the FollowMyHealth Patient Portal offered by [facility] by registering at the following website: [email or web address]. By joining [facility]’s FollowMyHealth portal, you will also be able to view your health information using other applications (apps) compatible with our system.

## 2023-11-30 NOTE — PROGRESS NOTE ADULT - PROBLEM SELECTOR PROBLEM 5
Acute kidney injury with acute tubular necrosis

## 2023-11-30 NOTE — PROGRESS NOTE ADULT - PROBLEM SELECTOR PROBLEM 6
DM (diabetes mellitus)
regular

## 2023-11-30 NOTE — PROGRESS NOTE ADULT - PROBLEM SELECTOR PLAN 9
h/o umbilical hernia.  Surgery consulted - no acute surgical intervention.

## 2023-11-30 NOTE — PROGRESS NOTE ADULT - PROBLEM SELECTOR PROBLEM 8
PUD (peptic ulcer disease)

## 2023-11-30 NOTE — PROGRESS NOTE ADULT - PROBLEM SELECTOR PLAN 2
Troponins peaked at 640, then downtrended s/p heparin drip.   - see as above.  - Stress test 11/29 showing EF 55%. Consider cardiac cath ? Troponins peaked at 640, then downtrended s/p heparin drip.   - see as above.  - Stress test 11/29 showing EF 55%.

## 2023-11-30 NOTE — PROGRESS NOTE ADULT - PROBLEM SELECTOR PLAN 3
Developed A fib with RVR to 160s.  Likely 2/2 sepsis.  - continue rate control with metoprolol.  - goal HR <110.  - tele.

## 2023-11-30 NOTE — PROGRESS NOTE ADULT - ASSESSMENT
Patient is a 69yo Male active smoker with h/o COPD, HLD, DM, HTN, chronic ETOH dependence, cirrhosis presents with SOB. Pt a/w Sepsis 2/2 PNA, COPD,  Acute hypoxic hypercapnic respiratory failure s/p brief intubation, Acute encephalopathy new onset Afib with CORTES. Nephrology consulted for Elevated serum creatinine.  Pt with NSTEMI with reduced EF; concerning for new onset HF (prev EF 50-55% in April 2023 --> now EF 20-25%). Pt pending possible cath once renal function improves  Pt extubated 11/19    1 CORTES- previous SCr 0.8-1 in April 2023. CORTES likely ATN. UA with 30 protein; large blood ?traumatic randolph with A/C use. FeNa 1.75%; inconclusive. FeUrea 33%; borderline; likely due to poor EABV.  Renal function overall improved with mild increase today. Recc to change Bumex to 1mg PO daily and and extra 1mg dose on MWF for maintenance; discussed with primary team  Repeat CXR with no edema  Ct abd/pelvis with no hydro. Strict I/Os. Avoid nephrotoxins/ NSAIDs/ RCA. Monitor BMP.    2. Acute encephalopathy- resolving. CT head neg. Ammonia level <10.     3. Acute CHF- TTE with severe global LV systolic dysfunction; EF 20-25%. Cardiology following. No plan for cath at this time; nuclear stress neg with improved EF    4. COPD exacerbation- CT chest with no PNA for which abx was d/c. Bronchodilators/ Steroids as per [facility].     [facility] NEPHROLOGY  Iggy Hernandez M.D.  Richard Cook D.O.  Bárbara Slaughter M.D.  MD Breanna Schulte, MSN, ANP-C    Telephone: [phone]  Facsimile: [phone]  [place] [address]   symptoms now resolved   recent echo EF 50%, mild segmental LV dysfunction   continue asa, statin  s/p cardiac cath 9/20 with TOSHIA on DAPT

## 2023-11-30 NOTE — PROGRESS NOTE ADULT - SUBJECTIVE AND OBJECTIVE BOX
[facility] NEPHROLOGY- PROGRESS NOTE    Patient is a 67yo Male active smoker with h/o COPD, HLD, DM, HTN, chronic ETOH dependence, cirrhosis presents with SOB. Pt a/w Sepsis 2/2 PNA, COPD,  Acute hypoxic hypercapnic respiratory failure s/p brief intubation, Acute encephalopathy new onset Afib with CORTES. Nephrology consulted for Elevated serum creatinine.  Pt with NSTEMI with reduced EF; concerning for new onset HF (prev EF 50-55% in April 2023 --> now EF 20-25%). Pt pending possible cath once renal function improves  Pt extubated 11/19      Hospital Medications: Medications reviewed.  REVIEW OF SYSTEMS: Pt denies any SOB at this time, chest pain, n/v/d, abd pain, urinary complaints or LE edema.  Used [employer] video  # [id number]; c/o SOB sometimes at night; but not at this time    VITALS:  T(F): 98.1 (11-30-23 @ 11:10), Max: 98.2 (11-29-23 @ 16:04)  HR: 83 (11-30-23 @ 11:10)  BP: 137/84 (11-30-23 @ 11:10)  RR: 18 (11-30-23 @ 11:10)  SpO2: 100% (11-30-23 @ 11:10)  Wt(kg): --    11-29 @ 07:01  -  11-30 @ 07:00  --------------------------------------------------------  IN: 650 mL / OUT: 0 mL / NET: 650 mL    11-30 @ 07:01  -  11-30 @ 13:58  --------------------------------------------------------  IN: 900 mL / OUT: 0 mL / NET: 900 mL      PHYSICAL EXAM:  Gen: NAD,   HEENT: anicteric    Cards: +irregular tachy, +S1/S2,   Resp: mild rales at base; no wheezing  GI: soft, +mild distention +umbilical hernia   Extremities: no LE edema B/L    LABS:  11-30    142  |  109<H>  |  45<H>  ----------------------------<  177<H>  3.5   |  26  |  2.02<H>    Ca    9.2      30 Nov 2023 08:19  Phos  3.9     11-30  Mg     1.8     11-30      Creatinine Trend: 2.02 <--, 1.62 <--, 1.68 <--, 1.46 <--, 1.31 <--, 1.33 <--, 1.43 <--                        11.6   13.14 )-----------( 139      ( 30 Nov 2023 08:19 )             37.4     Urine Studies:  Urinalysis Basic - ( 30 Nov 2023 08:19 )    Color:  / Appearance:  / SG:  / pH:   Gluc: 177 mg/dL / Ketone:   / Bili:  / Urobili:    Blood:  / Protein:  / Nitrite:    Leuk Esterase:  / RBC:  / WBC    Sq Epi:  / Non Sq Epi:  / Bacteria:

## 2023-11-30 NOTE — PROGRESS NOTE ADULT - PROBLEM SELECTOR PROBLEM 9
Umbilical hernia

## 2023-11-30 NOTE — PROGRESS NOTE ADULT - PROBLEM SELECTOR PLAN 4
h/o COPD, home meds tiotropium inhaler.  s/p extubation 11/19 for COPD exacerbation.  - patient still with bilateral expiratory wheezing.  - c/w tiotropium, albuterol, symbicort.  - start to taper prednisone 40 > 30  - Pulm Dr. Burton fontanez.

## 2023-11-30 NOTE — PROGRESS NOTE ADULT - PROBLEM SELECTOR PLAN 1
Echo - EF 20-25%. Severe global LV systolic dysfunction. Moderate MR. Mild AR. Severely dilated LA. Segmental wall  motion could not be assessed.  Septal motion consistent with conduction disease.  Patient developed NSTEMI. Troponins peaked at 640, then downtrended s/p heparin drip.   - Acute CHF likely 2/2 ischemic cardiomyopathy.   - Holding off on transfer for cardiac cath for now.   >> f/u Stress test - Normal myocardial perfusion scan, with no evidence of infarction or inducible ischemia.  LVEF = 53 %  >> f/u Dr. Alcazar if still going for cardiac cath ? Echo - EF 20-25%. Severe global LV systolic dysfunction. Moderate MR. Mild AR. Severely dilated LA. Segmental wall  motion could not be assessed.  Septal motion consistent with conduction disease.  Patient developed NSTEMI. Troponins peaked at 640, then downtrended s/p heparin drip.   - Acute CHF likely 2/2 ischemic cardiomyopathy.   - Holding off on transfer for cardiac cath for now.   >> f/u Stress test - Normal myocardial perfusion scan, with no evidence of infarction or inducible ischemia.  LVEF = 53 %

## 2023-11-30 NOTE — PROGRESS NOTE ADULT - PROBLEM SELECTOR PLAN 6
h/o DM, home med metformin.  A1c 4.9  - ISS, FS

## 2024-01-08 ENCOUNTER — INPATIENT (INPATIENT)
Facility: HOSPITAL | Age: 69
LOS: 11 days | Discharge: HOME HEALTH | DRG: 291 | End: 2024-01-20
Attending: GENERAL PRACTICE | Admitting: GENERAL PRACTICE
Payer: MEDICAID

## 2024-01-08 VITALS
HEIGHT: 68 IN | HEART RATE: 138 BPM | RESPIRATION RATE: 22 BRPM | TEMPERATURE: 97 F | SYSTOLIC BLOOD PRESSURE: 178 MMHG | DIASTOLIC BLOOD PRESSURE: 102 MMHG | OXYGEN SATURATION: 100 %

## 2024-01-08 DIAGNOSIS — R06.03 ACUTE RESPIRATORY DISTRESS: ICD-10-CM

## 2024-01-08 LAB
ALBUMIN SERPL ELPH-MCNC: 4 G/DL — SIGNIFICANT CHANGE UP (ref 3.5–5)
ALBUMIN SERPL ELPH-MCNC: 4 G/DL — SIGNIFICANT CHANGE UP (ref 3.5–5)
ALP SERPL-CCNC: 99 U/L — SIGNIFICANT CHANGE UP (ref 40–120)
ALP SERPL-CCNC: 99 U/L — SIGNIFICANT CHANGE UP (ref 40–120)
ALT FLD-CCNC: 16 U/L DA — SIGNIFICANT CHANGE UP (ref 10–60)
ALT FLD-CCNC: 16 U/L DA — SIGNIFICANT CHANGE UP (ref 10–60)
ANION GAP SERPL CALC-SCNC: 9 MMOL/L — SIGNIFICANT CHANGE UP (ref 5–17)
ANION GAP SERPL CALC-SCNC: 9 MMOL/L — SIGNIFICANT CHANGE UP (ref 5–17)
ANISOCYTOSIS BLD QL: SLIGHT — SIGNIFICANT CHANGE UP
ANISOCYTOSIS BLD QL: SLIGHT — SIGNIFICANT CHANGE UP
AST SERPL-CCNC: 26 U/L — SIGNIFICANT CHANGE UP (ref 10–40)
AST SERPL-CCNC: 26 U/L — SIGNIFICANT CHANGE UP (ref 10–40)
BASE EXCESS BLDV CALC-SCNC: -5.9 MMOL/L — SIGNIFICANT CHANGE UP
BASE EXCESS BLDV CALC-SCNC: -5.9 MMOL/L — SIGNIFICANT CHANGE UP
BASOPHILS # BLD AUTO: 0.42 K/UL — HIGH (ref 0–0.2)
BASOPHILS # BLD AUTO: 0.42 K/UL — HIGH (ref 0–0.2)
BASOPHILS NFR BLD AUTO: 2 % — SIGNIFICANT CHANGE UP (ref 0–2)
BASOPHILS NFR BLD AUTO: 2 % — SIGNIFICANT CHANGE UP (ref 0–2)
BILIRUB SERPL-MCNC: 0.6 MG/DL — SIGNIFICANT CHANGE UP (ref 0.2–1.2)
BILIRUB SERPL-MCNC: 0.6 MG/DL — SIGNIFICANT CHANGE UP (ref 0.2–1.2)
BLOOD GAS COMMENTS, VENOUS: SIGNIFICANT CHANGE UP
BLOOD GAS COMMENTS, VENOUS: SIGNIFICANT CHANGE UP
BUN SERPL-MCNC: 9 MG/DL — SIGNIFICANT CHANGE UP (ref 7–18)
BUN SERPL-MCNC: 9 MG/DL — SIGNIFICANT CHANGE UP (ref 7–18)
CA-I SERPL-SCNC: SIGNIFICANT CHANGE UP MMOL/L (ref 1.15–1.33)
CA-I SERPL-SCNC: SIGNIFICANT CHANGE UP MMOL/L (ref 1.15–1.33)
CALCIUM SERPL-MCNC: 9.6 MG/DL — SIGNIFICANT CHANGE UP (ref 8.4–10.5)
CALCIUM SERPL-MCNC: 9.6 MG/DL — SIGNIFICANT CHANGE UP (ref 8.4–10.5)
CHLORIDE SERPL-SCNC: 108 MMOL/L — SIGNIFICANT CHANGE UP (ref 96–108)
CHLORIDE SERPL-SCNC: 108 MMOL/L — SIGNIFICANT CHANGE UP (ref 96–108)
CO2 SERPL-SCNC: 22 MMOL/L — SIGNIFICANT CHANGE UP (ref 22–31)
CO2 SERPL-SCNC: 22 MMOL/L — SIGNIFICANT CHANGE UP (ref 22–31)
CREAT SERPL-MCNC: 1.36 MG/DL — HIGH (ref 0.5–1.3)
CREAT SERPL-MCNC: 1.36 MG/DL — HIGH (ref 0.5–1.3)
EGFR: 56 ML/MIN/1.73M2 — LOW
EGFR: 56 ML/MIN/1.73M2 — LOW
EOSINOPHIL # BLD AUTO: 1.87 K/UL — HIGH (ref 0–0.5)
EOSINOPHIL # BLD AUTO: 1.87 K/UL — HIGH (ref 0–0.5)
EOSINOPHIL NFR BLD AUTO: 9 % — HIGH (ref 0–6)
EOSINOPHIL NFR BLD AUTO: 9 % — HIGH (ref 0–6)
GAS PNL BLDA: SIGNIFICANT CHANGE UP
GAS PNL BLDV: 140 MMOL/L — SIGNIFICANT CHANGE UP (ref 136–145)
GAS PNL BLDV: 140 MMOL/L — SIGNIFICANT CHANGE UP (ref 136–145)
GAS PNL BLDV: SIGNIFICANT CHANGE UP
GAS PNL BLDV: SIGNIFICANT CHANGE UP
GLUCOSE SERPL-MCNC: 156 MG/DL — HIGH (ref 70–99)
GLUCOSE SERPL-MCNC: 156 MG/DL — HIGH (ref 70–99)
HCO3 BLDV-SCNC: 25 MMOL/L — SIGNIFICANT CHANGE UP (ref 22–29)
HCO3 BLDV-SCNC: 25 MMOL/L — SIGNIFICANT CHANGE UP (ref 22–29)
HCT VFR BLD CALC: 41 % — SIGNIFICANT CHANGE UP (ref 39–50)
HCT VFR BLD CALC: 41 % — SIGNIFICANT CHANGE UP (ref 39–50)
HGB BLD-MCNC: 12.4 G/DL — LOW (ref 13–17)
HGB BLD-MCNC: 12.4 G/DL — LOW (ref 13–17)
HIV 1 & 2 AB SERPL IA.RAPID: SIGNIFICANT CHANGE UP
HIV 1 & 2 AB SERPL IA.RAPID: SIGNIFICANT CHANGE UP
HOROWITZ INDEX BLDV+IHG-RTO: 100 — SIGNIFICANT CHANGE UP
HOROWITZ INDEX BLDV+IHG-RTO: 100 — SIGNIFICANT CHANGE UP
HYPOCHROMIA BLD QL: SLIGHT — SIGNIFICANT CHANGE UP
HYPOCHROMIA BLD QL: SLIGHT — SIGNIFICANT CHANGE UP
INR BLD: 1.03 RATIO — SIGNIFICANT CHANGE UP (ref 0.85–1.18)
INR BLD: 1.03 RATIO — SIGNIFICANT CHANGE UP (ref 0.85–1.18)
LACTATE BLDV-MCNC: 2.8 MMOL/L — HIGH (ref 0.5–2)
LACTATE BLDV-MCNC: 2.8 MMOL/L — HIGH (ref 0.5–2)
LACTATE SERPL-SCNC: 3.4 MMOL/L — HIGH (ref 0.7–2)
LACTATE SERPL-SCNC: 3.4 MMOL/L — HIGH (ref 0.7–2)
LYMPHOCYTES # BLD AUTO: 28 % — SIGNIFICANT CHANGE UP (ref 13–44)
LYMPHOCYTES # BLD AUTO: 28 % — SIGNIFICANT CHANGE UP (ref 13–44)
LYMPHOCYTES # BLD AUTO: 5.83 K/UL — HIGH (ref 1–3.3)
LYMPHOCYTES # BLD AUTO: 5.83 K/UL — HIGH (ref 1–3.3)
MAGNESIUM SERPL-MCNC: 2.2 MG/DL — SIGNIFICANT CHANGE UP (ref 1.6–2.6)
MAGNESIUM SERPL-MCNC: 2.2 MG/DL — SIGNIFICANT CHANGE UP (ref 1.6–2.6)
MANUAL SMEAR VERIFICATION: SIGNIFICANT CHANGE UP
MANUAL SMEAR VERIFICATION: SIGNIFICANT CHANGE UP
MCHC RBC-ENTMCNC: 27.3 PG — SIGNIFICANT CHANGE UP (ref 27–34)
MCHC RBC-ENTMCNC: 27.3 PG — SIGNIFICANT CHANGE UP (ref 27–34)
MCHC RBC-ENTMCNC: 30.2 GM/DL — LOW (ref 32–36)
MCHC RBC-ENTMCNC: 30.2 GM/DL — LOW (ref 32–36)
MCV RBC AUTO: 90.1 FL — SIGNIFICANT CHANGE UP (ref 80–100)
MCV RBC AUTO: 90.1 FL — SIGNIFICANT CHANGE UP (ref 80–100)
MONOCYTES # BLD AUTO: 0.62 K/UL — SIGNIFICANT CHANGE UP (ref 0–0.9)
MONOCYTES # BLD AUTO: 0.62 K/UL — SIGNIFICANT CHANGE UP (ref 0–0.9)
MONOCYTES NFR BLD AUTO: 3 % — SIGNIFICANT CHANGE UP (ref 2–14)
MONOCYTES NFR BLD AUTO: 3 % — SIGNIFICANT CHANGE UP (ref 2–14)
NEUTROPHILS # BLD AUTO: 11.45 K/UL — HIGH (ref 1.8–7.4)
NEUTROPHILS # BLD AUTO: 11.45 K/UL — HIGH (ref 1.8–7.4)
NEUTROPHILS NFR BLD AUTO: 55 % — SIGNIFICANT CHANGE UP (ref 43–77)
NEUTROPHILS NFR BLD AUTO: 55 % — SIGNIFICANT CHANGE UP (ref 43–77)
NRBC # BLD: 0 /100 WBCS — SIGNIFICANT CHANGE UP (ref 0–0)
NRBC # BLD: 0 /100 WBCS — SIGNIFICANT CHANGE UP (ref 0–0)
NT-PROBNP SERPL-SCNC: 1853 PG/ML — HIGH (ref 0–125)
NT-PROBNP SERPL-SCNC: 1853 PG/ML — HIGH (ref 0–125)
PCO2 BLDV: 74 MMHG — HIGH (ref 42–55)
PCO2 BLDV: 74 MMHG — HIGH (ref 42–55)
PH BLDV: 7.13 — LOW (ref 7.32–7.43)
PH BLDV: 7.13 — LOW (ref 7.32–7.43)
PHOSPHATE SERPL-MCNC: 4.1 MG/DL — SIGNIFICANT CHANGE UP (ref 2.5–4.5)
PHOSPHATE SERPL-MCNC: 4.1 MG/DL — SIGNIFICANT CHANGE UP (ref 2.5–4.5)
PLAT MORPH BLD: NORMAL — SIGNIFICANT CHANGE UP
PLAT MORPH BLD: NORMAL — SIGNIFICANT CHANGE UP
PLATELET # BLD AUTO: 247 K/UL — SIGNIFICANT CHANGE UP (ref 150–400)
PLATELET # BLD AUTO: 247 K/UL — SIGNIFICANT CHANGE UP (ref 150–400)
PLATELET COUNT - ESTIMATE: NORMAL — SIGNIFICANT CHANGE UP
PLATELET COUNT - ESTIMATE: NORMAL — SIGNIFICANT CHANGE UP
PO2 BLDV: 42 MMHG — SIGNIFICANT CHANGE UP
PO2 BLDV: 42 MMHG — SIGNIFICANT CHANGE UP
POIKILOCYTOSIS BLD QL AUTO: SLIGHT — SIGNIFICANT CHANGE UP
POIKILOCYTOSIS BLD QL AUTO: SLIGHT — SIGNIFICANT CHANGE UP
POLYCHROMASIA BLD QL SMEAR: SLIGHT — SIGNIFICANT CHANGE UP
POLYCHROMASIA BLD QL SMEAR: SLIGHT — SIGNIFICANT CHANGE UP
POTASSIUM BLDV-SCNC: 3.8 MMOL/L — SIGNIFICANT CHANGE UP (ref 3.5–5.1)
POTASSIUM BLDV-SCNC: 3.8 MMOL/L — SIGNIFICANT CHANGE UP (ref 3.5–5.1)
POTASSIUM SERPL-MCNC: 3.9 MMOL/L — SIGNIFICANT CHANGE UP (ref 3.5–5.3)
POTASSIUM SERPL-MCNC: 3.9 MMOL/L — SIGNIFICANT CHANGE UP (ref 3.5–5.3)
POTASSIUM SERPL-SCNC: 3.9 MMOL/L — SIGNIFICANT CHANGE UP (ref 3.5–5.3)
POTASSIUM SERPL-SCNC: 3.9 MMOL/L — SIGNIFICANT CHANGE UP (ref 3.5–5.3)
PROT SERPL-MCNC: 9 G/DL — HIGH (ref 6–8.3)
PROT SERPL-MCNC: 9 G/DL — HIGH (ref 6–8.3)
PROTHROM AB SERPL-ACNC: 11.7 SEC — SIGNIFICANT CHANGE UP (ref 9.5–13)
PROTHROM AB SERPL-ACNC: 11.7 SEC — SIGNIFICANT CHANGE UP (ref 9.5–13)
RAPID RVP RESULT: SIGNIFICANT CHANGE UP
RAPID RVP RESULT: SIGNIFICANT CHANGE UP
RBC # BLD: 4.55 M/UL — SIGNIFICANT CHANGE UP (ref 4.2–5.8)
RBC # BLD: 4.55 M/UL — SIGNIFICANT CHANGE UP (ref 4.2–5.8)
RBC # FLD: 14.4 % — SIGNIFICANT CHANGE UP (ref 10.3–14.5)
RBC # FLD: 14.4 % — SIGNIFICANT CHANGE UP (ref 10.3–14.5)
RBC BLD AUTO: ABNORMAL
RBC BLD AUTO: ABNORMAL
SAO2 % BLDV: 58.1 % — SIGNIFICANT CHANGE UP
SAO2 % BLDV: 58.1 % — SIGNIFICANT CHANGE UP
SARS-COV-2 RNA SPEC QL NAA+PROBE: SIGNIFICANT CHANGE UP
SARS-COV-2 RNA SPEC QL NAA+PROBE: SIGNIFICANT CHANGE UP
SODIUM SERPL-SCNC: 139 MMOL/L — SIGNIFICANT CHANGE UP (ref 135–145)
SODIUM SERPL-SCNC: 139 MMOL/L — SIGNIFICANT CHANGE UP (ref 135–145)
TROPONIN I, HIGH SENSITIVITY RESULT: 17 NG/L — SIGNIFICANT CHANGE UP
TROPONIN I, HIGH SENSITIVITY RESULT: 17 NG/L — SIGNIFICANT CHANGE UP
VARIANT LYMPHS # BLD: 3 % — SIGNIFICANT CHANGE UP (ref 0–6)
VARIANT LYMPHS # BLD: 3 % — SIGNIFICANT CHANGE UP (ref 0–6)
WBC # BLD: 20.81 K/UL — HIGH (ref 3.8–10.5)
WBC # BLD: 20.81 K/UL — HIGH (ref 3.8–10.5)
WBC # FLD AUTO: 20.81 K/UL — HIGH (ref 3.8–10.5)
WBC # FLD AUTO: 20.81 K/UL — HIGH (ref 3.8–10.5)

## 2024-01-08 PROCEDURE — 71250 CT THORAX DX C-: CPT | Mod: 26

## 2024-01-08 PROCEDURE — 71045 X-RAY EXAM CHEST 1 VIEW: CPT | Mod: 26

## 2024-01-08 PROCEDURE — 99291 CRITICAL CARE FIRST HOUR: CPT

## 2024-01-08 RX ORDER — METOPROLOL TARTRATE 50 MG
5 TABLET ORAL EVERY 6 HOURS
Refills: 0 | Status: DISCONTINUED | OUTPATIENT
Start: 2024-01-08 | End: 2024-01-09

## 2024-01-08 RX ORDER — IPRATROPIUM BROMIDE 0.2 MG/ML
500 SOLUTION, NON-ORAL INHALATION
Refills: 0 | Status: COMPLETED | OUTPATIENT
Start: 2024-01-08 | End: 2024-01-08

## 2024-01-08 RX ORDER — CHLORHEXIDINE GLUCONATE 213 G/1000ML
1 SOLUTION TOPICAL
Refills: 0 | Status: DISCONTINUED | OUTPATIENT
Start: 2024-01-08 | End: 2024-01-12

## 2024-01-08 RX ORDER — DILTIAZEM HCL 120 MG
10 CAPSULE, EXT RELEASE 24 HR ORAL ONCE
Refills: 0 | Status: COMPLETED | OUTPATIENT
Start: 2024-01-08 | End: 2024-01-08

## 2024-01-08 RX ORDER — INSULIN LISPRO 100/ML
VIAL (ML) SUBCUTANEOUS
Refills: 0 | Status: DISCONTINUED | OUTPATIENT
Start: 2024-01-08 | End: 2024-01-20

## 2024-01-08 RX ORDER — VANCOMYCIN HCL 1 G
1000 VIAL (EA) INTRAVENOUS ONCE
Refills: 0 | Status: COMPLETED | OUTPATIENT
Start: 2024-01-08 | End: 2024-01-08

## 2024-01-08 RX ORDER — LANOLIN ALCOHOL/MO/W.PET/CERES
5 CREAM (GRAM) TOPICAL AT BEDTIME
Refills: 0 | Status: DISCONTINUED | OUTPATIENT
Start: 2024-01-08 | End: 2024-01-20

## 2024-01-08 RX ORDER — BUMETANIDE 0.25 MG/ML
1 INJECTION INTRAMUSCULAR; INTRAVENOUS EVERY 12 HOURS
Refills: 0 | Status: DISCONTINUED | OUTPATIENT
Start: 2024-01-08 | End: 2024-01-09

## 2024-01-08 RX ORDER — ALBUTEROL 90 UG/1
2.5 AEROSOL, METERED ORAL
Refills: 0 | Status: COMPLETED | OUTPATIENT
Start: 2024-01-08 | End: 2024-01-08

## 2024-01-08 RX ORDER — AZITHROMYCIN 500 MG/1
500 TABLET, FILM COATED ORAL ONCE
Refills: 0 | Status: COMPLETED | OUTPATIENT
Start: 2024-01-08 | End: 2024-01-08

## 2024-01-08 RX ORDER — SODIUM CHLORIDE 9 MG/ML
500 INJECTION INTRAMUSCULAR; INTRAVENOUS; SUBCUTANEOUS ONCE
Refills: 0 | Status: COMPLETED | OUTPATIENT
Start: 2024-01-08 | End: 2024-01-08

## 2024-01-08 RX ORDER — INSULIN LISPRO 100/ML
VIAL (ML) SUBCUTANEOUS EVERY 6 HOURS
Refills: 0 | Status: DISCONTINUED | OUTPATIENT
Start: 2024-01-08 | End: 2024-01-08

## 2024-01-08 RX ORDER — CEFTRIAXONE 500 MG/1
1000 INJECTION, POWDER, FOR SOLUTION INTRAMUSCULAR; INTRAVENOUS ONCE
Refills: 0 | Status: COMPLETED | OUTPATIENT
Start: 2024-01-08 | End: 2024-01-08

## 2024-01-08 RX ORDER — DILTIAZEM HCL 120 MG
20 CAPSULE, EXT RELEASE 24 HR ORAL ONCE
Refills: 0 | Status: COMPLETED | OUTPATIENT
Start: 2024-01-08 | End: 2024-01-08

## 2024-01-08 RX ADMIN — CEFTRIAXONE 100 MILLIGRAM(S): 500 INJECTION, POWDER, FOR SOLUTION INTRAMUSCULAR; INTRAVENOUS at 08:20

## 2024-01-08 RX ADMIN — Medication 500 MICROGRAM(S): at 08:25

## 2024-01-08 RX ADMIN — Medication 500 MICROGRAM(S): at 07:58

## 2024-01-08 RX ADMIN — Medication 5 MILLIGRAM(S): at 23:41

## 2024-01-08 RX ADMIN — Medication 500 MICROGRAM(S): at 07:05

## 2024-01-08 RX ADMIN — Medication 10 MILLIGRAM(S): at 07:42

## 2024-01-08 RX ADMIN — ALBUTEROL 2.5 MILLIGRAM(S): 90 AEROSOL, METERED ORAL at 07:05

## 2024-01-08 RX ADMIN — Medication 5 MILLIGRAM(S): at 13:01

## 2024-01-08 RX ADMIN — Medication 250 MILLIGRAM(S): at 13:01

## 2024-01-08 RX ADMIN — ALBUTEROL 2.5 MILLIGRAM(S): 90 AEROSOL, METERED ORAL at 08:25

## 2024-01-08 RX ADMIN — Medication 20 MILLIGRAM(S): at 06:53

## 2024-01-08 RX ADMIN — CHLORHEXIDINE GLUCONATE 1 APPLICATION(S): 213 SOLUTION TOPICAL at 23:12

## 2024-01-08 RX ADMIN — Medication 5 MILLIGRAM(S): at 17:44

## 2024-01-08 RX ADMIN — AZITHROMYCIN 255 MILLIGRAM(S): 500 TABLET, FILM COATED ORAL at 09:16

## 2024-01-08 RX ADMIN — BUMETANIDE 1 MILLIGRAM(S): 0.25 INJECTION INTRAMUSCULAR; INTRAVENOUS at 17:44

## 2024-01-08 RX ADMIN — BUMETANIDE 1 MILLIGRAM(S): 0.25 INJECTION INTRAMUSCULAR; INTRAVENOUS at 13:01

## 2024-01-08 RX ADMIN — Medication 5 MILLIGRAM(S): at 23:10

## 2024-01-08 RX ADMIN — SODIUM CHLORIDE 500 MILLILITER(S): 9 INJECTION INTRAMUSCULAR; INTRAVENOUS; SUBCUTANEOUS at 08:20

## 2024-01-08 RX ADMIN — Medication 125 MILLIGRAM(S): at 07:02

## 2024-01-08 RX ADMIN — ALBUTEROL 2.5 MILLIGRAM(S): 90 AEROSOL, METERED ORAL at 07:58

## 2024-01-08 NOTE — ED PROVIDER NOTE - PHYSICAL EXAMINATION
Gen:  Awake, alert, mod distress, NCAT  Eyes:  PERRL, EOMI, no icterus, normal lids/lashes, normal conjunctivae.  ENT:  External inspection normal, pink/moist membranes.   CV:  S1S2, tachycardia, irregular rhythm, no murmur/gallops/rubs, no JVD, 2+ pulses b/l, no edema/cords/homans, warm/well-perfused.  Resp:  +Tachypnea, increased effort, +respiratory distress, lungs w poor air movement b/l, +retractions, no stridor.  Abd:  Soft abdomen, no tender/distended/guarding/rebound, no pulsatile mass, no CVA tender.   Musculoskeletal:  N/V intact, FROM all 4 extremities  Neck:  FROM neck, supple, trachea midline, no meningismus.   Skin:  Color normal for race, warm and dry, no rash.  Neuro:  Alert/interactive, CN 2-12 intact (grossly), normal motor (grossly), normal sensory (grossly)  Psych:  Limited due to acuity of condition.

## 2024-01-08 NOTE — H&P ADULT - NSICDXPASTMEDICALHX_GEN_ALL_CORE_FT
Division of Vascular Surgery        New Consult      Physician Requesting Consult:      Reason for Consult:   \"DVT\"    Chief Complaint:      \" I cannot move or feel my leg\"    History of Present Illness:      Lee Mejia is a 64 y.o. male who was admitted on 12/26/2023 as a transfer from [facility] for higher level of care for V-fib cardiac arrest.  He was also positive for COVID pneumonia.  He underwent a cardiac catheterization, ultimately went to cardiogenic shock, and an IABP was placed through the left femoral artery, as well as a temporary pacemaker at that time.  Once here, he arrested again with ROSC and subsequently underwent emergent CABG x 3 on 12/26.  Since then, he has been progressing.  His IABP was removed 12/30.  He was undergoing discharge planning to rehab.  Today, he was unable to bear weight when working with PT.  He is also complaining about left leg paresthesias, pain, and edema.  Venous duplex was complete that showed no DVT.  The arterial duplex showed the common femoral and external iliac arteries as occluded.  Vascular surgery was consulted for this reason.      Medical History:     Past Medical History:   Diagnosis Date    ATN (acute tubular necrosis) (HCC) 12/28/2023    COPD (chronic obstructive pulmonary disease) (HCC)     COPD (chronic obstructive pulmonary disease) (HCC)     Diabetes mellitus (HCC)     controlled with diet    Diabetes mellitus (HCC)     Left main coronary artery disease 12/26/2023    Mixed hyperlipidemia 03/09/2020       Surgical History:     Past Surgical History:   Procedure Laterality Date    CARDIAC PROCEDURE N/A 12/26/2023    Coronary angiography performed by Radha Escobar MD at [facility] CARDIAC CATH LAB    CARDIAC PROCEDURE N/A 12/26/2023    Percutaneous coronary intervention performed by Radha Escobar MD at [facility] CARDIAC CATH LAB    CARDIAC PROCEDURE N/A 12/26/2023    Intra-aortic balloon pump insertion performed by Radha Escobar MD at [facility] CARDIAC CATH  PAST MEDICAL HISTORY:  Alcohol dependence, daily use     COPD (chronic obstructive pulmonary disease)     DM (diabetes mellitus)     HLD (hyperlipidemia)     HTN (hypertension)     Liver cirrhosis     Portal hypertension with esophageal varices     Smokes tobacco daily     Syncope     Varicose veins

## 2024-01-08 NOTE — ED ADULT NURSE NOTE - OBJECTIVE STATEMENT
Patient presents to the ER via EMS with SOB. Patient known to have COPD and was recently admitted to the ICU.  Patient accompanied by his wife. Patient obviously in severe respiratory distress with use of accessory muscles and purse lip breathing. Chest expansion symmetric. Bilateral wheezes and rhonchi. Patients skin looks bluish. MD, RN's and RT at bedside. Patient placed on BiPAP. IV placed x 2 to right forearm and left AC. Patient is on the cardiac monitor and zoll. Patient is in Afib RVR. EKG done. Pulses intact.

## 2024-01-08 NOTE — CONSULT NOTE ADULT - SUBJECTIVE AND OBJECTIVE BOX
Time of visit:    CHIEF COMPLAINT: Patient is a 68y old  Male who presents with a chief complaint of AHRF, CHF exacerbation (08 Jan 2024 08:55)      HPI: This is a 68 yr old man  from home, ambulates independently,  COPD (not on O2), former smoker quit 2-months, HLD, DM, HTN, chronic ETOH dependence c/b cirrhosis, p/w worsening SOB. Patient is limited historian, currently on BIPAP and unable to full history deferring further collateral to wife, Maureen Tovt [phone] but unable to reach. Patient states that he has had worsening of breathing over 2-years, does not sleep with a machine at home, and when inquired about medications he admits to non-compliance. Denies fevers, chills, or sick contact. Denies chest pain, palpitations, or GUILLEN.    In ED:   Afebrile;  Afib w/ RVR and PVC; /102; RR 22 100% on RA. Started on BIPAP for increased WOB saturating 100% with improvement in WOB. Trop negative 17, BNP 1853 (improved from 4463). Given duoneb x3, solumedrol 125mg x1, rocephin x1, and 500cc bolus x1, diltizem 20mg IVP x1 + 10mg IVP x1. Awaiting to receive vanc x1 and azithromycin. CXR increased b/l opacification and left lower pleural effusion.  (08 Jan 2024 08:55)   Patient seen and examined.     PAST MEDICAL & SURGICAL HISTORY:  HTN (hypertension)      Varicose veins      HLD (hyperlipidemia)      Liver cirrhosis      Portal hypertension with esophageal varices      COPD (chronic obstructive pulmonary disease)      Syncope      Alcohol dependence, daily use      Smokes tobacco daily      DM (diabetes mellitus)      No significant past surgical history          Allergies    No Known Allergies    Intolerances        MEDICATIONS  (STANDING):  buMETAnide Injectable 1 milliGRAM(s) IV Push every 12 hours  chlorhexidine 2% Cloths 1 Application(s) Topical <User Schedule>  insulin lispro (ADMELOG) corrective regimen sliding scale   SubCutaneous every 6 hours  metoprolol tartrate Injectable 5 milliGRAM(s) IV Push every 6 hours      MEDICATIONS  (PRN):   Medications up to date at time of exam.    Medications up to date at time of exam.    FAMILY HISTORY:      SOCIAL HISTORY  Smoking History: [   ] smoking/smoke exposure, [ x  ] former smoker  Living Condition: [   ] apartment, [   ] private house  Work History:   Travel History: denies recent travel  Illicit Substance Use: denies  Alcohol Use: denies    REVIEW OF SYSTEMS:    CONSTITUTIONAL:  denies fevers, chills, sweats, weight loss    HEENT:  denies diplopia or blurred vision, sore throat or runny nose.    CARDIOVASCULAR:  denies pressure, squeezing, tightness, or heaviness about the chest; no palpitations.    RESPIRATORY:  denies SOB, cough, GUILLEN, wheezing.    GASTROINTESTINAL:  denies abdominal pain, nausea, vomiting or diarrhea.    GENITOURINARY: denies dysuria, frequency or urgency.    NEUROLOGIC:  denies numbness, tingling, seizures or weakness.    PSYCHIATRIC:  denies disorder of thought or mood.    MSK: denies swelling, redness      PHYSICAL EXAMINATION:    GENERAL: The patient is a well-developed, well-nourished, in no apparent distress.     Vital Signs Last 24 Hrs  T(C): 36.4 (08 Jan 2024 16:00), Max: 37.7 (08 Jan 2024 06:52)  T(F): 97.5 (08 Jan 2024 16:00), Max: 99.9 (08 Jan 2024 06:52)  HR: 114 (08 Jan 2024 18:00) (110 - 165)  BP: 128/88 (08 Jan 2024 18:00) (110/64 - 178/102)  BP(mean): 102 (08 Jan 2024 18:00) (93 - 102)  RR: 16 (08 Jan 2024 18:00) (16 - 23)  SpO2: 100% (08 Jan 2024 18:00) (99% - 100%)    Parameters below as of 08 Jan 2024 10:02  Patient On (Oxygen Delivery Method): BiPAP/CPAP       (if applicable)    Chest Tube (if applicable)    HEENT: Head is normocephalic and atraumatic. Extraocular muscles are intact. Mucous membranes are moist.     NECK: Supple, no palpable adenopathy.    LUNGS: Clear to auscultation, no wheezing, rales, or rhonchi.    HEART: Regular rate and rhythm without murmur.    ABDOMEN: Soft, nontender, and nondistended.  No hepatosplenomegaly is noted.    RENAL: No difficulty voiding, no pelvic pain    EXTREMITIES: Without any cyanosis, clubbing, rash, lesions or edema.    NEUROLOGIC: Awake, alert, oriented, grossly intact    SKIN: Warm, dry, good turgor.      LABS:                        12.4   20.81 )-----------( 247      ( 08 Jan 2024 06:24 )             41.0     01-08    139  |  108  |  9   ----------------------------<  156<H>  3.9   |  22  |  1.36<H>    Ca    9.6      08 Jan 2024 06:24  Phos  4.1     01-08  Mg     2.2     01-08    TPro  9.0<H>  /  Alb  4.0  /  TBili  0.6  /  DBili  x   /  AST  26  /  ALT  16  /  AlkPhos  99  01-08    PT/INR - ( 08 Jan 2024 07:20 )   PT: 11.7 sec;   INR: 1.03 ratio           Urinalysis Basic - ( 08 Jan 2024 06:24 )    Color: x / Appearance: x / SG: x / pH: x  Gluc: 156 mg/dL / Ketone: x  / Bili: x / Urobili: x   Blood: x / Protein: x / Nitrite: x   Leuk Esterase: x / RBC: x / WBC x   Sq Epi: x / Non Sq Epi: x / Bacteria: x      ABG - ( 08 Jan 2024 11:20 )  pH, Arterial: 7.41  pH, Blood: x     /  pCO2: 34    /  pO2: 143   / HCO3: 22    / Base Excess: -2.4  /  SaO2: 100           Lactate, Blood: 3.4 mmol/L (01-08-24 @ 06:24)        MICROBIOLOGY: (if applicable)    RADIOLOGY & ADDITIONAL STUDIES:  EKG:   CXR:< from: CT Chest No Cont (01.08.24 @ 10:36) >    ACC: [id number] EXAM:  CT CHEST   ORDERED BY: THERESA MARTINEZ     PROCEDURE DATE:  01/08/2024          INTERPRETATION:  EXAMINATION: CT CHEST    CLINICAL INDICATION: Dyspnea.    TECHNIQUE: Noncontrast CT of the chest was obtained.    COMPARISON: 11/20/2023.    FINDINGS:    AIRWAYS AND LUNGS: The central tracheobronchial tree is patent.  A few   patchy bilateral lung opacities. Left-sided pleural calcifications. Small   to moderate right pleural effusion. Small loculated left pleural effusion.    MEDIASTINUM AND PLEURA: There are no enlarged mediastinal, hilar or   axillary lymph nodes. The visualized portion of the thyroid gland is   unremarkable. There is no pneumothorax.    HEART AND VESSELS: There is mild cardiomegaly.  There are atherosclerotic   calcifications of the aorta and coronary arteries.  There is trace   pericardial fluid.  Aortic valve calcification.    UPPER ABDOMEN: Images of the upper abdomen demonstrate cirrhosis.    BONES AND SOFT TISSUES: L1 compression fracture unchanged from 11/20/2023    The soft tissues are unremarkable.    IMPRESSION:  CHF and/or pneumonia with small moderate right pleural effusion and small   loculated left pleural effusion.    --- End of Report ---            AMARILIS LIEBERMAN MD; Attending Radiologist  This document has been electronically signed. Jan 8 2024 10:43AM    < end of copied text >    ECHO:    IMPRESSION: 68y Male PAST MEDICAL & SURGICAL HISTORY:  HTN (hypertension)      Varicose veins      HLD (hyperlipidemia)      Liver cirrhosis      Portal hypertension with esophageal varices      COPD (chronic obstructive pulmonary disease)      Syncope      Alcohol dependence, daily use      Smokes tobacco daily      DM (diabetes mellitus)      No significant past surgical history       p/w       IMP: This is a 68 yr old man  from home, ambulates independently, HFrEF, COPD (not on O2), former smoker quit 2-months, HLD, DM, HTN, chronic ETOH dependence c/b cirrhosis, p/w worsening SOB. In the ED patient placed on NIV support. Noted to be in rapid afib. Concern for possible pulmonary edema.     Assessment:  -  Acute hypoxic respiratory failure  - COPD   - HFrEF   - Pulmonary edema   - Rapid afib   - DM type 2  - Hypertension     Plan:  - Cont. bipap support  - close respiratory monitoring  - Diuresis and aim for negative fluid balance  - Monitor urine output  - Trend trops   - Cont. AC   - Beta blockers for rate control  - Hemodynamic and cardiac monitoring  - NPO while on bipap    case discussed with icu attend   time 36 min Time of visit:    CHIEF COMPLAINT: Patient is a 68y old  Male who presents with a chief complaint of AHRF, CHF exacerbation (08 Jan 2024 08:55)      HPI: This is a 68 yr old man  from home, ambulates independently,  COPD (not on O2), former smoker quit 2-months, HLD, DM, HTN, chronic ETOH dependence c/b cirrhosis, p/w worsening SOB. Patient is limited historian, currently on BIPAP and unable to full history deferring further collateral to wife, Maureen Tovt [phone] but unable to reach. Patient states that he has had worsening of breathing over 2-years, does not sleep with a machine at home, and when inquired about medications he admits to non-compliance. Denies fevers, chills, or sick contact. Denies chest pain, palpitations, or GUILLNE.    In ED:   Afebrile;  Afib w/ RVR and PVC; /102; RR 22 100% on RA. Started on BIPAP for increased WOB saturating 100% with improvement in WOB. Trop negative 17, BNP 1853 (improved from 4463). Given duoneb x3, solumedrol 125mg x1, rocephin x1, and 500cc bolus x1, diltizem 20mg IVP x1 + 10mg IVP x1. Awaiting to receive vanc x1 and azithromycin. CXR increased b/l opacification and left lower pleural effusion.  (08 Jan 2024 08:55)   Patient seen and examined.     PAST MEDICAL & SURGICAL HISTORY:  HTN (hypertension)      Varicose veins      HLD (hyperlipidemia)      Liver cirrhosis      Portal hypertension with esophageal varices      COPD (chronic obstructive pulmonary disease)      Syncope      Alcohol dependence, daily use      Smokes tobacco daily      DM (diabetes mellitus)      No significant past surgical history          Allergies    No Known Allergies    Intolerances        MEDICATIONS  (STANDING):  buMETAnide Injectable 1 milliGRAM(s) IV Push every 12 hours  chlorhexidine 2% Cloths 1 Application(s) Topical <User Schedule>  insulin lispro (ADMELOG) corrective regimen sliding scale   SubCutaneous every 6 hours  metoprolol tartrate Injectable 5 milliGRAM(s) IV Push every 6 hours      MEDICATIONS  (PRN):   Medications up to date at time of exam.    Medications up to date at time of exam.    FAMILY HISTORY:      SOCIAL HISTORY  Smoking History: [   ] smoking/smoke exposure, [ x  ] former smoker  Living Condition: [   ] apartment, [   ] private house  Work History:   Travel History: denies recent travel  Illicit Substance Use: denies  Alcohol Use: denies    REVIEW OF SYSTEMS:    CONSTITUTIONAL:  denies fevers, chills, sweats, weight loss    HEENT:  denies diplopia or blurred vision, sore throat or runny nose.    CARDIOVASCULAR:  denies pressure, squeezing, tightness, or heaviness about the chest; no palpitations.    RESPIRATORY:  denies SOB, cough, GUILLEN, wheezing.    GASTROINTESTINAL:  denies abdominal pain, nausea, vomiting or diarrhea.    GENITOURINARY: denies dysuria, frequency or urgency.    NEUROLOGIC:  denies numbness, tingling, seizures or weakness.    PSYCHIATRIC:  denies disorder of thought or mood.    MSK: denies swelling, redness      PHYSICAL EXAMINATION:    GENERAL: The patient is a well-developed, well-nourished, in no apparent distress.     Vital Signs Last 24 Hrs  T(C): 36.4 (08 Jan 2024 16:00), Max: 37.7 (08 Jan 2024 06:52)  T(F): 97.5 (08 Jan 2024 16:00), Max: 99.9 (08 Jan 2024 06:52)  HR: 114 (08 Jan 2024 18:00) (110 - 165)  BP: 128/88 (08 Jan 2024 18:00) (110/64 - 178/102)  BP(mean): 102 (08 Jan 2024 18:00) (93 - 102)  RR: 16 (08 Jan 2024 18:00) (16 - 23)  SpO2: 100% (08 Jan 2024 18:00) (99% - 100%)    Parameters below as of 08 Jan 2024 10:02  Patient On (Oxygen Delivery Method): BiPAP/CPAP       (if applicable)    Chest Tube (if applicable)    HEENT: Head is normocephalic and atraumatic. Extraocular muscles are intact. Mucous membranes are moist.     NECK: Supple, no palpable adenopathy.    LUNGS: Clear to auscultation, no wheezing, rales, or rhonchi.    HEART: Regular rate and rhythm without murmur.    ABDOMEN: Soft, nontender, and nondistended.  No hepatosplenomegaly is noted.    RENAL: No difficulty voiding, no pelvic pain    EXTREMITIES: Without any cyanosis, clubbing, rash, lesions or edema.    NEUROLOGIC: Awake, alert, oriented, grossly intact    SKIN: Warm, dry, good turgor.      LABS:                        12.4   20.81 )-----------( 247      ( 08 Jan 2024 06:24 )             41.0     01-08    139  |  108  |  9   ----------------------------<  156<H>  3.9   |  22  |  1.36<H>    Ca    9.6      08 Jan 2024 06:24  Phos  4.1     01-08  Mg     2.2     01-08    TPro  9.0<H>  /  Alb  4.0  /  TBili  0.6  /  DBili  x   /  AST  26  /  ALT  16  /  AlkPhos  99  01-08    PT/INR - ( 08 Jan 2024 07:20 )   PT: 11.7 sec;   INR: 1.03 ratio           Urinalysis Basic - ( 08 Jan 2024 06:24 )    Color: x / Appearance: x / SG: x / pH: x  Gluc: 156 mg/dL / Ketone: x  / Bili: x / Urobili: x   Blood: x / Protein: x / Nitrite: x   Leuk Esterase: x / RBC: x / WBC x   Sq Epi: x / Non Sq Epi: x / Bacteria: x      ABG - ( 08 Jan 2024 11:20 )  pH, Arterial: 7.41  pH, Blood: x     /  pCO2: 34    /  pO2: 143   / HCO3: 22    / Base Excess: -2.4  /  SaO2: 100           Lactate, Blood: 3.4 mmol/L (01-08-24 @ 06:24)        MICROBIOLOGY: (if applicable)    RADIOLOGY & ADDITIONAL STUDIES:  EKG:   CXR:< from: CT Chest No Cont (01.08.24 @ 10:36) >    ACC: [id number] EXAM:  CT CHEST   ORDERED BY: THERESA MARTINEZ     PROCEDURE DATE:  01/08/2024          INTERPRETATION:  EXAMINATION: CT CHEST    CLINICAL INDICATION: Dyspnea.    TECHNIQUE: Noncontrast CT of the chest was obtained.    COMPARISON: 11/20/2023.    FINDINGS:    AIRWAYS AND LUNGS: The central tracheobronchial tree is patent.  A few   patchy bilateral lung opacities. Left-sided pleural calcifications. Small   to moderate right pleural effusion. Small loculated left pleural effusion.    MEDIASTINUM AND PLEURA: There are no enlarged mediastinal, hilar or   axillary lymph nodes. The visualized portion of the thyroid gland is   unremarkable. There is no pneumothorax.    HEART AND VESSELS: There is mild cardiomegaly.  There are atherosclerotic   calcifications of the aorta and coronary arteries.  There is trace   pericardial fluid.  Aortic valve calcification.    UPPER ABDOMEN: Images of the upper abdomen demonstrate cirrhosis.    BONES AND SOFT TISSUES: L1 compression fracture unchanged from 11/20/2023    The soft tissues are unremarkable.    IMPRESSION:  CHF and/or pneumonia with small moderate right pleural effusion and small   loculated left pleural effusion.    --- End of Report ---            AMARILIS LIEBERMAN MD; Attending Radiologist  This document has been electronically signed. Jan 8 2024 10:43AM    < end of copied text >    ECHO:    IMPRESSION: 68y Male PAST MEDICAL & SURGICAL HISTORY:  HTN (hypertension)      Varicose veins      HLD (hyperlipidemia)      Liver cirrhosis      Portal hypertension with esophageal varices      COPD (chronic obstructive pulmonary disease)      Syncope      Alcohol dependence, daily use      Smokes tobacco daily      DM (diabetes mellitus)      No significant past surgical history       p/w       IMP: This is a 68 yr old man  from home, ambulates independently, HFrEF, COPD (not on O2), former smoker quit 2-months, HLD, DM, HTN, chronic ETOH dependence c/b cirrhosis, p/w worsening SOB. In the ED patient placed on NIV support. Noted to be in rapid afib. Concern for possible pulmonary edema.     Assessment:  -  Acute hypoxic respiratory failure  - COPD   - HFrEF   - Pulmonary edema   - Rapid afib   - DM type 2  - Hypertension     Plan:  - Cont. bipap support  - close respiratory monitoring  - Diuresis and aim for negative fluid balance  - Monitor urine output  - Trend trops   - Cont. AC   - Beta blockers for rate control  - Hemodynamic and cardiac monitoring  - NPO while on bipap    case discussed with icu attend   time 36 min

## 2024-01-08 NOTE — H&P ADULT - ASSESSMENT
=================== Neuro============================  Alert and oriented x 2-3, limited historian at baseline     ================= Cardiovascular==========================  #Afib w/ RVR    #HFrEF    #HTN    ================- Pulm=================================  #AHRF    #COPD, former smoker     ==================ID===================================  #Leukocytosis   #suspected PNA    ================= Nephro================================  #CORTES on CKD    =================GI====================================  No issues     ================ Heme==================================  #Leukocytosis     =================Endocrine===============================  No issues     ================= Skin/Catheters============================  Peripheral IV lines     =================Prophylaxis =============================  DVT prophylaxis with Heparin SQ daily   GI prophylaxis with PPI daily    ==================GOC==================================  FULL CODE              =================== Neuro============================  Alert and oriented x 2-3, limited historian at baseline     ================= Cardiovascular==========================  #Afib w/ RVR    #HFrEF    #HTN    ================- Pulm=================================  #AHRF    #COPD, former smoker     ==================ID===================================  #Leukocytosis   #suspected PNA    ================= Nephro================================  #CORTES on CKD    =================GI====================================  No issues     ================ Heme==================================  #Leukocytosis     =================Endocrine===============================  No issues     ================= Skin/Catheters============================  Peripheral IV lines     =================Prophylaxis =============================  FD Lovenox (renally dosed)   GI prophylaxis with PPI daily    ==================GOC==================================  FULL CODE              =================== Neuro============================  Alert and oriented x 2-3, limited historian at baseline     ================= Cardiovascular==========================  #Afib w/ RVR  EKG showing Afib w/ RVR HR 140s with PVC  Trop negative x1       #HFrEF    #HTN    ================- Pulm=================================  #AHRF    #COPD, former smoker     ==================ID===================================  #Leukocytosis   #suspected PNA    ================= Nephro================================  #CORETS on CKD    =================GI====================================  No issues     ================ Heme==================================  #Leukocytosis     =================Endocrine===============================  No issues     ================= Skin/Catheters============================  Peripheral IV lines     =================Prophylaxis =============================  FD Lovenox (renally dosed)   GI prophylaxis with PPI daily    ==================GOC==================================  FULL CODE              =================== Neuro============================  Alert and oriented x 2-3, limited historian at baseline     ================= Cardiovascular==========================  #Afib w/ RVR  EKG showing Afib w/ RVR HR 140s with PVC  Trop negative x1       #HFrEF    #HTN    ================- Pulm=================================  #AHRF    #COPD, former smoker     ==================ID===================================  #Leukocytosis   #suspected PNA    ================= Nephro================================  #CORTES on CKD    =================GI====================================  No issues     ================ Heme==================================  #Leukocytosis     =================Endocrine===============================  No issues     ================= Skin/Catheters============================  Peripheral IV lines     =================Prophylaxis =============================  FD Lovenox (renally dosed)   GI prophylaxis with PPI daily    ==================GOC==================================  FULL CODE              =================== Neuro============================  Alert and oriented x 2-3, limited historian at baseline     ================= Cardiovascular==========================  #Afib w/ RVR  EKG showing Afib w/ RVR HR 140s with PVC  Trop negative x1  given diltiazem 20mg IVP x1 +10mg IVP x1 with little effect   start lopressor 5mg q6 IVP while NPO   FD lovenox while on NPO (takes Eliquis at home)  monitor on tele     #HFrEF  EKG showing Afib w/ RVR HR 140s with PVC  Trop negative x1; BNP 1853 ( improved 4463)  TTE 11/2023: EF 20-25%   c/w bumex 1mg IVP BID (takes bumex 1mg qod at home)      #HTN  /102 improved 129/81 s/p diltiazem IVP in ED  start lopressor 5mg q6 IVP while NPO   monitor BP    ================- Pulm=================================  #Acute Respiratory Failure   not hypoxic, increased WOB  s/p duonebs x3 and solumedrol 125mg IVP x1  RVP neg  f/u CT chest   c/w BIPAP  titrate as tolerated     #COPD, former smoker   c/w home meds therapeutic interchange     ==================ID===================================  #Leukocytosis   #suspected PNA    ================= Nephro================================  #CORTES on CKD    =================GI====================================  No issues     ================ Heme==================================  #Leukocytosis     =================Endocrine===============================  No issues     ================= Skin/Catheters============================  Peripheral IV lines     =================Prophylaxis =============================  FD Lovenox (renally dosed)   GI prophylaxis with PPI daily    ==================GOC==================================  FULL CODE      68M from home, ambulates independently, PMHx COPD (not on O2), former smoker quit 2-months, HLD, DM, HTN, chronic ETOH dependence c/b cirrhosis, p/w worsening SOB. Admitted to the ICU for AHRF 2/2 suspected CHF exacerbation requiring continuous BIPAP.       =================== Neuro============================  Alert and oriented x 2-3, limited historian at baseline   [place] speaking     ================= Cardiovascular==========================  #Afib w/ RVR  EKG showing Afib w/ RVR HR 140s with PVC  Trop negative x1  given diltiazem 20mg IVP x1 +10mg IVP x1 with little effect   start lopressor 5mg q6 IVP while NPO   FD lovenox while on NPO (takes Eliquis at home)  monitor on tele     #HFrEF  EKG showing Afib w/ RVR HR 140s with PVC  Trop negative x1; BNP 1853 ( improved 4463)  TTE 11/2023: EF 20-25%   c/w bumex 1mg IVP BID (takes bumex 1mg qod at home)      #HTN  /102 improved 129/81 s/p diltiazem IVP in ED  start lopressor 5mg q6 IVP while NPO   monitor BP    ================- Pulm=================================  #Acute Respiratory Failure   not hypoxic, increased WOB  s/p duonebs x3 and solumedrol 125mg IVP x1  RVP neg  f/u CT chest   c/w BIPAP  titrate as tolerated     #COPD, former smoker   c/w home meds therapeutic interchange     ==================ID===================================  #Leukocytosis   less likely of infectious etiology   Imaging c/w pulmonary edema  will optimize volume status     ================= Nephro================================  #CKD  SCr 1.36 (baseline 1.4-2.6)  will continue to monitor     =================GI====================================  No issues     ================ Heme==================================  #Leukocytosis   plan as above     =================Endocrine===============================  No issues     ================= Skin/Catheters============================  Peripheral IV lines     =================Prophylaxis =============================  FD Lovenox (renally dosed)     ==================GOC==================================  FULL CODE, patient deferred GOC to wife but unable to reach twice.       Disposition: Admitted to the ICU for continuous BIPAP.      68M from home, ambulates independently, PMHx COPD (not on O2), former smoker quit 2-months, HLD, DM, HTN, chronic ETOH dependence c/b cirrhosis, p/w worsening SOB. Admitted to the ICU for AHRF 2/2 suspected CHF exacerbation requiring continuous BIPAP.       =================== Neuro============================  Alert and oriented x 2-3, limited historian at baseline   [place] speaking     ================= Cardiovascular==========================  #Afib w/ RVR  EKG showing Afib w/ RVR HR 140s with PVC  Trop negative x1  given diltiazem 20mg IVP x1 +10mg IVP x1 with little effect   start lopressor 5mg q6 IVP while NPO   FD lovenox while on NPO (takes Eliquis at home)  monitor on tele     #HFrEF  EKG showing Afib w/ RVR HR 140s with PVC  Trop negative x1; BNP 1853 ( improved 4463)  TTE 11/2023: EF 20-25%   c/w bumex 1mg IVP BID (takes bumex 1mg qod at home)      #HTN  /102 improved 129/81 s/p diltiazem IVP in ED  start lopressor 5mg q6 IVP while NPO   monitor BP    ================- Pulm=================================  #Acute Respiratory Failure   not hypoxic, increased WOB  s/p duonebs x3 and solumedrol 125mg IVP x1  RVP neg  f/u CT chest   c/w BIPAP  titrate as tolerated     #COPD, former smoker   c/w home meds therapeutic interchange     ==================ID===================================  #Leukocytosis   less likely of infectious etiology   Imaging c/w pulmonary edema  will optimize volume status     ================= Nephro================================  #CKD  SCr 1.36 (baseline 1.4-2.6)  will continue to monitor     =================GI====================================  No issues     ================ Heme==================================  #Leukocytosis   plan as above     =================Endocrine===============================  No issues     ================= Skin/Catheters============================  Peripheral IV lines     =================Prophylaxis =============================  FD Lovenox (renally dosed)     ==================GOC==================================  FULL CODE, patient deferred GOC to wife but unable to reach twice.       Disposition: Admitted to the ICU for continuous BIPAP.

## 2024-01-08 NOTE — PATIENT PROFILE ADULT - FUNCTIONAL ASSESSMENT - BASIC MOBILITY 6.
3-calculated by average/Not able to assess (calculate score using [facility] averaging method)  3-calculated by average/Not able to assess (calculate score using [facility] averaging method)

## 2024-01-08 NOTE — H&P ADULT - HISTORY OF PRESENT ILLNESS
68M from home, ambulates independently, PMHx COPD (not on O2), former smoker quit 2-months, HLD, DM, HTN, chronic ETOH dependence c/b cirrhosis, p/w worsening SOB. Patient is limited historian, currently on BIPAP and unable to full history deferring further collateral to wife, Maureen Tovt [phone] but unable to reach. Patient states that he has had worsening of breathing over 2-years, does not sleep with a machine at home, and when inquired about medications he admits to non-compliance. Denies fevers, chills, or sick contact. Denies chest pain, palpitations, or GUILLEN.    In ED:   Afebrile;  Afib w/ RVR and PVC; /102; RR 22 100% on RA. Started on BIPAP for increased WOB saturating 100% with improvement in WOB. Trop negative 17, BNP 1853 (improved from 4463). Given duoneb x3, solumedrol 125mg x1, rocephin x1, and 500cc bolus x1, diltizem 20mg IVP x1 + 10mg IVP x1. Awaiting to receive vanc x1 and azithromycin. CXR increased b/l opacification and left lower pleural effusion.  68M from home, ambulates independently, PMHx COPD (not on O2), former smoker quit 2-months, HLD, DM, HTN, chronic ETOH dependence c/b cirrhosis, p/w worsening SOB. Patient is limited historian, currently on BIPAP and unable to full history deferring further collateral to wife, Maureen Tovt [phone] but unable to reach. Patient states that he has had worsening of breathing over 2-years, does not sleep with a machine at home, and when inquired about medications he admits to non-compliance. Denies fevers, chills, or sick contact. Denies chest pain, palpitations, or GUILLEN.    In ED:   Afebrile;  Afib w/ RVR and PVC; /102; RR 22 100% on RA. Started on BIPAP for increased WOB saturating 100% with improvement in WOB. Trop negative 17, BNP 1853 (improved from 4463). Given duoneb x3, solumedrol 125mg x1, rocephin x1, and 500cc bolus x1, diltizem 20mg IVP x1 + 10mg IVP x1. Awaiting to receive vanc x1 and azithromycin. CXR increased b/l opacification and left lower pleural effusion.

## 2024-01-08 NOTE — H&P ADULT - ATTENDING COMMENTS
68M from home, ambulates independently, PMHx HFREF, COPD (not on O2), former smoker quit 2-months, HLD, DM, HTN, chronic ETOH dependence c/b cirrhosis, p/w worsening SOB. In the ED patient placed on NIV support. Noted to be in rapid afib. Concern for possible pulmonary edema.     Assessment:  1. Acute hypoxic respiratory failure  2. HFREF   3. Pulmonary edema   4. Rapid afib   5. DM type 2  6. Hypertension     Plan:  - Cont. bipap support  - close respiratory monitoring  - Diuresis and aim for negative fluid balance  - Monitor urine output  - Trend trops   - Cont. AC   - Beta blockers for rate control  - Hemodynamic and cardiac monitoring  - NPO while on bipap  - Close glucose monitoring   - Admit to ICU

## 2024-01-08 NOTE — ED PROVIDER NOTE - CLINICAL SUMMARY MEDICAL DECISION MAKING FREE TEXT BOX
68 male with hx of DM, HTN, HLD, EtOH abuse, cirrhosis, COPD, CHF, & A-fib.   Pt presenting to the ED reporting acute respiratory distress tonight.    Tachycardic in atrial fibrillation with RVR on monitor.  Respiratory distress with tachypnea.  BiPAP started on arrival and bronchodilators/steroids given.    Lab values with leukocytosis & elevated lactate.  Antibiotics started for possible pneumonia on CXR.  Rate control agents given with improvement in heart rate.  ICU consulted    Patient requires inpatient admission for further care & stabilization. Care signed out to inpatient team.

## 2024-01-08 NOTE — ED PROVIDER NOTE - NS ED MD EKG INTERPRETATION 1
Last office visit 06/21/2022  Last refill 06/21/2022  Patient does have a follow up scheduled.  12/15/2022    Patient has an appointment scheduled   Afib @ 127, L axis, LBBB, no STEMI

## 2024-01-08 NOTE — ED PROVIDER NOTE - WR INTERPRETATION 1
Increased interstitial markings B/L with obscured left heart border concerning for pneumonia.  No effusion/PNTX.

## 2024-01-08 NOTE — ED PROVIDER NOTE - OBJECTIVE STATEMENT
68 male with hx of DM, HTN, HLD, EtOH abuse, cirrhosis, COPD, CHF, & A-fib.   Pt presenting to the ED reporting acute respiratory distress tonight.  History limited due to acuity of condition and assisted by EMS & wife at bedside.  No fevers, no reports of pain, no vomiting/diarrhea, no leg swelling.

## 2024-01-08 NOTE — PATIENT PROFILE ADULT - .
DISPLAY PLAN FREE TEXT DISPLAY PLAN FREE TEXT DISPLAY PLAN FREE TEXT DISPLAY PLAN FREE TEXT 08-Jan-2024 15:40:11

## 2024-01-08 NOTE — PATIENT PROFILE ADULT - FALL HARM RISK - HARM RISK INTERVENTIONS
Communicate Risk of Fall with Harm to all staff/Reinforce activity limits and safety measures with patient and family/Tailored Fall Risk Interventions/Visual Cue: Yellow wristband and red socks/Bed in lowest position, wheels locked, appropriate side rails in place/Call bell, personal items and telephone in reach/Instruct patient to call for assistance before getting out of bed or chair/Non-slip footwear when patient is out of bed/[place] to call system/Physically safe environment - no spills, clutter or unnecessary equipment/Purposeful Proactive Rounding/Room/bathroom lighting operational, light cord in reach Communicate Risk of Fall with Harm to all staff/Reinforce activity limits and safety measures with patient and family/Tailored Fall Risk Interventions/Visual Cue: Yellow wristband and red socks/Bed in lowest position, wheels locked, appropriate side rails in place/Call bell, personal items and telephone in reach/Instruct patient to call for assistance before getting out of bed or chair/Non-slip footwear when patient is out of bed/[place] to call system/Physically safe environment - no spills, clutter or unnecessary equipment/Purposeful Proactive Rounding/Room/bathroom lighting operational, light cord in reach

## 2024-01-08 NOTE — ED PROVIDER NOTE - CARE PLAN
Principal Discharge DX:	Respiratory distress  Secondary Diagnosis:	COPD exacerbation  Secondary Diagnosis:	Pneumonia  Secondary Diagnosis:	Atrial fibrillation with RVR   1

## 2024-01-08 NOTE — ED ADULT NURSE NOTE - NSFALLUNIVINTERV_ED_ALL_ED
Bed/Stretcher in lowest position, wheels locked, appropriate side rails in place/Call bell, personal items and telephone in reach/Instruct patient to call for assistance before getting out of bed/chair/stretcher/Non-slip footwear applied when patient is off stretcher/[place] to call system/Physically safe environment - no spills, clutter or unnecessary equipment/Purposeful proactive rounding/Room/bathroom lighting operational, light cord in reach Bed/Stretcher in lowest position, wheels locked, appropriate side rails in place/Call bell, personal items and telephone in reach/Instruct patient to call for assistance before getting out of bed/chair/stretcher/Non-slip footwear applied when patient is off stretcher/[place] to call system/Physically safe environment - no spills, clutter or unnecessary equipment/Purposeful proactive rounding/Room/bathroom lighting operational, light cord in reach

## 2024-01-08 NOTE — H&P ADULT - NSHPPHYSICALEXAM_GEN_ALL_CORE
Vital Signs Last 24 Hrs  T(C): 36.6 (08 Jan 2024 07:42), Max: 37.7 (08 Jan 2024 06:52)  T(F): 97.9 (08 Jan 2024 07:42), Max: 99.9 (08 Jan 2024 06:52)  HR: 128 (08 Jan 2024 07:42) (128 - 165)  BP: 110/64 (08 Jan 2024 07:42) (110/64 - 178/102)  BP(mean): --  RR: 20 (08 Jan 2024 07:42) (20 - 22)  SpO2: 100% (08 Jan 2024 07:42) (100% - 100%)    Parameters below as of 08 Jan 2024 07:42  Patient On (Oxygen Delivery Method): BiPAP/CPAP    GENERAL: NAD, lying in bed comfortably  HEAD:  Atraumatic, Normocephalic  EYES: EOMI, PERRLA, conjunctiva and sclera clear  ENT: Moist mucous membranes  NECK: Supple  CHEST/LUNG: Clear to auscultation bilaterally; No rales, rhonchi, wheezing, or rubs. Unlabored respirations while on BIPAP  HEART: Regular rate and rhythm; No murmurs, rubs, or gallops  ABDOMEN: Bowel sounds present; Soft, Nontender, Nondistended; Umbical hernia   EXTREMITIES: B/L LE anterior shins pecticheal rash; No clubbing, cyanosis, or edema  NERVOUS SYSTEM:  Alert & Oriented x2-3, speech clear. No deficits   MSK: FROM all 4 extremities, full and equal strength  SKIN: B/L LE anterior shins pecticheal rash

## 2024-01-09 LAB
ALBUMIN SERPL ELPH-MCNC: 3.1 G/DL — LOW (ref 3.5–5)
ALBUMIN SERPL ELPH-MCNC: 3.1 G/DL — LOW (ref 3.5–5)
ALP SERPL-CCNC: 64 U/L — SIGNIFICANT CHANGE UP (ref 40–120)
ALP SERPL-CCNC: 64 U/L — SIGNIFICANT CHANGE UP (ref 40–120)
ALT FLD-CCNC: 13 U/L DA — SIGNIFICANT CHANGE UP (ref 10–60)
ALT FLD-CCNC: 13 U/L DA — SIGNIFICANT CHANGE UP (ref 10–60)
ANION GAP SERPL CALC-SCNC: 7 MMOL/L — SIGNIFICANT CHANGE UP (ref 5–17)
ANION GAP SERPL CALC-SCNC: 7 MMOL/L — SIGNIFICANT CHANGE UP (ref 5–17)
AST SERPL-CCNC: 17 U/L — SIGNIFICANT CHANGE UP (ref 10–40)
AST SERPL-CCNC: 17 U/L — SIGNIFICANT CHANGE UP (ref 10–40)
BASOPHILS # BLD AUTO: 0.02 K/UL — SIGNIFICANT CHANGE UP (ref 0–0.2)
BASOPHILS # BLD AUTO: 0.02 K/UL — SIGNIFICANT CHANGE UP (ref 0–0.2)
BASOPHILS NFR BLD AUTO: 0.2 % — SIGNIFICANT CHANGE UP (ref 0–2)
BASOPHILS NFR BLD AUTO: 0.2 % — SIGNIFICANT CHANGE UP (ref 0–2)
BILIRUB SERPL-MCNC: 0.6 MG/DL — SIGNIFICANT CHANGE UP (ref 0.2–1.2)
BILIRUB SERPL-MCNC: 0.6 MG/DL — SIGNIFICANT CHANGE UP (ref 0.2–1.2)
BUN SERPL-MCNC: 14 MG/DL — SIGNIFICANT CHANGE UP (ref 7–18)
BUN SERPL-MCNC: 14 MG/DL — SIGNIFICANT CHANGE UP (ref 7–18)
CALCIUM SERPL-MCNC: 9.5 MG/DL — SIGNIFICANT CHANGE UP (ref 8.4–10.5)
CALCIUM SERPL-MCNC: 9.5 MG/DL — SIGNIFICANT CHANGE UP (ref 8.4–10.5)
CHLORIDE SERPL-SCNC: 109 MMOL/L — HIGH (ref 96–108)
CHLORIDE SERPL-SCNC: 109 MMOL/L — HIGH (ref 96–108)
CO2 SERPL-SCNC: 23 MMOL/L — SIGNIFICANT CHANGE UP (ref 22–31)
CO2 SERPL-SCNC: 23 MMOL/L — SIGNIFICANT CHANGE UP (ref 22–31)
CREAT SERPL-MCNC: 1.37 MG/DL — HIGH (ref 0.5–1.3)
CREAT SERPL-MCNC: 1.37 MG/DL — HIGH (ref 0.5–1.3)
EGFR: 56 ML/MIN/1.73M2 — LOW
EGFR: 56 ML/MIN/1.73M2 — LOW
EOSINOPHIL # BLD AUTO: 0.01 K/UL — SIGNIFICANT CHANGE UP (ref 0–0.5)
EOSINOPHIL # BLD AUTO: 0.01 K/UL — SIGNIFICANT CHANGE UP (ref 0–0.5)
EOSINOPHIL NFR BLD AUTO: 0.1 % — SIGNIFICANT CHANGE UP (ref 0–6)
EOSINOPHIL NFR BLD AUTO: 0.1 % — SIGNIFICANT CHANGE UP (ref 0–6)
GLUCOSE SERPL-MCNC: 123 MG/DL — HIGH (ref 70–99)
GLUCOSE SERPL-MCNC: 123 MG/DL — HIGH (ref 70–99)
HCT VFR BLD CALC: 33.3 % — LOW (ref 39–50)
HCT VFR BLD CALC: 33.3 % — LOW (ref 39–50)
HGB BLD-MCNC: 10.2 G/DL — LOW (ref 13–17)
HGB BLD-MCNC: 10.2 G/DL — LOW (ref 13–17)
IMM GRANULOCYTES NFR BLD AUTO: 0.4 % — SIGNIFICANT CHANGE UP (ref 0–0.9)
IMM GRANULOCYTES NFR BLD AUTO: 0.4 % — SIGNIFICANT CHANGE UP (ref 0–0.9)
LYMPHOCYTES # BLD AUTO: 0.91 K/UL — LOW (ref 1–3.3)
LYMPHOCYTES # BLD AUTO: 0.91 K/UL — LOW (ref 1–3.3)
LYMPHOCYTES # BLD AUTO: 10 % — LOW (ref 13–44)
LYMPHOCYTES # BLD AUTO: 10 % — LOW (ref 13–44)
MAGNESIUM SERPL-MCNC: 2 MG/DL — SIGNIFICANT CHANGE UP (ref 1.6–2.6)
MAGNESIUM SERPL-MCNC: 2 MG/DL — SIGNIFICANT CHANGE UP (ref 1.6–2.6)
MCHC RBC-ENTMCNC: 27.1 PG — SIGNIFICANT CHANGE UP (ref 27–34)
MCHC RBC-ENTMCNC: 27.1 PG — SIGNIFICANT CHANGE UP (ref 27–34)
MCHC RBC-ENTMCNC: 30.6 GM/DL — LOW (ref 32–36)
MCHC RBC-ENTMCNC: 30.6 GM/DL — LOW (ref 32–36)
MCV RBC AUTO: 88.3 FL — SIGNIFICANT CHANGE UP (ref 80–100)
MCV RBC AUTO: 88.3 FL — SIGNIFICANT CHANGE UP (ref 80–100)
MONOCYTES # BLD AUTO: 0.72 K/UL — SIGNIFICANT CHANGE UP (ref 0–0.9)
MONOCYTES # BLD AUTO: 0.72 K/UL — SIGNIFICANT CHANGE UP (ref 0–0.9)
MONOCYTES NFR BLD AUTO: 7.9 % — SIGNIFICANT CHANGE UP (ref 2–14)
MONOCYTES NFR BLD AUTO: 7.9 % — SIGNIFICANT CHANGE UP (ref 2–14)
NEUTROPHILS # BLD AUTO: 7.39 K/UL — SIGNIFICANT CHANGE UP (ref 1.8–7.4)
NEUTROPHILS # BLD AUTO: 7.39 K/UL — SIGNIFICANT CHANGE UP (ref 1.8–7.4)
NEUTROPHILS NFR BLD AUTO: 81.4 % — HIGH (ref 43–77)
NEUTROPHILS NFR BLD AUTO: 81.4 % — HIGH (ref 43–77)
NRBC # BLD: 0 /100 WBCS — SIGNIFICANT CHANGE UP (ref 0–0)
NRBC # BLD: 0 /100 WBCS — SIGNIFICANT CHANGE UP (ref 0–0)
PHOSPHATE SERPL-MCNC: 5.3 MG/DL — HIGH (ref 2.5–4.5)
PHOSPHATE SERPL-MCNC: 5.3 MG/DL — HIGH (ref 2.5–4.5)
PLATELET # BLD AUTO: 150 K/UL — SIGNIFICANT CHANGE UP (ref 150–400)
PLATELET # BLD AUTO: 150 K/UL — SIGNIFICANT CHANGE UP (ref 150–400)
POTASSIUM SERPL-MCNC: 4.1 MMOL/L — SIGNIFICANT CHANGE UP (ref 3.5–5.3)
POTASSIUM SERPL-MCNC: 4.1 MMOL/L — SIGNIFICANT CHANGE UP (ref 3.5–5.3)
POTASSIUM SERPL-SCNC: 4.1 MMOL/L — SIGNIFICANT CHANGE UP (ref 3.5–5.3)
POTASSIUM SERPL-SCNC: 4.1 MMOL/L — SIGNIFICANT CHANGE UP (ref 3.5–5.3)
PROT SERPL-MCNC: 7.4 G/DL — SIGNIFICANT CHANGE UP (ref 6–8.3)
PROT SERPL-MCNC: 7.4 G/DL — SIGNIFICANT CHANGE UP (ref 6–8.3)
RBC # BLD: 3.77 M/UL — LOW (ref 4.2–5.8)
RBC # BLD: 3.77 M/UL — LOW (ref 4.2–5.8)
RBC # FLD: 14 % — SIGNIFICANT CHANGE UP (ref 10.3–14.5)
RBC # FLD: 14 % — SIGNIFICANT CHANGE UP (ref 10.3–14.5)
SODIUM SERPL-SCNC: 139 MMOL/L — SIGNIFICANT CHANGE UP (ref 135–145)
SODIUM SERPL-SCNC: 139 MMOL/L — SIGNIFICANT CHANGE UP (ref 135–145)
WBC # BLD: 9.09 K/UL — SIGNIFICANT CHANGE UP (ref 3.8–10.5)
WBC # BLD: 9.09 K/UL — SIGNIFICANT CHANGE UP (ref 3.8–10.5)
WBC # FLD AUTO: 9.09 K/UL — SIGNIFICANT CHANGE UP (ref 3.8–10.5)
WBC # FLD AUTO: 9.09 K/UL — SIGNIFICANT CHANGE UP (ref 3.8–10.5)

## 2024-01-09 RX ORDER — SENNA PLUS 8.6 MG/1
2 TABLET ORAL AT BEDTIME
Refills: 0 | Status: DISCONTINUED | OUTPATIENT
Start: 2024-01-09 | End: 2024-01-20

## 2024-01-09 RX ORDER — METOPROLOL TARTRATE 50 MG
5 TABLET ORAL ONCE
Refills: 0 | Status: COMPLETED | OUTPATIENT
Start: 2024-01-09 | End: 2024-01-09

## 2024-01-09 RX ORDER — APIXABAN 2.5 MG/1
5 TABLET, FILM COATED ORAL
Refills: 0 | Status: DISCONTINUED | OUTPATIENT
Start: 2024-01-09 | End: 2024-01-20

## 2024-01-09 RX ORDER — BUMETANIDE 0.25 MG/ML
1 INJECTION INTRAMUSCULAR; INTRAVENOUS DAILY
Refills: 0 | Status: DISCONTINUED | OUTPATIENT
Start: 2024-01-09 | End: 2024-01-09

## 2024-01-09 RX ORDER — BUMETANIDE 0.25 MG/ML
1 INJECTION INTRAMUSCULAR; INTRAVENOUS DAILY
Refills: 0 | Status: DISCONTINUED | OUTPATIENT
Start: 2024-01-09 | End: 2024-01-15

## 2024-01-09 RX ORDER — POLYETHYLENE GLYCOL 3350 17 G/17G
17 POWDER, FOR SOLUTION ORAL DAILY
Refills: 0 | Status: DISCONTINUED | OUTPATIENT
Start: 2024-01-09 | End: 2024-01-20

## 2024-01-09 RX ORDER — METOPROLOL TARTRATE 50 MG
50 TABLET ORAL EVERY 8 HOURS
Refills: 0 | Status: DISCONTINUED | OUTPATIENT
Start: 2024-01-09 | End: 2024-01-20

## 2024-01-09 RX ADMIN — Medication 5 MILLIGRAM(S): at 21:20

## 2024-01-09 RX ADMIN — Medication 50 MILLIGRAM(S): at 21:20

## 2024-01-09 RX ADMIN — Medication 1: at 21:39

## 2024-01-09 RX ADMIN — Medication 5 MILLIGRAM(S): at 04:57

## 2024-01-09 RX ADMIN — APIXABAN 5 MILLIGRAM(S): 2.5 TABLET, FILM COATED ORAL at 19:35

## 2024-01-09 RX ADMIN — SENNA PLUS 2 TABLET(S): 8.6 TABLET ORAL at 21:20

## 2024-01-09 RX ADMIN — BUMETANIDE 1 MILLIGRAM(S): 0.25 INJECTION INTRAMUSCULAR; INTRAVENOUS at 05:36

## 2024-01-09 RX ADMIN — CHLORHEXIDINE GLUCONATE 1 APPLICATION(S): 213 SOLUTION TOPICAL at 05:36

## 2024-01-09 RX ADMIN — Medication 50 MILLIGRAM(S): at 13:52

## 2024-01-09 RX ADMIN — Medication 5 MILLIGRAM(S): at 06:26

## 2024-01-09 NOTE — PROGRESS NOTE ADULT - SUBJECTIVE AND OBJECTIVE BOX
Time of Visit:  Patient seen and examined. pat seen in  ICU comfortable     MEDICATIONS  (STANDING):  apixaban 5 milliGRAM(s) Oral two times a day  buMETAnide 1 milliGRAM(s) Oral daily  chlorhexidine 2% Cloths 1 Application(s) Topical <User Schedule>  insulin lispro (ADMELOG) corrective regimen sliding scale   SubCutaneous Before meals and at bedtime  melatonin 5 milliGRAM(s) Oral at bedtime  metoprolol tartrate 50 milliGRAM(s) Oral every 8 hours  polyethylene glycol 3350 17 Gram(s) Oral daily  senna 2 Tablet(s) Oral at bedtime      MEDICATIONS  (PRN):       Medications up to date at time of exam.      PHYSICAL EXAMINATION:  Patient has no new complaints.  GENERAL: The patient is a well-developed, well-nourished, in no apparent distress.     Vital Signs Last 24 Hrs  T(C): 34.8 (09 Jan 2024 15:48), Max: 36.9 (08 Jan 2024 23:45)  T(F): 94.7 (09 Jan 2024 15:48), Max: 98.5 (08 Jan 2024 23:45)  HR: 125 (09 Jan 2024 18:00) (102 - 136)  BP: 143/107 (09 Jan 2024 18:00) (108/94 - 157/111)  BP(mean): 119 (09 Jan 2024 18:00) (77 - 128)  RR: 22 (09 Jan 2024 18:00) (16 - 27)  SpO2: 100% (09 Jan 2024 18:00) (96% - 100%)    Parameters below as of 08 Jan 2024 22:00  Patient On (Oxygen Delivery Method): nasal cannula  O2 Flow (L/min): 3     (if applicable)    Chest Tube (if applicable)    HEENT: Head is normocephalic and atraumatic. Extraocular muscles are intact. Mucous membranes are moist.     NECK: Supple, no palpable adenopathy.    LUNGS: Clear to auscultation, no wheezing, rales, or rhonchi.    HEART: Regular rate and rhythm without murmur.    ABDOMEN: Soft, nontender, and nondistended.  No hepatosplenomegaly is noted.    : No painful voiding, no pelvic pain    EXTREMITIES: Without any cyanosis, clubbing, rash, lesions or edema.    NEUROLOGIC: Awake, alert, oriented, grossly intact    SKIN: Warm, dry, good turgor.      LABS:                        10.2   9.09  )-----------( 150      ( 09 Jan 2024 03:50 )             33.3     01-09    139  |  109<H>  |  14  ----------------------------<  123<H>  4.1   |  23  |  1.37<H>    Ca    9.5      09 Jan 2024 03:50  Phos  5.3     01-09  Mg     2.0     01-09    TPro  7.4  /  Alb  3.1<L>  /  TBili  0.6  /  DBili  x   /  AST  17  /  ALT  13  /  AlkPhos  64  01-09    PT/INR - ( 08 Jan 2024 07:20 )   PT: 11.7 sec;   INR: 1.03 ratio           Urinalysis Basic - ( 09 Jan 2024 03:50 )    Color: x / Appearance: x / SG: x / pH: x  Gluc: 123 mg/dL / Ketone: x  / Bili: x / Urobili: x   Blood: x / Protein: x / Nitrite: x   Leuk Esterase: x / RBC: x / WBC x   Sq Epi: x / Non Sq Epi: x / Bacteria: x      ABG - ( 08 Jan 2024 11:20 )  pH, Arterial: 7.41  pH, Blood: x     /  pCO2: 34    /  pO2: 143   / HCO3: 22    / Base Excess: -2.4  /  SaO2: 100                             MICROBIOLOGY: (if applicable)    RADIOLOGY & ADDITIONAL STUDIES:  EKG:   CXR:  ECHO:    IMPRESSION: 68y Male PAST MEDICAL & SURGICAL HISTORY:  HTN (hypertension)      Varicose veins      HLD (hyperlipidemia)      Liver cirrhosis      Portal hypertension with esophageal varices      COPD (chronic obstructive pulmonary disease)      Syncope      Alcohol dependence, daily use      Smokes tobacco daily      DM (diabetes mellitus)      No significant past surgical history       p/w         IMP: This is a 68 yr old man  from home, ambulates independently, HFrEF, COPD (not on O2), former smoker quit 2-months, HLD, DM, HTN, chronic ETOH dependence c/b cirrhosis, p/w worsening SOB. In the ED patient placed on NIV support. Noted to be in rapid afib. Concern for possible pulmonary edema.     Assessment:  -  Acute hypoxic respiratory failure  - COPD   - HFrEF   - Pulmonary edema   - Rapid afib   - DM type 2  - Hypertension     Plan:  - Cont. bipap support  - close respiratory monitoring  - Diuresis and aim for negative fluid balance  - Monitor urine output  - Trend trops   - Cont. AC   - Beta blockers for rate control  - Hemodynamic and cardiac monitoring  - NPO while on bipap

## 2024-01-09 NOTE — PROGRESS NOTE ADULT - ASSESSMENT
68M from home, ambulates independently, PMHx COPD (not on O2), former smoker quit 2-months, HLD, DM, HTN, chronic ETOH dependence c/b cirrhosis, p/w worsening SOB. Admitted to the ICU for AHRF 2/2 suspected CHF exacerbation requiring continuous BIPAP.       =================== Neuro============================  Alert and oriented x 2-3, limited historian at baseline   Mauritanian speaking     ================= Cardiovascular==========================  #Afib w/ RVR  EKG showing Afib w/ RVR HR 140s with PVC  Trop negative x1  given diltiazem 20mg IVP x1 +10mg IVP x1 with little effect   start lopressor 5mg q6 IVP while NPO   FD lovenox while on NPO (takes Eliquis at home)  monitor on tele     #HFrEF  EKG showing Afib w/ RVR HR 140s with PVC  Trop negative x1; BNP 1853 ( improved 4463)  TTE 11/2023: EF 20-25%   c/w bumex 1mg IVP BID (takes bumex 1mg qod at home)      #HTN  /102 improved 129/81 s/p diltiazem IVP in ED  start lopressor 5mg q6 IVP while NPO   monitor BP    ================- Pulm=================================  #Acute Respiratory Failure   not hypoxic, increased WOB  s/p duonebs x3 and solumedrol 125mg IVP x1  RVP neg  f/u CT chest   c/w BIPAP  titrate as tolerated     #COPD, former smoker   c/w home meds therapeutic interchange     ==================ID===================================  #Leukocytosis   less likely of infectious etiology   Imaging c/w pulmonary edema  will optimize volume status     ================= Nephro================================  #CKD  SCr 1.36 (baseline 1.4-2.6)  will continue to monitor     =================GI====================================  No issues     ================ Heme==================================  #Leukocytosis   plan as above     =================Endocrine===============================  No issues     ================= Skin/Catheters============================  Peripheral IV lines     =================Prophylaxis =============================  FD Lovenox (renally dosed)     ==================GOC==================================  FULL CODE, patient deferred GOC to wife but unable to reach twice.       Disposition: Admitted to the ICU for continuous BIPAP.      68M from home, ambulates independently, PMHx COPD (not on O2), former smoker quit 2-months, HLD, DM, HTN, chronic ETOH dependence c/b cirrhosis, p/w worsening SOB. Admitted to the ICU for AHRF 2/2 suspected CHF exacerbation requiring continuous BIPAP.       =================== Neuro============================  Alert and oriented x 2-3, limited historian at baseline   Turkmen speaking     ================= Cardiovascular==========================  #Afib w/ RVR  EKG showing Afib w/ RVR HR 140s with PVC  Trop negative x1  given diltiazem 20mg IVP x1 +10mg IVP x1 with little effect   start lopressor 5mg q6 IVP while NPO   FD lovenox while on NPO (takes Eliquis at home)  monitor on tele     #HFrEF  EKG showing Afib w/ RVR HR 140s with PVC  Trop negative x1; BNP 1853 ( improved 4463)  TTE 11/2023: EF 20-25%   c/w bumex 1mg IVP BID (takes bumex 1mg qod at home)      #HTN  /102 improved 129/81 s/p diltiazem IVP in ED  start lopressor 5mg q6 IVP while NPO   monitor BP    ================- Pulm=================================  #Acute Respiratory Failure   not hypoxic, increased WOB  s/p duonebs x3 and solumedrol 125mg IVP x1  RVP neg  f/u CT chest   c/w BIPAP  titrate as tolerated     #COPD, former smoker   c/w home meds therapeutic interchange     ==================ID===================================  #Leukocytosis   less likely of infectious etiology   Imaging c/w pulmonary edema  will optimize volume status     ================= Nephro================================  #CKD  SCr 1.36 (baseline 1.4-2.6)  will continue to monitor     =================GI====================================  No issues     ================ Heme==================================  #Leukocytosis   plan as above     =================Endocrine===============================  No issues     ================= Skin/Catheters============================  Peripheral IV lines     =================Prophylaxis =============================  FD Lovenox (renally dosed)     ==================GOC==================================  FULL CODE, patient deferred GOC to wife but unable to reach twice.       Disposition: Admitted to the ICU for continuous BIPAP.      68M from home, ambulates independently, PMHx COPD (not on O2), former smoker quit 2-months, HLD, DM, HTN, chronic ETOH dependence c/b cirrhosis, p/w worsening SOB. Admitted to the ICU for AHRF 2/2 suspected CHF exacerbation requiring continuous BIPAP.       =================== Neuro============================  Alert and oriented x 2-3, limited historian at baseline   Latvian speaking     ================= Cardiovascular==========================  #Afib w/ RVR  EKG showing Afib w/ RVR HR 140s with PVC  Trop negative x1  S/p diltiazem 20mg IVP x1 +10mg IVP x1 with little effect   S/p IV lopressor 5mg   Started PO metoprolol  Started Eliquis   monitor on tele     #HFrEF  EKG showing Afib w/ RVR HR 140s with PVC  Trop negative x1; BNP 1853 ( improved 4463)  TTE 11/2023: EF 20-25%   s/p IV bumex 1mg IVP BID  started PO Bumex      #HTN  /102 improved 129/81 s/p diltiazem IVP in ED  Started PO Metoprolol  monitor BP    ================- Pulm=================================  #Acute Respiratory Failure   not hypoxic, increased WOB  s/p duonebs x3 and solumedrol 125mg IVP x1  RVP neg  CT chest: CHF and/or pneumonia with small moderate right pleural effusion and small loculated left pleural effusion.  Pt weaned off Bipap, currently on NC  titrate as tolerated     #COPD, former smoker   c/w home meds therapeutic interchange     ==================ID===================================  #Leukocytosis   less likely of infectious etiology   Imaging c/w pulmonary edema  will optimize volume status     ================= Nephro================================  #CKD  SCr 1.36 (baseline 1.4-2.6)  will continue to monitor     =================GI====================================  No issues     ================ Heme==================================  #Leukocytosis   plan as above     =================Endocrine===============================  No issues     ================= Skin/Catheters============================  Peripheral IV lines     =================Prophylaxis =============================  Eliquis    ==================GOC==================================  FULL CODE, patient deferred GOC to wife but unable to reach twice.       Disposition: Telemetry     68M from home, ambulates independently, PMHx COPD (not on O2), former smoker quit 2-months, HLD, DM, HTN, chronic ETOH dependence c/b cirrhosis, p/w worsening SOB. Admitted to the ICU for AHRF 2/2 suspected CHF exacerbation requiring continuous BIPAP.       =================== Neuro============================  Alert and oriented x 2-3, limited historian at baseline   Colombian speaking     ================= Cardiovascular==========================  #Afib w/ RVR  EKG showing Afib w/ RVR HR 140s with PVC  Trop negative x1  S/p diltiazem 20mg IVP x1 +10mg IVP x1 with little effect   S/p IV lopressor 5mg   Started PO metoprolol  Started Eliquis   monitor on tele     #HFrEF  EKG showing Afib w/ RVR HR 140s with PVC  Trop negative x1; BNP 1853 ( improved 4463)  TTE 11/2023: EF 20-25%   s/p IV bumex 1mg IVP BID  started PO Bumex      #HTN  /102 improved 129/81 s/p diltiazem IVP in ED  Started PO Metoprolol  monitor BP    ================- Pulm=================================  #Acute Respiratory Failure   not hypoxic, increased WOB  s/p duonebs x3 and solumedrol 125mg IVP x1  RVP neg  CT chest: CHF and/or pneumonia with small moderate right pleural effusion and small loculated left pleural effusion.  Pt weaned off Bipap, currently on NC  titrate as tolerated     #COPD, former smoker   c/w home meds therapeutic interchange     ==================ID===================================  #Leukocytosis   less likely of infectious etiology   Imaging c/w pulmonary edema  will optimize volume status     ================= Nephro================================  #CKD  SCr 1.36 (baseline 1.4-2.6)  will continue to monitor     =================GI====================================  No issues     ================ Heme==================================  #Leukocytosis   plan as above     =================Endocrine===============================  No issues     ================= Skin/Catheters============================  Peripheral IV lines     =================Prophylaxis =============================  Eliquis    ==================GOC==================================  FULL CODE, patient deferred GOC to wife but unable to reach twice.       Disposition: Telemetry

## 2024-01-09 NOTE — PROGRESS NOTE ADULT - ATTENDING COMMENTS
68M from home, ambulates independently, PMHx HFREF, COPD (not on O2), former smoker quit 2-months, HLD, DM, HTN, chronic ETOH dependence c/b cirrhosis, p/w worsening SOB. In the ED patient placed on NIV support. Noted to be in rapid afib. Concern for possible pulmonary edema.     Assessment:  1. Acute hypoxic respiratory failure  2. HFREF   3. Pulmonary edema   4. Rapid afib   5. DM type 2  6. Hypertension     Plan:  - Cont. bipap support with breaks off to NC   - close respiratory monitoring  - Diuresis and aim for negative fluid balance  - Cont. Beta blockers  - Monitor urine output  - Trend trops   - Cont. AC   - Hemodynamic and cardiac monitoring  - Oral diet when off bipap  - Close glucose monitoring  - Transfer out of ICU 68M from home, ambulates independently, PMHx HFREF, COPD (not on O2), former smoker quit 2-months, HLD, DM, HTN, chronic ETOH dependence c/b cirrhosis, p/w worsening SOB. In the ED patient placed on NIV support. Noted to be in rapid afib. Concern for possible pulmonary edema.     Assessment:  1. Acute hypoxic respiratory failure  2. HFREF   3. Pulmonary edema   4. Rapid afib   5. DM type 2  6. Hypertension     Plan:  - Cont. bipap support with breaks off to NC   - close respiratory monitoring  - Diuresis and aim for negative fluid balance  - Cont. Beta blockers  - Monitor urine output  - Trend trops   - Cardiology consult  - Cont. AC   - Hemodynamic and cardiac monitoring  - Oral diet when off bipap  - Close glucose monitoring  - Transfer out of ICU

## 2024-01-09 NOTE — CHART NOTE - NSCHARTNOTEFT_GEN_A_CORE
68M from home, ambulates independently, PMHx COPD (not on O2), former smoker quit 2-months, HFrEF, HLD, DM, HTN, chronic ETOH dependence c/b cirrhosis, p/w worsening SOB. Admitted to the ICU for AHRF 2/2 suspected CHF exacerbation requiring continuous BIPAP. Patient states that he has had worsening of breathing over 2-years, does not sleep with a machine at home, and when inquired about medications he admits to non-compliance. Denies fevers, chills, or sick contact. Denies chest pain, palpitations, or GUILLEN.  CXR increased b/l opacification and left lower pleural effusion. Improved WOB after Bipap and IV bumex. Able to wean off Bipap. Pt currenlty on saturating 100% on 2l NC.    Patient presented with Afib w/ RVR. EKG showing Afib w/ RVR HR 140s with PVC. Pro BNP of 1853. Echo (11/23) showing EF of 20-25%.  S/p Diltiazem 20mg IVP x1 +10mg IVP x1 with little effect. Was started on IV lopressor 5mg q6, IV Bumex and FD lovenox while on NPO. Started PO lopressor, PO Bumex  and Eliquis.    Patient is stable for downgrade to floor under care of  ------------- for further management , covering resident ---------- was informed. Family notified.      <Things to follow up>  -Med rec 68M from home, ambulates independently, PMHx COPD (not on O2), former smoker quit 2-months, HFrEF, HLD, DM, HTN, chronic ETOH dependence c/b cirrhosis, p/w worsening SOB. Admitted to the ICU for AHRF 2/2 suspected CHF exacerbation requiring continuous BIPAP. Patient states that he has had worsening of breathing over 2-years, does not sleep with a machine at home, and when inquired about medications he admits to non-compliance. Denies fevers, chills, or sick contact. Denies chest pain, palpitations, or GUILLEN.  CXR increased b/l opacification and left lower pleural effusion. Improved WOB after Bipap and IV bumex. Able to wean off Bipap. Pt currenlty on saturating 100% on 2l NC.    Patient presented with Afib w/ RVR. EKG showing Afib w/ RVR HR 140s with PVC. Pro BNP of 1853. Echo (11/23) showing EF of 20-25%.  S/p Diltiazem 20mg IVP x1 +10mg IVP x1 with little effect. Was started on IV lopressor 5mg q6, IV Bumex and FD lovenox while on NPO. Started PO lopressor, PO Bumex  and Eliquis. Cardio, Dr Alcazar consulted.    Patient is stable for downgrade to floor under care of  ------------- for further management , covering resident ---------- was informed. Family notified.      <Things to follow up>  -Med rec 68M from home, ambulates independently, PMHx COPD (not on O2), former smoker quit 2-months, HFrEF, HLD, DM, HTN, chronic ETOH dependence c/b cirrhosis, p/w worsening SOB. Admitted to the ICU for AHRF 2/2 suspected CHF exacerbation requiring continuous BIPAP. Patient states that he has had worsening of breathing over 2-years, does not sleep with a machine at home, and when inquired about medications he admits to non-compliance. Denies fevers, chills, or sick contact. Denies chest pain, palpitations, or GUILLEN.  CXR increased b/l opacification and left lower pleural effusion. Improved WOB after Bipap and IV bumex. Able to wean off Bipap. Pt currenlty on saturating 100% on 2l NC.    Patient presented with Afib w/ RVR. EKG showing Afib w/ RVR HR 140s with PVC. Pro BNP of 1853. Echo (11/23) showing EF of 20-25%.  S/p Diltiazem 20mg IVP x1 +10mg IVP x1 with little effect. Was started on IV lopressor 5mg q6, IV Bumex and FD lovenox while on NPO. Started PO lopressor, PO Bumex  and Eliquis. Cardio, Dr Alcazar consulted.    Patient is stable for downgrade to floor under care of Dr. Ba for further management , covering resident Dr. Nicholas was informed. Family notified.      <Things to follow up>  -f/u Cardio recs  -Med rec 68M from home, ambulates independently, PMHx COPD (not on O2), former smoker quit 2-months, HFrEF, HLD, DM, HTN, chronic ETOH dependence c/b cirrhosis, p/w worsening SOB. Admitted to the ICU for AHRF 2/2 suspected CHF exacerbation requiring continuous BIPAP. Patient states that he has had worsening of breathing over 2-years, does not sleep with a machine at home, and when inquired about medications he admits to non-compliance. Denies fevers, chills, or sick contact. Denies chest pain, palpitations, or GUILLEN.  CXR increased b/l opacification and left lower pleural effusion. Improved WOB after Bipap and IV bumex. Able to wean off Bipap. Pt currenlty on saturating 100% on 2l NC.    Patient presented with Afib w/ RVR. EKG showing Afib w/ RVR HR 140s with PVC. Pro BNP of 1853. Echo (11/23) showing EF of 20-25%.  S/p Diltiazem 20mg IVP x1 +10mg IVP x1 with little effect. Was started on IV lopressor 5mg q6, IV Bumex and FD lovenox while on NPO. Started PO lopressor, PO Bumex  and Eliquis. Cardio, Dr Alcazar consulted.    Patient is stable for downgrade to floor under care of Dr. Rivera for further management , covering resident Dr. Nicholas was informed. Family notified.      <Things to follow up>  -f/u Cardio recs  -Med rec 68M from home, ambulates independently, PMHx COPD (not on O2), former smoker quit 2-months, HFrEF, HLD, DM, HTN, chronic ETOH dependence c/b cirrhosis, p/w worsening SOB. Admitted to the ICU for AHRF 2/2 suspected CHF exacerbation requiring continuous BIPAP. Patient states that he has had worsening of breathing over 2-years, does not sleep with a machine at home, and when inquired about medications he admits to non-compliance. Denies fevers, chills, or sick contact. Denies chest pain, palpitations, or GUILLEN.  CXR increased b/l opacification and left lower pleural effusion. Improved WOB after Bipap and IV bumex. Able to wean off Bipap. Pt currenlty on saturating 100% on 2l NC.    Patient presented with Afib w/ RVR. EKG showing Afib w/ RVR HR 140s with PVC. Pro BNP of 1853. Echo (11/23) showing EF of 20-25%.  S/p Diltiazem 20mg IVP x1 +10mg IVP x1 with little effect. Was started on IV lopressor 5mg q6, IV Bumex and FD lovenox while on NPO. Started PO lopressor, PO Bumex  and Eliquis. Cardio, Dr Alcazar consulted.    Patient is stable for downgrade to floor under care of Dr. Rivera for further management , covering NP Keila was informed. Family notified.      <Things to follow up>  -f/u Cardio recs  -Med rec

## 2024-01-09 NOTE — PROGRESS NOTE ADULT - SUBJECTIVE AND OBJECTIVE BOX
PGY-1 Progress Note discussed with attending    PAGER #: [[phone]] TILL 5:00 PM  PLEASE CONTACT ON CALL TEAM:  - On Call Team (Please refer to Angel) FROM 5:00 PM - 8:30PM  - Nightfloat Team FROM 8:30 -7:30 AM    CHIEF COMPLAINT & BRIEF HOSPITAL COURSE:    INTERVAL HPI/OVERNIGHT EVENTS:       REVIEW OF SYSTEMS:  CONSTITUTIONAL: No fever, weight loss, or fatigue  RESPIRATORY: No cough, wheezing, chills or hemoptysis; No shortness of breath  CARDIOVASCULAR: No chest pain, palpitations, dizziness, or leg swelling  GASTROINTESTINAL: No abdominal pain. No nausea, vomiting, or hematemesis; No diarrhea or constipation. No melena or hematochezia.  GENITOURINARY: No dysuria or hematuria, urinary frequency  NEUROLOGICAL: No headaches, memory loss, loss of strength, numbness, or tremors  SKIN: No itching, burning, rashes, or lesions     Vital Signs Last 24 Hrs  T(C): 35.6 (09 Jan 2024 05:00), Max: 36.9 (08 Jan 2024 23:45)  T(F): 96 (09 Jan 2024 05:00), Max: 98.5 (08 Jan 2024 23:45)  HR: 119 (09 Jan 2024 07:06) (110 - 136)  BP: 126/81 (09 Jan 2024 07:06) (108/94 - 168/84)  BP(mean): 94 (09 Jan 2024 07:06) (93 - 117)  RR: 25 (09 Jan 2024 07:06) (16 - 25)  SpO2: 100% (09 Jan 2024 07:06) (97% - 100%)    Parameters below as of 08 Jan 2024 22:00  Patient On (Oxygen Delivery Method): nasal cannula  O2 Flow (L/min): 3      PHYSICAL EXAMINATION:  GENERAL: NAD, well built  HEAD:  Atraumatic, Normocephalic  EYES:  conjunctiva and sclera clear  NECK: Supple, No JVD, Normal thyroid  CHEST/LUNG: Clear to auscultation. Clear to percussion bilaterally; No rales, rhonchi, wheezing, or rubs  HEART: Regular rate and rhythm; No murmurs, rubs, or gallops  ABDOMEN: Soft, Nontender, Nondistended; Bowel sounds present  NERVOUS SYSTEM:  Alert & Oriented X3,    EXTREMITIES:  2+ Peripheral Pulses, No clubbing, cyanosis, or edema  SKIN: warm dry                          10.2   9.09  )-----------( 150      ( 09 Jan 2024 03:50 )             33.3     01-09    139  |  109<H>  |  14  ----------------------------<  123<H>  4.1   |  23  |  1.37<H>    Ca    9.5      09 Jan 2024 03:50  Phos  5.3     01-09  Mg     2.0     01-09    TPro  7.4  /  Alb  3.1<L>  /  TBili  0.6  /  DBili  x   /  AST  17  /  ALT  13  /  AlkPhos  64  01-09    LIVER FUNCTIONS - ( 09 Jan 2024 03:50 )  Alb: 3.1 g/dL / Pro: 7.4 g/dL / ALK PHOS: 64 U/L / ALT: 13 U/L DA / AST: 17 U/L / GGT: x               PT/INR - ( 08 Jan 2024 07:20 )   PT: 11.7 sec;   INR: 1.03 ratio             CAPILLARY BLOOD GLUCOSE      RADIOLOGY & ADDITIONAL TESTS:                   PGY-1 Progress Note discussed with attending    PAGER #: [[phone]] TILL 5:00 PM  PLEASE CONTACT ON CALL TEAM:  - On Call Team (Please refer to Angel) FROM 5:00 PM - 8:30PM  - Nightfloat Team FROM 8:30 -7:30 AM    CHIEF COMPLAINT & BRIEF HOSPITAL COURSE:    INTERVAL HPI/OVERNIGHT EVENTS:       REVIEW OF SYSTEMS:  CONSTITUTIONAL: No fever, weight loss, or fatigue  RESPIRATORY: No cough, wheezing, chills or hemoptysis; No shortness of breath  CARDIOVASCULAR: No chest pain, palpitations, dizziness, or leg swelling  GASTROINTESTINAL: No abdominal pain. No nausea, vomiting, or hematemesis; No diarrhea or constipation. No melena or hematochezia.  GENITOURINARY: No dysuria or hematuria, urinary frequency  NEUROLOGICAL: No headaches, memory loss, loss of strength, numbness, or tremors  SKIN: No itching, burning, rashes, or lesions     Vital Signs Last 24 Hrs  T(C): 35.6 (09 Jan 2024 05:00), Max: 36.9 (08 Jan 2024 23:45)  T(F): 96 (09 Jan 2024 05:00), Max: 98.5 (08 Jan 2024 23:45)  HR: 119 (09 Jan 2024 07:06) (110 - 136)  BP: 126/81 (09 Jan 2024 07:06) (108/94 - 168/84)  BP(mean): 94 (09 Jan 2024 07:06) (93 - 117)  RR: 25 (09 Jan 2024 07:06) (16 - 25)  SpO2: 100% (09 Jan 2024 07:06) (97% - 100%)    Parameters below as of 08 Jan 2024 22:00  Patient On (Oxygen Delivery Method): nasal cannula  O2 Flow (L/min): 3      PHYSICAL EXAMINATION:  GENERAL: NAD, well built  HEAD:  Atraumatic, Normocephalic  EYES:  conjunctiva and sclera clear  NECK: Supple, No JVD, Normal thyroid  CHEST/LUNG: Clear to auscultation. Clear to percussion bilaterally; No rales, rhonchi, wheezing, or rubs  HEART: Regular rate and rhythm; No murmurs, rubs, or gallops  ABDOMEN: Soft, Nontender, Nondistended; Bowel sounds present  NERVOUS SYSTEM:  Alert & Oriented X3,    EXTREMITIES:  2+ Peripheral Pulses, No clubbing, cyanosis, or edema  SKIN: warm dry                          10.2   9.09  )-----------( 150      ( 09 Jan 2024 03:50 )             33.3     01-09    139  |  109<H>  |  14  ----------------------------<  123<H>  4.1   |  23  |  1.37<H>    Ca    9.5      09 Jan 2024 03:50  Phos  5.3     01-09  Mg     2.0     01-09    TPro  7.4  /  Alb  3.1<L>  /  TBili  0.6  /  DBili  x   /  AST  17  /  ALT  13  /  AlkPhos  64  01-09    LIVER FUNCTIONS - ( 09 Jan 2024 03:50 )  Alb: 3.1 g/dL / Pro: 7.4 g/dL / ALK PHOS: 64 U/L / ALT: 13 U/L DA / AST: 17 U/L / GGT: x               PT/INR - ( 08 Jan 2024 07:20 )   PT: 11.7 sec;   INR: 1.03 ratio             CAPILLARY BLOOD GLUCOSE      RADIOLOGY & ADDITIONAL TESTS:                   PGY-1 Progress Note discussed with attending    PAGER #: [[phone]] TILL 5:00 PM  PLEASE CONTACT ON CALL TEAM:  - On Call Team (Please refer to Angel) FROM 5:00 PM - 8:30PM  - Nightfloat Team FROM 8:30 -7:30 AM    INTERVAL HPI/OVERNIGHT EVENTS: Patient on Bipap overnight, currently on 2L NC. No acute events overnight.      REVIEW OF SYSTEMS:  CONSTITUTIONAL: No fever, weight loss, or fatigue  RESPIRATORY: No cough, wheezing, chills or hemoptysis; +shortness of breath  CARDIOVASCULAR: No chest pain, palpitations, dizziness, or leg swelling  GASTROINTESTINAL: No abdominal pain. No nausea, vomiting, or hematemesis; No diarrhea or constipation. No melena or hematochezia.  GENITOURINARY: No dysuria or hematuria, urinary frequency  NEUROLOGICAL: No headaches, memory loss, loss of strength, numbness, or tremors  SKIN: No itching, burning, rashes, or lesions     Vital Signs Last 24 Hrs  T(C): 35.6 (09 Jan 2024 05:00), Max: 36.9 (08 Jan 2024 23:45)  T(F): 96 (09 Jan 2024 05:00), Max: 98.5 (08 Jan 2024 23:45)  HR: 119 (09 Jan 2024 07:06) (110 - 136)  BP: 126/81 (09 Jan 2024 07:06) (108/94 - 168/84)  BP(mean): 94 (09 Jan 2024 07:06) (93 - 117)  RR: 25 (09 Jan 2024 07:06) (16 - 25)  SpO2: 100% (09 Jan 2024 07:06) (97% - 100%)    Parameters below as of 08 Jan 2024 22:00  Patient On (Oxygen Delivery Method): nasal cannula  O2 Flow (L/min): 3      PHYSICAL EXAMINATION:  GENERAL: NAD, well built  HEAD:  Atraumatic, Normocephalic  EYES:  conjunctiva and sclera clear  NECK: Supple, No JVD  CHEST/LUNG: B/l basilar crackles +,  No rales, rhonchi, wheezing, or rubs  HEART: Irregular rate and rhythm; No murmurs, rubs, or gallops  ABDOMEN: Soft, Nontender, Nondistended; Bowel sounds present  NERVOUS SYSTEM:  Alert & Oriented X3,    EXTREMITIES:  2+ Peripheral Pulses, No clubbing, cyanosis, or edema  SKIN: warm dry                          10.2   9.09  )-----------( 150      ( 09 Jan 2024 03:50 )             33.3     01-09    139  |  109<H>  |  14  ----------------------------<  123<H>  4.1   |  23  |  1.37<H>    Ca    9.5      09 Jan 2024 03:50  Phos  5.3     01-09  Mg     2.0     01-09    TPro  7.4  /  Alb  3.1<L>  /  TBili  0.6  /  DBili  x   /  AST  17  /  ALT  13  /  AlkPhos  64  01-09    LIVER FUNCTIONS - ( 09 Jan 2024 03:50 )  Alb: 3.1 g/dL / Pro: 7.4 g/dL / ALK PHOS: 64 U/L / ALT: 13 U/L DA / AST: 17 U/L / GGT: x               PT/INR - ( 08 Jan 2024 07:20 )   PT: 11.7 sec;   INR: 1.03 ratio             CAPILLARY BLOOD GLUCOSE      RADIOLOGY & ADDITIONAL TESTS:                   PGY-1 Progress Note discussed with attending    PAGER #: [[phone]] TILL 5:00 PM  PLEASE CONTACT ON CALL TEAM:  - On Call Team (Please refer to Angel) FROM 5:00 PM - 8:30PM  - Nightfloat Team FROM 8:30 -7:30 AM    INTERVAL HPI/OVERNIGHT EVENTS: Patient on Bipap overnight, currently on 2L NC. No acute events overnight.      REVIEW OF SYSTEMS:  CONSTITUTIONAL: No fever, weight loss, or fatigue  RESPIRATORY: No cough, wheezing, chills or hemoptysis; +shortness of breath  CARDIOVASCULAR: No chest pain, palpitations, dizziness, or leg swelling  GASTROINTESTINAL: No abdominal pain. No nausea, vomiting, or hematemesis; No diarrhea or constipation. No melena or hematochezia.  GENITOURINARY: No dysuria or hematuria, urinary frequency  NEUROLOGICAL: No headaches, memory loss, loss of strength, numbness, or tremors  SKIN: No itching, burning, rashes, or lesions     Vital Signs Last 24 Hrs  T(C): 35.6 (09 Jan 2024 05:00), Max: 36.9 (08 Jan 2024 23:45)  T(F): 96 (09 Jan 2024 05:00), Max: 98.5 (08 Jan 2024 23:45)  HR: 119 (09 Jan 2024 07:06) (110 - 136)  BP: 126/81 (09 Jan 2024 07:06) (108/94 - 168/84)  BP(mean): 94 (09 Jan 2024 07:06) (93 - 117)  RR: 25 (09 Jan 2024 07:06) (16 - 25)  SpO2: 100% (09 Jan 2024 07:06) (97% - 100%)    Parameters below as of 08 Jan 2024 22:00  Patient On (Oxygen Delivery Method): nasal cannula  O2 Flow (L/min): 3      PHYSICAL EXAMINATION:  GENERAL: NAD, well built  HEAD:  Atraumatic, Normocephalic  EYES:  conjunctiva and sclera clear  NECK: Supple, No JVD  CHEST/LUNG: B/l basilar crackles +,  No rales, rhonchi, wheezing, or rubs  HEART: Irregular rate and rhythm; No murmurs, rubs, or gallops  ABDOMEN: Soft, Nontender, Nondistended; Bowel sounds present  NERVOUS SYSTEM:  Alert & Oriented X3,    EXTREMITIES:  2+ Peripheral Pulses, No clubbing, cyanosis, or edema  SKIN: warm dry                          10.2   9.09  )-----------( 150      ( 09 Jan 2024 03:50 )             33.3     01-09    139  |  109<H>  |  14  ----------------------------<  123<H>  4.1   |  23  |  1.37<H>    Ca    9.5      09 Jan 2024 03:50  Phos  5.3     01-09  Mg     2.0     01-09    TPro  7.4  /  Alb  3.1<L>  /  TBili  0.6  /  DBili  x   /  AST  17  /  ALT  13  /  AlkPhos  64  01-09    LIVER FUNCTIONS - ( 09 Jan 2024 03:50 )  Alb: 3.1 g/dL / Pro: 7.4 g/dL / ALK PHOS: 64 U/L / ALT: 13 U/L DA / AST: 17 U/L / GGT: x               PT/INR - ( 08 Jan 2024 07:20 )   PT: 11.7 sec;   INR: 1.03 ratio             CAPILLARY BLOOD GLUCOSE      RADIOLOGY & ADDITIONAL TESTS:

## 2024-01-09 NOTE — CONSULT NOTE ADULT - SUBJECTIVE AND OBJECTIVE BOX
---___---___---___---___---___---___ ---___---___---___---___---___---___---___---___---                  M E D I C A L   A T T E N D I N G    C O N S U L T A T I O N   N O T E  ---___---___---___---___---___---___ ---___---___---___---___---___---___---___---___---                                      ( Note written / Date of service 01-09-24 ( This is certified to be the same as "ENTERED" date above ( for billing purposes)))       Pt seen and examined by me approximately thirty minutes ago.  ++CHIEF COMPLAINT:   Patient is a 68y old  Male who presents with a chief complaint of AHRF, CHF exacerbation (09 Jan 2024 08:16)      ++  HPI:  68M from home, ambulates independently, PMHx COPD (not on O2), former smoker quit 2-months, HLD, DM, HTN, chronic ETOH dependence c/b cirrhosis, p/w worsening SOB. Patient is limited historian, currently on BIPAP and unable to full history deferring further collateral to wife, Maureen Tovt [phone] but unable to reach. Patient states that he has had worsening of breathing over 2-years, does not sleep with a machine at home, and when inquired about medications he admits to non-compliance. Denies fevers, chills, or sick contact. Denies chest pain, palpitations, or GUILLEN.    In ED:   Afebrile;  Afib w/ RVR and PVC; /102; RR 22 100% on RA. Started on BIPAP for increased WOB saturating 100% with improvement in WOB. Trop negative 17, BNP 1853 (improved from 4463). Given duoneb x3, solumedrol 125mg x1, rocephin x1, and 500cc bolus x1, diltizem 20mg IVP x1 + 10mg IVP x1. Awaiting to receive vanc x1 and azithromycin. CXR increased b/l opacification and left lower pleural effusion.  (08 Jan 2024 08:55)      ---___---___---___---___---___---  PAST MEDICAL & SURGICAL HISTORY:  HTN (hypertension)      Varicose veins      HLD (hyperlipidemia)      Liver cirrhosis      Portal hypertension with esophageal varices      COPD (chronic obstructive pulmonary disease)      Syncope      Alcohol dependence, daily use      Smokes tobacco daily      DM (diabetes mellitus)      No significant past surgical history        ---___---___---___---___---___---   FAMILY HISTORY:      ---___---___---___---___---___---  SOCIAL HISTORY:  Alcohol: None reported  Smoking: None reported    ---___---___---___---___---___---  ALLERGIES:   Allergies    No Known Allergies    Intolerances      ---___---___---___---___---___---  MEDICATIONS:  MEDICATIONS  (STANDING):  apixaban 5 milliGRAM(s) Oral two times a day  buMETAnide Injectable 1 milliGRAM(s) IV Push every 12 hours  chlorhexidine 2% Cloths 1 Application(s) Topical <User Schedule>  insulin lispro (ADMELOG) corrective regimen sliding scale   SubCutaneous Before meals and at bedtime  melatonin 5 milliGRAM(s) Oral at bedtime  metoprolol tartrate 50 milliGRAM(s) Oral every 8 hours  polyethylene glycol 3350 17 Gram(s) Oral daily  senna 2 Tablet(s) Oral at bedtime    MEDICATIONS  (PRN):    ___________  Active diet:  Diet, Regular:   DASH/TLC Sodium & Cholesterol Restricted  ___________________    HOME MEDICATIONS:  Breo Ellipta 200 mcg-25 mcg/inh inhalation powder  metFORMIN 500 mg oral tablet  montelukast 10 mg oral tablet  omeprazole 20 mg oral delayed release capsule    ---___---___---___---___---___---  REVIEW OF SYSTEM:    GEN: no fever, no chills, no pain  RESP: no SOB, no cough, no sputum  CVS: no chest pain, no palpitations, no edema  GI: no abdominal pain, no nausea, no vomiting, no constipation, no diarrhea  : no dysuria, no frequency, no hematuria  Neuro: no headache, no dizziness  PSYCH: no anxiety, no depression  Derm : no itching, no rash    ---___---___---___---___---___---  VITAL SIGNS:  T(C): 35.6 (01-09-24 @ 05:00), Max: 36.9 (01-08-24 @ 23:45)  HR: 118 (01-09-24 @ 13:00) (106 - 136)  BP: 109/65 (01-09-24 @ 13:00) (108/94 - 168/84)  BP(mean): 77 (01-09-24 @ 13:00)  RR: 16 (01-09-24 @ 13:00) (16 - 27)  SpO2: 100% (01-09-24 @ 13:00) (96% - 100%)     CAPILLARY BLOOD GLUCOSE      POCT Blood Glucose.: 111 mg/dL (09 Jan 2024 06:12)  POCT Blood Glucose.: 140 mg/dL (08 Jan 2024 22:21)  POCT Blood Glucose.: 127 mg/dL (08 Jan 2024 17:13)    I&O's Summary    08 Jan 2024 07:01  -  09 Jan 2024 07:00  --------------------------------------------------------  IN: 100 mL / OUT: 1500 mL / NET: -1400 mL      ---___---___---___---___---___---  PHYSICAL EXAM:    GEN: A&O X 3 , NAD , comfortable, pleasant, calm   HEENT: NCAT, PERRL, MMM, hearing intact  Neck: supple , no JVD  CVS: S1S2 , regular , No M/R/G appreciated  PULM: CTA B/L,  no W/R/R appreciated  ABD.: soft. non tender, non distended,  bowel sounds present  Extrem: intact pulses , no edema   Derm: No rash , no ecchymoses  PSYCH : normal mood,  no delusion not anxious     ---___---___---___---___---___---            LAB AND IMAGING:                        10.2   9.09  )-----------( 150      ( 09 Jan 2024 03:50 )             33.3        01-09    139  |  109<H>  |  14  ----------------------------<  123<H>  4.1   |  23  |  1.37<H>    Ca    9.5      09 Jan 2024 03:50  Phos  5.3     01-09  Mg     2.0     01-09    TPro  7.4  /  Alb  3.1<L>  /  TBili  0.6  /  DBili  x   /  AST  17  /  ALT  13  /  AlkPhos  64  01-09    PT/INR - ( 08 Jan 2024 07:20 )   PT: 11.7 sec;   INR: 1.03 ratio                   ABG - ( 08 Jan 2024 11:20 )  pH, Arterial: 7.41  pH, Blood: x     /  pCO2: 34    /  pO2: 143   / HCO3: 22    / Base Excess: -2.4  /  SaO2: 100                      TSH:      2.31   (11-23-23)           Lipid profile:    HgA1C:   (11-22-23)          (11-22-23)      4.9     ---___---___---___---___---___---___ ---___---___---___---___---                         A S S E S S M E N T   A N D   P L A N :      HEALTH ISSUES - PROBLEM Dx:              -GI/DVT Prophylaxis per protocol.    --------------------------------------------  Case discussed with   Education given on findings and plan of care.  ___________________________  Will follow with you.  Thank you,  SALVATORE Rivera D.O.  Pager: [phone]       ---___---___---___---___---___---___ ---___---___---___---___---___---___---___---___---                  M E D I C A L   A T T E N D I N G    C O N S U L T A T I O N   N O T E  ---___---___---___---___---___---___ ---___---___---___---___---___---___---___---___---                                      ( Note written / Date of service 01-09-24 ( This is certified to be the same as "ENTERED" date above ( for billing purposes)))       Pt seen and examined by me approximately thirty minutes ago.  ++CHIEF COMPLAINT:   Patient is a 68y old  Male who presents with a chief complaint of AHRF, CHF exacerbation (09 Jan 2024 08:16)      ++  HPI:  68M from home, ambulates independently, PMHx COPD (not on O2), former smoker quit 2-months, HLD, DM, HTN, chronic ETOH dependence c/b cirrhosis, p/w worsening SOB. Patient is limited historian, currently on BIPAP and unable to full history deferring further collateral to wife, Maureen Tovt [phone] but unable to reach. Patient states that he has had worsening of breathing over 2-years, does not sleep with a machine at home, and when inquired about medications he admits to non-compliance. Denies fevers, chills, or sick contact. Denies chest pain, palpitations, or GUILLEN.    In ED:   Afebrile;  Afib w/ RVR and PVC; /102; RR 22 100% on RA. Started on BIPAP for increased WOB saturating 100% with improvement in WOB. Trop negative 17, BNP 1853 (improved from 4463). Given duoneb x3, solumedrol 125mg x1, rocephin x1, and 500cc bolus x1, diltizem 20mg IVP x1 + 10mg IVP x1. Awaiting to receive vanc x1 and azithromycin. CXR increased b/l opacification and left lower pleural effusion.  (08 Jan 2024 08:55)      ---___---___---___---___---___---  PAST MEDICAL & SURGICAL HISTORY:  HTN (hypertension)      Varicose veins      HLD (hyperlipidemia)      Liver cirrhosis      Portal hypertension with esophageal varices      COPD (chronic obstructive pulmonary disease)      Syncope      Alcohol dependence, daily use      Smokes tobacco daily      DM (diabetes mellitus)      No significant past surgical history        ---___---___---___---___---___---   FAMILY HISTORY:      ---___---___---___---___---___---  SOCIAL HISTORY:  Alcohol: None reported  Smoking: None reported    ---___---___---___---___---___---  ALLERGIES:   Allergies    No Known Allergies    Intolerances      ---___---___---___---___---___---  MEDICATIONS:  MEDICATIONS  (STANDING):  apixaban 5 milliGRAM(s) Oral two times a day  buMETAnide Injectable 1 milliGRAM(s) IV Push every 12 hours  chlorhexidine 2% Cloths 1 Application(s) Topical <User Schedule>  insulin lispro (ADMELOG) corrective regimen sliding scale   SubCutaneous Before meals and at bedtime  melatonin 5 milliGRAM(s) Oral at bedtime  metoprolol tartrate 50 milliGRAM(s) Oral every 8 hours  polyethylene glycol 3350 17 Gram(s) Oral daily  senna 2 Tablet(s) Oral at bedtime    MEDICATIONS  (PRN):    ___________  Active diet:  Diet, Regular:   DASH/TLC Sodium & Cholesterol Restricted  ___________________    HOME MEDICATIONS:  Breo Ellipta 200 mcg-25 mcg/inh inhalation powder  metFORMIN 500 mg oral tablet  montelukast 10 mg oral tablet  omeprazole 20 mg oral delayed release capsule    ---___---___---___---___---___---  REVIEW OF SYSTEM:    GEN: no fever, no chills, no pain  RESP: no SOB, no cough, no sputum  CVS: no chest pain, no palpitations, no edema  GI: no abdominal pain, no nausea, no vomiting, no constipation, no diarrhea  : no dysuria, no frequency, no hematuria  Neuro: no headache, no dizziness  PSYCH: no anxiety, no depression  Derm : no itching, no rash    ---___---___---___---___---___---  VITAL SIGNS:  T(C): 35.6 (01-09-24 @ 05:00), Max: 36.9 (01-08-24 @ 23:45)  HR: 118 (01-09-24 @ 13:00) (106 - 136)  BP: 109/65 (01-09-24 @ 13:00) (108/94 - 168/84)  BP(mean): 77 (01-09-24 @ 13:00)  RR: 16 (01-09-24 @ 13:00) (16 - 27)  SpO2: 100% (01-09-24 @ 13:00) (96% - 100%)     CAPILLARY BLOOD GLUCOSE      POCT Blood Glucose.: 111 mg/dL (09 Jan 2024 06:12)  POCT Blood Glucose.: 140 mg/dL (08 Jan 2024 22:21)  POCT Blood Glucose.: 127 mg/dL (08 Jan 2024 17:13)    I&O's Summary    08 Jan 2024 07:01  -  09 Jan 2024 07:00  --------------------------------------------------------  IN: 100 mL / OUT: 1500 mL / NET: -1400 mL      ---___---___---___---___---___---  PHYSICAL EXAM:    GEN: A&O X 3 , NAD , comfortable, pleasant, calm   HEENT: NCAT, PERRL, MMM, hearing intact  Neck: supple , no JVD  CVS: S1S2 , regular , No M/R/G appreciated  PULM: CTA B/L,  no W/R/R appreciated  ABD.: soft. non tender, non distended,  bowel sounds present  Extrem: intact pulses , no edema   Derm: No rash , no ecchymoses  PSYCH : normal mood,  no delusion not anxious     ---___---___---___---___---___---            LAB AND IMAGING:                        10.2   9.09  )-----------( 150      ( 09 Jan 2024 03:50 )             33.3        01-09    139  |  109<H>  |  14  ----------------------------<  123<H>  4.1   |  23  |  1.37<H>    Ca    9.5      09 Jan 2024 03:50  Phos  5.3     01-09  Mg     2.0     01-09    TPro  7.4  /  Alb  3.1<L>  /  TBili  0.6  /  DBili  x   /  AST  17  /  ALT  13  /  AlkPhos  64  01-09    PT/INR - ( 08 Jan 2024 07:20 )   PT: 11.7 sec;   INR: 1.03 ratio                   ABG - ( 08 Jan 2024 11:20 )  pH, Arterial: 7.41  pH, Blood: x     /  pCO2: 34    /  pO2: 143   / HCO3: 22    / Base Excess: -2.4  /  SaO2: 100                      TSH:      2.31   (11-23-23)           Lipid profile:    HgA1C:   (11-22-23)          (11-22-23)      4.9     ---___---___---___---___---___---___ ---___---___---___---___---                         A S S E S S M E N T   A N D   P L A N :      HEALTH ISSUES - PROBLEM Dx:              -GI/DVT Prophylaxis per protocol.    --------------------------------------------  Case discussed with   Education given on findings and plan of care.  ___________________________  Will follow with you.  Thank you,  SALVATORE Rivera D.O.  Pager: [phone]       ---___---___---___---___---___---___ ---___---___---___---___---___---___---___---___---                  M E D I C A L   A T T E N D I N G    C O N S U L T A T I O N   N O T E  ---___---___---___---___---___---___ ---___---___---___---___---___---___---___---___---                                      ( Note written / Date of service 01-09-24 ( This is certified to be the same as "ENTERED" date above ( for billing purposes)))       Pt seen and examined by me approximately thirty minutes ago.  ++CHIEF COMPLAINT:   Patient is a 68y old  Male who presented with AHRF, CHF exacerbation (09 Jan 2024 08:16)    ++  HPI:  68M from home, ambulates independently, PMHx COPD (not on O2), former smoker quit 2-months, HLD, DM, HTN, chronic ETOH dependence c/b cirrhosis, p/w worsening SOB.   Patient required BIPAP >> improved and anopw on O2 NC   Patient stated that he has had worsening of breathing over 2-years, does not sleep with a machine at home, and when inquired about medications he admits to non-compliance.   Denied fevers, chills, or sick contact. Denies chest pain, palpitations, or GUILLEN.  In ED:   Afebrile;  Afib w/ RVR and PVC; /102; RR 22 100% on RA. Started on BIPAP for increased WOB saturating 100% with improvement in WOB. Trop negative 17, BNP 1853 (improved from 4463). Given duoneb x3, solumedrol 125mg x1, rocephin x1, and 500cc bolus x1, diltizem 20mg IVP x1 + 10mg IVP x1. Awaiting to receive vanc x1 and azithromycin. CXR increased b/l opacification and left lower pleural effusion.  (08 Jan 2024 08:55)  admitted to ICU for resp faiure, new Bipap, CHF, new AF with RVR    ---___---___---___---___---___---  PAST MEDICAL & SURGICAL HISTORY:    HTN (hypertension)  Varicose veins  HLD (hyperlipidemia)  Liver cirrhosis  Portal hypertension with esophageal varices  COPD (chronic obstructive pulmonary disease)  Syncope  Alcohol dependence, daily use  DM (diabetes mellitus)    No significant past surgical history    ---___---___---___---___---___---   FAMILY HISTORY:    N/C    ---___---___---___---___---___---  SOCIAL HISTORY:  Alcohol: as above   Smoking: + past smoker     ---___---___---___---___---___---  ALLERGIES:     No Known Allergies    ---___---___---___---___---___---    MEDICATIONS:  MEDICATIONS  (STANDING):  apixaban 5 milliGRAM(s) Oral two times a day  buMETAnide Injectable 1 milliGRAM(s) IV Push every 12 hours  chlorhexidine 2% Cloths 1 Application(s) Topical <User Schedule>  insulin lispro (ADMELOG) corrective regimen sliding scale   SubCutaneous Before meals and at bedtime  melatonin 5 milliGRAM(s) Oral at bedtime  metoprolol tartrate 50 milliGRAM(s) Oral every 8 hours  polyethylene glycol 3350 17 Gram(s) Oral daily  senna 2 Tablet(s) Oral at bedtime    ___________  Active diet:  Diet, Regular:   DASH/TLC Sodium & Cholesterol Restricted  ___________________    HOME MEDICATIONS:  Breo Ellipta 200 mcg-25 mcg/inh inhalation powder  metFORMIN 500 mg oral tablet  montelukast 10 mg oral tablet  omeprazole 20 mg oral delayed release capsule    ---___---___---___---___---___---  REVIEW OF SYSTEM:    GEN: no fever, no chills, no pain  RESP: no SOB at rest, no cough, no sputum  CVS: no chest pain, no palpitations, no edema  GI: no abdominal pain, no nausea, no vomiting, no constipation, no diarrhea  : no dysuria, no frequency, no hematuria  Neuro: no headache, no dizziness  PSYCH: no anxiety, no depression  Derm : no itching, no rash    ---___---___---___---___---___---  VITAL SIGNS:  T(C): 35.6 (01-09-24 @ 05:00), Max: 36.9 (01-08-24 @ 23:45)  HR: 118 (01-09-24 @ 13:00) (106 - 136)  BP: 109/65 (01-09-24 @ 13:00) (108/94 - 168/84)  BP(mean): 77 (01-09-24 @ 13:00)  RR: 16 (01-09-24 @ 13:00) (16 - 27)  SpO2: 100% (01-09-24 @ 13:00) (96% - 100%)     CAPILLARY BLOOD GLUCOSE  POCT Blood Glucose.: 111 mg/dL (09 Jan 2024 06:12)  POCT Blood Glucose.: 140 mg/dL (08 Jan 2024 22:21)  POCT Blood Glucose.: 127 mg/dL (08 Jan 2024 17:13)    I&O's Summary    08 Jan 2024 07:01  -  09 Jan 2024 07:00  --------------------------------------------------------  IN: 100 mL / OUT: 1500 mL / NET: -1400 mL    ---___---___---___---___---___---  PHYSICAL EXAM:    GEN: A&O X 2-3 , NAD , comfortable, pleasant, calm   HEENT: NCAT, PERRL, MMM, hearing intact  Neck: supple , no JVD  CVS: S1S2 , Irregular , No M/R/G appreciated, tachy  PULM: CTA B/L,  no W/R/R appreciated  ABD.: soft. non tender, non distended,  bowel sounds present  Extrem: intact pulses , no edema   Derm: No rash , no ecchymoses  PSYCH : normal mood,  no delusion not anxious     ---___---___---___---___---___---            LAB AND IMAGING:                        10.2   9.09  )-----------( 150      ( 09 Jan 2024 03:50 )             33.3        01-09    139  |  109<H>  |  14  ----------------------------<  123<H>  4.1   |  23  |  1.37<H>    Ca    9.5      09 Jan 2024 03:50  Phos  5.3     01-09  Mg     2.0     01-09    TPro  7.4  /  Alb  3.1<L>  /  TBili  0.6  /  DBili  x   /  AST  17  /  ALT  13  /  AlkPhos  64  01-09    PT/INR - ( 08 Jan 2024 07:20 )   PT: 11.7 sec;   INR: 1.03 ratio                   ABG - ( 08 Jan 2024 11:20 )  pH, Arterial: 7.41  pH, Blood: x     /  pCO2: 34    /  pO2: 143   / HCO3: 22    / Base Excess: -2.4  /  SaO2: 100       TSH:      2.31   (11-23-23)           HgA1C:   (11-22-23)          (11-22-23)      4.9    ~~ High Sensitivity Troponin  ~~    17.0  <<--    Pro-BNP: 1853 (01-08-24 @ 06:24)    < from: CT Chest No Cont (01.08.24 @ 10:36) >  IMPRESSION:  CHF and/or pneumonia with small moderate right pleural effusion and small   loculated left pleural effusion.    < end of copied text >    < from: Transthoracic Echocardiogram (11.19.23 @ 13:36) >  CONCLUSIONS:  TECHNICALLY VERY DIFFICULT STUDY, POOR ACOUSTIC WINDOWS    1. Moderate mitral regurgitation.  2.  Mild aortic regurgitation.  3. Severely dilated left atrium.  LA volume index = 52cc/m2.  4. Normal left ventricular internal dimensions and wall thicknesses.  5. Endocardium not well visualized; grossly severe global left ventricular systolic dysfunction.   Segmental wall  motion could not be assessed.  Septal motion consistent  with conduction disease.  6. Mild right atrial enlargement.  7. Normal right ventricular size and function.  < end of copied text >     ---___---___---___---___---___---___ ---___---___---___---___---                         A S S E S S M E N T   A N D   P L A N :    acute hypoxemic respiratory failure  acute eacerbaiton of chrnoic systolic   CHF  Afib with RVR  -  Acute hypoxic respiratory failure  - COPD   - HFrEF   - Pulmonary edema   - Rapid afib   - DM type 2  - Hypertension     Plan:  - Cont. bipap support  - close respiratory monitoring  - Diuresis and aim for negative fluid balance  - Monitor urine output  - Trend trops   - Cont. AC   - Beta blockers for rate control  - Hemodynamic and cardiac monitoring          -GI/DVT Prophylaxis per protocol.    --------------------------------------------  Case discussed with   Education given on findings and plan of care.  ___________________________  Will follow with you.  Thank you,  SALVAOTRE Rivera D.O.  Pager: [phone]       ---___---___---___---___---___---___ ---___---___---___---___---___---___---___---___---                  M E D I C A L   A T T E N D I N G    C O N S U L T A T I O N   N O T E  ---___---___---___---___---___---___ ---___---___---___---___---___---___---___---___---                                      ( Note written / Date of service 01-09-24 ( This is certified to be the same as "ENTERED" date above ( for billing purposes)))       Pt seen and examined by me approximately thirty minutes ago.  ++CHIEF COMPLAINT:   Patient is a 68y old  Male who presented with AHRF, CHF exacerbation (09 Jan 2024 08:16)    ++  HPI:  68M from home, ambulates independently, PMHx COPD (not on O2), former smoker quit 2-months, HLD, DM, HTN, chronic ETOH dependence c/b cirrhosis, p/w worsening SOB.   Patient required BIPAP >> improved and anopw on O2 NC   Patient stated that he has had worsening of breathing over 2-years, does not sleep with a machine at home, and when inquired about medications he admits to non-compliance.   Denied fevers, chills, or sick contact. Denies chest pain, palpitations, or GUILLEN.  In ED:   Afebrile;  Afib w/ RVR and PVC; /102; RR 22 100% on RA. Started on BIPAP for increased WOB saturating 100% with improvement in WOB. Trop negative 17, BNP 1853 (improved from 4463). Given duoneb x3, solumedrol 125mg x1, rocephin x1, and 500cc bolus x1, diltizem 20mg IVP x1 + 10mg IVP x1. Awaiting to receive vanc x1 and azithromycin. CXR increased b/l opacification and left lower pleural effusion.  (08 Jan 2024 08:55)  admitted to ICU for resp faiure, new Bipap, CHF, new AF with RVR    ---___---___---___---___---___---  PAST MEDICAL & SURGICAL HISTORY:    HTN (hypertension)  Varicose veins  HLD (hyperlipidemia)  Liver cirrhosis  Portal hypertension with esophageal varices  COPD (chronic obstructive pulmonary disease)  Syncope  Alcohol dependence, daily use  DM (diabetes mellitus)    No significant past surgical history    ---___---___---___---___---___---   FAMILY HISTORY:    N/C    ---___---___---___---___---___---  SOCIAL HISTORY:  Alcohol: as above   Smoking: + past smoker     ---___---___---___---___---___---  ALLERGIES:     No Known Allergies    ---___---___---___---___---___---    MEDICATIONS:  MEDICATIONS  (STANDING):  apixaban 5 milliGRAM(s) Oral two times a day  buMETAnide Injectable 1 milliGRAM(s) IV Push every 12 hours  chlorhexidine 2% Cloths 1 Application(s) Topical <User Schedule>  insulin lispro (ADMELOG) corrective regimen sliding scale   SubCutaneous Before meals and at bedtime  melatonin 5 milliGRAM(s) Oral at bedtime  metoprolol tartrate 50 milliGRAM(s) Oral every 8 hours  polyethylene glycol 3350 17 Gram(s) Oral daily  senna 2 Tablet(s) Oral at bedtime    ___________  Active diet:  Diet, Regular:   DASH/TLC Sodium & Cholesterol Restricted  ___________________    HOME MEDICATIONS:  Breo Ellipta 200 mcg-25 mcg/inh inhalation powder  metFORMIN 500 mg oral tablet  montelukast 10 mg oral tablet  omeprazole 20 mg oral delayed release capsule    ---___---___---___---___---___---  REVIEW OF SYSTEM:    GEN: no fever, no chills, no pain  RESP: no SOB at rest, no cough, no sputum  CVS: no chest pain, no palpitations, no edema  GI: no abdominal pain, no nausea, no vomiting, no constipation, no diarrhea  : no dysuria, no frequency, no hematuria  Neuro: no headache, no dizziness  PSYCH: no anxiety, no depression  Derm : no itching, no rash    ---___---___---___---___---___---  VITAL SIGNS:  T(C): 35.6 (01-09-24 @ 05:00), Max: 36.9 (01-08-24 @ 23:45)  HR: 118 (01-09-24 @ 13:00) (106 - 136)  BP: 109/65 (01-09-24 @ 13:00) (108/94 - 168/84)  BP(mean): 77 (01-09-24 @ 13:00)  RR: 16 (01-09-24 @ 13:00) (16 - 27)  SpO2: 100% (01-09-24 @ 13:00) (96% - 100%)     CAPILLARY BLOOD GLUCOSE  POCT Blood Glucose.: 111 mg/dL (09 Jan 2024 06:12)  POCT Blood Glucose.: 140 mg/dL (08 Jan 2024 22:21)  POCT Blood Glucose.: 127 mg/dL (08 Jan 2024 17:13)    I&O's Summary    08 Jan 2024 07:01  -  09 Jan 2024 07:00  --------------------------------------------------------  IN: 100 mL / OUT: 1500 mL / NET: -1400 mL    ---___---___---___---___---___---  PHYSICAL EXAM:    GEN: A&O X 2-3 , NAD , comfortable, pleasant, calm   HEENT: NCAT, PERRL, MMM, hearing intact  Neck: supple , no JVD  CVS: S1S2 , Irregular , No M/R/G appreciated, tachy  PULM: CTA B/L,  no W/R/R appreciated  ABD.: soft. non tender, non distended,  bowel sounds present  Extrem: intact pulses , no edema   Derm: No rash , no ecchymoses  PSYCH : normal mood,  no delusion not anxious     ---___---___---___---___---___---            LAB AND IMAGING:                        10.2   9.09  )-----------( 150      ( 09 Jan 2024 03:50 )             33.3        01-09    139  |  109<H>  |  14  ----------------------------<  123<H>  4.1   |  23  |  1.37<H>    Ca    9.5      09 Jan 2024 03:50  Phos  5.3     01-09  Mg     2.0     01-09    TPro  7.4  /  Alb  3.1<L>  /  TBili  0.6  /  DBili  x   /  AST  17  /  ALT  13  /  AlkPhos  64  01-09    PT/INR - ( 08 Jan 2024 07:20 )   PT: 11.7 sec;   INR: 1.03 ratio                   ABG - ( 08 Jan 2024 11:20 )  pH, Arterial: 7.41  pH, Blood: x     /  pCO2: 34    /  pO2: 143   / HCO3: 22    / Base Excess: -2.4  /  SaO2: 100       TSH:      2.31   (11-23-23)           HgA1C:   (11-22-23)          (11-22-23)      4.9    ~~ High Sensitivity Troponin  ~~    17.0  <<--    Pro-BNP: 1853 (01-08-24 @ 06:24)    < from: CT Chest No Cont (01.08.24 @ 10:36) >  IMPRESSION:  CHF and/or pneumonia with small moderate right pleural effusion and small   loculated left pleural effusion.    < end of copied text >    < from: Transthoracic Echocardiogram (11.19.23 @ 13:36) >  CONCLUSIONS:  TECHNICALLY VERY DIFFICULT STUDY, POOR ACOUSTIC WINDOWS    1. Moderate mitral regurgitation.  2.  Mild aortic regurgitation.  3. Severely dilated left atrium.  LA volume index = 52cc/m2.  4. Normal left ventricular internal dimensions and wall thicknesses.  5. Endocardium not well visualized; grossly severe global left ventricular systolic dysfunction.   Segmental wall  motion could not be assessed.  Septal motion consistent  with conduction disease.  6. Mild right atrial enlargement.  7. Normal right ventricular size and function.  < end of copied text >     ---___---___---___---___---___---___ ---___---___---___---___---                         A S S E S S M E N T   A N D   P L A N :    acute hypoxemic respiratory failure  acute eacerbaiton of chrnoic systolic   CHF  Afib with RVR  -  Acute hypoxic respiratory failure  - COPD   - HFrEF   - Pulmonary edema   - Rapid afib   - DM type 2  - Hypertension     Plan:  - Cont. bipap support  - close respiratory monitoring  - Diuresis and aim for negative fluid balance  - Monitor urine output  - Trend trops   - Cont. AC   - Beta blockers for rate control  - Hemodynamic and cardiac monitoring          -GI/DVT Prophylaxis per protocol.    --------------------------------------------  Case discussed with   Education given on findings and plan of care.  ___________________________  Will follow with you.  Thank you,  SALVATORE Rivera D.O.  Pager: [phone]       ---___---___---___---___---___---___ ---___---___---___---___---___---___---___---___---                  M E D I C A L   A T T E N D I N G    C O N S U L T A T I O N   N O T E  ---___---___---___---___---___---___ ---___---___---___---___---___---___---___---___---                                      ( Note written / Date of service 01-09-24 ( This is certified to be the same as "ENTERED" date above ( for billing purposes)))       Pt seen and examined by me approximately thirty minutes ago.  ++CHIEF COMPLAINT:   Patient is a 68y old  Male who presented with AHRF, CHF exacerbation (09 Jan 2024 08:16)    ++  HPI:  68M from home, ambulates independently, PMHx COPD (not on O2), former smoker quit 2-months, HLD, DM, HTN, chronic ETOH dependence c/b cirrhosis, p/w worsening SOB.   Patient required BIPAP >> improved and anopw on O2 NC   Patient stated that he has had worsening of breathing over 2-years, does not sleep with a machine at home, and when inquired about medications he admits to non-compliance.   Denied fevers, chills, or sick contact. Denies chest pain, palpitations, or GUILLEN.  In ED:   Afebrile;  Afib w/ RVR and PVC; /102; RR 22 100% on RA. Started on BIPAP for increased WOB saturating 100% with improvement in WOB. Trop negative 17, BNP 1853 (improved from 4463). Given duoneb x3, solumedrol 125mg x1, rocephin x1, and 500cc bolus x1, diltizem 20mg IVP x1 + 10mg IVP x1. Awaiting to receive vanc x1 and azithromycin. CXR increased b/l opacification and left lower pleural effusion.  (08 Jan 2024 08:55)  admitted to ICU for resp faiure, new Bipap, CHF, new AF with RVR    ---___---___---___---___---___---  PAST MEDICAL & SURGICAL HISTORY:    HTN (hypertension)  Varicose veins  HLD (hyperlipidemia)  Liver cirrhosis  Portal hypertension with esophageal varices  COPD (chronic obstructive pulmonary disease)  Syncope  Alcohol dependence, daily use  DM (diabetes mellitus)    No significant past surgical history    ---___---___---___---___---___---   FAMILY HISTORY:    N/C    ---___---___---___---___---___---  SOCIAL HISTORY:  Alcohol: as above   Smoking: + past smoker     ---___---___---___---___---___---  ALLERGIES:     No Known Allergies    ---___---___---___---___---___---    MEDICATIONS:  MEDICATIONS  (STANDING):  apixaban 5 milliGRAM(s) Oral two times a day  buMETAnide Injectable 1 milliGRAM(s) IV Push every 12 hours  chlorhexidine 2% Cloths 1 Application(s) Topical <User Schedule>  insulin lispro (ADMELOG) corrective regimen sliding scale   SubCutaneous Before meals and at bedtime  melatonin 5 milliGRAM(s) Oral at bedtime  metoprolol tartrate 50 milliGRAM(s) Oral every 8 hours  polyethylene glycol 3350 17 Gram(s) Oral daily  senna 2 Tablet(s) Oral at bedtime    ___________  Active diet:  Diet, Regular:   DASH/TLC Sodium & Cholesterol Restricted  ___________________    HOME MEDICATIONS:  Breo Ellipta 200 mcg-25 mcg/inh inhalation powder  metFORMIN 500 mg oral tablet  montelukast 10 mg oral tablet  omeprazole 20 mg oral delayed release capsule    ---___---___---___---___---___---  REVIEW OF SYSTEM:    GEN: no fever, no chills, no pain  RESP: no SOB at rest, no cough, no sputum  CVS: no chest pain, no palpitations, no edema  GI: no abdominal pain, no nausea, no vomiting, no constipation, no diarrhea  : no dysuria, no frequency, no hematuria  Neuro: no headache, no dizziness  PSYCH: no anxiety, no depression  Derm : no itching, no rash    ---___---___---___---___---___---  VITAL SIGNS:  T(C): 35.6 (01-09-24 @ 05:00), Max: 36.9 (01-08-24 @ 23:45)  HR: 118 (01-09-24 @ 13:00) (106 - 136)  BP: 109/65 (01-09-24 @ 13:00) (108/94 - 168/84)  BP(mean): 77 (01-09-24 @ 13:00)  RR: 16 (01-09-24 @ 13:00) (16 - 27)  SpO2: 100% (01-09-24 @ 13:00) (96% - 100%)     CAPILLARY BLOOD GLUCOSE  POCT Blood Glucose.: 111 mg/dL (09 Jan 2024 06:12)  POCT Blood Glucose.: 140 mg/dL (08 Jan 2024 22:21)  POCT Blood Glucose.: 127 mg/dL (08 Jan 2024 17:13)    I&O's Summary    08 Jan 2024 07:01  -  09 Jan 2024 07:00  --------------------------------------------------------  IN: 100 mL / OUT: 1500 mL / NET: -1400 mL    ---___---___---___---___---___---  PHYSICAL EXAM:    GEN: A&O X 2-3 , NAD , comfortable, pleasant, calm   HEENT: NCAT, PERRL, MMM, hearing intact  Neck: supple , no JVD  CVS: S1S2 , Irregular , No M/R/G appreciated, tachy  PULM: CTA B/L,  no W/R/R appreciated  ABD.: soft. non tender, non distended,  bowel sounds present  Extrem: intact pulses , no edema   Derm: No rash , no ecchymoses  PSYCH : normal mood,  no delusion not anxious     ---___---___---___---___---___---            LAB AND IMAGING:                        10.2   9.09  )-----------( 150      ( 09 Jan 2024 03:50 )             33.3        01-09    139  |  109<H>  |  14  ----------------------------<  123<H>  4.1   |  23  |  1.37<H>    Ca    9.5      09 Jan 2024 03:50  Phos  5.3     01-09  Mg     2.0     01-09    TPro  7.4  /  Alb  3.1<L>  /  TBili  0.6  /  DBili  x   /  AST  17  /  ALT  13  /  AlkPhos  64  01-09    PT/INR - ( 08 Jan 2024 07:20 )   PT: 11.7 sec;   INR: 1.03 ratio                   ABG - ( 08 Jan 2024 11:20 )  pH, Arterial: 7.41  pH, Blood: x     /  pCO2: 34    /  pO2: 143   / HCO3: 22    / Base Excess: -2.4  /  SaO2: 100       TSH:      2.31   (11-23-23)           HgA1C:   (11-22-23)          (11-22-23)      4.9    ~~ High Sensitivity Troponin  ~~    17.0  <<--    Pro-BNP: 1853 (01-08-24 @ 06:24)    < from: CT Chest No Cont (01.08.24 @ 10:36) >  IMPRESSION:  CHF and/or pneumonia with small moderate right pleural effusion and small   loculated left pleural effusion.    < end of copied text >    < from: Transthoracic Echocardiogram (11.19.23 @ 13:36) >  CONCLUSIONS:  TECHNICALLY VERY DIFFICULT STUDY, POOR ACOUSTIC WINDOWS    1. Moderate mitral regurgitation.  2.  Mild aortic regurgitation.  3. Severely dilated left atrium.  LA volume index = 52cc/m2.  4. Normal left ventricular internal dimensions and wall thicknesses.  5. Endocardium not well visualized; grossly severe global left ventricular systolic dysfunction.   Segmental wall  motion could not be assessed.  Septal motion consistent  with conduction disease.  6. Mild right atrial enlargement.  7. Normal right ventricular size and function.  < end of copied text >     ---___---___---___---___---___---___ ---___---___---___---___---                         A S S E S S M E N T   A N D   P L A N :    acute hypoxemic respiratory failure  acute exacerbation of chromic systolic CHF  Afib with RVR  COPD   acute Pulmonary edema   ? possible pneumonia ( on CT ( though less likely given no fever, no leukocytosis)   DM type 2  history of Hypertension     ====>>>>    - post / off bipap support>>NC o2 a dn wean as able  - Diuresis and aim for negative fluid balance  - Monitor urine output  - need HR control  - Cont. AC   - cardiology consultation for further optimization   - Hemodynamic and cardiac monitoring  - Continue Current medications otherwise and monitor.  - nutrition, OOB as able, incentive spirometer   - supportive care   -GI/DVT Prophylaxis per protocol.    --------------------------------------------  Case discussed with patient   Education given on findings and plan of care.  ___________________________  Will follow with you.  Thank you,  SALVATORE Rivera D.O.  Pager: [phone]       ---___---___---___---___---___---___ ---___---___---___---___---___---___---___---___---                  M E D I C A L   A T T E N D I N G    C O N S U L T A T I O N   N O T E  ---___---___---___---___---___---___ ---___---___---___---___---___---___---___---___---                                      ( Note written / Date of service 01-09-24 ( This is certified to be the same as "ENTERED" date above ( for billing purposes)))       Pt seen and examined by me approximately thirty minutes ago.  ++CHIEF COMPLAINT:   Patient is a 68y old  Male who presented with AHRF, CHF exacerbation (09 Jan 2024 08:16)    ++  HPI:  68M from home, ambulates independently, PMHx COPD (not on O2), former smoker quit 2-months, HLD, DM, HTN, chronic ETOH dependence c/b cirrhosis, p/w worsening SOB.   Patient required BIPAP >> improved and anopw on O2 NC   Patient stated that he has had worsening of breathing over 2-years, does not sleep with a machine at home, and when inquired about medications he admits to non-compliance.   Denied fevers, chills, or sick contact. Denies chest pain, palpitations, or GUILLEN.  In ED:   Afebrile;  Afib w/ RVR and PVC; /102; RR 22 100% on RA. Started on BIPAP for increased WOB saturating 100% with improvement in WOB. Trop negative 17, BNP 1853 (improved from 4463). Given duoneb x3, solumedrol 125mg x1, rocephin x1, and 500cc bolus x1, diltizem 20mg IVP x1 + 10mg IVP x1. Awaiting to receive vanc x1 and azithromycin. CXR increased b/l opacification and left lower pleural effusion.  (08 Jan 2024 08:55)  admitted to ICU for resp faiure, new Bipap, CHF, new AF with RVR    ---___---___---___---___---___---  PAST MEDICAL & SURGICAL HISTORY:    HTN (hypertension)  Varicose veins  HLD (hyperlipidemia)  Liver cirrhosis  Portal hypertension with esophageal varices  COPD (chronic obstructive pulmonary disease)  Syncope  Alcohol dependence, daily use  DM (diabetes mellitus)    No significant past surgical history    ---___---___---___---___---___---   FAMILY HISTORY:    N/C    ---___---___---___---___---___---  SOCIAL HISTORY:  Alcohol: as above   Smoking: + past smoker     ---___---___---___---___---___---  ALLERGIES:     No Known Allergies    ---___---___---___---___---___---    MEDICATIONS:  MEDICATIONS  (STANDING):  apixaban 5 milliGRAM(s) Oral two times a day  buMETAnide Injectable 1 milliGRAM(s) IV Push every 12 hours  chlorhexidine 2% Cloths 1 Application(s) Topical <User Schedule>  insulin lispro (ADMELOG) corrective regimen sliding scale   SubCutaneous Before meals and at bedtime  melatonin 5 milliGRAM(s) Oral at bedtime  metoprolol tartrate 50 milliGRAM(s) Oral every 8 hours  polyethylene glycol 3350 17 Gram(s) Oral daily  senna 2 Tablet(s) Oral at bedtime    ___________  Active diet:  Diet, Regular:   DASH/TLC Sodium & Cholesterol Restricted  ___________________    HOME MEDICATIONS:  Breo Ellipta 200 mcg-25 mcg/inh inhalation powder  metFORMIN 500 mg oral tablet  montelukast 10 mg oral tablet  omeprazole 20 mg oral delayed release capsule    ---___---___---___---___---___---  REVIEW OF SYSTEM:    GEN: no fever, no chills, no pain  RESP: no SOB at rest, no cough, no sputum  CVS: no chest pain, no palpitations, no edema  GI: no abdominal pain, no nausea, no vomiting, no constipation, no diarrhea  : no dysuria, no frequency, no hematuria  Neuro: no headache, no dizziness  PSYCH: no anxiety, no depression  Derm : no itching, no rash    ---___---___---___---___---___---  VITAL SIGNS:  T(C): 35.6 (01-09-24 @ 05:00), Max: 36.9 (01-08-24 @ 23:45)  HR: 118 (01-09-24 @ 13:00) (106 - 136)  BP: 109/65 (01-09-24 @ 13:00) (108/94 - 168/84)  BP(mean): 77 (01-09-24 @ 13:00)  RR: 16 (01-09-24 @ 13:00) (16 - 27)  SpO2: 100% (01-09-24 @ 13:00) (96% - 100%)     CAPILLARY BLOOD GLUCOSE  POCT Blood Glucose.: 111 mg/dL (09 Jan 2024 06:12)  POCT Blood Glucose.: 140 mg/dL (08 Jan 2024 22:21)  POCT Blood Glucose.: 127 mg/dL (08 Jan 2024 17:13)    I&O's Summary    08 Jan 2024 07:01  -  09 Jan 2024 07:00  --------------------------------------------------------  IN: 100 mL / OUT: 1500 mL / NET: -1400 mL    ---___---___---___---___---___---  PHYSICAL EXAM:    GEN: A&O X 2-3 , NAD , comfortable, pleasant, calm   HEENT: NCAT, PERRL, MMM, hearing intact  Neck: supple , no JVD  CVS: S1S2 , Irregular , No M/R/G appreciated, tachy  PULM: CTA B/L,  no W/R/R appreciated  ABD.: soft. non tender, non distended,  bowel sounds present  Extrem: intact pulses , no edema   Derm: No rash , no ecchymoses  PSYCH : normal mood,  no delusion not anxious     ---___---___---___---___---___---            LAB AND IMAGING:                        10.2   9.09  )-----------( 150      ( 09 Jan 2024 03:50 )             33.3        01-09    139  |  109<H>  |  14  ----------------------------<  123<H>  4.1   |  23  |  1.37<H>    Ca    9.5      09 Jan 2024 03:50  Phos  5.3     01-09  Mg     2.0     01-09    TPro  7.4  /  Alb  3.1<L>  /  TBili  0.6  /  DBili  x   /  AST  17  /  ALT  13  /  AlkPhos  64  01-09    PT/INR - ( 08 Jan 2024 07:20 )   PT: 11.7 sec;   INR: 1.03 ratio                   ABG - ( 08 Jan 2024 11:20 )  pH, Arterial: 7.41  pH, Blood: x     /  pCO2: 34    /  pO2: 143   / HCO3: 22    / Base Excess: -2.4  /  SaO2: 100       TSH:      2.31   (11-23-23)           HgA1C:   (11-22-23)          (11-22-23)      4.9    ~~ High Sensitivity Troponin  ~~    17.0  <<--    Pro-BNP: 1853 (01-08-24 @ 06:24)    < from: CT Chest No Cont (01.08.24 @ 10:36) >  IMPRESSION:  CHF and/or pneumonia with small moderate right pleural effusion and small   loculated left pleural effusion.    < end of copied text >    < from: Transthoracic Echocardiogram (11.19.23 @ 13:36) >  CONCLUSIONS:  TECHNICALLY VERY DIFFICULT STUDY, POOR ACOUSTIC WINDOWS    1. Moderate mitral regurgitation.  2.  Mild aortic regurgitation.  3. Severely dilated left atrium.  LA volume index = 52cc/m2.  4. Normal left ventricular internal dimensions and wall thicknesses.  5. Endocardium not well visualized; grossly severe global left ventricular systolic dysfunction.   Segmental wall  motion could not be assessed.  Septal motion consistent  with conduction disease.  6. Mild right atrial enlargement.  7. Normal right ventricular size and function.  < end of copied text >     ---___---___---___---___---___---___ ---___---___---___---___---                         A S S E S S M E N T   A N D   P L A N :    acute hypoxemic respiratory failure  acute exacerbation of chromic systolic CHF  Afib with RVR  COPD   acute Pulmonary edema   ? possible pneumonia ( on CT ( though less likely given no fever, no leukocytosis)   DM type 2  history of Hypertension     ====>>>>    - post / off bipap support>>NC o2 a dn wean as able  - Diuresis and aim for negative fluid balance  - Monitor urine output  - need HR control  - Cont. AC   - cardiology consultation for further optimization   - Hemodynamic and cardiac monitoring  - Continue Current medications otherwise and monitor.  - nutrition, OOB as able, incentive spirometer   - supportive care   -GI/DVT Prophylaxis per protocol.    --------------------------------------------  Case discussed with patient   Education given on findings and plan of care.  ___________________________  Will follow with you.  Thank you,  SALVATORE Rivera D.O.  Pager: [phone]

## 2024-01-10 DIAGNOSIS — F17.200 NICOTINE DEPENDENCE, UNSPECIFIED, UNCOMPLICATED: ICD-10-CM

## 2024-01-10 DIAGNOSIS — E11.9 TYPE 2 DIABETES MELLITUS WITHOUT COMPLICATIONS: ICD-10-CM

## 2024-01-10 DIAGNOSIS — J96.01 ACUTE RESPIRATORY FAILURE WITH HYPOXIA: ICD-10-CM

## 2024-01-10 DIAGNOSIS — N18.9 CHRONIC KIDNEY DISEASE, UNSPECIFIED: ICD-10-CM

## 2024-01-10 DIAGNOSIS — I50.21 ACUTE SYSTOLIC (CONGESTIVE) HEART FAILURE: ICD-10-CM

## 2024-01-10 DIAGNOSIS — E78.5 HYPERLIPIDEMIA, UNSPECIFIED: ICD-10-CM

## 2024-01-10 DIAGNOSIS — J44.9 CHRONIC OBSTRUCTIVE PULMONARY DISEASE, UNSPECIFIED: ICD-10-CM

## 2024-01-10 DIAGNOSIS — Z02.9 ENCOUNTER FOR ADMINISTRATIVE EXAMINATIONS, UNSPECIFIED: ICD-10-CM

## 2024-01-10 DIAGNOSIS — I48.91 UNSPECIFIED ATRIAL FIBRILLATION: ICD-10-CM

## 2024-01-10 DIAGNOSIS — Z29.9 ENCOUNTER FOR PROPHYLACTIC MEASURES, UNSPECIFIED: ICD-10-CM

## 2024-01-10 DIAGNOSIS — I10 ESSENTIAL (PRIMARY) HYPERTENSION: ICD-10-CM

## 2024-01-10 LAB
ALBUMIN SERPL ELPH-MCNC: 3.3 G/DL — LOW (ref 3.5–5)
ALBUMIN SERPL ELPH-MCNC: 3.3 G/DL — LOW (ref 3.5–5)
ALP SERPL-CCNC: 59 U/L — SIGNIFICANT CHANGE UP (ref 40–120)
ALP SERPL-CCNC: 59 U/L — SIGNIFICANT CHANGE UP (ref 40–120)
ALT FLD-CCNC: 15 U/L DA — SIGNIFICANT CHANGE UP (ref 10–60)
ALT FLD-CCNC: 15 U/L DA — SIGNIFICANT CHANGE UP (ref 10–60)
ANION GAP SERPL CALC-SCNC: 6 MMOL/L — SIGNIFICANT CHANGE UP (ref 5–17)
ANION GAP SERPL CALC-SCNC: 6 MMOL/L — SIGNIFICANT CHANGE UP (ref 5–17)
AST SERPL-CCNC: 16 U/L — SIGNIFICANT CHANGE UP (ref 10–40)
AST SERPL-CCNC: 16 U/L — SIGNIFICANT CHANGE UP (ref 10–40)
BILIRUB SERPL-MCNC: 0.5 MG/DL — SIGNIFICANT CHANGE UP (ref 0.2–1.2)
BILIRUB SERPL-MCNC: 0.5 MG/DL — SIGNIFICANT CHANGE UP (ref 0.2–1.2)
BUN SERPL-MCNC: 23 MG/DL — HIGH (ref 7–18)
BUN SERPL-MCNC: 23 MG/DL — HIGH (ref 7–18)
CALCIUM SERPL-MCNC: 9.5 MG/DL — SIGNIFICANT CHANGE UP (ref 8.4–10.5)
CALCIUM SERPL-MCNC: 9.5 MG/DL — SIGNIFICANT CHANGE UP (ref 8.4–10.5)
CHLORIDE SERPL-SCNC: 108 MMOL/L — SIGNIFICANT CHANGE UP (ref 96–108)
CHLORIDE SERPL-SCNC: 108 MMOL/L — SIGNIFICANT CHANGE UP (ref 96–108)
CO2 SERPL-SCNC: 26 MMOL/L — SIGNIFICANT CHANGE UP (ref 22–31)
CO2 SERPL-SCNC: 26 MMOL/L — SIGNIFICANT CHANGE UP (ref 22–31)
CREAT SERPL-MCNC: 1.36 MG/DL — HIGH (ref 0.5–1.3)
CREAT SERPL-MCNC: 1.36 MG/DL — HIGH (ref 0.5–1.3)
D DIMER BLD IA.RAPID-MCNC: 309 NG/ML DDU — HIGH
D DIMER BLD IA.RAPID-MCNC: 309 NG/ML DDU — HIGH
EGFR: 56 ML/MIN/1.73M2 — LOW
EGFR: 56 ML/MIN/1.73M2 — LOW
GLUCOSE BLDC GLUCOMTR-MCNC: 109 MG/DL — HIGH (ref 70–99)
GLUCOSE BLDC GLUCOMTR-MCNC: 109 MG/DL — HIGH (ref 70–99)
GLUCOSE BLDC GLUCOMTR-MCNC: 113 MG/DL — HIGH (ref 70–99)
GLUCOSE BLDC GLUCOMTR-MCNC: 113 MG/DL — HIGH (ref 70–99)
GLUCOSE BLDC GLUCOMTR-MCNC: 116 MG/DL — HIGH (ref 70–99)
GLUCOSE BLDC GLUCOMTR-MCNC: 116 MG/DL — HIGH (ref 70–99)
GLUCOSE BLDC GLUCOMTR-MCNC: 96 MG/DL — SIGNIFICANT CHANGE UP (ref 70–99)
GLUCOSE BLDC GLUCOMTR-MCNC: 96 MG/DL — SIGNIFICANT CHANGE UP (ref 70–99)
GLUCOSE SERPL-MCNC: 111 MG/DL — HIGH (ref 70–99)
GLUCOSE SERPL-MCNC: 111 MG/DL — HIGH (ref 70–99)
HCT VFR BLD CALC: 34.2 % — LOW (ref 39–50)
HCT VFR BLD CALC: 34.2 % — LOW (ref 39–50)
HGB BLD-MCNC: 10.6 G/DL — LOW (ref 13–17)
HGB BLD-MCNC: 10.6 G/DL — LOW (ref 13–17)
MAGNESIUM SERPL-MCNC: 2.1 MG/DL — SIGNIFICANT CHANGE UP (ref 1.6–2.6)
MAGNESIUM SERPL-MCNC: 2.1 MG/DL — SIGNIFICANT CHANGE UP (ref 1.6–2.6)
MCHC RBC-ENTMCNC: 27.2 PG — SIGNIFICANT CHANGE UP (ref 27–34)
MCHC RBC-ENTMCNC: 27.2 PG — SIGNIFICANT CHANGE UP (ref 27–34)
MCHC RBC-ENTMCNC: 31 GM/DL — LOW (ref 32–36)
MCHC RBC-ENTMCNC: 31 GM/DL — LOW (ref 32–36)
MCV RBC AUTO: 87.7 FL — SIGNIFICANT CHANGE UP (ref 80–100)
MCV RBC AUTO: 87.7 FL — SIGNIFICANT CHANGE UP (ref 80–100)
NRBC # BLD: 0 /100 WBCS — SIGNIFICANT CHANGE UP (ref 0–0)
NRBC # BLD: 0 /100 WBCS — SIGNIFICANT CHANGE UP (ref 0–0)
PHOSPHATE SERPL-MCNC: 4.7 MG/DL — HIGH (ref 2.5–4.5)
PHOSPHATE SERPL-MCNC: 4.7 MG/DL — HIGH (ref 2.5–4.5)
PLATELET # BLD AUTO: 168 K/UL — SIGNIFICANT CHANGE UP (ref 150–400)
PLATELET # BLD AUTO: 168 K/UL — SIGNIFICANT CHANGE UP (ref 150–400)
POTASSIUM SERPL-MCNC: 3.7 MMOL/L — SIGNIFICANT CHANGE UP (ref 3.5–5.3)
POTASSIUM SERPL-MCNC: 3.7 MMOL/L — SIGNIFICANT CHANGE UP (ref 3.5–5.3)
POTASSIUM SERPL-SCNC: 3.7 MMOL/L — SIGNIFICANT CHANGE UP (ref 3.5–5.3)
POTASSIUM SERPL-SCNC: 3.7 MMOL/L — SIGNIFICANT CHANGE UP (ref 3.5–5.3)
PROT SERPL-MCNC: 7.3 G/DL — SIGNIFICANT CHANGE UP (ref 6–8.3)
PROT SERPL-MCNC: 7.3 G/DL — SIGNIFICANT CHANGE UP (ref 6–8.3)
RBC # BLD: 3.9 M/UL — LOW (ref 4.2–5.8)
RBC # BLD: 3.9 M/UL — LOW (ref 4.2–5.8)
RBC # FLD: 14.5 % — SIGNIFICANT CHANGE UP (ref 10.3–14.5)
RBC # FLD: 14.5 % — SIGNIFICANT CHANGE UP (ref 10.3–14.5)
SODIUM SERPL-SCNC: 140 MMOL/L — SIGNIFICANT CHANGE UP (ref 135–145)
SODIUM SERPL-SCNC: 140 MMOL/L — SIGNIFICANT CHANGE UP (ref 135–145)
WBC # BLD: 9.57 K/UL — SIGNIFICANT CHANGE UP (ref 3.8–10.5)
WBC # BLD: 9.57 K/UL — SIGNIFICANT CHANGE UP (ref 3.8–10.5)
WBC # FLD AUTO: 9.57 K/UL — SIGNIFICANT CHANGE UP (ref 3.8–10.5)
WBC # FLD AUTO: 9.57 K/UL — SIGNIFICANT CHANGE UP (ref 3.8–10.5)

## 2024-01-10 RX ORDER — LOSARTAN POTASSIUM 100 MG/1
100 TABLET, FILM COATED ORAL DAILY
Refills: 0 | Status: DISCONTINUED | OUTPATIENT
Start: 2024-01-10 | End: 2024-01-20

## 2024-01-10 RX ORDER — MONTELUKAST 4 MG/1
10 TABLET, CHEWABLE ORAL AT BEDTIME
Refills: 0 | Status: DISCONTINUED | OUTPATIENT
Start: 2024-01-10 | End: 2024-01-20

## 2024-01-10 RX ORDER — ASPIRIN/CALCIUM CARB/MAGNESIUM 324 MG
81 TABLET ORAL DAILY
Refills: 0 | Status: DISCONTINUED | OUTPATIENT
Start: 2024-01-10 | End: 2024-01-20

## 2024-01-10 RX ORDER — GABAPENTIN 400 MG/1
300 CAPSULE ORAL AT BEDTIME
Refills: 0 | Status: DISCONTINUED | OUTPATIENT
Start: 2024-01-10 | End: 2024-01-20

## 2024-01-10 RX ORDER — ATORVASTATIN CALCIUM 80 MG/1
40 TABLET, FILM COATED ORAL AT BEDTIME
Refills: 0 | Status: DISCONTINUED | OUTPATIENT
Start: 2024-01-10 | End: 2024-01-20

## 2024-01-10 RX ORDER — ALBUTEROL 90 UG/1
2 AEROSOL, METERED ORAL EVERY 4 HOURS
Refills: 0 | Status: DISCONTINUED | OUTPATIENT
Start: 2024-01-10 | End: 2024-01-17

## 2024-01-10 RX ORDER — BUDESONIDE AND FORMOTEROL FUMARATE DIHYDRATE 160; 4.5 UG/1; UG/1
2 AEROSOL RESPIRATORY (INHALATION)
Refills: 0 | Status: DISCONTINUED | OUTPATIENT
Start: 2024-01-10 | End: 2024-01-20

## 2024-01-10 RX ORDER — TIOTROPIUM BROMIDE 18 UG/1
2 CAPSULE ORAL; RESPIRATORY (INHALATION) DAILY
Refills: 0 | Status: DISCONTINUED | OUTPATIENT
Start: 2024-01-10 | End: 2024-01-20

## 2024-01-10 RX ORDER — AMLODIPINE BESYLATE 2.5 MG/1
5 TABLET ORAL DAILY
Refills: 0 | Status: DISCONTINUED | OUTPATIENT
Start: 2024-01-10 | End: 2024-01-20

## 2024-01-10 RX ORDER — PANTOPRAZOLE SODIUM 20 MG/1
40 TABLET, DELAYED RELEASE ORAL
Refills: 0 | Status: DISCONTINUED | OUTPATIENT
Start: 2024-01-10 | End: 2024-01-20

## 2024-01-10 RX ADMIN — APIXABAN 5 MILLIGRAM(S): 2.5 TABLET, FILM COATED ORAL at 17:03

## 2024-01-10 RX ADMIN — MONTELUKAST 10 MILLIGRAM(S): 4 TABLET, CHEWABLE ORAL at 22:11

## 2024-01-10 RX ADMIN — BUMETANIDE 1 MILLIGRAM(S): 0.25 INJECTION INTRAMUSCULAR; INTRAVENOUS at 05:56

## 2024-01-10 RX ADMIN — Medication 50 MILLIGRAM(S): at 22:09

## 2024-01-10 RX ADMIN — CHLORHEXIDINE GLUCONATE 1 APPLICATION(S): 213 SOLUTION TOPICAL at 05:55

## 2024-01-10 RX ADMIN — BUDESONIDE AND FORMOTEROL FUMARATE DIHYDRATE 2 PUFF(S): 160; 4.5 AEROSOL RESPIRATORY (INHALATION) at 22:09

## 2024-01-10 RX ADMIN — APIXABAN 5 MILLIGRAM(S): 2.5 TABLET, FILM COATED ORAL at 05:56

## 2024-01-10 RX ADMIN — SENNA PLUS 2 TABLET(S): 8.6 TABLET ORAL at 22:09

## 2024-01-10 RX ADMIN — Medication 50 MILLIGRAM(S): at 15:33

## 2024-01-10 RX ADMIN — Medication 50 MILLIGRAM(S): at 05:56

## 2024-01-10 RX ADMIN — Medication 5 MILLIGRAM(S): at 22:09

## 2024-01-10 RX ADMIN — ATORVASTATIN CALCIUM 40 MILLIGRAM(S): 80 TABLET, FILM COATED ORAL at 22:09

## 2024-01-10 RX ADMIN — GABAPENTIN 300 MILLIGRAM(S): 400 CAPSULE ORAL at 22:06

## 2024-01-10 NOTE — PROGRESS NOTE ADULT - ASSESSMENT
68M from home, ambulates independently, PMHx COPD (not on O2), former smoker quit 2-months, HLD, DM, HTN, chronic ETOH dependence c/b cirrhosis, p/w worsening SOB. Admitted to the ICU for AHRF 2/2 suspected CHF exacerbation requiring continuous BIPAP.       Afib w/ RVR  EKG showing Afib w/ RVR HR 140s with PVC  Trop negative x1  S/p diltiazem 20mg IVP x1 +10mg IVP x1 with little effect   S/p IV lopressor 5mg   Started PO metoprolol  Started Eliquis   monitor on tele     #HFrEF  EKG showing Afib w/ RVR HR 140s with PVC  Trop negative x1; BNP 1853 ( improved 4463)  TTE 11/2023: EF 20-25%   s/p IV bumex 1mg IVP BID  started PO Bumex      #HTN  /102 improved 129/81 s/p diltiazem IVP in ED  Started PO Metoprolol  monitor BP    ================- Pulm=================================  #Acute Respiratory Failure   not hypoxic, increased WOB  s/p duonebs x3 and solumedrol 125mg IVP x1  RVP neg  CT chest: CHF and/or pneumonia with small moderate right pleural effusion and small loculated left pleural effusion.  Pt weaned off Bipap, currently on NC  titrate as tolerated     #COPD, former smoker   c/w home meds therapeutic interchange     ==================ID===================================  #Leukocytosis   less likely of infectious etiology   Imaging c/w pulmonary edema  will optimize volume status     ================= Nephro================================  #CKD  SCr 1.36 (baseline 1.4-2.6)  will continue to monitor     =================GI====================================  No issues     ================ Heme==================================  #Leukocytosis   plan as above     =================Endocrine===============================  No issues     ================= Skin/Catheters============================  Peripheral IV lines     =================Prophylaxis =============================  Eliquis    ==================GOC==================================  FULL CODE, patient deferred GOC to wife but unable to reach twice.       Disposition: Telemetry     68M from home, ambulates independently, PMHx COPD (not on O2), former smoker quit 2-months, HLD, DM, HTN, chronic ETOH dependence c/b cirrhosis, p/w worsening SOB. Admitted to the ICU for AHRF 2/2 suspected CHF exacerbation requiring continuous BIPAP.                 ================- Pulm=================================  #Acute Respiratory Failure   not hypoxic, increased WOB  s/p duonebs x3 and solumedrol 125mg IVP x1  RVP neg  CT chest: CHF and/or pneumonia with small moderate right pleural effusion and small loculated left pleural effusion.  Pt weaned off Bipap, currently on NC  titrate as tolerated     #COPD, former smoker   c/w home meds therapeutic interchange     ==================ID===================================  #Leukocytosis   less likely of infectious etiology   Imaging c/w pulmonary edema  will optimize volume status     ================= Nephro================================  #CKD  SCr 1.36 (baseline 1.4-2.6)  will continue to monitor     =================GI====================================  No issues     ================ Heme==================================  #Leukocytosis   plan as above     =================Endocrine===============================  No issues     ================= Skin/Catheters============================  Peripheral IV lines     =================Prophylaxis =============================  Eliquis    ==================GOC==================================  FULL CODE, patient deferred GOC to wife but unable to reach twice.       Disposition: Telemetry

## 2024-01-10 NOTE — CONSULT NOTE ADULT - ASSESSMENT
68M from home, ambulates independently, PMHx COPD (not on O2), former smoker quit 2-months, HLD, DM, HTN, chronic ETOH dependence c/b cirrhosis, p/w worsening SOB. Admitted to the ICU for AHRF 2/2 suspected CHF exacerbation requiring continuous BIPAP.       #  Acute hypoxic respiratory failure.   # Atrial fibrillation with RVR.   # Acute systolic congestive heart failure.     - S/p ICU for BIPAP.    - c/w metoprolol 50mg q8 + eliquis  - cont  tele  - echo EF 20-25%  - cont  bumex 1mg daily

## 2024-01-10 NOTE — DIETITIAN INITIAL EVALUATION ADULT - PERTINENT MEDS FT
MEDICATIONS  (STANDING):  apixaban 5 milliGRAM(s) Oral two times a day  buMETAnide 1 milliGRAM(s) Oral daily  chlorhexidine 2% Cloths 1 Application(s) Topical <User Schedule>  insulin lispro (ADMELOG) corrective regimen sliding scale   SubCutaneous Before meals and at bedtime  melatonin 5 milliGRAM(s) Oral at bedtime  metoprolol tartrate 50 milliGRAM(s) Oral every 8 hours  polyethylene glycol 3350 17 Gram(s) Oral daily  senna 2 Tablet(s) Oral at bedtime    MEDICATIONS  (PRN):

## 2024-01-10 NOTE — PROGRESS NOTE ADULT - SUBJECTIVE AND OBJECTIVE BOX
Time of Visit:  Patient seen and examined.     MEDICATIONS  (STANDING):  apixaban 5 milliGRAM(s) Oral two times a day  buMETAnide 1 milliGRAM(s) Oral daily  chlorhexidine 2% Cloths 1 Application(s) Topical <User Schedule>  insulin lispro (ADMELOG) corrective regimen sliding scale   SubCutaneous Before meals and at bedtime  melatonin 5 milliGRAM(s) Oral at bedtime  metoprolol tartrate 50 milliGRAM(s) Oral every 8 hours  polyethylene glycol 3350 17 Gram(s) Oral daily  senna 2 Tablet(s) Oral at bedtime      MEDICATIONS  (PRN):       Medications up to date at time of exam.    ROS; No fever, chills, cough, nasal congestion on exam.   PHYSICAL EXAMINATION:  Vital Signs Last 24 Hrs  T(C): 36.7 (10 Chin 2024 11:32), Max: 36.7 (10 Chin 2024 11:32)  T(F): 98.1 (10 Chni 2024 11:32), Max: 98.1 (10 Chin 2024 11:32)  HR: 104 (10 Chin 2024 11:32) (88 - 136)  BP: 111/83 (10 Chin 2024 11:32) (102/83 - 143/107)  BP(mean): 119 (09 Jan 2024 18:00) (83 - 119)  RR: 18 (10 Chin 2024 11:32) (17 - 25)  SpO2: 100% (10 Chin 2024 11:32) (93% - 100%)    Parameters below as of 10 Chin 2024 11:32  Patient On (Oxygen Delivery Method): nasal cannula  O2 Flow (L/min): 2     (if applicable)    General : Alert and oriented. No acute distress .     HEENT: Head is normocephalic and atraumatic. No nasal tenderness. Extraocular muscles are intact. Mucous membranes are moist.     NECK: Supple, no palpable adenopathy.    LUNGS: Clear to auscultation bilaterally with no wheezing, rales, or rhonchi. No use of accessory muscle.     HEART: S1 S2 Regular rate and no click / rub.     ABDOMEN: Soft, nontender, and nondistended. Active bowel sounds.     EXTREMITIES: Without any cyanosis, clubbing, rash, lesions .    NEUROLOGIC: Awake, alert, oriented.     SKIN: Warm and moist . Non diaphoretic.       LABS:                        10.6   9.57  )-----------( 168      ( 10 Chin 2024 06:50 )             34.2     01-10    140  |  108  |  23<H>  ----------------------------<  111<H>  3.7   |  26  |  1.36<H>    Ca    9.5      10 Chin 2024 06:50  Phos  4.7     01-10  Mg     2.1     01-10    TPro  7.3  /  Alb  3.3<L>  /  TBili  0.5  /  DBili  x   /  AST  16  /  ALT  15  /  AlkPhos  59  01-10      Urinalysis Basic - ( 10 Chin 2024 06:50 )    Color: x / Appearance: x / SG: x / pH: x  Gluc: 111 mg/dL / Ketone: x  / Bili: x / Urobili: x   Blood: x / Protein: x / Nitrite: x   Leuk Esterase: x / RBC: x / WBC x   Sq Epi: x / Non Sq Epi: x / Bacteria: x            D-Dimer Assay, Quantitative: 309 ng/mL DDU (01-10-24 @ 12:05)            MICROBIOLOGY: (if applicable)    RADIOLOGY & ADDITIONAL STUDIES:  EKG:   CXR:  ECHO:    IMPRESSION: 68y Male PAST MEDICAL & SURGICAL HISTORY:  HTN (hypertension)      Varicose veins      HLD (hyperlipidemia)      Liver cirrhosis      Portal hypertension with esophageal varices      COPD (chronic obstructive pulmonary disease)      Syncope      Alcohol dependence, daily use      Smokes tobacco daily      DM (diabetes mellitus)      No significant past surgical history       Impression: This is a 67 Y/O Male former smoker. hx of chronic ETOH dependence with Cirrhosis . Presented to ED with worsening SOB. In the ED patient placed on Bipap. For pulmonary follow up of Acute hypoxic respiratory failure due to COPD , pulmonary edema / HFrEF , Rapid A Fib. Off Bipap and on Oxygen supplementation .       Suggestion:  O2 saturation 96% with O2 supplement. Continue Oxygen supplementation 2L NC .  On Apixaban 5 mg Twice daily.   Monitor O2 saturation trend.    Diuresis and aim for negative fluid balance.   Monitor urine output.   Beta blockers for rate control   Hemodynamic and cardiac monitoring         Time of Visit:  Patient seen and examined.     MEDICATIONS  (STANDING):  apixaban 5 milliGRAM(s) Oral two times a day  buMETAnide 1 milliGRAM(s) Oral daily  chlorhexidine 2% Cloths 1 Application(s) Topical <User Schedule>  insulin lispro (ADMELOG) corrective regimen sliding scale   SubCutaneous Before meals and at bedtime  melatonin 5 milliGRAM(s) Oral at bedtime  metoprolol tartrate 50 milliGRAM(s) Oral every 8 hours  polyethylene glycol 3350 17 Gram(s) Oral daily  senna 2 Tablet(s) Oral at bedtime      MEDICATIONS  (PRN):       Medications up to date at time of exam.    ROS; No fever, chills, cough, nasal congestion on exam.   PHYSICAL EXAMINATION:  Vital Signs Last 24 Hrs  T(C): 36.7 (10 Chin 2024 11:32), Max: 36.7 (10 Chin 2024 11:32)  T(F): 98.1 (10 Chin 2024 11:32), Max: 98.1 (10 Chin 2024 11:32)  HR: 104 (10 Chin 2024 11:32) (88 - 136)  BP: 111/83 (10 Chin 2024 11:32) (102/83 - 143/107)  BP(mean): 119 (09 Jan 2024 18:00) (83 - 119)  RR: 18 (10 Chin 2024 11:32) (17 - 25)  SpO2: 100% (10 Chin 2024 11:32) (93% - 100%)    Parameters below as of 10 Chin 2024 11:32  Patient On (Oxygen Delivery Method): nasal cannula  O2 Flow (L/min): 2     (if applicable)    General : Alert and oriented. No acute distress .     HEENT: Head is normocephalic and atraumatic. No nasal tenderness. Extraocular muscles are intact. Mucous membranes are moist.     NECK: Supple, no palpable adenopathy.    LUNGS: Clear to auscultation bilaterally with no wheezing, rales, or rhonchi. No use of accessory muscle.     HEART: S1 S2 Regular rate and no click / rub.     ABDOMEN: Soft, nontender, and nondistended. Active bowel sounds.     EXTREMITIES: Without any cyanosis, clubbing, rash, lesions .    NEUROLOGIC: Awake, alert, oriented.     SKIN: Warm and moist . Non diaphoretic.       LABS:                        10.6   9.57  )-----------( 168      ( 10 Chin 2024 06:50 )             34.2     01-10    140  |  108  |  23<H>  ----------------------------<  111<H>  3.7   |  26  |  1.36<H>    Ca    9.5      10 Chin 2024 06:50  Phos  4.7     01-10  Mg     2.1     01-10    TPro  7.3  /  Alb  3.3<L>  /  TBili  0.5  /  DBili  x   /  AST  16  /  ALT  15  /  AlkPhos  59  01-10      Urinalysis Basic - ( 10 Chin 2024 06:50 )    Color: x / Appearance: x / SG: x / pH: x  Gluc: 111 mg/dL / Ketone: x  / Bili: x / Urobili: x   Blood: x / Protein: x / Nitrite: x   Leuk Esterase: x / RBC: x / WBC x   Sq Epi: x / Non Sq Epi: x / Bacteria: x            D-Dimer Assay, Quantitative: 309 ng/mL DDU (01-10-24 @ 12:05)            MICROBIOLOGY: (if applicable)    RADIOLOGY & ADDITIONAL STUDIES:  EKG:   CXR:  ECHO:    IMPRESSION: 68y Male PAST MEDICAL & SURGICAL HISTORY:  HTN (hypertension)      Varicose veins      HLD (hyperlipidemia)      Liver cirrhosis      Portal hypertension with esophageal varices      COPD (chronic obstructive pulmonary disease)      Syncope      Alcohol dependence, daily use      Smokes tobacco daily      DM (diabetes mellitus)      No significant past surgical history       Impression: This is a 69 Y/O Male former smoker. hx of chronic ETOH dependence with Cirrhosis . Presented to ED with worsening SOB. In the ED patient placed on Bipap. For pulmonary follow up of Acute hypoxic respiratory failure due to COPD , pulmonary edema / HFrEF , Rapid A Fib. Off Bipap and on Oxygen supplementation .       Suggestion:  O2 saturation 96% with O2 supplement. Continue Oxygen supplementation 2L NC .  On Apixaban 5 mg Twice daily.   Monitor O2 saturation trend.    Diuresis and aim for negative fluid balance.   Monitor urine output.   Beta blockers for rate control   Hemodynamic and cardiac monitoring

## 2024-01-10 NOTE — PROGRESS NOTE ADULT - PROBLEM SELECTOR PLAN 3
echo noted 11/2023 ef  20-25%  c/w bumex 1mg daily on Bumex ( m,w,f BID) at home  echo noted 11/2023 ef  20-25%  c/w bumex 1mg daily  daily wt, strict I &O, fluid restrictions  monitor bmp w/ lytes

## 2024-01-10 NOTE — CONSULT NOTE ADULT - TIME BILLING
- Review of records, telemetry, vital signs and daily labs.   - General and cardiovascular physical examination.  - Generation of cardiovascular treatment plan.  - Coordination of care.      Patient was seen and examined by me on 1/10/23,interim events noted,labs and radiology studies reviewed.  Nate Alcazar MD,FACC.  [address] [place], [place] [id number].  [phone] - Review of records, telemetry, vital signs and daily labs.   - General and cardiovascular physical examination.  - Generation of cardiovascular treatment plan.  - Coordination of care.      Patient was seen and examined by me on 1/10/23,interim events noted,labs and radiology studies reviewed.  Nate Alcazar MD,FACC.  [address] [place], [place] [id number].  [phone]

## 2024-01-10 NOTE — PROGRESS NOTE ADULT - NS ATTEND AMEND GEN_ALL_CORE FT
Impression: This is a 69 Y/O Male former smoker. hx of chronic ETOH dependence with Cirrhosis . Presented to ED with worsening SOB. In the ED patient placed on Bipap. For pulmonary follow up of Acute hypoxic respiratory failure due to COPD , pulmonary edema / HFrEF , Rapid A Fib. Off Bipap and on Oxygen supplementation .       Suggestion:  O2 saturation 96% with O2 supplement. Continue Oxygen supplementation 2L NC .  On Apixaban 5 mg Twice daily.   Monitor O2 saturation trend.    Diuresis and aim for negative fluid balance.   Monitor urine output.   Beta blockers for rate control   Hemodynamic and cardiac monitoring Impression: This is a 67 Y/O Male former smoker. hx of chronic ETOH dependence with Cirrhosis . Presented to ED with worsening SOB. In the ED patient placed on Bipap. For pulmonary follow up of Acute hypoxic respiratory failure due to COPD , pulmonary edema / HFrEF , Rapid A Fib. Off Bipap and on Oxygen supplementation .       Suggestion:  O2 saturation 96% with O2 supplement. Continue Oxygen supplementation 2L NC .  On Apixaban 5 mg Twice daily.   Monitor O2 saturation trend.    Diuresis and aim for negative fluid balance.   Monitor urine output.   Beta blockers for rate control   Hemodynamic and cardiac monitoring

## 2024-01-10 NOTE — PROGRESS NOTE ADULT - PROBLEM SELECTOR PLAN 2
In ED: Afebrile;  Afib w/ RVR and PVC; /102; Trop negative 17,  s/p diltizem 20mg IVP x1 + 10mg IVP x1  c/w metoprolol 50mg q8  Cardio Dr. Alcazar consulted, f/u reccs In ED: Afebrile;  Afib w/ RVR and PVC; /102; Trop negative 17,  s/p diltizem 20mg IVP x1 + 10mg IVP x1  c/w metoprolol 50mg q8 + eliquis  c/w tele  Cardio Dr. Alcazar consulted, f/u reccs

## 2024-01-10 NOTE — PROGRESS NOTE ADULT - ASSESSMENT
_________________________________________________________________________________________  ========>>  M E D I C A L   A T T E N D I N G    F O L L O W  U P  N O T E  <<=========  -----------------------------------------------------------------------------------------------------    - Patient seen and examined by me earlier today.  Patient transferred off of ICU to medical floor on telemetry   - In summary,  GREGORY ELIZABETH is a 68y year old man admitted with   - Patient today overall doing ok, comfortable, eating OK. Occasional shortness of breath, however appears to be improved in general    ==================>> REVIEW OF SYSTEM <<=================    GEN: no fever, no chills, no pain  RESP: no SOB At the rest, no cough / sputum Reported  CVS: no chest pain, no palpitations  GI: no abdominal pain, no nausea, no constipation, no diarrhea  : no dysuria, no frequency  Neuro: no headache, no dizziness    ==================>> PHYSICAL EXAM <<=================    GEN: A&O X 3 , NAD , comfortable, pleasant, calm in bed... encouraged out of bed to chair with assistance as needed.   HEENT: NCAT, PERRL, MMM, hearing intact  CVS: S1S2 , Irregular , No M/R/G appreciated, Less tachy  PULM: CTA B/L,  no W/R/R appreciated  ABD.: soft. non tender, non distended,  bowel sounds present  Extrem: intact pulses , no edema             ( Note written / Date of service 01-10-24 ( This is certified to be the same as "ENTERED" date above ( for billing purposes)))    ==================>> MEDICATIONS <<====================    MEDICATIONS  (STANDING):  amLODIPine   Tablet 5 milliGRAM(s) Oral daily  apixaban 5 milliGRAM(s) Oral two times a day  aspirin  chewable 81 milliGRAM(s) Oral daily  atorvastatin 40 milliGRAM(s) Oral at bedtime  budesonide 160 MICROgram(s)/formoterol 4.5 MICROgram(s) Inhaler 2 Puff(s) Inhalation two times a day  buMETAnide 1 milliGRAM(s) Oral daily  chlorhexidine 2% Cloths 1 Application(s) Topical <User Schedule>  gabapentin 300 milliGRAM(s) Oral at bedtime  insulin lispro (ADMELOG) corrective regimen sliding scale   SubCutaneous Before meals and at bedtime  losartan 100 milliGRAM(s) Oral daily  melatonin 5 milliGRAM(s) Oral at bedtime  metoprolol tartrate 50 milliGRAM(s) Oral every 8 hours  montelukast 10 milliGRAM(s) Oral at bedtime  pantoprazole    Tablet 40 milliGRAM(s) Oral before breakfast  polyethylene glycol 3350 17 Gram(s) Oral daily  senna 2 Tablet(s) Oral at bedtime  tiotropium 2.5 MICROgram(s) Inhaler 2 Puff(s) Inhalation daily    MEDICATIONS  (PRN):  albuterol    90 MICROgram(s) HFA Inhaler 2 Puff(s) Inhalation every 4 hours PRN Shortness of Breath and/or Wheezing    ___________  Active diet:  Diet, Regular:   DASH/TLC Sodium & Cholesterol Restricted  1200mL Fluid Restriction ([id number])  ___________________    ==================>> VITAL SIGNS <<==================    T(C): 36.8 (01-10-24 @ 16:01), Max: 36.8 (01-10-24 @ 16:01)  HR: 100 (01-10-24 @ 16:01) (88 - 130)  BP: 127/90 (01-10-24 @ 16:01) (102/83 - 139/82)  RR: 18 (01-10-24 @ 16:01) (18 - 20)  SpO2: 100% (01-10-24 @ 16:01) (93% - 100%)     CAPILLARY BLOOD GLUCOSE  POCT Blood Glucose.: 96 mg/dL (10 Chin 2024 16:40)  POCT Blood Glucose.: 109 mg/dL (10 Chin 2024 11:43)  POCT Blood Glucose.: 116 mg/dL (10 Chin 2024 07:54)  POCT Blood Glucose.: 155 mg/dL (09 Jan 2024 21:21)    I&O's Summary    09 Jan 2024 07:01  -  10 Chin 2024 07:00  --------------------------------------------------------  IN: 700 mL / OUT: 1100 mL / NET: -400 mL       ==================>> LAB AND IMAGING <<==================                        10.6   9.57  )-----------( 168      ( 10 Chin 2024 06:50 )             34.2        01-10    140  |  108  |  23<H>  ----------------------------<  111<H>  3.7   |  26  |  1.36<H>    Creatinine:  1.36  <<==, 1.37  <<==, 1.36  <<==    Ca    9.5      10 Cihn 2024 06:50  Phos  4.7     01-10  Mg     2.1     01-10    TPro  7.3  /  Alb  3.3<L>  /  TBili  0.5  /  DBili  x   /  AST  16  /  ALT  15  /  AlkPhos  59  01-10    TSH:      2.31   (11-23-23)           Lipid profile:  P    HgA1C:   (11-22-23)          (11-22-23)      4.9    ___________________________________________________________________________________  ===============>>  A S S E S S M E N T   A N D   P L A N <<===============  ------------------------------------------------------------------------------------------      acute hypoxemic respiratory failure  acute exacerbation of chromic systolic CHF  Afib with RVR  COPD   acute Pulmonary edema   ? possible pneumonia ( on CT ( though less likely / Ruled out )   DM type 2  history of Hypertension     ====>>>>    - post / off bipap support>>NC o2 a dn wean as able  - Diuresis and aim for negative fluid balance  - Monitor urine output  - need HR control  - Cont. AC   - cardiology consultation for further optimization , Pending  - Hemodynamic and cardiac monitoring  - Continue Current medications otherwise and monitor.  - nutrition, OOB as able, incentive spirometer   - supportive care   -GI/DVT Prophylaxis per protocol.    --------------------------------------------  Case discussed with patient , Nurse Practitioner   Education given on findings and plan of care  ___________________________  H. RONI Rivera.  Pager: [phone]     _________________________________________________________________________________________  ========>>  M E D I C A L   A T T E N D I N G    F O L L O W  U P  N O T E  <<=========  -----------------------------------------------------------------------------------------------------    - Patient seen and examined by me earlier today.  Patient transferred off of ICU to medical floor on telemetry   - In summary,  GREGORY ELIZABETH is a 68y year old man admitted with   - Patient today overall doing ok, comfortable, eating OK. Occasional shortness of breath, however appears to be improved in general    ==================>> REVIEW OF SYSTEM <<=================    GEN: no fever, no chills, no pain  RESP: no SOB At the rest, no cough / sputum Reported  CVS: no chest pain, no palpitations  GI: no abdominal pain, no nausea, no constipation, no diarrhea  : no dysuria, no frequency  Neuro: no headache, no dizziness    ==================>> PHYSICAL EXAM <<=================    GEN: A&O X 3 , NAD , comfortable, pleasant, calm in bed... encouraged out of bed to chair with assistance as needed.   HEENT: NCAT, PERRL, MMM, hearing intact  CVS: S1S2 , Irregular , No M/R/G appreciated, Less tachy  PULM: CTA B/L,  no W/R/R appreciated  ABD.: soft. non tender, non distended,  bowel sounds present  Extrem: intact pulses , no edema             ( Note written / Date of service 01-10-24 ( This is certified to be the same as "ENTERED" date above ( for billing purposes)))    ==================>> MEDICATIONS <<====================    MEDICATIONS  (STANDING):  amLODIPine   Tablet 5 milliGRAM(s) Oral daily  apixaban 5 milliGRAM(s) Oral two times a day  aspirin  chewable 81 milliGRAM(s) Oral daily  atorvastatin 40 milliGRAM(s) Oral at bedtime  budesonide 160 MICROgram(s)/formoterol 4.5 MICROgram(s) Inhaler 2 Puff(s) Inhalation two times a day  buMETAnide 1 milliGRAM(s) Oral daily  chlorhexidine 2% Cloths 1 Application(s) Topical <User Schedule>  gabapentin 300 milliGRAM(s) Oral at bedtime  insulin lispro (ADMELOG) corrective regimen sliding scale   SubCutaneous Before meals and at bedtime  losartan 100 milliGRAM(s) Oral daily  melatonin 5 milliGRAM(s) Oral at bedtime  metoprolol tartrate 50 milliGRAM(s) Oral every 8 hours  montelukast 10 milliGRAM(s) Oral at bedtime  pantoprazole    Tablet 40 milliGRAM(s) Oral before breakfast  polyethylene glycol 3350 17 Gram(s) Oral daily  senna 2 Tablet(s) Oral at bedtime  tiotropium 2.5 MICROgram(s) Inhaler 2 Puff(s) Inhalation daily    MEDICATIONS  (PRN):  albuterol    90 MICROgram(s) HFA Inhaler 2 Puff(s) Inhalation every 4 hours PRN Shortness of Breath and/or Wheezing    ___________  Active diet:  Diet, Regular:   DASH/TLC Sodium & Cholesterol Restricted  1200mL Fluid Restriction ([id number])  ___________________    ==================>> VITAL SIGNS <<==================    T(C): 36.8 (01-10-24 @ 16:01), Max: 36.8 (01-10-24 @ 16:01)  HR: 100 (01-10-24 @ 16:01) (88 - 130)  BP: 127/90 (01-10-24 @ 16:01) (102/83 - 139/82)  RR: 18 (01-10-24 @ 16:01) (18 - 20)  SpO2: 100% (01-10-24 @ 16:01) (93% - 100%)     CAPILLARY BLOOD GLUCOSE  POCT Blood Glucose.: 96 mg/dL (10 Chin 2024 16:40)  POCT Blood Glucose.: 109 mg/dL (10 Chin 2024 11:43)  POCT Blood Glucose.: 116 mg/dL (10 Chin 2024 07:54)  POCT Blood Glucose.: 155 mg/dL (09 Jan 2024 21:21)    I&O's Summary    09 Jan 2024 07:01  -  10 Chin 2024 07:00  --------------------------------------------------------  IN: 700 mL / OUT: 1100 mL / NET: -400 mL       ==================>> LAB AND IMAGING <<==================                        10.6   9.57  )-----------( 168      ( 10 Chin 2024 06:50 )             34.2        01-10    140  |  108  |  23<H>  ----------------------------<  111<H>  3.7   |  26  |  1.36<H>    Creatinine:  1.36  <<==, 1.37  <<==, 1.36  <<==    Ca    9.5      10 Chin 2024 06:50  Phos  4.7     01-10  Mg     2.1     01-10    TPro  7.3  /  Alb  3.3<L>  /  TBili  0.5  /  DBili  x   /  AST  16  /  ALT  15  /  AlkPhos  59  01-10    TSH:      2.31   (11-23-23)           Lipid profile:  P    HgA1C:   (11-22-23)          (11-22-23)      4.9    ___________________________________________________________________________________  ===============>>  A S S E S S M E N T   A N D   P L A N <<===============  ------------------------------------------------------------------------------------------      acute hypoxemic respiratory failure  acute exacerbation of chromic systolic CHF  Afib with RVR  COPD   acute Pulmonary edema   ? possible pneumonia ( on CT ( though less likely / Ruled out )   DM type 2  history of Hypertension     ====>>>>    - post / off bipap support>>NC o2 a dn wean as able  - Diuresis and aim for negative fluid balance  - Monitor urine output  - need HR control  - Cont. AC   - cardiology consultation for further optimization , Pending  - Hemodynamic and cardiac monitoring  - Continue Current medications otherwise and monitor.  - nutrition, OOB as able, incentive spirometer   - supportive care   -GI/DVT Prophylaxis per protocol.    --------------------------------------------  Case discussed with patient , Nurse Practitioner   Education given on findings and plan of care  ___________________________  H. RONI Rivera.  Pager: [phone]

## 2024-01-10 NOTE — CONSULT NOTE ADULT - SUBJECTIVE AND OBJECTIVE BOX
PATIENT SEEN AND EXAMINED BY EDGARDO DRISCOLL M.D. ON :- 1/10/24  DATE OF SERVICE:    1/10/23         Interim events noted,Labs ,Radiological studies and Cardiology tests reviewed .  DISCUSSED WITH ACP/MEDICAL RESIDENT ON PLAN OF CARE.    MR#[id number]  PATIENT NAME:[facility] COURSE: HPI:  68M from home, ambulates independently, PMHx COPD (not on O2), former smoker quit 2-months, HLD, DM, HTN, chronic ETOH dependence c/b cirrhosis, p/w worsening SOB. Patient is limited historian, currently on BIPAP and unable to full history deferring further collateral to wife, Maureen Mcclellanvt [phone] but unable to reach. Patient states that he has had worsening of breathing over 2-years, does not sleep with a machine at home, and when inquired about medications he admits to non-compliance. Denies fevers, chills, or sick contact. Denies chest pain, palpitations, or GUILLEN.    In ED:   Afebrile;  Afib w/ RVR and PVC; /102; RR 22 100% on RA. Started on BIPAP for increased WOB saturating 100% with improvement in WOB. Trop negative 17, BNP 1853 (improved from 4463). Given duoneb x3, solumedrol 125mg x1, rocephin x1, and 500cc bolus x1, diltizem 20mg IVP x1 + 10mg IVP x1. Awaiting to receive vanc x1 and azithromycin. CXR increased b/l opacification and left lower pleural effusion.  (08 Jan 2024 08:55)      INTERIM EVENTS:Patient seen at bedside ,interim events noted.      PM -reviewed admission note, no change since admission  HEART FAILURE: Acute[ ]Chronic[ ] Systolic[ ] Diastolic[ ] Combined Systolic and Diastolic[ ]  CAD[ ] CABG[ ] PCI[ ]  DEVICES[ ] PPM[ ] ICD[ ] ILR[ ]  ATRIAL FIBRILLATION[ ] Paroxysmal[ ] Permanent[ ] CHADS2-[  ]  CORTES[ ] CKD1[ ] CKD2[ ] CKD3[ ] CKD4[ ] ESRD[ ]  COPD[ ] HTN[ ]   DM[ ] Type1[ ] Type 2[ ]   CVA[ ] Paresis[ ]    AMBULATION: Assisted[ ] Cane/walker[ ] Independent[ ]    MEDICATIONS  (STANDING):  amLODIPine   Tablet 5 milliGRAM(s) Oral daily  apixaban 5 milliGRAM(s) Oral two times a day  aspirin  chewable 81 milliGRAM(s) Oral daily  atorvastatin 40 milliGRAM(s) Oral at bedtime  budesonide 160 MICROgram(s)/formoterol 4.5 MICROgram(s) Inhaler 2 Puff(s) Inhalation two times a day  buMETAnide 1 milliGRAM(s) Oral daily  chlorhexidine 2% Cloths 1 Application(s) Topical <User Schedule>  gabapentin 300 milliGRAM(s) Oral at bedtime  insulin lispro (ADMELOG) corrective regimen sliding scale   SubCutaneous Before meals and at bedtime  losartan 100 milliGRAM(s) Oral daily  melatonin 5 milliGRAM(s) Oral at bedtime  metoprolol tartrate 50 milliGRAM(s) Oral every 8 hours  montelukast 10 milliGRAM(s) Oral at bedtime  pantoprazole    Tablet 40 milliGRAM(s) Oral before breakfast  polyethylene glycol 3350 17 Gram(s) Oral daily  senna 2 Tablet(s) Oral at bedtime  tiotropium 2.5 MICROgram(s) Inhaler 2 Puff(s) Inhalation daily    MEDICATIONS  (PRN):  albuterol    90 MICROgram(s) HFA Inhaler 2 Puff(s) Inhalation every 4 hours PRN Shortness of Breath and/or Wheezing            REVIEW OF SYSTEMS:  Constitutional: [ ] fever, [ ]weight loss,  [ ]fatigue [ ]weight gain  Eyes: [ ] visual changes  Respiratory: [ ]shortness of breath;  [ ] cough, [ ]wheezing, [ ]chills, [ ]hemoptysis  Cardiovascular: [ ] chest pain, [ ]palpitations, [ ]dizziness,  [ ]leg swelling[ ]orthopnea[ ]PND  Gastrointestinal: [ ] abdominal pain, [ ]nausea, [ ]vomiting,  [ ]diarrhea [ ]Constipation [ ]Melena  Genitourinary: [ ] dysuria, [ ] hematuria [ ]Goss  Neurologic: [ ] headaches [ ] tremors[ ]weakness [ ]Paralysis Right[ ] Left[ ]  Skin: [ ] itching, [ ]burning, [ ] rashes  Endocrine: [ ] heat or cold intolerance  Musculoskeletal: [ ] joint pain or swelling; [ ] muscle, back, or extremity pain  Psychiatric: [ ] depression, [ ]anxiety, [ ]mood swings, or [ ]difficulty sleeping  Hematologic: [ ] easy bruising, [ ] bleeding gums    [ ] All remaining systems negative except as per above.   [ ]Unable to obtain.  [x] No change in ROS since admission      Vital Signs Last 24 Hrs  T(C): 36.4 (10 Chin 2024 19:36), Max: 36.8 (10 Chin 2024 16:01)  T(F): 97.5 (10 Chin 2024 19:36), Max: 98.2 (10 Chin 2024 16:01)  HR: 107 (10 Chin 2024 19:36) (88 - 130)  BP: 140/91 (10 Chin 2024 19:36) (104/66 - 140/91)  BP(mean): --  RR: 18 (10 Chin 2024 19:36) (18 - 20)  SpO2: 100% (10 Chin 2024 19:36) (93% - 100%)    Parameters below as of 10 Chin 2024 19:36  Patient On (Oxygen Delivery Method): nasal cannula  O2 Flow (L/min): 2    I&O's Summary    09 Jan 2024 07:01  -  10 Chin 2024 07:00  --------------------------------------------------------  IN: 700 mL / OUT: 1100 mL / NET: -400 mL        PHYSICAL EXAM:  General: No acute distress BMI-  HEENT: EOMI, PERRL  Neck: Supple, [ ] JVD  Lungs: Equal air entry bilaterally; [ ] rales [ ] wheezing [ ] rhonchi  Heart: Regular rate and rhythm; [x ] murmur   2/6 [ x] systolic [ ] diastolic [ ] radiation[ ] rubs [ ]  gallops  Abdomen: Nontender, bowel sounds present  Extremities: No clubbing, cyanosis, [ ] edema [ ]Pulses  equal and intact  Nervous system:  Alert & Oriented X3, no focal deficits  Psychiatric: Normal affect  Skin: No rashes or lesions    LABS:  01-10    140  |  108  |  23<H>  ----------------------------<  111<H>  3.7   |  26  |  1.36<H>    Ca    9.5      10 Chin 2024 06:50  Phos  4.7     01-10  Mg     2.1     01-10    TPro  7.3  /  Alb  3.3<L>  /  TBili  0.5  /  DBili  x   /  AST  16  /  ALT  15  /  AlkPhos  59  01-10    Creatinine Trend: 1.36<--, 1.37<--, 1.36<--                        10.6   9.57  )-----------( 168      ( 10 Chin 2024 06:50 )             34.2       < from: Transthoracic Echocardiogram (11.19.23 @ 13:36) >    Patient name: GREGORY ELIZABETH  YOB: 1955   Age: 68 (M)   MR#: [id number]  Study Date: 11/19/2023  Location: [place] [id number]: Ciera Duran [facility]  Study quality: Technically difficult  Referring Physician: Matteo Johansen MD  Blood Pressure: 117/79 mmHg  Height: 173 cm  Weight: 71 kg  BSA: 1.8 m2  ------------------------------------------------------------------------    PROCEDURE: Transthoracic echocardiogram with 2-D, M-Mode  and complete spectral and color flow Doppler.  INDICATION: Shock, unspecified (R57.9)  HISTORY:  ------------------------------------------------------------------------  DIMENSIONS:  Dimensions:     Normal Values:  LA:     4.8 cm    2.0 - 4.0 cm  Ao:     3.6 cm    2.0 - 3.8 cm  SEPTUM: 1.2 cm    0.6 - 1.2 cm  PWT:    1.0 cm    0.6 - 1.1 cm  LVIDd:  5.8 cm    3.0 - 5.6 cm  LVIDs:  4.8 cm    1.8 - 4.0 cm      Derived Variables:  LVMI: 144 g/m2  RWT: 0.34  Ejection Fraction Visual Estimate: 20-25 %    ------------------------------------------------------------------------  OBSERVATIONS:  Mitral Valve: Normal mitral valve. Moderate mitral  regurgitation.  Aortic Root: Normal aortic root.  Aortic Valve: Aortic valve not well visualized. No elevated  gradients were noted across the valve. Mildaortic  regurgitation.  Left Atrium: Severely dilated left atrium.  LA volume index  = 52 cc/m2.  Left Ventricle: Endocardium not well visualized; grossly  severe global left ventricular systolic dysfunction.  Segmental wall motion could not be assessed.  Septal motion  consistent with conduction disease. Normal left ventricular  internal dimensions and wall thicknesses. Unable to  adequately assess diastolic function due to technical  aspects of this study.  Right Heart: Mild right atrial enlargement. Normal right  ventricular size and function. There is mild-moderate  tricuspid regurgitation. There is trace pulmonic  regurgitation.  Pericardium/PleuraSmall pericardial effusion. No  echocardiographic evidence of tamponade physiology.  Hemodynamic: RA Pressure is 10 mm Hg. RV systolic pressure  is 59 mm Hg. Moderate pulmonary hypertension.  ------------------------------------------------------------------------  CONCLUSIONS:  TECHNICALLY VERY DIFFICULT STUDY, POOR ACOUSTIC WINDOWS    1. Moderate mitral regurgitation.  2.  Mild aortic regurgitation.  3. Severely dilated left atrium.  LA volume index = 52  cc/m2.  4. Normal left ventricular internal dimensions and wall  thicknesses.  5. Endocardium not well visualized; grossly severe global  left ventricular systolic dysfunction.   Segmental wall  motion could not be assessed.  Septal motion consistent  with conduction disease.  6. Mild right atrial enlargement.  7. Normal right ventricular size and function.    ------------------------------------------------------------------------  Confirmed on  11/20/2023 - 12:28:32 by Salomon Beyer MD  ------------------------------------------------------------------------    < end of copied text >             PATIENT SEEN AND EXAMINED BY EDGARDO DRISCOLL M.D. ON :- 1/10/24  DATE OF SERVICE:    1/10/23         Interim events noted,Labs ,Radiological studies and Cardiology tests reviewed .  DISCUSSED WITH ACP/MEDICAL RESIDENT ON PLAN OF CARE.    MR#[id number]  PATIENT NAME:[facility] COURSE: HPI:  68M from home, ambulates independently, PMHx COPD (not on O2), former smoker quit 2-months, HLD, DM, HTN, chronic ETOH dependence c/b cirrhosis, p/w worsening SOB. Patient is limited historian, currently on BIPAP and unable to full history deferring further collateral to wife, Maureen Mcclellanvt [phone] but unable to reach. Patient states that he has had worsening of breathing over 2-years, does not sleep with a machine at home, and when inquired about medications he admits to non-compliance. Denies fevers, chills, or sick contact. Denies chest pain, palpitations, or GUILLEN.    In ED:   Afebrile;  Afib w/ RVR and PVC; /102; RR 22 100% on RA. Started on BIPAP for increased WOB saturating 100% with improvement in WOB. Trop negative 17, BNP 1853 (improved from 4463). Given duoneb x3, solumedrol 125mg x1, rocephin x1, and 500cc bolus x1, diltizem 20mg IVP x1 + 10mg IVP x1. Awaiting to receive vanc x1 and azithromycin. CXR increased b/l opacification and left lower pleural effusion.  (08 Jan 2024 08:55)      INTERIM EVENTS:Patient seen at bedside ,interim events noted.      PM -reviewed admission note, no change since admission  HEART FAILURE: Acute[ ]Chronic[ ] Systolic[ ] Diastolic[ ] Combined Systolic and Diastolic[ ]  CAD[ ] CABG[ ] PCI[ ]  DEVICES[ ] PPM[ ] ICD[ ] ILR[ ]  ATRIAL FIBRILLATION[ ] Paroxysmal[ ] Permanent[ ] CHADS2-[  ]  CORTES[ ] CKD1[ ] CKD2[ ] CKD3[ ] CKD4[ ] ESRD[ ]  COPD[ ] HTN[ ]   DM[ ] Type1[ ] Type 2[ ]   CVA[ ] Paresis[ ]    AMBULATION: Assisted[ ] Cane/walker[ ] Independent[ ]    MEDICATIONS  (STANDING):  amLODIPine   Tablet 5 milliGRAM(s) Oral daily  apixaban 5 milliGRAM(s) Oral two times a day  aspirin  chewable 81 milliGRAM(s) Oral daily  atorvastatin 40 milliGRAM(s) Oral at bedtime  budesonide 160 MICROgram(s)/formoterol 4.5 MICROgram(s) Inhaler 2 Puff(s) Inhalation two times a day  buMETAnide 1 milliGRAM(s) Oral daily  chlorhexidine 2% Cloths 1 Application(s) Topical <User Schedule>  gabapentin 300 milliGRAM(s) Oral at bedtime  insulin lispro (ADMELOG) corrective regimen sliding scale   SubCutaneous Before meals and at bedtime  losartan 100 milliGRAM(s) Oral daily  melatonin 5 milliGRAM(s) Oral at bedtime  metoprolol tartrate 50 milliGRAM(s) Oral every 8 hours  montelukast 10 milliGRAM(s) Oral at bedtime  pantoprazole    Tablet 40 milliGRAM(s) Oral before breakfast  polyethylene glycol 3350 17 Gram(s) Oral daily  senna 2 Tablet(s) Oral at bedtime  tiotropium 2.5 MICROgram(s) Inhaler 2 Puff(s) Inhalation daily    MEDICATIONS  (PRN):  albuterol    90 MICROgram(s) HFA Inhaler 2 Puff(s) Inhalation every 4 hours PRN Shortness of Breath and/or Wheezing            REVIEW OF SYSTEMS:  Constitutional: [ ] fever, [ ]weight loss,  [ ]fatigue [ ]weight gain  Eyes: [ ] visual changes  Respiratory: [ ]shortness of breath;  [ ] cough, [ ]wheezing, [ ]chills, [ ]hemoptysis  Cardiovascular: [ ] chest pain, [ ]palpitations, [ ]dizziness,  [ ]leg swelling[ ]orthopnea[ ]PND  Gastrointestinal: [ ] abdominal pain, [ ]nausea, [ ]vomiting,  [ ]diarrhea [ ]Constipation [ ]Melena  Genitourinary: [ ] dysuria, [ ] hematuria [ ]Goss  Neurologic: [ ] headaches [ ] tremors[ ]weakness [ ]Paralysis Right[ ] Left[ ]  Skin: [ ] itching, [ ]burning, [ ] rashes  Endocrine: [ ] heat or cold intolerance  Musculoskeletal: [ ] joint pain or swelling; [ ] muscle, back, or extremity pain  Psychiatric: [ ] depression, [ ]anxiety, [ ]mood swings, or [ ]difficulty sleeping  Hematologic: [ ] easy bruising, [ ] bleeding gums    [ ] All remaining systems negative except as per above.   [ ]Unable to obtain.  [x] No change in ROS since admission      Vital Signs Last 24 Hrs  T(C): 36.4 (10 Chin 2024 19:36), Max: 36.8 (10 Chin 2024 16:01)  T(F): 97.5 (10 Chin 2024 19:36), Max: 98.2 (10 Chin 2024 16:01)  HR: 107 (10 Chin 2024 19:36) (88 - 130)  BP: 140/91 (10 Chin 2024 19:36) (104/66 - 140/91)  BP(mean): --  RR: 18 (10 Chin 2024 19:36) (18 - 20)  SpO2: 100% (10 Chin 2024 19:36) (93% - 100%)    Parameters below as of 10 Chin 2024 19:36  Patient On (Oxygen Delivery Method): nasal cannula  O2 Flow (L/min): 2    I&O's Summary    09 Jan 2024 07:01  -  10 Chin 2024 07:00  --------------------------------------------------------  IN: 700 mL / OUT: 1100 mL / NET: -400 mL        PHYSICAL EXAM:  General: No acute distress BMI-  HEENT: EOMI, PERRL  Neck: Supple, [ ] JVD  Lungs: Equal air entry bilaterally; [ ] rales [ ] wheezing [ ] rhonchi  Heart: Regular rate and rhythm; [x ] murmur   2/6 [ x] systolic [ ] diastolic [ ] radiation[ ] rubs [ ]  gallops  Abdomen: Nontender, bowel sounds present  Extremities: No clubbing, cyanosis, [ ] edema [ ]Pulses  equal and intact  Nervous system:  Alert & Oriented X3, no focal deficits  Psychiatric: Normal affect  Skin: No rashes or lesions    LABS:  01-10    140  |  108  |  23<H>  ----------------------------<  111<H>  3.7   |  26  |  1.36<H>    Ca    9.5      10 Chin 2024 06:50  Phos  4.7     01-10  Mg     2.1     01-10    TPro  7.3  /  Alb  3.3<L>  /  TBili  0.5  /  DBili  x   /  AST  16  /  ALT  15  /  AlkPhos  59  01-10    Creatinine Trend: 1.36<--, 1.37<--, 1.36<--                        10.6   9.57  )-----------( 168      ( 10 Chin 2024 06:50 )             34.2       < from: Transthoracic Echocardiogram (11.19.23 @ 13:36) >    Patient name: GREGORY ELIZABETH  YOB: 1955   Age: 68 (M)   MR#: [id number]  Study Date: 11/19/2023  Location: [place] [id number]: Ciera Duran [facility]  Study quality: Technically difficult  Referring Physician: Matteo Johansen MD  Blood Pressure: 117/79 mmHg  Height: 173 cm  Weight: 71 kg  BSA: 1.8 m2  ------------------------------------------------------------------------    PROCEDURE: Transthoracic echocardiogram with 2-D, M-Mode  and complete spectral and color flow Doppler.  INDICATION: Shock, unspecified (R57.9)  HISTORY:  ------------------------------------------------------------------------  DIMENSIONS:  Dimensions:     Normal Values:  LA:     4.8 cm    2.0 - 4.0 cm  Ao:     3.6 cm    2.0 - 3.8 cm  SEPTUM: 1.2 cm    0.6 - 1.2 cm  PWT:    1.0 cm    0.6 - 1.1 cm  LVIDd:  5.8 cm    3.0 - 5.6 cm  LVIDs:  4.8 cm    1.8 - 4.0 cm      Derived Variables:  LVMI: 144 g/m2  RWT: 0.34  Ejection Fraction Visual Estimate: 20-25 %    ------------------------------------------------------------------------  OBSERVATIONS:  Mitral Valve: Normal mitral valve. Moderate mitral  regurgitation.  Aortic Root: Normal aortic root.  Aortic Valve: Aortic valve not well visualized. No elevated  gradients were noted across the valve. Mildaortic  regurgitation.  Left Atrium: Severely dilated left atrium.  LA volume index  = 52 cc/m2.  Left Ventricle: Endocardium not well visualized; grossly  severe global left ventricular systolic dysfunction.  Segmental wall motion could not be assessed.  Septal motion  consistent with conduction disease. Normal left ventricular  internal dimensions and wall thicknesses. Unable to  adequately assess diastolic function due to technical  aspects of this study.  Right Heart: Mild right atrial enlargement. Normal right  ventricular size and function. There is mild-moderate  tricuspid regurgitation. There is trace pulmonic  regurgitation.  Pericardium/PleuraSmall pericardial effusion. No  echocardiographic evidence of tamponade physiology.  Hemodynamic: RA Pressure is 10 mm Hg. RV systolic pressure  is 59 mm Hg. Moderate pulmonary hypertension.  ------------------------------------------------------------------------  CONCLUSIONS:  TECHNICALLY VERY DIFFICULT STUDY, POOR ACOUSTIC WINDOWS    1. Moderate mitral regurgitation.  2.  Mild aortic regurgitation.  3. Severely dilated left atrium.  LA volume index = 52  cc/m2.  4. Normal left ventricular internal dimensions and wall  thicknesses.  5. Endocardium not well visualized; grossly severe global  left ventricular systolic dysfunction.   Segmental wall  motion could not be assessed.  Septal motion consistent  with conduction disease.  6. Mild right atrial enlargement.  7. Normal right ventricular size and function.    ------------------------------------------------------------------------  Confirmed on  11/20/2023 - 12:28:32 by Salomon Beyer MD  ------------------------------------------------------------------------    < end of copied text >

## 2024-01-10 NOTE — DIETITIAN INITIAL EVALUATION ADULT - FACTORS AFF FOOD INTAKE
acute on chronic comorbidities/Voodoo/ethnic/cultural/personal food preferences acute on chronic comorbidities/Mandaeism/ethnic/cultural/personal food preferences

## 2024-01-10 NOTE — DIETITIAN INITIAL EVALUATION ADULT - NS FNS DIET ORDER
Diet, Regular:   DASH/TLC {Sodium & Cholesterol Restricted}  1200mL Fluid Restriction ([id number]) (01-10-24 @ 08:07)   Diet, Regular:   DASH/TLC {Sodium & Cholesterol Restricted}  1200mL Fluid Restriction ([id number]) (01-10-24 @ 08:07)

## 2024-01-10 NOTE — DIETITIAN INITIAL EVALUATION ADULT - OTHER INFO
Pt lives home PTA, downgraded from ICU, alert, oriented, [place]itanian speaking, contacted via [employer]  ID # [id number], well-communicated; Reported decreased intake x 2-3d, unsure recent wt changes, dentures at home, able to tolerate soft food, denied GI distress, swallowing problem at present, Unknown food allergies, no specific food choices; <25% intake at breakfast observed today Pt lives home PTA, downgraded from ICU, alert, oriented, Monegasque speaking, contacted via [employer]  ID # [id number], well-communicated; Reported decreased intake x 2-3d, unsure recent wt changes, dentures at home, able to tolerate soft food, denied GI distress, swallowing problem at present, Unknown food allergies, no specific food choices; <25% intake at breakfast observed today

## 2024-01-10 NOTE — DIETITIAN INITIAL EVALUATION ADULT - PERTINENT LABORATORY DATA
01-10    140  |  108  |  23<H>  ----------------------------<  111<H>  3.7   |  26  |  1.36<H>    Ca    9.5      10 Chin 2024 06:50  Phos  4.7     01-10  Mg     2.1     01-10    TPro  7.3  /  Alb  3.3<L>  /  TBili  0.5  /  DBili  x   /  AST  16  /  ALT  15  /  AlkPhos  59  01-10  POCT Blood Glucose.: 116 mg/dL (01-10-24 @ 07:54)  A1C with Estimated Average Glucose Result: 4.9 % (11-22-23 @ 04:39)

## 2024-01-10 NOTE — PROGRESS NOTE ADULT - PROBLEM SELECTOR PLAN 1
pt p/w sob and increased WOB  CXR increased b/l opacification and left lower pleural effusion. chf v.pna  CT chest: CHF and/or pneumonia with small moderate right pleural effusion and small loculated left pleural effusion.  In ED: RR 22 100% on RA.   BNP 1853 (improved from 4463).  RVP neg. Bcx 1/8- NGTD  S/p ICU for BIPAP.    s/p duoneb x3, solumedrol 125mg x1, rocephin x1, and 500cc bolus x1. vanc x1 and azithromycin.   Pt currently on NC pt p/w sob and increased WOB  CXR increased b/l opacification and left lower pleural effusion. chf v.pna  CT chest: CHF and/or pneumonia with small moderate right pleural effusion and small loculated left pleural effusion.  In ED: RR 22 100% on RA.   BNP 1853 (improved from 4463 in nov 2023).  RVP neg. Bcx 1/8- NGTD  S/p ICU for BIPAP.    s/p duoneb x3, solumedrol 125mg x1, rocephin x1, and 500cc bolus x1. vanc x1 and azithromycin.   Pt currently on NC- does not use home o2

## 2024-01-10 NOTE — PROGRESS NOTE ADULT - SUBJECTIVE AND OBJECTIVE BOX
Patient is a 68y old  Male who presents with a chief complaint of AHRF, CHF exacerbation (10 Chin 2024 11:43)      INTERVAL HPI/OVERNIGHT EVENTS: no overnight events    I&O's Summary    09 Jan 2024 07:01  -  10 Chin 2024 07:00  --------------------------------------------------------  IN: 700 mL / OUT: 1100 mL / NET: -400 mL      Vital Signs Last 24 Hrs  T(C): 36.8 (10 Chin 2024 16:01), Max: 36.8 (10 Chin 2024 16:01)  T(F): 98.2 (10 Chin 2024 16:01), Max: 98.2 (10 Chin 2024 16:01)  HR: 100 (10 Chin 2024 16:01) (88 - 136)  BP: 127/90 (10 Chin 2024 16:01) (102/83 - 143/107)  BP(mean): 119 (09 Jan 2024 18:00) (119 - 119)  RR: 18 (10 Chin 2024 16:01) (18 - 25)  SpO2: 100% (10 Chin 2024 16:01) (93% - 100%)    Parameters below as of 10 Chin 2024 16:01  Patient On (Oxygen Delivery Method): nasal cannula  O2 Flow (L/min): 2    PAST MEDICAL & SURGICAL HISTORY:  HTN (hypertension)      Varicose veins      HLD (hyperlipidemia)      Liver cirrhosis      Portal hypertension with esophageal varices      COPD (chronic obstructive pulmonary disease)      Syncope      Alcohol dependence, daily use      Smokes tobacco daily      DM (diabetes mellitus)      No significant past surgical history          SOCIAL HISTORY  Alcohol:  Tobacco:  Illicit substance use:      FAMILY HISTORY:      LABS:                        10.6   9.57  )-----------( 168      ( 10 Chin 2024 06:50 )             34.2     01-10    140  |  108  |  23<H>  ----------------------------<  111<H>  3.7   |  26  |  1.36<H>    Ca    9.5      10 Chin 2024 06:50  Phos  4.7     01-10  Mg     2.1     01-10    TPro  7.3  /  Alb  3.3<L>  /  TBili  0.5  /  DBili  x   /  AST  16  /  ALT  15  /  AlkPhos  59  01-10      Urinalysis Basic - ( 10 Chin 2024 06:50 )    Color: x / Appearance: x / SG: x / pH: x  Gluc: 111 mg/dL / Ketone: x  / Bili: x / Urobili: x   Blood: x / Protein: x / Nitrite: x   Leuk Esterase: x / RBC: x / WBC x   Sq Epi: x / Non Sq Epi: x / Bacteria: x      CAPILLARY BLOOD GLUCOSE      POCT Blood Glucose.: 109 mg/dL (10 Chin 2024 11:43)  POCT Blood Glucose.: 116 mg/dL (10 Chin 2024 07:54)  POCT Blood Glucose.: 155 mg/dL (09 Jan 2024 21:21)  POCT Blood Glucose.: 107 mg/dL (09 Jan 2024 17:15)        Urinalysis Basic - ( 10 Chin 2024 06:50 )    Color: x / Appearance: x / SG: x / pH: x  Gluc: 111 mg/dL / Ketone: x  / Bili: x / Urobili: x   Blood: x / Protein: x / Nitrite: x   Leuk Esterase: x / RBC: x / WBC x   Sq Epi: x / Non Sq Epi: x / Bacteria: x        MEDICATIONS  (STANDING):  apixaban 5 milliGRAM(s) Oral two times a day  buMETAnide 1 milliGRAM(s) Oral daily  chlorhexidine 2% Cloths 1 Application(s) Topical <User Schedule>  insulin lispro (ADMELOG) corrective regimen sliding scale   SubCutaneous Before meals and at bedtime  melatonin 5 milliGRAM(s) Oral at bedtime  metoprolol tartrate 50 milliGRAM(s) Oral every 8 hours  polyethylene glycol 3350 17 Gram(s) Oral daily  senna 2 Tablet(s) Oral at bedtime    MEDICATIONS  (PRN):      REVIEW OF SYSTEMS:  CONSTITUTIONAL: No fever  EYES: No eye pain, visual disturbance  ENMT:  No sinus or throat pain  NECK: No pain  RESPIRATORY: No cough, wheezing. +shortness of breath  CARDIOVASCULAR: No chest pain. +palpitations  GASTROINTESTINAL: No abdominal  pain. No nausea, vomiting. + diarrhea   GENITOURINARY: No dysuria  NEUROLOGICAL: No headaches  SKIN: No rashes  MUSCULOSKELETAL: No joint pain     RADIOLOGY & ADDITIONAL TESTS:  < from: Xray Chest 1 View- PORTABLE-Urgent (01.08.24 @ 07:01) >    ACC: [id number] EXAM:  XR CHEST PORTABLE URGENT 1V   ORDERED BY:  SHARONA ORTIZ     PROCEDURE DATE:  01/08/2024          INTERPRETATION:  EXAM: XR CHEST URGENT    INDICATION: Admitting Dxs: R06.03 ACUTE RESPIRATORY DISTRESS respiratory   distress HXR    COMPARISON: November 28, 2023    IMPRESSION: Cardiac paddle has appeared since previous exam. Increased   perihilar interstitial markings bilaterally more prominent than before.   Cardiomegaly. Left effusion with some compressive atelectatic change.   Small right effusion. I would favor congestive failure. An underlying   inflammatory infectious process not absolutely excluded behind the heart.   Regional osseous structures appropriate for age. No pneumothorax.   Follow-up suggested    --- End of Report ---    < end of copied text >  ACC: [id number] EXAM:  CT CHEST   ORDERED BY: THERESA MARTINEZ     PROCEDURE DATE:  01/08/2024          INTERPRETATION:  EXAMINATION: CT CHEST    CLINICAL INDICATION: Dyspnea.    TECHNIQUE: Noncontrast CT of the chest was obtained.    COMPARISON: 11/20/2023.    FINDINGS:    AIRWAYS AND LUNGS: The central tracheobronchial tree is patent.  A few   patchy bilateral lung opacities. Left-sided pleural calcifications. Small   to moderate right pleural effusion. Small loculated left pleural effusion.    MEDIASTINUM AND PLEURA: There are no enlarged mediastinal, hilar or   axillary lymph nodes. The visualized portion of the thyroid gland is   unremarkable. There is no pneumothorax.    HEART AND VESSELS: There is mild cardiomegaly.  There are atherosclerotic   calcifications of the aorta and coronary arteries.  There is trace   pericardial fluid.  Aortic valve calcification.    UPPER ABDOMEN: Images of the upper abdomen demonstrate cirrhosis.    BONES AND SOFT TISSUES: L1 compression fracture unchanged from 11/20/2023    The soft tissues are unremarkable.    IMPRESSION:  CHF and/or pneumonia with small moderate right pleural effusion and small   loculated left pleural effusion.    --- End of Report ---    Imaging Personally Reviewed:  [x ] YES  [ ] NO    Consultant(s) Notes Reviewed:  [x ] YES  [ ] NO    PHYSICAL EXAM:  GENERAL: NAD  HEAD:  Atraumatic, Normocephalic  EYES:  conjunctiva and sclera clear  ENMT: Moist mucous membranes  NECK: Supple  NERVOUS SYSTEM:  Alert & Oriented X3, Good concentration ( [place] June [id number])  CHEST/LUNG: CTA bilaterally; No rales, rhonchi, wheezing  HEART: Regular rate   ABDOMEN: Soft, Nontender, Nondistended; Bowel sounds present  EXTREMITIES: No clubbing, cyanosis, or edema  SKIN: No rashes  Care Collaborated Discussed with Consultants/Other Providers [x ] YES  [ ] NO Patient is a 68y old  Male who presents with a chief complaint of AHRF, CHF exacerbation (10 Chin 2024 11:43)      INTERVAL HPI/OVERNIGHT EVENTS: no overnight events    I&O's Summary    09 Jan 2024 07:01  -  10 Chin 2024 07:00  --------------------------------------------------------  IN: 700 mL / OUT: 1100 mL / NET: -400 mL      Vital Signs Last 24 Hrs  T(C): 36.8 (10 Chin 2024 16:01), Max: 36.8 (10 Chin 2024 16:01)  T(F): 98.2 (10 Chin 2024 16:01), Max: 98.2 (10 Chin 2024 16:01)  HR: 100 (10 Chin 2024 16:01) (88 - 136)  BP: 127/90 (10 Chin 2024 16:01) (102/83 - 143/107)  BP(mean): 119 (09 Jan 2024 18:00) (119 - 119)  RR: 18 (10 Chin 2024 16:01) (18 - 25)  SpO2: 100% (10 Chin 2024 16:01) (93% - 100%)    Parameters below as of 10 Chin 2024 16:01  Patient On (Oxygen Delivery Method): nasal cannula  O2 Flow (L/min): 2    PAST MEDICAL & SURGICAL HISTORY:  HTN (hypertension)      Varicose veins      HLD (hyperlipidemia)      Liver cirrhosis      Portal hypertension with esophageal varices      COPD (chronic obstructive pulmonary disease)      Syncope      Alcohol dependence, daily use      Smokes tobacco daily      DM (diabetes mellitus)      No significant past surgical history          SOCIAL HISTORY  Alcohol:  Tobacco:  Illicit substance use:      FAMILY HISTORY:      LABS:                        10.6   9.57  )-----------( 168      ( 10 Chin 2024 06:50 )             34.2     01-10    140  |  108  |  23<H>  ----------------------------<  111<H>  3.7   |  26  |  1.36<H>    Ca    9.5      10 Chin 2024 06:50  Phos  4.7     01-10  Mg     2.1     01-10    TPro  7.3  /  Alb  3.3<L>  /  TBili  0.5  /  DBili  x   /  AST  16  /  ALT  15  /  AlkPhos  59  01-10      Urinalysis Basic - ( 10 Chin 2024 06:50 )    Color: x / Appearance: x / SG: x / pH: x  Gluc: 111 mg/dL / Ketone: x  / Bili: x / Urobili: x   Blood: x / Protein: x / Nitrite: x   Leuk Esterase: x / RBC: x / WBC x   Sq Epi: x / Non Sq Epi: x / Bacteria: x      CAPILLARY BLOOD GLUCOSE      POCT Blood Glucose.: 109 mg/dL (10 Chin 2024 11:43)  POCT Blood Glucose.: 116 mg/dL (10 Chin 2024 07:54)  POCT Blood Glucose.: 155 mg/dL (09 Jan 2024 21:21)  POCT Blood Glucose.: 107 mg/dL (09 Jan 2024 17:15)        Urinalysis Basic - ( 10 Chin 2024 06:50 )    Color: x / Appearance: x / SG: x / pH: x  Gluc: 111 mg/dL / Ketone: x  / Bili: x / Urobili: x   Blood: x / Protein: x / Nitrite: x   Leuk Esterase: x / RBC: x / WBC x   Sq Epi: x / Non Sq Epi: x / Bacteria: x        MEDICATIONS  (STANDING):  apixaban 5 milliGRAM(s) Oral two times a day  buMETAnide 1 milliGRAM(s) Oral daily  chlorhexidine 2% Cloths 1 Application(s) Topical <User Schedule>  insulin lispro (ADMELOG) corrective regimen sliding scale   SubCutaneous Before meals and at bedtime  melatonin 5 milliGRAM(s) Oral at bedtime  metoprolol tartrate 50 milliGRAM(s) Oral every 8 hours  polyethylene glycol 3350 17 Gram(s) Oral daily  senna 2 Tablet(s) Oral at bedtime    MEDICATIONS  (PRN):      REVIEW OF SYSTEMS:  CONSTITUTIONAL: No fever  EYES: No eye pain, visual disturbance  ENMT:  No sinus or throat pain  NECK: No pain  RESPIRATORY: No cough, wheezing. +shortness of breath  CARDIOVASCULAR: No chest pain. +palpitations  GASTROINTESTINAL: No abdominal  pain. No nausea, vomiting. + diarrhea   GENITOURINARY: No dysuria  NEUROLOGICAL: No headaches  SKIN: No rashes  MUSCULOSKELETAL: No joint pain     RADIOLOGY & ADDITIONAL TESTS:  < from: Xray Chest 1 View- PORTABLE-Urgent (01.08.24 @ 07:01) >    ACC: [id number] EXAM:  XR CHEST PORTABLE URGENT 1V   ORDERED BY:  SHARONA ORTIZ     PROCEDURE DATE:  01/08/2024          INTERPRETATION:  EXAM: XR CHEST URGENT    INDICATION: Admitting Dxs: R06.03 ACUTE RESPIRATORY DISTRESS respiratory   distress HXR    COMPARISON: November 28, 2023    IMPRESSION: Cardiac paddle has appeared since previous exam. Increased   perihilar interstitial markings bilaterally more prominent than before.   Cardiomegaly. Left effusion with some compressive atelectatic change.   Small right effusion. I would favor congestive failure. An underlying   inflammatory infectious process not absolutely excluded behind the heart.   Regional osseous structures appropriate for age. No pneumothorax.   Follow-up suggested    --- End of Report ---    < end of copied text >  ACC: [id number] EXAM:  CT CHEST   ORDERED BY: THERESA MARTINEZ     PROCEDURE DATE:  01/08/2024          INTERPRETATION:  EXAMINATION: CT CHEST    CLINICAL INDICATION: Dyspnea.    TECHNIQUE: Noncontrast CT of the chest was obtained.    COMPARISON: 11/20/2023.    FINDINGS:    AIRWAYS AND LUNGS: The central tracheobronchial tree is patent.  A few   patchy bilateral lung opacities. Left-sided pleural calcifications. Small   to moderate right pleural effusion. Small loculated left pleural effusion.    MEDIASTINUM AND PLEURA: There are no enlarged mediastinal, hilar or   axillary lymph nodes. The visualized portion of the thyroid gland is   unremarkable. There is no pneumothorax.    HEART AND VESSELS: There is mild cardiomegaly.  There are atherosclerotic   calcifications of the aorta and coronary arteries.  There is trace   pericardial fluid.  Aortic valve calcification.    UPPER ABDOMEN: Images of the upper abdomen demonstrate cirrhosis.    BONES AND SOFT TISSUES: L1 compression fracture unchanged from 11/20/2023    The soft tissues are unremarkable.    IMPRESSION:  CHF and/or pneumonia with small moderate right pleural effusion and small   loculated left pleural effusion.    --- End of Report ---    Imaging Personally Reviewed:  [x ] YES  [ ] NO    Consultant(s) Notes Reviewed:  [x ] YES  [ ] NO    PHYSICAL EXAM:  GENERAL: NAD  HEAD:  Atraumatic, Normocephalic  EYES:  conjunctiva and sclera clear  ENMT: Moist mucous membranes  NECK: Supple  NERVOUS SYSTEM:  Alert & Oriented X3, Good concentration ( [place] June [id number])  CHEST/LUNG: CTA bilaterally; No rales, rhonchi, wheezing  HEART: Regular rate   ABDOMEN: Soft, Nontender, Nondistended; Bowel sounds present  EXTREMITIES: No clubbing, cyanosis, or edema  SKIN: No rashes  Care Collaborated Discussed with Consultants/Other Providers [x ] YES  [ ] NO

## 2024-01-11 LAB
ANION GAP SERPL CALC-SCNC: 7 MMOL/L — SIGNIFICANT CHANGE UP (ref 5–17)
ANION GAP SERPL CALC-SCNC: 7 MMOL/L — SIGNIFICANT CHANGE UP (ref 5–17)
BUN SERPL-MCNC: 26 MG/DL — HIGH (ref 7–18)
BUN SERPL-MCNC: 26 MG/DL — HIGH (ref 7–18)
CALCIUM SERPL-MCNC: 9.1 MG/DL — SIGNIFICANT CHANGE UP (ref 8.4–10.5)
CALCIUM SERPL-MCNC: 9.1 MG/DL — SIGNIFICANT CHANGE UP (ref 8.4–10.5)
CHLORIDE SERPL-SCNC: 108 MMOL/L — SIGNIFICANT CHANGE UP (ref 96–108)
CHLORIDE SERPL-SCNC: 108 MMOL/L — SIGNIFICANT CHANGE UP (ref 96–108)
CHOLEST SERPL-MCNC: 145 MG/DL — SIGNIFICANT CHANGE UP
CHOLEST SERPL-MCNC: 145 MG/DL — SIGNIFICANT CHANGE UP
CO2 SERPL-SCNC: 26 MMOL/L — SIGNIFICANT CHANGE UP (ref 22–31)
CO2 SERPL-SCNC: 26 MMOL/L — SIGNIFICANT CHANGE UP (ref 22–31)
CREAT SERPL-MCNC: 1.36 MG/DL — HIGH (ref 0.5–1.3)
CREAT SERPL-MCNC: 1.36 MG/DL — HIGH (ref 0.5–1.3)
EGFR: 56 ML/MIN/1.73M2 — LOW
EGFR: 56 ML/MIN/1.73M2 — LOW
GLUCOSE BLDC GLUCOMTR-MCNC: 107 MG/DL — HIGH (ref 70–99)
GLUCOSE BLDC GLUCOMTR-MCNC: 107 MG/DL — HIGH (ref 70–99)
GLUCOSE BLDC GLUCOMTR-MCNC: 111 MG/DL — HIGH (ref 70–99)
GLUCOSE BLDC GLUCOMTR-MCNC: 111 MG/DL — HIGH (ref 70–99)
GLUCOSE BLDC GLUCOMTR-MCNC: 128 MG/DL — HIGH (ref 70–99)
GLUCOSE BLDC GLUCOMTR-MCNC: 128 MG/DL — HIGH (ref 70–99)
GLUCOSE BLDC GLUCOMTR-MCNC: 89 MG/DL — SIGNIFICANT CHANGE UP (ref 70–99)
GLUCOSE BLDC GLUCOMTR-MCNC: 89 MG/DL — SIGNIFICANT CHANGE UP (ref 70–99)
GLUCOSE SERPL-MCNC: 86 MG/DL — SIGNIFICANT CHANGE UP (ref 70–99)
GLUCOSE SERPL-MCNC: 86 MG/DL — SIGNIFICANT CHANGE UP (ref 70–99)
HDLC SERPL-MCNC: 34 MG/DL — LOW
HDLC SERPL-MCNC: 34 MG/DL — LOW
LIPID PNL WITH DIRECT LDL SERPL: 90 MG/DL — SIGNIFICANT CHANGE UP
LIPID PNL WITH DIRECT LDL SERPL: 90 MG/DL — SIGNIFICANT CHANGE UP
NON HDL CHOLESTEROL: 111 MG/DL — SIGNIFICANT CHANGE UP
NON HDL CHOLESTEROL: 111 MG/DL — SIGNIFICANT CHANGE UP
POTASSIUM SERPL-MCNC: 3.4 MMOL/L — LOW (ref 3.5–5.3)
POTASSIUM SERPL-MCNC: 3.4 MMOL/L — LOW (ref 3.5–5.3)
POTASSIUM SERPL-SCNC: 3.4 MMOL/L — LOW (ref 3.5–5.3)
POTASSIUM SERPL-SCNC: 3.4 MMOL/L — LOW (ref 3.5–5.3)
SODIUM SERPL-SCNC: 141 MMOL/L — SIGNIFICANT CHANGE UP (ref 135–145)
SODIUM SERPL-SCNC: 141 MMOL/L — SIGNIFICANT CHANGE UP (ref 135–145)
TRIGL SERPL-MCNC: 107 MG/DL — SIGNIFICANT CHANGE UP
TRIGL SERPL-MCNC: 107 MG/DL — SIGNIFICANT CHANGE UP

## 2024-01-11 RX ADMIN — Medication 50 MILLIGRAM(S): at 05:32

## 2024-01-11 RX ADMIN — POLYETHYLENE GLYCOL 3350 17 GRAM(S): 17 POWDER, FOR SOLUTION ORAL at 12:01

## 2024-01-11 RX ADMIN — MONTELUKAST 10 MILLIGRAM(S): 4 TABLET, CHEWABLE ORAL at 21:51

## 2024-01-11 RX ADMIN — CHLORHEXIDINE GLUCONATE 1 APPLICATION(S): 213 SOLUTION TOPICAL at 05:31

## 2024-01-11 RX ADMIN — GABAPENTIN 300 MILLIGRAM(S): 400 CAPSULE ORAL at 21:51

## 2024-01-11 RX ADMIN — PANTOPRAZOLE SODIUM 40 MILLIGRAM(S): 20 TABLET, DELAYED RELEASE ORAL at 05:32

## 2024-01-11 RX ADMIN — Medication 5 MILLIGRAM(S): at 21:51

## 2024-01-11 RX ADMIN — ATORVASTATIN CALCIUM 40 MILLIGRAM(S): 80 TABLET, FILM COATED ORAL at 21:50

## 2024-01-11 RX ADMIN — Medication 50 MILLIGRAM(S): at 13:04

## 2024-01-11 RX ADMIN — BUMETANIDE 1 MILLIGRAM(S): 0.25 INJECTION INTRAMUSCULAR; INTRAVENOUS at 05:32

## 2024-01-11 RX ADMIN — AMLODIPINE BESYLATE 5 MILLIGRAM(S): 2.5 TABLET ORAL at 05:32

## 2024-01-11 RX ADMIN — SENNA PLUS 2 TABLET(S): 8.6 TABLET ORAL at 21:51

## 2024-01-11 RX ADMIN — LOSARTAN POTASSIUM 100 MILLIGRAM(S): 100 TABLET, FILM COATED ORAL at 05:32

## 2024-01-11 RX ADMIN — APIXABAN 5 MILLIGRAM(S): 2.5 TABLET, FILM COATED ORAL at 17:13

## 2024-01-11 RX ADMIN — BUDESONIDE AND FORMOTEROL FUMARATE DIHYDRATE 2 PUFF(S): 160; 4.5 AEROSOL RESPIRATORY (INHALATION) at 21:56

## 2024-01-11 RX ADMIN — Medication 50 MILLIGRAM(S): at 21:51

## 2024-01-11 RX ADMIN — BUDESONIDE AND FORMOTEROL FUMARATE DIHYDRATE 2 PUFF(S): 160; 4.5 AEROSOL RESPIRATORY (INHALATION) at 10:04

## 2024-01-11 RX ADMIN — ALBUTEROL 2 PUFF(S): 90 AEROSOL, METERED ORAL at 21:52

## 2024-01-11 RX ADMIN — APIXABAN 5 MILLIGRAM(S): 2.5 TABLET, FILM COATED ORAL at 05:32

## 2024-01-11 RX ADMIN — TIOTROPIUM BROMIDE 2 PUFF(S): 18 CAPSULE ORAL; RESPIRATORY (INHALATION) at 12:01

## 2024-01-11 RX ADMIN — Medication 81 MILLIGRAM(S): at 12:01

## 2024-01-11 NOTE — PROGRESS NOTE ADULT - ASSESSMENT
_________________________________________________________________________________________  ========>>  M E D I C A L   A T T E N D I N G    F O L L O W  U P  N O T E  <<=========  -----------------------------------------------------------------------------------------------------    - Patient seen and examined by me earlier today.  Patient transferred off of ICU to medical floor on telemetry   - In summary,  GREGORY ELIZABETH is a 68y year old man admitted with   - Patient today overall doing ok, comfortable, eating OK. Occasional shortness of breath, however appears to be improved in general    ==================>> REVIEW OF SYSTEM <<=================    GEN: no fever, no chills, no pain  RESP: no SOB At the rest, no cough / sputum Reported  CVS: no chest pain, no palpitations  GI: no abdominal pain, no nausea, no constipation, no diarrhea  : no dysuria, no frequency  Neuro: no headache, no dizziness    ==================>> PHYSICAL EXAM <<=================    GEN: A&O X 3 , NAD , comfortable, pleasant, calm in bed... encouraged out of bed to chair with assistance as needed.   HEENT: NCAT, PERRL, MMM, hearing intact  CVS: S1S2 , Irregular , No M/R/G appreciated, Less tachy  PULM: CTA B/L,  no W/R/R appreciated  ABD.: soft. non tender, non distended,  bowel sounds present  Extrem: intact pulses , no edema        ( Note written / Date of service 01-11-24 ( This is certified to be the same as "ENTERED" date above ( for billing purposes)))    ==================>> MEDICATIONS <<====================    amLODIPine   Tablet 5 milliGRAM(s) Oral daily  apixaban 5 milliGRAM(s) Oral two times a day  aspirin  chewable 81 milliGRAM(s) Oral daily  atorvastatin 40 milliGRAM(s) Oral at bedtime  budesonide 160 MICROgram(s)/formoterol 4.5 MICROgram(s) Inhaler 2 Puff(s) Inhalation two times a day  buMETAnide 1 milliGRAM(s) Oral daily  chlorhexidine 2% Cloths 1 Application(s) Topical <User Schedule>  gabapentin 300 milliGRAM(s) Oral at bedtime  insulin lispro (ADMELOG) corrective regimen sliding scale   SubCutaneous Before meals and at bedtime  losartan 100 milliGRAM(s) Oral daily  melatonin 5 milliGRAM(s) Oral at bedtime  metoprolol tartrate 50 milliGRAM(s) Oral every 8 hours  montelukast 10 milliGRAM(s) Oral at bedtime  pantoprazole    Tablet 40 milliGRAM(s) Oral before breakfast  polyethylene glycol 3350 17 Gram(s) Oral daily  senna 2 Tablet(s) Oral at bedtime  tiotropium 2.5 MICROgram(s) Inhaler 2 Puff(s) Inhalation daily    MEDICATIONS  (PRN):  albuterol    90 MICROgram(s) HFA Inhaler 2 Puff(s) Inhalation every 4 hours PRN Shortness of Breath and/or Wheezing    ___________  Active diet:  Diet, Regular:   DASH/TLC Sodium & Cholesterol Restricted  1200mL Fluid Restriction ([id number])  [place]  ___________________    ==================>> VITAL SIGNS <<==================    Weight (kg): 65  Vital Signs Last 24 HrsT(C): 36.8 (01-11-24 @ 16:27)  T(F): 98.2 (01-11-24 @ 16:27), Max: 98.4 (01-11-24 @ 11:20)  HR: 69 (01-11-24 @ 16:27) (69 - 109)  BP: 100/59 (01-11-24 @ 16:27)  RR: 18 (01-11-24 @ 16:27) (18 - 19)  SpO2: 98% (01-11-24 @ 16:27) (94% - 100%)      CAPILLARY BLOOD GLUCOSE      POCT Blood Glucose.: 107 mg/dL (11 Jan 2024 16:48)  POCT Blood Glucose.: 128 mg/dL (11 Jan 2024 11:35)  POCT Blood Glucose.: 89 mg/dL (11 Jan 2024 07:52)  POCT Blood Glucose.: 113 mg/dL (10 Chin 2024 21:43)     ==================>> LAB AND IMAGING <<==================                        10.6   9.57  )-----------( 168      ( 10 Chin 2024 06:50 )             34.2        01-11    141  |  108  |  26<H>  ----------------------------<  86  3.4<L>   |  26  |  1.36<H>    Ca    9.1      11 Jan 2024 06:02  Phos  4.7     01-10  Mg     2.1     01-10    TPro  7.3  /  Alb  3.3<L>  /  TBili  0.5  /  DBili  x   /  AST  16  /  ALT  15  /  AlkPhos  59  01-10    WBC count:   9.57 <<== ,  9.09 <<== ,  20.81 <<==   Hemoglobin:   10.6 <<==,  10.2 <<==,  12.4 <<==  platelets:  168 <==, 150 <==, 247 <==    Creatinine:  1.36  <<==, 1.36  <<==, 1.37  <<==, 1.36  <<==  Sodium:   141  <==, 140  <==, 139  <==, 139  <==       AST:          16(01-10) <== , 17(01-09) <== , 26(01-08) <==      ALT:        15(01-10)  <== , 13(01-09)  <== , 16(01-08)  <==      AP:        59(01-10)  <=, 64(01-09)  <=, 99(01-08)  <=     Bili:        0.5(01-10)  <=, 0.6(01-09)  <=, 0.6(01-08)  <=    ____________________________    M I C R O B I O L O G Y :    Culture - Blood (collected 08 Jan 2024 06:34)  Source: .Blood Blood  Preliminary Report (11 Jan 2024 13:01):    No growth at 72 Hours    Culture - Blood (collected 08 Jan 2024 06:24)  Source: .Blood Blood  Preliminary Report (11 Jan 2024 13:01):    No growth at 72 Hours        SARS-CoV-2: Alistair (01-08-24 @ 06:24)    ___________________________________________________________________________________  ===============>>  A S S E S S M E N T   A N D   P L A N <<===============  ------------------------------------------------------------------------------------------      acute hypoxemic respiratory failure  acute exacerbation of chromic systolic CHF  Afib with RVR  COPD   acute Pulmonary edema   ? possible pneumonia ( on CT ( though less likely / Ruled out )   DM type 2  history of Hypertension     ====>>>>    - post / off bipap support>>NC o2 a dn wean as able  - Diuresis and aim for negative fluid balance  - Monitor urine output  - need HR control  - Cont. AC   - cardiology consultation for further optimization , Pending  - Hemodynamic and cardiac monitoring  - Continue Current medications otherwise and monitor.  - nutrition, OOB as able, incentive spirometer   - supportive care   -GI/DVT Prophylaxis per protocol.    --------------------------------------------  Case discussed with patient , Nurse Practitioner   Education given on findings and plan of care  ___________________________  HAngella Rivera D.O.  Pager: [phone]     _________________________________________________________________________________________  ========>>  M E D I C A L   A T T E N D I N G    F O L L O W  U P  N O T E  <<=========  -----------------------------------------------------------------------------------------------------    - Patient seen and examined by me earlier today.  Patient transferred off of ICU to medical floor on telemetry   - In summary,  GREGORY ELIZABETH is a 68y year old man admitted with   - Patient today overall doing ok, comfortable, eating OK. Occasional shortness of breath, however appears to be improved in general    ==================>> REVIEW OF SYSTEM <<=================    GEN: no fever, no chills, no pain  RESP: no SOB At the rest, no cough / sputum Reported  CVS: no chest pain, no palpitations  GI: no abdominal pain, no nausea, no constipation, no diarrhea  : no dysuria, no frequency  Neuro: no headache, no dizziness    ==================>> PHYSICAL EXAM <<=================    GEN: A&O X 3 , NAD , comfortable, pleasant, calm in bed... encouraged out of bed to chair with assistance as needed.   HEENT: NCAT, PERRL, MMM, hearing intact  CVS: S1S2 , Irregular , No M/R/G appreciated, Less tachy  PULM: CTA B/L,  no W/R/R appreciated  ABD.: soft. non tender, non distended,  bowel sounds present  Extrem: intact pulses , no edema        ( Note written / Date of service 01-11-24 ( This is certified to be the same as "ENTERED" date above ( for billing purposes)))    ==================>> MEDICATIONS <<====================    amLODIPine   Tablet 5 milliGRAM(s) Oral daily  apixaban 5 milliGRAM(s) Oral two times a day  aspirin  chewable 81 milliGRAM(s) Oral daily  atorvastatin 40 milliGRAM(s) Oral at bedtime  budesonide 160 MICROgram(s)/formoterol 4.5 MICROgram(s) Inhaler 2 Puff(s) Inhalation two times a day  buMETAnide 1 milliGRAM(s) Oral daily  chlorhexidine 2% Cloths 1 Application(s) Topical <User Schedule>  gabapentin 300 milliGRAM(s) Oral at bedtime  insulin lispro (ADMELOG) corrective regimen sliding scale   SubCutaneous Before meals and at bedtime  losartan 100 milliGRAM(s) Oral daily  melatonin 5 milliGRAM(s) Oral at bedtime  metoprolol tartrate 50 milliGRAM(s) Oral every 8 hours  montelukast 10 milliGRAM(s) Oral at bedtime  pantoprazole    Tablet 40 milliGRAM(s) Oral before breakfast  polyethylene glycol 3350 17 Gram(s) Oral daily  senna 2 Tablet(s) Oral at bedtime  tiotropium 2.5 MICROgram(s) Inhaler 2 Puff(s) Inhalation daily    MEDICATIONS  (PRN):  albuterol    90 MICROgram(s) HFA Inhaler 2 Puff(s) Inhalation every 4 hours PRN Shortness of Breath and/or Wheezing    ___________  Active diet:  Diet, Regular:   DASH/TLC Sodium & Cholesterol Restricted  1200mL Fluid Restriction ([id number])  [place]  ___________________    ==================>> VITAL SIGNS <<==================    Weight (kg): 65  Vital Signs Last 24 HrsT(C): 36.8 (01-11-24 @ 16:27)  T(F): 98.2 (01-11-24 @ 16:27), Max: 98.4 (01-11-24 @ 11:20)  HR: 69 (01-11-24 @ 16:27) (69 - 109)  BP: 100/59 (01-11-24 @ 16:27)  RR: 18 (01-11-24 @ 16:27) (18 - 19)  SpO2: 98% (01-11-24 @ 16:27) (94% - 100%)      CAPILLARY BLOOD GLUCOSE      POCT Blood Glucose.: 107 mg/dL (11 Jan 2024 16:48)  POCT Blood Glucose.: 128 mg/dL (11 Jan 2024 11:35)  POCT Blood Glucose.: 89 mg/dL (11 Jan 2024 07:52)  POCT Blood Glucose.: 113 mg/dL (10 Chin 2024 21:43)     ==================>> LAB AND IMAGING <<==================                        10.6   9.57  )-----------( 168      ( 10 Chin 2024 06:50 )             34.2        01-11    141  |  108  |  26<H>  ----------------------------<  86  3.4<L>   |  26  |  1.36<H>    Ca    9.1      11 Jan 2024 06:02  Phos  4.7     01-10  Mg     2.1     01-10    TPro  7.3  /  Alb  3.3<L>  /  TBili  0.5  /  DBili  x   /  AST  16  /  ALT  15  /  AlkPhos  59  01-10    WBC count:   9.57 <<== ,  9.09 <<== ,  20.81 <<==   Hemoglobin:   10.6 <<==,  10.2 <<==,  12.4 <<==  platelets:  168 <==, 150 <==, 247 <==    Creatinine:  1.36  <<==, 1.36  <<==, 1.37  <<==, 1.36  <<==  Sodium:   141  <==, 140  <==, 139  <==, 139  <==       AST:          16(01-10) <== , 17(01-09) <== , 26(01-08) <==      ALT:        15(01-10)  <== , 13(01-09)  <== , 16(01-08)  <==      AP:        59(01-10)  <=, 64(01-09)  <=, 99(01-08)  <=     Bili:        0.5(01-10)  <=, 0.6(01-09)  <=, 0.6(01-08)  <=    ____________________________    M I C R O B I O L O G Y :    Culture - Blood (collected 08 Jan 2024 06:34)  Source: .Blood Blood  Preliminary Report (11 Jan 2024 13:01):    No growth at 72 Hours    Culture - Blood (collected 08 Jan 2024 06:24)  Source: .Blood Blood  Preliminary Report (11 Jan 2024 13:01):    No growth at 72 Hours        SARS-CoV-2: Alistair (01-08-24 @ 06:24)    ___________________________________________________________________________________  ===============>>  A S S E S S M E N T   A N D   P L A N <<===============  ------------------------------------------------------------------------------------------      acute hypoxemic respiratory failure  acute exacerbation of chromic systolic CHF  Afib with RVR  COPD   acute Pulmonary edema   ? possible pneumonia ( on CT ( though less likely / Ruled out )   DM type 2  history of Hypertension     ====>>>>    - post / off bipap support>>NC o2 a dn wean as able  - Diuresis and aim for negative fluid balance  - Monitor urine output  - need HR control  - Cont. AC   - cardiology consultation for further optimization , Pending  - Hemodynamic and cardiac monitoring  - Continue Current medications otherwise and monitor.  - nutrition, OOB as able, incentive spirometer   - supportive care   -GI/DVT Prophylaxis per protocol.    --------------------------------------------  Case discussed with patient , Nurse Practitioner   Education given on findings and plan of care  ___________________________  HAngella Rivera D.O.  Pager: [phone]     _________________________________________________________________________________________  ========>>  M E D I C A L   A T T E N D I N G    F O L L O W  U P  N O T E  <<=========  -----------------------------------------------------------------------------------------------------    - Patient seen and examined by me earlier today.    - In summary,  GREGORY ELIZABETH is a 68y year old man admitted with  Heart failure  - Patient today overall doing ok, comfortable, eating OK. Occasional shortness of breath, however appears to be improved in general    ==================>> REVIEW OF SYSTEM <<=================    GEN: no fever, no chills, no pain  RESP: no SOB At the rest, no cough / sputum Reported  CVS: no chest pain, no palpitations  GI: no abdominal pain, no nausea, no constipation, no diarrhea  : no dysuria, no frequency  Neuro: no headache, no dizziness    ==================>> PHYSICAL EXAM <<=================    GEN: A&O X 3 , NAD , comfortable, pleasant, calm in Chair, off oxygen  HEENT: NCAT, PERRL, MMM, hearing intact  CVS: S1S2 , Irregular , No M/R/G appreciated  PULM: CTA B/L,  no W/R/R appreciated  ABD.: soft. non tender, non distended,  bowel sounds present  Extrem: intact pulses , no edema        ( Note written / Date of service 01-11-24 ( This is certified to be the same as "ENTERED" date above ( for billing purposes)))    ==================>> MEDICATIONS <<====================    amLODIPine   Tablet 5 milliGRAM(s) Oral daily  apixaban 5 milliGRAM(s) Oral two times a day  aspirin  chewable 81 milliGRAM(s) Oral daily  atorvastatin 40 milliGRAM(s) Oral at bedtime  budesonide 160 MICROgram(s)/formoterol 4.5 MICROgram(s) Inhaler 2 Puff(s) Inhalation two times a day  buMETAnide 1 milliGRAM(s) Oral daily  chlorhexidine 2% Cloths 1 Application(s) Topical <User Schedule>  gabapentin 300 milliGRAM(s) Oral at bedtime  insulin lispro (ADMELOG) corrective regimen sliding scale   SubCutaneous Before meals and at bedtime  losartan 100 milliGRAM(s) Oral daily  melatonin 5 milliGRAM(s) Oral at bedtime  metoprolol tartrate 50 milliGRAM(s) Oral every 8 hours  montelukast 10 milliGRAM(s) Oral at bedtime  pantoprazole    Tablet 40 milliGRAM(s) Oral before breakfast  polyethylene glycol 3350 17 Gram(s) Oral daily  senna 2 Tablet(s) Oral at bedtime  tiotropium 2.5 MICROgram(s) Inhaler 2 Puff(s) Inhalation daily    MEDICATIONS  (PRN):  albuterol    90 MICROgram(s) HFA Inhaler 2 Puff(s) Inhalation every 4 hours PRN Shortness of Breath and/or Wheezing    ___________  Active diet:  Diet, Regular:   DASH/TLC Sodium & Cholesterol Restricted  1200mL Fluid Restriction ([id number])  [place]  ___________________    ==================>> VITAL SIGNS <<==================    Weight (kg): 65  Vital Signs Last 24 HrsT(C): 36.8 (01-11-24 @ 16:27)  T(F): 98.2 (01-11-24 @ 16:27), Max: 98.4 (01-11-24 @ 11:20)  HR: 69 (01-11-24 @ 16:27) (69 - 109)  BP: 100/59 (01-11-24 @ 16:27)  RR: 18 (01-11-24 @ 16:27) (18 - 19)  SpO2: 98% (01-11-24 @ 16:27) (94% - 100%)      CAPILLARY BLOOD GLUCOSE  POCT Blood Glucose.: 107 mg/dL (11 Jan 2024 16:48)  POCT Blood Glucose.: 128 mg/dL (11 Jan 2024 11:35)  POCT Blood Glucose.: 89 mg/dL (11 Jan 2024 07:52)  POCT Blood Glucose.: 113 mg/dL (10 Chin 2024 21:43)     ==================>> LAB AND IMAGING <<==================                        10.6   9.57  )-----------( 168      ( 10 Chin 2024 06:50 )             34.2        01-11    141  |  108  |  26<H>  ----------------------------<  86  3.4<L>   |  26  |  1.36<H>    Ca    9.1      11 Jan 2024 06:02  Phos  4.7     01-10  Mg     2.1     01-10    TPro  7.3  /  Alb  3.3<L>  /  TBili  0.5  /  DBili  x   /  AST  16  /  ALT  15  /  AlkPhos  59  01-10    WBC count:   9.57 <<== ,  9.09 <<== ,  20.81 <<==   Hemoglobin:   10.6 <<==,  10.2 <<==,  12.4 <<==  platelets:  168 <==, 150 <==, 247 <==    Creatinine:  1.36  <<==, 1.36  <<==, 1.37  <<==, 1.36  <<==  Sodium:   141  <==, 140  <==, 139  <==, 139  <==       AST:          16(01-10) <== , 17(01-09) <== , 26(01-08) <==      ALT:        15(01-10)  <== , 13(01-09)  <== , 16(01-08)  <==      AP:        59(01-10)  <=, 64(01-09)  <=, 99(01-08)  <=     Bili:        0.5(01-10)  <=, 0.6(01-09)  <=, 0.6(01-08)  <=    ____________________________    M I C R O B I O L O G Y :    Culture - Blood (collected 08 Jan 2024 06:34)  Source: .Blood Blood  Preliminary Report (11 Jan 2024 13:01):    No growth at 72 Hours    Culture - Blood (collected 08 Jan 2024 06:24)  Source: .Blood Blood  Preliminary Report (11 Jan 2024 13:01):    No growth at 72 Hours    ___________________________________________________________________________________  ===============>>  A S S E S S M E N T   A N D   P L A N <<===============  ------------------------------------------------------------------------------------------    acute hypoxemic respiratory failure  acute exacerbation of chromic systolic CHF  Afib with RVR  COPD   acute Pulmonary edema   ? possible pneumonia ( on CT ( though less likely / Ruled out )   DM type 2  history of Hypertension     ====>>>>    - post / off bipap support>>NC o2 a dn wean as able >> off   - Diuresis and aim for negative fluid balance  - Monitor urine output  - need HR control  - Cont. AC   - cardiology Follow-up, management, optimization, appreciated  - Hemodynamic and cardiac monitoring  - Continue Current medications otherwise and monitor.  - nutrition, OOB as able, incentive spirometer   - supportive care   -GI/DVT Prophylaxis per protocol.    Possible discharge planning home in the next 24 hours if you remain stable  --------------------------------------------  Case discussed with patient , Nurse Practitioner   Education given on findings and plan of care  ___________________________  SALVATORE Rivera D.O.  Pager: [phone]     _________________________________________________________________________________________  ========>>  M E D I C A L   A T T E N D I N G    F O L L O W  U P  N O T E  <<=========  -----------------------------------------------------------------------------------------------------    - Patient seen and examined by me earlier today.    - In summary,  GREGORY ELIZABETH is a 68y year old man admitted with  Heart failure  - Patient today overall doing ok, comfortable, eating OK. Occasional shortness of breath, however appears to be improved in general    ==================>> REVIEW OF SYSTEM <<=================    GEN: no fever, no chills, no pain  RESP: no SOB At the rest, no cough / sputum Reported  CVS: no chest pain, no palpitations  GI: no abdominal pain, no nausea, no constipation, no diarrhea  : no dysuria, no frequency  Neuro: no headache, no dizziness    ==================>> PHYSICAL EXAM <<=================    GEN: A&O X 3 , NAD , comfortable, pleasant, calm in Chair, off oxygen  HEENT: NCAT, PERRL, MMM, hearing intact  CVS: S1S2 , Irregular , No M/R/G appreciated  PULM: CTA B/L,  no W/R/R appreciated  ABD.: soft. non tender, non distended,  bowel sounds present  Extrem: intact pulses , no edema        ( Note written / Date of service 01-11-24 ( This is certified to be the same as "ENTERED" date above ( for billing purposes)))    ==================>> MEDICATIONS <<====================    amLODIPine   Tablet 5 milliGRAM(s) Oral daily  apixaban 5 milliGRAM(s) Oral two times a day  aspirin  chewable 81 milliGRAM(s) Oral daily  atorvastatin 40 milliGRAM(s) Oral at bedtime  budesonide 160 MICROgram(s)/formoterol 4.5 MICROgram(s) Inhaler 2 Puff(s) Inhalation two times a day  buMETAnide 1 milliGRAM(s) Oral daily  chlorhexidine 2% Cloths 1 Application(s) Topical <User Schedule>  gabapentin 300 milliGRAM(s) Oral at bedtime  insulin lispro (ADMELOG) corrective regimen sliding scale   SubCutaneous Before meals and at bedtime  losartan 100 milliGRAM(s) Oral daily  melatonin 5 milliGRAM(s) Oral at bedtime  metoprolol tartrate 50 milliGRAM(s) Oral every 8 hours  montelukast 10 milliGRAM(s) Oral at bedtime  pantoprazole    Tablet 40 milliGRAM(s) Oral before breakfast  polyethylene glycol 3350 17 Gram(s) Oral daily  senna 2 Tablet(s) Oral at bedtime  tiotropium 2.5 MICROgram(s) Inhaler 2 Puff(s) Inhalation daily    MEDICATIONS  (PRN):  albuterol    90 MICROgram(s) HFA Inhaler 2 Puff(s) Inhalation every 4 hours PRN Shortness of Breath and/or Wheezing    ___________  Active diet:  Diet, Regular:   DASH/TLC Sodium & Cholesterol Restricted  1200mL Fluid Restriction ([id number])  [place]  ___________________    ==================>> VITAL SIGNS <<==================    Weight (kg): 65  Vital Signs Last 24 HrsT(C): 36.8 (01-11-24 @ 16:27)  T(F): 98.2 (01-11-24 @ 16:27), Max: 98.4 (01-11-24 @ 11:20)  HR: 69 (01-11-24 @ 16:27) (69 - 109)  BP: 100/59 (01-11-24 @ 16:27)  RR: 18 (01-11-24 @ 16:27) (18 - 19)  SpO2: 98% (01-11-24 @ 16:27) (94% - 100%)      CAPILLARY BLOOD GLUCOSE  POCT Blood Glucose.: 107 mg/dL (11 Jan 2024 16:48)  POCT Blood Glucose.: 128 mg/dL (11 Jan 2024 11:35)  POCT Blood Glucose.: 89 mg/dL (11 Jan 2024 07:52)  POCT Blood Glucose.: 113 mg/dL (10 Chin 2024 21:43)     ==================>> LAB AND IMAGING <<==================                        10.6   9.57  )-----------( 168      ( 10 Chin 2024 06:50 )             34.2        01-11    141  |  108  |  26<H>  ----------------------------<  86  3.4<L>   |  26  |  1.36<H>    Ca    9.1      11 Jan 2024 06:02  Phos  4.7     01-10  Mg     2.1     01-10    TPro  7.3  /  Alb  3.3<L>  /  TBili  0.5  /  DBili  x   /  AST  16  /  ALT  15  /  AlkPhos  59  01-10    WBC count:   9.57 <<== ,  9.09 <<== ,  20.81 <<==   Hemoglobin:   10.6 <<==,  10.2 <<==,  12.4 <<==  platelets:  168 <==, 150 <==, 247 <==    Creatinine:  1.36  <<==, 1.36  <<==, 1.37  <<==, 1.36  <<==  Sodium:   141  <==, 140  <==, 139  <==, 139  <==       AST:          16(01-10) <== , 17(01-09) <== , 26(01-08) <==      ALT:        15(01-10)  <== , 13(01-09)  <== , 16(01-08)  <==      AP:        59(01-10)  <=, 64(01-09)  <=, 99(01-08)  <=     Bili:        0.5(01-10)  <=, 0.6(01-09)  <=, 0.6(01-08)  <=    ____________________________    M I C R O B I O L O G Y :    Culture - Blood (collected 08 Jan 2024 06:34)  Source: .Blood Blood  Preliminary Report (11 Jan 2024 13:01):    No growth at 72 Hours    Culture - Blood (collected 08 Jan 2024 06:24)  Source: .Blood Blood  Preliminary Report (11 Jan 2024 13:01):    No growth at 72 Hours    ___________________________________________________________________________________  ===============>>  A S S E S S M E N T   A N D   P L A N <<===============  ------------------------------------------------------------------------------------------    acute hypoxemic respiratory failure  acute exacerbation of chromic systolic CHF  Afib with RVR  COPD   acute Pulmonary edema   ? possible pneumonia ( on CT ( though less likely / Ruled out )   DM type 2  history of Hypertension     ====>>>>    - post / off bipap support>>NC o2 a dn wean as able >> off   - Diuresis and aim for negative fluid balance  - Monitor urine output  - need HR control  - Cont. AC   - cardiology Follow-up, management, optimization, appreciated  - Hemodynamic and cardiac monitoring  - Continue Current medications otherwise and monitor.  - nutrition, OOB as able, incentive spirometer   - supportive care   -GI/DVT Prophylaxis per protocol.    Possible discharge planning home in the next 24 hours if you remain stable  --------------------------------------------  Case discussed with patient , Nurse Practitioner   Education given on findings and plan of care  ___________________________  SALVATORE Rivera D.O.  Pager: [phone]

## 2024-01-11 NOTE — PROGRESS NOTE ADULT - ASSESSMENT
68M from home, ambulates independently, PMHx COPD (not on O2), former smoker quit 2-months, HLD, DM, HTN, chronic ETOH dependence c/b cirrhosis, p/w worsening SOB. Admitted to the ICU for AHRF 2/2 suspected CHF exacerbation requiring continuous BIPAP.  Pt. down graded to tele 1/9, HD stable, afebrile, tolerating RA well.  PT recc OP PT.  Discharge likely tomorrow (1/12) pending cardiology clearance.

## 2024-01-11 NOTE — PHYSICAL THERAPY INITIAL EVALUATION ADULT - 30 SECOND CHAIR STAND TEST, REHAB EVAL
According to 30 second chair stand test, patient's score is at   which reveals significant functional weakness and puts the patient at risk for falls. Based on the score mentioned above, patient is safe to be discharged home and will benefit from OPT services.

## 2024-01-11 NOTE — PROGRESS NOTE ADULT - SUBJECTIVE AND OBJECTIVE BOX
Time of Visit:  Patient seen and examined.     MEDICATIONS  (STANDING):  amLODIPine   Tablet 5 milliGRAM(s) Oral daily  apixaban 5 milliGRAM(s) Oral two times a day  aspirin  chewable 81 milliGRAM(s) Oral daily  atorvastatin 40 milliGRAM(s) Oral at bedtime  budesonide 160 MICROgram(s)/formoterol 4.5 MICROgram(s) Inhaler 2 Puff(s) Inhalation two times a day  buMETAnide 1 milliGRAM(s) Oral daily  chlorhexidine 2% Cloths 1 Application(s) Topical <User Schedule>  gabapentin 300 milliGRAM(s) Oral at bedtime  insulin lispro (ADMELOG) corrective regimen sliding scale   SubCutaneous Before meals and at bedtime  losartan 100 milliGRAM(s) Oral daily  melatonin 5 milliGRAM(s) Oral at bedtime  metoprolol tartrate 50 milliGRAM(s) Oral every 8 hours  montelukast 10 milliGRAM(s) Oral at bedtime  pantoprazole    Tablet 40 milliGRAM(s) Oral before breakfast  polyethylene glycol 3350 17 Gram(s) Oral daily  senna 2 Tablet(s) Oral at bedtime  tiotropium 2.5 MICROgram(s) Inhaler 2 Puff(s) Inhalation daily      MEDICATIONS  (PRN):  albuterol    90 MICROgram(s) HFA Inhaler 2 Puff(s) Inhalation every 4 hours PRN Shortness of Breath and/or Wheezing       Medications up to date at time of exam.      PHYSICAL EXAMINATION:  Patient has no new complaints.  GENERAL: The patient is a well-developed, well-nourished, in no apparent distress.     Vital Signs Last 24 Hrs  T(C): 36.8 (11 Jan 2024 16:27), Max: 36.9 (11 Jan 2024 11:20)  T(F): 98.2 (11 Jan 2024 16:27), Max: 98.4 (11 Jan 2024 11:20)  HR: 69 (11 Jan 2024 16:27) (69 - 109)  BP: 100/59 (11 Jan 2024 16:27) (100/59 - 140/91)  BP(mean): --  RR: 18 (11 Jan 2024 16:27) (18 - 19)  SpO2: 98% (11 Jan 2024 16:27) (94% - 100%)    Parameters below as of 11 Jan 2024 16:27  Patient On (Oxygen Delivery Method): room air       (if applicable)    Chest Tube (if applicable)    HEENT: Head is normocephalic and atraumatic. Extraocular muscles are intact. Mucous membranes are moist.     NECK: Supple, no palpable adenopathy.    LUNGS: Clear to auscultation, no wheezing, rales, or rhonchi.    HEART: Regular rate and rhythm without murmur.    ABDOMEN: Soft, nontender, and nondistended.  No hepatosplenomegaly is noted.    : No painful voiding, no pelvic pain    EXTREMITIES: Without any cyanosis, clubbing, rash, lesions or edema.    NEUROLOGIC: Awake, alert, oriented, grossly intact    SKIN: Warm, dry, good turgor.      LABS:                        10.6   9.57  )-----------( 168      ( 10 Chin 2024 06:50 )             34.2     01-11    141  |  108  |  26<H>  ----------------------------<  86  3.4<L>   |  26  |  1.36<H>    Ca    9.1      11 Jan 2024 06:02  Phos  4.7     01-10  Mg     2.1     01-10    TPro  7.3  /  Alb  3.3<L>  /  TBili  0.5  /  DBili  x   /  AST  16  /  ALT  15  /  AlkPhos  59  01-10      Urinalysis Basic - ( 11 Jan 2024 06:02 )    Color: x / Appearance: x / SG: x / pH: x  Gluc: 86 mg/dL / Ketone: x  / Bili: x / Urobili: x   Blood: x / Protein: x / Nitrite: x   Leuk Esterase: x / RBC: x / WBC x   Sq Epi: x / Non Sq Epi: x / Bacteria: x                      MICROBIOLOGY: (if applicable)    RADIOLOGY & ADDITIONAL STUDIES:  EKG:   CXR:  ECHO:    IMPRESSION: 68y Male PAST MEDICAL & SURGICAL HISTORY:  HTN (hypertension)      Varicose veins      HLD (hyperlipidemia)      Liver cirrhosis      Portal hypertension with esophageal varices      COPD (chronic obstructive pulmonary disease)      Syncope      Alcohol dependence, daily use      Smokes tobacco daily      DM (diabetes mellitus)      No significant past surgical history       p/w           RECOMMENDATIONS:   Time of Visit:  Patient seen and examined.     MEDICATIONS  (STANDING):  amLODIPine   Tablet 5 milliGRAM(s) Oral daily  apixaban 5 milliGRAM(s) Oral two times a day  aspirin  chewable 81 milliGRAM(s) Oral daily  atorvastatin 40 milliGRAM(s) Oral at bedtime  budesonide 160 MICROgram(s)/formoterol 4.5 MICROgram(s) Inhaler 2 Puff(s) Inhalation two times a day  buMETAnide 1 milliGRAM(s) Oral daily  chlorhexidine 2% Cloths 1 Application(s) Topical <User Schedule>  gabapentin 300 milliGRAM(s) Oral at bedtime  insulin lispro (ADMELOG) corrective regimen sliding scale   SubCutaneous Before meals and at bedtime  losartan 100 milliGRAM(s) Oral daily  melatonin 5 milliGRAM(s) Oral at bedtime  metoprolol tartrate 50 milliGRAM(s) Oral every 8 hours  montelukast 10 milliGRAM(s) Oral at bedtime  pantoprazole    Tablet 40 milliGRAM(s) Oral before breakfast  polyethylene glycol 3350 17 Gram(s) Oral daily  senna 2 Tablet(s) Oral at bedtime  tiotropium 2.5 MICROgram(s) Inhaler 2 Puff(s) Inhalation daily      MEDICATIONS  (PRN):  albuterol    90 MICROgram(s) HFA Inhaler 2 Puff(s) Inhalation every 4 hours PRN Shortness of Breath and/or Wheezing       Medications up to date at time of exam.      PHYSICAL EXAMINATION:  Patient has no new complaints.  GENERAL: The patient is a well-developed, well-nourished, in no apparent distress.     Vital Signs Last 24 Hrs  T(C): 36.8 (11 Jan 2024 16:27), Max: 36.9 (11 Jan 2024 11:20)  T(F): 98.2 (11 Jan 2024 16:27), Max: 98.4 (11 Jan 2024 11:20)  HR: 69 (11 Jan 2024 16:27) (69 - 109)  BP: 100/59 (11 Jan 2024 16:27) (100/59 - 140/91)  BP(mean): --  RR: 18 (11 Jan 2024 16:27) (18 - 19)  SpO2: 98% (11 Jan 2024 16:27) (94% - 100%)    Parameters below as of 11 Jan 2024 16:27  Patient On (Oxygen Delivery Method): room air       (if applicable)    Chest Tube (if applicable)    HEENT: Head is normocephalic and atraumatic. Extraocular muscles are intact. Mucous membranes are moist.     NECK: Supple, no palpable adenopathy.    LUNGS: Clear to auscultation, no wheezing, rales, or rhonchi.    HEART: Regular rate and rhythm without murmur.    ABDOMEN: Soft, nontender, and nondistended.  No hepatosplenomegaly is noted.    : No painful voiding, no pelvic pain    EXTREMITIES: Without any cyanosis, clubbing, rash, lesions or edema.    NEUROLOGIC: Awake, alert, oriented, grossly intact    SKIN: Warm, dry, good turgor.      LABS:                        10.6   9.57  )-----------( 168      ( 10 Chin 2024 06:50 )             34.2     01-11    141  |  108  |  26<H>  ----------------------------<  86  3.4<L>   |  26  |  1.36<H>    Ca    9.1      11 Jan 2024 06:02  Phos  4.7     01-10  Mg     2.1     01-10    TPro  7.3  /  Alb  3.3<L>  /  TBili  0.5  /  DBili  x   /  AST  16  /  ALT  15  /  AlkPhos  59  01-10      Urinalysis Basic - ( 11 Jan 2024 06:02 )    Color: x / Appearance: x / SG: x / pH: x  Gluc: 86 mg/dL / Ketone: x  / Bili: x / Urobili: x   Blood: x / Protein: x / Nitrite: x   Leuk Esterase: x / RBC: x / WBC x   Sq Epi: x / Non Sq Epi: x / Bacteria: x                      MICROBIOLOGY: (if applicable)    RADIOLOGY & ADDITIONAL STUDIES:  EKG:   CXR:  ECHO:    IMPRESSION: 68y Male PAST MEDICAL & SURGICAL HISTORY:  HTN (hypertension)      Varicose veins      HLD (hyperlipidemia)      Liver cirrhosis      Portal hypertension with esophageal varices      COPD (chronic obstructive pulmonary disease)      Syncope      Alcohol dependence, daily use      Smokes tobacco daily      DM (diabetes mellitus)      No significant past surgical history       p/w         Impression: This is a 67 Y/O Male former smoker. hx of chronic ETOH dependence with Cirrhosis . Presented to ED with worsening SOB. In the ED patient placed on Bipap. For pulmonary follow up of Acute hypoxic respiratory failure due to COPD , pulmonary edema / HFrEF , Rapid A Fib. Off Bipap and on Oxygen supplementation .       Suggestion:  O2 saturation 96% with O2 supplement. Continue Oxygen supplementation 2L NC .  On Apixaban 5 mg Twice daily.   Monitor O2 saturation trend.    Diuresis and aim for negative fluid balance.   Monitor urine output.   Beta blockers for rate control   Hemodynamic and cardiac monitoring     Time of Visit:  Patient seen and examined.     MEDICATIONS  (STANDING):  amLODIPine   Tablet 5 milliGRAM(s) Oral daily  apixaban 5 milliGRAM(s) Oral two times a day  aspirin  chewable 81 milliGRAM(s) Oral daily  atorvastatin 40 milliGRAM(s) Oral at bedtime  budesonide 160 MICROgram(s)/formoterol 4.5 MICROgram(s) Inhaler 2 Puff(s) Inhalation two times a day  buMETAnide 1 milliGRAM(s) Oral daily  chlorhexidine 2% Cloths 1 Application(s) Topical <User Schedule>  gabapentin 300 milliGRAM(s) Oral at bedtime  insulin lispro (ADMELOG) corrective regimen sliding scale   SubCutaneous Before meals and at bedtime  losartan 100 milliGRAM(s) Oral daily  melatonin 5 milliGRAM(s) Oral at bedtime  metoprolol tartrate 50 milliGRAM(s) Oral every 8 hours  montelukast 10 milliGRAM(s) Oral at bedtime  pantoprazole    Tablet 40 milliGRAM(s) Oral before breakfast  polyethylene glycol 3350 17 Gram(s) Oral daily  senna 2 Tablet(s) Oral at bedtime  tiotropium 2.5 MICROgram(s) Inhaler 2 Puff(s) Inhalation daily      MEDICATIONS  (PRN):  albuterol    90 MICROgram(s) HFA Inhaler 2 Puff(s) Inhalation every 4 hours PRN Shortness of Breath and/or Wheezing       Medications up to date at time of exam.      PHYSICAL EXAMINATION:  Patient has no new complaints.  GENERAL: The patient is a well-developed, well-nourished, in no apparent distress.     Vital Signs Last 24 Hrs  T(C): 36.8 (11 Jan 2024 16:27), Max: 36.9 (11 Jan 2024 11:20)  T(F): 98.2 (11 Jan 2024 16:27), Max: 98.4 (11 Jan 2024 11:20)  HR: 69 (11 Jan 2024 16:27) (69 - 109)  BP: 100/59 (11 Jan 2024 16:27) (100/59 - 140/91)  BP(mean): --  RR: 18 (11 Jan 2024 16:27) (18 - 19)  SpO2: 98% (11 Jan 2024 16:27) (94% - 100%)    Parameters below as of 11 Jan 2024 16:27  Patient On (Oxygen Delivery Method): room air       (if applicable)    Chest Tube (if applicable)    HEENT: Head is normocephalic and atraumatic. Extraocular muscles are intact. Mucous membranes are moist.     NECK: Supple, no palpable adenopathy.    LUNGS: Clear to auscultation, no wheezing, rales, or rhonchi.    HEART: Regular rate and rhythm without murmur.    ABDOMEN: Soft, nontender, and nondistended.  No hepatosplenomegaly is noted.    : No painful voiding, no pelvic pain    EXTREMITIES: Without any cyanosis, clubbing, rash, lesions or edema.    NEUROLOGIC: Awake, alert, oriented, grossly intact    SKIN: Warm, dry, good turgor.      LABS:                        10.6   9.57  )-----------( 168      ( 10 Chin 2024 06:50 )             34.2     01-11    141  |  108  |  26<H>  ----------------------------<  86  3.4<L>   |  26  |  1.36<H>    Ca    9.1      11 Jan 2024 06:02  Phos  4.7     01-10  Mg     2.1     01-10    TPro  7.3  /  Alb  3.3<L>  /  TBili  0.5  /  DBili  x   /  AST  16  /  ALT  15  /  AlkPhos  59  01-10      Urinalysis Basic - ( 11 Jan 2024 06:02 )    Color: x / Appearance: x / SG: x / pH: x  Gluc: 86 mg/dL / Ketone: x  / Bili: x / Urobili: x   Blood: x / Protein: x / Nitrite: x   Leuk Esterase: x / RBC: x / WBC x   Sq Epi: x / Non Sq Epi: x / Bacteria: x                      MICROBIOLOGY: (if applicable)    RADIOLOGY & ADDITIONAL STUDIES:  EKG:   CXR:  ECHO:    IMPRESSION: 68y Male PAST MEDICAL & SURGICAL HISTORY:  HTN (hypertension)      Varicose veins      HLD (hyperlipidemia)      Liver cirrhosis      Portal hypertension with esophageal varices      COPD (chronic obstructive pulmonary disease)      Syncope      Alcohol dependence, daily use      Smokes tobacco daily      DM (diabetes mellitus)      No significant past surgical history       p/w         Impression: This is a 69 Y/O Male former smoker. hx of chronic ETOH dependence with Cirrhosis . Presented to ED with worsening SOB. In the ED patient placed on Bipap. For pulmonary follow up of Acute hypoxic respiratory failure due to COPD , pulmonary edema / HFrEF , Rapid A Fib. Off Bipap and on Oxygen supplementation .       Suggestion:  O2 saturation 96% with O2 supplement. Continue Oxygen supplementation 2L NC .  On Apixaban 5 mg Twice daily.   Monitor O2 saturation trend.    Diuresis and aim for negative fluid balance.   Monitor urine output.   Beta blockers for rate control   Hemodynamic and cardiac monitoring

## 2024-01-11 NOTE — PROGRESS NOTE ADULT - PROBLEM SELECTOR PLAN 2
In ED: Afebrile;  Afib w/ RVR and PVC; /102; Trop negative 17,  s/p diltizem 20mg IVP x1 + 10mg IVP x1  c/w metoprolol 50mg q8 + eliquis  c/w tele  Cardio Dr. Alcazar consulted, f/u reccs

## 2024-01-11 NOTE — PROGRESS NOTE ADULT - SUBJECTIVE AND OBJECTIVE BOX
PATIENT SEEN AND EXAMINED BY EDGARDO DRISCOLL M.D. ON :- 1/11/24  DATE OF SERVICE:  1/11/24           Interim events noted,Labs ,Radiological studies and Cardiology tests reviewed .  DISCUSSED WITH ACP/MEDICAL RESIDENT ON PLAN OF CARE.    MR#[id number]  PATIENT NAME:[facility] COURSE: HPI:  68M from home, ambulates independently, PMHx COPD (not on O2), former smoker quit 2-months, HLD, DM, HTN, chronic ETOH dependence c/b cirrhosis, p/w worsening SOB. Patient is limited historian, currently on BIPAP and unable to full history deferring further collateral to wife, Maureen Mcclellanvt [phone] but unable to reach. Patient states that he has had worsening of breathing over 2-years, does not sleep with a machine at home, and when inquired about medications he admits to non-compliance. Denies fevers, chills, or sick contact. Denies chest pain, palpitations, or GUILLEN.    In ED:   Afebrile;  Afib w/ RVR and PVC; /102; RR 22 100% on RA. Started on BIPAP for increased WOB saturating 100% with improvement in WOB. Trop negative 17, BNP 1853 (improved from 4463). Given duoneb x3, solumedrol 125mg x1, rocephin x1, and 500cc bolus x1, diltizem 20mg IVP x1 + 10mg IVP x1. Awaiting to receive vanc x1 and azithromycin. CXR increased b/l opacification and left lower pleural effusion.  (08 Jan 2024 08:55)      INTERIM EVENTS:Patient seen at bedside ,interim events noted.      PM -reviewed admission note, no change since admission  HEART FAILURE: Acute[ ]Chronic[ ] Systolic[ ] Diastolic[ ] Combined Systolic and Diastolic[ ]  CAD[ ] CABG[ ] PCI[ ]  DEVICES[ ] PPM[ ] ICD[ ] ILR[ ]  ATRIAL FIBRILLATION[ ] Paroxysmal[ ] Permanent[ ] CHADS2-[  ]  CORTES[ ] CKD1[ ] CKD2[ ] CKD3[ ] CKD4[ ] ESRD[ ]  COPD[ ] HTN[ ]   DM[ ] Type1[ ] Type 2[ ]   CVA[ ] Paresis[ ]    AMBULATION: Assisted[ ] Cane/walker[ ] Independent[ ]    MEDICATIONS  (STANDING):  amLODIPine   Tablet 5 milliGRAM(s) Oral daily  apixaban 5 milliGRAM(s) Oral two times a day  aspirin  chewable 81 milliGRAM(s) Oral daily  atorvastatin 40 milliGRAM(s) Oral at bedtime  budesonide 160 MICROgram(s)/formoterol 4.5 MICROgram(s) Inhaler 2 Puff(s) Inhalation two times a day  buMETAnide 1 milliGRAM(s) Oral daily  chlorhexidine 2% Cloths 1 Application(s) Topical <User Schedule>  gabapentin 300 milliGRAM(s) Oral at bedtime  insulin lispro (ADMELOG) corrective regimen sliding scale   SubCutaneous Before meals and at bedtime  losartan 100 milliGRAM(s) Oral daily  melatonin 5 milliGRAM(s) Oral at bedtime  metoprolol tartrate 50 milliGRAM(s) Oral every 8 hours  montelukast 10 milliGRAM(s) Oral at bedtime  pantoprazole    Tablet 40 milliGRAM(s) Oral before breakfast  polyethylene glycol 3350 17 Gram(s) Oral daily  senna 2 Tablet(s) Oral at bedtime  tiotropium 2.5 MICROgram(s) Inhaler 2 Puff(s) Inhalation daily    MEDICATIONS  (PRN):  albuterol    90 MICROgram(s) HFA Inhaler 2 Puff(s) Inhalation every 4 hours PRN Shortness of Breath and/or Wheezing            REVIEW OF SYSTEMS:  Constitutional: [ ] fever, [ ]weight loss,  [ ]fatigue [ ]weight gain  Eyes: [ ] visual changes  Respiratory: [ ]shortness of breath;  [ ] cough, [ ]wheezing, [ ]chills, [ ]hemoptysis  Cardiovascular: [ ] chest pain, [ ]palpitations, [ ]dizziness,  [ ]leg swelling[ ]orthopnea[ ]PND  Gastrointestinal: [ ] abdominal pain, [ ]nausea, [ ]vomiting,  [ ]diarrhea [ ]Constipation [ ]Melena  Genitourinary: [ ] dysuria, [ ] hematuria [ ]Goss  Neurologic: [ ] headaches [ ] tremors[ ]weakness [ ]Paralysis Right[ ] Left[ ]  Skin: [ ] itching, [ ]burning, [ ] rashes  Endocrine: [ ] heat or cold intolerance  Musculoskeletal: [ ] joint pain or swelling; [ ] muscle, back, or extremity pain  Psychiatric: [ ] depression, [ ]anxiety, [ ]mood swings, or [ ]difficulty sleeping  Hematologic: [ ] easy bruising, [ ] bleeding gums    [ ] All remaining systems negative except as per above.   [ ]Unable to obtain.  [x] No change in ROS since admission      Vital Signs Last 24 Hrs  T(C): 36.8 (11 Jan 2024 19:29), Max: 36.9 (11 Jan 2024 11:20)  T(F): 98.2 (11 Jan 2024 19:29), Max: 98.4 (11 Jan 2024 11:20)  HR: 99 (11 Jan 2024 19:29) (69 - 109)  BP: 126/73 (11 Jan 2024 19:29) (100/59 - 126/74)  BP(mean): --  RR: 18 (11 Jan 2024 19:29) (18 - 19)  SpO2: 97% (11 Jan 2024 19:29) (94% - 100%)    Parameters below as of 11 Jan 2024 19:29  Patient On (Oxygen Delivery Method): room air      I&O's Summary      PHYSICAL EXAM:  General: No acute distress BMI-  HEENT: EOMI, PERRL  Neck: Supple, [ ] JVD  Lungs: Equal air entry bilaterally; [ ] rales [ ] wheezing [ ] rhonchi  Heart: Regular rate and rhythm; [x ] murmur   2/6 [ x] systolic [ ] diastolic [ ] radiation[ ] rubs [ ]  gallops  Abdomen: Nontender, bowel sounds present  Extremities: No clubbing, cyanosis, [ ] edema [ ]Pulses  equal and intact  Nervous system:  Alert & Oriented X3, no focal deficits  Psychiatric: Normal affect  Skin: No rashes or lesions    LABS:  01-11    141  |  108  |  26<H>  ----------------------------<  86  3.4<L>   |  26  |  1.36<H>    Ca    9.1      11 Jan 2024 06:02  Phos  4.7     01-10  Mg     2.1     01-10    TPro  7.3  /  Alb  3.3<L>  /  TBili  0.5  /  DBili  x   /  AST  16  /  ALT  15  /  AlkPhos  59  01-10    Creatinine Trend: 1.36<--, 1.36<--, 1.37<--, 1.36<--                        10.6   9.57  )-----------( 168      ( 10 Chin 2024 06:50 )             34.2     CONCLUSIONS:  TECHNICALLY VERY DIFFICULT STUDY, POOR ACOUSTIC WINDOWS    1. Moderate mitral regurgitation.  2.  Mild aortic regurgitation.  3. Severely dilated left atrium.  LA volume index = 52  cc/m2.  4. Normal left ventricular internal dimensions and wall  thicknesses.  5. Endocardium not well visualized; grossly severe global  left ventricular systolic dysfunction.   Segmental wall  motion could not be assessed.  Septal motion consistent  with conduction disease.  6. Mild right atrial enlargement.  7. Normal right ventricular size and function.    ------------------------------------------------------------------------  Confirmed on  11/20/2023 - 12:28:32 by Salomon Beyer MD  ------------------------------------------------------------------------             PATIENT SEEN AND EXAMINED BY EDGARDO DRISCOLL M.D. ON :- 1/11/24  DATE OF SERVICE:  1/11/24           Interim events noted,Labs ,Radiological studies and Cardiology tests reviewed .  DISCUSSED WITH ACP/MEDICAL RESIDENT ON PLAN OF CARE.    MR#[id number]  PATIENT NAME:[facility] COURSE: HPI:  68M from home, ambulates independently, PMHx COPD (not on O2), former smoker quit 2-months, HLD, DM, HTN, chronic ETOH dependence c/b cirrhosis, p/w worsening SOB. Patient is limited historian, currently on BIPAP and unable to full history deferring further collateral to wife, Maureen Mcclellanvt [phone] but unable to reach. Patient states that he has had worsening of breathing over 2-years, does not sleep with a machine at home, and when inquired about medications he admits to non-compliance. Denies fevers, chills, or sick contact. Denies chest pain, palpitations, or GUILLEN.    In ED:   Afebrile;  Afib w/ RVR and PVC; /102; RR 22 100% on RA. Started on BIPAP for increased WOB saturating 100% with improvement in WOB. Trop negative 17, BNP 1853 (improved from 4463). Given duoneb x3, solumedrol 125mg x1, rocephin x1, and 500cc bolus x1, diltizem 20mg IVP x1 + 10mg IVP x1. Awaiting to receive vanc x1 and azithromycin. CXR increased b/l opacification and left lower pleural effusion.  (08 Jan 2024 08:55)      INTERIM EVENTS:Patient seen at bedside ,interim events noted.      PM -reviewed admission note, no change since admission  HEART FAILURE: Acute[ ]Chronic[ ] Systolic[ ] Diastolic[ ] Combined Systolic and Diastolic[ ]  CAD[ ] CABG[ ] PCI[ ]  DEVICES[ ] PPM[ ] ICD[ ] ILR[ ]  ATRIAL FIBRILLATION[ ] Paroxysmal[ ] Permanent[ ] CHADS2-[  ]  CORTES[ ] CKD1[ ] CKD2[ ] CKD3[ ] CKD4[ ] ESRD[ ]  COPD[ ] HTN[ ]   DM[ ] Type1[ ] Type 2[ ]   CVA[ ] Paresis[ ]    AMBULATION: Assisted[ ] Cane/walker[ ] Independent[ ]    MEDICATIONS  (STANDING):  amLODIPine   Tablet 5 milliGRAM(s) Oral daily  apixaban 5 milliGRAM(s) Oral two times a day  aspirin  chewable 81 milliGRAM(s) Oral daily  atorvastatin 40 milliGRAM(s) Oral at bedtime  budesonide 160 MICROgram(s)/formoterol 4.5 MICROgram(s) Inhaler 2 Puff(s) Inhalation two times a day  buMETAnide 1 milliGRAM(s) Oral daily  chlorhexidine 2% Cloths 1 Application(s) Topical <User Schedule>  gabapentin 300 milliGRAM(s) Oral at bedtime  insulin lispro (ADMELOG) corrective regimen sliding scale   SubCutaneous Before meals and at bedtime  losartan 100 milliGRAM(s) Oral daily  melatonin 5 milliGRAM(s) Oral at bedtime  metoprolol tartrate 50 milliGRAM(s) Oral every 8 hours  montelukast 10 milliGRAM(s) Oral at bedtime  pantoprazole    Tablet 40 milliGRAM(s) Oral before breakfast  polyethylene glycol 3350 17 Gram(s) Oral daily  senna 2 Tablet(s) Oral at bedtime  tiotropium 2.5 MICROgram(s) Inhaler 2 Puff(s) Inhalation daily    MEDICATIONS  (PRN):  albuterol    90 MICROgram(s) HFA Inhaler 2 Puff(s) Inhalation every 4 hours PRN Shortness of Breath and/or Wheezing            REVIEW OF SYSTEMS:  Constitutional: [ ] fever, [ ]weight loss,  [ ]fatigue [ ]weight gain  Eyes: [ ] visual changes  Respiratory: [ ]shortness of breath;  [ ] cough, [ ]wheezing, [ ]chills, [ ]hemoptysis  Cardiovascular: [ ] chest pain, [ ]palpitations, [ ]dizziness,  [ ]leg swelling[ ]orthopnea[ ]PND  Gastrointestinal: [ ] abdominal pain, [ ]nausea, [ ]vomiting,  [ ]diarrhea [ ]Constipation [ ]Melena  Genitourinary: [ ] dysuria, [ ] hematuria [ ]Goss  Neurologic: [ ] headaches [ ] tremors[ ]weakness [ ]Paralysis Right[ ] Left[ ]  Skin: [ ] itching, [ ]burning, [ ] rashes  Endocrine: [ ] heat or cold intolerance  Musculoskeletal: [ ] joint pain or swelling; [ ] muscle, back, or extremity pain  Psychiatric: [ ] depression, [ ]anxiety, [ ]mood swings, or [ ]difficulty sleeping  Hematologic: [ ] easy bruising, [ ] bleeding gums    [ ] All remaining systems negative except as per above.   [ ]Unable to obtain.  [x] No change in ROS since admission      Vital Signs Last 24 Hrs  T(C): 36.8 (11 Jan 2024 19:29), Max: 36.9 (11 Jan 2024 11:20)  T(F): 98.2 (11 Jan 2024 19:29), Max: 98.4 (11 Jan 2024 11:20)  HR: 99 (11 Jan 2024 19:29) (69 - 109)  BP: 126/73 (11 Jan 2024 19:29) (100/59 - 126/74)  BP(mean): --  RR: 18 (11 Jan 2024 19:29) (18 - 19)  SpO2: 97% (11 Jan 2024 19:29) (94% - 100%)    Parameters below as of 11 Jan 2024 19:29  Patient On (Oxygen Delivery Method): room air      I&O's Summary      PHYSICAL EXAM:  General: No acute distress BMI-  HEENT: EOMI, PERRL  Neck: Supple, [ ] JVD  Lungs: Equal air entry bilaterally; [ ] rales [ ] wheezing [ ] rhonchi  Heart: Regular rate and rhythm; [x ] murmur   2/6 [ x] systolic [ ] diastolic [ ] radiation[ ] rubs [ ]  gallops  Abdomen: Nontender, bowel sounds present  Extremities: No clubbing, cyanosis, [ ] edema [ ]Pulses  equal and intact  Nervous system:  Alert & Oriented X3, no focal deficits  Psychiatric: Normal affect  Skin: No rashes or lesions    LABS:  01-11    141  |  108  |  26<H>  ----------------------------<  86  3.4<L>   |  26  |  1.36<H>    Ca    9.1      11 Jan 2024 06:02  Phos  4.7     01-10  Mg     2.1     01-10    TPro  7.3  /  Alb  3.3<L>  /  TBili  0.5  /  DBili  x   /  AST  16  /  ALT  15  /  AlkPhos  59  01-10    Creatinine Trend: 1.36<--, 1.36<--, 1.37<--, 1.36<--                        10.6   9.57  )-----------( 168      ( 10 Chin 2024 06:50 )             34.2     CONCLUSIONS:  TECHNICALLY VERY DIFFICULT STUDY, POOR ACOUSTIC WINDOWS    1. Moderate mitral regurgitation.  2.  Mild aortic regurgitation.  3. Severely dilated left atrium.  LA volume index = 52  cc/m2.  4. Normal left ventricular internal dimensions and wall  thicknesses.  5. Endocardium not well visualized; grossly severe global  left ventricular systolic dysfunction.   Segmental wall  motion could not be assessed.  Septal motion consistent  with conduction disease.  6. Mild right atrial enlargement.  7. Normal right ventricular size and function.    ------------------------------------------------------------------------  Confirmed on  11/20/2023 - 12:28:32 by Salomon Beyer MD  ------------------------------------------------------------------------

## 2024-01-11 NOTE — PHYSICAL THERAPY INITIAL EVALUATION ADULT - GENERAL OBSERVATIONS, REHAB EVAL
Patient received in supine, AxOX3, NAD; on monitor and w/ IV; patient reports min back and chest pain (4/10).

## 2024-01-11 NOTE — PROGRESS NOTE ADULT - PROBLEM SELECTOR PLAN 3
on Bumex ( m,w,f BID) at home  echo noted 11/2023 ef  20-25%  c/w bumex 1mg daily  daily wt, strict I &O, fluid restrictions  monitor bmp w/ lytes

## 2024-01-11 NOTE — PROGRESS NOTE ADULT - PROBLEM SELECTOR PLAN 1
pt p/w sob and increased WOB  CXR increased b/l opacification and left lower pleural effusion. chf v.pna  CT chest: CHF and/or pneumonia with small moderate right pleural effusion and small loculated left pleural effusion.  In ED: RR 22 100% on RA.   BNP 1853 (improved from 4463 in nov 2023).  RVP neg. Bcx 1/8- NGTD  S/p ICU for BIPAP.    s/p duoneb x3, solumedrol 125mg x1, rocephin x1, and 500cc bolus x1. vanc x1 and azithromycin.   Pt currently on RA, tolerating

## 2024-01-11 NOTE — PROGRESS NOTE ADULT - SUBJECTIVE AND OBJECTIVE BOX
NP Note discussed with  Primary Attending    Patient is a 68y old  Male who presents with a chief complaint of AHRF, CHF exacerbation (10 Chin 2024 18:26)      INTERVAL HPI/OVERNIGHT EVENTS: no new complaints    MEDICATIONS  (STANDING):  amLODIPine   Tablet 5 milliGRAM(s) Oral daily  apixaban 5 milliGRAM(s) Oral two times a day  aspirin  chewable 81 milliGRAM(s) Oral daily  atorvastatin 40 milliGRAM(s) Oral at bedtime  budesonide 160 MICROgram(s)/formoterol 4.5 MICROgram(s) Inhaler 2 Puff(s) Inhalation two times a day  buMETAnide 1 milliGRAM(s) Oral daily  chlorhexidine 2% Cloths 1 Application(s) Topical <User Schedule>  gabapentin 300 milliGRAM(s) Oral at bedtime  insulin lispro (ADMELOG) corrective regimen sliding scale   SubCutaneous Before meals and at bedtime  losartan 100 milliGRAM(s) Oral daily  melatonin 5 milliGRAM(s) Oral at bedtime  metoprolol tartrate 50 milliGRAM(s) Oral every 8 hours  montelukast 10 milliGRAM(s) Oral at bedtime  pantoprazole    Tablet 40 milliGRAM(s) Oral before breakfast  polyethylene glycol 3350 17 Gram(s) Oral daily  senna 2 Tablet(s) Oral at bedtime  tiotropium 2.5 MICROgram(s) Inhaler 2 Puff(s) Inhalation daily    MEDICATIONS  (PRN):  albuterol    90 MICROgram(s) HFA Inhaler 2 Puff(s) Inhalation every 4 hours PRN Shortness of Breath and/or Wheezing      __________________________________________________  REVIEW OF SYSTEMS:    CONSTITUTIONAL: No fever,   EYES: no acute visual disturbances  NECK: No pain or stiffness  RESPIRATORY: No cough; No shortness of breath  CARDIOVASCULAR: No chest pain, no palpitations  GASTROINTESTINAL: No pain. No nausea or vomiting; No diarrhea   NEUROLOGICAL: No headache or numbness, no tremors  MUSCULOSKELETAL: No joint pain, no muscle pain  GENITOURINARY: no dysuria, no frequency, no hesitancy  PSYCHIATRY: no depression , no anxiety  ALL OTHER  ROS negative        Vital Signs Last 24 Hrs  T(C): 36.9 (11 Jan 2024 11:20), Max: 36.9 (11 Jan 2024 11:20)  T(F): 98.4 (11 Jan 2024 11:20), Max: 98.4 (11 Jan 2024 11:20)  HR: 92 (11 Jan 2024 13:02) (85 - 109)  BP: 121/76 (11 Jan 2024 13:02) (109/88 - 140/91)  BP(mean): --  RR: 18 (11 Jan 2024 11:20) (18 - 19)  SpO2: 97% (11 Jan 2024 11:20) (94% - 100%)    Parameters below as of 11 Jan 2024 11:20  Patient On (Oxygen Delivery Method): room air        ________________________________________________  PHYSICAL EXAM:   GENERAL: NAD  HEENT: Normocephalic;  conjunctivae and sclerae clear; moist mucous membranes;   NECK : supple  CHEST/LUNG: Clear to auscultation bilaterally with good air entry   HEART: S1 S2  regular; no murmurs, gallops or rubs  ABDOMEN: Soft, Nontender, Nondistended; Bowel sounds present  EXTREMITIES: no cyanosis; no edema; no calf tenderness  SKIN: warm and dry; no rash  NERVOUS SYSTEM:  Awake and alert; Oriented  to place, person and time ; no new deficits    _________________________________________________  LABS:                        10.6   9.57  )-----------( 168      ( 10 Chin 2024 06:50 )             34.2     01-11    141  |  108  |  26<H>  ----------------------------<  86  3.4<L>   |  26  |  1.36<H>    Ca    9.1      11 Jan 2024 06:02  Phos  4.7     01-10  Mg     2.1     01-10    TPro  7.3  /  Alb  3.3<L>  /  TBili  0.5  /  DBili  x   /  AST  16  /  ALT  15  /  AlkPhos  59  01-10      Urinalysis Basic - ( 11 Jan 2024 06:02 )    Color: x / Appearance: x / SG: x / pH: x  Gluc: 86 mg/dL / Ketone: x  / Bili: x / Urobili: x   Blood: x / Protein: x / Nitrite: x   Leuk Esterase: x / RBC: x / WBC x   Sq Epi: x / Non Sq Epi: x / Bacteria: x      CAPILLARY BLOOD GLUCOSE      POCT Blood Glucose.: 128 mg/dL (11 Jan 2024 11:35)  POCT Blood Glucose.: 89 mg/dL (11 Jan 2024 07:52)  POCT Blood Glucose.: 113 mg/dL (10 Chin 2024 21:43)  POCT Blood Glucose.: 96 mg/dL (10 Chin 2024 16:40)    RADIOLOGY & ADDITIONAL TESTS:    < from: CT Chest No Cont (01.08.24 @ 10:36) >    ACC: [id number] EXAM:  CT CHEST   ORDERED BY: THERESA MARTINEZ     PROCEDURE DATE:  01/08/2024          INTERPRETATION:  EXAMINATION: CT CHEST    CLINICAL INDICATION: Dyspnea.    TECHNIQUE: Noncontrast CT of the chest was obtained.    COMPARISON: 11/20/2023.    FINDINGS:    AIRWAYS AND LUNGS: The central tracheobronchial tree is patent.  A few   patchy bilateral lung opacities. Left-sided pleural calcifications. Small   to moderate right pleural effusion. Small loculated left pleural effusion.    MEDIASTINUM AND PLEURA: There are no enlarged mediastinal, hilar or   axillary lymph nodes. The visualized portion of the thyroid gland is   unremarkable. There is no pneumothorax.    HEART AND VESSELS: There is mild cardiomegaly.  There are atherosclerotic   calcifications of the aorta and coronary arteries.  There is trace   pericardial fluid.  Aortic valve calcification.    UPPER ABDOMEN: Images of the upper abdomen demonstrate cirrhosis.    BONES AND SOFT TISSUES: L1 compression fracture unchanged from 11/20/2023    The soft tissues are unremarkable.    IMPRESSION:  CHF and/or pneumonia with small moderate right pleural effusion and small   loculated left pleural effusion.    --- End of Report ---    < end of copied text >      < from: Xray Chest 1 View- PORTABLE-Urgent (01.08.24 @ 07:01) >  ACC: [id number] EXAM:  XR CHEST PORTABLE URGENT 1V   ORDERED BY:  SHARONA ORTIZ     PROCEDURE DATE:  01/08/2024          INTERPRETATION:  EXAM: XR CHEST URGENT    INDICATION: Admitting Dxs: R06.03 ACUTE RESPIRATORY DISTRESS respiratory   distress HXR    COMPARISON: November 28, 2023    IMPRESSION: Cardiac paddle has appeared since previous exam. Increased   perihilar interstitial markings bilaterally more prominent than before.   Cardiomegaly. Left effusion with some compressive atelectatic change.   Small right effusion. I would favor congestive failure. An underlying   inflammatory infectious process not absolutely excluded behind the heart.   Regional osseous structures appropriate for age. No pneumothorax.   Follow-up suggested    --- End of Report ---    < end of copied text >    Imaging Personally Reviewed:  YES/NO    Consultant(s) Notes Reviewed:   YES/ No    Care Discussed with Consultants :     Plan of care was discussed with patient and /or primary care giver; all questions and concerns were addressed and care was aligned with patient's wishes.     NP Note discussed with  Primary Attending    Patient is a 68y old  Male who presents with a chief complaint of AHRF, CHF exacerbation (10 Chin 2024 18:26)      INTERVAL HPI/OVERNIGHT EVENTS: no new complaints    MEDICATIONS  (STANDING):  amLODIPine   Tablet 5 milliGRAM(s) Oral daily  apixaban 5 milliGRAM(s) Oral two times a day  aspirin  chewable 81 milliGRAM(s) Oral daily  atorvastatin 40 milliGRAM(s) Oral at bedtime  budesonide 160 MICROgram(s)/formoterol 4.5 MICROgram(s) Inhaler 2 Puff(s) Inhalation two times a day  buMETAnide 1 milliGRAM(s) Oral daily  chlorhexidine 2% Cloths 1 Application(s) Topical <User Schedule>  gabapentin 300 milliGRAM(s) Oral at bedtime  insulin lispro (ADMELOG) corrective regimen sliding scale   SubCutaneous Before meals and at bedtime  losartan 100 milliGRAM(s) Oral daily  melatonin 5 milliGRAM(s) Oral at bedtime  metoprolol tartrate 50 milliGRAM(s) Oral every 8 hours  montelukast 10 milliGRAM(s) Oral at bedtime  pantoprazole    Tablet 40 milliGRAM(s) Oral before breakfast  polyethylene glycol 3350 17 Gram(s) Oral daily  senna 2 Tablet(s) Oral at bedtime  tiotropium 2.5 MICROgram(s) Inhaler 2 Puff(s) Inhalation daily    MEDICATIONS  (PRN):  albuterol    90 MICROgram(s) HFA Inhaler 2 Puff(s) Inhalation every 4 hours PRN Shortness of Breath and/or Wheezing      __________________________________________________  REVIEW OF SYSTEMS:    CONSTITUTIONAL: No fever,   EYES: no acute visual disturbances  NECK: No pain or stiffness  RESPIRATORY: No cough; No shortness of breath  CARDIOVASCULAR: No chest pain, no palpitations  GASTROINTESTINAL: No pain. No nausea or vomiting; No diarrhea   NEUROLOGICAL: No headache or numbness, no tremors  MUSCULOSKELETAL: No joint pain, no muscle pain  GENITOURINARY: no dysuria, no frequency, no hesitancy  PSYCHIATRY: no depression , no anxiety  ALL OTHER  ROS negative        Vital Signs Last 24 Hrs  T(C): 36.9 (11 Jan 2024 11:20), Max: 36.9 (11 Jan 2024 11:20)  T(F): 98.4 (11 Jan 2024 11:20), Max: 98.4 (11 Jan 2024 11:20)  HR: 92 (11 Jan 2024 13:02) (85 - 109)  BP: 121/76 (11 Jan 2024 13:02) (109/88 - 140/91)  BP(mean): --  RR: 18 (11 Jan 2024 11:20) (18 - 19)  SpO2: 97% (11 Jan 2024 11:20) (94% - 100%)    Parameters below as of 11 Jan 2024 11:20  Patient On (Oxygen Delivery Method): room air        ________________________________________________  PHYSICAL EXAM:   GENERAL: NAD  HEENT: Normocephalic;  conjunctivae and sclerae clear; moist mucous membranes;   NECK : supple  CHEST/LUNG: Clear to auscultation bilaterally with good air entry   HEART: S1 S2  regular; no murmurs, gallops or rubs  ABDOMEN: Soft, Nontender, Nondistended; Bowel sounds present  EXTREMITIES: no cyanosis; no edema; no calf tenderness  SKIN: warm and dry; no rash  NERVOUS SYSTEM:  Awake and alert; Oriented  to place, person and time ; no new deficits    _________________________________________________  LABS:                        10.6   9.57  )-----------( 168      ( 10 Chin 2024 06:50 )             34.2     01-11    141  |  108  |  26<H>  ----------------------------<  86  3.4<L>   |  26  |  1.36<H>    Ca    9.1      11 Jan 2024 06:02  Phos  4.7     01-10  Mg     2.1     01-10    TPro  7.3  /  Alb  3.3<L>  /  TBili  0.5  /  DBili  x   /  AST  16  /  ALT  15  /  AlkPhos  59  01-10      Urinalysis Basic - ( 11 Jan 2024 06:02 )    Color: x / Appearance: x / SG: x / pH: x  Gluc: 86 mg/dL / Ketone: x  / Bili: x / Urobili: x   Blood: x / Protein: x / Nitrite: x   Leuk Esterase: x / RBC: x / WBC x   Sq Epi: x / Non Sq Epi: x / Bacteria: x      CAPILLARY BLOOD GLUCOSE      POCT Blood Glucose.: 128 mg/dL (11 Jan 2024 11:35)  POCT Blood Glucose.: 89 mg/dL (11 Jan 2024 07:52)  POCT Blood Glucose.: 113 mg/dL (10 Chin 2024 21:43)  POCT Blood Glucose.: 96 mg/dL (10 Chin 2024 16:40)    RADIOLOGY & ADDITIONAL TESTS:    < from: CT Chest No Cont (01.08.24 @ 10:36) >    ACC: [id number] EXAM:  CT CHEST   ORDERED BY: THERESA MARTINEZ     PROCEDURE DATE:  01/08/2024          INTERPRETATION:  EXAMINATION: CT CHEST    CLINICAL INDICATION: Dyspnea.    TECHNIQUE: Noncontrast CT of the chest was obtained.    COMPARISON: 11/20/2023.    FINDINGS:    AIRWAYS AND LUNGS: The central tracheobronchial tree is patent.  A few   patchy bilateral lung opacities. Left-sided pleural calcifications. Small   to moderate right pleural effusion. Small loculated left pleural effusion.    MEDIASTINUM AND PLEURA: There are no enlarged mediastinal, hilar or   axillary lymph nodes. The visualized portion of the thyroid gland is   unremarkable. There is no pneumothorax.    HEART AND VESSELS: There is mild cardiomegaly.  There are atherosclerotic   calcifications of the aorta and coronary arteries.  There is trace   pericardial fluid.  Aortic valve calcification.    UPPER ABDOMEN: Images of the upper abdomen demonstrate cirrhosis.    BONES AND SOFT TISSUES: L1 compression fracture unchanged from 11/20/2023    The soft tissues are unremarkable.    IMPRESSION:  CHF and/or pneumonia with small moderate right pleural effusion and small   loculated left pleural effusion.    --- End of Report ---    < end of copied text >      < from: Xray Chest 1 View- PORTABLE-Urgent (01.08.24 @ 07:01) >  ACC: [id number] EXAM:  XR CHEST PORTABLE URGENT 1V   ORDERED BY:  SHARONA ORTIZ     PROCEDURE DATE:  01/08/2024          INTERPRETATION:  EXAM: XR CHEST URGENT    INDICATION: Admitting Dxs: R06.03 ACUTE RESPIRATORY DISTRESS respiratory   distress HXR    COMPARISON: November 28, 2023    IMPRESSION: Cardiac paddle has appeared since previous exam. Increased   perihilar interstitial markings bilaterally more prominent than before.   Cardiomegaly. Left effusion with some compressive atelectatic change.   Small right effusion. I would favor congestive failure. An underlying   inflammatory infectious process not absolutely excluded behind the heart.   Regional osseous structures appropriate for age. No pneumothorax.   Follow-up suggested    --- End of Report ---    < end of copied text >    Imaging Personally Reviewed:  YES/NO    Consultant(s) Notes Reviewed:   YES/ No    Care Discussed with Consultants :     Plan of care was discussed with patient and /or primary care giver; all questions and concerns were addressed and care was aligned with patient's wishes.

## 2024-01-11 NOTE — PHYSICAL THERAPY INITIAL EVALUATION ADULT - NSACTIVITYREC_GEN_A_PT
Patient instructed to continue w/ deep breathing exercises and daily ambulation to improve endurance and lung function.

## 2024-01-12 LAB
ALBUMIN SERPL ELPH-MCNC: 3.6 G/DL — SIGNIFICANT CHANGE UP (ref 3.5–5)
ALBUMIN SERPL ELPH-MCNC: 3.6 G/DL — SIGNIFICANT CHANGE UP (ref 3.5–5)
ALP SERPL-CCNC: 76 U/L — SIGNIFICANT CHANGE UP (ref 40–120)
ALP SERPL-CCNC: 76 U/L — SIGNIFICANT CHANGE UP (ref 40–120)
ALT FLD-CCNC: 15 U/L DA — SIGNIFICANT CHANGE UP (ref 10–60)
ALT FLD-CCNC: 15 U/L DA — SIGNIFICANT CHANGE UP (ref 10–60)
ANION GAP SERPL CALC-SCNC: 7 MMOL/L — SIGNIFICANT CHANGE UP (ref 5–17)
ANION GAP SERPL CALC-SCNC: 7 MMOL/L — SIGNIFICANT CHANGE UP (ref 5–17)
AST SERPL-CCNC: 16 U/L — SIGNIFICANT CHANGE UP (ref 10–40)
AST SERPL-CCNC: 16 U/L — SIGNIFICANT CHANGE UP (ref 10–40)
BILIRUB SERPL-MCNC: 0.5 MG/DL — SIGNIFICANT CHANGE UP (ref 0.2–1.2)
BILIRUB SERPL-MCNC: 0.5 MG/DL — SIGNIFICANT CHANGE UP (ref 0.2–1.2)
BUN SERPL-MCNC: 25 MG/DL — HIGH (ref 7–18)
BUN SERPL-MCNC: 25 MG/DL — HIGH (ref 7–18)
BUN SERPL-MCNC: SIGNIFICANT CHANGE UP MG/DL (ref 7–18)
BUN SERPL-MCNC: SIGNIFICANT CHANGE UP MG/DL (ref 7–18)
CALCIUM SERPL-MCNC: 9.6 MG/DL — SIGNIFICANT CHANGE UP (ref 8.4–10.5)
CALCIUM SERPL-MCNC: 9.6 MG/DL — SIGNIFICANT CHANGE UP (ref 8.4–10.5)
CALCIUM SERPL-MCNC: SIGNIFICANT CHANGE UP MG/DL (ref 8.4–10.5)
CALCIUM SERPL-MCNC: SIGNIFICANT CHANGE UP MG/DL (ref 8.4–10.5)
CHLORIDE SERPL-SCNC: 109 MMOL/L — HIGH (ref 96–108)
CHLORIDE SERPL-SCNC: 109 MMOL/L — HIGH (ref 96–108)
CHLORIDE SERPL-SCNC: SIGNIFICANT CHANGE UP MMOL/L (ref 96–108)
CHLORIDE SERPL-SCNC: SIGNIFICANT CHANGE UP MMOL/L (ref 96–108)
CO2 SERPL-SCNC: 27 MMOL/L — SIGNIFICANT CHANGE UP (ref 22–31)
CO2 SERPL-SCNC: 27 MMOL/L — SIGNIFICANT CHANGE UP (ref 22–31)
CO2 SERPL-SCNC: SIGNIFICANT CHANGE UP MMOL/L (ref 22–31)
CO2 SERPL-SCNC: SIGNIFICANT CHANGE UP MMOL/L (ref 22–31)
CREAT SERPL-MCNC: 1.49 MG/DL — HIGH (ref 0.5–1.3)
CREAT SERPL-MCNC: 1.49 MG/DL — HIGH (ref 0.5–1.3)
CREAT SERPL-MCNC: SIGNIFICANT CHANGE UP MG/DL (ref 0.5–1.3)
CREAT SERPL-MCNC: SIGNIFICANT CHANGE UP MG/DL (ref 0.5–1.3)
EGFR: 50 ML/MIN/1.73M2 — LOW
EGFR: 50 ML/MIN/1.73M2 — LOW
GLUCOSE BLDC GLUCOMTR-MCNC: 103 MG/DL — HIGH (ref 70–99)
GLUCOSE BLDC GLUCOMTR-MCNC: 103 MG/DL — HIGH (ref 70–99)
GLUCOSE BLDC GLUCOMTR-MCNC: 107 MG/DL — HIGH (ref 70–99)
GLUCOSE BLDC GLUCOMTR-MCNC: 130 MG/DL — HIGH (ref 70–99)
GLUCOSE BLDC GLUCOMTR-MCNC: 130 MG/DL — HIGH (ref 70–99)
GLUCOSE SERPL-MCNC: 83 MG/DL — SIGNIFICANT CHANGE UP (ref 70–99)
GLUCOSE SERPL-MCNC: 83 MG/DL — SIGNIFICANT CHANGE UP (ref 70–99)
GLUCOSE SERPL-MCNC: 85 MG/DL — SIGNIFICANT CHANGE UP (ref 70–99)
GLUCOSE SERPL-MCNC: 85 MG/DL — SIGNIFICANT CHANGE UP (ref 70–99)
HCT VFR BLD CALC: 35.1 % — LOW (ref 39–50)
HCT VFR BLD CALC: 35.1 % — LOW (ref 39–50)
HGB BLD-MCNC: 11 G/DL — LOW (ref 13–17)
HGB BLD-MCNC: 11 G/DL — LOW (ref 13–17)
MCHC RBC-ENTMCNC: 27.6 PG — SIGNIFICANT CHANGE UP (ref 27–34)
MCHC RBC-ENTMCNC: 27.6 PG — SIGNIFICANT CHANGE UP (ref 27–34)
MCHC RBC-ENTMCNC: 31.3 GM/DL — LOW (ref 32–36)
MCHC RBC-ENTMCNC: 31.3 GM/DL — LOW (ref 32–36)
MCV RBC AUTO: 88.2 FL — SIGNIFICANT CHANGE UP (ref 80–100)
MCV RBC AUTO: 88.2 FL — SIGNIFICANT CHANGE UP (ref 80–100)
NRBC # BLD: 0 /100 WBCS — SIGNIFICANT CHANGE UP (ref 0–0)
NRBC # BLD: 0 /100 WBCS — SIGNIFICANT CHANGE UP (ref 0–0)
PLATELET # BLD AUTO: 151 K/UL — SIGNIFICANT CHANGE UP (ref 150–400)
PLATELET # BLD AUTO: 151 K/UL — SIGNIFICANT CHANGE UP (ref 150–400)
POTASSIUM SERPL-MCNC: 3.7 MMOL/L — SIGNIFICANT CHANGE UP (ref 3.5–5.3)
POTASSIUM SERPL-MCNC: 3.7 MMOL/L — SIGNIFICANT CHANGE UP (ref 3.5–5.3)
POTASSIUM SERPL-MCNC: SIGNIFICANT CHANGE UP MMOL/L (ref 3.5–5.3)
POTASSIUM SERPL-MCNC: SIGNIFICANT CHANGE UP MMOL/L (ref 3.5–5.3)
POTASSIUM SERPL-SCNC: 3.7 MMOL/L — SIGNIFICANT CHANGE UP (ref 3.5–5.3)
POTASSIUM SERPL-SCNC: 3.7 MMOL/L — SIGNIFICANT CHANGE UP (ref 3.5–5.3)
POTASSIUM SERPL-SCNC: SIGNIFICANT CHANGE UP MMOL/L (ref 3.5–5.3)
POTASSIUM SERPL-SCNC: SIGNIFICANT CHANGE UP MMOL/L (ref 3.5–5.3)
PROT SERPL-MCNC: 8 G/DL — SIGNIFICANT CHANGE UP (ref 6–8.3)
PROT SERPL-MCNC: 8 G/DL — SIGNIFICANT CHANGE UP (ref 6–8.3)
RBC # BLD: 3.98 M/UL — LOW (ref 4.2–5.8)
RBC # BLD: 3.98 M/UL — LOW (ref 4.2–5.8)
RBC # FLD: 14.2 % — SIGNIFICANT CHANGE UP (ref 10.3–14.5)
RBC # FLD: 14.2 % — SIGNIFICANT CHANGE UP (ref 10.3–14.5)
SODIUM SERPL-SCNC: 143 MMOL/L — SIGNIFICANT CHANGE UP (ref 135–145)
SODIUM SERPL-SCNC: 143 MMOL/L — SIGNIFICANT CHANGE UP (ref 135–145)
SODIUM SERPL-SCNC: SIGNIFICANT CHANGE UP MMOL/L (ref 135–145)
SODIUM SERPL-SCNC: SIGNIFICANT CHANGE UP MMOL/L (ref 135–145)
WBC # BLD: 7.79 K/UL — SIGNIFICANT CHANGE UP (ref 3.8–10.5)
WBC # BLD: 7.79 K/UL — SIGNIFICANT CHANGE UP (ref 3.8–10.5)
WBC # FLD AUTO: 7.79 K/UL — SIGNIFICANT CHANGE UP (ref 3.8–10.5)
WBC # FLD AUTO: 7.79 K/UL — SIGNIFICANT CHANGE UP (ref 3.8–10.5)

## 2024-01-12 RX ADMIN — ALBUTEROL 2 PUFF(S): 90 AEROSOL, METERED ORAL at 21:37

## 2024-01-12 RX ADMIN — AMLODIPINE BESYLATE 5 MILLIGRAM(S): 2.5 TABLET ORAL at 05:21

## 2024-01-12 RX ADMIN — LOSARTAN POTASSIUM 100 MILLIGRAM(S): 100 TABLET, FILM COATED ORAL at 05:22

## 2024-01-12 RX ADMIN — Medication 50 MILLIGRAM(S): at 21:38

## 2024-01-12 RX ADMIN — BUMETANIDE 1 MILLIGRAM(S): 0.25 INJECTION INTRAMUSCULAR; INTRAVENOUS at 05:22

## 2024-01-12 RX ADMIN — Medication 5 MILLIGRAM(S): at 21:38

## 2024-01-12 RX ADMIN — ATORVASTATIN CALCIUM 40 MILLIGRAM(S): 80 TABLET, FILM COATED ORAL at 21:37

## 2024-01-12 RX ADMIN — TIOTROPIUM BROMIDE 2 PUFF(S): 18 CAPSULE ORAL; RESPIRATORY (INHALATION) at 11:45

## 2024-01-12 RX ADMIN — GABAPENTIN 300 MILLIGRAM(S): 400 CAPSULE ORAL at 21:37

## 2024-01-12 RX ADMIN — Medication 81 MILLIGRAM(S): at 11:44

## 2024-01-12 RX ADMIN — Medication 50 MILLIGRAM(S): at 14:13

## 2024-01-12 RX ADMIN — BUDESONIDE AND FORMOTEROL FUMARATE DIHYDRATE 2 PUFF(S): 160; 4.5 AEROSOL RESPIRATORY (INHALATION) at 21:37

## 2024-01-12 RX ADMIN — CHLORHEXIDINE GLUCONATE 1 APPLICATION(S): 213 SOLUTION TOPICAL at 05:23

## 2024-01-12 RX ADMIN — APIXABAN 5 MILLIGRAM(S): 2.5 TABLET, FILM COATED ORAL at 05:22

## 2024-01-12 RX ADMIN — PANTOPRAZOLE SODIUM 40 MILLIGRAM(S): 20 TABLET, DELAYED RELEASE ORAL at 05:22

## 2024-01-12 RX ADMIN — Medication 50 MILLIGRAM(S): at 05:22

## 2024-01-12 RX ADMIN — BUDESONIDE AND FORMOTEROL FUMARATE DIHYDRATE 2 PUFF(S): 160; 4.5 AEROSOL RESPIRATORY (INHALATION) at 10:43

## 2024-01-12 RX ADMIN — APIXABAN 5 MILLIGRAM(S): 2.5 TABLET, FILM COATED ORAL at 17:56

## 2024-01-12 RX ADMIN — SENNA PLUS 2 TABLET(S): 8.6 TABLET ORAL at 21:38

## 2024-01-12 RX ADMIN — MONTELUKAST 10 MILLIGRAM(S): 4 TABLET, CHEWABLE ORAL at 21:38

## 2024-01-12 NOTE — PROGRESS NOTE ADULT - SUBJECTIVE AND OBJECTIVE BOX
Time of Visit:  Patient seen and examined. off BiPaP    MEDICATIONS  (STANDING):  amLODIPine   Tablet 5 milliGRAM(s) Oral daily  apixaban 5 milliGRAM(s) Oral two times a day  aspirin  chewable 81 milliGRAM(s) Oral daily  atorvastatin 40 milliGRAM(s) Oral at bedtime  budesonide 160 MICROgram(s)/formoterol 4.5 MICROgram(s) Inhaler 2 Puff(s) Inhalation two times a day  buMETAnide 1 milliGRAM(s) Oral daily  gabapentin 300 milliGRAM(s) Oral at bedtime  insulin lispro (ADMELOG) corrective regimen sliding scale   SubCutaneous Before meals and at bedtime  losartan 100 milliGRAM(s) Oral daily  melatonin 5 milliGRAM(s) Oral at bedtime  metoprolol tartrate 50 milliGRAM(s) Oral every 8 hours  montelukast 10 milliGRAM(s) Oral at bedtime  pantoprazole    Tablet 40 milliGRAM(s) Oral before breakfast  polyethylene glycol 3350 17 Gram(s) Oral daily  senna 2 Tablet(s) Oral at bedtime  tiotropium 2.5 MICROgram(s) Inhaler 2 Puff(s) Inhalation daily      MEDICATIONS  (PRN):  albuterol    90 MICROgram(s) HFA Inhaler 2 Puff(s) Inhalation every 4 hours PRN Shortness of Breath and/or Wheezing       Medications up to date at time of exam.      PHYSICAL EXAMINATION:  Patient has no new complaints.  GENERAL: The patient is a well-developed, well-nourished, in no apparent distress.     Vital Signs Last 24 Hrs  T(C): 36.6 (12 Jan 2024 15:50), Max: 37.1 (12 Jan 2024 11:12)  T(F): 97.9 (12 Jan 2024 15:50), Max: 98.7 (12 Jan 2024 11:12)  HR: 114 (12 Jan 2024 15:50) (89 - 114)  BP: 110/73 (12 Jan 2024 15:50) (105/78 - 128/84)  BP(mean): --  RR: 18 (12 Jan 2024 15:50) (18 - 18)  SpO2: 98% (12 Jan 2024 15:50) (97% - 98%)    Parameters below as of 12 Jan 2024 15:50  Patient On (Oxygen Delivery Method): room air       (if applicable)    Chest Tube (if applicable)    HEENT: Head is normocephalic and atraumatic. Extraocular muscles are intact. Mucous membranes are moist.     NECK: Supple, no palpable adenopathy.    LUNGS: Clear to auscultation, no wheezing, rales, or rhonchi.    HEART: Regular rate and rhythm without murmur.    ABDOMEN: Soft, nontender, and nondistended.  No hepatosplenomegaly is noted.    : No painful voiding, no pelvic pain    EXTREMITIES: Without any cyanosis, clubbing, rash, lesions or edema.    NEUROLOGIC: Awake, alert, oriented, grossly intact    SKIN: Warm, dry, good turgor.      LABS:                        11.0   7.79  )-----------( 151      ( 12 Jan 2024 08:00 )             35.1     01-12    143  |  109<H>  |  25<H>  ----------------------------<  83  3.7   |  27  |  1.49<H>    Ca    9.6      12 Jan 2024 12:30    TPro  8.0  /  Alb  3.6  /  TBili  0.5  /  DBili  x   /  AST  16  /  ALT  15  /  AlkPhos  76  01-12      Urinalysis Basic - ( 12 Jan 2024 12:30 )    Color: x / Appearance: x / SG: x / pH: x  Gluc: 83 mg/dL / Ketone: x  / Bili: x / Urobili: x   Blood: x / Protein: x / Nitrite: x   Leuk Esterase: x / RBC: x / WBC x   Sq Epi: x / Non Sq Epi: x / Bacteria: x                      MICROBIOLOGY: (if applicable)    RADIOLOGY & ADDITIONAL STUDIES:  EKG:   CXR:  ECHO:    IMPRESSION: 68y Male PAST MEDICAL & SURGICAL HISTORY:  HTN (hypertension)      Varicose veins      HLD (hyperlipidemia)      Liver cirrhosis      Portal hypertension with esophageal varices      COPD (chronic obstructive pulmonary disease)      Syncope      Alcohol dependence, daily use      Smokes tobacco daily      DM (diabetes mellitus)      No significant past surgical history       p/w           RECOMMENDATIONS:   Time of Visit:  Patient seen and examined. off BiPaP    MEDICATIONS  (STANDING):  amLODIPine   Tablet 5 milliGRAM(s) Oral daily  apixaban 5 milliGRAM(s) Oral two times a day  aspirin  chewable 81 milliGRAM(s) Oral daily  atorvastatin 40 milliGRAM(s) Oral at bedtime  budesonide 160 MICROgram(s)/formoterol 4.5 MICROgram(s) Inhaler 2 Puff(s) Inhalation two times a day  buMETAnide 1 milliGRAM(s) Oral daily  gabapentin 300 milliGRAM(s) Oral at bedtime  insulin lispro (ADMELOG) corrective regimen sliding scale   SubCutaneous Before meals and at bedtime  losartan 100 milliGRAM(s) Oral daily  melatonin 5 milliGRAM(s) Oral at bedtime  metoprolol tartrate 50 milliGRAM(s) Oral every 8 hours  montelukast 10 milliGRAM(s) Oral at bedtime  pantoprazole    Tablet 40 milliGRAM(s) Oral before breakfast  polyethylene glycol 3350 17 Gram(s) Oral daily  senna 2 Tablet(s) Oral at bedtime  tiotropium 2.5 MICROgram(s) Inhaler 2 Puff(s) Inhalation daily      MEDICATIONS  (PRN):  albuterol    90 MICROgram(s) HFA Inhaler 2 Puff(s) Inhalation every 4 hours PRN Shortness of Breath and/or Wheezing       Medications up to date at time of exam.      PHYSICAL EXAMINATION:  Patient has no new complaints.  GENERAL: The patient is a well-developed, well-nourished, in no apparent distress.     Vital Signs Last 24 Hrs  T(C): 36.6 (12 Jan 2024 15:50), Max: 37.1 (12 Jan 2024 11:12)  T(F): 97.9 (12 Jan 2024 15:50), Max: 98.7 (12 Jan 2024 11:12)  HR: 114 (12 Jan 2024 15:50) (89 - 114)  BP: 110/73 (12 Jan 2024 15:50) (105/78 - 128/84)  BP(mean): --  RR: 18 (12 Jan 2024 15:50) (18 - 18)  SpO2: 98% (12 Jan 2024 15:50) (97% - 98%)    Parameters below as of 12 Jan 2024 15:50  Patient On (Oxygen Delivery Method): room air       (if applicable)    Chest Tube (if applicable)    HEENT: Head is normocephalic and atraumatic. Extraocular muscles are intact. Mucous membranes are moist.     NECK: Supple, no palpable adenopathy.    LUNGS: Clear to auscultation, no wheezing, rales, or rhonchi.    HEART: Regular rate and rhythm without murmur.    ABDOMEN: Soft, nontender, and nondistended.  No hepatosplenomegaly is noted.    : No painful voiding, no pelvic pain    EXTREMITIES: Without any cyanosis, clubbing, rash, lesions or edema.    NEUROLOGIC: Awake, alert, oriented, grossly intact    SKIN: Warm, dry, good turgor.      LABS:                        11.0   7.79  )-----------( 151      ( 12 Jan 2024 08:00 )             35.1     01-12    143  |  109<H>  |  25<H>  ----------------------------<  83  3.7   |  27  |  1.49<H>    Ca    9.6      12 Jan 2024 12:30    TPro  8.0  /  Alb  3.6  /  TBili  0.5  /  DBili  x   /  AST  16  /  ALT  15  /  AlkPhos  76  01-12      Urinalysis Basic - ( 12 Jan 2024 12:30 )    Color: x / Appearance: x / SG: x / pH: x  Gluc: 83 mg/dL / Ketone: x  / Bili: x / Urobili: x   Blood: x / Protein: x / Nitrite: x   Leuk Esterase: x / RBC: x / WBC x   Sq Epi: x / Non Sq Epi: x / Bacteria: x                      MICROBIOLOGY: (if applicable)    RADIOLOGY & ADDITIONAL STUDIES:  EKG:   CXR:  ECHO:    IMPRESSION: 68y Male PAST MEDICAL & SURGICAL HISTORY:  HTN (hypertension)      Varicose veins      HLD (hyperlipidemia)      Liver cirrhosis      Portal hypertension with esophageal varices      COPD (chronic obstructive pulmonary disease)      Syncope      Alcohol dependence, daily use      Smokes tobacco daily      DM (diabetes mellitus)      No significant past surgical history       p/w         Impression: This is a 69 Y/O Male former smoker. hx of chronic ETOH dependence with Cirrhosis . Presented to ED with worsening SOB. In the ED patient placed on Bipap. For pulmonary follow up of Acute hypoxic respiratory failure due to COPD , pulmonary edema / HFrEF , Rapid A Fib. Off Bipap and on Oxygen supplementation .       Suggestion:  - Ambulating in unit on roomair    - Off supp and BiPaP  - On Apixaban 5 mg Twice daily.   - Monitor O2 saturation trend.   - Diuresis and aim for negative fluid balance.  - Monitor urine output.  - Beta blockers for rate control  - Hemodynamic and cardiac monitoring

## 2024-01-12 NOTE — PROGRESS NOTE ADULT - TIME BILLING
- Review of records, telemetry, vital signs and daily labs.   - General and cardiovascular physical examination.  - Generation of cardiovascular treatment plan.  - Coordination of care.      Patient was seen and examined by me on 1/12/24,interim events noted,labs and radiology studies reviewed.  Nate Alcazar MD,FACC.  [address] [place], [place] [id number].  [phone] - Review of records, telemetry, vital signs and daily labs.   - General and cardiovascular physical examination.  - Generation of cardiovascular treatment plan.  - Coordination of care.      Patient was seen and examined by me on 1/12/24,interim events noted,labs and radiology studies reviewed.  Nate Alcazar MD,FACC.  [address] [place], [place] [id number].  [phone]

## 2024-01-12 NOTE — PROGRESS NOTE ADULT - SUBJECTIVE AND OBJECTIVE BOX
PATIENT SEEN AND EXAMINED BY EDGARDO DRISCOLL M.D. ON :- 1/12/24  DATE OF SERVICE:     1/12/24        Interim events noted,Labs ,Radiological studies and Cardiology tests reviewed .  DISCUSSED WITH ACP/MEDICAL RESIDENT ON PLAN OF CARE.    MR#[id number]  PATIENT NAME:[facility] COURSE: HPI:  68M from home, ambulates independently, PMHx COPD (not on O2), former smoker quit 2-months, HLD, DM, HTN, chronic ETOH dependence c/b cirrhosis, p/w worsening SOB. Patient is limited historian, currently on BIPAP and unable to full history deferring further collateral to wife, Maureen Mcclellanvt [phone] but unable to reach. Patient states that he has had worsening of breathing over 2-years, does not sleep with a machine at home, and when inquired about medications he admits to non-compliance. Denies fevers, chills, or sick contact. Denies chest pain, palpitations, or GUILLEN.    In ED:   Afebrile;  Afib w/ RVR and PVC; /102; RR 22 100% on RA. Started on BIPAP for increased WOB saturating 100% with improvement in WOB. Trop negative 17, BNP 1853 (improved from 4463). Given duoneb x3, solumedrol 125mg x1, rocephin x1, and 500cc bolus x1, diltizem 20mg IVP x1 + 10mg IVP x1. Awaiting to receive vanc x1 and azithromycin. CXR increased b/l opacification and left lower pleural effusion.  (08 Jan 2024 08:55)      INTERIM EVENTS:Patient seen at bedside ,interim events noted.      PM -reviewed admission note, no change since admission  HEART FAILURE: Acute[ ]Chronic[ ] Systolic[ ] Diastolic[ ] Combined Systolic and Diastolic[ ]  CAD[ ] CABG[ ] PCI[ ]  DEVICES[ ] PPM[ ] ICD[ ] ILR[ ]  ATRIAL FIBRILLATION[ ] Paroxysmal[ ] Permanent[ ] CHADS2-[  ]  CORTES[ ] CKD1[ ] CKD2[ ] CKD3[ ] CKD4[ ] ESRD[ ]  COPD[ ] HTN[ ]   DM[ ] Type1[ ] Type 2[ ]   CVA[ ] Paresis[ ]    AMBULATION: Assisted[ ] Cane/walker[ ] Independent[ ]    MEDICATIONS  (STANDING):  amLODIPine   Tablet 5 milliGRAM(s) Oral daily  apixaban 5 milliGRAM(s) Oral two times a day  aspirin  chewable 81 milliGRAM(s) Oral daily  atorvastatin 40 milliGRAM(s) Oral at bedtime  budesonide 160 MICROgram(s)/formoterol 4.5 MICROgram(s) Inhaler 2 Puff(s) Inhalation two times a day  buMETAnide 1 milliGRAM(s) Oral daily  gabapentin 300 milliGRAM(s) Oral at bedtime  insulin lispro (ADMELOG) corrective regimen sliding scale   SubCutaneous Before meals and at bedtime  losartan 100 milliGRAM(s) Oral daily  melatonin 5 milliGRAM(s) Oral at bedtime  metoprolol tartrate 50 milliGRAM(s) Oral every 8 hours  montelukast 10 milliGRAM(s) Oral at bedtime  pantoprazole    Tablet 40 milliGRAM(s) Oral before breakfast  polyethylene glycol 3350 17 Gram(s) Oral daily  senna 2 Tablet(s) Oral at bedtime  tiotropium 2.5 MICROgram(s) Inhaler 2 Puff(s) Inhalation daily    MEDICATIONS  (PRN):  albuterol    90 MICROgram(s) HFA Inhaler 2 Puff(s) Inhalation every 4 hours PRN Shortness of Breath and/or Wheezing            REVIEW OF SYSTEMS:  Constitutional: [ ] fever, [ ]weight loss,  [ ]fatigue [ ]weight gain  Eyes: [ ] visual changes  Respiratory: [ ]shortness of breath;  [ ] cough, [ ]wheezing, [ ]chills, [ ]hemoptysis  Cardiovascular: [ ] chest pain, [ ]palpitations, [ ]dizziness,  [ ]leg swelling[ ]orthopnea[ ]PND  Gastrointestinal: [ ] abdominal pain, [ ]nausea, [ ]vomiting,  [ ]diarrhea [ ]Constipation [ ]Melena  Genitourinary: [ ] dysuria, [ ] hematuria [ ]Goss  Neurologic: [ ] headaches [ ] tremors[ ]weakness [ ]Paralysis Right[ ] Left[ ]  Skin: [ ] itching, [ ]burning, [ ] rashes  Endocrine: [ ] heat or cold intolerance  Musculoskeletal: [ ] joint pain or swelling; [ ] muscle, back, or extremity pain  Psychiatric: [ ] depression, [ ]anxiety, [ ]mood swings, or [ ]difficulty sleeping  Hematologic: [ ] easy bruising, [ ] bleeding gums    [ ] All remaining systems negative except as per above.   [ ]Unable to obtain.  [x] No change in ROS since admission      Vital Signs Last 24 Hrs  T(C): 36.5 (12 Jan 2024 20:35), Max: 37.1 (12 Jan 2024 11:12)  T(F): 97.7 (12 Jan 2024 20:35), Max: 98.7 (12 Jan 2024 11:12)  HR: 108 (12 Jan 2024 20:35) (89 - 114)  BP: 127/69 (12 Jan 2024 20:35) (105/78 - 128/84)  BP(mean): --  RR: 18 (12 Jan 2024 20:35) (18 - 18)  SpO2: 98% (12 Jan 2024 20:35) (97% - 98%)    Parameters below as of 12 Jan 2024 20:35  Patient On (Oxygen Delivery Method): room air      I&O's Summary    12 Jan 2024 07:01  -  12 Jan 2024 22:01  --------------------------------------------------------  IN: 450 mL / OUT: 0 mL / NET: 450 mL        PHYSICAL EXAM:  General: No acute distress BMI-  HEENT: EOMI, PERRL  Neck: Supple, [ ] JVD  Lungs: Equal air entry bilaterally; [ ] rales [ ] wheezing [ ] rhonchi  Heart: Regular rate and rhythm; [x ] murmur   2/6 [ x] systolic [ ] diastolic [ ] radiation[ ] rubs [ ]  gallops  Abdomen: Nontender, bowel sounds present  Extremities: No clubbing, cyanosis, [ ] edema [ ]Pulses  equal and intact  Nervous system:  Alert & Oriented X3, no focal deficits  Psychiatric: Normal affect  Skin: No rashes or lesions    LABS:  01-12    143  |  109<H>  |  25<H>  ----------------------------<  83  3.7   |  27  |  1.49<H>    Ca    9.6      12 Jan 2024 12:30    TPro  8.0  /  Alb  3.6  /  TBili  0.5  /  DBili  x   /  AST  16  /  ALT  15  /  AlkPhos  76  01-12    Creatinine Trend: 1.49<--, QNS<--, 1.36<--, 1.36<--, 1.37<--, 1.36<--                        11.0   7.79  )-----------( 151      ( 12 Jan 2024 08:00 )             35.1                PATIENT SEEN AND EXAMINED BY EDGARDO DRISCOLL M.D. ON :- 1/12/24  DATE OF SERVICE:     1/12/24        Interim events noted,Labs ,Radiological studies and Cardiology tests reviewed .  DISCUSSED WITH ACP/MEDICAL RESIDENT ON PLAN OF CARE.    MR#[id number]  PATIENT NAME:[facility] COURSE: HPI:  68M from home, ambulates independently, PMHx COPD (not on O2), former smoker quit 2-months, HLD, DM, HTN, chronic ETOH dependence c/b cirrhosis, p/w worsening SOB. Patient is limited historian, currently on BIPAP and unable to full history deferring further collateral to wife, Maureen Mcclellanvt [phone] but unable to reach. Patient states that he has had worsening of breathing over 2-years, does not sleep with a machine at home, and when inquired about medications he admits to non-compliance. Denies fevers, chills, or sick contact. Denies chest pain, palpitations, or GUILLEN.    In ED:   Afebrile;  Afib w/ RVR and PVC; /102; RR 22 100% on RA. Started on BIPAP for increased WOB saturating 100% with improvement in WOB. Trop negative 17, BNP 1853 (improved from 4463). Given duoneb x3, solumedrol 125mg x1, rocephin x1, and 500cc bolus x1, diltizem 20mg IVP x1 + 10mg IVP x1. Awaiting to receive vanc x1 and azithromycin. CXR increased b/l opacification and left lower pleural effusion.  (08 Jan 2024 08:55)      INTERIM EVENTS:Patient seen at bedside ,interim events noted.      PM -reviewed admission note, no change since admission  HEART FAILURE: Acute[ ]Chronic[ ] Systolic[ ] Diastolic[ ] Combined Systolic and Diastolic[ ]  CAD[ ] CABG[ ] PCI[ ]  DEVICES[ ] PPM[ ] ICD[ ] ILR[ ]  ATRIAL FIBRILLATION[ ] Paroxysmal[ ] Permanent[ ] CHADS2-[  ]  CORTES[ ] CKD1[ ] CKD2[ ] CKD3[ ] CKD4[ ] ESRD[ ]  COPD[ ] HTN[ ]   DM[ ] Type1[ ] Type 2[ ]   CVA[ ] Paresis[ ]    AMBULATION: Assisted[ ] Cane/walker[ ] Independent[ ]    MEDICATIONS  (STANDING):  amLODIPine   Tablet 5 milliGRAM(s) Oral daily  apixaban 5 milliGRAM(s) Oral two times a day  aspirin  chewable 81 milliGRAM(s) Oral daily  atorvastatin 40 milliGRAM(s) Oral at bedtime  budesonide 160 MICROgram(s)/formoterol 4.5 MICROgram(s) Inhaler 2 Puff(s) Inhalation two times a day  buMETAnide 1 milliGRAM(s) Oral daily  gabapentin 300 milliGRAM(s) Oral at bedtime  insulin lispro (ADMELOG) corrective regimen sliding scale   SubCutaneous Before meals and at bedtime  losartan 100 milliGRAM(s) Oral daily  melatonin 5 milliGRAM(s) Oral at bedtime  metoprolol tartrate 50 milliGRAM(s) Oral every 8 hours  montelukast 10 milliGRAM(s) Oral at bedtime  pantoprazole    Tablet 40 milliGRAM(s) Oral before breakfast  polyethylene glycol 3350 17 Gram(s) Oral daily  senna 2 Tablet(s) Oral at bedtime  tiotropium 2.5 MICROgram(s) Inhaler 2 Puff(s) Inhalation daily    MEDICATIONS  (PRN):  albuterol    90 MICROgram(s) HFA Inhaler 2 Puff(s) Inhalation every 4 hours PRN Shortness of Breath and/or Wheezing            REVIEW OF SYSTEMS:  Constitutional: [ ] fever, [ ]weight loss,  [ ]fatigue [ ]weight gain  Eyes: [ ] visual changes  Respiratory: [ ]shortness of breath;  [ ] cough, [ ]wheezing, [ ]chills, [ ]hemoptysis  Cardiovascular: [ ] chest pain, [ ]palpitations, [ ]dizziness,  [ ]leg swelling[ ]orthopnea[ ]PND  Gastrointestinal: [ ] abdominal pain, [ ]nausea, [ ]vomiting,  [ ]diarrhea [ ]Constipation [ ]Melena  Genitourinary: [ ] dysuria, [ ] hematuria [ ]Goss  Neurologic: [ ] headaches [ ] tremors[ ]weakness [ ]Paralysis Right[ ] Left[ ]  Skin: [ ] itching, [ ]burning, [ ] rashes  Endocrine: [ ] heat or cold intolerance  Musculoskeletal: [ ] joint pain or swelling; [ ] muscle, back, or extremity pain  Psychiatric: [ ] depression, [ ]anxiety, [ ]mood swings, or [ ]difficulty sleeping  Hematologic: [ ] easy bruising, [ ] bleeding gums    [ ] All remaining systems negative except as per above.   [ ]Unable to obtain.  [x] No change in ROS since admission      Vital Signs Last 24 Hrs  T(C): 36.5 (12 Jan 2024 20:35), Max: 37.1 (12 Jan 2024 11:12)  T(F): 97.7 (12 Jan 2024 20:35), Max: 98.7 (12 Jan 2024 11:12)  HR: 108 (12 Jan 2024 20:35) (89 - 114)  BP: 127/69 (12 Jan 2024 20:35) (105/78 - 128/84)  BP(mean): --  RR: 18 (12 Jan 2024 20:35) (18 - 18)  SpO2: 98% (12 Jan 2024 20:35) (97% - 98%)    Parameters below as of 12 Jan 2024 20:35  Patient On (Oxygen Delivery Method): room air      I&O's Summary    12 Jan 2024 07:01  -  12 Jan 2024 22:01  --------------------------------------------------------  IN: 450 mL / OUT: 0 mL / NET: 450 mL        PHYSICAL EXAM:  General: No acute distress BMI-  HEENT: EOMI, PERRL  Neck: Supple, [ ] JVD  Lungs: Equal air entry bilaterally; [ ] rales [ ] wheezing [ ] rhonchi  Heart: Regular rate and rhythm; [x ] murmur   2/6 [ x] systolic [ ] diastolic [ ] radiation[ ] rubs [ ]  gallops  Abdomen: Nontender, bowel sounds present  Extremities: No clubbing, cyanosis, [ ] edema [ ]Pulses  equal and intact  Nervous system:  Alert & Oriented X3, no focal deficits  Psychiatric: Normal affect  Skin: No rashes or lesions    LABS:  01-12    143  |  109<H>  |  25<H>  ----------------------------<  83  3.7   |  27  |  1.49<H>    Ca    9.6      12 Jan 2024 12:30    TPro  8.0  /  Alb  3.6  /  TBili  0.5  /  DBili  x   /  AST  16  /  ALT  15  /  AlkPhos  76  01-12    Creatinine Trend: 1.49<--, QNS<--, 1.36<--, 1.36<--, 1.37<--, 1.36<--                        11.0   7.79  )-----------( 151      ( 12 Jan 2024 08:00 )             35.1

## 2024-01-12 NOTE — PROGRESS NOTE ADULT - ASSESSMENT
_________________________________________________________________________________________  ========>>  M E D I C A L   A T T E N D I N G    F O L L O W  U P  N O T E  <<=========  -----------------------------------------------------------------------------------------------------    - Patient seen and examined by me earlier today.    - In summary,  GREGORY ELIZABETH is a 68y year old man admitted with  Heart failure  - Patient today overall doing ok, comfortable, eating OK. Occasional shortness of breath, however appears to be improved in general    it appears patient has had CHF for a while , with reduced EF and several hospitalization and no follow up with cardio as outpatient !!      patient lived with wife, has 2 children in [place], one in [place] and one in [facility] !!  unclear if good social support .. and patient not seen to be very reliable to care fo himself...         unclear / not appears to be patient had ischemic workup and evaluation for ICD...   per discussed with cardio patient also may not have been on optimal medical management for 3 months fully to evaluation for improved EF....       >> will need close follow up as outpatient with cardio         ? social work evaluation may be helpful for safe DC planing ... :  follow up     ==================>> REVIEW OF SYSTEM <<=================    GEN: no fever, no chills, no pain  RESP: no SOB At the rest, no cough / sputum Reported  CVS: no chest pain, no palpitations  GI: no abdominal pain, no nausea, no constipation, no diarrhea  : no dysuria, no frequency  Neuro: no headache, no dizziness    ==================>> PHYSICAL EXAM <<=================    GEN: A&O X 3 , NAD , comfortable, pleasant, calm in Chair, off oxygen  HEENT: NCAT, PERRL, MMM, hearing intact  CVS: S1S2 , Irregular , No M/R/G appreciated  PULM: CTA B/L,  no W/R/R appreciated  ABD.: soft. non tender, non distended,  bowel sounds present  Extrem: intact pulses , no edema         ( Note written / Date of service 01-12-24 ( This is certified to be the same as "ENTERED" date above ( for billing purposes)))    ==================>> MEDICATIONS <<====================    amLODIPine   Tablet 5 milliGRAM(s) Oral daily  apixaban 5 milliGRAM(s) Oral two times a day  aspirin  chewable 81 milliGRAM(s) Oral daily  atorvastatin 40 milliGRAM(s) Oral at bedtime  budesonide 160 MICROgram(s)/formoterol 4.5 MICROgram(s) Inhaler 2 Puff(s) Inhalation two times a day  buMETAnide 1 milliGRAM(s) Oral daily  gabapentin 300 milliGRAM(s) Oral at bedtime  insulin lispro (ADMELOG) corrective regimen sliding scale   SubCutaneous Before meals and at bedtime  losartan 100 milliGRAM(s) Oral daily  melatonin 5 milliGRAM(s) Oral at bedtime  metoprolol tartrate 50 milliGRAM(s) Oral every 8 hours  montelukast 10 milliGRAM(s) Oral at bedtime  pantoprazole    Tablet 40 milliGRAM(s) Oral before breakfast  polyethylene glycol 3350 17 Gram(s) Oral daily  senna 2 Tablet(s) Oral at bedtime  tiotropium 2.5 MICROgram(s) Inhaler 2 Puff(s) Inhalation daily    MEDICATIONS  (PRN):  albuterol    90 MICROgram(s) HFA Inhaler 2 Puff(s) Inhalation every 4 hours PRN Shortness of Breath and/or Wheezing    ___________  Active diet:  Diet, Regular:   DASH/TLC Sodium & Cholesterol Restricted  1200mL Fluid Restriction ([id number])  Kosher  ___________________    ==================>> VITAL SIGNS <<==================    Weight (kg): 65  Vital Signs Last 24 HrsT(C): 37.1 (01-12-24 @ 11:12)  T(F): 98.7 (01-12-24 @ 11:12), Max: 98.7 (01-12-24 @ 11:12)  HR: 91 (01-12-24 @ 11:12) (69 - 99)  BP: 105/78 (01-12-24 @ 11:12)  RR: 18 (01-12-24 @ 11:12) (18 - 18)  SpO2: 98% (01-12-24 @ 11:12) (97% - 98%)      CAPILLARY BLOOD GLUCOSE  POCT Blood Glucose.: 130 mg/dL (12 Jan 2024 11:21)  POCT Blood Glucose.: 103 mg/dL (12 Jan 2024 07:21)  POCT Blood Glucose.: 111 mg/dL (11 Jan 2024 21:25)  POCT Blood Glucose.: 107 mg/dL (11 Jan 2024 16:48)     ==================>> LAB AND IMAGING <<==================                        11.0   7.79  )-----------( 151      ( 12 Jan 2024 08:00 )             35.1        01-12    143  |  109<H>  |  25<H>  ----------------------------<  83  3.7   |  27  |  1.49<H>    Ca    9.6      12 Jan 2024 12:30    TPro  8.0  /  Alb  3.6  /  TBili  0.5  /  DBili  x   /  AST  16  /  ALT  15  /  AlkPhos  76  01-12    WBC count:   7.79 <<== ,  9.57 <<== ,  9.09 <<== ,  20.81 <<==   Hemoglobin:   11.0 <<==,  10.6 <<==,  10.2 <<==,  12.4 <<==  platelets:  151 <==, 168 <==, 150 <==, 247 <==    Creatinine:  1.49  <<==, QNS  <<==, 1.36  <<==, 1.36  <<==, 1.37  <<==, 1.36  <<==  Sodium:   143  <==, QNS  <==, 141  <==, 140  <==, 139  <==, 139  <==       AST:          16(01-12) <== , 16(01-10) <== , 17(01-09) <== , 26(01-08) <==      ALT:        15(01-12)  <== , 15(01-10)  <== , 13(01-09)  <== , 16(01-08)  <==      AP:        76(01-12)  <=, 59(01-10)  <=, 64(01-09)  <=, 99(01-08)  <=     Bili:        0.5(01-12)  <=, 0.5(01-10)  <=, 0.6(01-09)  <=, 0.6(01-08)  <=    ____________________________    M I C R O B I O L O G Y :    Culture - Blood (collected 08 Jan 2024 06:34)  Source: .Blood Blood  Preliminary Report (12 Jan 2024 13:02):    No growth at 4 days    Culture - Blood (collected 08 Jan 2024 06:24)  Source: .Blood Blood  Preliminary Report (12 Jan 2024 13:02):    No growth at 4 days    [facility] (01-[facility] @ 06:24)    ___________________________________________________________________________________  ===============>>  A S S E S S M E N T   A N D   P L A N <<===============  ------------------------------------------------------------------------------------------    acute hypoxemic respiratory failure  acute exacerbation of chromic systolic CHF  Afib with RVR > controlled   COPD   acute Pulmonary edema   ? possible pneumonia ( on CT ( though less likely / Ruled out )   DM type 2  history of Hypertension     ====>>>>    - off O2 and overall doing better now ..   - Diuresis and aim for negative fluid balance  - Monitor urine output  - need HR control  - Cont. AC   - cardiology Follow-up, management, optimization      see discussion above   - Hemodynamic and cardiac monitoring  - Continue Current medications otherwise and monitor.  - nutrition, OOB as able, incentive spirometer   - supportive care   -GI/DVT Prophylaxis per protocol.    Possible discharge planning home if remain stable    --------------------------------------------  Case discussed with patient , Nurse Practitioner , cardio   Education given on findings and plan of care  ___________________________  SALVATORE Rivera D.O.  Pager: [phone]     _________________________________________________________________________________________  ========>>  M E D I C A L   A T T E N D I N G    F O L L O W  U P  N O T E  <<=========  -----------------------------------------------------------------------------------------------------    - Patient seen and examined by me earlier today.    - In summary,  GREGORY ELIZABETH is a 68y year old man admitted with  Heart failure  - Patient today overall doing ok, comfortable, eating OK. Occasional shortness of breath, however appears to be improved in general    it appears patient has had CHF for a while , with reduced EF and several hospitalization and no follow up with cardio as outpatient !!      patient lived with wife, has 2 children in [place], one in [place] and one in [facility] !!  unclear if good social support .. and patient not seen to be very reliable to care fo himself...         unclear / not appears to be patient had ischemic workup and evaluation for ICD...   per discussed with cardio patient also may not have been on optimal medical management for 3 months fully to evaluation for improved EF....       >> will need close follow up as outpatient with cardio         ? social work evaluation may be helpful for safe DC planing ... :  follow up     ==================>> REVIEW OF SYSTEM <<=================    GEN: no fever, no chills, no pain  RESP: no SOB At the rest, no cough / sputum Reported  CVS: no chest pain, no palpitations  GI: no abdominal pain, no nausea, no constipation, no diarrhea  : no dysuria, no frequency  Neuro: no headache, no dizziness    ==================>> PHYSICAL EXAM <<=================    GEN: A&O X 3 , NAD , comfortable, pleasant, calm in Chair, off oxygen  HEENT: NCAT, PERRL, MMM, hearing intact  CVS: S1S2 , Irregular , No M/R/G appreciated  PULM: CTA B/L,  no W/R/R appreciated  ABD.: soft. non tender, non distended,  bowel sounds present  Extrem: intact pulses , no edema         ( Note written / Date of service 01-12-24 ( This is certified to be the same as "ENTERED" date above ( for billing purposes)))    ==================>> MEDICATIONS <<====================    amLODIPine   Tablet 5 milliGRAM(s) Oral daily  apixaban 5 milliGRAM(s) Oral two times a day  aspirin  chewable 81 milliGRAM(s) Oral daily  atorvastatin 40 milliGRAM(s) Oral at bedtime  budesonide 160 MICROgram(s)/formoterol 4.5 MICROgram(s) Inhaler 2 Puff(s) Inhalation two times a day  buMETAnide 1 milliGRAM(s) Oral daily  gabapentin 300 milliGRAM(s) Oral at bedtime  insulin lispro (ADMELOG) corrective regimen sliding scale   SubCutaneous Before meals and at bedtime  losartan 100 milliGRAM(s) Oral daily  melatonin 5 milliGRAM(s) Oral at bedtime  metoprolol tartrate 50 milliGRAM(s) Oral every 8 hours  montelukast 10 milliGRAM(s) Oral at bedtime  pantoprazole    Tablet 40 milliGRAM(s) Oral before breakfast  polyethylene glycol 3350 17 Gram(s) Oral daily  senna 2 Tablet(s) Oral at bedtime  tiotropium 2.5 MICROgram(s) Inhaler 2 Puff(s) Inhalation daily    MEDICATIONS  (PRN):  albuterol    90 MICROgram(s) HFA Inhaler 2 Puff(s) Inhalation every 4 hours PRN Shortness of Breath and/or Wheezing    ___________  Active diet:  Diet, Regular:   DASH/TLC Sodium & Cholesterol Restricted  1200mL Fluid Restriction ([id number])  Kosher  ___________________    ==================>> VITAL SIGNS <<==================    Weight (kg): 65  Vital Signs Last 24 HrsT(C): 37.1 (01-12-24 @ 11:12)  T(F): 98.7 (01-12-24 @ 11:12), Max: 98.7 (01-12-24 @ 11:12)  HR: 91 (01-12-24 @ 11:12) (69 - 99)  BP: 105/78 (01-12-24 @ 11:12)  RR: 18 (01-12-24 @ 11:12) (18 - 18)  SpO2: 98% (01-12-24 @ 11:12) (97% - 98%)      CAPILLARY BLOOD GLUCOSE  POCT Blood Glucose.: 130 mg/dL (12 Jan 2024 11:21)  POCT Blood Glucose.: 103 mg/dL (12 Jan 2024 07:21)  POCT Blood Glucose.: 111 mg/dL (11 Jan 2024 21:25)  POCT Blood Glucose.: 107 mg/dL (11 Jan 2024 16:48)     ==================>> LAB AND IMAGING <<==================                        11.0   7.79  )-----------( 151      ( 12 Jan 2024 08:00 )             35.1        01-12    143  |  109<H>  |  25<H>  ----------------------------<  83  3.7   |  27  |  1.49<H>    Ca    9.6      12 Jan 2024 12:30    TPro  8.0  /  Alb  3.6  /  TBili  0.5  /  DBili  x   /  AST  16  /  ALT  15  /  AlkPhos  76  01-12    WBC count:   7.79 <<== ,  9.57 <<== ,  9.09 <<== ,  20.81 <<==   Hemoglobin:   11.0 <<==,  10.6 <<==,  10.2 <<==,  12.4 <<==  platelets:  151 <==, 168 <==, 150 <==, 247 <==    Creatinine:  1.49  <<==, QNS  <<==, 1.36  <<==, 1.36  <<==, 1.37  <<==, 1.36  <<==  Sodium:   143  <==, QNS  <==, 141  <==, 140  <==, 139  <==, 139  <==       AST:          16(01-12) <== , 16(01-10) <== , 17(01-09) <== , 26(01-08) <==      ALT:        15(01-12)  <== , 15(01-10)  <== , 13(01-09)  <== , 16(01-08)  <==      AP:        76(01-12)  <=, 59(01-10)  <=, 64(01-09)  <=, 99(01-08)  <=     Bili:        0.5(01-12)  <=, 0.5(01-10)  <=, 0.6(01-09)  <=, 0.6(01-08)  <=    ____________________________    M I C R O B I O L O G Y :    Culture - Blood (collected 08 Jan 2024 06:34)  Source: .Blood Blood  Preliminary Report (12 Jan 2024 13:02):    No growth at 4 days    Culture - Blood (collected 08 Jan 2024 06:24)  Source: .Blood Blood  Preliminary Report (12 Jan 2024 13:02):    No growth at 4 days    [facility] (01-08-[facility] @ 06:24)    ___________________________________________________________________________________  ===============>>  A S S E S S M E N T   A N D   P L A N <<===============  ------------------------------------------------------------------------------------------    acute hypoxemic respiratory failure  acute exacerbation of chromic systolic CHF  Afib with RVR > controlled   COPD   acute Pulmonary edema   ? possible pneumonia ( on CT ( though less likely / Ruled out )   DM type 2  history of Hypertension     ====>>>>    - off O2 and overall doing better now ..   - Diuresis and aim for negative fluid balance  - Monitor urine output  - need HR control  - Cont. AC   - cardiology Follow-up, management, optimization      see discussion above   - Hemodynamic and cardiac monitoring  - Continue Current medications otherwise and monitor.  - nutrition, OOB as able, incentive spirometer   - supportive care   -GI/DVT Prophylaxis per protocol.    Possible discharge planning home if remain stable    --------------------------------------------  Case discussed with patient , Nurse Practitioner , cardio   Education given on findings and plan of care  ___________________________  SALVATORE Rivera D.O.  Pager: [phone]

## 2024-01-13 ENCOUNTER — TRANSCRIPTION ENCOUNTER (OUTPATIENT)
Age: 69
End: 2024-01-13

## 2024-01-13 LAB
ANION GAP SERPL CALC-SCNC: 6 MMOL/L — SIGNIFICANT CHANGE UP (ref 5–17)
ANION GAP SERPL CALC-SCNC: 6 MMOL/L — SIGNIFICANT CHANGE UP (ref 5–17)
BASE EXCESS BLDV CALC-SCNC: 2.4 MMOL/L — SIGNIFICANT CHANGE UP
BASE EXCESS BLDV CALC-SCNC: 2.4 MMOL/L — SIGNIFICANT CHANGE UP
BUN SERPL-MCNC: 24 MG/DL — HIGH (ref 7–18)
BUN SERPL-MCNC: 24 MG/DL — HIGH (ref 7–18)
CA-I SERPL-SCNC: SIGNIFICANT CHANGE UP MMOL/L (ref 1.15–1.33)
CA-I SERPL-SCNC: SIGNIFICANT CHANGE UP MMOL/L (ref 1.15–1.33)
CALCIUM SERPL-MCNC: 9.3 MG/DL — SIGNIFICANT CHANGE UP (ref 8.4–10.5)
CALCIUM SERPL-MCNC: 9.3 MG/DL — SIGNIFICANT CHANGE UP (ref 8.4–10.5)
CHLORIDE SERPL-SCNC: 109 MMOL/L — HIGH (ref 96–108)
CHLORIDE SERPL-SCNC: 109 MMOL/L — HIGH (ref 96–108)
CO2 SERPL-SCNC: 25 MMOL/L — SIGNIFICANT CHANGE UP (ref 22–31)
CO2 SERPL-SCNC: 25 MMOL/L — SIGNIFICANT CHANGE UP (ref 22–31)
CREAT SERPL-MCNC: 1.31 MG/DL — HIGH (ref 0.5–1.3)
CREAT SERPL-MCNC: 1.31 MG/DL — HIGH (ref 0.5–1.3)
CULTURE RESULTS: SIGNIFICANT CHANGE UP
EGFR: 59 ML/MIN/1.73M2 — LOW
EGFR: 59 ML/MIN/1.73M2 — LOW
GAS PNL BLDV: 138 MMOL/L — SIGNIFICANT CHANGE UP (ref 136–145)
GAS PNL BLDV: 138 MMOL/L — SIGNIFICANT CHANGE UP (ref 136–145)
GAS PNL BLDV: SIGNIFICANT CHANGE UP
GAS PNL BLDV: SIGNIFICANT CHANGE UP
GLUCOSE BLDC GLUCOMTR-MCNC: 114 MG/DL — HIGH (ref 70–99)
GLUCOSE BLDC GLUCOMTR-MCNC: 196 MG/DL — HIGH (ref 70–99)
GLUCOSE BLDC GLUCOMTR-MCNC: 196 MG/DL — HIGH (ref 70–99)
GLUCOSE BLDC GLUCOMTR-MCNC: 91 MG/DL — SIGNIFICANT CHANGE UP (ref 70–99)
GLUCOSE BLDC GLUCOMTR-MCNC: 91 MG/DL — SIGNIFICANT CHANGE UP (ref 70–99)
GLUCOSE SERPL-MCNC: 139 MG/DL — HIGH (ref 70–99)
GLUCOSE SERPL-MCNC: 139 MG/DL — HIGH (ref 70–99)
HCO3 BLDV-SCNC: 24 MMOL/L — SIGNIFICANT CHANGE UP (ref 22–29)
HCO3 BLDV-SCNC: 24 MMOL/L — SIGNIFICANT CHANGE UP (ref 22–29)
HCT VFR BLD CALC: 34.5 % — LOW (ref 39–50)
HCT VFR BLD CALC: 34.5 % — LOW (ref 39–50)
HGB BLD-MCNC: 10.6 G/DL — LOW (ref 13–17)
HGB BLD-MCNC: 10.6 G/DL — LOW (ref 13–17)
LACTATE BLDV-MCNC: 1.6 MMOL/L — SIGNIFICANT CHANGE UP (ref 0.5–2)
LACTATE BLDV-MCNC: 1.6 MMOL/L — SIGNIFICANT CHANGE UP (ref 0.5–2)
LACTATE SERPL-SCNC: 1.4 MMOL/L — SIGNIFICANT CHANGE UP (ref 0.7–2)
LACTATE SERPL-SCNC: 1.4 MMOL/L — SIGNIFICANT CHANGE UP (ref 0.7–2)
MCHC RBC-ENTMCNC: 27.2 PG — SIGNIFICANT CHANGE UP (ref 27–34)
MCHC RBC-ENTMCNC: 27.2 PG — SIGNIFICANT CHANGE UP (ref 27–34)
MCHC RBC-ENTMCNC: 30.7 GM/DL — LOW (ref 32–36)
MCHC RBC-ENTMCNC: 30.7 GM/DL — LOW (ref 32–36)
MCV RBC AUTO: 88.7 FL — SIGNIFICANT CHANGE UP (ref 80–100)
MCV RBC AUTO: 88.7 FL — SIGNIFICANT CHANGE UP (ref 80–100)
NRBC # BLD: 0 /100 WBCS — SIGNIFICANT CHANGE UP (ref 0–0)
NRBC # BLD: 0 /100 WBCS — SIGNIFICANT CHANGE UP (ref 0–0)
PCO2 BLDV: 29 MMHG — LOW (ref 42–55)
PCO2 BLDV: 29 MMHG — LOW (ref 42–55)
PH BLDV: 7.53 — HIGH (ref 7.32–7.43)
PH BLDV: 7.53 — HIGH (ref 7.32–7.43)
PLATELET # BLD AUTO: 108 K/UL — LOW (ref 150–400)
PLATELET # BLD AUTO: 108 K/UL — LOW (ref 150–400)
PO2 BLDV: 129 MMHG — SIGNIFICANT CHANGE UP
PO2 BLDV: 129 MMHG — SIGNIFICANT CHANGE UP
POTASSIUM BLDV-SCNC: 4 MMOL/L — SIGNIFICANT CHANGE UP (ref 3.5–5.1)
POTASSIUM BLDV-SCNC: 4 MMOL/L — SIGNIFICANT CHANGE UP (ref 3.5–5.1)
POTASSIUM SERPL-MCNC: 3.8 MMOL/L — SIGNIFICANT CHANGE UP (ref 3.5–5.3)
POTASSIUM SERPL-MCNC: 3.8 MMOL/L — SIGNIFICANT CHANGE UP (ref 3.5–5.3)
POTASSIUM SERPL-SCNC: 3.8 MMOL/L — SIGNIFICANT CHANGE UP (ref 3.5–5.3)
POTASSIUM SERPL-SCNC: 3.8 MMOL/L — SIGNIFICANT CHANGE UP (ref 3.5–5.3)
RBC # BLD: 3.89 M/UL — LOW (ref 4.2–5.8)
RBC # BLD: 3.89 M/UL — LOW (ref 4.2–5.8)
RBC # FLD: 14.1 % — SIGNIFICANT CHANGE UP (ref 10.3–14.5)
RBC # FLD: 14.1 % — SIGNIFICANT CHANGE UP (ref 10.3–14.5)
SAO2 % BLDV: 100 % — SIGNIFICANT CHANGE UP
SAO2 % BLDV: 100 % — SIGNIFICANT CHANGE UP
SODIUM SERPL-SCNC: 140 MMOL/L — SIGNIFICANT CHANGE UP (ref 135–145)
SODIUM SERPL-SCNC: 140 MMOL/L — SIGNIFICANT CHANGE UP (ref 135–145)
SPECIMEN SOURCE: SIGNIFICANT CHANGE UP
TROPONIN I, HIGH SENSITIVITY RESULT: 20.8 NG/L — SIGNIFICANT CHANGE UP
TROPONIN I, HIGH SENSITIVITY RESULT: 20.8 NG/L — SIGNIFICANT CHANGE UP
WBC # BLD: 9.28 K/UL — SIGNIFICANT CHANGE UP (ref 3.8–10.5)
WBC # BLD: 9.28 K/UL — SIGNIFICANT CHANGE UP (ref 3.8–10.5)
WBC # FLD AUTO: 9.28 K/UL — SIGNIFICANT CHANGE UP (ref 3.8–10.5)
WBC # FLD AUTO: 9.28 K/UL — SIGNIFICANT CHANGE UP (ref 3.8–10.5)

## 2024-01-13 RX ORDER — IPRATROPIUM/ALBUTEROL SULFATE 18-103MCG
3 AEROSOL WITH ADAPTER (GRAM) INHALATION ONCE
Refills: 0 | Status: COMPLETED | OUTPATIENT
Start: 2024-01-13 | End: 2024-01-13

## 2024-01-13 RX ADMIN — BUMETANIDE 1 MILLIGRAM(S): 0.25 INJECTION INTRAMUSCULAR; INTRAVENOUS at 05:40

## 2024-01-13 RX ADMIN — POLYETHYLENE GLYCOL 3350 17 GRAM(S): 17 POWDER, FOR SOLUTION ORAL at 12:52

## 2024-01-13 RX ADMIN — Medication 50 MILLIGRAM(S): at 05:40

## 2024-01-13 RX ADMIN — ATORVASTATIN CALCIUM 40 MILLIGRAM(S): 80 TABLET, FILM COATED ORAL at 21:44

## 2024-01-13 RX ADMIN — BUDESONIDE AND FORMOTEROL FUMARATE DIHYDRATE 2 PUFF(S): 160; 4.5 AEROSOL RESPIRATORY (INHALATION) at 09:36

## 2024-01-13 RX ADMIN — Medication 1: at 21:45

## 2024-01-13 RX ADMIN — MONTELUKAST 10 MILLIGRAM(S): 4 TABLET, CHEWABLE ORAL at 21:44

## 2024-01-13 RX ADMIN — GABAPENTIN 300 MILLIGRAM(S): 400 CAPSULE ORAL at 21:44

## 2024-01-13 RX ADMIN — APIXABAN 5 MILLIGRAM(S): 2.5 TABLET, FILM COATED ORAL at 18:43

## 2024-01-13 RX ADMIN — Medication 81 MILLIGRAM(S): at 12:51

## 2024-01-13 RX ADMIN — PANTOPRAZOLE SODIUM 40 MILLIGRAM(S): 20 TABLET, DELAYED RELEASE ORAL at 05:40

## 2024-01-13 RX ADMIN — TIOTROPIUM BROMIDE 2 PUFF(S): 18 CAPSULE ORAL; RESPIRATORY (INHALATION) at 12:51

## 2024-01-13 RX ADMIN — BUDESONIDE AND FORMOTEROL FUMARATE DIHYDRATE 2 PUFF(S): 160; 4.5 AEROSOL RESPIRATORY (INHALATION) at 21:45

## 2024-01-13 RX ADMIN — LOSARTAN POTASSIUM 100 MILLIGRAM(S): 100 TABLET, FILM COATED ORAL at 05:40

## 2024-01-13 RX ADMIN — Medication 5 MILLIGRAM(S): at 21:44

## 2024-01-13 RX ADMIN — AMLODIPINE BESYLATE 5 MILLIGRAM(S): 2.5 TABLET ORAL at 05:40

## 2024-01-13 RX ADMIN — Medication 50 MILLIGRAM(S): at 21:44

## 2024-01-13 RX ADMIN — ALBUTEROL 2 PUFF(S): 90 AEROSOL, METERED ORAL at 05:41

## 2024-01-13 RX ADMIN — Medication 3 MILLILITER(S): at 16:42

## 2024-01-13 RX ADMIN — APIXABAN 5 MILLIGRAM(S): 2.5 TABLET, FILM COATED ORAL at 05:40

## 2024-01-13 RX ADMIN — Medication 50 MILLIGRAM(S): at 14:30

## 2024-01-13 NOTE — PROGRESS NOTE ADULT - PROBLEM SELECTOR PLAN 1
In ED: Afebrile;  Afib w/ RVR and PVC; /102; Trop negative 17,  s/p diltizem 20mg IVP x1 + 10mg IVP x1  c/w metoprolol 50mg q8 + eliquis  c/w tele

## 2024-01-13 NOTE — DISCHARGE NOTE PROVIDER - NSDCCAREPROVSEEN_GEN_ALL_CORE_FT
Miguel, Maxine Alcazar, Nate Manrique, Edilberto Martinez, Shawn H Shawn Mulligan  User ADM  Ordering Physician  Saud Robles  [facility] ServiceAccount      [ Greater than 35 min spent for discharge services.   SALVATORE Rivera ]       ( Note written / Date of service is the same as last day of patient stay  in the hospital ( for billing purposes))) Shawn Mulligan  User ADM  Ordering Physician  Saud Robles  [facility] ServiceAccount      [ Greater than 35 min spent for discharge services.   SALVATORE Rivera ]       ( Note written / Date of service is the same as last day of patient stay  in the hospital ( for billing purposes)))

## 2024-01-13 NOTE — DISCHARGE NOTE PROVIDER - CARE PROVIDER_API CALL
Sally Barrios  Internal Medicine  [address] [place] [address], [place] [address]  Phone: [phone]  Fax: [phone]  Established Patient  Follow Up Time: 1-3 days    Nate Alcazar  Cardiology  [address] [place] [address]  Phone: [phone]  Fax: [phone]  Follow Up Time: 1 week   Sally Barrios  Internal Medicine  [address] [place] [address], [place] [address]  Phone: [phone]  Fax: [phone]  Established Patient  Follow Up Time: 1-3 days    Nate Alcazar  Cardiology  [address] [place] [address]  Phone: [phone]  Fax: [phone]  Follow Up Time: 1 week

## 2024-01-13 NOTE — CHART NOTE - NSCHARTNOTEFT_GEN_A_CORE
Patient is a 68y old  Male who presents with a chief complaint of AHRF, CHF exacerbation (13 Jan 2024 14:16)    Vital Signs Last 24 Hrs  T(F): 98.2 (13 Jan 2024 15:36), Max: 98.6 (13 Jan 2024 05:45)  HR: 90 (13 Jan 2024 15:36) (77 - 110)  BP: 150/103 (13 Jan 2024 15:36) (106/70 - 150/103)  RR: 19 (13 Jan 2024 15:36) (18 - 19)  SpO2: 96% (13 Jan 2024 15:36) (96% - 98%)    Parameters below as of 13 Jan 2024 15:36  Patient On (Oxygen Delivery Method): nasal cannula O2 Flow (L/min): 2    PAST MEDICAL & SURGICAL HISTORY:  HTN (hypertension) Varicose veins HLD (hyperlipidemia)  Liver cirrhosis Portal hypertension with esophageal varices  COPD (chronic obstructive pulmonary disease) Syncope  Alcohol dependence, daily use Smokes tobacco daily  DM (diabetes mellitus)    LABS:                     11.0   7.79  )-----------( 151      ( 12 Jan 2024 08:00 )             35.1     01-12    143  |  109<H>  |  25<H>  ----------------------------<  83  3.7   |  27  |  1.49<H>    Ca    9.6      12 Jan 2024 12:30    TPro  8.0  /  Alb  3.6  /  TBili  0.5  /  DBili  x   /  AST  16  /  ALT  15  /  AlkPhos  76  01-12      ASSESSMENT AND PLAN   Patient is a 68y old  Male who presents with a chief complaint of AHRF, CHF exacerbation (13 Jan 2024 14:16)  Received call that patient with respiratory distress  pulmonology follows   will give dose albuterol/ipratropium for Nebulization. 3 milliLiter(s) Nebulizer once  repeat chest x-ray   request Pulmonology re-evaluation     Care Collaborated Discussed with Consultants/Other Providers [x] YES  [ ] NO Patient is a 68y old  Male who presents with a chief complaint of AHRF, CHF exacerbation (13 Jan 2024 14:16)    Vital Signs Last 24 Hrs  T(F): 98.2 (13 Jan 2024 15:36), Max: 98.6 (13 Jan 2024 05:45)  HR: 90 (13 Jan 2024 15:36) (77 - 110)  BP: 150/103 (13 Jan 2024 15:36) (106/70 - 150/103)  RR: 19 (13 Jan 2024 15:36) (18 - 19)  SpO2: 96% (13 Jan 2024 15:36) (96% - 98%)    Parameters below as of 13 Jan 2024 15:36  Patient On (Oxygen Delivery Method): nasal cannula O2 Flow (L/min): 2    PAST MEDICAL & SURGICAL HISTORY:  HTN (hypertension) Varicose veins HLD (hyperlipidemia)  Liver cirrhosis Portal hypertension with esophageal varices  COPD (chronic obstructive pulmonary disease) Syncope  Alcohol dependence, daily use Smokes tobacco daily  DM (diabetes mellitus)    LABS:                     11.0   7.79  )-----------( 151      ( 12 Jan 2024 08:00 )             35.1     01-12    143  |  109<H>  |  25<H>  ----------------------------<  83  3.7   |  27  |  1.49<H>    Ca    9.6      12 Jan 2024 12:30    TPro  8.0  /  Alb  3.6  /  TBili  0.5  /  DBili  x   /  AST  16  /  ALT  15  /  AlkPhos  76  01-12      ASSESSMENT AND PLAN   Patient is a 68y old  Male who presents with a chief complaint of AHRF, CHF exacerbation (13 Jan 2024 14:16)  Received call that patient with respiratory distress  patient seen at the bedside all lung fields wheezing   pulmonology follows   will give dose albuterol/ipratropium for Nebulization. 3 milliLiter(s) Nebulizer once  repeat chest x-ray   request Pulmonology re-evaluation   consider ICU consult if not better     Care Collaborated Discussed with Consultants/Other Providers [x] YES  [ ] NO

## 2024-01-13 NOTE — DISCHARGE NOTE PROVIDER - NSFOLLOWUPCLINICS_GEN_ALL_ED_FT
[facility]  Pulmonary Medicine  [address] [place] [address]  Phone: [phone]  Fax: [phone]     [facility]  Pulmonary Medicine  [address] [place] [address]  Phone: [phone]  Fax: [phone]

## 2024-01-13 NOTE — DISCHARGE NOTE PROVIDER - NSDCMRMEDTOKEN_GEN_ALL_CORE_FT
aspirin 81 mg oral tablet, chewable: 1 tab(s) orally once a day  atorvastatin 40 mg oral tablet: 1 tab(s) orally once a day (at bedtime)  Breo Ellipta 200 mcg-25 mcg/inh inhalation powder: 1 puff(s) inhaled 2 times a day  bumetanide 1 mg oral tablet: 1 tab(s) orally once a day On MONDAY, WEDNESDAY, FRIDAY please take 1 tablet at night as well.  cyclobenzaprine 5 mg oral tablet: 1 tab(s) orally every 8 hours as needed for Muscle Spasm  Eliquis 5 mg oral tablet: 1 tab(s) orally 2 times a day  metFORMIN 500 mg oral tablet: 1 tab(s) orally 2 times a day  metoprolol tartrate 50 mg oral tablet: 1 tab(s) orally 3 times a day  montelukast 10 mg oral tablet: 1 tab(s) orally once a day (at bedtime)  omeprazole 20 mg oral delayed release capsule: 1 cap(s) orally once a day  polyethylene glycol 3350 oral powder for reconstitution: 17 gram(s) orally once a day as needed for  constipation  predniSONE 10 mg oral tablet: 1 tab(s) orally once a day  senna leaf extract oral tablet: 2 tab(s) orally once a day (at bedtime) as needed for  constipation  tiotropium 2.5 mcg/inh inhalation aerosol: 2 puff(s) inhaled once a day   Albuterol (Eqv-ProAir HFA) 90 mcg/inh inhalation aerosol: 2 puff(s) inhaled every 6 hours as needed for Shortness of Breath and/or Wheezing  aspirin 81 mg oral tablet, chewable: 1 tab(s) orally once a day  atorvastatin 40 mg oral tablet: 1 tab(s) orally once a day (at bedtime)  bumetanide 1 mg oral tablet: 1 tab(s) orally once a day  Cozaar 100 mg oral tablet: 1 tab(s) orally once a day  Digox 125 mcg (0.125 mg) oral tablet: 1 tab(s) orally once a day  Eliquis 5 mg oral tablet: 1 tab(s) orally 2 times a day  metFORMIN 500 mg oral tablet: 1 tab(s) orally 2 times a day  metoprolol tartrate 50 mg oral tablet: 1 tab(s) orally 3 times a day  montelukast 10 mg oral tablet: 1 tab(s) orally once a day (at bedtime)  Norvasc 5 mg oral tablet: 1 tab(s) orally once a day  omeprazole 20 mg oral delayed release capsule: 1 cap(s) orally once a day  polyethylene glycol 3350 oral powder for reconstitution: 17 gram(s) orally once a day as needed for  constipation  senna leaf extract oral tablet: 2 tab(s) orally once a day (at bedtime) as needed for  constipation  Symbicort 160 mcg-4.5 mcg/inh inhalation aerosol: 2 puff(s) inhaled once a day inhaled  tiotropium 2.5 mcg/inh inhalation aerosol: 2 puff(s) inhaled once a day

## 2024-01-13 NOTE — PROGRESS NOTE ADULT - SUBJECTIVE AND OBJECTIVE BOX
Time of Visit:  Patient seen and examined. sister Mehran at bedside     MEDICATIONS  (STANDING):  amLODIPine   Tablet 5 milliGRAM(s) Oral daily  apixaban 5 milliGRAM(s) Oral two times a day  aspirin  chewable 81 milliGRAM(s) Oral daily  atorvastatin 40 milliGRAM(s) Oral at bedtime  budesonide 160 MICROgram(s)/formoterol 4.5 MICROgram(s) Inhaler 2 Puff(s) Inhalation two times a day  buMETAnide 1 milliGRAM(s) Oral daily  gabapentin 300 milliGRAM(s) Oral at bedtime  insulin lispro (ADMELOG) corrective regimen sliding scale   SubCutaneous Before meals and at bedtime  losartan 100 milliGRAM(s) Oral daily  melatonin 5 milliGRAM(s) Oral at bedtime  metoprolol tartrate 50 milliGRAM(s) Oral every 8 hours  montelukast 10 milliGRAM(s) Oral at bedtime  pantoprazole    Tablet 40 milliGRAM(s) Oral before breakfast  polyethylene glycol 3350 17 Gram(s) Oral daily  senna 2 Tablet(s) Oral at bedtime  tiotropium 2.5 MICROgram(s) Inhaler 2 Puff(s) Inhalation daily      MEDICATIONS  (PRN):  albuterol    90 MICROgram(s) HFA Inhaler 2 Puff(s) Inhalation every 4 hours PRN Shortness of Breath and/or Wheezing       Medications up to date at time of exam.      PHYSICAL EXAMINATION:  Patient has no new complaints.  GENERAL: The patient is a well-developed, well-nourished, in no apparent distress.     Vital Signs Last 24 Hrs  T(C): 36.8 (13 Jan 2024 11:43), Max: 37 (13 Jan 2024 05:45)  T(F): 98.2 (13 Jan 2024 11:43), Max: 98.6 (13 Jan 2024 05:45)  HR: 83 (13 Jan 2024 11:43) (77 - 114)  BP: 124/75 (13 Jan 2024 11:43) (106/70 - 148/114)  BP(mean): --  RR: 19 (13 Jan 2024 11:43) (18 - 19)  SpO2: 97% (13 Jan 2024 11:43) (96% - 98%)    Parameters below as of 13 Jan 2024 11:43  Patient On (Oxygen Delivery Method): room air       (if applicable)    Chest Tube (if applicable)    HEENT: Head is normocephalic and atraumatic. Extraocular muscles are intact. Mucous membranes are moist.     NECK: Supple, no palpable adenopathy.    LUNGS: Clear to auscultation, no wheezing, rales, or rhonchi.    HEART: Regular rate and rhythm without murmur.    ABDOMEN: Soft, nontender, and nondistended.  No hepatosplenomegaly is noted.    : No painful voiding, no pelvic pain    EXTREMITIES: Without any cyanosis, clubbing, rash, lesions or edema.    NEUROLOGIC: Awake, alert, oriented, grossly intact    SKIN: Warm, dry, good turgor.      LABS:                        11.0   7.79  )-----------( 151      ( 12 Jan 2024 08:00 )             35.1     01-12    143  |  109<H>  |  25<H>  ----------------------------<  83  3.7   |  27  |  1.49<H>    Ca    9.6      12 Jan 2024 12:30    TPro  8.0  /  Alb  3.6  /  TBili  0.5  /  DBili  x   /  AST  16  /  ALT  15  /  AlkPhos  76  01-12      Urinalysis Basic - ( 12 Jan 2024 12:30 )    Color: x / Appearance: x / SG: x / pH: x  Gluc: 83 mg/dL / Ketone: x  / Bili: x / Urobili: x   Blood: x / Protein: x / Nitrite: x   Leuk Esterase: x / RBC: x / WBC x   Sq Epi: x / Non Sq Epi: x / Bacteria: x    MICROBIOLOGY: (if applicable)    RADIOLOGY & ADDITIONAL STUDIES:  EKG:   CXR:  ECHO:    IMPRESSION: 68y Male PAST MEDICAL & SURGICAL HISTORY:  HTN (hypertension)      Varicose veins      HLD (hyperlipidemia)      Liver cirrhosis      Portal hypertension with esophageal varices      COPD (chronic obstructive pulmonary disease)      Syncope      Alcohol dependence, daily use      Smokes tobacco daily      DM (diabetes mellitus)      No significant past surgical history       p/w         Impression: This is a 69 Y/O Male former smoker. hx of chronic ETOH dependence with Cirrhosis . Presented to ED with worsening SOB. In the ED patient placed on Bipap. For pulmonary follow up of Acute hypoxic respiratory failure due to COPD , pulmonary edema / HFrEF , Rapid A Fib. Off Bipap and on Oxygen supplementation .       Suggestion:  - Ambulating in unit on roomair    - Off supp and BiPaP  - On Apixaban 5 mg Twice daily.   - Monitor O2 saturation trend.   - Diuresis and aim for negative fluid balance.  - Monitor urine output.  - Beta blockers for rate control  - Hemodynamic and cardiac monitoring       Time of Visit:  Patient seen and examined. sister Mehran at bedside     MEDICATIONS  (STANDING):  amLODIPine   Tablet 5 milliGRAM(s) Oral daily  apixaban 5 milliGRAM(s) Oral two times a day  aspirin  chewable 81 milliGRAM(s) Oral daily  atorvastatin 40 milliGRAM(s) Oral at bedtime  budesonide 160 MICROgram(s)/formoterol 4.5 MICROgram(s) Inhaler 2 Puff(s) Inhalation two times a day  buMETAnide 1 milliGRAM(s) Oral daily  gabapentin 300 milliGRAM(s) Oral at bedtime  insulin lispro (ADMELOG) corrective regimen sliding scale   SubCutaneous Before meals and at bedtime  losartan 100 milliGRAM(s) Oral daily  melatonin 5 milliGRAM(s) Oral at bedtime  metoprolol tartrate 50 milliGRAM(s) Oral every 8 hours  montelukast 10 milliGRAM(s) Oral at bedtime  pantoprazole    Tablet 40 milliGRAM(s) Oral before breakfast  polyethylene glycol 3350 17 Gram(s) Oral daily  senna 2 Tablet(s) Oral at bedtime  tiotropium 2.5 MICROgram(s) Inhaler 2 Puff(s) Inhalation daily      MEDICATIONS  (PRN):  albuterol    90 MICROgram(s) HFA Inhaler 2 Puff(s) Inhalation every 4 hours PRN Shortness of Breath and/or Wheezing       Medications up to date at time of exam.      PHYSICAL EXAMINATION:  Patient has no new complaints.  GENERAL: The patient is a well-developed, well-nourished, in no apparent distress.     Vital Signs Last 24 Hrs  T(C): 36.8 (13 Jan 2024 11:43), Max: 37 (13 Jan 2024 05:45)  T(F): 98.2 (13 Jan 2024 11:43), Max: 98.6 (13 Jan 2024 05:45)  HR: 83 (13 Jan 2024 11:43) (77 - 114)  BP: 124/75 (13 Jan 2024 11:43) (106/70 - 148/114)  BP(mean): --  RR: 19 (13 Jan 2024 11:43) (18 - 19)  SpO2: 97% (13 Jan 2024 11:43) (96% - 98%)    Parameters below as of 13 Jan 2024 11:43  Patient On (Oxygen Delivery Method): room air       (if applicable)    Chest Tube (if applicable)    HEENT: Head is normocephalic and atraumatic. Extraocular muscles are intact. Mucous membranes are moist.     NECK: Supple, no palpable adenopathy.    LUNGS: Clear to auscultation, no wheezing, rales, or rhonchi.    HEART: Regular rate and rhythm without murmur.    ABDOMEN: Soft, nontender, and nondistended.  No hepatosplenomegaly is noted.    : No painful voiding, no pelvic pain    EXTREMITIES: Without any cyanosis, clubbing, rash, lesions or edema.    NEUROLOGIC: Awake, alert, oriented, grossly intact    SKIN: Warm, dry, good turgor.      LABS:                        11.0   7.79  )-----------( 151      ( 12 Jan 2024 08:00 )             35.1     01-12    143  |  109<H>  |  25<H>  ----------------------------<  83  3.7   |  27  |  1.49<H>    Ca    9.6      12 Jan 2024 12:30    TPro  8.0  /  Alb  3.6  /  TBili  0.5  /  DBili  x   /  AST  16  /  ALT  15  /  AlkPhos  76  01-12      Urinalysis Basic - ( 12 Jan 2024 12:30 )    Color: x / Appearance: x / SG: x / pH: x  Gluc: 83 mg/dL / Ketone: x  / Bili: x / Urobili: x   Blood: x / Protein: x / Nitrite: x   Leuk Esterase: x / RBC: x / WBC x   Sq Epi: x / Non Sq Epi: x / Bacteria: x    MICROBIOLOGY: (if applicable)    RADIOLOGY & ADDITIONAL STUDIES:  EKG:   CXR:  ECHO:    IMPRESSION: 68y Male PAST MEDICAL & SURGICAL HISTORY:  HTN (hypertension)      Varicose veins      HLD (hyperlipidemia)      Liver cirrhosis      Portal hypertension with esophageal varices      COPD (chronic obstructive pulmonary disease)      Syncope      Alcohol dependence, daily use      Smokes tobacco daily      DM (diabetes mellitus)      No significant past surgical history       p/w         Impression: This is a 67 Y/O Male former smoker. hx of chronic ETOH dependence with Cirrhosis . Presented to ED with worsening SOB. In the ED patient placed on Bipap. For pulmonary follow up of Acute hypoxic respiratory failure due to COPD , pulmonary edema / HFrEF , Rapid A Fib. Off Bipap and on Oxygen supplementation .       Suggestion:  - Ambulating in unit on roomair    - Off supp and BiPaP  - On Apixaban 5 mg Twice daily.   - Monitor O2 saturation trend.   - Diuresis and aim for negative fluid balance.  - Monitor urine output.  - Beta blockers for rate control  - Hemodynamic and cardiac monitoring

## 2024-01-13 NOTE — PROGRESS NOTE ADULT - SUBJECTIVE AND OBJECTIVE BOX
Patient is a 68y old  Male who presents with a chief complaint of AHRF, CHF exacerbation (10 Chin 2024 18:26)      INTERVAL HPI/OVERNIGHT EVENTS: no new complaints    MEDICATIONS  (STANDING):  amLODIPine   Tablet 5 milliGRAM(s) Oral daily  apixaban 5 milliGRAM(s) Oral two times a day  aspirin  chewable 81 milliGRAM(s) Oral daily  atorvastatin 40 milliGRAM(s) Oral at bedtime  budesonide 160 MICROgram(s)/formoterol 4.5 MICROgram(s) Inhaler 2 Puff(s) Inhalation two times a day  buMETAnide 1 milliGRAM(s) Oral daily  chlorhexidine 2% Cloths 1 Application(s) Topical <User Schedule>  gabapentin 300 milliGRAM(s) Oral at bedtime  insulin lispro (ADMELOG) corrective regimen sliding scale   SubCutaneous Before meals and at bedtime  losartan 100 milliGRAM(s) Oral daily  melatonin 5 milliGRAM(s) Oral at bedtime  metoprolol tartrate 50 milliGRAM(s) Oral every 8 hours  montelukast 10 milliGRAM(s) Oral at bedtime  pantoprazole    Tablet 40 milliGRAM(s) Oral before breakfast  polyethylene glycol 3350 17 Gram(s) Oral daily  senna 2 Tablet(s) Oral at bedtime  tiotropium 2.5 MICROgram(s) Inhaler 2 Puff(s) Inhalation daily    MEDICATIONS  (PRN):  albuterol    90 MICROgram(s) HFA Inhaler 2 Puff(s) Inhalation every 4 hours PRN Shortness of Breath and/or Wheezing      __________________________________________________  REVIEW OF SYSTEMS:    CONSTITUTIONAL: No fever,   EYES: no acute visual disturbances  NECK: No pain or stiffness  RESPIRATORY: No cough; No shortness of breath  CARDIOVASCULAR: No chest pain, no palpitations  GASTROINTESTINAL: No pain. No nausea or vomiting; No diarrhea   NEUROLOGICAL: No headache or numbness, no tremors  MUSCULOSKELETAL: No joint pain, no muscle pain  GENITOURINARY: no dysuria, no frequency, no hesitancy  PSYCHIATRY: no depression , no anxiety  ALL OTHER  ROS negative        Vital Signs Last 24 Hrs  T(C): 36.9 (11 Jan 2024 11:20), Max: 36.9 (11 Jan 2024 11:20)  T(F): 98.4 (11 Jan 2024 11:20), Max: 98.4 (11 Jan 2024 11:20)  HR: 92 (11 Jan 2024 13:02) (85 - 109)  BP: 121/76 (11 Jan 2024 13:02) (109/88 - 140/91)  BP(mean): --  RR: 18 (11 Jan 2024 11:20) (18 - 19)  SpO2: 97% (11 Jan 2024 11:20) (94% - 100%)    Parameters below as of 11 Jan 2024 11:20  Patient On (Oxygen Delivery Method): room air        ________________________________________________  PHYSICAL EXAM:   GENERAL: NAD  HEENT: Normocephalic;  conjunctivae and sclerae clear; moist mucous membranes;   NECK : supple  CHEST/LUNG: Clear to auscultation bilaterally with good air entry   HEART: S1 S2  regular; no murmurs, gallops or rubs  ABDOMEN: Soft, Nontender, Nondistended; Bowel sounds present  EXTREMITIES: no cyanosis; no edema; no calf tenderness  SKIN: warm and dry; no rash  NERVOUS SYSTEM:  Awake and alert; Oriented  to place, person and time ; no new deficits    _________________________________________________  LABS:                        10.6   9.57  )-----------( 168      ( 10 Chin 2024 06:50 )             34.2     01-11    141  |  108  |  26<H>  ----------------------------<  86  3.4<L>   |  26  |  1.36<H>    Ca    9.1      11 Jan 2024 06:02  Phos  4.7     01-10  Mg     2.1     01-10    TPro  7.3  /  Alb  3.3<L>  /  TBili  0.5  /  DBili  x   /  AST  16  /  ALT  15  /  AlkPhos  59  01-10      Urinalysis Basic - ( 11 Jan 2024 06:02 )    Color: x / Appearance: x / SG: x / pH: x  Gluc: 86 mg/dL / Ketone: x  / Bili: x / Urobili: x   Blood: x / Protein: x / Nitrite: x   Leuk Esterase: x / RBC: x / WBC x   Sq Epi: x / Non Sq Epi: x / Bacteria: x      CAPILLARY BLOOD GLUCOSE      POCT Blood Glucose.: 128 mg/dL (11 Jan 2024 11:35)  POCT Blood Glucose.: 89 mg/dL (11 Jan 2024 07:52)  POCT Blood Glucose.: 113 mg/dL (10 Chin 2024 21:43)  POCT Blood Glucose.: 96 mg/dL (10 Chin 2024 16:40)    RADIOLOGY & ADDITIONAL TESTS:    < from: CT Chest No Cont (01.08.24 @ 10:36) >    ACC: [id number] EXAM:  CT CHEST   ORDERED BY: THERESA MARTIENZ     PROCEDURE DATE:  01/08/2024          INTERPRETATION:  EXAMINATION: CT CHEST    CLINICAL INDICATION: Dyspnea.    TECHNIQUE: Noncontrast CT of the chest was obtained.    COMPARISON: 11/20/2023.    FINDINGS:    AIRWAYS AND LUNGS: The central tracheobronchial tree is patent.  A few   patchy bilateral lung opacities. Left-sided pleural calcifications. Small   to moderate right pleural effusion. Small loculated left pleural effusion.    MEDIASTINUM AND PLEURA: There are no enlarged mediastinal, hilar or   axillary lymph nodes. The visualized portion of the thyroid gland is   unremarkable. There is no pneumothorax.    HEART AND VESSELS: There is mild cardiomegaly.  There are atherosclerotic   calcifications of the aorta and coronary arteries.  There is trace   pericardial fluid.  Aortic valve calcification.    UPPER ABDOMEN: Images of the upper abdomen demonstrate cirrhosis.    BONES AND SOFT TISSUES: L1 compression fracture unchanged from 11/20/2023    The soft tissues are unremarkable.    IMPRESSION:  CHF and/or pneumonia with small moderate right pleural effusion and small   loculated left pleural effusion.    --- End of Report ---    < end of copied text >      < from: Xray Chest 1 View- PORTABLE-Urgent (01.08.24 @ 07:01) >  ACC: [id number] EXAM:  XR CHEST PORTABLE URGENT 1V   ORDERED BY:  SHARONA ORTIZ     PROCEDURE DATE:  01/08/2024          INTERPRETATION:  EXAM: XR CHEST URGENT    INDICATION: Admitting Dxs: R06.03 ACUTE RESPIRATORY DISTRESS respiratory   distress HXR    COMPARISON: November 28, 2023    IMPRESSION: Cardiac paddle has appeared since previous exam. Increased   perihilar interstitial markings bilaterally more prominent than before.   Cardiomegaly. Left effusion with some compressive atelectatic change.   Small right effusion. I would favor congestive failure. An underlying   inflammatory infectious process not absolutely excluded behind the heart.   Regional osseous structures appropriate for age. No pneumothorax.   Follow-up suggested    --- End of Report ---    < end of copied text >    Imaging Personally Reviewed:  YES/NO    Consultant(s) Notes Reviewed:   YES/ No    Care Discussed with Consultants :     Plan of care was discussed with patient and /or primary care giver; all questions and concerns were addressed and care was aligned with patient's wishes.         Patient is a 68y old  Male who presents with a chief complaint of AHRF, CHF exacerbation (10 Chin 2024 18:26)      INTERVAL HPI/OVERNIGHT EVENTS: no new complaints    MEDICATIONS  (STANDING):  amLODIPine   Tablet 5 milliGRAM(s) Oral daily  apixaban 5 milliGRAM(s) Oral two times a day  aspirin  chewable 81 milliGRAM(s) Oral daily  atorvastatin 40 milliGRAM(s) Oral at bedtime  budesonide 160 MICROgram(s)/formoterol 4.5 MICROgram(s) Inhaler 2 Puff(s) Inhalation two times a day  buMETAnide 1 milliGRAM(s) Oral daily  chlorhexidine 2% Cloths 1 Application(s) Topical <User Schedule>  gabapentin 300 milliGRAM(s) Oral at bedtime  insulin lispro (ADMELOG) corrective regimen sliding scale   SubCutaneous Before meals and at bedtime  losartan 100 milliGRAM(s) Oral daily  melatonin 5 milliGRAM(s) Oral at bedtime  metoprolol tartrate 50 milliGRAM(s) Oral every 8 hours  montelukast 10 milliGRAM(s) Oral at bedtime  pantoprazole    Tablet 40 milliGRAM(s) Oral before breakfast  polyethylene glycol 3350 17 Gram(s) Oral daily  senna 2 Tablet(s) Oral at bedtime  tiotropium 2.5 MICROgram(s) Inhaler 2 Puff(s) Inhalation daily    MEDICATIONS  (PRN):  albuterol    90 MICROgram(s) HFA Inhaler 2 Puff(s) Inhalation every 4 hours PRN Shortness of Breath and/or Wheezing      __________________________________________________  REVIEW OF SYSTEMS:    CONSTITUTIONAL: No fever,   EYES: no acute visual disturbances  NECK: No pain or stiffness  RESPIRATORY: No cough; No shortness of breath  CARDIOVASCULAR: No chest pain, no palpitations  GASTROINTESTINAL: No pain. No nausea or vomiting; No diarrhea   NEUROLOGICAL: No headache or numbness, no tremors  MUSCULOSKELETAL: No joint pain, no muscle pain  GENITOURINARY: no dysuria, no frequency, no hesitancy  PSYCHIATRY: no depression , no anxiety  ALL OTHER  ROS negative        Vital Signs Last 24 Hrs  T(C): 36.9 (11 Jan 2024 11:20), Max: 36.9 (11 Jan 2024 11:20)  T(F): 98.4 (11 Jan 2024 11:20), Max: 98.4 (11 Jan 2024 11:20)  HR: 92 (11 Jan 2024 13:02) (85 - 109)  BP: 121/76 (11 Jan 2024 13:02) (109/88 - 140/91)  BP(mean): --  RR: 18 (11 Jan 2024 11:20) (18 - 19)  SpO2: 97% (11 Jan 2024 11:20) (94% - 100%)    Parameters below as of 11 Jan 2024 11:20  Patient On (Oxygen Delivery Method): room air        ________________________________________________  PHYSICAL EXAM:   GENERAL: NAD  HEENT: Normocephalic;  conjunctivae and sclerae clear; moist mucous membranes;   NECK : supple  CHEST/LUNG: Clear to auscultation bilaterally with good air entry   HEART: S1 S2  regular; no murmurs, gallops or rubs  ABDOMEN: Soft, Nontender, Nondistended; Bowel sounds present  EXTREMITIES: no cyanosis; no edema; no calf tenderness  SKIN: warm and dry; no rash  NERVOUS SYSTEM:  Awake and alert; Oriented  to place, person and time ; no new deficits    _________________________________________________  LABS:                        10.6   9.57  )-----------( 168      ( 10 Chin 2024 06:50 )             34.2     01-11    141  |  108  |  26<H>  ----------------------------<  86  3.4<L>   |  26  |  1.36<H>    Ca    9.1      11 Jan 2024 06:02  Phos  4.7     01-10  Mg     2.1     01-10    TPro  7.3  /  Alb  3.3<L>  /  TBili  0.5  /  DBili  x   /  AST  16  /  ALT  15  /  AlkPhos  59  01-10      Urinalysis Basic - ( 11 Jan 2024 06:02 )    Color: x / Appearance: x / SG: x / pH: x  Gluc: 86 mg/dL / Ketone: x  / Bili: x / Urobili: x   Blood: x / Protein: x / Nitrite: x   Leuk Esterase: x / RBC: x / WBC x   Sq Epi: x / Non Sq Epi: x / Bacteria: x      CAPILLARY BLOOD GLUCOSE      POCT Blood Glucose.: 128 mg/dL (11 Jan 2024 11:35)  POCT Blood Glucose.: 89 mg/dL (11 Jan 2024 07:52)  POCT Blood Glucose.: 113 mg/dL (10 Chin 2024 21:43)  POCT Blood Glucose.: 96 mg/dL (10 Chin 2024 16:40)    RADIOLOGY & ADDITIONAL TESTS:    < from: CT Chest No Cont (01.08.24 @ 10:36) >    ACC: [id number] EXAM:  CT CHEST   ORDERED BY: THERESA MARTINEZ     PROCEDURE DATE:  01/08/2024          INTERPRETATION:  EXAMINATION: CT CHEST    CLINICAL INDICATION: Dyspnea.    TECHNIQUE: Noncontrast CT of the chest was obtained.    COMPARISON: 11/20/2023.    FINDINGS:    AIRWAYS AND LUNGS: The central tracheobronchial tree is patent.  A few   patchy bilateral lung opacities. Left-sided pleural calcifications. Small   to moderate right pleural effusion. Small loculated left pleural effusion.    MEDIASTINUM AND PLEURA: There are no enlarged mediastinal, hilar or   axillary lymph nodes. The visualized portion of the thyroid gland is   unremarkable. There is no pneumothorax.    HEART AND VESSELS: There is mild cardiomegaly.  There are atherosclerotic   calcifications of the aorta and coronary arteries.  There is trace   pericardial fluid.  Aortic valve calcification.    UPPER ABDOMEN: Images of the upper abdomen demonstrate cirrhosis.    BONES AND SOFT TISSUES: L1 compression fracture unchanged from 11/20/2023    The soft tissues are unremarkable.    IMPRESSION:  CHF and/or pneumonia with small moderate right pleural effusion and small   loculated left pleural effusion.    --- End of Report ---    < end of copied text >      < from: Xray Chest 1 View- PORTABLE-Urgent (01.08.24 @ 07:01) >  ACC: [id number] EXAM:  XR CHEST PORTABLE URGENT 1V   ORDERED BY:  SHARONA ORTIZ     PROCEDURE DATE:  01/08/2024          INTERPRETATION:  EXAM: XR CHEST URGENT    INDICATION: Admitting Dxs: R06.03 ACUTE RESPIRATORY DISTRESS respiratory   distress HXR    COMPARISON: November 28, 2023    IMPRESSION: Cardiac paddle has appeared since previous exam. Increased   perihilar interstitial markings bilaterally more prominent than before.   Cardiomegaly. Left effusion with some compressive atelectatic change.   Small right effusion. I would favor congestive failure. An underlying   inflammatory infectious process not absolutely excluded behind the heart.   Regional osseous structures appropriate for age. No pneumothorax.   Follow-up suggested    --- End of Report ---    < end of copied text >    Imaging Personally Reviewed:  YES/NO    Consultant(s) Notes Reviewed:   YES/ No    Care Discussed with Consultants :     Plan of care was discussed with patient and /or primary care giver; all questions and concerns were addressed and care was aligned with patient's wishes.

## 2024-01-13 NOTE — DISCHARGE NOTE PROVIDER - NSDCFUSCHEDAPPT_GEN_ALL_CORE_FT
Vinod Joyce  [facility]  CARDIOLOGY [address]  Scheduled Appointment: 03/18/2024    Vinod Joyce  [facility]  CARDIOLOGY [address]  Scheduled Appointment: 03/18/2024    Va Sparks  [facility]  PULMMED [address] [facility]  Scheduled Appointment: 03/19/2024     Vinod Joyce  [facility]  CARDIOLOGY [address]  Scheduled Appointment: 03/18/2024    Vinod Joyce  [facility]  CARDIOLOGY [address]  Scheduled Appointment: 03/18/2024    Va Sparks  [facility]  PULMMED [address] [facility]  Scheduled Appointment: 03/19/2024

## 2024-01-13 NOTE — DISCHARGE NOTE PROVIDER - PROVIDER TOKENS
PROVIDER:[TOKEN:[[id number]],FOLLOWUP:[1-3 days],ESTABLISHEDPATIENT:[T]],PROVIDER:[TOKEN:[[id number]],FOLLOWUP:[1 week]]

## 2024-01-13 NOTE — PROGRESS NOTE ADULT - SUBJECTIVE AND OBJECTIVE BOX
PATIENT SEEN AND EXAMINED BY EDGARDO DRISCOLL M.D. ON :- 1/13/24  DATE OF SERVICE:      1/13/24       Interim events noted,Labs ,Radiological studies and Cardiology tests reviewed .  DISCUSSED WITH ACP/MEDICAL RESIDENT ON PLAN OF CARE.    MR#[id number]  PATIENT NAME:[facility] COURSE: HPI:  68M from home, ambulates independently, PMHx COPD (not on O2), former smoker quit 2-months, HLD, DM, HTN, chronic ETOH dependence c/b cirrhosis, p/w worsening SOB. Patient is limited historian, currently on BIPAP and unable to full history deferring further collateral to wife, Maureen Mcclellanvt [phone] but unable to reach. Patient states that he has had worsening of breathing over 2-years, does not sleep with a machine at home, and when inquired about medications he admits to non-compliance. Denies fevers, chills, or sick contact. Denies chest pain, palpitations, or GUILLEN.    In ED:   Afebrile;  Afib w/ RVR and PVC; /102; RR 22 100% on RA. Started on BIPAP for increased WOB saturating 100% with improvement in WOB. Trop negative 17, BNP 1853 (improved from 4463). Given duoneb x3, solumedrol 125mg x1, rocephin x1, and 500cc bolus x1, diltizem 20mg IVP x1 + 10mg IVP x1. Awaiting to receive vanc x1 and azithromycin. CXR increased b/l opacification and left lower pleural effusion.  (08 Jan 2024 08:55)      INTERIM EVENTS:Patient seen at bedside ,interim events noted.      PM -reviewed admission note, no change since admission  HEART FAILURE: Acute[ ]Chronic[ ] Systolic[ ] Diastolic[ ] Combined Systolic and Diastolic[ ]  CAD[ ] CABG[ ] PCI[ ]  DEVICES[ ] PPM[ ] ICD[ ] ILR[ ]  ATRIAL FIBRILLATION[ ] Paroxysmal[ ] Permanent[ ] CHADS2-[  ]  CORTES[ ] CKD1[ ] CKD2[ ] CKD3[ ] CKD4[ ] ESRD[ ]  COPD[ ] HTN[ ]   DM[ ] Type1[ ] Type 2[ ]   CVA[ ] Paresis[ ]    AMBULATION: Assisted[ ] Cane/walker[ ] Independent[ ]    MEDICATIONS  (STANDING):  amLODIPine   Tablet 5 milliGRAM(s) Oral daily  apixaban 5 milliGRAM(s) Oral two times a day  aspirin  chewable 81 milliGRAM(s) Oral daily  atorvastatin 40 milliGRAM(s) Oral at bedtime  budesonide 160 MICROgram(s)/formoterol 4.5 MICROgram(s) Inhaler 2 Puff(s) Inhalation two times a day  buMETAnide 1 milliGRAM(s) Oral daily  gabapentin 300 milliGRAM(s) Oral at bedtime  insulin lispro (ADMELOG) corrective regimen sliding scale   SubCutaneous Before meals and at bedtime  losartan 100 milliGRAM(s) Oral daily  melatonin 5 milliGRAM(s) Oral at bedtime  metoprolol tartrate 50 milliGRAM(s) Oral every 8 hours  montelukast 10 milliGRAM(s) Oral at bedtime  pantoprazole    Tablet 40 milliGRAM(s) Oral before breakfast  polyethylene glycol 3350 17 Gram(s) Oral daily  senna 2 Tablet(s) Oral at bedtime  tiotropium 2.5 MICROgram(s) Inhaler 2 Puff(s) Inhalation daily    MEDICATIONS  (PRN):  albuterol    90 MICROgram(s) HFA Inhaler 2 Puff(s) Inhalation every 4 hours PRN Shortness of Breath and/or Wheezing            REVIEW OF SYSTEMS:  Constitutional: [ ] fever, [ ]weight loss,  [ ]fatigue [ ]weight gain  Eyes: [ ] visual changes  Respiratory: [ ]shortness of breath;  [ ] cough, [ ]wheezing, [ ]chills, [ ]hemoptysis  Cardiovascular: [ ] chest pain, [ ]palpitations, [ ]dizziness,  [ ]leg swelling[ ]orthopnea[ ]PND  Gastrointestinal: [ ] abdominal pain, [ ]nausea, [ ]vomiting,  [ ]diarrhea [ ]Constipation [ ]Melena  Genitourinary: [ ] dysuria, [ ] hematuria [ ]Goss  Neurologic: [ ] headaches [ ] tremors[ ]weakness [ ]Paralysis Right[ ] Left[ ]  Skin: [ ] itching, [ ]burning, [ ] rashes  Endocrine: [ ] heat or cold intolerance  Musculoskeletal: [ ] joint pain or swelling; [ ] muscle, back, or extremity pain  Psychiatric: [ ] depression, [ ]anxiety, [ ]mood swings, or [ ]difficulty sleeping  Hematologic: [ ] easy bruising, [ ] bleeding gums    [ ] All remaining systems negative except as per above.   [ ]Unable to obtain.  [x] No change in ROS since admission      Vital Signs Last 24 Hrs  T(C): 36.9 (13 Jan 2024 19:41), Max: 37 (13 Jan 2024 05:45)  T(F): 98.4 (13 Jan 2024 19:41), Max: 98.6 (13 Jan 2024 05:45)  HR: 124 (13 Jan 2024 19:41) (77 - 124)  BP: 129/61 (13 Jan 2024 19:41) (106/70 - 150/103)  BP(mean): --  RR: 18 (13 Jan 2024 19:41) (18 - 19)  SpO2: 100% (13 Jan 2024 19:41) (91% - 100%)    Parameters below as of 13 Jan 2024 19:41  Patient On (Oxygen Delivery Method): nasal cannula  O2 Flow (L/min): 2    I&O's Summary    12 Jan 2024 07:01  -  13 Jan 2024 07:00  --------------------------------------------------------  IN: 450 mL / OUT: 0 mL / NET: 450 mL        PHYSICAL EXAM:  General: No acute distress BMI-  HEENT: EOMI, PERRL  Neck: Supple, [ ] JVD  Lungs: Equal air entry bilaterally; [ ] rales [ ] wheezing [ ] rhonchi  Heart: Regular rate and rhythm; [x ] murmur   2/6 [ x] systolic [ ] diastolic [ ] radiation[ ] rubs [ ]  gallops  Abdomen: Nontender, bowel sounds present  Extremities: No clubbing, cyanosis, [ ] edema [ ]Pulses  equal and intact  Nervous system:  Alert & Oriented X3, no focal deficits  Psychiatric: Normal affect  Skin: No rashes or lesions    LABS:  01-13    140  |  109<H>  |  24<H>  ----------------------------<  139<H>  3.8   |  25  |  1.31<H>    Ca    9.3      13 Jan 2024 18:00    TPro  8.0  /  Alb  3.6  /  TBili  0.5  /  DBili  x   /  AST  16  /  ALT  15  /  AlkPhos  76  01-12    Creatinine Trend: 1.31<--, 1.49<--, QNS<--, 1.36<--, 1.36<--, 1.37<--                        10.6   9.28  )-----------( 108      ( 13 Jan 2024 18:00 )             34.5                PATIENT SEEN AND EXAMINED BY EDGARDO DRISCOLL M.D. ON :- 1/13/24  DATE OF SERVICE:      1/13/24       Interim events noted,Labs ,Radiological studies and Cardiology tests reviewed .  DISCUSSED WITH ACP/MEDICAL RESIDENT ON PLAN OF CARE.    MR#[id number]  PATIENT NAME:[facility] COURSE: HPI:  68M from home, ambulates independently, PMHx COPD (not on O2), former smoker quit 2-months, HLD, DM, HTN, chronic ETOH dependence c/b cirrhosis, p/w worsening SOB. Patient is limited historian, currently on BIPAP and unable to full history deferring further collateral to wife, Maureen Mcclellanvt [phone] but unable to reach. Patient states that he has had worsening of breathing over 2-years, does not sleep with a machine at home, and when inquired about medications he admits to non-compliance. Denies fevers, chills, or sick contact. Denies chest pain, palpitations, or GUILLEN.    In ED:   Afebrile;  Afib w/ RVR and PVC; /102; RR 22 100% on RA. Started on BIPAP for increased WOB saturating 100% with improvement in WOB. Trop negative 17, BNP 1853 (improved from 4463). Given duoneb x3, solumedrol 125mg x1, rocephin x1, and 500cc bolus x1, diltizem 20mg IVP x1 + 10mg IVP x1. Awaiting to receive vanc x1 and azithromycin. CXR increased b/l opacification and left lower pleural effusion.  (08 Jan 2024 08:55)      INTERIM EVENTS:Patient seen at bedside ,interim events noted.      PM -reviewed admission note, no change since admission  HEART FAILURE: Acute[ ]Chronic[ ] Systolic[ ] Diastolic[ ] Combined Systolic and Diastolic[ ]  CAD[ ] CABG[ ] PCI[ ]  DEVICES[ ] PPM[ ] ICD[ ] ILR[ ]  ATRIAL FIBRILLATION[ ] Paroxysmal[ ] Permanent[ ] CHADS2-[  ]  CORTES[ ] CKD1[ ] CKD2[ ] CKD3[ ] CKD4[ ] ESRD[ ]  COPD[ ] HTN[ ]   DM[ ] Type1[ ] Type 2[ ]   CVA[ ] Paresis[ ]    AMBULATION: Assisted[ ] Cane/walker[ ] Independent[ ]    MEDICATIONS  (STANDING):  amLODIPine   Tablet 5 milliGRAM(s) Oral daily  apixaban 5 milliGRAM(s) Oral two times a day  aspirin  chewable 81 milliGRAM(s) Oral daily  atorvastatin 40 milliGRAM(s) Oral at bedtime  budesonide 160 MICROgram(s)/formoterol 4.5 MICROgram(s) Inhaler 2 Puff(s) Inhalation two times a day  buMETAnide 1 milliGRAM(s) Oral daily  gabapentin 300 milliGRAM(s) Oral at bedtime  insulin lispro (ADMELOG) corrective regimen sliding scale   SubCutaneous Before meals and at bedtime  losartan 100 milliGRAM(s) Oral daily  melatonin 5 milliGRAM(s) Oral at bedtime  metoprolol tartrate 50 milliGRAM(s) Oral every 8 hours  montelukast 10 milliGRAM(s) Oral at bedtime  pantoprazole    Tablet 40 milliGRAM(s) Oral before breakfast  polyethylene glycol 3350 17 Gram(s) Oral daily  senna 2 Tablet(s) Oral at bedtime  tiotropium 2.5 MICROgram(s) Inhaler 2 Puff(s) Inhalation daily    MEDICATIONS  (PRN):  albuterol    90 MICROgram(s) HFA Inhaler 2 Puff(s) Inhalation every 4 hours PRN Shortness of Breath and/or Wheezing            REVIEW OF SYSTEMS:  Constitutional: [ ] fever, [ ]weight loss,  [ ]fatigue [ ]weight gain  Eyes: [ ] visual changes  Respiratory: [ ]shortness of breath;  [ ] cough, [ ]wheezing, [ ]chills, [ ]hemoptysis  Cardiovascular: [ ] chest pain, [ ]palpitations, [ ]dizziness,  [ ]leg swelling[ ]orthopnea[ ]PND  Gastrointestinal: [ ] abdominal pain, [ ]nausea, [ ]vomiting,  [ ]diarrhea [ ]Constipation [ ]Melena  Genitourinary: [ ] dysuria, [ ] hematuria [ ]Goss  Neurologic: [ ] headaches [ ] tremors[ ]weakness [ ]Paralysis Right[ ] Left[ ]  Skin: [ ] itching, [ ]burning, [ ] rashes  Endocrine: [ ] heat or cold intolerance  Musculoskeletal: [ ] joint pain or swelling; [ ] muscle, back, or extremity pain  Psychiatric: [ ] depression, [ ]anxiety, [ ]mood swings, or [ ]difficulty sleeping  Hematologic: [ ] easy bruising, [ ] bleeding gums    [ ] All remaining systems negative except as per above.   [ ]Unable to obtain.  [x] No change in ROS since admission      Vital Signs Last 24 Hrs  T(C): 36.9 (13 Jan 2024 19:41), Max: 37 (13 Jan 2024 05:45)  T(F): 98.4 (13 Jan 2024 19:41), Max: 98.6 (13 Jan 2024 05:45)  HR: 124 (13 Jan 2024 19:41) (77 - 124)  BP: 129/61 (13 Jan 2024 19:41) (106/70 - 150/103)  BP(mean): --  RR: 18 (13 Jan 2024 19:41) (18 - 19)  SpO2: 100% (13 Jan 2024 19:41) (91% - 100%)    Parameters below as of 13 Jan 2024 19:41  Patient On (Oxygen Delivery Method): nasal cannula  O2 Flow (L/min): 2    I&O's Summary    12 Jan 2024 07:01  -  13 Jan 2024 07:00  --------------------------------------------------------  IN: 450 mL / OUT: 0 mL / NET: 450 mL        PHYSICAL EXAM:  General: No acute distress BMI-  HEENT: EOMI, PERRL  Neck: Supple, [ ] JVD  Lungs: Equal air entry bilaterally; [ ] rales [ ] wheezing [ ] rhonchi  Heart: Regular rate and rhythm; [x ] murmur   2/6 [ x] systolic [ ] diastolic [ ] radiation[ ] rubs [ ]  gallops  Abdomen: Nontender, bowel sounds present  Extremities: No clubbing, cyanosis, [ ] edema [ ]Pulses  equal and intact  Nervous system:  Alert & Oriented X3, no focal deficits  Psychiatric: Normal affect  Skin: No rashes or lesions    LABS:  01-13    140  |  109<H>  |  24<H>  ----------------------------<  139<H>  3.8   |  25  |  1.31<H>    Ca    9.3      13 Jan 2024 18:00    TPro  8.0  /  Alb  3.6  /  TBili  0.5  /  DBili  x   /  AST  16  /  ALT  15  /  AlkPhos  76  01-12    Creatinine Trend: 1.31<--, 1.49<--, QNS<--, 1.36<--, 1.36<--, 1.37<--                        10.6   9.28  )-----------( 108      ( 13 Jan 2024 18:00 )             34.5

## 2024-01-13 NOTE — PROGRESS NOTE ADULT - TIME BILLING
- Review of records, telemetry, vital signs and daily labs.   - General and cardiovascular physical examination.  - Generation of cardiovascular treatment plan.  - Coordination of care.      Patient was seen and examined by me on 1/13/24,interim events noted,labs and radiology studies reviewed.  Nate Alcazar MD,FACC.  [address] [place], [place] [id number].  [phone] - Review of records, telemetry, vital signs and daily labs.   - General and cardiovascular physical examination.  - Generation of cardiovascular treatment plan.  - Coordination of care.      Patient was seen and examined by me on 1/13/24,interim events noted,labs and radiology studies reviewed.  Nate Alcazar MD,FACC.  [address] [place], [place] [id number].  [phone]

## 2024-01-13 NOTE — DISCHARGE NOTE PROVIDER - HOSPITAL COURSE
68M from home, ambulates independently, PMHx HFREF, COPD (not on O2), former smoker quit 2-months, HLD, DM, HTN, chronic ETOH dependence c/b cirrhosis, p/w worsening shortness of breath. In the ED patient placed on non invasive respiratory support also was noted to be in rapid A fib. Concern for possible pulmonary edema.   Patient was admitted for to ICU for management of Acute hypoxic respiratory failure, pulmonary edema, rapid A fib      Patient evaluated by multidisciplinary team including pulmonology and cardiology and medically optimized.   Discharge plan as per multidisciplinary team and attending Dr Rivera with close PCP, pulmonology and cardiology follow up.       68M from home, ambulates independently, PMHx HFREF, COPD (not on O2), former smoker quit 2-months, HLD, DM, HTN, chronic ETOH dependence c/b cirrhosis, p/w worsening shortness of breath. In the ED patient placed on non invasive respiratory support also was noted to be in rapid A fib. Concern for possible pulmonary edema.   Patient was admitted for to ICU for management of Acute hypoxic respiratory failure, pulmonary edema, rapid A fib. CXR increased b/l opacification and left lower pleural effusion.   EKG showed Afib w/ RVR HR 140s with PVC. Pro BNP of 1853. Echo (11/23) showed EF of 20-25%.    Patient given Diltiazem 20mg IVP x1 +10mg IVP x1 with little effect and was started on intravenous lopressor 5mg q6, IV Bumex and FD lovenox while on NPO.   Started on oral lopressor, oral Bumex and Eliquis.     Patient evaluated by multidisciplinary team including pulmonology and cardiology and medically optimized.   Discharge plan as per multidisciplinary team and attending Dr Rivera with close PCP, pulmonology and cardiology follow up.       68M from home, ambulates independently, PMHx COPD (not on O2), former smoker quit 2-months, HLD, DM, HTN, chronic ETOH dependence c/b cirrhosis, p/w worsening SOB. Patient is limited historian, currently on BIPAP and unable to full history deferring further collateral to wife, Maureen Mcclellanvt [phone] but unable to reach. Patient states that he has had worsening of breathing over 2-years, does not sleep with a machine at home, and when inquired about medications he admits to non-compliance.   In ED:   Afebrile;  Afib w/ RVR and PVC; /102; RR 22 100% on RA. Started on BIPAP for increased WOB saturating 100% with improvement in WOB. Trop negative 17, BNP 1853 (improved from 4463). Given duoneb x3, solumedrol 125mg x1, rocephin x1, and 500cc bolus x1, diltizem 20mg IVP x1 + 10mg IVP x1. Awaiting to receive vanc x1 and azithromycin. CXR increased b/l opacification and left lower pleural effusion. Admitted to ICU for AHRF 2/2 suspected CHF exacerbation requiring continuous BIPAP. CT chest on 1/8 showed CHF and/or PNA with small moderate right pleural effusion and small loculated left pleural effusion.    Pt. downgraded to tele on 1/9. Hospital course complicated by worsening shortness of breath, increased work of breathing and wheezing. CXR on 1/15 showed mild improved aeration. Pt was given nebulizer treatment and diurese with bumex. Pulm Dr Manrique is following. Pt noted to have increase WOB this morning despite nebulizer treatment, repeat CXR 1/16 showed slight CHF. Pt was given IV Bumex again and PO daily. Course further complicated by intermittent Afib with RVR, Dr Alcazar cardiology following. Digoxin loading dose was given and started daily.    PT recs OP PT    Pt is medically optimized for discharge, discussed with the attending Miguel  This is just a brief hospital course, please refer to the progress notes for detailed information   68M from home, ambulates independently, PMHx COPD (not on O2), former smoker quit 2-months, HLD, DM, HTN, chronic ETOH dependence c/b cirrhosis, p/w worsening SOB. Patient is limited historian, currently on BIPAP and unable to full history deferring further collateral to wife, Maureen Mcclellanvt [phone] but unable to reach. Patient states that he has had worsening of breathing over 2-years, does not sleep with a machine at home, and when inquired about medications he admits to non-compliance.   In ED:   Afebrile;  Afib w/ RVR and PVC; /102; RR 22 100% on RA. Started on BIPAP for increased WOB saturating 100% with improvement in WOB. Trop negative 17, BNP 1853 (improved from 4463). Given duoneb x3, solumedrol 125mg x1, rocephin x1, and 500cc bolus x1, diltizem 20mg IVP x1 + 10mg IVP x1. Awaiting to receive vanc x1 and azithromycin. CXR increased b/l opacification and left lower pleural effusion. Admitted to ICU for AHRF 2/2 suspected CHF exacerbation requiring continuous BIPAP. CT chest on 1/8 showed CHF and/or PNA with small moderate right pleural effusion and small loculated left pleural effusion.    Pt. downgraded to tele on 1/9. Hospital course complicated by worsening shortness of breath, increased work of breathing and wheezing. CXR on 1/15 showed mild improved aeration. Pt was given nebulizer treatment and diurese with bumex. Pulm Dr Manrique is following. Pt noted to have increase WOB this morning despite nebulizer treatment, repeat CXR 1/16 showed slight CHF. Pt was given IV Bumex again and PO daily. Course further complicated by intermittent Afib with RVR, Dr Alcazar cardiology following. Digoxin loading dose was given and started daily.    PT recs OP PT    Pt is medically optimized for discharge, discussed with the attending Miguel  This is just a brief hospital course, please refer to the progress notes for detailed information

## 2024-01-13 NOTE — DISCHARGE NOTE PROVIDER - NSDCCPCAREPLAN_GEN_ALL_CORE_FT
PRINCIPAL DISCHARGE DIAGNOSIS  Diagnosis: Respiratory distress  Assessment and Plan of Treatment: Please continue taking your home emdications. Follow up with your pulmonologist and PCP within 2-3 days. For any worsening shortness of breath, chest pain any new or concerming symtoms return to ED or call 911 .   STOP SMOKING      SECONDARY DISCHARGE DIAGNOSES  Diagnosis: COPD exacerbation  Assessment and Plan of Treatment: Please continue taking your home emdications. Follow up with your pulmonologist and PCP within 2-3 days. For any worsening shortness of breath, chest pain any new or concerming symtoms return to ED or call 911   STOP SMOKING    Diagnosis: Pneumonia  Assessment and Plan of Treatment: You completed your course of antibiotics while in the hospital.   Follow up iwth your PCP in 2 days    Diagnosis: Atrial fibrillation with RVR  Assessment and Plan of Treatment: Continue your medications as precribed. Follow up with your PCP and cardilogist in 2 to 3 days     PRINCIPAL DISCHARGE DIAGNOSIS  Diagnosis: Acute hypoxic respiratory failure  Assessment and Plan of Treatment: You were admitted for worsening shortness of breath. You required admission to ICU and non-invasive ventilation use. You continue to use supplemental oxygen during your stay. This is likely due to CHF exacerbation and COPD  Follow up with your pulmonologist and PCP within 2-3 days. For any worsening shortness of breath, chest pain any new or concerming symtoms return to ED or call 911 .  Do not smoke. Nicotine and other chemicals in cigarettes and cigars can cause lung damage and make your symptoms worse.  Prevent the spread of germs. Wash your hands often with soap and water. Use gel hand cleanser when soap and water are not available. Do not touch your eyes, nose, or mouth unless you have washed your hands first. Cover your mouth when you cough. Cough into a tissue or your shirtsleeve so you do not spread germs from your hands. If you are sick, stay away from others as much as possible.  Limit alcohol.      SECONDARY DISCHARGE DIAGNOSES  Diagnosis: Acute systolic congestive heart failure  Assessment and Plan of Treatment: You were admitted for CHF exacerbation. You were diurese with Bumex with improvement with your breathing  Continue taking Bumex 1 mg daily  Follow up with your cardiologist    Diagnosis: COPD exacerbation  Assessment and Plan of Treatment: Continue taking your inhalers at home  Call your Health Care provider upon arrival home to make a follow up appointment within one week.  Take all inhalers as prescribed by your Health Care Provider.  Take steroids as prescribed by your Health Care Provider.  If your cough increases infrequency and severity and/or you have shortness of breath or increased shortness of breath call your Health Care Provider.  If you develop fever, chills, night sweats, malaise, and/or change in mental status call your Health care Provider.  Nutrition is very important.  Eat small frequent meals.  Increase your activity as tolerated.  Do not stay in bed all day      Diagnosis: Pneumonia  Assessment and Plan of Treatment: You completed your course of antibiotics while in the hospital.   Follow up iwth your PCP in 2 days  Pneumonia is a lung infection that can cause a fever, cough, and trouble breathing.  Nutrition is important, eat small frequent meals.  Get lots of rest and drink fluids.  Call your health care provider upon arrival home from hospital and make a follow up appointment for one week.  If your cough worsens, you develop fever greater than 101', you have shaking chills, a fast heartbeat, trouble breathing and/or feel your are breathing much faster than usual, call your healthcare provider.  Make sure you wash your hands frequently.    Diagnosis: Chronic kidney disease, unspecified CKD stage  Assessment and Plan of Treatment: You have known poor kidney function. Your creatinine is now back to your baseline level  Follow up with your primary care provider    Diagnosis: Atrial fibrillation with RVR  Assessment and Plan of Treatment: Continue taking Digoxin, metoprolol and Eliquis  Atrial fibrillation is the most common heart rhythm problem & has the risk of stroke & heart attack  It helps if you control your blood pressure, not drink more than 1-2 alcohol drinks per day, cut down on caffeine, getting treatment for over active thyroid gland, & getting exercise  Call your doctor if you feel your heart racing or beating unusually, chest tightness or pain, lightheaded, faint, shortness of breath especially with exercise  It is important to take your heart medication as prescribed  You may be on anticoagulation which is very important to take as directed - you may need blood work to monitor drug levels    Diagnosis: HLD (hyperlipidemia)  Assessment and Plan of Treatment: Continue taking statin at home    Diagnosis: HTN (hypertension)  Assessment and Plan of Treatment: Continue norvasc, losartan, and metoprolol  Low salt diet  Activity as tolerated.  Follow up with your medical doctor for routine blood pressure monitoring at your next visit.  Notify your doctor if you have any of the following symptoms:   Dizziness, Lightheadedness, Blurry vision, Headache, Chest pain, Shortness of breath    Diagnosis: DM (diabetes mellitus)  Assessment and Plan of Treatment: Your A1c 4.9 on 11/2023.  Continue taking your home medications. Follow up with your endocrinologist

## 2024-01-13 NOTE — DISCHARGE NOTE PROVIDER - CARE PROVIDERS DIRECT ADDRESSES
,[email or web address] ,[email or web address],[email or web address]

## 2024-01-14 LAB
ANION GAP SERPL CALC-SCNC: 6 MMOL/L — SIGNIFICANT CHANGE UP (ref 5–17)
ANION GAP SERPL CALC-SCNC: 6 MMOL/L — SIGNIFICANT CHANGE UP (ref 5–17)
BASE EXCESS BLDA CALC-SCNC: 4.2 MMOL/L — HIGH (ref -2–3)
BASE EXCESS BLDA CALC-SCNC: 4.2 MMOL/L — HIGH (ref -2–3)
BLOOD GAS COMMENTS ARTERIAL: SIGNIFICANT CHANGE UP
BLOOD GAS COMMENTS ARTERIAL: SIGNIFICANT CHANGE UP
BUN SERPL-MCNC: 24 MG/DL — HIGH (ref 7–18)
BUN SERPL-MCNC: 24 MG/DL — HIGH (ref 7–18)
CALCIUM SERPL-MCNC: 8.8 MG/DL — SIGNIFICANT CHANGE UP (ref 8.4–10.5)
CALCIUM SERPL-MCNC: 8.8 MG/DL — SIGNIFICANT CHANGE UP (ref 8.4–10.5)
CHLORIDE SERPL-SCNC: 106 MMOL/L — SIGNIFICANT CHANGE UP (ref 96–108)
CHLORIDE SERPL-SCNC: 106 MMOL/L — SIGNIFICANT CHANGE UP (ref 96–108)
CO2 SERPL-SCNC: 26 MMOL/L — SIGNIFICANT CHANGE UP (ref 22–31)
CO2 SERPL-SCNC: 26 MMOL/L — SIGNIFICANT CHANGE UP (ref 22–31)
CREAT SERPL-MCNC: 1.61 MG/DL — HIGH (ref 0.5–1.3)
CREAT SERPL-MCNC: 1.61 MG/DL — HIGH (ref 0.5–1.3)
EGFR: 46 ML/MIN/1.73M2 — LOW
EGFR: 46 ML/MIN/1.73M2 — LOW
GLUCOSE BLDC GLUCOMTR-MCNC: 102 MG/DL — HIGH (ref 70–99)
GLUCOSE BLDC GLUCOMTR-MCNC: 102 MG/DL — HIGH (ref 70–99)
GLUCOSE BLDC GLUCOMTR-MCNC: 115 MG/DL — HIGH (ref 70–99)
GLUCOSE BLDC GLUCOMTR-MCNC: 115 MG/DL — HIGH (ref 70–99)
GLUCOSE BLDC GLUCOMTR-MCNC: 83 MG/DL — SIGNIFICANT CHANGE UP (ref 70–99)
GLUCOSE BLDC GLUCOMTR-MCNC: 83 MG/DL — SIGNIFICANT CHANGE UP (ref 70–99)
GLUCOSE BLDC GLUCOMTR-MCNC: 94 MG/DL — SIGNIFICANT CHANGE UP (ref 70–99)
GLUCOSE BLDC GLUCOMTR-MCNC: 94 MG/DL — SIGNIFICANT CHANGE UP (ref 70–99)
GLUCOSE SERPL-MCNC: 160 MG/DL — HIGH (ref 70–99)
GLUCOSE SERPL-MCNC: 160 MG/DL — HIGH (ref 70–99)
HCO3 BLDA-SCNC: 28 MMOL/L — SIGNIFICANT CHANGE UP (ref 21–28)
HCO3 BLDA-SCNC: 28 MMOL/L — SIGNIFICANT CHANGE UP (ref 21–28)
HCT VFR BLD CALC: 31.2 % — LOW (ref 39–50)
HCT VFR BLD CALC: 31.2 % — LOW (ref 39–50)
HGB BLD-MCNC: 9.4 G/DL — LOW (ref 13–17)
HGB BLD-MCNC: 9.4 G/DL — LOW (ref 13–17)
HOROWITZ INDEX BLDA+IHG-RTO: 32 — SIGNIFICANT CHANGE UP
HOROWITZ INDEX BLDA+IHG-RTO: 32 — SIGNIFICANT CHANGE UP
MCHC RBC-ENTMCNC: 26.8 PG — LOW (ref 27–34)
MCHC RBC-ENTMCNC: 26.8 PG — LOW (ref 27–34)
MCHC RBC-ENTMCNC: 30.1 GM/DL — LOW (ref 32–36)
MCHC RBC-ENTMCNC: 30.1 GM/DL — LOW (ref 32–36)
MCV RBC AUTO: 88.9 FL — SIGNIFICANT CHANGE UP (ref 80–100)
MCV RBC AUTO: 88.9 FL — SIGNIFICANT CHANGE UP (ref 80–100)
NRBC # BLD: 0 /100 WBCS — SIGNIFICANT CHANGE UP (ref 0–0)
NRBC # BLD: 0 /100 WBCS — SIGNIFICANT CHANGE UP (ref 0–0)
PCO2 BLDA: 40 MMHG — SIGNIFICANT CHANGE UP (ref 35–48)
PCO2 BLDA: 40 MMHG — SIGNIFICANT CHANGE UP (ref 35–48)
PH BLDA: 7.46 — HIGH (ref 7.35–7.45)
PH BLDA: 7.46 — HIGH (ref 7.35–7.45)
PLATELET # BLD AUTO: 143 K/UL — LOW (ref 150–400)
PLATELET # BLD AUTO: 143 K/UL — LOW (ref 150–400)
PO2 BLDA: 99 MMHG — SIGNIFICANT CHANGE UP (ref 83–108)
PO2 BLDA: 99 MMHG — SIGNIFICANT CHANGE UP (ref 83–108)
POTASSIUM SERPL-MCNC: 4.4 MMOL/L — SIGNIFICANT CHANGE UP (ref 3.5–5.3)
POTASSIUM SERPL-MCNC: 4.4 MMOL/L — SIGNIFICANT CHANGE UP (ref 3.5–5.3)
POTASSIUM SERPL-SCNC: 4.4 MMOL/L — SIGNIFICANT CHANGE UP (ref 3.5–5.3)
POTASSIUM SERPL-SCNC: 4.4 MMOL/L — SIGNIFICANT CHANGE UP (ref 3.5–5.3)
RAPID RVP RESULT: SIGNIFICANT CHANGE UP
RAPID RVP RESULT: SIGNIFICANT CHANGE UP
RBC # BLD: 3.51 M/UL — LOW (ref 4.2–5.8)
RBC # BLD: 3.51 M/UL — LOW (ref 4.2–5.8)
RBC # FLD: 14.3 % — SIGNIFICANT CHANGE UP (ref 10.3–14.5)
RBC # FLD: 14.3 % — SIGNIFICANT CHANGE UP (ref 10.3–14.5)
SAO2 % BLDA: 100 % — SIGNIFICANT CHANGE UP
SAO2 % BLDA: 100 % — SIGNIFICANT CHANGE UP
SARS-COV-2 RNA SPEC QL NAA+PROBE: SIGNIFICANT CHANGE UP
SARS-COV-2 RNA SPEC QL NAA+PROBE: SIGNIFICANT CHANGE UP
SODIUM SERPL-SCNC: 138 MMOL/L — SIGNIFICANT CHANGE UP (ref 135–145)
SODIUM SERPL-SCNC: 138 MMOL/L — SIGNIFICANT CHANGE UP (ref 135–145)
WBC # BLD: 8.67 K/UL — SIGNIFICANT CHANGE UP (ref 3.8–10.5)
WBC # BLD: 8.67 K/UL — SIGNIFICANT CHANGE UP (ref 3.8–10.5)
WBC # FLD AUTO: 8.67 K/UL — SIGNIFICANT CHANGE UP (ref 3.8–10.5)
WBC # FLD AUTO: 8.67 K/UL — SIGNIFICANT CHANGE UP (ref 3.8–10.5)

## 2024-01-14 PROCEDURE — 71045 X-RAY EXAM CHEST 1 VIEW: CPT | Mod: 26

## 2024-01-14 RX ORDER — OLANZAPINE 15 MG/1
2.5 TABLET, FILM COATED ORAL ONCE
Refills: 0 | Status: DISCONTINUED | OUTPATIENT
Start: 2024-01-14 | End: 2024-01-14

## 2024-01-14 RX ORDER — IPRATROPIUM/ALBUTEROL SULFATE 18-103MCG
3 AEROSOL WITH ADAPTER (GRAM) INHALATION EVERY 6 HOURS
Refills: 0 | Status: DISCONTINUED | OUTPATIENT
Start: 2024-01-14 | End: 2024-01-20

## 2024-01-14 RX ORDER — ALPRAZOLAM 0.25 MG
0.25 TABLET ORAL ONCE
Refills: 0 | Status: DISCONTINUED | OUTPATIENT
Start: 2024-01-14 | End: 2024-01-14

## 2024-01-14 RX ADMIN — BUDESONIDE AND FORMOTEROL FUMARATE DIHYDRATE 2 PUFF(S): 160; 4.5 AEROSOL RESPIRATORY (INHALATION) at 21:05

## 2024-01-14 RX ADMIN — TIOTROPIUM BROMIDE 2 PUFF(S): 18 CAPSULE ORAL; RESPIRATORY (INHALATION) at 12:30

## 2024-01-14 RX ADMIN — Medication 3 MILLILITER(S): at 14:27

## 2024-01-14 RX ADMIN — APIXABAN 5 MILLIGRAM(S): 2.5 TABLET, FILM COATED ORAL at 17:54

## 2024-01-14 RX ADMIN — AMLODIPINE BESYLATE 5 MILLIGRAM(S): 2.5 TABLET ORAL at 06:46

## 2024-01-14 RX ADMIN — Medication 5 MILLIGRAM(S): at 21:04

## 2024-01-14 RX ADMIN — BUDESONIDE AND FORMOTEROL FUMARATE DIHYDRATE 2 PUFF(S): 160; 4.5 AEROSOL RESPIRATORY (INHALATION) at 12:29

## 2024-01-14 RX ADMIN — Medication 50 MILLIGRAM(S): at 06:47

## 2024-01-14 RX ADMIN — PANTOPRAZOLE SODIUM 40 MILLIGRAM(S): 20 TABLET, DELAYED RELEASE ORAL at 06:47

## 2024-01-14 RX ADMIN — MONTELUKAST 10 MILLIGRAM(S): 4 TABLET, CHEWABLE ORAL at 21:04

## 2024-01-14 RX ADMIN — ALBUTEROL 2 PUFF(S): 90 AEROSOL, METERED ORAL at 18:30

## 2024-01-14 RX ADMIN — GABAPENTIN 300 MILLIGRAM(S): 400 CAPSULE ORAL at 21:04

## 2024-01-14 RX ADMIN — Medication 0.25 MILLIGRAM(S): at 21:04

## 2024-01-14 RX ADMIN — Medication 81 MILLIGRAM(S): at 12:29

## 2024-01-14 RX ADMIN — BUMETANIDE 1 MILLIGRAM(S): 0.25 INJECTION INTRAMUSCULAR; INTRAVENOUS at 06:47

## 2024-01-14 RX ADMIN — ATORVASTATIN CALCIUM 40 MILLIGRAM(S): 80 TABLET, FILM COATED ORAL at 21:04

## 2024-01-14 RX ADMIN — Medication 3 MILLILITER(S): at 20:25

## 2024-01-14 RX ADMIN — Medication 50 MILLIGRAM(S): at 21:04

## 2024-01-14 RX ADMIN — Medication 50 MILLIGRAM(S): at 14:52

## 2024-01-14 RX ADMIN — APIXABAN 5 MILLIGRAM(S): 2.5 TABLET, FILM COATED ORAL at 06:46

## 2024-01-14 RX ADMIN — Medication 3 MILLILITER(S): at 11:15

## 2024-01-14 RX ADMIN — LOSARTAN POTASSIUM 100 MILLIGRAM(S): 100 TABLET, FILM COATED ORAL at 06:46

## 2024-01-14 NOTE — PROGRESS NOTE ADULT - ASSESSMENT
_________________________________________________________________________________________  ========>>  M E D I C A L   A T T E N D I N G    F O L L O W  U P  N O T E  <<=========  -----------------------------------------------------------------------------------------------------    - Patient seen and examined by me earlier today.    - Patient since yesterday with worsening respiratory status per report.. with some wheezing and need for O2 support... + SOB and GUILLEN ..      patient today same with some increased work of rebating !!              patient already placed on Bipap by RT for incased work of breathing !!              ABG and RVP ordered, will follow     ==================>> REVIEW OF SYSTEM <<=================    GEN: no fever, no chills, no pain  RESP: + SOB At the rest, no cough , + wheezing per patient and wife at bedside   CVS: no chest pain, no palpitations  GI: no abdominal pain, no nausea, no constipation, no diarrhea  : no dysuria, no frequency  Neuro: no headache, no dizziness    ==================>> PHYSICAL EXAM <<=================    GEN: A&O X 3 , NAD , comfortable, pleasant, calm in bed on oxygen  HEENT: NCAT, PERRL, MMM, hearing intact  CVS: S1S2 , Irregular , No M/R/G appreciated, not tachy  PULM: + bilateral wheezing !! > awaiting nebs..   ABD.: soft. non tender, non distended,  bowel sounds present  Extrem: intact pulses , NO edema        ( Note written / Date of service 01-14-24 ( This is certified to be the same as "ENTERED" date above ( for billing purposes)))    ==================>> MEDICATIONS <<====================    albuterol/ipratropium for Nebulization 3 milliLiter(s) Nebulizer every 6 hours  amLODIPine   Tablet 5 milliGRAM(s) Oral daily  apixaban 5 milliGRAM(s) Oral two times a day  aspirin  chewable 81 milliGRAM(s) Oral daily  atorvastatin 40 milliGRAM(s) Oral at bedtime  budesonide 160 MICROgram(s)/formoterol 4.5 MICROgram(s) Inhaler 2 Puff(s) Inhalation two times a day  buMETAnide 1 milliGRAM(s) Oral daily  gabapentin 300 milliGRAM(s) Oral at bedtime  insulin lispro (ADMELOG) corrective regimen sliding scale   SubCutaneous Before meals and at bedtime  losartan 100 milliGRAM(s) Oral daily  melatonin 5 milliGRAM(s) Oral at bedtime  metoprolol tartrate 50 milliGRAM(s) Oral every 8 hours  montelukast 10 milliGRAM(s) Oral at bedtime  pantoprazole    Tablet 40 milliGRAM(s) Oral before breakfast  polyethylene glycol 3350 17 Gram(s) Oral daily  senna 2 Tablet(s) Oral at bedtime  tiotropium 2.5 MICROgram(s) Inhaler 2 Puff(s) Inhalation daily    MEDICATIONS  (PRN):  albuterol    90 MICROgram(s) HFA Inhaler 2 Puff(s) Inhalation every 4 hours PRN Shortness of Breath and/or Wheezing    ___________  Active diet:  Diet, Regular:   DASH/TLC Sodium & Cholesterol Restricted  1200mL Fluid Restriction ([id number])  [place]  ___________________    ==================>> VITAL SIGNS <<==================    Vital Signs Last 24 HrsT(C): 36.6 (01-14-24 @ 08:03)  T(F): 97.9 (01-14-24 @ 08:03), Max: 98.6 (01-14-24 @ 04:44)  HR: 75 (01-14-24 @ 08:03) (75 - 124)  BP: 117/91 (01-14-24 @ 08:03)  RR: 20 (01-14-24 @ 08:03) (18 - 20)  SpO2: 96% (01-14-24 @ 08:03) (91% - 100%)      CAPILLARY BLOOD GLUCOSE  POCT Blood Glucose.: 83 mg/dL (14 Jan 2024 07:43)  POCT Blood Glucose.: 196 mg/dL (13 Jan 2024 21:31)  POCT Blood Glucose.: 114 mg/dL (13 Jan 2024 16:58)     ==================>> LAB AND IMAGING <<==================                        9.4    8.67  )-----------( 143      ( 14 Jan 2024 09:48 )             31.2        01-14    138  |  106  |  24<H>  ----------------------------<  160<H>  4.4   |  26  |  1.61<H>    Ca    8.8      14 Jan 2024 09:48    TPro  8.0  /  Alb  3.6  /  TBili  0.5  /  DBili  x   /  AST  16  /  ALT  15  /  AlkPhos  76  01-12    WBC count:   8.67 <<== ,  9.28 <<== ,  7.79 <<== ,  9.57 <<==   Hemoglobin:   9.4 <<==,  10.6 <<==,  11.0 <<==,  10.6 <<==  platelets:  143 <==, 108 <==, 151 <==, 168 <==, 150 <==    Creatinine:  1.61  <<==, 1.31  <<==, 1.49  <<==, QNS  <<==, 1.36  <<==, 1.36  <<==  Sodium:   138  <==, 140  <==, 143  <==, QNS  <==, 141  <==, 140  <==, 139  <==       AST:          16(01-12) <== , 16(01-10) <== , 17(01-09) <== , 26(01-08) <==      ALT:        15(01-12)  <== , 15(01-10)  <== , 13(01-09)  <== , 16(01-08)  <==      AP:        76(01-12)  <=, 59(01-10)  <=, 64(01-09)  <=, 99(01-08)  <=     Bili:        0.5(01-12)  <=, 0.5(01-10)  <=, 0.6(01-09)  <=, 0.6(01-08)  <=    ____________________________    M I C R O B I O L O G Y :    Culture - Blood (collected 08 Jan 2024 06:34)  Source: .Blood Blood  Final Report (13 Jan 2024 13:00):    No growth at 5 days    Culture - Blood (collected 08 Jan 2024 06:24)  Source: .Blood Blood  Final Report (13 Jan 2024 13:00):    No growth at 5 days    ___________________________________________________________________________________  ===============>>  A S S E S S M E N T   A N D   P L A N <<===============  ------------------------------------------------------------------------------------------    acute hypoxemic respiratory failure  acute exacerbation of chromic systolic CHF  Afib with RVR > controlled   COPD  >> now with newexacerbation      smoker ( long term, recently stopped !)   acute Pulmonary edema > resolved  suspect new viral URI  DM type 2  history of Hypertension     ====>>>>    - was off O2 >> now back on O2 support and increased work of breathing     suspect COPD exacerbation from likely new viral URI, rather than CHF exacerbation           follow ABG, RVP..            cardio / pulm follow up           nebs >> wean off Bipap as able   - Diuresis and aim for negative fluid balance  - Monitor urine output  - Cont. AC  / HR control   - cardiology Follow-up, management, optimization appreciated       see discussion bellow   - Hemodynamic and cardiac monitoring  - Continue Current medications otherwise and monitor.  - nutrition, OOB as able, incentive spirometer   - supportive care   -GI/DVT Prophylaxis per protocol.    {    it appears patient has had CHF for a while , with reduced EF and several hospitalization and no follow up with cardio as outpatient !!      patient lives with wife, has 2 children in [place], one in [place] and one in [facility] !!  unclear if good social support .. and patient not seen to be very reliable to care fo himself...         unclear / not appears to be patient had ischemic workup and evaluation for ICD...   per discussed with cardio patient also may not have been on optimal medical management for 3 months fully to evaluation for improved EF....       >> will need close follow up as outpatient with cardio         ? social work evaluation may be helpful for safe DC planing ... :  follow up   }  --------------------------------------------  Case discussed with patient , wife, Nurse Practitioner , RN..   Education given on findings and plan of care  ___________________________  H. RONI Rivera.  Pager: [phone]   _________________________________________________________________________________________  ========>>  M E D I C A L   A T T E N D I N G    F O L L O W  U P  N O T E  <<=========  -----------------------------------------------------------------------------------------------------    - Patient seen and examined by me earlier today.    - Patient since yesterday with worsening respiratory status per report.. with some wheezing and need for O2 support... + SOB and GUILLEN ..      patient today same with some increased work of rebating !!              patient already placed on Bipap by RT for incased work of breathing !!              ABG and RVP ordered, will follow     ==================>> REVIEW OF SYSTEM <<=================    GEN: no fever, no chills, no pain  RESP: + SOB At the rest, no cough , + wheezing per patient and wife at bedside   CVS: no chest pain, no palpitations  GI: no abdominal pain, no nausea, no constipation, no diarrhea  : no dysuria, no frequency  Neuro: no headache, no dizziness    ==================>> PHYSICAL EXAM <<=================    GEN: A&O X 3 , NAD , comfortable, pleasant, calm in bed on oxygen  HEENT: NCAT, PERRL, MMM, hearing intact  CVS: S1S2 , Irregular , No M/R/G appreciated, not tachy  PULM: + bilateral wheezing !! > awaiting nebs..   ABD.: soft. non tender, non distended,  bowel sounds present  Extrem: intact pulses , NO edema        ( Note written / Date of service 01-14-24 ( This is certified to be the same as "ENTERED" date above ( for billing purposes)))    ==================>> MEDICATIONS <<====================    albuterol/ipratropium for Nebulization 3 milliLiter(s) Nebulizer every 6 hours  amLODIPine   Tablet 5 milliGRAM(s) Oral daily  apixaban 5 milliGRAM(s) Oral two times a day  aspirin  chewable 81 milliGRAM(s) Oral daily  atorvastatin 40 milliGRAM(s) Oral at bedtime  budesonide 160 MICROgram(s)/formoterol 4.5 MICROgram(s) Inhaler 2 Puff(s) Inhalation two times a day  buMETAnide 1 milliGRAM(s) Oral daily  gabapentin 300 milliGRAM(s) Oral at bedtime  insulin lispro (ADMELOG) corrective regimen sliding scale   SubCutaneous Before meals and at bedtime  losartan 100 milliGRAM(s) Oral daily  melatonin 5 milliGRAM(s) Oral at bedtime  metoprolol tartrate 50 milliGRAM(s) Oral every 8 hours  montelukast 10 milliGRAM(s) Oral at bedtime  pantoprazole    Tablet 40 milliGRAM(s) Oral before breakfast  polyethylene glycol 3350 17 Gram(s) Oral daily  senna 2 Tablet(s) Oral at bedtime  tiotropium 2.5 MICROgram(s) Inhaler 2 Puff(s) Inhalation daily    MEDICATIONS  (PRN):  albuterol    90 MICROgram(s) HFA Inhaler 2 Puff(s) Inhalation every 4 hours PRN Shortness of Breath and/or Wheezing    ___________  Active diet:  Diet, Regular:   DASH/TLC Sodium & Cholesterol Restricted  1200mL Fluid Restriction ([id number])  [place]  ___________________    ==================>> VITAL SIGNS <<==================    Vital Signs Last 24 HrsT(C): 36.6 (01-14-24 @ 08:03)  T(F): 97.9 (01-14-24 @ 08:03), Max: 98.6 (01-14-24 @ 04:44)  HR: 75 (01-14-24 @ 08:03) (75 - 124)  BP: 117/91 (01-14-24 @ 08:03)  RR: 20 (01-14-24 @ 08:03) (18 - 20)  SpO2: 96% (01-14-24 @ 08:03) (91% - 100%)      CAPILLARY BLOOD GLUCOSE  POCT Blood Glucose.: 83 mg/dL (14 Jan 2024 07:43)  POCT Blood Glucose.: 196 mg/dL (13 Jan 2024 21:31)  POCT Blood Glucose.: 114 mg/dL (13 Jan 2024 16:58)     ==================>> LAB AND IMAGING <<==================                        9.4    8.67  )-----------( 143      ( 14 Jan 2024 09:48 )             31.2        01-14    138  |  106  |  24<H>  ----------------------------<  160<H>  4.4   |  26  |  1.61<H>    Ca    8.8      14 Jan 2024 09:48    TPro  8.0  /  Alb  3.6  /  TBili  0.5  /  DBili  x   /  AST  16  /  ALT  15  /  AlkPhos  76  01-12    WBC count:   8.67 <<== ,  9.28 <<== ,  7.79 <<== ,  9.57 <<==   Hemoglobin:   9.4 <<==,  10.6 <<==,  11.0 <<==,  10.6 <<==  platelets:  143 <==, 108 <==, 151 <==, 168 <==, 150 <==    Creatinine:  1.61  <<==, 1.31  <<==, 1.49  <<==, QNS  <<==, 1.36  <<==, 1.36  <<==  Sodium:   138  <==, 140  <==, 143  <==, QNS  <==, 141  <==, 140  <==, 139  <==       AST:          16(01-12) <== , 16(01-10) <== , 17(01-09) <== , 26(01-08) <==      ALT:        15(01-12)  <== , 15(01-10)  <== , 13(01-09)  <== , 16(01-08)  <==      AP:        76(01-12)  <=, 59(01-10)  <=, 64(01-09)  <=, 99(01-08)  <=     Bili:        0.5(01-12)  <=, 0.5(01-10)  <=, 0.6(01-09)  <=, 0.6(01-08)  <=    ____________________________    M I C R O B I O L O G Y :    Culture - Blood (collected 08 Jan 2024 06:34)  Source: .Blood Blood  Final Report (13 Jan 2024 13:00):    No growth at 5 days    Culture - Blood (collected 08 Jan 2024 06:24)  Source: .Blood Blood  Final Report (13 Jan 2024 13:00):    No growth at 5 days    ___________________________________________________________________________________  ===============>>  A S S E S S M E N T   A N D   P L A N <<===============  ------------------------------------------------------------------------------------------    acute hypoxemic respiratory failure  acute exacerbation of chromic systolic CHF  Afib with RVR > controlled   COPD  >> now with newexacerbation      smoker ( long term, recently stopped !)   acute Pulmonary edema > resolved  suspect new viral URI  DM type 2  history of Hypertension     ====>>>>    - was off O2 >> now back on O2 support and increased work of breathing     suspect COPD exacerbation from likely new viral URI, rather than CHF exacerbation           follow ABG, RVP..            cardio / pulm follow up           nebs >> wean off Bipap as able   - Diuresis and aim for negative fluid balance  - Monitor urine output  - Cont. AC  / HR control   - cardiology Follow-up, management, optimization appreciated       see discussion bellow   - Hemodynamic and cardiac monitoring  - Continue Current medications otherwise and monitor.  - nutrition, OOB as able, incentive spirometer   - supportive care   -GI/DVT Prophylaxis per protocol.    {    it appears patient has had CHF for a while , with reduced EF and several hospitalization and no follow up with cardio as outpatient !!      patient lives with wife, has 2 children in [place], one in [place] and one in [facility] !!  unclear if good social support .. and patient not seen to be very reliable to care fo himself...         unclear / not appears to be patient had ischemic workup and evaluation for ICD...   per discussed with cardio patient also may not have been on optimal medical management for 3 months fully to evaluation for improved EF....       >> will need close follow up as outpatient with cardio         ? social work evaluation may be helpful for safe DC planing ... :  follow up   }  --------------------------------------------  Case discussed with patient , wife, Nurse Practitioner , RN..   Education given on findings and plan of care  ___________________________  H. RONI Rivera.  Pager: [phone]

## 2024-01-14 NOTE — PROGRESS NOTE ADULT - TIME BILLING
- Review of records, telemetry, vital signs and daily labs.   - General and cardiovascular physical examination.  - Generation of cardiovascular treatment plan.  - Coordination of care.      Patient was seen and examined by me on 1/14/24,interim events noted,labs and radiology studies reviewed.  Nate Alcazar MD,FACC.  [address] [place], [place] [id number].  [phone] - Review of records, telemetry, vital signs and daily labs.   - General and cardiovascular physical examination.  - Generation of cardiovascular treatment plan.  - Coordination of care.      Patient was seen and examined by me on 1/14/24,interim events noted,labs and radiology studies reviewed.  Nate Alcazar MD,FACC.  [address] [place], [place] [id number].  [phone]

## 2024-01-14 NOTE — PROGRESS NOTE ADULT - SUBJECTIVE AND OBJECTIVE BOX
Time of Visit:  Patient seen and examined.     MEDICATIONS  (STANDING):  albuterol/ipratropium for Nebulization 3 milliLiter(s) Nebulizer every 6 hours  amLODIPine   Tablet 5 milliGRAM(s) Oral daily  apixaban 5 milliGRAM(s) Oral two times a day  aspirin  chewable 81 milliGRAM(s) Oral daily  atorvastatin 40 milliGRAM(s) Oral at bedtime  budesonide 160 MICROgram(s)/formoterol 4.5 MICROgram(s) Inhaler 2 Puff(s) Inhalation two times a day  buMETAnide 1 milliGRAM(s) Oral daily  gabapentin 300 milliGRAM(s) Oral at bedtime  insulin lispro (ADMELOG) corrective regimen sliding scale   SubCutaneous Before meals and at bedtime  losartan 100 milliGRAM(s) Oral daily  melatonin 5 milliGRAM(s) Oral at bedtime  metoprolol tartrate 50 milliGRAM(s) Oral every 8 hours  montelukast 10 milliGRAM(s) Oral at bedtime  pantoprazole    Tablet 40 milliGRAM(s) Oral before breakfast  polyethylene glycol 3350 17 Gram(s) Oral daily  senna 2 Tablet(s) Oral at bedtime  tiotropium 2.5 MICROgram(s) Inhaler 2 Puff(s) Inhalation daily      MEDICATIONS  (PRN):  albuterol    90 MICROgram(s) HFA Inhaler 2 Puff(s) Inhalation every 4 hours PRN Shortness of Breath and/or Wheezing       Medications up to date at time of exam.      PHYSICAL EXAMINATION:  Patient has no new complaints.  GENERAL: The patient is a well-developed, well-nourished, in no apparent distress.     Vital Signs Last 24 Hrs  T(C): 37 (14 Jan 2024 16:02), Max: 37 (14 Jan 2024 04:44)  T(F): 98.6 (14 Jan 2024 16:02), Max: 98.6 (14 Jan 2024 04:44)  HR: 104 (14 Jan 2024 16:02) (75 - 124)  BP: 111/62 (14 Jan 2024 16:02) (98/60 - 129/76)  BP(mean): --  RR: 20 (14 Jan 2024 16:02) (18 - 22)  SpO2: 95% (14 Jan 2024 16:02) (93% - 100%)    Parameters below as of 14 Jan 2024 16:02  Patient On (Oxygen Delivery Method): room air       (if applicable)    Chest Tube (if applicable)    HEENT: Head is normocephalic and atraumatic. Extraocular muscles are intact. Mucous membranes are moist.     NECK: Supple, no palpable adenopathy.    LUNGS: Clear to auscultation, no wheezing, rales, or rhonchi.    HEART: Regular rate and rhythm without murmur.    ABDOMEN: Soft, nontender, and nondistended.  No hepatosplenomegaly is noted.    : No painful voiding, no pelvic pain    EXTREMITIES: Without any cyanosis, clubbing, rash, lesions or edema.    NEUROLOGIC: Awake, alert, oriented, grossly intact    SKIN: Warm, dry, good turgor.      LABS:                        9.4    8.67  )-----------( 143      ( 14 Jan 2024 09:48 )             31.2     01-14    138  |  106  |  24<H>  ----------------------------<  160<H>  4.4   |  26  |  1.61<H>    Ca    8.8      14 Jan 2024 09:48        Urinalysis Basic - ( 14 Jan 2024 09:48 )    Color: x / Appearance: x / SG: x / pH: x  Gluc: 160 mg/dL / Ketone: x  / Bili: x / Urobili: x   Blood: x / Protein: x / Nitrite: x   Leuk Esterase: x / RBC: x / WBC x   Sq Epi: x / Non Sq Epi: x / Bacteria: x      ABG - ( 14 Jan 2024 11:35 )  pH, Arterial: 7.46  pH, Blood: x     /  pCO2: 40    /  pO2: 99    / HCO3: 28    / Base Excess: 4.2   /  SaO2: 100                             MICROBIOLOGY: (if applicable)    RADIOLOGY & ADDITIONAL STUDIES:  EKG:   CXR:  ECHO:    IMPRESSION: 68y Male PAST MEDICAL & SURGICAL HISTORY:  HTN (hypertension)      Varicose veins      HLD (hyperlipidemia)      Liver cirrhosis      Portal hypertension with esophageal varices      COPD (chronic obstructive pulmonary disease)      Syncope      Alcohol dependence, daily use      Smokes tobacco daily      DM (diabetes mellitus)      No significant past surgical history       p/w           RECOMMENDATIONS:   Time of Visit:  Patient seen and examined. O2     MEDICATIONS  (STANDING):  albuterol/ipratropium for Nebulization 3 milliLiter(s) Nebulizer every 6 hours  amLODIPine   Tablet 5 milliGRAM(s) Oral daily  apixaban 5 milliGRAM(s) Oral two times a day  aspirin  chewable 81 milliGRAM(s) Oral daily  atorvastatin 40 milliGRAM(s) Oral at bedtime  budesonide 160 MICROgram(s)/formoterol 4.5 MICROgram(s) Inhaler 2 Puff(s) Inhalation two times a day  buMETAnide 1 milliGRAM(s) Oral daily  gabapentin 300 milliGRAM(s) Oral at bedtime  insulin lispro (ADMELOG) corrective regimen sliding scale   SubCutaneous Before meals and at bedtime  losartan 100 milliGRAM(s) Oral daily  melatonin 5 milliGRAM(s) Oral at bedtime  metoprolol tartrate 50 milliGRAM(s) Oral every 8 hours  montelukast 10 milliGRAM(s) Oral at bedtime  pantoprazole    Tablet 40 milliGRAM(s) Oral before breakfast  polyethylene glycol 3350 17 Gram(s) Oral daily  senna 2 Tablet(s) Oral at bedtime  tiotropium 2.5 MICROgram(s) Inhaler 2 Puff(s) Inhalation daily      MEDICATIONS  (PRN):  albuterol    90 MICROgram(s) HFA Inhaler 2 Puff(s) Inhalation every 4 hours PRN Shortness of Breath and/or Wheezing       Medications up to date at time of exam.      PHYSICAL EXAMINATION:  Patient has no new complaints.  GENERAL: The patient is a well-developed, well-nourished, in no apparent distress.     Vital Signs Last 24 Hrs  T(C): 37 (14 Jan 2024 16:02), Max: 37 (14 Jan 2024 04:44)  T(F): 98.6 (14 Jan 2024 16:02), Max: 98.6 (14 Jan 2024 04:44)  HR: 104 (14 Jan 2024 16:02) (75 - 124)  BP: 111/62 (14 Jan 2024 16:02) (98/60 - 129/76)  BP(mean): --  RR: 20 (14 Jan 2024 16:02) (18 - 22)  SpO2: 95% (14 Jan 2024 16:02) (93% - 100%)    Parameters below as of 14 Jan 2024 16:02  Patient On (Oxygen Delivery Method): room air       (if applicable)    Chest Tube (if applicable)    HEENT: Head is normocephalic and atraumatic. Extraocular muscles are intact. Mucous membranes are moist.     NECK: Supple, no palpable adenopathy.    LUNGS: Clear to auscultation, no wheezing, rales, or rhonchi.    HEART: Regular rate and rhythm without murmur.    ABDOMEN: Soft, nontender, and nondistended.  No hepatosplenomegaly is noted.    : No painful voiding, no pelvic pain    EXTREMITIES: Without any cyanosis, clubbing, rash, lesions or edema.    NEUROLOGIC: Awake, alert, oriented, grossly intact    SKIN: Warm, dry, good turgor.      LABS:                        9.4    8.67  )-----------( 143      ( 14 Jan 2024 09:48 )             31.2     01-14    138  |  106  |  24<H>  ----------------------------<  160<H>  4.4   |  26  |  1.61<H>    Ca    8.8      14 Jan 2024 09:48        Urinalysis Basic - ( 14 Jan 2024 09:48 )    Color: x / Appearance: x / SG: x / pH: x  Gluc: 160 mg/dL / Ketone: x  / Bili: x / Urobili: x   Blood: x / Protein: x / Nitrite: x   Leuk Esterase: x / RBC: x / WBC x   Sq Epi: x / Non Sq Epi: x / Bacteria: x      ABG - ( 14 Jan 2024 11:35 )  pH, Arterial: 7.46  pH, Blood: x     /  pCO2: 40    /  pO2: 99    / HCO3: 28    / Base Excess: 4.2   /  SaO2: 100         MICROBIOLOGY: (if applicable)    RADIOLOGY & ADDITIONAL STUDIES:  EKG:   CXR:  ECHO:    IMPRESSION: 68y Male PAST MEDICAL & SURGICAL HISTORY:  HTN (hypertension)      Varicose veins      HLD (hyperlipidemia)      Liver cirrhosis      Portal hypertension with esophageal varices      COPD (chronic obstructive pulmonary disease)      Syncope      Alcohol dependence, daily use      Smokes tobacco daily      DM (diabetes mellitus)      No significant past surgical history       p/w         Impression: This is a 69 Y/O Male former smoker. hx of chronic ETOH dependence with Cirrhosis . Presented to ED with worsening SOB. In the ED patient placed on Bipap. For pulmonary follow up of Acute hypoxic respiratory failure due to COPD , pulmonary edema / HFrEF , Rapid A Fib. Today  Bipap and on Oxygen supplementation .       Suggestion:  - Contnue O2 supp and BiPaP as needed  - CXR   - LAsix   - On Apixaban 5 mg Twice daily.   - Monitor O2 saturation trend.   - Diuresis and aim for negative fluid balance.  - Monitor urine output.  - Beta blockers for rate control  - Hemodynamic and cardiac monitoring   Time of Visit:  Patient seen and examined. O2     MEDICATIONS  (STANDING):  albuterol/ipratropium for Nebulization 3 milliLiter(s) Nebulizer every 6 hours  amLODIPine   Tablet 5 milliGRAM(s) Oral daily  apixaban 5 milliGRAM(s) Oral two times a day  aspirin  chewable 81 milliGRAM(s) Oral daily  atorvastatin 40 milliGRAM(s) Oral at bedtime  budesonide 160 MICROgram(s)/formoterol 4.5 MICROgram(s) Inhaler 2 Puff(s) Inhalation two times a day  buMETAnide 1 milliGRAM(s) Oral daily  gabapentin 300 milliGRAM(s) Oral at bedtime  insulin lispro (ADMELOG) corrective regimen sliding scale   SubCutaneous Before meals and at bedtime  losartan 100 milliGRAM(s) Oral daily  melatonin 5 milliGRAM(s) Oral at bedtime  metoprolol tartrate 50 milliGRAM(s) Oral every 8 hours  montelukast 10 milliGRAM(s) Oral at bedtime  pantoprazole    Tablet 40 milliGRAM(s) Oral before breakfast  polyethylene glycol 3350 17 Gram(s) Oral daily  senna 2 Tablet(s) Oral at bedtime  tiotropium 2.5 MICROgram(s) Inhaler 2 Puff(s) Inhalation daily      MEDICATIONS  (PRN):  albuterol    90 MICROgram(s) HFA Inhaler 2 Puff(s) Inhalation every 4 hours PRN Shortness of Breath and/or Wheezing       Medications up to date at time of exam.      PHYSICAL EXAMINATION:  Patient has no new complaints.  GENERAL: The patient is a well-developed, well-nourished, in no apparent distress.     Vital Signs Last 24 Hrs  T(C): 37 (14 Jan 2024 16:02), Max: 37 (14 Jan 2024 04:44)  T(F): 98.6 (14 Jan 2024 16:02), Max: 98.6 (14 Jan 2024 04:44)  HR: 104 (14 Jan 2024 16:02) (75 - 124)  BP: 111/62 (14 Jan 2024 16:02) (98/60 - 129/76)  BP(mean): --  RR: 20 (14 Jan 2024 16:02) (18 - 22)  SpO2: 95% (14 Jan 2024 16:02) (93% - 100%)    Parameters below as of 14 Jan 2024 16:02  Patient On (Oxygen Delivery Method): room air       (if applicable)    Chest Tube (if applicable)    HEENT: Head is normocephalic and atraumatic. Extraocular muscles are intact. Mucous membranes are moist.     NECK: Supple, no palpable adenopathy.    LUNGS: Clear to auscultation, no wheezing, rales, or rhonchi.    HEART: Regular rate and rhythm without murmur.    ABDOMEN: Soft, nontender, and nondistended.  No hepatosplenomegaly is noted.    : No painful voiding, no pelvic pain    EXTREMITIES: Without any cyanosis, clubbing, rash, lesions or edema.    NEUROLOGIC: Awake, alert, oriented, grossly intact    SKIN: Warm, dry, good turgor.      LABS:                        9.4    8.67  )-----------( 143      ( 14 Jan 2024 09:48 )             31.2     01-14    138  |  106  |  24<H>  ----------------------------<  160<H>  4.4   |  26  |  1.61<H>    Ca    8.8      14 Jan 2024 09:48        Urinalysis Basic - ( 14 Jan 2024 09:48 )    Color: x / Appearance: x / SG: x / pH: x  Gluc: 160 mg/dL / Ketone: x  / Bili: x / Urobili: x   Blood: x / Protein: x / Nitrite: x   Leuk Esterase: x / RBC: x / WBC x   Sq Epi: x / Non Sq Epi: x / Bacteria: x      ABG - ( 14 Jan 2024 11:35 )  pH, Arterial: 7.46  pH, Blood: x     /  pCO2: 40    /  pO2: 99    / HCO3: 28    / Base Excess: 4.2   /  SaO2: 100         MICROBIOLOGY: (if applicable)    RADIOLOGY & ADDITIONAL STUDIES:  EKG:   CXR:  ECHO:    IMPRESSION: 68y Male PAST MEDICAL & SURGICAL HISTORY:  HTN (hypertension)      Varicose veins      HLD (hyperlipidemia)      Liver cirrhosis      Portal hypertension with esophageal varices      COPD (chronic obstructive pulmonary disease)      Syncope      Alcohol dependence, daily use      Smokes tobacco daily      DM (diabetes mellitus)      No significant past surgical history       p/w         Impression: This is a 67 Y/O Male former smoker. hx of chronic ETOH dependence with Cirrhosis . Presented to ED with worsening SOB. In the ED patient placed on Bipap. For pulmonary follow up of Acute hypoxic respiratory failure due to COPD , pulmonary edema / HFrEF , Rapid A Fib. Today  Bipap and on Oxygen supplementation .       Suggestion:  - Contnue O2 supp and BiPaP as needed  - CXR   - LAsix   - On Apixaban 5 mg Twice daily.   - Monitor O2 saturation trend.   - Diuresis and aim for negative fluid balance.  - Monitor urine output.  - Beta blockers for rate control  - Hemodynamic and cardiac monitoring

## 2024-01-14 NOTE — CHART NOTE - NSCHARTNOTEFT_GEN_A_CORE
68M from home, ambulates independently, PMHx COPD (not on O2), former smoker quit 2-months, HLD, DM, HTN, chronic ETOH dependence c/b cirrhosis, p/w worsening SOB. Admitted to the ICU for AHRF 2/2 suspected CHF exacerbation requiring continuous BIPAP. 68M from home, ambulates independently, PMHx COPD (not on O2), former smoker quit 2-months, HLD, DM, HTN, chronic ETOH dependence c/b cirrhosis, p/w worsening SOB. Admitted to the ICU for AHRF 2/2 suspected CHF exacerbation requiring continuous BIPAP.    Called by the nurse for respiratory distress. Pt on nasal cannula and desaturating to 89%. Of note, pt had respiratory distress event yesterday as well.    #AHRF  - wheeze B/L heard  - pt refused BiPAP  - start duonebs, stat dose now  - continue albuterol PRN and symbicort  - continue supplemental O2, wean off as tolerated to keep SPO2 >90%  - CXR pending from yesterday  - F/U RVP panel    Addendum: pt seen after nebulizer treatment, sitting up right, eating well, not in respiratory distress although still has wheezing bilaterally. Oxygen saturation 95% on room air.

## 2024-01-14 NOTE — CHART NOTE - NSCHARTNOTEFT_GEN_A_CORE
Assessment:     Factors impacting intake: [ ] none [ ] nausea  [ ] vomiting [ ] diarrhea [ ] constipation  [ ]chewing problems [ ] swallowing issues  [ ] other:     Diet Presciption: Diet, Regular:   DASH/TLC {Sodium & Cholesterol Restricted}  1200mL Fluid Restriction ([id number])  Kosher (24 @ 13:11)    Intake:     Daily Weight in k.4 (2024 04:44)  Weight in k.8 (2024 06:00)  Weight in k.4 (2024 04:22)    % Weight Change    Pertinent Medications: MEDICATIONS  (STANDING):  amLODIPine   Tablet 5 milliGRAM(s) Oral daily  apixaban 5 milliGRAM(s) Oral two times a day  aspirin  chewable 81 milliGRAM(s) Oral daily  atorvastatin 40 milliGRAM(s) Oral at bedtime  budesonide 160 MICROgram(s)/formoterol 4.5 MICROgram(s) Inhaler 2 Puff(s) Inhalation two times a day  buMETAnide 1 milliGRAM(s) Oral daily  gabapentin 300 milliGRAM(s) Oral at bedtime  insulin lispro (ADMELOG) corrective regimen sliding scale   SubCutaneous Before meals and at bedtime  losartan 100 milliGRAM(s) Oral daily  melatonin 5 milliGRAM(s) Oral at bedtime  metoprolol tartrate 50 milliGRAM(s) Oral every 8 hours  montelukast 10 milliGRAM(s) Oral at bedtime  pantoprazole    Tablet 40 milliGRAM(s) Oral before breakfast  polyethylene glycol 3350 17 Gram(s) Oral daily  senna 2 Tablet(s) Oral at bedtime  tiotropium 2.5 MICROgram(s) Inhaler 2 Puff(s) Inhalation daily    MEDICATIONS  (PRN):  albuterol    90 MICROgram(s) HFA Inhaler 2 Puff(s) Inhalation every 4 hours PRN Shortness of Breath and/or Wheezing    Pertinent Labs:  Na138 mmol/L Glu 160 mg/dL<H> K+ 4.4 mmol/L Cr  1.61 mg/dL<H> BUN 24 mg/dL<H> 01-10 Phos 4.7 mg/dL<H>  Alb 3.6 g/dL  Chol 145 mg/dL LDL --    HDL 34 mg/dL<L> Trig 107 mg/dL     CAPILLARY BLOOD GLUCOSE      POCT Blood Glucose.: 83 mg/dL (2024 07:43)  POCT Blood Glucose.: 196 mg/dL (2024 21:31)  POCT Blood Glucose.: 114 mg/dL (2024 16:58)  POCT Blood Glucose.: 114 mg/dL (2024 11:27)      Skin:     Estimated Needs:   [ ] no change since previous assessment  [ ] recalculated:     Previous Nutrition Diagnosis:   [ ] Inadequate Energy Intake [ ]Inadequate Oral Intake [ ] Excessive Energy Intake   [ ] Underweight [ ] Increased Nutrient Needs [ ] Overweight/Obesity  [ ] Swallowing Difficult   [ ] Altered GI Function [ ] Unintended Weight Loss [ ] Food & Nutrition Related Knowledge Deficit [ ] Malnutrition   [ ] Not Ready for Diet/Life Style Changes     Nutrition Diagnosis is [ ] ongoing  [ ] Improving   [ ] resolved [ ] not applicable     New Nutrition Diagnosis: [ ] not applicable       Interventions:   Recommend  [ ] Change Diet To:  [ ] Nutrition Supplement  [ ] Nutrition Support  [ ] Other:     Monitoring and Evaluation:   [ ] PO intake [ x ] Tolerance to diet prescription [ x ] weights [ x ] labs[ x ] follow up per protocol  [ ] other: Assessment:     Factors impacting intake: [ ] none [ ] nausea  [ ] vomiting [ ] diarrhea [ ] constipation  [ ]chewing problems [ ] swallowing issues  [ ] other:     Diet Presciption: Diet, Regular:   DASH/TLC {Sodium & Cholesterol Restricted}  1200mL Fluid Restriction ([id number])  Kosher (24 @ 13:11)    Intake:     Daily Weight in k.4 (2024 04:44)  Weight in k.8 (2024 06:00)  Weight in k.4 (2024 04:22)    % Weight Change    Pertinent Medications: MEDICATIONS  (STANDING):  amLODIPine   Tablet 5 milliGRAM(s) Oral daily  apixaban 5 milliGRAM(s) Oral two times a day  aspirin  chewable 81 milliGRAM(s) Oral daily  atorvastatin 40 milliGRAM(s) Oral at bedtime  budesonide 160 MICROgram(s)/formoterol 4.5 MICROgram(s) Inhaler 2 Puff(s) Inhalation two times a day  buMETAnide 1 milliGRAM(s) Oral daily  gabapentin 300 milliGRAM(s) Oral at bedtime  insulin lispro (ADMELOG) corrective regimen sliding scale   SubCutaneous Before meals and at bedtime  losartan 100 milliGRAM(s) Oral daily  melatonin 5 milliGRAM(s) Oral at bedtime  metoprolol tartrate 50 milliGRAM(s) Oral every 8 hours  montelukast 10 milliGRAM(s) Oral at bedtime  pantoprazole    Tablet 40 milliGRAM(s) Oral before breakfast  polyethylene glycol 3350 17 Gram(s) Oral daily  senna 2 Tablet(s) Oral at bedtime  tiotropium 2.5 MICROgram(s) Inhaler 2 Puff(s) Inhalation daily    MEDICATIONS  (PRN):  albuterol    90 MICROgram(s) HFA Inhaler 2 Puff(s) Inhalation every 4 hours PRN Shortness of Breath and/or Wheezing    Pertinent Labs:  Na138 mmol/L Glu 160 mg/dL<H> K+ 4.4 mmol/L Cr  1.61 mg/dL<H> BUN 24 mg/dL<H> 01-10 Phos 4.7 mg/dL<H>  Alb 3.6 g/dL  Chol 145 mg/dL LDL --    HDL 34 mg/dL<L> Trig 107 mg/dL     CAPILLARY BLOOD GLUCOSE      POCT Blood Glucose.: 83 mg/dL (2024 07:43)  POCT Blood Glucose.: 196 mg/dL (2024 21:31)  POCT Blood Glucose.: 114 mg/dL (2024 16:58)  POCT Blood Glucose.: 114 mg/dL (2024 11:27)      Skin:     Estimated Needs:   [ ] no change since previous assessment  [ ] recalculated:     Previous Nutrition Diagnosis:   [ ] Inadequate Energy Intake [ ]Inadequate Oral Intake [ ] Excessive Energy Intake   [ ] Underweight [ ] Increased Nutrient Needs [ ] Overweight/Obesity  [ ] Swallowing Difficult   [ ] Altered GI Function [ ] Unintended Weight Loss [ ] Food & Nutrition Related Knowledge Deficit [ ] Malnutrition   [ ] Not Ready for Diet/Life Style Changes     Nutrition Diagnosis is [ ] ongoing  [ ] Improving   [ ] resolved [ ] not applicable     New Nutrition Diagnosis: [ ] not applicable       Interventions:   Recommend  [ ] Change Diet To:  [ ] Nutrition Supplement  [ ] Nutrition Support  [ ] Other:     Monitoring and Evaluation:   [ ] PO intake [ x ] Tolerance to diet prescription [ x ] weights [ x ] labs[ x ] follow up per protocol  [ ] other: Assessment:       Nutrition consult verbally requested by RN for education. Chart reviewed, pt visited with wife at bedside, contacted via [employer] [place]  ID # [id number]; Reported still not eating well, afraid to eat due to breathing issue, no specific food choices, willing to try oral nutritional supplement; Nutrition information on Heart Healthy, Fluid Restriction discussed, written copy given, questions reviewed. Discussed with NP/RN    Factors impacting intake: [ x ] other: Taoist/ethnic/cultural/personal food preferences; acute on chronic comorbidities    Diet Prescription:   Diet, Regular:   DASH/TLC {Sodium & Cholesterol Restricted}  1200mL Fluid Restriction ([id number])  Kosher (24 @ 13:11)    Daily Weight in k.4 (2024 04:44)  Weight in k.8 (2024 06:00)  Weight in k.4 (2024 04:22)    % Weight Change    Pertinent Medications: MEDICATIONS  (STANDING):  amLODIPine   Tablet 5 milliGRAM(s) Oral daily  apixaban 5 milliGRAM(s) Oral two times a day  aspirin  chewable 81 milliGRAM(s) Oral daily  atorvastatin 40 milliGRAM(s) Oral at bedtime  budesonide 160 MICROgram(s)/formoterol 4.5 MICROgram(s) Inhaler 2 Puff(s) Inhalation two times a day  buMETAnide 1 milliGRAM(s) Oral daily  gabapentin 300 milliGRAM(s) Oral at bedtime  insulin lispro (ADMELOG) corrective regimen sliding scale   SubCutaneous Before meals and at bedtime  losartan 100 milliGRAM(s) Oral daily  melatonin 5 milliGRAM(s) Oral at bedtime  metoprolol tartrate 50 milliGRAM(s) Oral every 8 hours  montelukast 10 milliGRAM(s) Oral at bedtime  pantoprazole    Tablet 40 milliGRAM(s) Oral before breakfast  polyethylene glycol 3350 17 Gram(s) Oral daily  senna 2 Tablet(s) Oral at bedtime  tiotropium 2.5 MICROgram(s) Inhaler 2 Puff(s) Inhalation daily    MEDICATIONS  (PRN):  albuterol    90 MICROgram(s) HFA Inhaler 2 Puff(s) Inhalation every 4 hours PRN Shortness of Breath and/or Wheezing    Pertinent Labs:  Na138 mmol/L Glu 160 mg/dL<H> K+ 4.4 mmol/L Cr  1.61 mg/dL<H> BUN 24 mg/dL<H> 01-10 Phos 4.7 mg/dL<H> -12 Alb 3.6 g/dL  Chol 145 mg/dL LDL --    HDL 34 mg/dL<L> Trig 107 mg/dL     CAPILLARY BLOOD GLUCOSE    POCT Blood Glucose.: 83 mg/dL (2024 07:43)  POCT Blood Glucose.: 196 mg/dL (2024 21:31)  POCT Blood Glucose.: 114 mg/dL (2024 16:58)  POCT Blood Glucose.: 114 mg/dL (2024 11:27)    Skin: skin intact     Estimated Needs:   [ x ] no change since previous assessment  [ ] recalculated:     Previous Nutrition Diagnosis:   [ x ]Inadequate Oral Intake     Nutrition Diagnosis is [ x ] ongoing  [ ] Improving   [ ] resolved [ ] not applicable     New Nutrition Diagnosis: [ x ] not applicable     Interventions/Recommend  [ x ] Change Diet To: Ensure Compact 1 can ( 4 oz or 120 ml ) x bid daily ( 440 kcal, 18 g protein)   [ ] Nutrition Supplement  [ ] Nutrition Support  [ x ] Other: Discussed with NP/RN                   Provide food choices within diet Rx as available/updated                    Diet education given ( see above and Plan of Care)    Monitoring and Evaluation:   [ x ] PO intake [ x ] Tolerance to diet prescription [ x ] weights [ x ] labs[ x ] follow up per protocol  [ ] other: Assessment:       Nutrition consult verbally requested by RN for education. Chart reviewed, pt visited with wife at bedside, contacted via [employer] [place]  ID # [id number]; Reported still not eating well, afraid to eat due to breathing issue, no specific food choices, willing to try oral nutritional supplement; Nutrition information on Heart Healthy, Fluid Restriction discussed, written copy given, questions reviewed. Discussed with NP/RN    Factors impacting intake: [ x ] other: Orthodoxy/ethnic/cultural/personal food preferences; acute on chronic comorbidities    Diet Prescription:   Diet, Regular:   DASH/TLC {Sodium & Cholesterol Restricted}  1200mL Fluid Restriction ([id number])  Kos[place] (24 @ 13:11)    Daily Weight in k.4 (2024 04:44)  Weight in k.8 (2024 06:00)  Weight in k.4 (2024 04:22)    % Weight Change    Pertinent Medications: MEDICATIONS  (STANDING):  amLODIPine   Tablet 5 milliGRAM(s) Oral daily  apixaban 5 milliGRAM(s) Oral two times a day  aspirin  chewable 81 milliGRAM(s) Oral daily  atorvastatin 40 milliGRAM(s) Oral at bedtime  budesonide 160 MICROgram(s)/formoterol 4.5 MICROgram(s) Inhaler 2 Puff(s) Inhalation two times a day  buMETAnide 1 milliGRAM(s) Oral daily  gabapentin 300 milliGRAM(s) Oral at bedtime  insulin lispro (ADMELOG) corrective regimen sliding scale   SubCutaneous Before meals and at bedtime  losartan 100 milliGRAM(s) Oral daily  melatonin 5 milliGRAM(s) Oral at bedtime  metoprolol tartrate 50 milliGRAM(s) Oral every 8 hours  montelukast 10 milliGRAM(s) Oral at bedtime  pantoprazole    Tablet 40 milliGRAM(s) Oral before breakfast  polyethylene glycol 3350 17 Gram(s) Oral daily  senna 2 Tablet(s) Oral at bedtime  tiotropium 2.5 MICROgram(s) Inhaler 2 Puff(s) Inhalation daily    MEDICATIONS  (PRN):  albuterol    90 MICROgram(s) HFA Inhaler 2 Puff(s) Inhalation every 4 hours PRN Shortness of Breath and/or Wheezing    Pertinent Labs:  Na138 mmol/L Glu 160 mg/dL<H> K+ 4.4 mmol/L Cr  1.61 mg/dL<H> BUN 24 mg/dL<H> 01-10 Phos 4.7 mg/dL<H> -12 Alb 3.6 g/dL  Chol 145 mg/dL LDL --    HDL 34 mg/dL<L> Trig 107 mg/dL     CAPILLARY BLOOD GLUCOSE    POCT Blood Glucose.: 83 mg/dL (2024 07:43)  POCT Blood Glucose.: 196 mg/dL (2024 21:31)  POCT Blood Glucose.: 114 mg/dL (2024 16:58)  POCT Blood Glucose.: 114 mg/dL (2024 11:27)    Skin: skin intact     Estimated Needs:   [ x ] no change since previous assessment  [ ] recalculated:     Previous Nutrition Diagnosis:   [ x ]Inadequate Oral Intake     Nutrition Diagnosis is [ x ] ongoing  [ ] Improving   [ ] resolved [ ] not applicable     New Nutrition Diagnosis: [ x ] not applicable     Interventions/Recommend  [ x ] Change Diet To: Ensure Compact 1 can ( 4 oz or 120 ml ) x bid daily ( 440 kcal, 18 g protein)   [ ] Nutrition Supplement  [ ] Nutrition Support  [ x ] Other: Discussed with NP/RN                   Provide food choices within diet Rx as available/updated                    Diet education given ( see above and Plan of Care)    Monitoring and Evaluation:   [ x ] PO intake [ x ] Tolerance to diet prescription [ x ] weights [ x ] labs[ x ] follow up per protocol  [ ] other: Assessment:       Nutrition consult verbally requested by RN for education on Low Na, fluid restriction. Chart reviewed, pt visited with wife at bedside, contacted via [employer] [place]  ID # [id number], well-communicated; Reported still not eating well, afraid to eat due to breathing issue, no specific food choices, willing to try oral nutritional supplement; Nutrition information on Heart Healthy, Fluid Restriction discussed, written copy in [place] given, questions reviewed, very receptive. Discussed with NP/RN. ( Noted h/o DM, YxaG9C=5.9, not on DM modified diet per MD at present )    Factors impacting intake: [ x ] other: Judaism/ethnic/cultural/personal food preferences; acute on chronic comorbidities    Diet Prescription:   Diet, Regular:   DASH/TLC {Sodium & Cholesterol Restricted}  1200mL Fluid Restriction ([id number])  Kosher (24 @ 13:11)    Daily Weight in k.4 (2024 04:44)  Weight in k.8 (2024 06:00)  Weight in k.4 (2024 04:22)    % Weight Change    Pertinent Medications: MEDICATIONS  (STANDING):  amLODIPine   Tablet 5 milliGRAM(s) Oral daily  apixaban 5 milliGRAM(s) Oral two times a day  aspirin  chewable 81 milliGRAM(s) Oral daily  atorvastatin 40 milliGRAM(s) Oral at bedtime  budesonide 160 MICROgram(s)/formoterol 4.5 MICROgram(s) Inhaler 2 Puff(s) Inhalation two times a day  buMETAnide 1 milliGRAM(s) Oral daily  gabapentin 300 milliGRAM(s) Oral at bedtime  insulin lispro (ADMELOG) corrective regimen sliding scale   SubCutaneous Before meals and at bedtime  losartan 100 milliGRAM(s) Oral daily  melatonin 5 milliGRAM(s) Oral at bedtime  metoprolol tartrate 50 milliGRAM(s) Oral every 8 hours  montelukast 10 milliGRAM(s) Oral at bedtime  pantoprazole    Tablet 40 milliGRAM(s) Oral before breakfast  polyethylene glycol 3350 17 Gram(s) Oral daily  senna 2 Tablet(s) Oral at bedtime  tiotropium 2.5 MICROgram(s) Inhaler 2 Puff(s) Inhalation daily    MEDICATIONS  (PRN):  albuterol    90 MICROgram(s) HFA Inhaler 2 Puff(s) Inhalation every 4 hours PRN Shortness of Breath and/or Wheezing    Pertinent Labs:  Na138 mmol/L Glu 160 mg/dL<H> K+ 4.4 mmol/L Cr  1.61 mg/dL<H> BUN 24 mg/dL<H> 01-10 Phos 4.7 mg/dL<H> 12 Alb 3.6 g/dL  Chol 145 mg/dL LDL --    HDL 34 mg/dL<L> Trig 107 mg/dL     CAPILLARY BLOOD GLUCOSE    POCT Blood Glucose.: 83 mg/dL (2024 07:43)  POCT Blood Glucose.: 196 mg/dL (2024 21:31)  POCT Blood Glucose.: 114 mg/dL (2024 16:58)  POCT Blood Glucose.: 114 mg/dL (2024 11:27)    Skin: skin intact     Estimated Needs:   [ x ] no change since previous assessment  [ ] recalculated:     Previous Nutrition Diagnosis:   [ x ]Inadequate Oral Intake     Nutrition Diagnosis is [ x ] ongoing  [ ] Improving   [ ] resolved [ ] not applicable     New Nutrition Diagnosis: [ x ] not applicable     Interventions/Recommend  [ x ] Change Diet To: Ensure Compact 1 can ( 4 oz or 120 ml ) x bid daily ( 440 kcal, 18 g protein)   [ ] Nutrition Supplement  [ ] Nutrition Support  [ x ] Other: Discussed with NP/RN                   Provide food choices within diet Rx as available/updated                    Diet education given ( see above and Plan of Care)    Monitoring and Evaluation:   [ x ] PO intake [ x ] Tolerance to diet prescription [ x ] weights [ x ] labs[ x ] follow up per protocol  [ ] other: Assessment:       Nutrition consult verbally requested by RN for education on Low Na, fluid restriction. Chart reviewed, pt visited with wife at bedside, contacted via [employer] [place]  ID # [id number], well-communicated; Reported still not eating well, afraid to eat due to breathing issue, no specific food choices, willing to try oral nutritional supplement; Nutrition information on Heart Healthy, Fluid Restriction discussed, written copy in [place] given, questions reviewed, very receptive. Discussed with NP/RN. ( Noted h/o DM, WmfD5B=1.9, not on DM modified diet per MD at present )    Factors impacting intake: [ x ] other: Jainism/ethnic/cultural/personal food preferences; acute on chronic comorbidities    Diet Prescription:   Diet, Regular:   DASH/TLC {Sodium & Cholesterol Restricted}  1200mL Fluid Restriction ([id number])  Kosher (24 @ 13:11)    Daily Weight in k.4 (2024 04:44)  Weight in k.8 (2024 06:00)  Weight in k.4 (2024 04:22)    % Weight Change    Pertinent Medications: MEDICATIONS  (STANDING):  amLODIPine   Tablet 5 milliGRAM(s) Oral daily  apixaban 5 milliGRAM(s) Oral two times a day  aspirin  chewable 81 milliGRAM(s) Oral daily  atorvastatin 40 milliGRAM(s) Oral at bedtime  budesonide 160 MICROgram(s)/formoterol 4.5 MICROgram(s) Inhaler 2 Puff(s) Inhalation two times a day  buMETAnide 1 milliGRAM(s) Oral daily  gabapentin 300 milliGRAM(s) Oral at bedtime  insulin lispro (ADMELOG) corrective regimen sliding scale   SubCutaneous Before meals and at bedtime  losartan 100 milliGRAM(s) Oral daily  melatonin 5 milliGRAM(s) Oral at bedtime  metoprolol tartrate 50 milliGRAM(s) Oral every 8 hours  montelukast 10 milliGRAM(s) Oral at bedtime  pantoprazole    Tablet 40 milliGRAM(s) Oral before breakfast  polyethylene glycol 3350 17 Gram(s) Oral daily  senna 2 Tablet(s) Oral at bedtime  tiotropium 2.5 MICROgram(s) Inhaler 2 Puff(s) Inhalation daily    MEDICATIONS  (PRN):  albuterol    90 MICROgram(s) HFA Inhaler 2 Puff(s) Inhalation every 4 hours PRN Shortness of Breath and/or Wheezing    Pertinent Labs:  Na138 mmol/L Glu 160 mg/dL<H> K+ 4.4 mmol/L Cr  1.61 mg/dL<H> BUN 24 mg/dL<H> 01-10 Phos 4.7 mg/dL<H> 12 Alb 3.6 g/dL  Chol 145 mg/dL LDL --    HDL 34 mg/dL<L> Trig 107 mg/dL     CAPILLARY BLOOD GLUCOSE    POCT Blood Glucose.: 83 mg/dL (2024 07:43)  POCT Blood Glucose.: 196 mg/dL (2024 21:31)  POCT Blood Glucose.: 114 mg/dL (2024 16:58)  POCT Blood Glucose.: 114 mg/dL (2024 11:27)    Skin: skin intact     Estimated Needs:   [ x ] no change since previous assessment  [ ] recalculated:     Previous Nutrition Diagnosis:   [ x ]Inadequate Oral Intake     Nutrition Diagnosis is [ x ] ongoing  [ ] Improving   [ ] resolved [ ] not applicable     New Nutrition Diagnosis: [ x ] not applicable     Interventions/Recommend  [ x ] Change Diet To: Ensure Compact 1 can ( 4 oz or 120 ml ) x bid daily ( 440 kcal, 18 g protein)   [ ] Nutrition Supplement  [ ] Nutrition Support  [ x ] Other: Discussed with NP/RN                   Provide food choices within diet Rx as available/updated                    Diet education given ( see above and Plan of Care)    Monitoring and Evaluation:   [ x ] PO intake [ x ] Tolerance to diet prescription [ x ] weights [ x ] labs[ x ] follow up per protocol  [ ] other:

## 2024-01-14 NOTE — PROGRESS NOTE ADULT - SUBJECTIVE AND OBJECTIVE BOX
PATIENT SEEN AND EXAMINED BY EDGARDO DRISCOLL M.D. ON :- 1/14/24  DATE OF SERVICE:   1/14/24          Interim events noted,Labs ,Radiological studies and Cardiology tests reviewed .  DISCUSSED WITH ACP/MEDICAL RESIDENT ON PLAN OF CARE.    MR#[id number]  PATIENT NAME:[facility] COURSE: HPI:  68M from home, ambulates independently, PMHx COPD (not on O2), former smoker quit 2-months, HLD, DM, HTN, chronic ETOH dependence c/b cirrhosis, p/w worsening SOB. Patient is limited historian, currently on BIPAP and unable to full history deferring further collateral to wife, Maureen Mcclellanvt [phone] but unable to reach. Patient states that he has had worsening of breathing over 2-years, does not sleep with a machine at home, and when inquired about medications he admits to non-compliance. Denies fevers, chills, or sick contact. Denies chest pain, palpitations, or GUILLEN.    In ED:   Afebrile;  Afib w/ RVR and PVC; /102; RR 22 100% on RA. Started on BIPAP for increased WOB saturating 100% with improvement in WOB. Trop negative 17, BNP 1853 (improved from 4463). Given duoneb x3, solumedrol 125mg x1, rocephin x1, and 500cc bolus x1, diltizem 20mg IVP x1 + 10mg IVP x1. Awaiting to receive vanc x1 and azithromycin. CXR increased b/l opacification and left lower pleural effusion.  (08 Jan 2024 08:55)      INTERIM EVENTS:Patient seen at bedside ,interim events noted.      PM -reviewed admission note, no change since admission  HEART FAILURE: Acute[ ]Chronic[ ] Systolic[ ] Diastolic[ ] Combined Systolic and Diastolic[ ]  CAD[ ] CABG[ ] PCI[ ]  DEVICES[ ] PPM[ ] ICD[ ] ILR[ ]  ATRIAL FIBRILLATION[ ] Paroxysmal[ ] Permanent[ ] CHADS2-[  ]  CORTES[ ] CKD1[ ] CKD2[ ] CKD3[ ] CKD4[ ] ESRD[ ]  COPD[ ] HTN[ ]   DM[ ] Type1[ ] Type 2[ ]   CVA[ ] Paresis[ ]    AMBULATION: Assisted[ ] Cane/walker[ ] Independent[ ]    MEDICATIONS  (STANDING):  albuterol/ipratropium for Nebulization 3 milliLiter(s) Nebulizer every 6 hours  amLODIPine   Tablet 5 milliGRAM(s) Oral daily  apixaban 5 milliGRAM(s) Oral two times a day  aspirin  chewable 81 milliGRAM(s) Oral daily  atorvastatin 40 milliGRAM(s) Oral at bedtime  budesonide 160 MICROgram(s)/formoterol 4.5 MICROgram(s) Inhaler 2 Puff(s) Inhalation two times a day  buMETAnide 1 milliGRAM(s) Oral daily  gabapentin 300 milliGRAM(s) Oral at bedtime  insulin lispro (ADMELOG) corrective regimen sliding scale   SubCutaneous Before meals and at bedtime  losartan 100 milliGRAM(s) Oral daily  melatonin 5 milliGRAM(s) Oral at bedtime  metoprolol tartrate 50 milliGRAM(s) Oral every 8 hours  montelukast 10 milliGRAM(s) Oral at bedtime  pantoprazole    Tablet 40 milliGRAM(s) Oral before breakfast  polyethylene glycol 3350 17 Gram(s) Oral daily  senna 2 Tablet(s) Oral at bedtime  tiotropium 2.5 MICROgram(s) Inhaler 2 Puff(s) Inhalation daily    MEDICATIONS  (PRN):  albuterol    90 MICROgram(s) HFA Inhaler 2 Puff(s) Inhalation every 4 hours PRN Shortness of Breath and/or Wheezing            REVIEW OF SYSTEMS:  Constitutional: [ ] fever, [ ]weight loss,  [ ]fatigue [ ]weight gain  Eyes: [ ] visual changes  Respiratory: [ ]shortness of breath;  [ ] cough, [ ]wheezing, [ ]chills, [ ]hemoptysis  Cardiovascular: [ ] chest pain, [ ]palpitations, [ ]dizziness,  [ ]leg swelling[ ]orthopnea[ ]PND  Gastrointestinal: [ ] abdominal pain, [ ]nausea, [ ]vomiting,  [ ]diarrhea [ ]Constipation [ ]Melena  Genitourinary: [ ] dysuria, [ ] hematuria [ ]Goss  Neurologic: [ ] headaches [ ] tremors[ ]weakness [ ]Paralysis Right[ ] Left[ ]  Skin: [ ] itching, [ ]burning, [ ] rashes  Endocrine: [ ] heat or cold intolerance  Musculoskeletal: [ ] joint pain or swelling; [ ] muscle, back, or extremity pain  Psychiatric: [ ] depression, [ ]anxiety, [ ]mood swings, or [ ]difficulty sleeping  Hematologic: [ ] easy bruising, [ ] bleeding gums    [ ] All remaining systems negative except as per above.   [ ]Unable to obtain.  [x] No change in ROS since admission      Vital Signs Last 24 Hrs  T(C): 37.4 (14 Jan 2024 19:37), Max: 37.4 (14 Jan 2024 19:37)  T(F): 99.3 (14 Jan 2024 19:37), Max: 99.3 (14 Jan 2024 19:37)  HR: 106 (14 Jan 2024 19:37) (75 - 110)  BP: 116/73 (14 Jan 2024 19:37) (98/60 - 129/76)  BP(mean): --  RR: 20 (14 Jan 2024 19:37) (18 - 22)  SpO2: 96% (14 Jan 2024 19:37) (93% - 100%)    Parameters below as of 14 Jan 2024 19:37  Patient On (Oxygen Delivery Method): room air      I&O's Summary    14 Jan 2024 07:01  -  14 Jan 2024 22:01  --------------------------------------------------------  IN: 230 mL / OUT: 170 mL / NET: 60 mL        PHYSICAL EXAM:  General: No acute distress BMI-  HEENT: EOMI, PERRL  Neck: Supple, [ ] JVD  Lungs: Equal air entry bilaterally; [ ] rales [ ] wheezing [ ] rhonchi  Heart: Regular rate and rhythm; [x ] murmur   2/6 [ x] systolic [ ] diastolic [ ] radiation[ ] rubs [ ]  gallops  Abdomen: Nontender, bowel sounds present  Extremities: No clubbing, cyanosis, [ ] edema [ ]Pulses  equal and intact  Nervous system:  Alert & Oriented X3, no focal deficits  Psychiatric: Normal affect  Skin: No rashes or lesions    LABS:  01-14    138  |  106  |  24<H>  ----------------------------<  160<H>  4.4   |  26  |  1.61<H>    Ca    8.8      14 Jan 2024 09:48      Creatinine Trend: 1.61<--, 1.31<--, 1.49<--, QNS<--, 1.36<--, 1.36<--                        9.4    8.67  )-----------( 143      ( 14 Jan 2024 09:48 )             31.2                PATIENT SEEN AND EXAMINED BY EDGARDO DRISCOLL M.D. ON :- 1/14/24  DATE OF SERVICE:   1/14/24          Interim events noted,Labs ,Radiological studies and Cardiology tests reviewed .  DISCUSSED WITH ACP/MEDICAL RESIDENT ON PLAN OF CARE.    MR#[id number]  PATIENT NAME:[facility] COURSE: HPI:  68M from home, ambulates independently, PMHx COPD (not on O2), former smoker quit 2-months, HLD, DM, HTN, chronic ETOH dependence c/b cirrhosis, p/w worsening SOB. Patient is limited historian, currently on BIPAP and unable to full history deferring further collateral to wife, Maureen Mcclellanvt [phone] but unable to reach. Patient states that he has had worsening of breathing over 2-years, does not sleep with a machine at home, and when inquired about medications he admits to non-compliance. Denies fevers, chills, or sick contact. Denies chest pain, palpitations, or GUILLEN.    In ED:   Afebrile;  Afib w/ RVR and PVC; /102; RR 22 100% on RA. Started on BIPAP for increased WOB saturating 100% with improvement in WOB. Trop negative 17, BNP 1853 (improved from 4463). Given duoneb x3, solumedrol 125mg x1, rocephin x1, and 500cc bolus x1, diltizem 20mg IVP x1 + 10mg IVP x1. Awaiting to receive vanc x1 and azithromycin. CXR increased b/l opacification and left lower pleural effusion.  (08 Jan 2024 08:55)      INTERIM EVENTS:Patient seen at bedside ,interim events noted.      PM -reviewed admission note, no change since admission  HEART FAILURE: Acute[ ]Chronic[ ] Systolic[ ] Diastolic[ ] Combined Systolic and Diastolic[ ]  CAD[ ] CABG[ ] PCI[ ]  DEVICES[ ] PPM[ ] ICD[ ] ILR[ ]  ATRIAL FIBRILLATION[ ] Paroxysmal[ ] Permanent[ ] CHADS2-[  ]  CORTES[ ] CKD1[ ] CKD2[ ] CKD3[ ] CKD4[ ] ESRD[ ]  COPD[ ] HTN[ ]   DM[ ] Type1[ ] Type 2[ ]   CVA[ ] Paresis[ ]    AMBULATION: Assisted[ ] Cane/walker[ ] Independent[ ]    MEDICATIONS  (STANDING):  albuterol/ipratropium for Nebulization 3 milliLiter(s) Nebulizer every 6 hours  amLODIPine   Tablet 5 milliGRAM(s) Oral daily  apixaban 5 milliGRAM(s) Oral two times a day  aspirin  chewable 81 milliGRAM(s) Oral daily  atorvastatin 40 milliGRAM(s) Oral at bedtime  budesonide 160 MICROgram(s)/formoterol 4.5 MICROgram(s) Inhaler 2 Puff(s) Inhalation two times a day  buMETAnide 1 milliGRAM(s) Oral daily  gabapentin 300 milliGRAM(s) Oral at bedtime  insulin lispro (ADMELOG) corrective regimen sliding scale   SubCutaneous Before meals and at bedtime  losartan 100 milliGRAM(s) Oral daily  melatonin 5 milliGRAM(s) Oral at bedtime  metoprolol tartrate 50 milliGRAM(s) Oral every 8 hours  montelukast 10 milliGRAM(s) Oral at bedtime  pantoprazole    Tablet 40 milliGRAM(s) Oral before breakfast  polyethylene glycol 3350 17 Gram(s) Oral daily  senna 2 Tablet(s) Oral at bedtime  tiotropium 2.5 MICROgram(s) Inhaler 2 Puff(s) Inhalation daily    MEDICATIONS  (PRN):  albuterol    90 MICROgram(s) HFA Inhaler 2 Puff(s) Inhalation every 4 hours PRN Shortness of Breath and/or Wheezing            REVIEW OF SYSTEMS:  Constitutional: [ ] fever, [ ]weight loss,  [ ]fatigue [ ]weight gain  Eyes: [ ] visual changes  Respiratory: [ ]shortness of breath;  [ ] cough, [ ]wheezing, [ ]chills, [ ]hemoptysis  Cardiovascular: [ ] chest pain, [ ]palpitations, [ ]dizziness,  [ ]leg swelling[ ]orthopnea[ ]PND  Gastrointestinal: [ ] abdominal pain, [ ]nausea, [ ]vomiting,  [ ]diarrhea [ ]Constipation [ ]Melena  Genitourinary: [ ] dysuria, [ ] hematuria [ ]Goss  Neurologic: [ ] headaches [ ] tremors[ ]weakness [ ]Paralysis Right[ ] Left[ ]  Skin: [ ] itching, [ ]burning, [ ] rashes  Endocrine: [ ] heat or cold intolerance  Musculoskeletal: [ ] joint pain or swelling; [ ] muscle, back, or extremity pain  Psychiatric: [ ] depression, [ ]anxiety, [ ]mood swings, or [ ]difficulty sleeping  Hematologic: [ ] easy bruising, [ ] bleeding gums    [ ] All remaining systems negative except as per above.   [ ]Unable to obtain.  [x] No change in ROS since admission      Vital Signs Last 24 Hrs  T(C): 37.4 (14 Jan 2024 19:37), Max: 37.4 (14 Jan 2024 19:37)  T(F): 99.3 (14 Jan 2024 19:37), Max: 99.3 (14 Jan 2024 19:37)  HR: 106 (14 Jan 2024 19:37) (75 - 110)  BP: 116/73 (14 Jan 2024 19:37) (98/60 - 129/76)  BP(mean): --  RR: 20 (14 Jan 2024 19:37) (18 - 22)  SpO2: 96% (14 Jan 2024 19:37) (93% - 100%)    Parameters below as of 14 Jan 2024 19:37  Patient On (Oxygen Delivery Method): room air      I&O's Summary    14 Jan 2024 07:01  -  14 Jan 2024 22:01  --------------------------------------------------------  IN: 230 mL / OUT: 170 mL / NET: 60 mL        PHYSICAL EXAM:  General: No acute distress BMI-  HEENT: EOMI, PERRL  Neck: Supple, [ ] JVD  Lungs: Equal air entry bilaterally; [ ] rales [ ] wheezing [ ] rhonchi  Heart: Regular rate and rhythm; [x ] murmur   2/6 [ x] systolic [ ] diastolic [ ] radiation[ ] rubs [ ]  gallops  Abdomen: Nontender, bowel sounds present  Extremities: No clubbing, cyanosis, [ ] edema [ ]Pulses  equal and intact  Nervous system:  Alert & Oriented X3, no focal deficits  Psychiatric: Normal affect  Skin: No rashes or lesions    LABS:  01-14    138  |  106  |  24<H>  ----------------------------<  160<H>  4.4   |  26  |  1.61<H>    Ca    8.8      14 Jan 2024 09:48      Creatinine Trend: 1.61<--, 1.31<--, 1.49<--, QNS<--, 1.36<--, 1.36<--                        9.4    8.67  )-----------( 143      ( 14 Jan 2024 09:48 )             31.2

## 2024-01-15 LAB
ALBUMIN SERPL ELPH-MCNC: 2.9 G/DL — LOW (ref 3.5–5)
ALBUMIN SERPL ELPH-MCNC: 2.9 G/DL — LOW (ref 3.5–5)
ALP SERPL-CCNC: 61 U/L — SIGNIFICANT CHANGE UP (ref 40–120)
ALP SERPL-CCNC: 61 U/L — SIGNIFICANT CHANGE UP (ref 40–120)
ALT FLD-CCNC: 15 U/L DA — SIGNIFICANT CHANGE UP (ref 10–60)
ALT FLD-CCNC: 15 U/L DA — SIGNIFICANT CHANGE UP (ref 10–60)
ANION GAP SERPL CALC-SCNC: 7 MMOL/L — SIGNIFICANT CHANGE UP (ref 5–17)
ANION GAP SERPL CALC-SCNC: 7 MMOL/L — SIGNIFICANT CHANGE UP (ref 5–17)
AST SERPL-CCNC: 19 U/L — SIGNIFICANT CHANGE UP (ref 10–40)
AST SERPL-CCNC: 19 U/L — SIGNIFICANT CHANGE UP (ref 10–40)
BILIRUB SERPL-MCNC: 0.6 MG/DL — SIGNIFICANT CHANGE UP (ref 0.2–1.2)
BILIRUB SERPL-MCNC: 0.6 MG/DL — SIGNIFICANT CHANGE UP (ref 0.2–1.2)
BUN SERPL-MCNC: 27 MG/DL — HIGH (ref 7–18)
BUN SERPL-MCNC: 27 MG/DL — HIGH (ref 7–18)
CALCIUM SERPL-MCNC: 8.6 MG/DL — SIGNIFICANT CHANGE UP (ref 8.4–10.5)
CALCIUM SERPL-MCNC: 8.6 MG/DL — SIGNIFICANT CHANGE UP (ref 8.4–10.5)
CHLORIDE SERPL-SCNC: 107 MMOL/L — SIGNIFICANT CHANGE UP (ref 96–108)
CHLORIDE SERPL-SCNC: 107 MMOL/L — SIGNIFICANT CHANGE UP (ref 96–108)
CO2 SERPL-SCNC: 25 MMOL/L — SIGNIFICANT CHANGE UP (ref 22–31)
CO2 SERPL-SCNC: 25 MMOL/L — SIGNIFICANT CHANGE UP (ref 22–31)
CREAT SERPL-MCNC: 1.57 MG/DL — HIGH (ref 0.5–1.3)
CREAT SERPL-MCNC: 1.57 MG/DL — HIGH (ref 0.5–1.3)
EGFR: 47 ML/MIN/1.73M2 — LOW
EGFR: 47 ML/MIN/1.73M2 — LOW
GLUCOSE BLDC GLUCOMTR-MCNC: 189 MG/DL — HIGH (ref 70–99)
GLUCOSE BLDC GLUCOMTR-MCNC: 229 MG/DL — HIGH (ref 70–99)
GLUCOSE BLDC GLUCOMTR-MCNC: 397 MG/DL — HIGH (ref 70–99)
GLUCOSE BLDC GLUCOMTR-MCNC: 397 MG/DL — HIGH (ref 70–99)
GLUCOSE BLDC GLUCOMTR-MCNC: 99 MG/DL — SIGNIFICANT CHANGE UP (ref 70–99)
GLUCOSE BLDC GLUCOMTR-MCNC: 99 MG/DL — SIGNIFICANT CHANGE UP (ref 70–99)
GLUCOSE SERPL-MCNC: 110 MG/DL — HIGH (ref 70–99)
GLUCOSE SERPL-MCNC: 110 MG/DL — HIGH (ref 70–99)
HCT VFR BLD CALC: 30.8 % — LOW (ref 39–50)
HCT VFR BLD CALC: 30.8 % — LOW (ref 39–50)
HGB BLD-MCNC: 9.2 G/DL — LOW (ref 13–17)
HGB BLD-MCNC: 9.2 G/DL — LOW (ref 13–17)
MAGNESIUM SERPL-MCNC: 2.2 MG/DL — SIGNIFICANT CHANGE UP (ref 1.6–2.6)
MAGNESIUM SERPL-MCNC: 2.2 MG/DL — SIGNIFICANT CHANGE UP (ref 1.6–2.6)
MCHC RBC-ENTMCNC: 26.6 PG — LOW (ref 27–34)
MCHC RBC-ENTMCNC: 26.6 PG — LOW (ref 27–34)
MCHC RBC-ENTMCNC: 29.9 GM/DL — LOW (ref 32–36)
MCHC RBC-ENTMCNC: 29.9 GM/DL — LOW (ref 32–36)
MCV RBC AUTO: 89 FL — SIGNIFICANT CHANGE UP (ref 80–100)
MCV RBC AUTO: 89 FL — SIGNIFICANT CHANGE UP (ref 80–100)
NRBC # BLD: 0 /100 WBCS — SIGNIFICANT CHANGE UP (ref 0–0)
NRBC # BLD: 0 /100 WBCS — SIGNIFICANT CHANGE UP (ref 0–0)
PHOSPHATE SERPL-MCNC: 3.5 MG/DL — SIGNIFICANT CHANGE UP (ref 2.5–4.5)
PHOSPHATE SERPL-MCNC: 3.5 MG/DL — SIGNIFICANT CHANGE UP (ref 2.5–4.5)
PLATELET # BLD AUTO: 92 K/UL — LOW (ref 150–400)
PLATELET # BLD AUTO: 92 K/UL — LOW (ref 150–400)
POTASSIUM SERPL-MCNC: 4 MMOL/L — SIGNIFICANT CHANGE UP (ref 3.5–5.3)
POTASSIUM SERPL-MCNC: 4 MMOL/L — SIGNIFICANT CHANGE UP (ref 3.5–5.3)
POTASSIUM SERPL-SCNC: 4 MMOL/L — SIGNIFICANT CHANGE UP (ref 3.5–5.3)
POTASSIUM SERPL-SCNC: 4 MMOL/L — SIGNIFICANT CHANGE UP (ref 3.5–5.3)
PROT SERPL-MCNC: 6.8 G/DL — SIGNIFICANT CHANGE UP (ref 6–8.3)
PROT SERPL-MCNC: 6.8 G/DL — SIGNIFICANT CHANGE UP (ref 6–8.3)
RBC # BLD: 3.46 M/UL — LOW (ref 4.2–5.8)
RBC # BLD: 3.46 M/UL — LOW (ref 4.2–5.8)
RBC # FLD: 14.6 % — HIGH (ref 10.3–14.5)
RBC # FLD: 14.6 % — HIGH (ref 10.3–14.5)
SODIUM SERPL-SCNC: 139 MMOL/L — SIGNIFICANT CHANGE UP (ref 135–145)
SODIUM SERPL-SCNC: 139 MMOL/L — SIGNIFICANT CHANGE UP (ref 135–145)
WBC # BLD: 5.74 K/UL — SIGNIFICANT CHANGE UP (ref 3.8–10.5)
WBC # BLD: 5.74 K/UL — SIGNIFICANT CHANGE UP (ref 3.8–10.5)
WBC # FLD AUTO: 5.74 K/UL — SIGNIFICANT CHANGE UP (ref 3.8–10.5)
WBC # FLD AUTO: 5.74 K/UL — SIGNIFICANT CHANGE UP (ref 3.8–10.5)

## 2024-01-15 PROCEDURE — 71045 X-RAY EXAM CHEST 1 VIEW: CPT | Mod: 26

## 2024-01-15 RX ORDER — BUMETANIDE 0.25 MG/ML
1 INJECTION INTRAMUSCULAR; INTRAVENOUS ONCE
Refills: 0 | Status: COMPLETED | OUTPATIENT
Start: 2024-01-15 | End: 2024-01-15

## 2024-01-15 RX ORDER — BUMETANIDE 0.25 MG/ML
2 INJECTION INTRAMUSCULAR; INTRAVENOUS ONCE
Refills: 0 | Status: COMPLETED | OUTPATIENT
Start: 2024-01-15 | End: 2024-01-15

## 2024-01-15 RX ADMIN — Medication 50 MILLIGRAM(S): at 13:29

## 2024-01-15 RX ADMIN — BUMETANIDE 1 MILLIGRAM(S): 0.25 INJECTION INTRAMUSCULAR; INTRAVENOUS at 10:42

## 2024-01-15 RX ADMIN — Medication 2: at 21:31

## 2024-01-15 RX ADMIN — TIOTROPIUM BROMIDE 2 PUFF(S): 18 CAPSULE ORAL; RESPIRATORY (INHALATION) at 11:15

## 2024-01-15 RX ADMIN — ATORVASTATIN CALCIUM 40 MILLIGRAM(S): 80 TABLET, FILM COATED ORAL at 21:35

## 2024-01-15 RX ADMIN — Medication 3 MILLILITER(S): at 08:43

## 2024-01-15 RX ADMIN — BUDESONIDE AND FORMOTEROL FUMARATE DIHYDRATE 2 PUFF(S): 160; 4.5 AEROSOL RESPIRATORY (INHALATION) at 21:36

## 2024-01-15 RX ADMIN — APIXABAN 5 MILLIGRAM(S): 2.5 TABLET, FILM COATED ORAL at 05:44

## 2024-01-15 RX ADMIN — BUMETANIDE 1 MILLIGRAM(S): 0.25 INJECTION INTRAMUSCULAR; INTRAVENOUS at 05:44

## 2024-01-15 RX ADMIN — Medication 3 MILLILITER(S): at 21:56

## 2024-01-15 RX ADMIN — Medication 40 MILLIGRAM(S): at 10:43

## 2024-01-15 RX ADMIN — Medication 5: at 11:12

## 2024-01-15 RX ADMIN — LOSARTAN POTASSIUM 100 MILLIGRAM(S): 100 TABLET, FILM COATED ORAL at 05:45

## 2024-01-15 RX ADMIN — Medication 5 MILLIGRAM(S): at 21:32

## 2024-01-15 RX ADMIN — BUMETANIDE 2 MILLIGRAM(S): 0.25 INJECTION INTRAMUSCULAR; INTRAVENOUS at 17:31

## 2024-01-15 RX ADMIN — ALBUTEROL 2 PUFF(S): 90 AEROSOL, METERED ORAL at 09:09

## 2024-01-15 RX ADMIN — ALBUTEROL 2 PUFF(S): 90 AEROSOL, METERED ORAL at 21:30

## 2024-01-15 RX ADMIN — APIXABAN 5 MILLIGRAM(S): 2.5 TABLET, FILM COATED ORAL at 17:30

## 2024-01-15 RX ADMIN — AMLODIPINE BESYLATE 5 MILLIGRAM(S): 2.5 TABLET ORAL at 05:44

## 2024-01-15 RX ADMIN — Medication 81 MILLIGRAM(S): at 11:12

## 2024-01-15 RX ADMIN — PANTOPRAZOLE SODIUM 40 MILLIGRAM(S): 20 TABLET, DELAYED RELEASE ORAL at 05:45

## 2024-01-15 RX ADMIN — GABAPENTIN 300 MILLIGRAM(S): 400 CAPSULE ORAL at 21:32

## 2024-01-15 RX ADMIN — Medication 50 MILLIGRAM(S): at 21:33

## 2024-01-15 RX ADMIN — ALBUTEROL 2 PUFF(S): 90 AEROSOL, METERED ORAL at 02:16

## 2024-01-15 RX ADMIN — Medication 3 MILLILITER(S): at 14:35

## 2024-01-15 RX ADMIN — Medication 50 MILLIGRAM(S): at 05:44

## 2024-01-15 RX ADMIN — Medication 3 MILLILITER(S): at 02:22

## 2024-01-15 RX ADMIN — MONTELUKAST 10 MILLIGRAM(S): 4 TABLET, CHEWABLE ORAL at 21:32

## 2024-01-15 RX ADMIN — SENNA PLUS 2 TABLET(S): 8.6 TABLET ORAL at 21:32

## 2024-01-15 RX ADMIN — Medication 1: at 17:30

## 2024-01-15 RX ADMIN — BUDESONIDE AND FORMOTEROL FUMARATE DIHYDRATE 2 PUFF(S): 160; 4.5 AEROSOL RESPIRATORY (INHALATION) at 09:09

## 2024-01-15 NOTE — PROGRESS NOTE ADULT - NS ATTEND AMEND GEN_ALL_CORE FT
Impression: This is a 69 Y/O Male former smoker. hx of chronic ETOH dependence with Cirrhosis . Presented to ED with worsening SOB. In the ED patient placed on Bipap. For pulmonary follow up of Acute hypoxic respiratory failure due to COPD , pulmonary edema / HFrEF , Rapid A Fib. Off Bipap and using on and off  Oxygen supplementation .       Suggestion:  O2 saturation 95% room air . Monitor O2 saturation room air trend , using O2 supplement on and off.  Can have oxygen supplementation  2L NC .  Continue Duoneb via nebulization Q 6 Hours .  Continue Symbicort 160- 4.5 mcg 2 puffs Twice Daily.   Continue Singulair 10 mg Daily.    On Apixaban 5 mg Twice daily.   Continue Tiotropium 2.5 mcg 2 puffs Daily.     Diuresis and aim for negative fluid balance.   Monitor urine output.   Beta blockers for rate control.   Hemodynamic and cardiac monitoring Impression: This is a 67 Y/O Male former smoker. hx of chronic ETOH dependence with Cirrhosis . Presented to ED with worsening SOB. In the ED patient placed on Bipap. For pulmonary follow up of Acute hypoxic respiratory failure due to COPD , pulmonary edema / HFrEF , Rapid A Fib. Off Bipap and using on and off  Oxygen supplementation .       Suggestion:  O2 saturation 95% room air . Monitor O2 saturation room air trend , using O2 supplement on and off.  Can have oxygen supplementation  2L NC .  Continue Duoneb via nebulization Q 6 Hours .  Continue Symbicort 160- 4.5 mcg 2 puffs Twice Daily.   Continue Singulair 10 mg Daily.    On Apixaban 5 mg Twice daily.   Continue Tiotropium 2.5 mcg 2 puffs Daily.     Diuresis and aim for negative fluid balance.   Monitor urine output.   Beta blockers for rate control.   Hemodynamic and cardiac monitoring

## 2024-01-15 NOTE — PROGRESS NOTE ADULT - ASSESSMENT
_________________________________________________________________________________________  ========>>  M E D I C A L   A T T E N D I N G    F O L L O W  U P  N O T E  <<=========  -----------------------------------------------------------------------------------------------------    - Patient seen and examined by me earlier today.    - Patient with unstable respiratory status per report.. with need for O2 m was on Bipap briefly yesterday...      patient today seems a bit better, on O2 via NC, 2 L        RVP was negative !! doesn't appear grossly fluid overloaded, no wheezing !!     ==================>> REVIEW OF SYSTEM <<=================    GEN: no fever, no chills, no pain..   RESP: + SOB on and off..  no cough   CVS: no chest pain, no palpitations  GI: no abdominal pain, no nausea, eating ok   : no dysuria, no frequency  Neuro: no headache, no dizziness    ==================>> PHYSICAL EXAM <<=================    GEN: A&O X 3 , NAD , comfortable, pleasant, calm in bed on oxygen.. encouraged out of bed to chair with assistance as needed.   HEENT: NCAT, PERRL, MMM, hearing intact  CVS: S1S2 , Irregular , No M/R/G appreciated, not tachy  PULM:  scattered bilateral mild rhonchi, no wheezing   ABD.: soft. non tender, non distended,  bowel sounds present  Extrem: intact pulses , NO edema        ( Note written / Date of service 01-15-24 ( This is certified to be the same as "ENTERED" date above ( for billing purposes)))    ==================>> MEDICATIONS <<====================    albuterol/ipratropium for Nebulization 3 milliLiter(s) Nebulizer every 6 hours  amLODIPine   Tablet 5 milliGRAM(s) Oral daily  apixaban 5 milliGRAM(s) Oral two times a day  aspirin  chewable 81 milliGRAM(s) Oral daily  atorvastatin 40 milliGRAM(s) Oral at bedtime  budesonide 160 MICROgram(s)/formoterol 4.5 MICROgram(s) Inhaler 2 Puff(s) Inhalation two times a day  buMETAnide Injectable 2 milliGRAM(s) IV Push once  gabapentin 300 milliGRAM(s) Oral at bedtime  insulin lispro (ADMELOG) corrective regimen sliding scale   SubCutaneous Before meals and at bedtime  losartan 100 milliGRAM(s) Oral daily  melatonin 5 milliGRAM(s) Oral at bedtime  metoprolol tartrate 50 milliGRAM(s) Oral every 8 hours  montelukast 10 milliGRAM(s) Oral at bedtime  pantoprazole    Tablet 40 milliGRAM(s) Oral before breakfast  polyethylene glycol 3350 17 Gram(s) Oral daily  senna 2 Tablet(s) Oral at bedtime  tiotropium 2.5 MICROgram(s) Inhaler 2 Puff(s) Inhalation daily    MEDICATIONS  (PRN):  albuterol    90 MICROgram(s) HFA Inhaler 2 Puff(s) Inhalation every 4 hours PRN Shortness of Breath and/or Wheezing    ___________  Active diet:  Diet, Regular:   DASH/TLC Sodium & Cholesterol Restricted  1200mL Fluid Restriction ([id number])  Kosher  Supplement Feeding Modality:  Oral  Ensure Compact Cans or Servings Per Day:  1       Frequency:  Two Times a day  ___________________    ==================>> VITAL SIGNS <<==================    Vital Signs Last 24 HrsT(C): 37.1 (01-15-24 @ 11:14)  T(F): 98.7 (01-15-24 @ 11:14), Max: 99.3 (01-14-24 @ 19:37)  HR: 83 (01-15-24 @ 11:14) (83 - 117)  BP: 125/97 (01-15-24 @ 11:14)  RR: 20 (01-15-24 @ 11:14) (20 - 22)  SpO2: 94% (01-15-24 @ 11:14) (93% - 100%)      CAPILLARY BLOOD GLUCOSE  POCT Blood Glucose.: 397 mg/dL (15 Chin 2024 11:08)  POCT Blood Glucose.: 99 mg/dL (15 Chin 2024 07:40)  POCT Blood Glucose.: 115 mg/dL (14 Jan 2024 21:20)  POCT Blood Glucose.: 102 mg/dL (14 Jan 2024 16:48)     ==================>> LAB AND IMAGING <<==================                        9.2    5.74  )-----------( 92       ( 15 Chin 2024 06:11 )             30.8        01-15    139  |  107  |  27<H>  ----------------------------<  110<H>  4.0   |  25  |  1.57<H>    Ca    8.6      15 Chin 2024 06:11  Phos  3.5     01-15  Mg     2.2     01-15    TPro  6.8  /  Alb  2.9<L>  /  TBili  0.6  /  DBili  x   /  AST  19  /  ALT  15  /  AlkPhos  61  01-15    WBC count:   5.74 <<== ,  8.67 <<== ,  9.28 <<== ,  7.79 <<==   Hemoglobin:   9.2 <<==,  9.4 <<==,  10.6 <<==,  11.0 <<==  platelets:  92 <==, 143 <==, 108 <==, 151 <==, 168 <==    Creatinine:  1.57  <<==, 1.61  <<==, 1.31  <<==, 1.49  <<==, QNS  <<==, 1.36  <<==  Sodium:   139  <==, 138  <==, 140  <==, 143  <==, QNS  <==, 141  <==, 140  <==       AST:          19(01-15) <== , 16(01-12) <== , 16(01-10) <== , 17(01-09) <== , 26(01-08) <==      ALT:        15(01-15)  <== , 15(01-12)  <== , 15(01-10)  <== , 13(01-09)  <== , 16(01-08)  <==      AP:        61(01-15)  <=, 76(01-12)  <=, 59(01-10)  <=, 64(01-09)  <=, 99(01-08)  <=     Bili:        0.6(01-15)  <=, 0.5(01-12)  <=, 0.5(01-10)  <=, 0.6(01-09)  <=, 0.6(01-08)  <=    Blood Gas Profile - Arterial (01.14.24 @ 11:35)    pH, Arterial: 7.46   pCO2, Arterial: 40 mmHg   pO2, Arterial: 99 mmHg   HCO3, Arterial: 28 mmol/L   Base Excess, Arterial: 4.2 mmol/L   Oxygen Saturation, Arterial: 100 %   FIO2, Arterial: 32.0   Blood Gas Comments Arterial: 3L N/C  Left Brachial    Rapid RVP Result: NotDetec (01.14.24 @ 11:00)    ____________________________    M I C R O B I O L O G Y :    Culture - Blood (collected 08 Jan 2024 06:34)  Source: .Blood Blood  Final Report (13 Jan 2024 13:00):    No growth at 5 days    Culture - Blood (collected 08 Jan 2024 06:24)  Source: .Blood Blood  Final Report (13 Jan 2024 13:00):    No growth at 5 days    SARS-CoV-2: NotDetec (01-14-24 @ 11:00)  SARS-CoV-2: NotDetec (01-08-24 @ 06:24)    ___________________________________________________________________________________  ===============>>  A S S E S S M E N T   A N D   P L A N <<===============  ------------------------------------------------------------------------------------------    acute hypoxemic respiratory failure  acute exacerbation of chromic systolic CHF  Afib with RVR > better controlled   COPD  >> now with new exacerbation      smoker ( long term, recently stopped !)   acute Pulmonary edema > improved   suspect new viral URI  DM type 2  history of Hypertension     ====>>>>    - was off O2 >> now back on O2 support and increased work of breathing     suspect COPD exacerbation         viral URI ruled out            CHF seems to be stable >> continue diuretics as able / needed :: monitor creatinine            cardio / pulm follow up and management appreciated              wean off O2 : don't believe patient needs O2 for home..?  - Diuresis and aim for negative fluid balance  - Monitor urine output  - Cont. AC  / HR control   - cardiology Follow-up, management, optimization appreciated       see discussion bellow   - Hemodynamic and cardiac monitoring  - Continue Current medications otherwise and monitor.  - nutrition, OOB as able, incentive spirometer   - supportive care   -GI/DVT Prophylaxis per protocol.    {    it appears patient has had CHF for a while , with reduced EF and several hospitalization and no follow up with cardio as outpatient !!      patient lives with wife, has 2 children in [place], one in [place] and one in [facility] !!  unclear if good social support .. and patient not seen to be very reliable to care fo himself...         unclear / not appears to be patient had ischemic workup and evaluation for ICD...   per discussed with cardio patient also may not have been on optimal medical management for 3 months fully to evaluation for improved EF....       >> will need close follow up as outpatient with cardio         ? social work evaluation may be helpful for safe DC planing ... :  follow up   }  --------------------------------------------  Case discussed with patient Nurse Practitioner   Education given on findings and plan of care  ___________________________  HAngella Rivera D.O.  Pager: [phone]   _________________________________________________________________________________________  ========>>  M E D I C A L   A T T E N D I N G    F O L L O W  U P  N O T E  <<=========  -----------------------------------------------------------------------------------------------------    - Patient seen and examined by me earlier today.    - Patient with unstable respiratory status per report.. with need for O2 m was on Bipap briefly yesterday...      patient today seems a bit better, on O2 via NC, 2 L        RVP was negative !! doesn't appear grossly fluid overloaded, no wheezing !!     ==================>> REVIEW OF SYSTEM <<=================    GEN: no fever, no chills, no pain..   RESP: + SOB on and off..  no cough   CVS: no chest pain, no palpitations  GI: no abdominal pain, no nausea, eating ok   : no dysuria, no frequency  Neuro: no headache, no dizziness    ==================>> PHYSICAL EXAM <<=================    GEN: A&O X 3 , NAD , comfortable, pleasant, calm in bed on oxygen.. encouraged out of bed to chair with assistance as needed.   HEENT: NCAT, PERRL, MMM, hearing intact  CVS: S1S2 , Irregular , No M/R/G appreciated, not tachy  PULM:  scattered bilateral mild rhonchi, no wheezing   ABD.: soft. non tender, non distended,  bowel sounds present  Extrem: intact pulses , NO edema        ( Note written / Date of service 01-15-24 ( This is certified to be the same as "ENTERED" date above ( for billing purposes)))    ==================>> MEDICATIONS <<====================    albuterol/ipratropium for Nebulization 3 milliLiter(s) Nebulizer every 6 hours  amLODIPine   Tablet 5 milliGRAM(s) Oral daily  apixaban 5 milliGRAM(s) Oral two times a day  aspirin  chewable 81 milliGRAM(s) Oral daily  atorvastatin 40 milliGRAM(s) Oral at bedtime  budesonide 160 MICROgram(s)/formoterol 4.5 MICROgram(s) Inhaler 2 Puff(s) Inhalation two times a day  buMETAnide Injectable 2 milliGRAM(s) IV Push once  gabapentin 300 milliGRAM(s) Oral at bedtime  insulin lispro (ADMELOG) corrective regimen sliding scale   SubCutaneous Before meals and at bedtime  losartan 100 milliGRAM(s) Oral daily  melatonin 5 milliGRAM(s) Oral at bedtime  metoprolol tartrate 50 milliGRAM(s) Oral every 8 hours  montelukast 10 milliGRAM(s) Oral at bedtime  pantoprazole    Tablet 40 milliGRAM(s) Oral before breakfast  polyethylene glycol 3350 17 Gram(s) Oral daily  senna 2 Tablet(s) Oral at bedtime  tiotropium 2.5 MICROgram(s) Inhaler 2 Puff(s) Inhalation daily    MEDICATIONS  (PRN):  albuterol    90 MICROgram(s) HFA Inhaler 2 Puff(s) Inhalation every 4 hours PRN Shortness of Breath and/or Wheezing    ___________  Active diet:  Diet, Regular:   DASH/TLC Sodium & Cholesterol Restricted  1200mL Fluid Restriction ([id number])  Kosher  Supplement Feeding Modality:  Oral  Ensure Compact Cans or Servings Per Day:  1       Frequency:  Two Times a day  ___________________    ==================>> VITAL SIGNS <<==================    Vital Signs Last 24 HrsT(C): 37.1 (01-15-24 @ 11:14)  T(F): 98.7 (01-15-24 @ 11:14), Max: 99.3 (01-14-24 @ 19:37)  HR: 83 (01-15-24 @ 11:14) (83 - 117)  BP: 125/97 (01-15-24 @ 11:14)  RR: 20 (01-15-24 @ 11:14) (20 - 22)  SpO2: 94% (01-15-24 @ 11:14) (93% - 100%)      CAPILLARY BLOOD GLUCOSE  POCT Blood Glucose.: 397 mg/dL (15 Chin 2024 11:08)  POCT Blood Glucose.: 99 mg/dL (15 Chin 2024 07:40)  POCT Blood Glucose.: 115 mg/dL (14 Jan 2024 21:20)  POCT Blood Glucose.: 102 mg/dL (14 Jan 2024 16:48)     ==================>> LAB AND IMAGING <<==================                        9.2    5.74  )-----------( 92       ( 15 Chin 2024 06:11 )             30.8        01-15    139  |  107  |  27<H>  ----------------------------<  110<H>  4.0   |  25  |  1.57<H>    Ca    8.6      15 Chin 2024 06:11  Phos  3.5     01-15  Mg     2.2     01-15    TPro  6.8  /  Alb  2.9<L>  /  TBili  0.6  /  DBili  x   /  AST  19  /  ALT  15  /  AlkPhos  61  01-15    WBC count:   5.74 <<== ,  8.67 <<== ,  9.28 <<== ,  7.79 <<==   Hemoglobin:   9.2 <<==,  9.4 <<==,  10.6 <<==,  11.0 <<==  platelets:  92 <==, 143 <==, 108 <==, 151 <==, 168 <==    Creatinine:  1.57  <<==, 1.61  <<==, 1.31  <<==, 1.49  <<==, QNS  <<==, 1.36  <<==  Sodium:   139  <==, 138  <==, 140  <==, 143  <==, QNS  <==, 141  <==, 140  <==       AST:          19(01-15) <== , 16(01-12) <== , 16(01-10) <== , 17(01-09) <== , 26(01-08) <==      ALT:        15(01-15)  <== , 15(01-12)  <== , 15(01-10)  <== , 13(01-09)  <== , 16(01-08)  <==      AP:        61(01-15)  <=, 76(01-12)  <=, 59(01-10)  <=, 64(01-09)  <=, 99(01-08)  <=     Bili:        0.6(01-15)  <=, 0.5(01-12)  <=, 0.5(01-10)  <=, 0.6(01-09)  <=, 0.6(01-08)  <=    Blood Gas Profile - Arterial (01.14.24 @ 11:35)    pH, Arterial: 7.46   pCO2, Arterial: 40 mmHg   pO2, Arterial: 99 mmHg   HCO3, Arterial: 28 mmol/L   Base Excess, Arterial: 4.2 mmol/L   Oxygen Saturation, Arterial: 100 %   FIO2, Arterial: 32.0   Blood Gas Comments Arterial: 3L N/C  Left Brachial    Rapid RVP Result: NotDetec (01.14.24 @ 11:00)    ____________________________    M I C R O B I O L O G Y :    Culture - Blood (collected 08 Jan 2024 06:34)  Source: .Blood Blood  Final Report (13 Jan 2024 13:00):    No growth at 5 days    Culture - Blood (collected 08 Jan 2024 06:24)  Source: .Blood Blood  Final Report (13 Jan 2024 13:00):    No growth at 5 days    SARS-CoV-2: NotDetec (01-14-24 @ 11:00)  SARS-CoV-2: NotDetec (01-08-24 @ 06:24)    ___________________________________________________________________________________  ===============>>  A S S E S S M E N T   A N D   P L A N <<===============  ------------------------------------------------------------------------------------------    acute hypoxemic respiratory failure  acute exacerbation of chromic systolic CHF  Afib with RVR > better controlled   COPD  >> now with new exacerbation      smoker ( long term, recently stopped !)   acute Pulmonary edema > improved   suspect new viral URI  DM type 2  history of Hypertension     ====>>>>    - was off O2 >> now back on O2 support and increased work of breathing     suspect COPD exacerbation         viral URI ruled out            CHF seems to be stable >> continue diuretics as able / needed :: monitor creatinine            cardio / pulm follow up and management appreciated              wean off O2 : don't believe patient needs O2 for home..?  - Diuresis and aim for negative fluid balance  - Monitor urine output  - Cont. AC  / HR control   - cardiology Follow-up, management, optimization appreciated       see discussion bellow   - Hemodynamic and cardiac monitoring  - Continue Current medications otherwise and monitor.  - nutrition, OOB as able, incentive spirometer   - supportive care   -GI/DVT Prophylaxis per protocol.    {    it appears patient has had CHF for a while , with reduced EF and several hospitalization and no follow up with cardio as outpatient !!      patient lives with wife, has 2 children in [place], one in [place] and one in [facility] !!  unclear if good social support .. and patient not seen to be very reliable to care fo himself...         unclear / not appears to be patient had ischemic workup and evaluation for ICD...   per discussed with cardio patient also may not have been on optimal medical management for 3 months fully to evaluation for improved EF....       >> will need close follow up as outpatient with cardio         ? social work evaluation may be helpful for safe DC planing ... :  follow up   }  --------------------------------------------  Case discussed with patient Nurse Practitioner   Education given on findings and plan of care  ___________________________  HAngella Rivera D.O.  Pager: [phone]   _________________________________________________________________________________________  ========>>  M E D I C A L   A T T E N D I N G    F O L L O W  U P  N O T E  <<=========  -----------------------------------------------------------------------------------------------------    - Patient seen and examined by me earlier today.    - Patient with unstable respiratory status per report.. with need for O2 m was on Bipap briefly yesterday...      patient today seems a bit better, on O2 via NC, 2 L        RVP was negative !! doesn't appear grossly fluid overloaded, no wheezing !!     ==================>> REVIEW OF SYSTEM <<=================    GEN: no fever, no chills, no pain..   RESP: + SOB on and off..  no cough   CVS: no chest pain, no palpitations  GI: no abdominal pain, no nausea, eating ok   : no dysuria, no frequency  Neuro: no headache, no dizziness    ==================>> PHYSICAL EXAM <<=================    GEN: A&O X 3 , NAD , comfortable, pleasant, calm in bed on oxygen.. encouraged out of bed to chair with assistance as needed.   HEENT: NCAT, PERRL, MMM, hearing intact  CVS: S1S2 , Irregular , No M/R/G appreciated, not tachy  PULM:  scattered bilateral mild rhonchi, no wheezing   ABD.: soft. non tender, non distended,  bowel sounds present  Extrem: intact pulses , NO edema        ( Note written / Date of service 01-15-24 ( This is certified to be the same as "ENTERED" date above ( for billing purposes)))    ==================>> MEDICATIONS <<====================    albuterol/ipratropium for Nebulization 3 milliLiter(s) Nebulizer every 6 hours  amLODIPine   Tablet 5 milliGRAM(s) Oral daily  apixaban 5 milliGRAM(s) Oral two times a day  aspirin  chewable 81 milliGRAM(s) Oral daily  atorvastatin 40 milliGRAM(s) Oral at bedtime  budesonide 160 MICROgram(s)/formoterol 4.5 MICROgram(s) Inhaler 2 Puff(s) Inhalation two times a day  buMETAnide Injectable 2 milliGRAM(s) IV Push once  gabapentin 300 milliGRAM(s) Oral at bedtime  insulin lispro (ADMELOG) corrective regimen sliding scale   SubCutaneous Before meals and at bedtime  losartan 100 milliGRAM(s) Oral daily  melatonin 5 milliGRAM(s) Oral at bedtime  metoprolol tartrate 50 milliGRAM(s) Oral every 8 hours  montelukast 10 milliGRAM(s) Oral at bedtime  pantoprazole    Tablet 40 milliGRAM(s) Oral before breakfast  polyethylene glycol 3350 17 Gram(s) Oral daily  senna 2 Tablet(s) Oral at bedtime  tiotropium 2.5 MICROgram(s) Inhaler 2 Puff(s) Inhalation daily    MEDICATIONS  (PRN):  albuterol    90 MICROgram(s) HFA Inhaler 2 Puff(s) Inhalation every 4 hours PRN Shortness of Breath and/or Wheezing    ___________  Active diet:  Diet, Regular:   DASH/TLC Sodium & Cholesterol Restricted  1200mL Fluid Restriction ([id number])  Kosher  Supplement Feeding Modality:  Oral  Ensure Compact Cans or Servings Per Day:  1       Frequency:  Two Times a day  ___________________    ==================>> VITAL SIGNS <<==================    Vital Signs Last 24 HrsT(C): 37.1 (01-15-24 @ 11:14)  T(F): 98.7 (01-15-24 @ 11:14), Max: 99.3 (01-14-24 @ 19:37)  HR: 83 (01-15-24 @ 11:14) (83 - 117)  BP: 125/97 (01-15-24 @ 11:14)  RR: 20 (01-15-24 @ 11:14) (20 - 22)  SpO2: 94% (01-15-24 @ 11:14) (93% - 100%)      CAPILLARY BLOOD GLUCOSE  POCT Blood Glucose.: 397 mg/dL (15 Chin 2024 11:08)  POCT Blood Glucose.: 99 mg/dL (15 Chin 2024 07:40)  POCT Blood Glucose.: 115 mg/dL (14 Jan 2024 21:20)  POCT Blood Glucose.: 102 mg/dL (14 Jan 2024 16:48)     ==================>> LAB AND IMAGING <<==================                        9.2    5.74  )-----------( 92       ( 15 Chin 2024 06:11 )             30.8        01-15    139  |  107  |  27<H>  ----------------------------<  110<H>  4.0   |  25  |  1.57<H>    Ca    8.6      15 Chin 2024 06:11  Phos  3.5     01-15  Mg     2.2     01-15    TPro  6.8  /  Alb  2.9<L>  /  TBili  0.6  /  DBili  x   /  AST  19  /  ALT  15  /  AlkPhos  61  01-15    WBC count:   5.74 <<== ,  8.67 <<== ,  9.28 <<== ,  7.79 <<==   Hemoglobin:   9.2 <<==,  9.4 <<==,  10.6 <<==,  11.0 <<==  platelets:  92 <==, 143 <==, 108 <==, 151 <==, 168 <==    Creatinine:  1.57  <<==, 1.61  <<==, 1.31  <<==, 1.49  <<==, QNS  <<==, 1.36  <<==  Sodium:   139  <==, 138  <==, 140  <==, 143  <==, QNS  <==, 141  <==, 140  <==       AST:          19(01-15) <== , 16(01-12) <== , 16(01-10) <== , 17(01-09) <== , 26(01-08) <==      ALT:        15(01-15)  <== , 15(01-12)  <== , 15(01-10)  <== , 13(01-09)  <== , 16(01-08)  <==      AP:        61(01-15)  <=, 76(01-12)  <=, 59(01-10)  <=, 64(01-09)  <=, 99(01-08)  <=     Bili:        0.6(01-15)  <=, 0.5(01-12)  <=, 0.5(01-10)  <=, 0.6(01-09)  <=, 0.6(01-08)  <=    Blood Gas Profile - Arterial (01.14.24 @ 11:35)    pH, Arterial: 7.46   pCO2, Arterial: 40 mmHg   pO2, Arterial: 99 mmHg   HCO3, Arterial: 28 mmol/L   Base Excess, Arterial: 4.2 mmol/L   Oxygen Saturation, Arterial: 100 %   FIO2, Arterial: 32.0   Blood Gas Comments Arterial: 3L N/C  Left Brachial    Rapid RVP Result: NotDetec (01.14.24 @ 11:00)    ____________________________    M I C R O B I O L O G Y :    Culture - Blood (collected 08 Jan 2024 06:34)  Source: .Blood Blood  Final Report (13 Jan 2024 13:00):    No growth at 5 days    Culture - Blood (collected 08 Jan 2024 06:24)  Source: .Blood Blood  Final Report (13 Jan 2024 13:00):    No growth at 5 days    SARS-CoV-2: NotDetec (01-14-24 @ 11:00)  SARS-CoV-2: NotDetec (01-08-24 @ 06:24)    ___________________________________________________________________________________  ===============>>  A S S E S S M E N T   A N D   P L A N <<===============  ------------------------------------------------------------------------------------------    acute hypoxemic respiratory failure  acute exacerbation of chromic systolic CHF  Afib with RVR > better controlled   COPD  >> now with new exacerbation      smoker ( long term, recently stopped !)   acute Pulmonary edema > improved   suspect new viral URI  DM type 2  history of Hypertension     ====>>>>    - was off O2 >> now back on O2 support and increased work of breathing     suspect COPD exacerbation         viral URI ruled out            CHF seems to be stable >> continue diuretics as able / needed :: monitor creatinine            cardio / pulm follow up and management appreciated              wean off O2 : don't believe patient needs O2 for home..?  - Diuresis and aim for negative fluid balance  - Monitor urine output  - Cont. AC  / HR control   - cardiology Follow-up, management, optimization appreciated       see discussion bellow   - Hemodynamic and cardiac monitoring  - Continue Current medications otherwise and monitor.  - nutrition, OOB as able, incentive spirometer   - supportive care   -GI/DVT Prophylaxis per protocol.    {    it appears patient has had CHF for a while , with reduced EF and several hospitalization and no follow up with cardio as outpatient !!      patient lives with wife, has 2 children in [place], one in [place] and one in [facility] !!  unclear if good social support .. and patient not seen to be very reliable to care fo himself...         unclear / not appears to be patient had ischemic workup and evaluation for ICD...   per discussed with cardio patient also may not have been on optimal medical management for 3 months fully to evaluation for improved EF....       >> will need close follow up as outpatient with cardio         ? social work evaluation may be helpful for safe DC planing ... :  follow up   }  --------------------------------------------  Case discussed with patient Nurse Practitioner   Education given on findings and plan of care  ___________________________  HAngella Rivera D.O.  Pager: [phone]

## 2024-01-15 NOTE — PROGRESS NOTE ADULT - SUBJECTIVE AND OBJECTIVE BOX
Time of Visit:  Patient seen and examined.     MEDICATIONS  (STANDING):  albuterol/ipratropium for Nebulization 3 milliLiter(s) Nebulizer every 6 hours  amLODIPine   Tablet 5 milliGRAM(s) Oral daily  apixaban 5 milliGRAM(s) Oral two times a day  aspirin  chewable 81 milliGRAM(s) Oral daily  atorvastatin 40 milliGRAM(s) Oral at bedtime  budesonide 160 MICROgram(s)/formoterol 4.5 MICROgram(s) Inhaler 2 Puff(s) Inhalation two times a day  buMETAnide Injectable 2 milliGRAM(s) IV Push once  gabapentin 300 milliGRAM(s) Oral at bedtime  insulin lispro (ADMELOG) corrective regimen sliding scale   SubCutaneous Before meals and at bedtime  losartan 100 milliGRAM(s) Oral daily  melatonin 5 milliGRAM(s) Oral at bedtime  metoprolol tartrate 50 milliGRAM(s) Oral every 8 hours  montelukast 10 milliGRAM(s) Oral at bedtime  pantoprazole    Tablet 40 milliGRAM(s) Oral before breakfast  polyethylene glycol 3350 17 Gram(s) Oral daily  senna 2 Tablet(s) Oral at bedtime  tiotropium 2.5 MICROgram(s) Inhaler 2 Puff(s) Inhalation daily      MEDICATIONS  (PRN):  albuterol    90 MICROgram(s) HFA Inhaler 2 Puff(s) Inhalation every 4 hours PRN Shortness of Breath and/or Wheezing       Medications up to date at time of exam.    ROS; No fever, chills, cough, congestion on exam.   PHYSICAL EXAMINATION:  Vital Signs Last 24 Hrs  T(C): 37.1 (15 Chin 2024 11:14), Max: 37.4 (14 Jan 2024 19:37)  T(F): 98.7 (15 Chin 2024 11:14), Max: 99.3 (14 Jan 2024 19:37)  HR: 83 (15 Chin 2024 11:14) (83 - 117)  BP: 125/97 (15 Chin 2024 11:14) (111/64 - 126/82)  BP(mean): --  RR: 20 (15 Chin 2024 11:14) (20 - 20)  SpO2: 94% (15 Chin 2024 11:14) (94% - 100%)    Parameters below as of 15 Chin 2024 11:14  Patient On (Oxygen Delivery Method): room air      General : Alert and oriented. No acute distress .     HEENT: Head is normocephalic and atraumatic. No nasal tenderness. Extraocular muscles are intact. Mucous membranes are moist.     NECK: Supple, no palpable adenopathy.    LUNGS: Clear to auscultation bilaterally with no wheezing, rales, or rhonchi. No use of accessory muscle.     HEART: S1 S2 Regular rate and no click / rub.     ABDOMEN: Soft, nontender, and nondistended. Active bowel sounds.     EXTREMITIES: Without any cyanosis, clubbing, rash, lesions .    NEUROLOGIC: Awake, alert, oriented.     SKIN: Warm and moist . Non diaphoretic.     LABS:                        9.2    5.74  )-----------( 92       ( 15 Chin 2024 06:11 )             30.8     01-15    139  |  107  |  27<H>  ----------------------------<  110<H>  4.0   |  25  |  1.57<H>    Ca    8.6      15 Chin 2024 06:11  Phos  3.5     01-15  Mg     2.2     01-15    TPro  6.8  /  Alb  2.9<L>  /  TBili  0.6  /  DBili  x   /  AST  19  /  ALT  15  /  AlkPhos  61  01-15      Urinalysis Basic - ( 15 Chin 2024 06:11 )    Color: x / Appearance: x / SG: x / pH: x  Gluc: 110 mg/dL / Ketone: x  / Bili: x / Urobili: x   Blood: x / Protein: x / Nitrite: x   Leuk Esterase: x / RBC: x / WBC x   Sq Epi: x / Non Sq Epi: x / Bacteria: x      ABG - ( 14 Jan 2024 11:35 )  pH, Arterial: 7.46  pH, Blood: x     /  pCO2: 40    /  pO2: 99    / HCO3: 28    / Base Excess: 4.2   /  SaO2: 100         MICROBIOLOGY: (if applicable)    RADIOLOGY & ADDITIONAL STUDIES:  EKG:   CXR: < from: Xray Chest 1 View- PORTABLE-Urgent (Xray Chest 1 View- PORTABLE-Urgent .) (01.15.24 @ 10:09) >    ACC: [id number] EXAM:  XR CHEST PORTABLE URGENT 1V   ORDERED BY: EMERSON VERAS     PROCEDURE DATE:  01/15/2024          INTERPRETATION:  Exam:XR CHEST URGENT    clinical history:Shortness of breath    Mild improved aeration of the lungs seen. Stable dural based   calcifications left chest. No pneumothorax.    IMPRESSION: Mild improved aeration    --- End of Report ---            CHRISTINE GAO MD; Attending Radiologist  This document has been electronically signed. Chin 15 2024 12:47PM    < end of copied text >    ECHO:    IMPRESSION: 68y Male PAST MEDICAL & SURGICAL HISTORY:  HTN (hypertension)      Varicose veins      HLD (hyperlipidemia)      Liver cirrhosis      Portal hypertension with esophageal varices      COPD (chronic obstructive pulmonary disease)      Syncope      Alcohol dependence, daily use      Smokes tobacco daily      DM (diabetes mellitus)      No significant past surgical history      Impression: This is a 67 Y/O Male former smoker. hx of chronic ETOH dependence with Cirrhosis . Presented to ED with worsening SOB. In the ED patient placed on Bipap. For pulmonary follow up of Acute hypoxic respiratory failure due to COPD , pulmonary edema / HFrEF , Rapid A Fib. Off Bipap and using on and off  Oxygen supplementation .       Suggestion:  O2 saturation 95% room air . Monitor O2 saturation room air trend , using O2 supplement on and off.  Can have oxygen supplementation  2L NC .  Continue Duoneb via nebulization Q 6 Hours .  Continue Symbicort 160- 4.5 mcg 2 puffs Twice Daily.   Continue Singulair 10 mg Daily.    On Apixaban 5 mg Twice daily.   Continue Tiotropium 2.5 mcg 2 puffs Daily.     Diuresis and aim for negative fluid balance.   Monitor urine output.   Beta blockers for rate control.   Hemodynamic and cardiac monitoring   Time of Visit:  Patient seen and examined.     MEDICATIONS  (STANDING):  albuterol/ipratropium for Nebulization 3 milliLiter(s) Nebulizer every 6 hours  amLODIPine   Tablet 5 milliGRAM(s) Oral daily  apixaban 5 milliGRAM(s) Oral two times a day  aspirin  chewable 81 milliGRAM(s) Oral daily  atorvastatin 40 milliGRAM(s) Oral at bedtime  budesonide 160 MICROgram(s)/formoterol 4.5 MICROgram(s) Inhaler 2 Puff(s) Inhalation two times a day  buMETAnide Injectable 2 milliGRAM(s) IV Push once  gabapentin 300 milliGRAM(s) Oral at bedtime  insulin lispro (ADMELOG) corrective regimen sliding scale   SubCutaneous Before meals and at bedtime  losartan 100 milliGRAM(s) Oral daily  melatonin 5 milliGRAM(s) Oral at bedtime  metoprolol tartrate 50 milliGRAM(s) Oral every 8 hours  montelukast 10 milliGRAM(s) Oral at bedtime  pantoprazole    Tablet 40 milliGRAM(s) Oral before breakfast  polyethylene glycol 3350 17 Gram(s) Oral daily  senna 2 Tablet(s) Oral at bedtime  tiotropium 2.5 MICROgram(s) Inhaler 2 Puff(s) Inhalation daily      MEDICATIONS  (PRN):  albuterol    90 MICROgram(s) HFA Inhaler 2 Puff(s) Inhalation every 4 hours PRN Shortness of Breath and/or Wheezing       Medications up to date at time of exam.    ROS; No fever, chills, cough, congestion on exam.   PHYSICAL EXAMINATION:  Vital Signs Last 24 Hrs  T(C): 37.1 (15 Chin 2024 11:14), Max: 37.4 (14 Jan 2024 19:37)  T(F): 98.7 (15 Chin 2024 11:14), Max: 99.3 (14 Jan 2024 19:37)  HR: 83 (15 Chin 2024 11:14) (83 - 117)  BP: 125/97 (15 Chin 2024 11:14) (111/64 - 126/82)  BP(mean): --  RR: 20 (15 Chin 2024 11:14) (20 - 20)  SpO2: 94% (15 Chin 2024 11:14) (94% - 100%)    Parameters below as of 15 Chin 2024 11:14  Patient On (Oxygen Delivery Method): room air      General : Alert and oriented. No acute distress .     HEENT: Head is normocephalic and atraumatic. No nasal tenderness. Extraocular muscles are intact. Mucous membranes are moist.     NECK: Supple, no palpable adenopathy.    LUNGS: Clear to auscultation bilaterally with no wheezing, rales, or rhonchi. No use of accessory muscle.     HEART: S1 S2 Regular rate and no click / rub.     ABDOMEN: Soft, nontender, and nondistended. Active bowel sounds.     EXTREMITIES: Without any cyanosis, clubbing, rash, lesions .    NEUROLOGIC: Awake, alert, oriented.     SKIN: Warm and moist . Non diaphoretic.     LABS:                        9.2    5.74  )-----------( 92       ( 15 Chin 2024 06:11 )             30.8     01-15    139  |  107  |  27<H>  ----------------------------<  110<H>  4.0   |  25  |  1.57<H>    Ca    8.6      15 Chin 2024 06:11  Phos  3.5     01-15  Mg     2.2     01-15    TPro  6.8  /  Alb  2.9<L>  /  TBili  0.6  /  DBili  x   /  AST  19  /  ALT  15  /  AlkPhos  61  01-15      Urinalysis Basic - ( 15 Chin 2024 06:11 )    Color: x / Appearance: x / SG: x / pH: x  Gluc: 110 mg/dL / Ketone: x  / Bili: x / Urobili: x   Blood: x / Protein: x / Nitrite: x   Leuk Esterase: x / RBC: x / WBC x   Sq Epi: x / Non Sq Epi: x / Bacteria: x      ABG - ( 14 Jan 2024 11:35 )  pH, Arterial: 7.46  pH, Blood: x     /  pCO2: 40    /  pO2: 99    / HCO3: 28    / Base Excess: 4.2   /  SaO2: 100         MICROBIOLOGY: (if applicable)    RADIOLOGY & ADDITIONAL STUDIES:  EKG:   CXR: < from: Xray Chest 1 View- PORTABLE-Urgent (Xray Chest 1 View- PORTABLE-Urgent .) (01.15.24 @ 10:09) >    ACC: [id number] EXAM:  XR CHEST PORTABLE URGENT 1V   ORDERED BY: EMERSON VERAS     PROCEDURE DATE:  01/15/2024          INTERPRETATION:  Exam:XR CHEST URGENT    clinical history:Shortness of breath    Mild improved aeration of the lungs seen. Stable dural based   calcifications left chest. No pneumothorax.    IMPRESSION: Mild improved aeration    --- End of Report ---            CHRISTINE GAO MD; Attending Radiologist  This document has been electronically signed. Chin 15 2024 12:47PM    < end of copied text >    ECHO:    IMPRESSION: 68y Male PAST MEDICAL & SURGICAL HISTORY:  HTN (hypertension)      Varicose veins      HLD (hyperlipidemia)      Liver cirrhosis      Portal hypertension with esophageal varices      COPD (chronic obstructive pulmonary disease)      Syncope      Alcohol dependence, daily use      Smokes tobacco daily      DM (diabetes mellitus)      No significant past surgical history      Impression: This is a 69 Y/O Male former smoker. hx of chronic ETOH dependence with Cirrhosis . Presented to ED with worsening SOB. In the ED patient placed on Bipap. For pulmonary follow up of Acute hypoxic respiratory failure due to COPD , pulmonary edema / HFrEF , Rapid A Fib. Off Bipap and using on and off  Oxygen supplementation .       Suggestion:  O2 saturation 95% room air . Monitor O2 saturation room air trend , using O2 supplement on and off.  Can have oxygen supplementation  2L NC .  Continue Duoneb via nebulization Q 6 Hours .  Continue Symbicort 160- 4.5 mcg 2 puffs Twice Daily.   Continue Singulair 10 mg Daily.    On Apixaban 5 mg Twice daily.   Continue Tiotropium 2.5 mcg 2 puffs Daily.     Diuresis and aim for negative fluid balance.   Monitor urine output.   Beta blockers for rate control.   Hemodynamic and cardiac monitoring   Time of Visit:  Patient seen and examined.     MEDICATIONS  (STANDING):  albuterol/ipratropium for Nebulization 3 milliLiter(s) Nebulizer every 6 hours  amLODIPine   Tablet 5 milliGRAM(s) Oral daily  apixaban 5 milliGRAM(s) Oral two times a day  aspirin  chewable 81 milliGRAM(s) Oral daily  atorvastatin 40 milliGRAM(s) Oral at bedtime  budesonide 160 MICROgram(s)/formoterol 4.5 MICROgram(s) Inhaler 2 Puff(s) Inhalation two times a day  buMETAnide Injectable 2 milliGRAM(s) IV Push once  gabapentin 300 milliGRAM(s) Oral at bedtime  insulin lispro (ADMELOG) corrective regimen sliding scale   SubCutaneous Before meals and at bedtime  losartan 100 milliGRAM(s) Oral daily  melatonin 5 milliGRAM(s) Oral at bedtime  metoprolol tartrate 50 milliGRAM(s) Oral every 8 hours  montelukast 10 milliGRAM(s) Oral at bedtime  pantoprazole    Tablet 40 milliGRAM(s) Oral before breakfast  polyethylene glycol 3350 17 Gram(s) Oral daily  senna 2 Tablet(s) Oral at bedtime  tiotropium 2.5 MICROgram(s) Inhaler 2 Puff(s) Inhalation daily      MEDICATIONS  (PRN):  albuterol    90 MICROgram(s) HFA Inhaler 2 Puff(s) Inhalation every 4 hours PRN Shortness of Breath and/or Wheezing       Medications up to date at time of exam.    ROS; No fever, chills, cough, congestion on exam.   PHYSICAL EXAMINATION:  Vital Signs Last 24 Hrs  T(C): 37.1 (15 Chin 2024 11:14), Max: 37.4 (14 Jan 2024 19:37)  T(F): 98.7 (15 Chin 2024 11:14), Max: 99.3 (14 Jan 2024 19:37)  HR: 83 (15 Chin 2024 11:14) (83 - 117)  BP: 125/97 (15 Chin 2024 11:14) (111/64 - 126/82)  BP(mean): --  RR: 20 (15 Chin 2024 11:14) (20 - 20)  SpO2: 94% (15 Chin 2024 11:14) (94% - 100%)    Parameters below as of 15 Chin 2024 11:14  Patient On (Oxygen Delivery Method): room air      General : Alert and oriented. No acute distress .     HEENT: Head is normocephalic and atraumatic. No nasal tenderness. Extraocular muscles are intact. Mucous membranes are moist.     NECK: Supple, no palpable adenopathy.    LUNGS: Clear to auscultation bilaterally with no wheezing, rales, or rhonchi. No use of accessory muscle.     HEART: S1 S2 Regular rate and no click / rub.     ABDOMEN: Soft, nontender, and nondistended. Active bowel sounds.     EXTREMITIES: Without any cyanosis, clubbing, rash, lesions .    NEUROLOGIC: Awake, alert, oriented.     SKIN: Warm and moist . Non diaphoretic.     LABS:                        9.2    5.74  )-----------( 92       ( 15 Chin 2024 06:11 )             30.8     01-15    139  |  107  |  27<H>  ----------------------------<  110<H>  4.0   |  25  |  1.57<H>    Ca    8.6      15 Chin 2024 06:11  Phos  3.5     01-15  Mg     2.2     01-15    TPro  6.8  /  Alb  2.9<L>  /  TBili  0.6  /  DBili  x   /  AST  19  /  ALT  15  /  AlkPhos  61  01-15      Urinalysis Basic - ( 15 Chin 2024 06:11 )    Color: x / Appearance: x / SG: x / pH: x  Gluc: 110 mg/dL / Ketone: x  / Bili: x / Urobili: x   Blood: x / Protein: x / Nitrite: x   Leuk Esterase: x / RBC: x / WBC x   Sq Epi: x / Non Sq Epi: x / Bacteria: x      ABG - ( 14 Jan 2024 11:35 )  pH, Arterial: 7.46  pH, Blood: x     /  pCO2: 40    /  pO2: 99    / HCO3: 28    / Base Excess: 4.2   /  SaO2: 100         MICROBIOLOGY: (if applicable)    RADIOLOGY & ADDITIONAL STUDIES:  EKG:   CXR: < from: Xray Chest 1 View- PORTABLE-Urgent (Xray Chest 1 View- PORTABLE-Urgent .) (01.15.24 @ 10:09) >    ACC: [id number] EXAM:  XR CHEST PORTABLE URGENT 1V   ORDERED BY: EMERSON VERAS     PROCEDURE DATE:  01/15/2024          INTERPRETATION:  Exam:XR CHEST URGENT    clinical history:Shortness of breath    Mild improved aeration of the lungs seen. Stable dural based   calcifications left chest. No pneumothorax.    IMPRESSION: Mild improved aeration    --- End of Report ---            CHRISTINE GAO MD; Attending Radiologist  This document has been electronically signed. Chin 15 2024 12:47PM    < end of copied text >    ECHO:    IMPRESSION: 68y Male PAST MEDICAL & SURGICAL HISTORY:  HTN (hypertension)      Varicose veins      HLD (hyperlipidemia)      Liver cirrhosis      Portal hypertension with esophageal varices      COPD (chronic obstructive pulmonary disease)      Syncope      Alcohol dependence, daily use      Smokes tobacco daily      DM (diabetes mellitus)      No significant past surgical history      Impression: This is a 69 Y/O Male former smoker. hx of chronic ETOH dependence with Cirrhosis . Presented to ED with worsening SOB. In the ED patient placed on Bipap. For pulmonary follow up of Acute hypoxic respiratory failure due to COPD , pulmonary edema / HFrEF , Rapid A Fib. Off Bipap and using on and off  Oxygen supplementation .       Suggestion:  O2 saturation 95% room air . Monitor O2 saturation room air trend , using O2 supplement on and off.  Can have oxygen supplementation  2L NC .  Continue Duoneb via nebulization Q 6 Hours .  Continue Symbicort 160- 4.5 mcg 2 puffs Twice Daily.   Continue Singulair 10 mg Daily.    On Apixaban 5 mg Twice daily.   Continue Tiotropium 2.5 mcg 2 puffs Daily.     Diuresis and aim for negative fluid balance.   Monitor urine output.   Beta blockers for rate control.   Hemodynamic and cardiac monitoring

## 2024-01-15 NOTE — CHART NOTE - NSCHARTNOTEFT_GEN_A_CORE
68M from home, ambulates independently, PMHx COPD (not on O2), former smoker quit 2-months, HLD, DM, HTN, chronic ETOH dependence c/b cirrhosis, p/w worsening SOB. Admitted to the ICU for AHRF 2/2 suspected CHF exacerbation requiring continuous BIPAP.    Called by the nurse for respiratory distress and wheezing. Pt on nasal cannula saturating well. When assessed at bedside with [place]  [id number], pt is awake, alert oriented x 4. Endorsed not feeling well and observed increased work of breathing but not desaturating. Of note, pt just had his nebulizer treatment (duoneb) and Albuterol PRN.    #AHRF  - pulm following Dr Manrique  - stat CXR showed Mild improved aeration  - scattered wheeze heard all through out  - continue neb tx ATC  - continue albuterol PRN  - stat Bumex 1mg IV given  - Bumex 2mg IV this evening 6PM  - will check creatinine in AM for further doses of Bumex necessary  - pt observed with improved breathing around 11AM  - discussed with Dr Rivera 68M from home, ambulates independently, PMHx COPD (not on O2), former smoker quit 2-months, HLD, DM, HTN, chronic ETOH dependence c/b cirrhosis, p/w worsening SOB. Admitted to the ICU for AHRF 2/2 suspected CHF exacerbation requiring continuous BIPAP.    Called by the nurse for respiratory distress and wheezing. Pt on nasal cannula saturating well. When assessed at bedside with Chinese  [id number], pt is awake, alert oriented x 4. Endorsed not feeling well and observed increased work of breathing but not desaturating. Of note, pt just had his nebulizer treatment (duoneb) and Albuterol PRN.    #AHRF  - pulm following Dr Manrique  - stat CXR showed Mild improved aeration  - scattered wheeze heard all through out  - continue neb tx ATC  - continue albuterol PRN  - stat Bumex 1mg IV given  - Bumex 2mg IV this evening 6PM  - will check creatinine in AM for further doses of Bumex necessary  - pt observed with improved breathing around 11AM  - discussed with Dr Rivera 68M from home, ambulates independently, PMHx COPD (not on O2), former smoker quit 2-months, HLD, DM, HTN, chronic ETOH dependence c/b cirrhosis, p/w worsening SOB. Admitted to the ICU for AHRF 2/2 suspected CHF exacerbation requiring continuous BIPAP.    Called by the nurse for respiratory distress and wheezing. Pt on nasal cannula saturating well. When assessed at bedside with Brazilian  [id number], pt is awake, alert oriented x 4. Endorsed not feeling well and observed increased work of breathing but not desaturating. Of note, pt just had his nebulizer treatment (duoneb) and Albuterol PRN.    #AHRF  - pulm following Dr Manrique  - stat CXR showed Mild improved aeration  - scattered wheeze heard all through out  - continue neb tx ATC  - continue albuterol PRN  - stat Bumex 1mg IV given  - Bumex 2mg IV this evening 6PM  - will check creatinine in AM for further doses of Bumex necessary  - pt observed with improved breathing around 11AM  - discussed with Dr Rivera 68M from home, ambulates independently, PMHx COPD (not on O2), former smoker quit 2-months, HLD, DM, HTN, chronic ETOH dependence c/b cirrhosis, p/w worsening SOB. Admitted to the ICU for AHRF 2/2 suspected CHF exacerbation requiring continuous BIPAP.    Called by the nurse for respiratory distress and wheezing. Pt on nasal cannula saturating well. When assessed at bedside with Citizen of [place]  [id number], pt is awake, alert oriented x 4. Endorsed not feeling well and observed increased work of breathing but not desaturating. Of note, pt just had his nebulizer treatment (duoneb) and Albuterol PRN.    #AHRF  - pulm following Dr Manrique  - stat CXR showed Mild improved aeration  - scattered wheeze heard all through out  - continue neb tx ATC  - continue albuterol PRN  - stat Bumex 1mg IV given  - Bumex 2mg IV this evening 6PM  - will check creatinine in AM for further doses of Bumex necessary  - pt observed with improved breathing around 11AM    Plan and findings discussed with Dr Rivera and Dr Manrique (pulmonologist) 68M from home, ambulates independently, PMHx COPD (not on O2), former smoker quit 2-months, HLD, DM, HTN, chronic ETOH dependence c/b cirrhosis, p/w worsening SOB. Admitted to the ICU for AHRF 2/2 suspected CHF exacerbation requiring continuous BIPAP.    Called by the nurse for respiratory distress and wheezing. Pt on nasal cannula saturating well. When assessed at bedside with [place]  [id number], pt is awake, alert oriented x 4. Endorsed not feeling well and observed increased work of breathing but not desaturating. Of note, pt just had his nebulizer treatment (duoneb) and Albuterol PRN.    #AHRF  - pulm following Dr Manrique  - stat CXR showed Mild improved aeration  - scattered wheeze heard all through out  - continue neb tx ATC  - continue albuterol PRN  - stat Bumex 1mg IV given  - Bumex 2mg IV this evening 6PM  - will check creatinine in AM for further doses of Bumex necessary  - pt observed with improved breathing around 11AM    Plan and findings discussed with Dr Rivera and Dr Manrique (pulmonologist) 68M from home, ambulates independently, PMHx COPD (not on O2), former smoker quit 2-months, HLD, DM, HTN, chronic ETOH dependence c/b cirrhosis, p/w worsening SOB. Admitted to the ICU for AHRF 2/2 suspected CHF exacerbation requiring continuous BIPAP.    Called by the nurse for respiratory distress and wheezing. Pt on nasal cannula saturating well. When assessed at bedside with [place]  [id number], pt is awake, alert oriented x 4. Endorsed not feeling well and observed increased work of breathing but not desaturating. Of note, pt just had his nebulizer treatment (duoneb) and Albuterol PRN.    #AHRF  - pulm following Dr Manrique  - stat CXR showed Mild improved aeration  - scattered wheeze heard all through out  - continue neb tx ATC  - continue albuterol PRN  - stat Bumex 1mg IV given  - Bumex 2mg IV this evening 6PM  - will check creatinine in AM for further doses of Bumex necessary  - pt observed with improved breathing around 11AM    Plan and findings discussed with Dr Rivera and Dr Manrique (pulmonologist)

## 2024-01-16 LAB
ALBUMIN SERPL ELPH-MCNC: 3 G/DL — LOW (ref 3.5–5)
ALP SERPL-CCNC: 62 U/L — SIGNIFICANT CHANGE UP (ref 40–120)
ALT FLD-CCNC: 16 U/L DA — SIGNIFICANT CHANGE UP (ref 10–60)
ANION GAP SERPL CALC-SCNC: 8 MMOL/L — SIGNIFICANT CHANGE UP (ref 5–17)
AST SERPL-CCNC: 12 U/L — SIGNIFICANT CHANGE UP (ref 10–40)
BILIRUB SERPL-MCNC: 0.4 MG/DL — SIGNIFICANT CHANGE UP (ref 0.2–1.2)
BUN SERPL-MCNC: 40 MG/DL — HIGH (ref 7–18)
CALCIUM SERPL-MCNC: 9.3 MG/DL — SIGNIFICANT CHANGE UP (ref 8.4–10.5)
CHLORIDE SERPL-SCNC: 108 MMOL/L — SIGNIFICANT CHANGE UP (ref 96–108)
CO2 SERPL-SCNC: 26 MMOL/L — SIGNIFICANT CHANGE UP (ref 22–31)
CREAT SERPL-MCNC: 1.69 MG/DL — HIGH (ref 0.5–1.3)
EGFR: 43 ML/MIN/1.73M2 — LOW
GLUCOSE BLDC GLUCOMTR-MCNC: 110 MG/DL — HIGH (ref 70–99)
GLUCOSE BLDC GLUCOMTR-MCNC: 143 MG/DL — HIGH (ref 70–99)
GLUCOSE BLDC GLUCOMTR-MCNC: 147 MG/DL — HIGH (ref 70–99)
GLUCOSE BLDC GLUCOMTR-MCNC: 165 MG/DL — HIGH (ref 70–99)
GLUCOSE SERPL-MCNC: 138 MG/DL — HIGH (ref 70–99)
HCT VFR BLD CALC: 29.6 % — LOW (ref 39–50)
HGB BLD-MCNC: 9.1 G/DL — LOW (ref 13–17)
MAGNESIUM SERPL-MCNC: 2.2 MG/DL — SIGNIFICANT CHANGE UP (ref 1.6–2.6)
MCHC RBC-ENTMCNC: 26.3 PG — LOW (ref 27–34)
MCHC RBC-ENTMCNC: 30.7 GM/DL — LOW (ref 32–36)
MCV RBC AUTO: 85.5 FL — SIGNIFICANT CHANGE UP (ref 80–100)
NRBC # BLD: 0 /100 WBCS — SIGNIFICANT CHANGE UP (ref 0–0)
PHOSPHATE SERPL-MCNC: 4.2 MG/DL — SIGNIFICANT CHANGE UP (ref 2.5–4.5)
PLATELET # BLD AUTO: 125 K/UL — LOW (ref 150–400)
POTASSIUM SERPL-MCNC: 3.3 MMOL/L — LOW (ref 3.5–5.3)
POTASSIUM SERPL-SCNC: 3.3 MMOL/L — LOW (ref 3.5–5.3)
PROT SERPL-MCNC: 7.1 G/DL — SIGNIFICANT CHANGE UP (ref 6–8.3)
RBC # BLD: 3.46 M/UL — LOW (ref 4.2–5.8)
RBC # FLD: 14.3 % — SIGNIFICANT CHANGE UP (ref 10.3–14.5)
SODIUM SERPL-SCNC: 142 MMOL/L — SIGNIFICANT CHANGE UP (ref 135–145)
WBC # BLD: 4.75 K/UL — SIGNIFICANT CHANGE UP (ref 3.8–10.5)
WBC # FLD AUTO: 4.75 K/UL — SIGNIFICANT CHANGE UP (ref 3.8–10.5)

## 2024-01-16 PROCEDURE — 71045 X-RAY EXAM CHEST 1 VIEW: CPT | Mod: 26

## 2024-01-16 RX ORDER — DIGOXIN 250 MCG
125 TABLET ORAL DAILY
Refills: 0 | Status: DISCONTINUED | OUTPATIENT
Start: 2024-01-17 | End: 2024-01-20

## 2024-01-16 RX ORDER — POTASSIUM CHLORIDE 20 MEQ
40 PACKET (EA) ORAL ONCE
Refills: 0 | Status: COMPLETED | OUTPATIENT
Start: 2024-01-16 | End: 2024-01-16

## 2024-01-16 RX ORDER — DIGOXIN 250 MCG
250 TABLET ORAL ONCE
Refills: 0 | Status: COMPLETED | OUTPATIENT
Start: 2024-01-16 | End: 2024-01-16

## 2024-01-16 RX ADMIN — Medication 3 MILLILITER(S): at 20:12

## 2024-01-16 RX ADMIN — ATORVASTATIN CALCIUM 40 MILLIGRAM(S): 80 TABLET, FILM COATED ORAL at 21:37

## 2024-01-16 RX ADMIN — Medication 3 MILLILITER(S): at 08:27

## 2024-01-16 RX ADMIN — Medication 1: at 16:56

## 2024-01-16 RX ADMIN — AMLODIPINE BESYLATE 5 MILLIGRAM(S): 2.5 TABLET ORAL at 05:57

## 2024-01-16 RX ADMIN — Medication 50 MILLIGRAM(S): at 21:38

## 2024-01-16 RX ADMIN — Medication 81 MILLIGRAM(S): at 11:17

## 2024-01-16 RX ADMIN — BUDESONIDE AND FORMOTEROL FUMARATE DIHYDRATE 2 PUFF(S): 160; 4.5 AEROSOL RESPIRATORY (INHALATION) at 21:37

## 2024-01-16 RX ADMIN — Medication 5 MILLIGRAM(S): at 21:38

## 2024-01-16 RX ADMIN — GABAPENTIN 300 MILLIGRAM(S): 400 CAPSULE ORAL at 21:37

## 2024-01-16 RX ADMIN — Medication 40 MILLIEQUIVALENT(S): at 10:27

## 2024-01-16 RX ADMIN — Medication 50 MILLIGRAM(S): at 14:07

## 2024-01-16 RX ADMIN — ALBUTEROL 2 PUFF(S): 90 AEROSOL, METERED ORAL at 08:27

## 2024-01-16 RX ADMIN — SENNA PLUS 2 TABLET(S): 8.6 TABLET ORAL at 21:37

## 2024-01-16 RX ADMIN — TIOTROPIUM BROMIDE 2 PUFF(S): 18 CAPSULE ORAL; RESPIRATORY (INHALATION) at 11:17

## 2024-01-16 RX ADMIN — Medication 250 MICROGRAM(S): at 17:28

## 2024-01-16 RX ADMIN — PANTOPRAZOLE SODIUM 40 MILLIGRAM(S): 20 TABLET, DELAYED RELEASE ORAL at 05:57

## 2024-01-16 RX ADMIN — APIXABAN 5 MILLIGRAM(S): 2.5 TABLET, FILM COATED ORAL at 17:12

## 2024-01-16 RX ADMIN — ALBUTEROL 2 PUFF(S): 90 AEROSOL, METERED ORAL at 18:07

## 2024-01-16 RX ADMIN — Medication 3 MILLILITER(S): at 14:31

## 2024-01-16 RX ADMIN — Medication 50 MILLIGRAM(S): at 05:57

## 2024-01-16 RX ADMIN — MONTELUKAST 10 MILLIGRAM(S): 4 TABLET, CHEWABLE ORAL at 21:37

## 2024-01-16 RX ADMIN — APIXABAN 5 MILLIGRAM(S): 2.5 TABLET, FILM COATED ORAL at 05:57

## 2024-01-16 RX ADMIN — LOSARTAN POTASSIUM 100 MILLIGRAM(S): 100 TABLET, FILM COATED ORAL at 05:57

## 2024-01-16 RX ADMIN — BUDESONIDE AND FORMOTEROL FUMARATE DIHYDRATE 2 PUFF(S): 160; 4.5 AEROSOL RESPIRATORY (INHALATION) at 09:58

## 2024-01-16 NOTE — PROGRESS NOTE ADULT - ASSESSMENT
68M from home, ambulates independently, PMHx COPD (not on O2), former smoker quit 2-months, HLD, DM, HTN, chronic ETOH dependence c/b cirrhosis, p/w worsening SOB. Admitted initially to ICU for AHRF 2/2 suspected CHF exacerbation requiring continuous BIPAP. Pt. down graded to tele 1/9.

## 2024-01-16 NOTE — PROGRESS NOTE ADULT - SUBJECTIVE AND OBJECTIVE BOX
Time of Visit:  Patient seen and examined.     MEDICATIONS  (STANDING):  albuterol/ipratropium for Nebulization 3 milliLiter(s) Nebulizer every 6 hours  amLODIPine   Tablet 5 milliGRAM(s) Oral daily  apixaban 5 milliGRAM(s) Oral two times a day  aspirin  chewable 81 milliGRAM(s) Oral daily  atorvastatin 40 milliGRAM(s) Oral at bedtime  budesonide 160 MICROgram(s)/formoterol 4.5 MICROgram(s) Inhaler 2 Puff(s) Inhalation two times a day  gabapentin 300 milliGRAM(s) Oral at bedtime  insulin lispro (ADMELOG) corrective regimen sliding scale   SubCutaneous Before meals and at bedtime  losartan 100 milliGRAM(s) Oral daily  melatonin 5 milliGRAM(s) Oral at bedtime  metoprolol tartrate 50 milliGRAM(s) Oral every 8 hours  montelukast 10 milliGRAM(s) Oral at bedtime  pantoprazole    Tablet 40 milliGRAM(s) Oral before breakfast  polyethylene glycol 3350 17 Gram(s) Oral daily  senna 2 Tablet(s) Oral at bedtime  tiotropium 2.5 MICROgram(s) Inhaler 2 Puff(s) Inhalation daily      MEDICATIONS  (PRN):  albuterol    90 MICROgram(s) HFA Inhaler 2 Puff(s) Inhalation every 4 hours PRN Shortness of Breath and/or Wheezing       Medications up to date at time of exam.    ROS; No fever, chills, cough, nasal congestion on exam.   PHYSICAL EXAMINATION:    Vital Signs Last 24 Hrs  T(C): 36.3 (16 Jan 2024 15:46), Max: 37.1 (16 Jan 2024 11:32)  T(F): 97.3 (16 Jan 2024 15:46), Max: 98.8 (16 Jan 2024 11:32)  HR: 131 (16 Jan 2024 15:46) (106 - 137)  BP: 102/55 (16 Jan 2024 15:46) (93/58 - 119/67)  BP(mean): --  RR: 18 (16 Jan 2024 15:46) (18 - 20)  SpO2: 96% (16 Jan 2024 15:46) (93% - 99%)    Parameters below as of 16 Jan 2024 15:46  Patient On (Oxygen Delivery Method): room air       General : Alert and oriented. Able to answer question with no SOB.  No acute distress .     HEENT: Head is normocephalic and atraumatic. No nasal tenderness. Extraocular muscles are intact. Mucous membranes are moist.     NECK: Supple, no palpable adenopathy.    LUNGS: Non labored. No wheezing. No use of accessory muscle.     HEART: S1 S2 Regular rate and no click / rub.     ABDOMEN: Soft, nontender, and nondistended. Active bowel sounds.     EXTREMITIES: Without any cyanosis, clubbing, rash, lesions .    NEUROLOGIC: Awake, alert, oriented.     SKIN: Warm and moist . Non diaphoretic.         LABS:                        9.1    4.75  )-----------( 125      ( 16 Jan 2024 06:50 )             29.6     01-16    142  |  108  |  40<H>  ----------------------------<  138<H>  3.3<L>   |  26  |  1.69<H>    Ca    9.3      16 Jan 2024 06:50  Phos  4.2     01-16  Mg     2.2     01-16    TPro  7.1  /  Alb  3.0<L>  /  TBili  0.4  /  DBili  x   /  AST  12  /  ALT  16  /  AlkPhos  62  01-16      Urinalysis Basic - ( 16 Jan 2024 06:50 )    Color: x / Appearance: x / SG: x / pH: x  Gluc: 138 mg/dL / Ketone: x  / Bili: x / Urobili: x   Blood: x / Protein: x / Nitrite: x   Leuk Esterase: x / RBC: x / WBC x   Sq Epi: x / Non Sq Epi: x / Bacteria: x                      MICROBIOLOGY: (if applicable)    RADIOLOGY & ADDITIONAL STUDIES:  EKG:   CXR: < from: Xray Chest 1 View- PORTABLE-Urgent (Xray Chest 1 View- PORTABLE-Urgent .) (01.15.24 @ 10:09) >    ACC: [id number] EXAM:  XR CHEST PORTABLE URGENT 1V   ORDERED BY: EMERSON VERAS     PROCEDURE DATE:  01/15/2024          INTERPRETATION:  Exam:XR CHEST URGENT    clinical history:Shortness of breath    Mild improved aeration of the lungs seen. Stable dural based   calcifications left chest. No pneumothorax.    IMPRESSION: Mild improved aeration    --- End of Report ---            CHRISTINE GAO MD; Attending Radiologist  This document has been electronically signed. Chin 15 2024 12:47PM    < end of copied text >    ECHO:    IMPRESSION: 68y Male PAST MEDICAL & SURGICAL HISTORY:  HTN (hypertension)      Varicose veins      HLD (hyperlipidemia)      Liver cirrhosis      Portal hypertension with esophageal varices      COPD (chronic obstructive pulmonary disease)      Syncope      Alcohol dependence, daily use      Smokes tobacco daily      DM (diabetes mellitus)      No significant past surgical history      Impression: This is a 67 Y/O Male former smoker. hx of chronic ETOH dependence with Cirrhosis . Presented to ED with worsening SOB. In the ED patient placed on Bipap. For pulmonary follow up of Acute hypoxic respiratory failure due to COPD , pulmonary edema / HFrEF , Rapid A Fib. Off Bipap and using on and off  Oxygen supplementation . 01-15-24 CXR reviewed No pneumothorax, No Effusion.        Suggestion:  O2 saturation 94% room air . Monitor O2 saturation room air trend , using O2 supplement on and off.  Can have oxygen supplementation  2L NC .  Continue Duoneb via nebulization Q 6 Hours . Do not give at the same time with PRN Albuterol 90 mcg 2 puffs Q 6 Hours.   Continue Symbicort 160- 4.5 mcg 2 puffs Twice Daily with oral spacer .   Continue Singulair 10 mg Daily.    On Apixaban 5 mg Twice daily.   Continue Tiotropium 2.5 mcg 2 puffs Daily.     Diuresis and aim for negative fluid balance.   Monitor urine output.   Beta blockers for rate control.   Hemodynamic and cardiac monitoring    Time of Visit:  Patient seen and examined.     MEDICATIONS  (STANDING):  albuterol/ipratropium for Nebulization 3 milliLiter(s) Nebulizer every 6 hours  amLODIPine   Tablet 5 milliGRAM(s) Oral daily  apixaban 5 milliGRAM(s) Oral two times a day  aspirin  chewable 81 milliGRAM(s) Oral daily  atorvastatin 40 milliGRAM(s) Oral at bedtime  budesonide 160 MICROgram(s)/formoterol 4.5 MICROgram(s) Inhaler 2 Puff(s) Inhalation two times a day  gabapentin 300 milliGRAM(s) Oral at bedtime  insulin lispro (ADMELOG) corrective regimen sliding scale   SubCutaneous Before meals and at bedtime  losartan 100 milliGRAM(s) Oral daily  melatonin 5 milliGRAM(s) Oral at bedtime  metoprolol tartrate 50 milliGRAM(s) Oral every 8 hours  montelukast 10 milliGRAM(s) Oral at bedtime  pantoprazole    Tablet 40 milliGRAM(s) Oral before breakfast  polyethylene glycol 3350 17 Gram(s) Oral daily  senna 2 Tablet(s) Oral at bedtime  tiotropium 2.5 MICROgram(s) Inhaler 2 Puff(s) Inhalation daily      MEDICATIONS  (PRN):  albuterol    90 MICROgram(s) HFA Inhaler 2 Puff(s) Inhalation every 4 hours PRN Shortness of Breath and/or Wheezing       Medications up to date at time of exam.    ROS; No fever, chills, cough, nasal congestion on exam.   PHYSICAL EXAMINATION:    Vital Signs Last 24 Hrs  T(C): 36.3 (16 Jan 2024 15:46), Max: 37.1 (16 Jan 2024 11:32)  T(F): 97.3 (16 Jan 2024 15:46), Max: 98.8 (16 Jan 2024 11:32)  HR: 131 (16 Jan 2024 15:46) (106 - 137)  BP: 102/55 (16 Jan 2024 15:46) (93/58 - 119/67)  BP(mean): --  RR: 18 (16 Jan 2024 15:46) (18 - 20)  SpO2: 96% (16 Jan 2024 15:46) (93% - 99%)    Parameters below as of 16 Jan 2024 15:46  Patient On (Oxygen Delivery Method): room air       General : Alert and oriented. Able to answer question with no SOB.  No acute distress .     HEENT: Head is normocephalic and atraumatic. No nasal tenderness. Extraocular muscles are intact. Mucous membranes are moist.     NECK: Supple, no palpable adenopathy.    LUNGS: Non labored. No wheezing. No use of accessory muscle.     HEART: S1 S2 Regular rate and no click / rub.     ABDOMEN: Soft, nontender, and nondistended. Active bowel sounds.     EXTREMITIES: Without any cyanosis, clubbing, rash, lesions .    NEUROLOGIC: Awake, alert, oriented.     SKIN: Warm and moist . Non diaphoretic.         LABS:                        9.1    4.75  )-----------( 125      ( 16 Jan 2024 06:50 )             29.6     01-16    142  |  108  |  40<H>  ----------------------------<  138<H>  3.3<L>   |  26  |  1.69<H>    Ca    9.3      16 Jan 2024 06:50  Phos  4.2     01-16  Mg     2.2     01-16    TPro  7.1  /  Alb  3.0<L>  /  TBili  0.4  /  DBili  x   /  AST  12  /  ALT  16  /  AlkPhos  62  01-16      Urinalysis Basic - ( 16 Jan 2024 06:50 )    Color: x / Appearance: x / SG: x / pH: x  Gluc: 138 mg/dL / Ketone: x  / Bili: x / Urobili: x   Blood: x / Protein: x / Nitrite: x   Leuk Esterase: x / RBC: x / WBC x   Sq Epi: x / Non Sq Epi: x / Bacteria: x                      MICROBIOLOGY: (if applicable)    RADIOLOGY & ADDITIONAL STUDIES:  EKG:   CXR: < from: Xray Chest 1 View- PORTABLE-Urgent (Xray Chest 1 View- PORTABLE-Urgent .) (01.15.24 @ 10:09) >    ACC: [id number] EXAM:  XR CHEST PORTABLE URGENT 1V   ORDERED BY: EMERSON VERAS     PROCEDURE DATE:  01/15/2024          INTERPRETATION:  Exam:XR CHEST URGENT    clinical history:Shortness of breath    Mild improved aeration of the lungs seen. Stable dural based   calcifications left chest. No pneumothorax.    IMPRESSION: Mild improved aeration    --- End of Report ---            CHRISTINE GAO MD; Attending Radiologist  This document has been electronically signed. Chin 15 2024 12:47PM    < end of copied text >    ECHO:    IMPRESSION: 68y Male PAST MEDICAL & SURGICAL HISTORY:  HTN (hypertension)      Varicose veins      HLD (hyperlipidemia)      Liver cirrhosis      Portal hypertension with esophageal varices      COPD (chronic obstructive pulmonary disease)      Syncope      Alcohol dependence, daily use      Smokes tobacco daily      DM (diabetes mellitus)      No significant past surgical history      Impression: This is a 67 Y/O Male former smoker. hx of chronic ETOH dependence with Cirrhosis . Presented to ED with worsening SOB. In the ED patient placed on Bipap. For pulmonary follow up of Acute hypoxic respiratory failure due to COPD , pulmonary edema / HFrEF , Rapid A Fib. Off Bipap and using on and off  Oxygen supplementation . 01-15-24 CXR reviewed No pneumothorax, No Effusion.        Suggestion:  O2 saturation 94% room air . Monitor O2 saturation room air trend , using O2 supplement on and off.  Can have oxygen supplementation  2L NC .  Continue Duoneb via nebulization Q 6 Hours . Do not give at the same time with PRN Albuterol 90 mcg 2 puffs Q 6 Hours.   Continue Symbicort 160- 4.5 mcg 2 puffs Twice Daily with oral spacer .   Continue Singulair 10 mg Daily.    On Apixaban 5 mg Twice daily.   Continue Tiotropium 2.5 mcg 2 puffs Daily.     Diuresis and aim for negative fluid balance.   Monitor urine output.   Beta blockers for rate control.   Hemodynamic and cardiac monitoring

## 2024-01-16 NOTE — PROGRESS NOTE ADULT - NS ATTEND AMEND GEN_ALL_CORE FT
Overall  Acute hypoxic respiratory failure from Acute systolic heart failure/ fluid overload/ Pleural effusion   Pulm, Cards following     Hold Bumex in the setting of CORTES  Plan of care discussed with House staff

## 2024-01-16 NOTE — PROGRESS NOTE ADULT - PROBLEM SELECTOR PLAN 3
on Bumex ( m,w,f BID) at home  - echo noted 11/2023 EF  20-25%, severe dilated LA  - was on bumex 1mg PO -- pt needed aggressive diuresis  - s/p 3mg Bumex IV -- will hold bumex today for cr of 1.69 (creatinine 2's on 11/2023)  - monitor I/O's  - F/U CXR

## 2024-01-16 NOTE — PROGRESS NOTE ADULT - PROBLEM SELECTOR PLAN 1
p/w sob and increased WOB  - CT chest: CHF and/or pneumonia with small moderate right pleural effusion and small loculated left pleural effusion.  - BNP 1853 (improved from 4463 in nov 2023).  - RVP neg. Bcx 1/8- NGTD  - continue BiPAP at night PRN  - continue nebulizer tx  - keep pt on room air as tolerated. Pt observed to be saturating well on room air

## 2024-01-16 NOTE — PROGRESS NOTE ADULT - NS ATTEND AMEND GEN_ALL_CORE FT
Impression: This is a 69 Y/O Male former smoker. hx of chronic ETOH dependence with Cirrhosis . Presented to ED with worsening SOB. In the ED patient placed on Bipap. For pulmonary follow up of Acute hypoxic respiratory failure due to COPD , pulmonary edema / HFrEF , Rapid A Fib. Off Bipap and using on and off  Oxygen supplementation . 01-15-24 CXR reviewed No pneumothorax, No Effusion.        Suggestion:  O2 saturation 94% room air . Monitor O2 saturation room air trend , using O2 supplement on and off.  Can have oxygen supplementation  2L NC .  Continue Duoneb via nebulization Q 6 Hours . Do not give at the same time with PRN Albuterol 90 mcg 2 puffs Q 6 Hours.   Continue Symbicort 160- 4.5 mcg 2 puffs Twice Daily with oral spacer .   Continue Singulair 10 mg Daily.    On Apixaban 5 mg Twice daily.   Continue Tiotropium 2.5 mcg 2 puffs Daily.    Bumex 1 mg TIW  CXR  Monitor urine output.  Beta blockers for rate control.  Hemodynamic and cardiac monitoring Impression: This is a 67 Y/O Male former smoker. hx of chronic ETOH dependence with Cirrhosis . Presented to ED with worsening SOB. In the ED patient placed on Bipap. For pulmonary follow up of Acute hypoxic respiratory failure due to COPD , pulmonary edema / HFrEF , Rapid A Fib. Off Bipap and using on and off  Oxygen supplementation . 01-15-24 CXR reviewed No pneumothorax, No Effusion.        Suggestion:  O2 saturation 94% room air . Monitor O2 saturation room air trend , using O2 supplement on and off.  Can have oxygen supplementation  2L NC .  Continue Duoneb via nebulization Q 6 Hours . Do not give at the same time with PRN Albuterol 90 mcg 2 puffs Q 6 Hours.   Continue Symbicort 160- 4.5 mcg 2 puffs Twice Daily with oral spacer .   Continue Singulair 10 mg Daily.    On Apixaban 5 mg Twice daily.   Continue Tiotropium 2.5 mcg 2 puffs Daily.    Bumex 1 mg TIW  CXR  Monitor urine output.  Beta blockers for rate control.  Hemodynamic and cardiac monitoring

## 2024-01-16 NOTE — PROGRESS NOTE ADULT - PROBLEM SELECTOR PLAN 2
- found to be in Afib with RVR in ED  - s/p diltiazem  - c/w metoprolol 50mg q8 and Eliquis  - continue tele monitoring  - cards Dr Alcazar following

## 2024-01-16 NOTE — PROGRESS NOTE ADULT - SUBJECTIVE AND OBJECTIVE BOX
NP Note discussed with  Primary Attending    Patient is a 68y old  Male who presents with a chief complaint of AHRF, CHF exacerbation (16 Jan 2024 12:25)      INTERVAL HPI/OVERNIGHT EVENTS:  HPI:  68M from home, ambulates independently, PMHx COPD (not on O2), former smoker quit 2-months, HLD, DM, HTN, chronic ETOH dependence c/b cirrhosis, p/w worsening SOB. Patient is limited historian, currently on BIPAP and unable to full history deferring further collateral to wife, Maureen Tovt [phone] but unable to reach. Patient states that he has had worsening of breathing over 2-years, does not sleep with a machine at home, and when inquired about medications he admits to non-compliance.   In ED:   Afebrile;  Afib w/ RVR and PVC; /102; RR 22 100% on RA. Started on BIPAP for increased WOB saturating 100% with improvement in WOB. Trop negative 17, BNP 1853 (improved from 4463). Given duoneb x3, solumedrol 125mg x1, rocephin x1, and 500cc bolus x1, diltizem 20mg IVP x1 + 10mg IVP x1. Awaiting to receive vanc x1 and azithromycin. CXR increased b/l opacification and left lower pleural effusion. Admitted to ICU for AHRF 2/2 suspected CHF exacerbation requiring continuous BIPAP. CT chest on 1/8 showed CHF and/or PNA with small moderate right pleural effusion and small loculated left pleural effusion.    Pt. downgraded to tele on 1/9. Hospital course complicated by worsening shortness of breath, increased work of breathing and wheezing. CXR on 1/15 showed mild improved aeration. Pt was given nebulizer treatment and diurese with bumex. Pulm Dr Manrique is following. Pt tolerating room air today, saturating well in no respiratory distress . Will repeat CXR today and will possibly DC pt home tomorrow given his respiratory status continue to improve    PT recs OP PT      MEDICATIONS  (STANDING):  albuterol/ipratropium for Nebulization 3 milliLiter(s) Nebulizer every 6 hours  amLODIPine   Tablet 5 milliGRAM(s) Oral daily  apixaban 5 milliGRAM(s) Oral two times a day  aspirin  chewable 81 milliGRAM(s) Oral daily  atorvastatin 40 milliGRAM(s) Oral at bedtime  budesonide 160 MICROgram(s)/formoterol 4.5 MICROgram(s) Inhaler 2 Puff(s) Inhalation two times a day  gabapentin 300 milliGRAM(s) Oral at bedtime  insulin lispro (ADMELOG) corrective regimen sliding scale   SubCutaneous Before meals and at bedtime  losartan 100 milliGRAM(s) Oral daily  melatonin 5 milliGRAM(s) Oral at bedtime  metoprolol tartrate 50 milliGRAM(s) Oral every 8 hours  montelukast 10 milliGRAM(s) Oral at bedtime  pantoprazole    Tablet 40 milliGRAM(s) Oral before breakfast  polyethylene glycol 3350 17 Gram(s) Oral daily  senna 2 Tablet(s) Oral at bedtime  tiotropium 2.5 MICROgram(s) Inhaler 2 Puff(s) Inhalation daily    MEDICATIONS  (PRN):  albuterol    90 MICROgram(s) HFA Inhaler 2 Puff(s) Inhalation every 4 hours PRN Shortness of Breath and/or Wheezing      __________________________________________________  REVIEW OF SYSTEMS:    CONSTITUTIONAL: No fever,   EYES: no acute visual disturbances  NECK: No pain or stiffness  RESPIRATORY: No cough; No shortness of breath  CARDIOVASCULAR: No chest pain, no palpitations  GASTROINTESTINAL: No pain. No nausea or vomiting; No diarrhea   NEUROLOGICAL: No headache or numbness, no tremors  MUSCULOSKELETAL: No joint pain, no muscle pain  GENITOURINARY: no dysuria, no frequency, no hesitancy  ALL OTHER  ROS negative        Vital Signs Last 24 Hrs  T(C): 36.3 (16 Jan 2024 15:46), Max: 37.1 (16 Jan 2024 11:32)  T(F): 97.3 (16 Jan 2024 15:46), Max: 98.8 (16 Jan 2024 11:32)  HR: 131 (16 Jan 2024 15:46) (106 - 137)  BP: 102/55 (16 Jan 2024 15:46) (93/58 - 119/67)  BP(mean): --  RR: 18 (16 Jan 2024 15:46) (18 - 19)  SpO2: 96% (16 Jan 2024 15:46) (93% - 99%)    Parameters below as of 16 Jan 2024 15:46  Patient On (Oxygen Delivery Method): room air        ________________________________________________  PHYSICAL EXAM:  GENERAL: NAD  HEENT: Normocephalic; conjunctivae and sclerae clear; moist mucous membranes;   NECK : supple  CHEST/LUNG: Left expiratory wheeze, rhonchi  HEART: S1 S2  regular;  ABDOMEN: Soft, Nontender, Nondistended; Bowel sounds present  EXTREMITIES: no cyanosis; no edema; no calf tenderness  SKIN: warm and dry; no rash  NERVOUS SYSTEM:  Awake and alert; Oriented  to place, person and time ;    _________________________________________________  LABS:                        9.1    4.75  )-----------( 125      ( 16 Jan 2024 06:50 )             29.6     01-16    142  |  108  |  40<H>  ----------------------------<  138<H>  3.3<L>   |  26  |  1.69<H>    Ca    9.3      16 Jan 2024 06:50  Phos  4.2     01-16  Mg     2.2     01-16    TPro  7.1  /  Alb  3.0<L>  /  TBili  0.4  /  DBili  x   /  AST  12  /  ALT  16  /  AlkPhos  62  01-16      Urinalysis Basic - ( 16 Jan 2024 06:50 )    Color: x / Appearance: x / SG: x / pH: x  Gluc: 138 mg/dL / Ketone: x  / Bili: x / Urobili: x   Blood: x / Protein: x / Nitrite: x   Leuk Esterase: x / RBC: x / WBC x   Sq Epi: x / Non Sq Epi: x / Bacteria: x      CAPILLARY BLOOD GLUCOSE      POCT Blood Glucose.: 143 mg/dL (16 Jan 2024 11:49)  POCT Blood Glucose.: 147 mg/dL (16 Jan 2024 07:49)  POCT Blood Glucose.: 229 mg/dL (15 Chin 2024 21:08)  POCT Blood Glucose.: 189 mg/dL (15 Chin 2024 17:01)        RADIOLOGY & ADDITIONAL TESTS:  < from: Xray Chest 1 View- PORTABLE-Urgent (Xray Chest 1 View- PORTABLE-Urgent .) (01.15.24 @ 10:09) >    ACC: [id number] EXAM:  XR CHEST PORTABLE URGENT 1V   ORDERED BY: EMERSON VERAS     PROCEDURE DATE:  01/15/2024          INTERPRETATION:  Exam:XR CHEST URGENT    clinical history:Shortness of breath    Mild improved aeration of the lungs seen. Stable dural based   calcifications left chest. No pneumothorax.    IMPRESSION: Mild improved aeration    --- End of Report ---            CHRISTINE GAO MD; Attending Radiologist  This document has been electronically signed. Chin 15 2024 12:47PM    < end of copied text >    Imaging Personally Reviewed:  YES/NO    Consultant(s) Notes Reviewed:   YES/ No    Care Discussed with Consultants :     Plan of care was discussed with patient and /or primary care giver; all questions and concerns were addressed and care was aligned with patient's wishes.

## 2024-01-17 LAB
ALBUMIN SERPL ELPH-MCNC: 3.2 G/DL — LOW (ref 3.5–5)
ALP SERPL-CCNC: 60 U/L — SIGNIFICANT CHANGE UP (ref 40–120)
ALT FLD-CCNC: 14 U/L DA — SIGNIFICANT CHANGE UP (ref 10–60)
ANION GAP SERPL CALC-SCNC: 6 MMOL/L — SIGNIFICANT CHANGE UP (ref 5–17)
AST SERPL-CCNC: 12 U/L — SIGNIFICANT CHANGE UP (ref 10–40)
BILIRUB SERPL-MCNC: 0.4 MG/DL — SIGNIFICANT CHANGE UP (ref 0.2–1.2)
BUN SERPL-MCNC: 36 MG/DL — HIGH (ref 7–18)
CALCIUM SERPL-MCNC: 9.4 MG/DL — SIGNIFICANT CHANGE UP (ref 8.4–10.5)
CHLORIDE SERPL-SCNC: 110 MMOL/L — HIGH (ref 96–108)
CO2 SERPL-SCNC: 25 MMOL/L — SIGNIFICANT CHANGE UP (ref 22–31)
CREAT SERPL-MCNC: 1.46 MG/DL — HIGH (ref 0.5–1.3)
EGFR: 52 ML/MIN/1.73M2 — LOW
GLUCOSE BLDC GLUCOMTR-MCNC: 106 MG/DL — HIGH (ref 70–99)
GLUCOSE BLDC GLUCOMTR-MCNC: 137 MG/DL — HIGH (ref 70–99)
GLUCOSE BLDC GLUCOMTR-MCNC: 142 MG/DL — HIGH (ref 70–99)
GLUCOSE BLDC GLUCOMTR-MCNC: 185 MG/DL — HIGH (ref 70–99)
GLUCOSE SERPL-MCNC: 123 MG/DL — HIGH (ref 70–99)
MAGNESIUM SERPL-MCNC: 2.2 MG/DL — SIGNIFICANT CHANGE UP (ref 1.6–2.6)
PHOSPHATE SERPL-MCNC: 4.3 MG/DL — SIGNIFICANT CHANGE UP (ref 2.5–4.5)
POTASSIUM SERPL-MCNC: 4.1 MMOL/L — SIGNIFICANT CHANGE UP (ref 3.5–5.3)
POTASSIUM SERPL-SCNC: 4.1 MMOL/L — SIGNIFICANT CHANGE UP (ref 3.5–5.3)
PROT SERPL-MCNC: 7.3 G/DL — SIGNIFICANT CHANGE UP (ref 6–8.3)
SODIUM SERPL-SCNC: 141 MMOL/L — SIGNIFICANT CHANGE UP (ref 135–145)

## 2024-01-17 RX ORDER — BUMETANIDE 0.25 MG/ML
1 INJECTION INTRAMUSCULAR; INTRAVENOUS ONCE
Refills: 0 | Status: COMPLETED | OUTPATIENT
Start: 2024-01-17 | End: 2024-01-17

## 2024-01-17 RX ORDER — BUMETANIDE 0.25 MG/ML
1 INJECTION INTRAMUSCULAR; INTRAVENOUS DAILY
Refills: 0 | Status: DISCONTINUED | OUTPATIENT
Start: 2024-01-18 | End: 2024-01-18

## 2024-01-17 RX ORDER — BUMETANIDE 0.25 MG/ML
2 INJECTION INTRAMUSCULAR; INTRAVENOUS ONCE
Refills: 0 | Status: COMPLETED | OUTPATIENT
Start: 2024-01-17 | End: 2024-01-17

## 2024-01-17 RX ORDER — IPRATROPIUM/ALBUTEROL SULFATE 18-103MCG
3 AEROSOL WITH ADAPTER (GRAM) INHALATION ONCE
Refills: 0 | Status: COMPLETED | OUTPATIENT
Start: 2024-01-17 | End: 2024-01-17

## 2024-01-17 RX ORDER — DIGOXIN 250 MCG
250 TABLET ORAL ONCE
Refills: 0 | Status: COMPLETED | OUTPATIENT
Start: 2024-01-17 | End: 2024-01-17

## 2024-01-17 RX ORDER — BUMETANIDE 0.25 MG/ML
1 INJECTION INTRAMUSCULAR; INTRAVENOUS ONCE
Refills: 0 | Status: DISCONTINUED | OUTPATIENT
Start: 2024-01-17 | End: 2024-01-17

## 2024-01-17 RX ORDER — ALBUTEROL 90 UG/1
2 AEROSOL, METERED ORAL EVERY 6 HOURS
Refills: 0 | Status: DISCONTINUED | OUTPATIENT
Start: 2024-01-17 | End: 2024-01-20

## 2024-01-17 RX ADMIN — Medication 3 MILLILITER(S): at 02:35

## 2024-01-17 RX ADMIN — GABAPENTIN 300 MILLIGRAM(S): 400 CAPSULE ORAL at 21:12

## 2024-01-17 RX ADMIN — TIOTROPIUM BROMIDE 2 PUFF(S): 18 CAPSULE ORAL; RESPIRATORY (INHALATION) at 12:29

## 2024-01-17 RX ADMIN — BUDESONIDE AND FORMOTEROL FUMARATE DIHYDRATE 2 PUFF(S): 160; 4.5 AEROSOL RESPIRATORY (INHALATION) at 09:07

## 2024-01-17 RX ADMIN — BUDESONIDE AND FORMOTEROL FUMARATE DIHYDRATE 2 PUFF(S): 160; 4.5 AEROSOL RESPIRATORY (INHALATION) at 21:12

## 2024-01-17 RX ADMIN — Medication 5 MILLIGRAM(S): at 21:12

## 2024-01-17 RX ADMIN — Medication 3 MILLILITER(S): at 23:48

## 2024-01-17 RX ADMIN — LOSARTAN POTASSIUM 100 MILLIGRAM(S): 100 TABLET, FILM COATED ORAL at 05:41

## 2024-01-17 RX ADMIN — MONTELUKAST 10 MILLIGRAM(S): 4 TABLET, CHEWABLE ORAL at 21:12

## 2024-01-17 RX ADMIN — AMLODIPINE BESYLATE 5 MILLIGRAM(S): 2.5 TABLET ORAL at 05:41

## 2024-01-17 RX ADMIN — BUMETANIDE 2 MILLIGRAM(S): 0.25 INJECTION INTRAMUSCULAR; INTRAVENOUS at 17:30

## 2024-01-17 RX ADMIN — Medication 3 MILLILITER(S): at 09:30

## 2024-01-17 RX ADMIN — ATORVASTATIN CALCIUM 40 MILLIGRAM(S): 80 TABLET, FILM COATED ORAL at 21:12

## 2024-01-17 RX ADMIN — APIXABAN 5 MILLIGRAM(S): 2.5 TABLET, FILM COATED ORAL at 17:30

## 2024-01-17 RX ADMIN — BUMETANIDE 1 MILLIGRAM(S): 0.25 INJECTION INTRAMUSCULAR; INTRAVENOUS at 12:05

## 2024-01-17 RX ADMIN — Medication 125 MICROGRAM(S): at 05:43

## 2024-01-17 RX ADMIN — Medication 3 MILLILITER(S): at 20:45

## 2024-01-17 RX ADMIN — Medication 50 MILLIGRAM(S): at 14:10

## 2024-01-17 RX ADMIN — Medication 3 MILLILITER(S): at 14:10

## 2024-01-17 RX ADMIN — Medication 250 MICROGRAM(S): at 12:28

## 2024-01-17 RX ADMIN — Medication 50 MILLIGRAM(S): at 05:41

## 2024-01-17 RX ADMIN — Medication 50 MILLIGRAM(S): at 21:13

## 2024-01-17 RX ADMIN — APIXABAN 5 MILLIGRAM(S): 2.5 TABLET, FILM COATED ORAL at 05:41

## 2024-01-17 RX ADMIN — PANTOPRAZOLE SODIUM 40 MILLIGRAM(S): 20 TABLET, DELAYED RELEASE ORAL at 05:41

## 2024-01-17 RX ADMIN — Medication 81 MILLIGRAM(S): at 12:29

## 2024-01-17 RX ADMIN — Medication 1: at 12:29

## 2024-01-17 NOTE — PROGRESS NOTE ADULT - SUBJECTIVE AND OBJECTIVE BOX
GREGORY ELIZABETH  MR# [id number]  68yMale        Patient is a 68y old  Male who presents with a chief complaint of AHRF, CHF exacerbation (17 Jan 2024 12:06)      INTERVAL HPI/OVERNIGHT EVENTS:  Patient seen and examined at bedside. No notations of chest pain, palpitation, SOB, orthopnea, nausea, vomiting or abdominal pain.    ALLERGIES  No Known Allergies      MEDICATIONS  albuterol    90 MICROgram(s) HFA Inhaler 2 Puff(s) Inhalation every 6 hours PRN Shortness of Breath and/or Wheezing  albuterol/ipratropium for Nebulization 3 milliLiter(s) Nebulizer every 6 hours  amLODIPine   Tablet 5 milliGRAM(s) Oral daily  apixaban 5 milliGRAM(s) Oral two times a day  aspirin  chewable 81 milliGRAM(s) Oral daily  atorvastatin 40 milliGRAM(s) Oral at bedtime  budesonide 160 MICROgram(s)/formoterol 4.5 MICROgram(s) Inhaler 2 Puff(s) Inhalation two times a day  buMETAnide Injectable 2 milliGRAM(s) IV Push once  digoxin     Tablet 125 MICROGram(s) Oral daily  gabapentin 300 milliGRAM(s) Oral at bedtime  insulin lispro (ADMELOG) corrective regimen sliding scale   SubCutaneous Before meals and at bedtime  losartan 100 milliGRAM(s) Oral daily  melatonin 5 milliGRAM(s) Oral at bedtime  metoprolol tartrate 50 milliGRAM(s) Oral every 8 hours  montelukast 10 milliGRAM(s) Oral at bedtime  pantoprazole    Tablet 40 milliGRAM(s) Oral before breakfast  polyethylene glycol 3350 17 Gram(s) Oral daily  senna 2 Tablet(s) Oral at bedtime  tiotropium 2.5 MICROgram(s) Inhaler 2 Puff(s) Inhalation daily              REVIEW OF SYSTEMS:  CONSTITUTIONAL: No fever, weight loss, or fatigue  EYES: No eye pain, visual disturbances, or discharge  ENT:  No difficulty hearing, tinnitus, vertigo; No sinus or throat pain  NECK: No pain or stiffness  RESPIRATORY: No cough, wheezing, chills or hemoptysis; No Shortness of Breath  CARDIOVASCULAR: No chest pain, palpitations, passing out, dizziness, or leg swelling  GASTROINTESTINAL: No abdominal or epigastric pain. No nausea, vomiting, or hematemesis; No diarrhea or constipation. No melena or hematochezia.  GENITOURINARY: No dysuria, frequency, hematuria, or incontinence  NEUROLOGICAL: No headaches, memory loss, loss of strength, numbness, or tremors  SKIN: No itching, burning, rashes, or lesions   LYMPH Nodes: No enlarged glands  ENDOCRINE: No heat or cold intolerance; No hair loss  MUSCULOSKELETAL: No joint pain or swelling; No muscle, back, or extremity pain  PSYCHIATRIC: No depression, anxiety, mood swings, or difficulty sleeping  HEME/LYMPH: No easy bruising, or bleeding gums  ALLERGY AND IMMUNOLOGIC: No hives or eczema	    [ ] All others negative	  [ ] Unable to obtain      T(C): 36.7 (01-17-24 @ 14:01), Max: 37 (01-17-24 @ 12:29)  T(F): 98.1 (01-17-24 @ 14:01), Max: 98.6 (01-17-24 @ 12:29)  HR: 90 (01-17-24 @ 14:01) (90 - 150)  BP: 127/83 (01-17-24 @ 14:01) (102/55 - 150/85)  RR: 18 (01-17-24 @ 14:01) (18 - 19)  SpO2: 100% (01-17-24 @ 14:01) (90% - 100%)  Wt(kg): --    I&O's Summary    16 Jan 2024 07:01  -  17 Jan 2024 07:00  --------------------------------------------------------  IN: 900 mL / OUT: 300 mL / NET: 600 mL          PHYSICAL EXAM:  A X O x  HEAD:  Atraumatic, Normocephalic  EYES: EOMI, PERRLA, conjunctiva and sclera clear  NECK: Supple, No JVD, Normal thyroid  Resp: CTAB, No crackles, wheezing,   CVS: Regular rate and rhythm; No discernable murmurs, rubs, or gallops  ABD: Soft, Nontender, Nondistended; Bowel sounds present  EXTREMITIES:  2+ Peripheral Pulses, No edema  LYMPH: No dicernable lymphadenopathy noted  GENERAL: NAD, well-groomed, well-developed      LABS:                        9.1    4.75  )-----------( 125      ( 16 Jan 2024 06:50 )             29.6     01-17    141  |  110<H>  |  36<H>  ----------------------------<  123<H>  4.1   |  25  |  1.46<H>    Ca    9.4      17 Jan 2024 06:14  Phos  4.3     01-17  Mg     2.2     01-17    TPro  7.3  /  Alb  3.2<L>  /  TBili  0.4  /  DBili  x   /  AST  12  /  ALT  14  /  AlkPhos  60  01-17      Urinalysis Basic - ( 17 Jan 2024 06:14 )    Color: x / Appearance: x / SG: x / pH: x  Gluc: 123 mg/dL / Ketone: x  / Bili: x / Urobili: x   Blood: x / Protein: x / Nitrite: x   Leuk Esterase: x / RBC: x / WBC x   Sq Epi: x / Non Sq Epi: x / Bacteria: x      CAPILLARY BLOOD GLUCOSE      POCT Blood Glucose.: 185 mg/dL (17 Jan 2024 12:10)  POCT Blood Glucose.: 106 mg/dL (17 Jan 2024 08:19)  POCT Blood Glucose.: 110 mg/dL (16 Jan 2024 21:10)  POCT Blood Glucose.: 165 mg/dL (16 Jan 2024 16:46)      Troponins:  ProBNP:  Lipid Profile:   HgA1c:  TSH:           RADIOLOGY & ADDITIONAL TESTS:    Imaging Personally Reviewed:  [ ] YES  [ ] NO      Consultant(s) Notes Reviewed:  [x ] YES  [ ] NO    Care Discussed with Consultants/Other Providers [ x] YES  [ ] NO          PAST MEDICAL & SURGICAL HISTORY:  HTN (hypertension)      Varicose veins      HLD (hyperlipidemia)      Liver cirrhosis      Portal hypertension with esophageal varices      COPD (chronic obstructive pulmonary disease)      Syncope      Alcohol dependence, daily use      Smokes tobacco daily      DM (diabetes mellitus)      No significant past surgical history            Acute respiratory distress    Handoff    MEWS Score    HTN (hypertension)    Varicose veins    HLD (hyperlipidemia)    COPD (chronic obstructive pulmonary disease)    Asthma with COPD    Asthma    Liver cirrhosis    Portal hypertension with esophageal varices    COPD (chronic obstructive pulmonary disease)    Syncope    Alcohol dependence, daily use    Smokes tobacco daily    DM (diabetes mellitus)    HTN (hypertension)    Varicose veins    Respiratory distress    HTN (hypertension)    HLD (hyperlipidemia)    COPD (chronic obstructive pulmonary disease)    DM (diabetes mellitus)    Smokes tobacco daily    Atrial fibrillation with RVR    Acute systolic congestive heart failure    Acute hypoxic respiratory failure    Chronic kidney disease, unspecified CKD stage    Discharge planning issues    Prophylactic measure    No significant past surgical history    No significant past surgical history    A)SOB    Smokes tobacco daily    37    COPD exacerbation    Pneumonia    Atrial fibrillation with RVR    [email or web address]

## 2024-01-17 NOTE — PROGRESS NOTE ADULT - PROBLEM SELECTOR PLAN 10
PT recs OP PT  pt with intermittently going to flash pulmonary edema -- needing aggressive diuresis at times, pending clinical improvement  Afib with RVR on exertion -- pending improvement with dig  F/U with pulm final recs and cardiology final recs  pt do not use O2 at home PT recs OP PT  - pt with intermittently going to flash pulmonary edema -- needing aggressive diuresis at times, pending clinical improvement  - Afib with RVR on exertion -- pending improvement with dig  - F/U with pulm final recs and cardiology final recs  - pt do not use O2 at home  - sister is the CDPAP as per CM

## 2024-01-17 NOTE — PROGRESS NOTE ADULT - NS ATTEND AMEND GEN_ALL_CORE FT
Impression: This is a 67 Y/O Male former smoker. hx of chronic ETOH dependence with Cirrhosis . Presented to ED with worsening SOB. In the ED patient placed on Bipap. For pulmonary follow up of Acute hypoxic respiratory failure due to COPD , pulmonary edema / HFrEF , Rapid A Fib. Off Bipap and using on and off  Oxygen supplementation . 01-15-24 CXR reviewed No pneumothorax, No Effusion.        Suggestion:  O2 saturation 95% room air . Monitor O2 saturation room air trend , using O2 supplement on and off.  Can have oxygen supplementation  2L NC .  Continue Duoneb via nebulization Q 6 Hours . Do not give at the same time with PRN Albuterol 90 mcg 2 puffs Q 6 Hours.   Continue Symbicort 160- 4.5 mcg 2 puffs Twice Daily with oral spacer .   Continue Singulair 10 mg Daily.    On Apixaban 5 mg Twice daily.   Continue Tiotropium 2.5 mcg 2 puffs Daily.     Diuresis and aim for negative fluid balance.  Bumex 1 mg qd   Monitor urine output and renal fx  Beta blockers for rate control.  Hemodynamic and cardiac monitoring

## 2024-01-17 NOTE — PROGRESS NOTE ADULT - PROBLEM SELECTOR PLAN 3
on Bumex ( m,w,f BID) at home  - echo noted 11/2023 EF  20-25%, severe dilated LA  - was on bumex 1mg PO -- pt needed aggressive diuresis, switched to IV  - s/p 3mg Bumex IV on 1/15  - s/p 3mg Bumex IV on 1/16  - start Bumex 1mg PO 1/18  - monitor I/O's  - F/U CXR in AM

## 2024-01-17 NOTE — PROGRESS NOTE ADULT - SUBJECTIVE AND OBJECTIVE BOX
NP Note discussed with  Primary Attending    Patient is a 68y old  Male who presents with a chief complaint of AHRF, CHF exacerbation (17 Jan 2024 15:09)      INTERVAL HPI/OVERNIGHT EVENTS:  68M from home, ambulates independently, PMHx COPD (not on O2), former smoker quit 2-months, HLD, DM, HTN, chronic ETOH dependence c/b cirrhosis, p/w worsening SOB. Patient is limited historian, currently on BIPAP and unable to full history deferring further collateral to wife, Maureen Tovt [phone] but unable to reach. Patient states that he has had worsening of breathing over 2-years, does not sleep with a machine at home, and when inquired about medications he admits to non-compliance.   In ED:   Afebrile;  Afib w/ RVR and PVC; /102; RR 22 100% on RA. Started on BIPAP for increased WOB saturating 100% with improvement in WOB. Trop negative 17, BNP 1853 (improved from 4463). Given duoneb x3, solumedrol 125mg x1, rocephin x1, and 500cc bolus x1, diltizem 20mg IVP x1 + 10mg IVP x1. Awaiting to receive vanc x1 and azithromycin. CXR increased b/l opacification and left lower pleural effusion. Admitted to ICU for AHRF 2/2 suspected CHF exacerbation requiring continuous BIPAP. CT chest on 1/8 showed CHF and/or PNA with small moderate right pleural effusion and small loculated left pleural effusion.    Pt. downgraded to tele on 1/9. Hospital course complicated by worsening shortness of breath, increased work of breathing and wheezing. CXR on 1/15 showed mild improved aeration. Pt was given nebulizer treatment and diurese with bumex. Pulm Dr Manrique is following. Pt noted to have increase WOB this morning despite nebulizer treatment, repeat CXR 1/16 showed slight CHF. Pt was given IV Bumex again and PO daily. Course further complicated by intermittent Afib with RVR, Dr Alcazar cardiology following. Digoxin loading dose was given and started daily.    PT recs OP PT      MEDICATIONS  (STANDING):  albuterol/ipratropium for Nebulization 3 milliLiter(s) Nebulizer every 6 hours  amLODIPine   Tablet 5 milliGRAM(s) Oral daily  apixaban 5 milliGRAM(s) Oral two times a day  aspirin  chewable 81 milliGRAM(s) Oral daily  atorvastatin 40 milliGRAM(s) Oral at bedtime  budesonide 160 MICROgram(s)/formoterol 4.5 MICROgram(s) Inhaler 2 Puff(s) Inhalation two times a day  buMETAnide Injectable 2 milliGRAM(s) IV Push once  digoxin     Tablet 125 MICROGram(s) Oral daily  gabapentin 300 milliGRAM(s) Oral at bedtime  insulin lispro (ADMELOG) corrective regimen sliding scale   SubCutaneous Before meals and at bedtime  losartan 100 milliGRAM(s) Oral daily  melatonin 5 milliGRAM(s) Oral at bedtime  metoprolol tartrate 50 milliGRAM(s) Oral every 8 hours  montelukast 10 milliGRAM(s) Oral at bedtime  pantoprazole    Tablet 40 milliGRAM(s) Oral before breakfast  polyethylene glycol 3350 17 Gram(s) Oral daily  senna 2 Tablet(s) Oral at bedtime  tiotropium 2.5 MICROgram(s) Inhaler 2 Puff(s) Inhalation daily    MEDICATIONS  (PRN):  albuterol    90 MICROgram(s) HFA Inhaler 2 Puff(s) Inhalation every 6 hours PRN Shortness of Breath and/or Wheezing      __________________________________________________  REVIEW OF SYSTEMS:    CONSTITUTIONAL: No fever,   EYES: no acute visual disturbances  NECK: No pain or stiffness  RESPIRATORY: No cough; + shortness of breath  CARDIOVASCULAR: No chest pain, no palpitations  GASTROINTESTINAL: No pain. No nausea or vomiting; No diarrhea   NEUROLOGICAL: No headache or numbness, no tremors  MUSCULOSKELETAL: No joint pain, no muscle pain  GENITOURINARY: no dysuria, no frequency, no hesitancy  ALL OTHER  ROS negative        Vital Signs Last 24 Hrs  T(C): 36.7 (17 Jan 2024 15:42), Max: 37 (17 Jan 2024 12:29)  T(F): 98 (17 Jan 2024 15:42), Max: 98.6 (17 Jan 2024 12:29)  HR: 93 (17 Jan 2024 15:42) (90 - 150)  BP: 109/55 (17 Jan 2024 15:42) (109/55 - 150/85)  BP(mean): --  RR: 18 (17 Jan 2024 15:42) (18 - 19)  SpO2: 100% (17 Jan 2024 15:42) (90% - 100%)    Parameters below as of 17 Jan 2024 15:42  Patient On (Oxygen Delivery Method): nasal cannula  O2 Flow (L/min): 3      ________________________________________________  PHYSICAL EXAM:  GENERAL: NAD  HEENT: Normocephalic;  conjunctivae and sclerae clear; moist mucous membranes;   NECK : supple  CHEST/LUNG: B/L Wheeze + crackles  HEART: S1 S2  regular;  ABDOMEN: Soft, Nontender, Nondistended; Bowel sounds present  EXTREMITIES: no cyanosis; no edema; no calf tenderness  SKIN: warm and dry; no rash  NERVOUS SYSTEM:  Awake and alert; Oriented  to place, person and time    _________________________________________________  LABS:                        9.1    4.75  )-----------( 125      ( 16 Jan 2024 06:50 )             29.6     01-17    141  |  110<H>  |  36<H>  ----------------------------<  123<H>  4.1   |  25  |  1.46<H>    Ca    9.4      17 Jan 2024 06:14  Phos  4.3     01-17  Mg     2.2     01-17    TPro  7.3  /  Alb  3.2<L>  /  TBili  0.4  /  DBili  x   /  AST  12  /  ALT  14  /  AlkPhos  60  01-17      Urinalysis Basic - ( 17 Jan 2024 06:14 )    Color: x / Appearance: x / SG: x / pH: x  Gluc: 123 mg/dL / Ketone: x  / Bili: x / Urobili: x   Blood: x / Protein: x / Nitrite: x   Leuk Esterase: x / RBC: x / WBC x   Sq Epi: x / Non Sq Epi: x / Bacteria: x      CAPILLARY BLOOD GLUCOSE      POCT Blood Glucose.: 185 mg/dL (17 Jan 2024 12:10)  POCT Blood Glucose.: 106 mg/dL (17 Jan 2024 08:19)  POCT Blood Glucose.: 110 mg/dL (16 Jan 2024 21:10)  POCT Blood Glucose.: 165 mg/dL (16 Jan 2024 16:46)        RADIOLOGY & ADDITIONAL TESTS:  < from: Xray Chest 1 View-PORTABLE IMMEDIATE (Xray Chest 1 View-PORTABLE IMMEDIATE .) (01.16.24 @ 17:45) >    ACC: [id number] EXAM:  XR CHEST PORTABLE IMMED 1V   ORDERED BY: EMERSON VERAS     PROCEDURE DATE:  01/16/2024          INTERPRETATION:  AP chest on January 16, 2024 5:25 PM. Patient has   respiratory distress.    Heart possibly enlarged. Old left rib fractures again noted.    Is a chronic appearing left lateral pleural reaction and old fracture   deformity left upper humerus.    Increased interstitial vascular markings are noted somewhat increased   from January 15 suggesting slight CHF.    IMPRESSION: Slight CHF at this time.    --- End of Report ---            MARIA ANTONIA BEASLEY MD; Attending Radiologist  This document has been electronically signed. Jan 16 2024  5:59PM    < end of copied text >    Imaging Personally Reviewed:  YES/NO    Consultant(s) Notes Reviewed:   YES/ No    Care Discussed with Consultants :     Plan of care was discussed with patient and /or primary care giver; all questions and concerns were addressed and care was aligned with patient's wishes.

## 2024-01-17 NOTE — PROGRESS NOTE ADULT - PROVIDER SPECIALTY LIST ADULT
Results   TSH   13 Jun 2017 09:14 AM  -   TSH: 1.151 mcUnits/mL  Reference Range: 0.350-5.000.  T4   13 Jun 2017 09:14 AM  -   T4: 9.0 mcg/dl  Reference Range: 4.7-13.3.  Discussed   Thyroid tests are normal.   Internal Medicine

## 2024-01-17 NOTE — PROGRESS NOTE ADULT - PROBLEM SELECTOR PLAN 1
p/w sob and increased WOB  - CT chest: CHF and/or pneumonia with small moderate right pleural effusion and small loculated left pleural effusion.  - BNP 1853 (improved from 4463 in nov 2023).  - RVP neg. Bcx 1/8- NGTD  - continue BiPAP at night PRN, pt refusing at times  - continue nebulizer tx  - keep pt on room air as tolerated. Pt observed to be saturating well on room air

## 2024-01-17 NOTE — PROGRESS NOTE ADULT - SUBJECTIVE AND OBJECTIVE BOX
Time of Visit:  Patient seen and examined.     MEDICATIONS  (STANDING):  albuterol/ipratropium for Nebulization 3 milliLiter(s) Nebulizer every 6 hours  amLODIPine   Tablet 5 milliGRAM(s) Oral daily  apixaban 5 milliGRAM(s) Oral two times a day  aspirin  chewable 81 milliGRAM(s) Oral daily  atorvastatin 40 milliGRAM(s) Oral at bedtime  budesonide 160 MICROgram(s)/formoterol 4.5 MICROgram(s) Inhaler 2 Puff(s) Inhalation two times a day  buMETAnide Injectable 2 milliGRAM(s) IV Push once  digoxin     Tablet 125 MICROGram(s) Oral daily  gabapentin 300 milliGRAM(s) Oral at bedtime  insulin lispro (ADMELOG) corrective regimen sliding scale   SubCutaneous Before meals and at bedtime  losartan 100 milliGRAM(s) Oral daily  melatonin 5 milliGRAM(s) Oral at bedtime  metoprolol tartrate 50 milliGRAM(s) Oral every 8 hours  montelukast 10 milliGRAM(s) Oral at bedtime  pantoprazole    Tablet 40 milliGRAM(s) Oral before breakfast  polyethylene glycol 3350 17 Gram(s) Oral daily  senna 2 Tablet(s) Oral at bedtime  tiotropium 2.5 MICROgram(s) Inhaler 2 Puff(s) Inhalation daily      MEDICATIONS  (PRN):  albuterol    90 MICROgram(s) HFA Inhaler 2 Puff(s) Inhalation every 6 hours PRN Shortness of Breath and/or Wheezing       Medications up to date at time of exam.    ROS; No fever, chills, cough, nasal congestion on exam.    PHYSICAL EXAMINATION:      Vital Signs Last 24 Hrs  T(C): 36.7 (17 Jan 2024 14:01), Max: 37 (17 Jan 2024 12:29)  T(F): 98.1 (17 Jan 2024 14:01), Max: 98.6 (17 Jan 2024 12:29)  HR: 90 (17 Jan 2024 14:01) (90 - 150)  BP: 127/83 (17 Jan 2024 14:01) (102/55 - 150/85)  BP(mean): --  RR: 18 (17 Jan 2024 14:01) (18 - 19)  SpO2: 100% (17 Jan 2024 14:01) (90% - 100%)    Parameters below as of 17 Jan 2024 14:01  Patient On (Oxygen Delivery Method): nasal cannula  O2 Flow (L/min): 3     General : Alert and oriented. Able to answer question with no SOB.  No acute distress .     HEENT: Head is normocephalic and atraumatic. No nasal tenderness. Extraocular muscles are intact. Mucous membranes are moist.     NECK: Supple, no palpable adenopathy.    LUNGS: Non labored. No wheezing. No use of accessory muscle.     HEART: S1 S2 Regular rate and no click / rub.     ABDOMEN: Soft, nontender, and nondistended. Active bowel sounds.     EXTREMITIES: Without any cyanosis, clubbing, rash, lesions .    NEUROLOGIC: Awake, alert, oriented.     SKIN: Warm and moist . Non diaphoretic.       LABS:                        9.1    4.75  )-----------( 125      ( 16 Jan 2024 06:50 )             29.6     01-17    141  |  110<H>  |  36<H>  ----------------------------<  123<H>  4.1   |  25  |  1.46<H>    Ca    9.4      17 Jan 2024 06:14  Phos  4.3     01-17  Mg     2.2     01-17    TPro  7.3  /  Alb  3.2<L>  /  TBili  0.4  /  DBili  x   /  AST  12  /  ALT  14  /  AlkPhos  60  01-17      Urinalysis Basic - ( 17 Jan 2024 06:14 )    Color: x / Appearance: x / SG: x / pH: x  Gluc: 123 mg/dL / Ketone: x  / Bili: x / Urobili: x   Blood: x / Protein: x / Nitrite: x   Leuk Esterase: x / RBC: x / WBC x   Sq Epi: x / Non Sq Epi: x / Bacteria: x                      MICROBIOLOGY: (if applicable)    RADIOLOGY & ADDITIONAL STUDIES:  EKG:   CXR: < from: Xray Chest 1 View-PORTABLE IMMEDIATE (Xray Chest 1 View-PORTABLE IMMEDIATE .) (01.16.24 @ 17:45) >    ACC: [id number] EXAM:  XR CHEST PORTABLE IMMED 1V   ORDERED BY: EMERSON VERAS     PROCEDURE DATE:  01/16/2024          INTERPRETATION:  AP chest on January 16, 2024 5:25 PM. Patient has   respiratory distress.    Heart possibly enlarged. Old left rib fractures again noted.    Is a chronic appearing left lateral pleural reaction and old fracture   deformity left upper humerus.    Increased interstitial vascular markings are noted somewhat increased   from January 15 suggesting slight CHF.    IMPRESSION: Slight CHF at this time.    --- End of Report ---            MARIA ANTONIA BEASLEY MD; Attending Radiologist  This document has been electronically signed. Jan 16 2024  5:59PM    < end of copied text >    ECHO:    IMPRESSION: 68y Male PAST MEDICAL & SURGICAL HISTORY:  HTN (hypertension)      Varicose veins      HLD (hyperlipidemia)      Liver cirrhosis      Portal hypertension with esophageal varices      COPD (chronic obstructive pulmonary disease)      Syncope      Alcohol dependence, daily use      Smokes tobacco daily      DM (diabetes mellitus)      No significant past surgical history       Impression: This is a 69 Y/O Male former smoker. hx of chronic ETOH dependence with Cirrhosis . Presented to ED with worsening SOB. In the ED patient placed on Bipap. For pulmonary follow up of Acute hypoxic respiratory failure due to COPD , pulmonary edema / HFrEF , Rapid A Fib. Off Bipap and using on and off  Oxygen supplementation . 01-15-24 CXR reviewed No pneumothorax, No Effusion.        Suggestion:  O2 saturation 95% room air . Monitor O2 saturation room air trend , using O2 supplement on and off.  Can have oxygen supplementation  2L NC .  Continue Duoneb via nebulization Q 6 Hours . Do not give at the same time with PRN Albuterol 90 mcg 2 puffs Q 6 Hours.   Continue Symbicort 160- 4.5 mcg 2 puffs Twice Daily with oral spacer .   Continue Singulair 10 mg Daily.    On Apixaban 5 mg Twice daily.   Continue Tiotropium 2.5 mcg 2 puffs Daily.     Diuresis and aim for negative fluid balance.   Monitor urine output.  Beta blockers for rate control.   Hemodynamic and cardiac monitoring

## 2024-01-17 NOTE — PROGRESS NOTE ADULT - PROBLEM SELECTOR PLAN 2
- found to be in Afib with RVR in ED  - s/p diltiazem  - c/w metoprolol 50mg q8 and Eliquis  - start digoxin  - continue tele monitoring  - cards Dr Alcazar following

## 2024-01-18 LAB
ANION GAP SERPL CALC-SCNC: 4 MMOL/L — LOW (ref 5–17)
BUN SERPL-MCNC: 26 MG/DL — HIGH (ref 7–18)
CALCIUM SERPL-MCNC: 9.5 MG/DL — SIGNIFICANT CHANGE UP (ref 8.4–10.5)
CHLORIDE SERPL-SCNC: 107 MMOL/L — SIGNIFICANT CHANGE UP (ref 96–108)
CO2 SERPL-SCNC: 29 MMOL/L — SIGNIFICANT CHANGE UP (ref 22–31)
CREAT SERPL-MCNC: 1.24 MG/DL — SIGNIFICANT CHANGE UP (ref 0.5–1.3)
EGFR: 63 ML/MIN/1.73M2 — SIGNIFICANT CHANGE UP
GLUCOSE BLDC GLUCOMTR-MCNC: 131 MG/DL — HIGH (ref 70–99)
GLUCOSE BLDC GLUCOMTR-MCNC: 131 MG/DL — HIGH (ref 70–99)
GLUCOSE BLDC GLUCOMTR-MCNC: 135 MG/DL — HIGH (ref 70–99)
GLUCOSE BLDC GLUCOMTR-MCNC: 140 MG/DL — HIGH (ref 70–99)
GLUCOSE SERPL-MCNC: 107 MG/DL — HIGH (ref 70–99)
MAGNESIUM SERPL-MCNC: 1.9 MG/DL — SIGNIFICANT CHANGE UP (ref 1.6–2.6)
PHOSPHATE SERPL-MCNC: 3.5 MG/DL — SIGNIFICANT CHANGE UP (ref 2.5–4.5)
POTASSIUM SERPL-MCNC: 3.7 MMOL/L — SIGNIFICANT CHANGE UP (ref 3.5–5.3)
POTASSIUM SERPL-SCNC: 3.7 MMOL/L — SIGNIFICANT CHANGE UP (ref 3.5–5.3)
SODIUM SERPL-SCNC: 140 MMOL/L — SIGNIFICANT CHANGE UP (ref 135–145)

## 2024-01-18 PROCEDURE — 71045 X-RAY EXAM CHEST 1 VIEW: CPT | Mod: 26

## 2024-01-18 RX ORDER — BUMETANIDE 0.25 MG/ML
1 INJECTION INTRAMUSCULAR; INTRAVENOUS DAILY
Refills: 0 | Status: DISCONTINUED | OUTPATIENT
Start: 2024-01-18 | End: 2024-01-20

## 2024-01-18 RX ADMIN — Medication 3 MILLILITER(S): at 15:05

## 2024-01-18 RX ADMIN — Medication 3 MILLILITER(S): at 09:55

## 2024-01-18 RX ADMIN — AMLODIPINE BESYLATE 5 MILLIGRAM(S): 2.5 TABLET ORAL at 05:51

## 2024-01-18 RX ADMIN — Medication 50 MILLIGRAM(S): at 05:51

## 2024-01-18 RX ADMIN — MONTELUKAST 10 MILLIGRAM(S): 4 TABLET, CHEWABLE ORAL at 22:06

## 2024-01-18 RX ADMIN — BUDESONIDE AND FORMOTEROL FUMARATE DIHYDRATE 2 PUFF(S): 160; 4.5 AEROSOL RESPIRATORY (INHALATION) at 11:48

## 2024-01-18 RX ADMIN — Medication 81 MILLIGRAM(S): at 11:48

## 2024-01-18 RX ADMIN — GABAPENTIN 300 MILLIGRAM(S): 400 CAPSULE ORAL at 22:05

## 2024-01-18 RX ADMIN — PANTOPRAZOLE SODIUM 40 MILLIGRAM(S): 20 TABLET, DELAYED RELEASE ORAL at 05:51

## 2024-01-18 RX ADMIN — LOSARTAN POTASSIUM 100 MILLIGRAM(S): 100 TABLET, FILM COATED ORAL at 05:51

## 2024-01-18 RX ADMIN — BUMETANIDE 1 MILLIGRAM(S): 0.25 INJECTION INTRAMUSCULAR; INTRAVENOUS at 22:11

## 2024-01-18 RX ADMIN — Medication 5 MILLIGRAM(S): at 22:05

## 2024-01-18 RX ADMIN — TIOTROPIUM BROMIDE 2 PUFF(S): 18 CAPSULE ORAL; RESPIRATORY (INHALATION) at 11:48

## 2024-01-18 RX ADMIN — ATORVASTATIN CALCIUM 40 MILLIGRAM(S): 80 TABLET, FILM COATED ORAL at 22:06

## 2024-01-18 RX ADMIN — APIXABAN 5 MILLIGRAM(S): 2.5 TABLET, FILM COATED ORAL at 17:58

## 2024-01-18 RX ADMIN — BUDESONIDE AND FORMOTEROL FUMARATE DIHYDRATE 2 PUFF(S): 160; 4.5 AEROSOL RESPIRATORY (INHALATION) at 22:04

## 2024-01-18 RX ADMIN — APIXABAN 5 MILLIGRAM(S): 2.5 TABLET, FILM COATED ORAL at 05:51

## 2024-01-18 RX ADMIN — Medication 50 MILLIGRAM(S): at 14:33

## 2024-01-18 RX ADMIN — BUMETANIDE 1 MILLIGRAM(S): 0.25 INJECTION INTRAMUSCULAR; INTRAVENOUS at 05:50

## 2024-01-18 RX ADMIN — Medication 50 MILLIGRAM(S): at 22:07

## 2024-01-18 RX ADMIN — Medication 125 MICROGRAM(S): at 05:51

## 2024-01-18 NOTE — PROGRESS NOTE ADULT - PROBLEM SELECTOR PROBLEM 3
HLD (hyperlipidemia)
Acute systolic congestive heart failure
HLD (hyperlipidemia)
Acute systolic congestive heart failure

## 2024-01-18 NOTE — PROGRESS NOTE ADULT - PROBLEM SELECTOR PLAN 4
on home Singulair, spiriva, albuterol, symbicort  - c/w home meds
controlled  on home metroprolol, norvasc and losartan  c/w home meds w/ parameters
on home Singulair, spiriva, albuterol, symbicort  c/w home meds
on home Singulair, spiriva, albuterol, symbicort  - c/w home meds
on home Singulair, spiriva, albuterol, symbicort  - c/w home meds
on home Singulair, spiriva, albuterol, symbicort  c/w home meds
controlled  on home metroprolol, norvasc and losartan  c/w home meds w/ parameters
on home Singulair, spiriva, albuterol, symbicort  c/w home meds
on home Singulair, spiriva, albuterol, symbicort  c/w home meds

## 2024-01-18 NOTE — PROGRESS NOTE ADULT - SUBJECTIVE AND OBJECTIVE BOX
NP Note discussed with  primary attending    Patient is a 68y old  Male who presents with a chief complaint of AHRF, CHF exacerbation (17 Jan 2024 16:21)      INTERVAL HPI/OVERNIGHT EVENTS: no new complaints    MEDICATIONS  (STANDING):  albuterol/ipratropium for Nebulization 3 milliLiter(s) Nebulizer every 6 hours  amLODIPine   Tablet 5 milliGRAM(s) Oral daily  apixaban 5 milliGRAM(s) Oral two times a day  aspirin  chewable 81 milliGRAM(s) Oral daily  atorvastatin 40 milliGRAM(s) Oral at bedtime  budesonide 160 MICROgram(s)/formoterol 4.5 MICROgram(s) Inhaler 2 Puff(s) Inhalation two times a day  buMETAnide Injectable 1 milliGRAM(s) IV Push daily  digoxin     Tablet 125 MICROGram(s) Oral daily  gabapentin 300 milliGRAM(s) Oral at bedtime  insulin lispro (ADMELOG) corrective regimen sliding scale   SubCutaneous Before meals and at bedtime  losartan 100 milliGRAM(s) Oral daily  melatonin 5 milliGRAM(s) Oral at bedtime  metoprolol tartrate 50 milliGRAM(s) Oral every 8 hours  montelukast 10 milliGRAM(s) Oral at bedtime  pantoprazole    Tablet 40 milliGRAM(s) Oral before breakfast  polyethylene glycol 3350 17 Gram(s) Oral daily  senna 2 Tablet(s) Oral at bedtime  tiotropium 2.5 MICROgram(s) Inhaler 2 Puff(s) Inhalation daily    MEDICATIONS  (PRN):  albuterol    90 MICROgram(s) HFA Inhaler 2 Puff(s) Inhalation every 6 hours PRN Shortness of Breath and/or Wheezing      __________________________________________________  REVIEW OF SYSTEMS:    CONSTITUTIONAL: No fever,   RESPIRATORY: No cough; + intermittent shortness of breath  CARDIOVASCULAR: No chest pain, no palpitations  GASTROINTESTINAL: No pain. No nausea or vomiting; No diarrhea   NEUROLOGICAL: No headache or numbness, no tremors  MUSCULOSKELETAL: No joint pain, no muscle pain  GENITOURINARY: no dysuria, no frequency, no hesitancy        Vital Signs Last 24 Hrs  T(C): 36.7 (18 Jan 2024 08:24), Max: 37 (17 Jan 2024 12:29)  T(F): 98.1 (18 Jan 2024 08:24), Max: 98.6 (17 Jan 2024 12:29)  HR: 88 (18 Jan 2024 08:24) (80 - 111)  BP: 107/53 (18 Jan 2024 08:24) (107/53 - 149/75)  BP(mean): 68 (18 Jan 2024 08:24) (68 - 68)  RR: 18 (18 Jan 2024 08:24) (18 - 20)  SpO2: 93% (18 Jan 2024 08:24) (93% - 100%)    Parameters below as of 18 Jan 2024 08:24  Patient On (Oxygen Delivery Method): room air        ________________________________________________  PHYSICAL EXAM:  GENERAL: NAD  CHEST/LUNG: scattered wheezing, + mandy scattered rhonchi   HEART: S1 S2  regular; no murmurs, gallops or rubs  ABDOMEN: Soft, Nontender, Nondistended; Bowel sounds present  EXTREMITIES: no cyanosis; no edema; no calf tenderness  SKIN: warm and dry; no rash  NERVOUS SYSTEM:  Awake and alert; Oriented  to place, person and time ; no new deficits    _________________________________________________  LABS:    01-18    140  |  107  |  26<H>  ----------------------------<  107<H>  3.7   |  29  |  1.24    Ca    9.5      18 Jan 2024 07:00  Phos  3.5     01-18  Mg     1.9     01-18    TPro  7.3  /  Alb  3.2<L>  /  TBili  0.4  /  DBili  x   /  AST  12  /  ALT  14  /  AlkPhos  60  01-17      Urinalysis Basic - ( 18 Jan 2024 07:00 )    Color: x / Appearance: x / SG: x / pH: x  Gluc: 107 mg/dL / Ketone: x  / Bili: x / Urobili: x   Blood: x / Protein: x / Nitrite: x   Leuk Esterase: x / RBC: x / WBC x   Sq Epi: x / Non Sq Epi: x / Bacteria: x      CAPILLARY BLOOD GLUCOSE      POCT Blood Glucose.: 131 mg/dL (18 Jan 2024 07:59)  POCT Blood Glucose.: 137 mg/dL (17 Jan 2024 21:01)  POCT Blood Glucose.: 142 mg/dL (17 Jan 2024 16:38)  POCT Blood Glucose.: 185 mg/dL (17 Jan 2024 12:10)        RADIOLOGY & ADDITIONAL TESTS:    Imaging Personally Reviewed:  YES/NO    Consultant(s) Notes Reviewed:   YES/ No    Care Discussed with Consultants :     Plan of care was discussed with patient and /or primary care giver; all questions and concerns were addressed and care was aligned with patient's wishes.

## 2024-01-18 NOTE — PROGRESS NOTE ADULT - PROBLEM SELECTOR PLAN 9
eliquis  ppi
DVT: Eliquis  GI: PPI
eliquis  ppi

## 2024-01-18 NOTE — PROGRESS NOTE ADULT - SUBJECTIVE AND OBJECTIVE BOX
Time of Visit:  Patient seen and examined.     MEDICATIONS  (STANDING):  albuterol/ipratropium for Nebulization 3 milliLiter(s) Nebulizer every 6 hours  amLODIPine   Tablet 5 milliGRAM(s) Oral daily  apixaban 5 milliGRAM(s) Oral two times a day  aspirin  chewable 81 milliGRAM(s) Oral daily  atorvastatin 40 milliGRAM(s) Oral at bedtime  budesonide 160 MICROgram(s)/formoterol 4.5 MICROgram(s) Inhaler 2 Puff(s) Inhalation two times a day  buMETAnide 1 milliGRAM(s) Oral daily  digoxin     Tablet 125 MICROGram(s) Oral daily  gabapentin 300 milliGRAM(s) Oral at bedtime  insulin lispro (ADMELOG) corrective regimen sliding scale   SubCutaneous Before meals and at bedtime  losartan 100 milliGRAM(s) Oral daily  melatonin 5 milliGRAM(s) Oral at bedtime  metoprolol tartrate 50 milliGRAM(s) Oral every 8 hours  montelukast 10 milliGRAM(s) Oral at bedtime  pantoprazole    Tablet 40 milliGRAM(s) Oral before breakfast  polyethylene glycol 3350 17 Gram(s) Oral daily  senna 2 Tablet(s) Oral at bedtime  tiotropium 2.5 MICROgram(s) Inhaler 2 Puff(s) Inhalation daily      MEDICATIONS  (PRN):  albuterol    90 MICROgram(s) HFA Inhaler 2 Puff(s) Inhalation every 6 hours PRN Shortness of Breath and/or Wheezing       Medications up to date at time of exam.    ROS; No fever, chills, cough, nasal congestion on exam.   PHYSICAL EXAMINATION:  Vital Signs Last 24 Hrs  T(C): 37.2 (18 Jan 2024 11:53), Max: 37.2 (18 Jan 2024 11:53)  T(F): 99 (18 Jan 2024 11:53), Max: 99 (18 Jan 2024 11:53)  HR: 59 (18 Jan 2024 11:53) (59 - 106)  BP: 110/63 (18 Jan 2024 11:53) (107/53 - 149/75)  BP(mean): 77 (18 Jan 2024 11:53) (68 - 77)  RR: 18 (18 Jan 2024 11:53) (18 - 20)  SpO2: 96% (18 Jan 2024 11:53) (93% - 100%)    Parameters below as of 18 Jan 2024 11:53  Patient On (Oxygen Delivery Method): room air       General : Alert and oriented. Able to answer question with no SOB.  No acute distress .     HEENT: Head is normocephalic and atraumatic. No nasal tenderness. Extraocular muscles are intact. Mucous membranes are moist.     NECK: Supple, no palpable adenopathy.    LUNGS: Non labored. Scatterred wheezing. No use of accessory muscle.     HEART: S1 S2 Regular rate and no click / rub.     ABDOMEN: Soft, nontender, and nondistended. Active bowel sounds.     EXTREMITIES: Without any cyanosis, clubbing, rash, lesions .    NEUROLOGIC: Awake, alert, oriented.     SKIN: Warm and moist . Non diaphoretic.     LABS:    01-18    140  |  107  |  26<H>  ----------------------------<  107<H>  3.7   |  29  |  1.24    Ca    9.5      18 Jan 2024 07:00  Phos  3.5     01-18  Mg     1.9     01-18    TPro  7.3  /  Alb  3.2<L>  /  TBili  0.4  /  DBili  x   /  AST  12  /  ALT  14  /  AlkPhos  60  01-17      Urinalysis Basic - ( 18 Jan 2024 07:00 )    Color: x / Appearance: x / SG: x / pH: x  Gluc: 107 mg/dL / Ketone: x  / Bili: x / Urobili: x   Blood: x / Protein: x / Nitrite: x   Leuk Esterase: x / RBC: x / WBC x   Sq Epi: x / Non Sq Epi: x / Bacteria: x                      MICROBIOLOGY: (if applicable)    RADIOLOGY & ADDITIONAL STUDIES:  EKG:   CXR:  ECHO:    IMPRESSION: 68y Male PAST MEDICAL & SURGICAL HISTORY:  HTN (hypertension)      Varicose veins      HLD (hyperlipidemia)      Liver cirrhosis      Portal hypertension with esophageal varices      COPD (chronic obstructive pulmonary disease)      Syncope      Alcohol dependence, daily use      Smokes tobacco daily      DM (diabetes mellitus)      No significant past surgical history       Impression: This is a 67 Y/O Male former smoker. hx of chronic ETOH dependence with Cirrhosis . Presented to ED with worsening SOB. In the ED patient placed on Bipap. For pulmonary follow up of Acute hypoxic respiratory failure due to COPD , pulmonary edema / HFrEF , Rapid A Fib. Off Bipap and using on and off  Oxygen supplementation . 01-15-24 CXR reviewed No pneumothorax, No Effusion.        Suggestion:  O2 saturation 98% room air . So far saturating good room air. Non compliant to Bipap at Night.    Continue Duoneb via nebulization Q 6 Hours . Do not give at the same time with PRN Albuterol 90 mcg 2 puffs Q 6 Hours.   Continue Symbicort 160- 4.5 mcg 2 puffs Twice Daily with oral spacer .   Continue Singulair 10 mg Daily.    On Apixaban 5 mg Twice daily.   Continue Tiotropium 2.5 mcg 2 puffs Daily.    On Bumex 1 mg Oral Daily.    Diuresis and aim for negative fluid balance.   Monitor urine output.  Beta blockers for rate control.   Hemodynamic and cardiac monitoring

## 2024-01-18 NOTE — PROGRESS NOTE ADULT - PROBLEM SELECTOR PLAN 5
- c/w home statin
Lipid panel unremarkable  Low HDL likely 2/2 smoking and inactivity  c/w home statin
- c/w home statin
- c/w home statin
f/u lipid panel in AM  c/w home statin

## 2024-01-18 NOTE — PROGRESS NOTE ADULT - ASSESSMENT
68M from home, ambulates independently, PMHx COPD (not on O2), former smoker quit 2-months, HLD, DM, HTN, chronic ETOH dependence c/b cirrhosis, p/w worsening SOB. Patient is limited historian, currently on BIPAP and unable to full history deferring further collateral to wife, Maureen Mcclellanvt [phone] but unable to reach. Patient states that he has had worsening of breathing over 2-years, does not sleep with a machine at home, and when inquired about medications he admits to non-compliance.   In ED:   Afebrile;  Afib w/ RVR and PVC; /102; RR 22 100% on RA. Started on BIPAP for increased WOB saturating 100% with improvement in WOB. Trop negative 17, BNP 1853 (improved from 4463). Given duoneb x3, solumedrol 125mg x1, rocephin x1, and 500cc bolus x1, diltizem 20mg IVP x1 + 10mg IVP x1. Awaiting to receive vanc x1 and azithromycin. CXR increased b/l opacification and left lower pleural effusion. Admitted to ICU for AHRF 2/2 suspected CHF exacerbation requiring continuous BIPAP. CT chest on 1/8 showed CHF and/or PNA with small moderate right pleural effusion and small loculated left pleural effusion.    Pt. downgraded to tele on 1/9. Hospital course complicated by worsening shortness of breath, increased work of breathing and wheezing. CXR on 1/15 showed mild improved aeration. Pt was given nebulizer treatment and diurese with bumex. Pulm Dr Manrique is following. Pt noted to have increase WOB despite nebulizer treatment, repeat CXR 1/16 showed slight CHF. Pt was given IV Bumex again and PO daily. Course further complicated by intermittent Afib with RVR, Dr Alcazar cardiology following. Digoxin loading dose was given and started daily.    PT recs OP PT

## 2024-01-18 NOTE — PROGRESS NOTE ADULT - PROBLEM SELECTOR PROBLEM 2
Atrial fibrillation with RVR
Acute systolic congestive heart failure
Acute systolic congestive heart failure
Atrial fibrillation with RVR
Atrial fibrillation with RVR

## 2024-01-18 NOTE — PROGRESS NOTE ADULT - PROBLEM SELECTOR PROBLEM 1
Panel Management Review      Patient has the following on her problem list:     Depression / Dysthymia review    Patient is due for:  None        Composite cancer screening  Chart review shows that this patient is due/due soon for the following Pap Smear, Mammogram and Colonoscopy  Summary:    Patient is due/failing the following:   MAMMOGRAM; COLONOSCOPY; PAP; HEP C SCREEN; LIPIDS    Action needed:   Patient needs office visit for physical with PAP and FASTING LABS; Orders for: MAMMOGRAM and COLONOSCOPY.    Type of outreach:    Sent [employer] message.    Questions for provider review:    None                                                                                                                                    Margi Ramos CMA ([facility]AMA)       Chart routed to None .        
Acute hypoxic respiratory failure
Atrial fibrillation with RVR
Atrial fibrillation with RVR
Acute hypoxic respiratory failure
Acute hypoxic respiratory failure

## 2024-01-18 NOTE — PROGRESS NOTE ADULT - PROBLEM SELECTOR PLAN 10
PT recs OP PT  - pt with intermittently going to flash pulmonary edema -- needing aggressive diuresis at times, pending clinical improvement  - Afib with RVR on exertion -- pending improvement with dig  - F/U with pulm final recs and cardiology final recs  - pt do not use O2 at home  - sister is the CDPAP as per CM

## 2024-01-18 NOTE — PROGRESS NOTE ADULT - ASSESSMENT
_________________________________________________________________________________________  ========>>  M E D I C A L   A T T E N D I N G    F O L L O W  U P  N O T E  <<=========  -----------------------------------------------------------------------------------------------------    - Patient seen and examined by me earlier today.    - Patient with unstable respiratory status per report.. with need for O2 m was on Bipap briefly yesterday...      patient today seems a bit better, on O2 via NC, 2 L        RVP was negative !! doesn't appear grossly fluid overloaded, no wheezing !!     ==================>> REVIEW OF SYSTEM <<=================    GEN: no fever, no chills, no pain..   RESP: + SOB on and off..  no cough   CVS: no chest pain, no palpitations  GI: no abdominal pain, no nausea, eating ok   : no dysuria, no frequency  Neuro: no headache, no dizziness    ==================>> PHYSICAL EXAM <<=================    GEN: A&O X 3 , NAD , comfortable, pleasant, calm in bed on oxygen.. encouraged out of bed to chair with assistance as needed.   HEENT: NCAT, PERRL, MMM, hearing intact  CVS: S1S2 , Irregular , No M/R/G appreciated, not tachy  PULM:  scattered bilateral mild rhonchi, no wheezing   ABD.: soft. non tender, non distended,  bowel sounds present  Extrem: intact pulses , NO edema           ( Note written / Date of service 01-18-24 ( This is certified to be the same as "ENTERED" date above ( for billing purposes)))    ==================>> MEDICATIONS <<====================    albuterol/ipratropium for Nebulization 3 milliLiter(s) Nebulizer every 6 hours  amLODIPine   Tablet 5 milliGRAM(s) Oral daily  apixaban 5 milliGRAM(s) Oral two times a day  aspirin  chewable 81 milliGRAM(s) Oral daily  atorvastatin 40 milliGRAM(s) Oral at bedtime  budesonide 160 MICROgram(s)/formoterol 4.5 MICROgram(s) Inhaler 2 Puff(s) Inhalation two times a day  buMETAnide 1 milliGRAM(s) Oral daily  digoxin     Tablet 125 MICROGram(s) Oral daily  gabapentin 300 milliGRAM(s) Oral at bedtime  insulin lispro (ADMELOG) corrective regimen sliding scale   SubCutaneous Before meals and at bedtime  losartan 100 milliGRAM(s) Oral daily  melatonin 5 milliGRAM(s) Oral at bedtime  metoprolol tartrate 50 milliGRAM(s) Oral every 8 hours  montelukast 10 milliGRAM(s) Oral at bedtime  pantoprazole    Tablet 40 milliGRAM(s) Oral before breakfast  polyethylene glycol 3350 17 Gram(s) Oral daily  senna 2 Tablet(s) Oral at bedtime  tiotropium 2.5 MICROgram(s) Inhaler 2 Puff(s) Inhalation daily    MEDICATIONS  (PRN):  albuterol    90 MICROgram(s) HFA Inhaler 2 Puff(s) Inhalation every 6 hours PRN Shortness of Breath and/or Wheezing    ___________  Active diet:  Diet, Regular:   DASH/TLC Sodium & Cholesterol Restricted  1200mL Fluid Restriction ([id number])  Kosher  Supplement Feeding Modality:  Oral  Ensure Compact Cans or Servings Per Day:  1       Frequency:  Two Times a day  ___________________    ==================>> VITAL SIGNS <<==================    Vital Signs Last 24 HrsT(C): 36.7 (01-18-24 @ 15:50)  T(F): 98.1 (01-18-24 @ 15:50), Max: 99 (01-18-24 @ 11:53)  HR: 100 (01-18-24 @ 15:50) (59 - 100)  BP: 132/69 (01-18-24 @ 15:50)  RR: 18 (01-18-24 @ 15:50) (18 - 20)  SpO2: 95% (01-18-24 @ 15:50) (93% - 98%)      CAPILLARY BLOOD GLUCOSE      POCT Blood Glucose.: 140 mg/dL (18 Jan 2024 16:52)  POCT Blood Glucose.: 131 mg/dL (18 Jan 2024 11:42)  POCT Blood Glucose.: 131 mg/dL (18 Jan 2024 07:59)  POCT Blood Glucose.: 137 mg/dL (17 Jan 2024 21:01)     ==================>> LAB AND IMAGING <<==================       01-18    140  |  107  |  26<H>  ----------------------------<  107<H>  3.7   |  29  |  1.24    Ca    9.5      18 Jan 2024 07:00  Phos  3.5     01-18  Mg     1.9     01-18    TPro  7.3  /  Alb  3.2<L>  /  TBili  0.4  /  DBili  x   /  AST  12  /  ALT  14  /  AlkPhos  60  01-17    WBC count:   4.75 <<== ,  5.74 <<== ,  8.67 <<==   Hemoglobin:   9.1 <<==,  9.2 <<==,  9.4 <<==  platelets:  125 <==, 92 <==, 143 <==, 108 <==    Creatinine:  1.24  <<==, 1.46  <<==, 1.69  <<==, 1.57  <<==, 1.61  <<==, 1.31  <<==  Sodium:   140  <==, 141  <==, 142  <==, 139  <==, 138  <==, 140  <==       AST:          12(01-17) <== , 12(01-16) <== , 19(01-15) <== , 16(01-12) <== , 16(01-10) <==      ALT:        14(01-17)  <== , 16(01-16)  <== , 15(01-15)  <== , 15(01-12)  <== , 15(01-10)  <==      AP:        60(01-17)  <=, 62(01-16)  <=, 61(01-15)  <=, 76(01-12)  <=, 59(01-10)  <=     Bili:        0.4(01-17)  <=, 0.4(01-16)  <=, 0.6(01-15)  <=, 0.5(01-12)  <=, 0.5(01-10)  <=    ____________________________    M I C R O B I O L O G Y :      SARS-CoV-2: [facility] (01-14-24 @ 11:00)  SARS-CoV-2: [facility] (01-08-24 @ 06:24)    ___________________________________________________________________________________  ===============>>  A S S E S S M E N T   A N D   P L A N <<===============  ------------------------------------------------------------------------------------------    acute hypoxemic respiratory failure  acute exacerbation of chromic systolic CHF  Afib with RVR > better controlled   COPD  >> now with new exacerbation      smoker ( long term, recently stopped !)   acute Pulmonary edema > improved   suspect new viral URI  DM type 2  history of Hypertension     ====>>>>    - was off O2 >> now back on O2 support and increased work of breathing     suspect COPD exacerbation         viral URI ruled out            CHF seems to be stable >> continue diuretics as able / needed :: monitor creatinine            cardio / pulm follow up and management appreciated              wean off O2 : don't believe patient needs O2 for home..?  - Diuresis and aim for negative fluid balance  - Monitor urine output  - Cont. AC  / HR control   - cardiology Follow-up, management, optimization appreciated       see discussion bellow   - Hemodynamic and cardiac monitoring  - Continue Current medications otherwise and monitor.  - nutrition, OOB as able, incentive spirometer   - supportive care   -GI/DVT Prophylaxis per protocol.    {    it appears patient has had CHF for a while , with reduced EF and several hospitalization and no follow up with cardio as outpatient !!      patient lives with wife, has 2 children in [place], one in [place] and one in [facility] !!  unclear if good social support .. and patient not seen to be very reliable to care fo himself...         unclear / not appears to be patient had ischemic workup and evaluation for ICD...   per discussed with cardio patient also may not have been on optimal medical management for 3 months fully to evaluation for improved EF....       >> will need close follow up as outpatient with cardio         ? social work evaluation may be helpful for safe DC planing ... :  follow up   }  --------------------------------------------  Case discussed with patient Nurse Practitioner   Education given on findings and plan of care  ___________________________  H. RONI Rivera.  Pager: [phone]   _________________________________________________________________________________________  ========>>  M E D I C A L   A T T E N D I N G    F O L L O W  U P  N O T E  <<=========  -----------------------------------------------------------------------------------------------------    - Patient seen and examined by me earlier today.    - Patient doing better, off o2..      patient in and out of Afib with RVR ( patient had had prior flash Pulmonary edema)     ==================>> REVIEW OF SYSTEM <<=================    GEN: no fever, no chills, no pain..   RESP: no  SOB on and off..  no cough   CVS: no chest pain, no palpitations  GI: no abdominal pain, no nausea, eating ok   : no dysuria, no frequency  Neuro: no headache, no dizziness    ==================>> PHYSICAL EXAM <<=================    GEN: A&O X 3 , NAD , comfortable, pleasant, calm in bed on oxygen.. encouraged out of bed to chair with assistance as needed.   HEENT: NCAT, PERRL, MMM, hearing intact  CVS: S1S2 , Irregular , No M/R/G appreciated, not tachy  PULM:  scattered bilateral mild rhonchi, no wheezing   ABD.: soft. non tender, non distended,  bowel sounds present  Extrem: intact pulses , NO edema           ( Note written / Date of service 01-18-24 ( This is certified to be the same as "ENTERED" date above ( for billing purposes)))    ==================>> MEDICATIONS <<====================    albuterol/ipratropium for Nebulization 3 milliLiter(s) Nebulizer every 6 hours  amLODIPine   Tablet 5 milliGRAM(s) Oral daily  apixaban 5 milliGRAM(s) Oral two times a day  aspirin  chewable 81 milliGRAM(s) Oral daily  atorvastatin 40 milliGRAM(s) Oral at bedtime  budesonide 160 MICROgram(s)/formoterol 4.5 MICROgram(s) Inhaler 2 Puff(s) Inhalation two times a day  buMETAnide 1 milliGRAM(s) Oral daily  digoxin     Tablet 125 MICROGram(s) Oral daily  gabapentin 300 milliGRAM(s) Oral at bedtime  insulin lispro (ADMELOG) corrective regimen sliding scale   SubCutaneous Before meals and at bedtime  losartan 100 milliGRAM(s) Oral daily  melatonin 5 milliGRAM(s) Oral at bedtime  metoprolol tartrate 50 milliGRAM(s) Oral every 8 hours  montelukast 10 milliGRAM(s) Oral at bedtime  pantoprazole    Tablet 40 milliGRAM(s) Oral before breakfast  polyethylene glycol 3350 17 Gram(s) Oral daily  senna 2 Tablet(s) Oral at bedtime  tiotropium 2.5 MICROgram(s) Inhaler 2 Puff(s) Inhalation daily    MEDICATIONS  (PRN):  albuterol    90 MICROgram(s) HFA Inhaler 2 Puff(s) Inhalation every 6 hours PRN Shortness of Breath and/or Wheezing    ___________  Active diet:  Diet, Regular:   DASH/TLC Sodium & Cholesterol Restricted  1200mL Fluid Restriction ([id number])  [place]  Supplement Feeding Modality:  Oral  Ensure Compact Cans or Servings Per Day:  1       Frequency:  Two Times a day  ___________________    ==================>> VITAL SIGNS <<==================    Vital Signs Last 24 HrsT(C): 36.7 (01-18-24 @ 15:50)  T(F): 98.1 (01-18-24 @ 15:50), Max: 99 (01-18-24 @ 11:53)  HR: 100 (01-18-24 @ 15:50) (59 - 100)  BP: 132/69 (01-18-24 @ 15:50)  RR: 18 (01-18-24 @ 15:50) (18 - 20)  SpO2: 95% (01-18-24 @ 15:50) (93% - 98%)      CAPILLARY BLOOD GLUCOSE  POCT Blood Glucose.: 140 mg/dL (18 Jan 2024 16:52)  POCT Blood Glucose.: 131 mg/dL (18 Jan 2024 11:42)  POCT Blood Glucose.: 131 mg/dL (18 Jan 2024 07:59)  POCT Blood Glucose.: 137 mg/dL (17 Jan 2024 21:01)     ==================>> LAB AND IMAGING <<==================       01-18    140  |  107  |  26<H>  ----------------------------<  107<H>  3.7   |  29  |  1.24    Ca    9.5      18 Jan 2024 07:00  Phos  3.5     01-18  Mg     1.9     01-18    TPro  7.3  /  Alb  3.2<L>  /  TBili  0.4  /  DBili  x   /  AST  12  /  ALT  14  /  AlkPhos  60  01-17    WBC count:   4.75 <<== ,  5.74 <<== ,  8.67 <<==   Hemoglobin:   9.1 <<==,  9.2 <<==,  9.4 <<==  platelets:  125 <==, 92 <==, 143 <==, 108 <==    Creatinine:  1.24  <<==, 1.46  <<==, 1.69  <<==, 1.57  <<==, 1.61  <<==, 1.31  <<==  Sodium:   140  <==, 141  <==, 142  <==, 139  <==, 138  <==, 140  <==       AST:          12(01-17) <== , 12(01-16) <== , 19(01-15) <== , 16(01-12) <== , 16(01-10) <==      ALT:        14(01-17)  <== , 16(01-16)  <== , 15(01-15)  <== , 15(01-12)  <== , 15(01-10)  <==      AP:        60(01-17)  <=, 62(01-16)  <=, 61(01-15)  <=, 76(01-12)  <=, 59(01-10)  <=     Bili:        0.4(01-17)  <=, 0.4(01-16)  <=, 0.6(01-15)  <=, 0.5(01-12)  <=, 0.5(01-10)  <=    ____________________________    M I C R O B I O L O G Y :      SARS-CoV-2: [facility] (01-14-24 @ 11:00)  SARS-CoV-2: [facility] (01-08-24 @ 06:24)    ___________________________________________________________________________________  ===============>>  A S S E S S M E N T   A N D   P L A N <<===============  ------------------------------------------------------------------------------------------    acute hypoxemic respiratory failure  acute exacerbation of chromic systolic CHF  Afib with RVR   COPD  >> now with new exacerbation      smoker ( long term, recently stopped !)   acute Pulmonary edema > improved   suspect new viral URI  DM type 2  history of Hypertension     ====>>>>    -  monitor off O2      COPD Under control           CHF seems to be stable >> continue diuretics as able / needed :: monitor creatinine            Need close cardio Follow-up and management                 ? electrophysiology consultation   pulm follow up and management appreciated              Patient asking for home O2, however don't believe patient Meets requirements  - Diuresis and aim for negative fluid balance  - Monitor urine output  - Cont. AC  / HR control   - Hemodynamic and cardiac monitoring  - Continue Current medications otherwise and monitor.  - nutrition, OOB as able, incentive spirometer   - supportive care   -GI/DVT Prophylaxis per protocol.    Discharge planning when cleared by cardiology    --------------------------------------------  Case discussed with patient Nurse Practitioner   Education given on findings and plan of care  ___________________________  HAngella Rivera D.O.  Pager: [phone]

## 2024-01-18 NOTE — PROGRESS NOTE ADULT - PROBLEM SELECTOR PLAN 7
SCr 1.36 (baseline 1.4-2.6)  - Scr today 1.46  - avoid nephrotoxic agents  - monitor BMP
SCr 1.36 (baseline 1.4-2.6)  - Scr today 1.69  - avoid nephrotoxic agents  - monitor BMP
SCr 1.36 (baseline 1.4-2.6)  Scr 1.36  avoid nephrotoxic agents  monitor BMP
SCr 1.36 (baseline 1.4-2.6)  - Scr today 1.46  - avoid nephrotoxic agents  - monitor BMP
SCr 1.36 (baseline 1.4-2.6)  Scr 1.36  avoid nephrotoxic agents  monitor BMP

## 2024-01-18 NOTE — PROGRESS NOTE ADULT - PROBLEM SELECTOR PLAN 8
controlled  on home metroprolol, norvasc and losartan  c/w home meds w/ parameters
on home metroprolol, norvasc and losartan  - c/w home meds w/ parameters
controlled  on home metroprolol, norvasc and losartan  c/w home meds w/ parameters

## 2024-01-18 NOTE — PROGRESS NOTE ADULT - PROBLEM SELECTOR PROBLEM 4
COPD (chronic obstructive pulmonary disease)
HTN (hypertension)
COPD (chronic obstructive pulmonary disease)
HTN (hypertension)
COPD (chronic obstructive pulmonary disease)

## 2024-01-18 NOTE — PROGRESS NOTE ADULT - NS ATTEND AMEND GEN_ALL_CORE FT
Impression: This is a 67 Y/O Male former smoker. hx of chronic ETOH dependence with Cirrhosis . Presented to ED with worsening SOB. In the ED patient placed on Bipap. For pulmonary follow up of Acute hypoxic respiratory failure due to COPD , pulmonary edema / HFrEF , Rapid A Fib. Off Bipap and using on and off  Oxygen supplementation . 01-15-24 CXR reviewed No pneumothorax, No Effusion.        Suggestion:  O2 saturation 98% room air . So far saturating good room air. Non compliant to Bipap at Night.    Continue Duoneb via nebulization Q 6 Hours . Do not give at the same time with PRN Albuterol 90 mcg 2 puffs Q 6 Hours.   Continue Symbicort 160- 4.5 mcg 2 puffs Twice Daily with oral spacer .   Continue Singulair 10 mg Daily.    On Apixaban 5 mg Twice daily.   Continue Tiotropium 2.5 mcg 2 puffs Daily.    On Bumex 1 mg Oral Daily.    Diuresis and aim for negative fluid balance.   Monitor urine output.  Beta blockers for rate control.   Hemodynamic and cardiac monitoring

## 2024-01-18 NOTE — PROGRESS NOTE ADULT - PROBLEM SELECTOR PLAN 6
on metformin at home  - a1c 11/2023- 4.9%  - continue with ISS
on metformin at home  a1c 11/2023- 4.9%  c/w ISS ACHS  controlled

## 2024-01-19 LAB
GLUCOSE BLDC GLUCOMTR-MCNC: 107 MG/DL — HIGH (ref 70–99)
GLUCOSE BLDC GLUCOMTR-MCNC: 126 MG/DL — HIGH (ref 70–99)
GLUCOSE BLDC GLUCOMTR-MCNC: 99 MG/DL — SIGNIFICANT CHANGE UP (ref 70–99)

## 2024-01-19 RX ORDER — ALBUTEROL 90 UG/1
2 AEROSOL, METERED ORAL
Qty: 1 | Refills: 0
Start: 2024-01-19 | End: 2024-02-17

## 2024-01-19 RX ORDER — DIGOXIN 250 MCG
1 TABLET ORAL
Qty: 30 | Refills: 0
Start: 2024-01-19 | End: 2024-02-17

## 2024-01-19 RX ORDER — AMLODIPINE BESYLATE 2.5 MG/1
1 TABLET ORAL
Qty: 30 | Refills: 0
Start: 2024-01-19 | End: 2024-02-17

## 2024-01-19 RX ORDER — BUMETANIDE 0.25 MG/ML
1 INJECTION INTRAMUSCULAR; INTRAVENOUS
Qty: 30 | Refills: 0
Start: 2024-01-19 | End: 2024-02-17

## 2024-01-19 RX ORDER — BACITRACIN ZINC 500 UNIT/G
1 OINTMENT IN PACKET (EA) TOPICAL THREE TIMES A DAY
Refills: 0 | Status: COMPLETED | OUTPATIENT
Start: 2024-01-19 | End: 2024-01-20

## 2024-01-19 RX ORDER — LOSARTAN POTASSIUM 100 MG/1
1 TABLET, FILM COATED ORAL
Qty: 30 | Refills: 0
Start: 2024-01-19 | End: 2024-02-17

## 2024-01-19 RX ORDER — FLUTICASONE FUROATE AND VILANTEROL TRIFENATATE 100; 25 UG/1; UG/1
1 POWDER RESPIRATORY (INHALATION)
Refills: 0 | DISCHARGE

## 2024-01-19 RX ORDER — BUDESONIDE AND FORMOTEROL FUMARATE DIHYDRATE 160; 4.5 UG/1; UG/1
2 AEROSOL RESPIRATORY (INHALATION)
Qty: 1 | Refills: 0
Start: 2024-01-19 | End: 2024-02-17

## 2024-01-19 RX ORDER — TIOTROPIUM BROMIDE 18 UG/1
2 CAPSULE ORAL; RESPIRATORY (INHALATION)
Qty: 2 | Refills: 0
Start: 2024-01-19 | End: 2024-02-17

## 2024-01-19 RX ADMIN — Medication 3 MILLILITER(S): at 08:24

## 2024-01-19 RX ADMIN — Medication 50 MILLIGRAM(S): at 14:10

## 2024-01-19 RX ADMIN — Medication 1 APPLICATION(S): at 14:10

## 2024-01-19 RX ADMIN — MONTELUKAST 10 MILLIGRAM(S): 4 TABLET, CHEWABLE ORAL at 22:21

## 2024-01-19 RX ADMIN — Medication 5 MILLIGRAM(S): at 22:20

## 2024-01-19 RX ADMIN — Medication 50 MILLIGRAM(S): at 22:20

## 2024-01-19 RX ADMIN — APIXABAN 5 MILLIGRAM(S): 2.5 TABLET, FILM COATED ORAL at 06:27

## 2024-01-19 RX ADMIN — LOSARTAN POTASSIUM 100 MILLIGRAM(S): 100 TABLET, FILM COATED ORAL at 06:27

## 2024-01-19 RX ADMIN — BUMETANIDE 1 MILLIGRAM(S): 0.25 INJECTION INTRAMUSCULAR; INTRAVENOUS at 06:27

## 2024-01-19 RX ADMIN — BUDESONIDE AND FORMOTEROL FUMARATE DIHYDRATE 2 PUFF(S): 160; 4.5 AEROSOL RESPIRATORY (INHALATION) at 22:20

## 2024-01-19 RX ADMIN — APIXABAN 5 MILLIGRAM(S): 2.5 TABLET, FILM COATED ORAL at 18:03

## 2024-01-19 RX ADMIN — Medication 50 MILLIGRAM(S): at 06:28

## 2024-01-19 RX ADMIN — AMLODIPINE BESYLATE 5 MILLIGRAM(S): 2.5 TABLET ORAL at 06:27

## 2024-01-19 RX ADMIN — Medication 81 MILLIGRAM(S): at 11:35

## 2024-01-19 RX ADMIN — ATORVASTATIN CALCIUM 40 MILLIGRAM(S): 80 TABLET, FILM COATED ORAL at 22:20

## 2024-01-19 RX ADMIN — Medication 1 APPLICATION(S): at 22:58

## 2024-01-19 RX ADMIN — PANTOPRAZOLE SODIUM 40 MILLIGRAM(S): 20 TABLET, DELAYED RELEASE ORAL at 06:27

## 2024-01-19 RX ADMIN — GABAPENTIN 300 MILLIGRAM(S): 400 CAPSULE ORAL at 22:20

## 2024-01-19 RX ADMIN — TIOTROPIUM BROMIDE 2 PUFF(S): 18 CAPSULE ORAL; RESPIRATORY (INHALATION) at 11:35

## 2024-01-19 RX ADMIN — Medication 125 MICROGRAM(S): at 06:27

## 2024-01-19 NOTE — PROGRESS NOTE ADULT - ASSESSMENT
_________________________________________________________________________________________  ========>>  M E D I C A L   A T T E N D I N G    F O L L O W  U P  N O T E  <<=========  -----------------------------------------------------------------------------------------------------    - Patient seen and examined by me earlier today.    - Patient doing better, off o2..      patient in and out of Afib with RVR ( patient had had prior flash Pulmonary edema)     ==================>> REVIEW OF SYSTEM <<=================    GEN: no fever, no chills, no pain..   RESP: no  SOB on and off..  no cough   CVS: no chest pain, no palpitations  GI: no abdominal pain, no nausea, eating ok   : no dysuria, no frequency  Neuro: no headache, no dizziness    ==================>> PHYSICAL EXAM <<=================    GEN: A&O X 3 , NAD , comfortable, pleasant, calm in bed on oxygen.. encouraged out of bed to chair with assistance as needed.   HEENT: NCAT, PERRL, MMM, hearing intact  CVS: S1S2 , Irregular , No M/R/G appreciated, not tachy  PULM:  scattered bilateral mild rhonchi, no wheezing   ABD.: soft. non tender, non distended,  bowel sounds present  Extrem: intact pulses , NO edema          ( Note written / Date of service 01-19-24 ( This is certified to be the same as "ENTERED" date above ( for billing purposes)))    ==================>> MEDICATIONS <<====================    albuterol/ipratropium for Nebulization 3 milliLiter(s) Nebulizer every 6 hours  amLODIPine   Tablet 5 milliGRAM(s) Oral daily  apixaban 5 milliGRAM(s) Oral two times a day  aspirin  chewable 81 milliGRAM(s) Oral daily  atorvastatin 40 milliGRAM(s) Oral at bedtime  bacitracin   Ointment 1 Application(s) Topical three times a day  budesonide 160 MICROgram(s)/formoterol 4.5 MICROgram(s) Inhaler 2 Puff(s) Inhalation two times a day  buMETAnide 1 milliGRAM(s) Oral daily  digoxin     Tablet 125 MICROGram(s) Oral daily  gabapentin 300 milliGRAM(s) Oral at bedtime  insulin lispro (ADMELOG) corrective regimen sliding scale   SubCutaneous Before meals and at bedtime  losartan 100 milliGRAM(s) Oral daily  melatonin 5 milliGRAM(s) Oral at bedtime  metoprolol tartrate 50 milliGRAM(s) Oral every 8 hours  montelukast 10 milliGRAM(s) Oral at bedtime  pantoprazole    Tablet 40 milliGRAM(s) Oral before breakfast  polyethylene glycol 3350 17 Gram(s) Oral daily  senna 2 Tablet(s) Oral at bedtime  tiotropium 2.5 MICROgram(s) Inhaler 2 Puff(s) Inhalation daily    MEDICATIONS  (PRN):  albuterol    90 MICROgram(s) HFA Inhaler 2 Puff(s) Inhalation every 6 hours PRN Shortness of Breath and/or Wheezing    ___________  Active diet:  Diet, Regular:   DASH/TLC Sodium & Cholesterol Restricted  1200mL Fluid Restriction ([id number])  Kosher  Supplement Feeding Modality:  Oral  Ensure Compact Cans or Servings Per Day:  1       Frequency:  Two Times a day  ___________________    ==================>> VITAL SIGNS <<==================    Vital Signs Last 24 HrsT(C): 36.4 (01-19-24 @ 11:03)  T(F): 97.5 (01-19-24 @ 11:03), Max: 98.4 (01-19-24 @ 04:30)  HR: 78 (01-19-24 @ 14:07) (78 - 100)  BP: 113/59 (01-19-24 @ 14:07)  RR: 18 (01-19-24 @ 11:03) (18 - 20)  SpO2: 94% (01-19-24 @ 11:04) (94% - 99%)      CAPILLARY BLOOD GLUCOSE      POCT Blood Glucose.: 99 mg/dL (19 Jan 2024 07:45)  POCT Blood Glucose.: 135 mg/dL (18 Jan 2024 21:27)  POCT Blood Glucose.: 140 mg/dL (18 Jan 2024 16:52)     ==================>> LAB AND IMAGING <<==================       01-18    140  |  107  |  26<H>  ----------------------------<  107<H>  3.7   |  29  |  1.24    Ca    9.5      18 Jan 2024 07:00  Phos  3.5     01-18  Mg     1.9     01-18      WBC count:   4.75 <<== ,  5.74 <<==   Hemoglobin:   9.1 <<==,  9.2 <<==  platelets:  125 <==, 92 <==, 143 <==, 108 <==    Creatinine:  1.24  <<==, 1.46  <<==, 1.69  <<==, 1.57  <<==, 1.61  <<==, 1.31  <<==  Sodium:   140  <==, 141  <==, 142  <==, 139  <==, 138  <==, 140  <==       AST:          12(01-17) <== , 12(01-16) <== , 19(01-15) <== , 16(01-12) <== , 16(01-10) <==      ALT:        14(01-17)  <== , 16(01-16)  <== , 15(01-15)  <== , 15(01-12)  <== , 15(01-10)  <==      AP:        60(01-17)  <=, 62(01-16)  <=, 61(01-15)  <=, 76(01-12)  <=, 59(01-10)  <=     Bili:        0.4(01-17)  <=, 0.4(01-16)  <=, 0.6(01-15)  <=, 0.5(01-12)  <=, 0.5(01-10)  <=    ____________________________    M I C R O B I O L O G Y :      [facility]  [facility] 06[facility]24[facility]      ___________________________________________________________________________________  ===============>>  A S S E S S M E N T   A N D   P L A N <<===============  ------------------------------------------------------------------------------------------    acute hypoxemic respiratory failure  acute exacerbation of chromic systolic CHF  Afib with RVR   COPD  >> now with new exacerbation      smoker ( long term, recently stopped !)   acute Pulmonary edema > improved   suspect new viral URI  DM type 2  history of Hypertension     ====>>>>    -  monitor off O2      COPD Under control           CHF seems to be stable >> continue diuretics as able / needed :: monitor creatinine            Need close cardio Follow-up and management                 ? electrophysiology consultation   pulm follow up and management appreciated              Patient asking for home O2, however don't believe patient Meets requirements  - Diuresis and aim for negative fluid balance  - Monitor urine output  - Cont. AC  / HR control   - Hemodynamic and cardiac monitoring  - Continue Current medications otherwise and monitor.  - nutrition, OOB as able, incentive spirometer   - supportive care   -GI/DVT Prophylaxis per protocol.    Discharge planning when cleared by cardiology    --------------------------------------------  Case discussed with patient Nurse Practitioner   Education given on findings and plan of care  ___________________________  SALVATORE Rivera D.O.  Pager: [phone]   _________________________________________________________________________________________  ========>>  M E D I C A L   A T T E N D I N G    F O L L O W  U P  N O T E  <<=========  -----------------------------------------------------------------------------------------------------    - Patient seen and examined by me earlier today.    - Patient doing better, off o2..     Telemetry reviewed, no significant events noted  I had a long discussion with patient, separately with patient's sister over the phone (who is patient's care take at home), discussed the need for close monitoring with cardiology as outpatient, compliance with medications, salt restriction, another issues.  All questions answered    ==================>> REVIEW OF SYSTEM <<=================    GEN: no fever, no chills, no pain..   RESP: no  SOB on and off..  no cough   CVS: no chest pain, no palpitations  GI: no abdominal pain, no nausea, eating ok   : no dysuria, no frequency  Neuro: no headache, no dizziness    ==================>> PHYSICAL EXAM <<=================    GEN: A&O X 3 , NAD , comfortable, pleasant, calm in bed off oxygen..  Walking independently to bathroom  HEENT: NCAT, PERRL, MMM, hearing intact  CVS: S1S2 , Irregular , No M/R/G appreciated, not tachy  PULM:  scattered bilateral mild rhonchi, no wheezing   ABD.: soft. non tender, non distended,  bowel sounds present  Extrem: intact pulses , NO edema          ( Note written / Date of service 01-19-24 ( This is certified to be the same as "ENTERED" date above ( for billing purposes)))    ==================>> MEDICATIONS <<====================    albuterol/ipratropium for Nebulization 3 milliLiter(s) Nebulizer every 6 hours  amLODIPine   Tablet 5 milliGRAM(s) Oral daily  apixaban 5 milliGRAM(s) Oral two times a day  aspirin  chewable 81 milliGRAM(s) Oral daily  atorvastatin 40 milliGRAM(s) Oral at bedtime  bacitracin   Ointment 1 Application(s) Topical three times a day  budesonide 160 MICROgram(s)/formoterol 4.5 MICROgram(s) Inhaler 2 Puff(s) Inhalation two times a day  buMETAnide 1 milliGRAM(s) Oral daily  digoxin     Tablet 125 MICROGram(s) Oral daily  gabapentin 300 milliGRAM(s) Oral at bedtime  insulin lispro (ADMELOG) corrective regimen sliding scale   SubCutaneous Before meals and at bedtime  losartan 100 milliGRAM(s) Oral daily  melatonin 5 milliGRAM(s) Oral at bedtime  metoprolol tartrate 50 milliGRAM(s) Oral every 8 hours  montelukast 10 milliGRAM(s) Oral at bedtime  pantoprazole    Tablet 40 milliGRAM(s) Oral before breakfast  polyethylene glycol 3350 17 Gram(s) Oral daily  senna 2 Tablet(s) Oral at bedtime  tiotropium 2.5 MICROgram(s) Inhaler 2 Puff(s) Inhalation daily    MEDICATIONS  (PRN):  albuterol    90 MICROgram(s) HFA Inhaler 2 Puff(s) Inhalation every 6 hours PRN Shortness of Breath and/or Wheezing    ___________  Active diet:  Diet, Regular:   DASH/TLC Sodium & Cholesterol Restricted  1200mL Fluid Restriction ([id number])  Kosher  Supplement Feeding Modality:  Oral  Ensure Compact Cans or Servings Per Day:  1       Frequency:  Two Times a day  ___________________    ==================>> VITAL SIGNS <<==================    Vital Signs Last 24 HrsT(C): 36.4 (01-19-24 @ 11:03)  T(F): 97.5 (01-19-24 @ 11:03), Max: 98.4 (01-19-24 @ 04:30)  HR: 78 (01-19-24 @ 14:07) (78 - 100)  BP: 113/59 (01-19-24 @ 14:07)  RR: 18 (01-19-24 @ 11:03) (18 - 20)  SpO2: 94% (01-19-24 @ 11:04) (94% - 99%)      CAPILLARY BLOOD GLUCOSE    POCT Blood Glucose.: 99 mg/dL (19 Jan 2024 07:45)  POCT Blood Glucose.: 135 mg/dL (18 Jan 2024 21:27)  POCT Blood Glucose.: 140 mg/dL (18 Jan 2024 16:52)     ==================>> LAB AND IMAGING <<==================       01-18    140  |  107  |  26<H>  ----------------------------<  107<H>  3.7   |  29  |  1.24    Ca    9.5      18 Jan 2024 07:00  Phos  3.5     01-18  Mg     1.9     01-18      WBC count:   4.75 <<== ,  5.74 <<==   Hemoglobin:   9.1 <<==,  9.2 <<==  platelets:  125 <==, 92 <==, 143 <==, 108 <==    Creatinine:  1.24  <<==, 1.46  <<==, 1.69  <<==, 1.57  <<==, 1.61  <<==, 1.31  <<==  Sodium:   140  <==, 141  <==, 142  <==, 139  <==, 138  <==, 140  <==       AST:          12(01-17) <== , 12(01-16) <== , 19(01-15) <== , 16(01-12) <== , 16(01-10) <==      ALT:        14(01-17)  <== , 16(01-16)  <== , 15(01-15)  <== , 15(01-12)  <== , 15(01-10)  <==      AP:        60(01-17)  <=, 62(01-16)  <=, 61(01-15)  <=, 76(01-12)  <=, 59(01-10)  <=     Bili:        0.4(01-17)  <=, 0.4(01-16)  <=, 0.6(01-15)  <=, 0.5(01-12)  <=, 0.5(01-10)  <=    ____________________________    M I C R O B I O L O G Y :      SARS-CoV-2: [facility] (01-14-24 @ 11:00)  SARS-CoV-2: [facility] (01-08-24 @ 06:24)      ___________________________________________________________________________________  ===============>>  A S S E S S M E N T   A N D   P L A N <<===============  ------------------------------------------------------------------------------------------    acute hypoxemic respiratory failure  acute exacerbation of chromic systolic CHF  Afib with RVR : Seems improved overall  COPD  >> Stable     smoker ( long term, recently stopped !)   acute Pulmonary edema > improved   suspect new viral URI  DM type 2  history of Hypertension     ====>>>>    -  monitor off O2      COPD Under control           CHF seems to be stable >> continue diuretics as able / needed :: monitor creatinine            Need close cardio Follow-up and management (Requested cardiology follow-up two days ago)                ? electrophysiology consultation   pulm follow up and management appreciated              Patient asking for home O2, however don't believe patient Meets requirements  - Diuresis and aim for negative fluid balance  - Monitor urine output  - Cont. AC  / HR control   - Hemodynamic and cardiac monitoring  - Continue Current medications otherwise and monitor.  - nutrition, OOB as able, incentive spirometer   - supportive care   -GI/DVT Prophylaxis per protocol.    Discharge planning as above     --------------------------------------------  Case discussed with patient Nurse Practitioner ,    Education given on findings and plan of care  ___________________________  HAngella Rivera D.O.  Pager: [phone]

## 2024-01-19 NOTE — PROGRESS NOTE ADULT - REASON FOR ADMISSION
AHRF, CHF exacerbation

## 2024-01-19 NOTE — PROGRESS NOTE ADULT - SUBJECTIVE AND OBJECTIVE BOX
Time of Visit:  Patient seen and examined. pat is lying in bed comfortable , ambient air     MEDICATIONS  (STANDING):  albuterol/ipratropium for Nebulization 3 milliLiter(s) Nebulizer every 6 hours  amLODIPine   Tablet 5 milliGRAM(s) Oral daily  apixaban 5 milliGRAM(s) Oral two times a day  aspirin  chewable 81 milliGRAM(s) Oral daily  atorvastatin 40 milliGRAM(s) Oral at bedtime  bacitracin   Ointment 1 Application(s) Topical three times a day  budesonide 160 MICROgram(s)/formoterol 4.5 MICROgram(s) Inhaler 2 Puff(s) Inhalation two times a day  buMETAnide 1 milliGRAM(s) Oral daily  digoxin     Tablet 125 MICROGram(s) Oral daily  gabapentin 300 milliGRAM(s) Oral at bedtime  insulin lispro (ADMELOG) corrective regimen sliding scale   SubCutaneous Before meals and at bedtime  losartan 100 milliGRAM(s) Oral daily  melatonin 5 milliGRAM(s) Oral at bedtime  metoprolol tartrate 50 milliGRAM(s) Oral every 8 hours  montelukast 10 milliGRAM(s) Oral at bedtime  pantoprazole    Tablet 40 milliGRAM(s) Oral before breakfast  polyethylene glycol 3350 17 Gram(s) Oral daily  senna 2 Tablet(s) Oral at bedtime  tiotropium 2.5 MICROgram(s) Inhaler 2 Puff(s) Inhalation daily      MEDICATIONS  (PRN):  albuterol    90 MICROgram(s) HFA Inhaler 2 Puff(s) Inhalation every 6 hours PRN Shortness of Breath and/or Wheezing       Medications up to date at time of exam.      PHYSICAL EXAMINATION:  Patient has no new complaints.  GENERAL: The patient is a well-developed, well-nourished, in no apparent distress.     Vital Signs Last 24 Hrs  T(C): 36.4 (19 Jan 2024 15:36), Max: 36.9 (19 Jan 2024 04:30)  T(F): 97.5 (19 Jan 2024 15:36), Max: 98.4 (19 Jan 2024 04:30)  HR: 84 (19 Jan 2024 15:36) (78 - 100)  BP: 125/66 (19 Jan 2024 15:36) (113/59 - 133/74)  BP(mean): --  RR: 18 (19 Jan 2024 15:36) (18 - 20)  SpO2: 97% (19 Jan 2024 15:36) (94% - 99%)    Parameters below as of 19 Jan 2024 15:36  Patient On (Oxygen Delivery Method): room air       (if applicable)    Chest Tube (if applicable)    HEENT: Head is normocephalic and atraumatic. Extraocular muscles are intact. Mucous membranes are moist.     NECK: Supple, no palpable adenopathy.    LUNGS: Clear to auscultation, no wheezing, rales, or rhonchi.    HEART: Regular rate and rhythm without murmur.    ABDOMEN: Soft, nontender, and nondistended.  No hepatosplenomegaly is noted.    : No painful voiding, no pelvic pain    EXTREMITIES: Without any cyanosis, clubbing, rash, lesions or edema.    NEUROLOGIC: Awake, alert, oriented, grossly intact    SKIN: Warm, dry, good turgor.      LABS:    01-18    140  |  107  |  26<H>  ----------------------------<  107<H>  3.7   |  29  |  1.24    Ca    9.5      18 Jan 2024 07:00  Phos  3.5     01-18  Mg     1.9     01-18        Urinalysis Basic - ( 18 Jan 2024 07:00 )    Color: x / Appearance: x / SG: x / pH: x  Gluc: 107 mg/dL / Ketone: x  / Bili: x / Urobili: x   Blood: x / Protein: x / Nitrite: x   Leuk Esterase: x / RBC: x / WBC x   Sq Epi: x / Non Sq Epi: x / Bacteria: x                      MICROBIOLOGY: (if applicable)    RADIOLOGY & ADDITIONAL STUDIES:  EKG:   CXR:  ECHO:    IMPRESSION: 68y Male PAST MEDICAL & SURGICAL HISTORY:  HTN (hypertension)      Varicose veins      HLD (hyperlipidemia)      Liver cirrhosis      Portal hypertension with esophageal varices      COPD (chronic obstructive pulmonary disease)      Syncope      Alcohol dependence, daily use      Smokes tobacco daily      DM (diabetes mellitus)      No significant past surgical history       p/w           Impression: This is a 67 Y/O Male former smoker. hx of chronic ETOH dependence with Cirrhosis . Presented to ED with worsening SOB. In the ED patient placed on Bipap. For pulmonary follow up of Acute hypoxic respiratory failure due to COPD , pulmonary edema / HFrEF , Rapid A Fib. Off Bipap and using on and off  Oxygen supplementation . 01-15-24 CXR reviewed No pneumothorax, No Effusion.        Suggestion:  - O2 saturation 98% room air . So far saturating good room air.   - D/C O2 supp and BiPaP   - Continue Duoneb via nebulization Q 6 Hours . Do not give at the same time with PRN Albuterol 90 mcg 2 puffs Q 6 Hours.   - Continue Symbicort 160- 4.5 mcg 2 puffs Twice Daily with oral spacer .   - Continue Singulair 10 mg Daily.    - On Apixaban 5 mg Twice daily.   - Continue Tiotropium 2.5 mcg 2 puffs Daily.    - On Bumex 1 mg Oral Daily.   - Diuresis and aim for negative fluid balance.

## 2024-01-20 ENCOUNTER — TRANSCRIPTION ENCOUNTER (OUTPATIENT)
Age: 69
End: 2024-01-20

## 2024-01-20 VITALS
TEMPERATURE: 98 F | RESPIRATION RATE: 18 BRPM | OXYGEN SATURATION: 95 % | SYSTOLIC BLOOD PRESSURE: 114 MMHG | HEART RATE: 84 BPM | DIASTOLIC BLOOD PRESSURE: 72 MMHG

## 2024-01-20 LAB
GLUCOSE BLDC GLUCOMTR-MCNC: 126 MG/DL — HIGH (ref 70–99)
GLUCOSE BLDC GLUCOMTR-MCNC: 97 MG/DL — SIGNIFICANT CHANGE UP (ref 70–99)

## 2024-01-20 PROCEDURE — 96374 THER/PROPH/DIAG INJ IV PUSH: CPT

## 2024-01-20 PROCEDURE — 85027 COMPLETE CBC AUTOMATED: CPT

## 2024-01-20 PROCEDURE — 87040 BLOOD CULTURE FOR BACTERIA: CPT

## 2024-01-20 PROCEDURE — 94660 CPAP INITIATION&MGMT: CPT

## 2024-01-20 PROCEDURE — 84100 ASSAY OF PHOSPHORUS: CPT

## 2024-01-20 PROCEDURE — 86703 HIV-1/HIV-2 1 RESULT ANTBDY: CPT

## 2024-01-20 PROCEDURE — 71250 CT THORAX DX C-: CPT | Mod: MA

## 2024-01-20 PROCEDURE — 82962 GLUCOSE BLOOD TEST: CPT

## 2024-01-20 PROCEDURE — 83880 ASSAY OF NATRIURETIC PEPTIDE: CPT

## 2024-01-20 PROCEDURE — 82803 BLOOD GASES ANY COMBINATION: CPT

## 2024-01-20 PROCEDURE — 84484 ASSAY OF TROPONIN QUANT: CPT

## 2024-01-20 PROCEDURE — 83605 ASSAY OF LACTIC ACID: CPT

## 2024-01-20 PROCEDURE — 80061 LIPID PANEL: CPT

## 2024-01-20 PROCEDURE — 84295 ASSAY OF SERUM SODIUM: CPT

## 2024-01-20 PROCEDURE — 71045 X-RAY EXAM CHEST 1 VIEW: CPT

## 2024-01-20 PROCEDURE — 85025 COMPLETE CBC W/AUTO DIFF WBC: CPT

## 2024-01-20 PROCEDURE — 83735 ASSAY OF MAGNESIUM: CPT

## 2024-01-20 PROCEDURE — 93005 ELECTROCARDIOGRAM TRACING: CPT

## 2024-01-20 PROCEDURE — 97161 PT EVAL LOW COMPLEX 20 MIN: CPT

## 2024-01-20 PROCEDURE — 36415 COLL VENOUS BLD VENIPUNCTURE: CPT

## 2024-01-20 PROCEDURE — 94640 AIRWAY INHALATION TREATMENT: CPT

## 2024-01-20 PROCEDURE — 96375 TX/PRO/DX INJ NEW DRUG ADDON: CPT

## 2024-01-20 PROCEDURE — 80053 COMPREHEN METABOLIC PANEL: CPT

## 2024-01-20 PROCEDURE — 85610 PROTHROMBIN TIME: CPT

## 2024-01-20 PROCEDURE — 99285 EMERGENCY DEPT VISIT HI MDM: CPT

## 2024-01-20 PROCEDURE — 85379 FIBRIN DEGRADATION QUANT: CPT

## 2024-01-20 PROCEDURE — 96376 TX/PRO/DX INJ SAME DRUG ADON: CPT

## 2024-01-20 PROCEDURE — 0225U NFCT DS DNA&RNA 21 SARSCOV2: CPT

## 2024-01-20 PROCEDURE — 82330 ASSAY OF CALCIUM: CPT

## 2024-01-20 PROCEDURE — 84132 ASSAY OF SERUM POTASSIUM: CPT

## 2024-01-20 PROCEDURE — 80048 BASIC METABOLIC PNL TOTAL CA: CPT

## 2024-01-20 RX ADMIN — Medication 125 MICROGRAM(S): at 06:00

## 2024-01-20 RX ADMIN — APIXABAN 5 MILLIGRAM(S): 2.5 TABLET, FILM COATED ORAL at 06:00

## 2024-01-20 RX ADMIN — Medication 50 MILLIGRAM(S): at 05:59

## 2024-01-20 RX ADMIN — PANTOPRAZOLE SODIUM 40 MILLIGRAM(S): 20 TABLET, DELAYED RELEASE ORAL at 06:01

## 2024-01-20 RX ADMIN — BUDESONIDE AND FORMOTEROL FUMARATE DIHYDRATE 2 PUFF(S): 160; 4.5 AEROSOL RESPIRATORY (INHALATION) at 09:01

## 2024-01-20 RX ADMIN — AMLODIPINE BESYLATE 5 MILLIGRAM(S): 2.5 TABLET ORAL at 06:00

## 2024-01-20 RX ADMIN — Medication 3 MILLILITER(S): at 09:20

## 2024-01-20 RX ADMIN — Medication 1 APPLICATION(S): at 05:59

## 2024-01-20 RX ADMIN — ALBUTEROL 2 PUFF(S): 90 AEROSOL, METERED ORAL at 07:48

## 2024-01-20 RX ADMIN — BUMETANIDE 1 MILLIGRAM(S): 0.25 INJECTION INTRAMUSCULAR; INTRAVENOUS at 06:00

## 2024-01-20 RX ADMIN — TIOTROPIUM BROMIDE 2 PUFF(S): 18 CAPSULE ORAL; RESPIRATORY (INHALATION) at 11:17

## 2024-01-20 RX ADMIN — Medication 81 MILLIGRAM(S): at 11:17

## 2024-01-20 RX ADMIN — LOSARTAN POTASSIUM 100 MILLIGRAM(S): 100 TABLET, FILM COATED ORAL at 06:00

## 2024-01-20 NOTE — DISCHARGE NOTE NURSING/CASE MANAGEMENT/SOCIAL WORK - PATIENT PORTAL LINK FT
You can access the FollowMyHealth Patient Portal offered by [facility] by registering at the following website: [email or web address]. By joining [facility]’s FollowMyHealth portal, you will also be able to view your health information using other applications (apps) compatible with our system.

## 2024-03-18 ENCOUNTER — APPOINTMENT (OUTPATIENT)
Dept: CARDIOLOGY | Facility: CLINIC | Age: 69
End: 2024-03-18

## 2024-03-19 ENCOUNTER — APPOINTMENT (OUTPATIENT)
Dept: PULMONOLOGY | Facility: CLINIC | Age: 69
End: 2024-03-19

## 2024-10-10 NOTE — PROGRESS NOTE ADULT - PROBLEM SELECTOR PLAN 6
normal (ped)...
pt was recently found to have esophageal varices on EGD.  -Nadolol was not given as patient had bradycardia, will give once bradycardia resolved.   GI - Dr. Christensen.
IMPROVE VTE Score: 2, VTE ppx with heparin and GI ppx with protonix.
[] Previous VTE                                                3  [] Thrombophilia                                             2  [] Lower limb paralysis                                   2    [] Current Cancer                                             2   [] Immobilization > 24 hrs                              1  [x] ICU/CCU stay > 24 hours                             1  [x] Age > 60                                                         1    IMPROVE VTE Score: 2, VTE ppx with heparin and GI ppx with protonix.
pt was recently found to have esophageal varices on EGD.  -Nadolol was not given as patient had bradycardia, will give once bradycardia resolved.   GI - Dr. Christensen.
pt was recently found to have esophageal varices on EGD.  -Nadolol was not given as patient had bradycardia, will give once bradycardia resolved.   GI - Dr. Christensen.
pt was recently found to have esophageal varices on EGD.  will give nadolol from ngt as recommended by gi.   GI - Dr. Christensen.
pt was recently found to have esophageal varices on EGD.  will hold off beta blocker due to bradycardia on telemetry.  GI - Dr. Christensen.
[] Previous VTE                                                3  [] Thrombophilia                                             2  [] Lower limb paralysis                                   2    [] Current Cancer                                             2   [] Immobilization > 24 hrs                              1  [x] ICU/CCU stay > 24 hours                             1  [x] Age > 60                                                         1    IMPROVE VTE Score: 2, VTE ppx with heparin and GI ppx with protonix.

## 2024-10-31 NOTE — ED ADULT NURSE NOTE - CAS TRG GEN SKIN CONDITION
RN ear assessment completed prior to ear irrigation. Right side - Otoscopic exam reveals cerumen present and irrigation advised. Post Ear Irrigation exam completed and no bleeding noted and cerumen still present .  Pain assessment completed.     Patient tolerated procedure:  yes    Discussed using debrox at home, patient familiar -- recently used debrox for left side ear wax.  
Warm

## 2025-01-28 NOTE — ED PROVIDER NOTE - ADMIT DISPOSITION PRESENT ON ADMISSION SEPSIS
Detail Level: Detailed Add 1585x Cpt? (Do Not Bill If You Billed For The Procedure Placing The Sutures. This Is An Add-On Code That Must Be Billed With An E/M Visit Code): No No

## 2025-05-22 NOTE — PATIENT PROFILE ADULT - FUNCTIONAL ASSESSMENT - DAILY ACTIVITY SECTION LABEL
. Detail Level: Zone Add High Risk Medication Management Associated Diagnosis?: No Comments: Patient starting initiation dose of Simlandi injections today after appt.

## 2025-05-30 NOTE — PROGRESS NOTE ADULT - PROBLEM SELECTOR PROBLEM 7
Thrombocytopenia
no
Thrombocytopenia
Thrombocytopenia

## 2025-07-21 NOTE — DISCHARGE NOTE NURSING/CASE MANAGEMENT/SOCIAL WORK - NSDPLANG ASIS_GEN_ALL_CORE
Connected Care Resource Center: [facility]    Christy Patterson  Community Health Worker  [facility] [phone]      *Connected Care Resource Team does NOT follow patient ongoing. Referrals are identified based on internal discharge reports and the outreach is to ensure patient has an understanding of their discharge instructions.     No Yes